# Patient Record
Sex: FEMALE | Race: WHITE | NOT HISPANIC OR LATINO
[De-identification: names, ages, dates, MRNs, and addresses within clinical notes are randomized per-mention and may not be internally consistent; named-entity substitution may affect disease eponyms.]

---

## 2017-01-04 ENCOUNTER — RX RENEWAL (OUTPATIENT)
Age: 82
End: 2017-01-04

## 2017-01-20 LAB
ALBUMIN SERPL ELPH-MCNC: 4.1 G/DL
ALP BLD-CCNC: 52 U/L
ALT SERPL-CCNC: 14 U/L
ANION GAP SERPL CALC-SCNC: 12 MMOL/L
APPEARANCE: CLEAR
AST SERPL-CCNC: 22 U/L
BASOPHILS # BLD AUTO: 0 K/UL
BASOPHILS NFR BLD AUTO: 0 %
BILIRUB SERPL-MCNC: 0.3 MG/DL
BILIRUBIN URINE: NEGATIVE
BLOOD URINE: NEGATIVE
BUN SERPL-MCNC: 17 MG/DL
CALCIUM SERPL-MCNC: 9.4 MG/DL
CHLORIDE SERPL-SCNC: 110 MMOL/L
CO2 SERPL-SCNC: 22 MMOL/L
COLOR: YELLOW
CREAT SERPL-MCNC: 1.29 MG/DL
EOSINOPHIL # BLD AUTO: 0.06 K/UL
EOSINOPHIL NFR BLD AUTO: 1.9 %
GLUCOSE QUALITATIVE U: NORMAL MG/DL
GLUCOSE SERPL-MCNC: 139 MG/DL
HCT VFR BLD CALC: 36.7 %
HGB BLD-MCNC: 11.8 G/DL
IMM GRANULOCYTES NFR BLD AUTO: 0 %
KETONES URINE: NEGATIVE
LEUKOCYTE ESTERASE URINE: NEGATIVE
LYMPHOCYTES # BLD AUTO: 1.09 K/UL
LYMPHOCYTES NFR BLD AUTO: 34.5 %
MAN DIFF?: NORMAL
MCHC RBC-ENTMCNC: 29.9 PG
MCHC RBC-ENTMCNC: 32.2 GM/DL
MCV RBC AUTO: 92.9 FL
MONOCYTES # BLD AUTO: 0.18 K/UL
MONOCYTES NFR BLD AUTO: 5.7 %
NEUTROPHILS # BLD AUTO: 1.83 K/UL
NEUTROPHILS NFR BLD AUTO: 57.9 %
NITRITE URINE: NEGATIVE
PH URINE: 5
PLATELET # BLD AUTO: 125 K/UL
POTASSIUM SERPL-SCNC: 3.9 MMOL/L
PROT SERPL-MCNC: 6.4 G/DL
PROTEIN URINE: NEGATIVE MG/DL
RBC # BLD: 3.95 M/UL
RBC # FLD: 15 %
SODIUM SERPL-SCNC: 144 MMOL/L
SPECIFIC GRAVITY URINE: 1.02
UROBILINOGEN URINE: NORMAL MG/DL
WBC # FLD AUTO: 3.16 K/UL

## 2017-01-23 ENCOUNTER — APPOINTMENT (OUTPATIENT)
Dept: HEMATOLOGY ONCOLOGY | Facility: CLINIC | Age: 82
End: 2017-01-23

## 2017-01-23 VITALS
WEIGHT: 171 LBS | SYSTOLIC BLOOD PRESSURE: 128 MMHG | TEMPERATURE: 97.8 F | DIASTOLIC BLOOD PRESSURE: 60 MMHG | BODY MASS INDEX: 30.29 KG/M2 | HEART RATE: 76 BPM

## 2017-01-26 ENCOUNTER — LABORATORY RESULT (OUTPATIENT)
Age: 82
End: 2017-01-26

## 2017-01-26 LAB
ALBUMIN SERPL ELPH-MCNC: 4.3 G/DL
ALP BLD-CCNC: 65 U/L
ALT SERPL-CCNC: 22 U/L
ANION GAP SERPL CALC-SCNC: 14 MMOL/L
APPEARANCE: ABNORMAL
AST SERPL-CCNC: 33 U/L
BASOPHILS # BLD AUTO: 0 K/UL
BASOPHILS NFR BLD AUTO: 0 %
BILIRUB SERPL-MCNC: 0.3 MG/DL
BILIRUBIN URINE: NEGATIVE
BLOOD URINE: NEGATIVE
BUN SERPL-MCNC: 20 MG/DL
CALCIUM SERPL-MCNC: 9.4 MG/DL
CHLORIDE SERPL-SCNC: 111 MMOL/L
CO2 SERPL-SCNC: 19 MMOL/L
COLOR: YELLOW
CREAT SERPL-MCNC: 1.24 MG/DL
EOSINOPHIL # BLD AUTO: 0.07 K/UL
EOSINOPHIL NFR BLD AUTO: 2.2 %
GLUCOSE QUALITATIVE U: NORMAL MG/DL
GLUCOSE SERPL-MCNC: 149 MG/DL
HCT VFR BLD CALC: 37.3 %
HGB BLD-MCNC: 12.1 G/DL
IMM GRANULOCYTES NFR BLD AUTO: 0 %
KETONES URINE: NEGATIVE
LEUKOCYTE ESTERASE URINE: NEGATIVE
LYMPHOCYTES # BLD AUTO: 1.11 K/UL
LYMPHOCYTES NFR BLD AUTO: 34.7 %
MAN DIFF?: NORMAL
MCHC RBC-ENTMCNC: 30.1 PG
MCHC RBC-ENTMCNC: 32.4 GM/DL
MCV RBC AUTO: 92.8 FL
MONOCYTES # BLD AUTO: 0.3 K/UL
MONOCYTES NFR BLD AUTO: 9.4 %
NEUTROPHILS # BLD AUTO: 1.72 K/UL
NEUTROPHILS NFR BLD AUTO: 53.7 %
NITRITE URINE: NEGATIVE
PH URINE: 5.5
PLATELET # BLD AUTO: 136 K/UL
POTASSIUM SERPL-SCNC: 3.6 MMOL/L
PROT SERPL-MCNC: 6.8 G/DL
PROTEIN URINE: ABNORMAL MG/DL
RBC # BLD: 4.02 M/UL
RBC # FLD: 16.4 %
SODIUM SERPL-SCNC: 144 MMOL/L
SPECIFIC GRAVITY URINE: 1.02
UROBILINOGEN URINE: NORMAL MG/DL
WBC # FLD AUTO: 3.2 K/UL

## 2017-01-30 ENCOUNTER — INPATIENT (INPATIENT)
Facility: HOSPITAL | Age: 82
LOS: 1 days | Discharge: HOME | DRG: 310 | End: 2017-02-01
Attending: INTERNAL MEDICINE | Admitting: STUDENT IN AN ORGANIZED HEALTH CARE EDUCATION/TRAINING PROGRAM
Payer: MEDICARE

## 2017-01-30 ENCOUNTER — APPOINTMENT (OUTPATIENT)
Age: 82
End: 2017-01-30

## 2017-01-30 ENCOUNTER — APPOINTMENT (OUTPATIENT)
Dept: INFUSION THERAPY | Facility: HOSPITAL | Age: 82
End: 2017-01-30

## 2017-01-30 ENCOUNTER — OUTPATIENT (OUTPATIENT)
Dept: OUTPATIENT SERVICES | Facility: HOSPITAL | Age: 82
LOS: 1 days | End: 2017-01-30
Payer: MEDICARE

## 2017-01-30 VITALS — DIASTOLIC BLOOD PRESSURE: 56 MMHG | HEART RATE: 44 BPM | SYSTOLIC BLOOD PRESSURE: 138 MMHG | RESPIRATION RATE: 14 BRPM

## 2017-01-30 DIAGNOSIS — Z79.82 LONG TERM (CURRENT) USE OF ASPIRIN: ICD-10-CM

## 2017-01-30 DIAGNOSIS — Z79.84 LONG TERM (CURRENT) USE OF ORAL HYPOGLYCEMIC DRUGS: ICD-10-CM

## 2017-01-30 DIAGNOSIS — R00.1 BRADYCARDIA, UNSPECIFIED: ICD-10-CM

## 2017-01-30 DIAGNOSIS — I44.1 ATRIOVENTRICULAR BLOCK, SECOND DEGREE: ICD-10-CM

## 2017-01-30 DIAGNOSIS — Z85.3 PERSONAL HISTORY OF MALIGNANT NEOPLASM OF BREAST: ICD-10-CM

## 2017-01-30 DIAGNOSIS — I10 ESSENTIAL (PRIMARY) HYPERTENSION: ICD-10-CM

## 2017-01-30 DIAGNOSIS — E78.5 HYPERLIPIDEMIA, UNSPECIFIED: ICD-10-CM

## 2017-01-30 DIAGNOSIS — Z85.038 PERSONAL HISTORY OF OTHER MALIGNANT NEOPLASM OF LARGE INTESTINE: ICD-10-CM

## 2017-01-30 DIAGNOSIS — Z92.21 PERSONAL HISTORY OF ANTINEOPLASTIC CHEMOTHERAPY: ICD-10-CM

## 2017-01-30 DIAGNOSIS — Z87.891 PERSONAL HISTORY OF NICOTINE DEPENDENCE: ICD-10-CM

## 2017-01-30 DIAGNOSIS — E11.9 TYPE 2 DIABETES MELLITUS WITHOUT COMPLICATIONS: ICD-10-CM

## 2017-01-30 PROCEDURE — 71010: CPT | Mod: 26

## 2017-01-30 PROCEDURE — 99223 1ST HOSP IP/OBS HIGH 75: CPT

## 2017-01-30 PROCEDURE — 96360 HYDRATION IV INFUSION INIT: CPT

## 2017-01-31 PROCEDURE — 93010 ELECTROCARDIOGRAM REPORT: CPT

## 2017-01-31 PROCEDURE — 93306 TTE W/DOPPLER COMPLETE: CPT | Mod: 26

## 2017-01-31 PROCEDURE — 99233 SBSQ HOSP IP/OBS HIGH 50: CPT

## 2017-02-01 PROCEDURE — 99223 1ST HOSP IP/OBS HIGH 75: CPT

## 2017-02-01 PROCEDURE — 84443 ASSAY THYROID STIM HORMONE: CPT

## 2017-02-01 PROCEDURE — 93017 CV STRESS TEST TRACING ONLY: CPT

## 2017-02-01 PROCEDURE — 82553 CREATINE MB FRACTION: CPT

## 2017-02-01 PROCEDURE — A9500: CPT

## 2017-02-01 PROCEDURE — 78452 HT MUSCLE IMAGE SPECT MULT: CPT

## 2017-02-01 PROCEDURE — 93306 TTE W/DOPPLER COMPLETE: CPT

## 2017-02-01 PROCEDURE — 80061 LIPID PANEL: CPT

## 2017-02-01 PROCEDURE — 99285 EMERGENCY DEPT VISIT HI MDM: CPT | Mod: 25

## 2017-02-01 PROCEDURE — 83036 HEMOGLOBIN GLYCOSYLATED A1C: CPT

## 2017-02-01 PROCEDURE — 96360 HYDRATION IV INFUSION INIT: CPT

## 2017-02-01 PROCEDURE — 81003 URINALYSIS AUTO W/O SCOPE: CPT

## 2017-02-01 PROCEDURE — 80048 BASIC METABOLIC PNL TOTAL CA: CPT

## 2017-02-01 PROCEDURE — 93018 CV STRESS TEST I&R ONLY: CPT

## 2017-02-01 PROCEDURE — 80076 HEPATIC FUNCTION PANEL: CPT

## 2017-02-01 PROCEDURE — 78452 HT MUSCLE IMAGE SPECT MULT: CPT | Mod: 26

## 2017-02-01 PROCEDURE — 85730 THROMBOPLASTIN TIME PARTIAL: CPT

## 2017-02-01 PROCEDURE — 71045 X-RAY EXAM CHEST 1 VIEW: CPT

## 2017-02-01 PROCEDURE — 93016 CV STRESS TEST SUPVJ ONLY: CPT

## 2017-02-01 PROCEDURE — 93005 ELECTROCARDIOGRAM TRACING: CPT

## 2017-02-01 PROCEDURE — 85027 COMPLETE CBC AUTOMATED: CPT

## 2017-02-01 PROCEDURE — 99232 SBSQ HOSP IP/OBS MODERATE 35: CPT | Mod: 25

## 2017-02-01 PROCEDURE — 85610 PROTHROMBIN TIME: CPT

## 2017-02-01 PROCEDURE — 84484 ASSAY OF TROPONIN QUANT: CPT

## 2017-02-01 PROCEDURE — 82550 ASSAY OF CK (CPK): CPT

## 2017-02-06 ENCOUNTER — APPOINTMENT (OUTPATIENT)
Dept: HEMATOLOGY ONCOLOGY | Facility: CLINIC | Age: 82
End: 2017-02-06

## 2017-02-06 VITALS
DIASTOLIC BLOOD PRESSURE: 70 MMHG | BODY MASS INDEX: 30.11 KG/M2 | TEMPERATURE: 97.8 F | SYSTOLIC BLOOD PRESSURE: 160 MMHG | WEIGHT: 170 LBS | HEART RATE: 80 BPM

## 2017-02-08 ENCOUNTER — APPOINTMENT (OUTPATIENT)
Dept: CARDIOLOGY | Facility: CLINIC | Age: 82
End: 2017-02-08

## 2017-02-08 ENCOUNTER — NON-APPOINTMENT (OUTPATIENT)
Age: 82
End: 2017-02-08

## 2017-02-08 VITALS
HEART RATE: 86 BPM | HEIGHT: 63 IN | SYSTOLIC BLOOD PRESSURE: 155 MMHG | DIASTOLIC BLOOD PRESSURE: 83 MMHG | OXYGEN SATURATION: 100 % | RESPIRATION RATE: 15 BRPM | BODY MASS INDEX: 30.3 KG/M2 | WEIGHT: 171 LBS

## 2017-02-08 DIAGNOSIS — Z92.21 PERSONAL HISTORY OF ANTINEOPLASTIC CHEMOTHERAPY: ICD-10-CM

## 2017-02-08 DIAGNOSIS — I44.1 ATRIOVENTRICULAR BLOCK, SECOND DEGREE: ICD-10-CM

## 2017-02-09 PROBLEM — I44.1 SECOND DEGREE AV BLOCK, MOBITZ TYPE I: Status: ACTIVE | Noted: 2017-02-02

## 2017-02-13 ENCOUNTER — OUTPATIENT (OUTPATIENT)
Dept: OUTPATIENT SERVICES | Facility: HOSPITAL | Age: 82
LOS: 1 days | End: 2017-02-13
Payer: MEDICARE

## 2017-02-13 ENCOUNTER — APPOINTMENT (OUTPATIENT)
Dept: INFUSION THERAPY | Facility: HOSPITAL | Age: 82
End: 2017-02-13

## 2017-02-13 DIAGNOSIS — Z51.11 ENCOUNTER FOR ANTINEOPLASTIC CHEMOTHERAPY: ICD-10-CM

## 2017-02-13 DIAGNOSIS — R11.2 NAUSEA WITH VOMITING, UNSPECIFIED: ICD-10-CM

## 2017-02-13 DIAGNOSIS — C18.9 MALIGNANT NEOPLASM OF COLON, UNSPECIFIED: ICD-10-CM

## 2017-03-01 ENCOUNTER — NON-APPOINTMENT (OUTPATIENT)
Age: 82
End: 2017-03-01

## 2017-03-01 ENCOUNTER — APPOINTMENT (OUTPATIENT)
Dept: ELECTROPHYSIOLOGY | Facility: CLINIC | Age: 82
End: 2017-03-01

## 2017-03-01 ENCOUNTER — MEDICATION RENEWAL (OUTPATIENT)
Age: 82
End: 2017-03-01

## 2017-03-01 VITALS
HEIGHT: 63 IN | WEIGHT: 170 LBS | HEART RATE: 81 BPM | DIASTOLIC BLOOD PRESSURE: 76 MMHG | OXYGEN SATURATION: 98 % | SYSTOLIC BLOOD PRESSURE: 149 MMHG | BODY MASS INDEX: 30.12 KG/M2

## 2017-03-04 LAB
ALBUMIN SERPL ELPH-MCNC: 4.2 G/DL
ALP BLD-CCNC: 60 U/L
ALT SERPL-CCNC: 14 U/L
ANION GAP SERPL CALC-SCNC: 14 MMOL/L
APPEARANCE: CLEAR
AST SERPL-CCNC: 21 U/L
BILIRUB SERPL-MCNC: 0.4 MG/DL
BILIRUBIN URINE: NEGATIVE
BLOOD URINE: NEGATIVE
BUN SERPL-MCNC: 25 MG/DL
CALCIUM SERPL-MCNC: 9.4 MG/DL
CHLORIDE SERPL-SCNC: 116 MMOL/L
CO2 SERPL-SCNC: 16 MMOL/L
COLOR: YELLOW
CREAT SERPL-MCNC: 1.41 MG/DL
GLUCOSE QUALITATIVE U: NORMAL MG/DL
GLUCOSE SERPL-MCNC: 125 MG/DL
KETONES URINE: NEGATIVE
LEUKOCYTE ESTERASE URINE: NEGATIVE
NITRITE URINE: NEGATIVE
PH URINE: 5.5
POTASSIUM SERPL-SCNC: 3.5 MMOL/L
PROT SERPL-MCNC: 6.3 G/DL
PROTEIN URINE: NEGATIVE MG/DL
SODIUM SERPL-SCNC: 146 MMOL/L
SPECIFIC GRAVITY URINE: 1.02
UROBILINOGEN URINE: NORMAL MG/DL

## 2017-03-06 ENCOUNTER — APPOINTMENT (OUTPATIENT)
Dept: INFUSION THERAPY | Facility: HOSPITAL | Age: 82
End: 2017-03-06

## 2017-03-06 ENCOUNTER — APPOINTMENT (OUTPATIENT)
Dept: HEMATOLOGY ONCOLOGY | Facility: CLINIC | Age: 82
End: 2017-03-06

## 2017-03-06 VITALS
TEMPERATURE: 97 F | BODY MASS INDEX: 30.47 KG/M2 | HEART RATE: 80 BPM | SYSTOLIC BLOOD PRESSURE: 126 MMHG | DIASTOLIC BLOOD PRESSURE: 78 MMHG | WEIGHT: 172 LBS

## 2017-03-06 LAB
BASOPHILS # BLD AUTO: 0.01 K/UL
BASOPHILS NFR BLD AUTO: 0.4 %
EOSINOPHIL # BLD AUTO: 0.12 K/UL
EOSINOPHIL NFR BLD AUTO: 4.4 %
HCT VFR BLD CALC: 35.4 %
HGB BLD-MCNC: 11.9 G/DL
IMM GRANULOCYTES NFR BLD AUTO: 0 %
LYMPHOCYTES # BLD AUTO: 0.8 K/UL
LYMPHOCYTES NFR BLD AUTO: 29.6 %
MAN DIFF?: NORMAL
MCHC RBC-ENTMCNC: 31.3 PG
MCHC RBC-ENTMCNC: 33.6 GM/DL
MCV RBC AUTO: 93.2 FL
MONOCYTES # BLD AUTO: 0.19 K/UL
MONOCYTES NFR BLD AUTO: 7 %
NEUTROPHILS # BLD AUTO: 1.58 K/UL
NEUTROPHILS NFR BLD AUTO: 58.6 %
PLATELET # BLD AUTO: 120 K/UL
RBC # BLD: 3.8 M/UL
RBC # FLD: 17.7 %
WBC # FLD AUTO: 2.7 K/UL

## 2017-03-06 PROCEDURE — 85025 COMPLETE CBC W/AUTO DIFF WBC: CPT

## 2017-03-06 PROCEDURE — 36415 COLL VENOUS BLD VENIPUNCTURE: CPT

## 2017-03-06 PROCEDURE — 96415 CHEMO IV INFUSION ADDL HR: CPT

## 2017-03-06 PROCEDURE — 96413 CHEMO IV INFUSION 1 HR: CPT

## 2017-03-08 ENCOUNTER — APPOINTMENT (OUTPATIENT)
Dept: CARDIOLOGY | Facility: CLINIC | Age: 82
End: 2017-03-08

## 2017-03-13 ENCOUNTER — LABORATORY RESULT (OUTPATIENT)
Age: 82
End: 2017-03-13

## 2017-03-13 LAB
BASOPHILS # BLD AUTO: 0 K/UL
BASOPHILS NFR BLD AUTO: 0 %
EOSINOPHIL # BLD AUTO: 0.1 K/UL
EOSINOPHIL NFR BLD AUTO: 3 %
HCT VFR BLD CALC: 36.1 %
HGB BLD-MCNC: 11.9 G/DL
IMM GRANULOCYTES NFR BLD AUTO: 0 %
LYMPHOCYTES # BLD AUTO: 1.05 K/UL
LYMPHOCYTES NFR BLD AUTO: 31.8 %
MAN DIFF?: NORMAL
MCHC RBC-ENTMCNC: 31.6 PG
MCHC RBC-ENTMCNC: 33 GM/DL
MCV RBC AUTO: 95.8 FL
MONOCYTES # BLD AUTO: 0.29 K/UL
MONOCYTES NFR BLD AUTO: 8.8 %
NEUTROPHILS # BLD AUTO: 1.86 K/UL
NEUTROPHILS NFR BLD AUTO: 56.4 %
PLATELET # BLD AUTO: 83 K/UL
RBC # BLD: 3.77 M/UL
RBC # FLD: 17.9 %
WBC # FLD AUTO: 3.3 K/UL

## 2017-03-20 ENCOUNTER — MEDICATION RENEWAL (OUTPATIENT)
Age: 82
End: 2017-03-20

## 2017-03-26 LAB
ALBUMIN SERPL ELPH-MCNC: 4 G/DL
ALP BLD-CCNC: 70 U/L
ALT SERPL-CCNC: 15 U/L
ANION GAP SERPL CALC-SCNC: 18 MMOL/L
APPEARANCE: CLEAR
AST SERPL-CCNC: 21 U/L
BASOPHILS # BLD AUTO: 0 K/UL
BASOPHILS NFR BLD AUTO: 0 %
BILIRUB SERPL-MCNC: 0.5 MG/DL
BILIRUBIN URINE: NEGATIVE
BLOOD URINE: NEGATIVE
BUN SERPL-MCNC: 19 MG/DL
CALCIUM SERPL-MCNC: 9.4 MG/DL
CHLORIDE SERPL-SCNC: 114 MMOL/L
CO2 SERPL-SCNC: 16 MMOL/L
COLOR: YELLOW
CREAT SERPL-MCNC: 1.54 MG/DL
EOSINOPHIL # BLD AUTO: 0.07 K/UL
EOSINOPHIL NFR BLD AUTO: 2.2 %
GLUCOSE QUALITATIVE U: NORMAL MG/DL
GLUCOSE SERPL-MCNC: 138 MG/DL
HCT VFR BLD CALC: 37.2 %
HGB BLD-MCNC: 12 G/DL
IMM GRANULOCYTES NFR BLD AUTO: 0 %
KETONES URINE: NEGATIVE
LEUKOCYTE ESTERASE URINE: NEGATIVE
LYMPHOCYTES # BLD AUTO: 0.86 K/UL
LYMPHOCYTES NFR BLD AUTO: 27.2 %
MAN DIFF?: NORMAL
MCHC RBC-ENTMCNC: 32.3 GM/DL
MCHC RBC-ENTMCNC: 32.3 PG
MCV RBC AUTO: 100 FL
MONOCYTES # BLD AUTO: 0.28 K/UL
MONOCYTES NFR BLD AUTO: 8.9 %
NEUTROPHILS # BLD AUTO: 1.95 K/UL
NEUTROPHILS NFR BLD AUTO: 61.7 %
NITRITE URINE: NEGATIVE
PH URINE: 6
PLATELET # BLD AUTO: 126 K/UL
POTASSIUM SERPL-SCNC: 4 MMOL/L
PROT SERPL-MCNC: 6.6 G/DL
PROTEIN URINE: NEGATIVE MG/DL
RBC # BLD: 3.72 M/UL
RBC # FLD: 20.3 %
SODIUM SERPL-SCNC: 147 MMOL/L
SPECIFIC GRAVITY URINE: 1.02
UROBILINOGEN URINE: NORMAL MG/DL
WBC # FLD AUTO: 3.16 K/UL

## 2017-03-27 ENCOUNTER — APPOINTMENT (OUTPATIENT)
Dept: HEMATOLOGY ONCOLOGY | Facility: CLINIC | Age: 82
End: 2017-03-27

## 2017-03-27 ENCOUNTER — APPOINTMENT (OUTPATIENT)
Dept: INFUSION THERAPY | Facility: HOSPITAL | Age: 82
End: 2017-03-27

## 2017-03-27 ENCOUNTER — OUTPATIENT (OUTPATIENT)
Dept: OUTPATIENT SERVICES | Facility: HOSPITAL | Age: 82
LOS: 1 days | End: 2017-03-27
Payer: MEDICARE

## 2017-03-27 VITALS
BODY MASS INDEX: 29.23 KG/M2 | HEART RATE: 80 BPM | DIASTOLIC BLOOD PRESSURE: 62 MMHG | WEIGHT: 165 LBS | TEMPERATURE: 97.7 F | SYSTOLIC BLOOD PRESSURE: 142 MMHG

## 2017-03-27 DIAGNOSIS — C18.9 MALIGNANT NEOPLASM OF COLON, UNSPECIFIED: ICD-10-CM

## 2017-03-27 DIAGNOSIS — R52 PAIN, UNSPECIFIED: ICD-10-CM

## 2017-04-13 ENCOUNTER — APPOINTMENT (OUTPATIENT)
Dept: HEMATOLOGY ONCOLOGY | Facility: CLINIC | Age: 82
End: 2017-04-13

## 2017-04-13 VITALS
HEART RATE: 60 BPM | WEIGHT: 160 LBS | DIASTOLIC BLOOD PRESSURE: 64 MMHG | SYSTOLIC BLOOD PRESSURE: 124 MMHG | BODY MASS INDEX: 28.34 KG/M2 | TEMPERATURE: 96.5 F

## 2017-04-15 ENCOUNTER — LABORATORY RESULT (OUTPATIENT)
Age: 82
End: 2017-04-15

## 2017-04-15 LAB
APPEARANCE: ABNORMAL
BILIRUBIN URINE: NEGATIVE
BLOOD URINE: NEGATIVE
COLOR: YELLOW
GLUCOSE QUALITATIVE U: NORMAL MG/DL
KETONES URINE: NEGATIVE
LEUKOCYTE ESTERASE URINE: NEGATIVE
NITRITE URINE: NEGATIVE
PH URINE: 6
PROTEIN URINE: 100 MG/DL
SPECIFIC GRAVITY URINE: 1.03
UROBILINOGEN URINE: 1 MG/DL

## 2017-04-17 ENCOUNTER — APPOINTMENT (OUTPATIENT)
Dept: INFUSION THERAPY | Facility: HOSPITAL | Age: 82
End: 2017-04-17

## 2017-04-17 ENCOUNTER — OTHER (OUTPATIENT)
Age: 82
End: 2017-04-17

## 2017-04-21 LAB
ALBUMIN SERPL ELPH-MCNC: 4.2 G/DL
ALP BLD-CCNC: 87 U/L
ALT SERPL-CCNC: 21 U/L
ANION GAP SERPL CALC-SCNC: 21 MMOL/L
AST SERPL-CCNC: 24 U/L
BASOPHILS # BLD AUTO: 0 K/UL
BASOPHILS NFR BLD AUTO: 0 %
BILIRUB SERPL-MCNC: 0.6 MG/DL
BUN SERPL-MCNC: 34 MG/DL
CALCIUM SERPL-MCNC: 9.6 MG/DL
CHLORIDE SERPL-SCNC: 115 MMOL/L
CO2 SERPL-SCNC: 12 MMOL/L
CREAT 24H UR-MCNC: 0.6 G/24 H
CREAT ?TM UR-MCNC: 137 MG/DL
CREAT SERPL-MCNC: 1.75 MG/DL
EOSINOPHIL # BLD AUTO: 0.06 K/UL
EOSINOPHIL NFR BLD AUTO: 1.6 %
GLUCOSE SERPL-MCNC: 174 MG/DL
HCT VFR BLD CALC: 36.7 %
HGB BLD-MCNC: 12.4 G/DL
IMM GRANULOCYTES NFR BLD AUTO: 0 %
LYMPHOCYTES # BLD AUTO: 0.84 K/UL
LYMPHOCYTES NFR BLD AUTO: 22.7 %
MAN DIFF?: NORMAL
MCHC RBC-ENTMCNC: 33.5 PG
MCHC RBC-ENTMCNC: 33.8 GM/DL
MCV RBC AUTO: 99.2 FL
MONOCYTES # BLD AUTO: 0.4 K/UL
MONOCYTES NFR BLD AUTO: 10.8 %
NEUTROPHILS # BLD AUTO: 2.4 K/UL
NEUTROPHILS NFR BLD AUTO: 64.9 %
PLATELET # BLD AUTO: 153 K/UL
POTASSIUM SERPL-SCNC: 3.7 MMOL/L
PROT 24H UR-MRATE: 26 MG/DL
PROT ?TM UR-MCNC: 24 HR
PROT SERPL-MCNC: 6.3 G/DL
PROT UR-MCNC: 110 MG/24 H
RBC # BLD: 3.7 M/UL
RBC # FLD: 20.5 %
SODIUM SERPL-SCNC: 148 MMOL/L
SPECIMEN VOL 24H UR: 425 ML
WBC # FLD AUTO: 3.7 K/UL

## 2017-04-27 ENCOUNTER — APPOINTMENT (OUTPATIENT)
Dept: INFUSION THERAPY | Facility: HOSPITAL | Age: 82
End: 2017-04-27

## 2017-04-28 ENCOUNTER — APPOINTMENT (OUTPATIENT)
Dept: INFUSION THERAPY | Facility: HOSPITAL | Age: 82
End: 2017-04-28

## 2017-04-28 ENCOUNTER — OUTPATIENT (OUTPATIENT)
Dept: OUTPATIENT SERVICES | Facility: HOSPITAL | Age: 82
LOS: 1 days | End: 2017-04-28
Payer: MEDICARE

## 2017-04-28 PROCEDURE — 96361 HYDRATE IV INFUSION ADD-ON: CPT

## 2017-04-28 PROCEDURE — 96413 CHEMO IV INFUSION 1 HR: CPT

## 2017-04-28 PROCEDURE — 96360 HYDRATION IV INFUSION INIT: CPT

## 2017-04-30 ENCOUNTER — FORM ENCOUNTER (OUTPATIENT)
Age: 82
End: 2017-04-30

## 2017-05-01 ENCOUNTER — OTHER (OUTPATIENT)
Age: 82
End: 2017-05-01

## 2017-05-01 ENCOUNTER — LABORATORY RESULT (OUTPATIENT)
Age: 82
End: 2017-05-01

## 2017-05-01 ENCOUNTER — OUTPATIENT (OUTPATIENT)
Dept: OUTPATIENT SERVICES | Facility: HOSPITAL | Age: 82
LOS: 1 days | End: 2017-05-01
Payer: MEDICARE

## 2017-05-01 ENCOUNTER — APPOINTMENT (OUTPATIENT)
Dept: CT IMAGING | Facility: HOSPITAL | Age: 82
End: 2017-05-01

## 2017-05-01 ENCOUNTER — APPOINTMENT (OUTPATIENT)
Dept: INFUSION THERAPY | Facility: HOSPITAL | Age: 82
End: 2017-05-01

## 2017-05-01 DIAGNOSIS — R91.8 OTHER NONSPECIFIC ABNORMAL FINDING OF LUNG FIELD: ICD-10-CM

## 2017-05-01 DIAGNOSIS — K76.89 OTHER SPECIFIED DISEASES OF LIVER: ICD-10-CM

## 2017-05-01 DIAGNOSIS — K57.30 DIVERTICULOSIS OF LARGE INTESTINE WITHOUT PERFORATION OR ABSCESS WITHOUT BLEEDING: ICD-10-CM

## 2017-05-01 PROCEDURE — 74176 CT ABD & PELVIS W/O CONTRAST: CPT

## 2017-05-01 PROCEDURE — 71250 CT THORAX DX C-: CPT

## 2017-05-02 ENCOUNTER — MESSAGE (OUTPATIENT)
Age: 82
End: 2017-05-02

## 2017-05-05 ENCOUNTER — APPOINTMENT (OUTPATIENT)
Dept: HEMATOLOGY ONCOLOGY | Facility: CLINIC | Age: 82
End: 2017-05-05

## 2017-05-05 VITALS
SYSTOLIC BLOOD PRESSURE: 122 MMHG | WEIGHT: 163 LBS | HEART RATE: 64 BPM | BODY MASS INDEX: 28.87 KG/M2 | DIASTOLIC BLOOD PRESSURE: 60 MMHG | TEMPERATURE: 97.7 F

## 2017-05-09 LAB
ALBUMIN SERPL ELPH-MCNC: 4.1 G/DL
ALP BLD-CCNC: 61 U/L
ALT SERPL-CCNC: 19 U/L
ANION GAP SERPL CALC-SCNC: 15 MMOL/L
AST SERPL-CCNC: 30 U/L
BASOPHILS # BLD AUTO: 0.01 K/UL
BASOPHILS NFR BLD AUTO: 0.4 %
BILIRUB SERPL-MCNC: 0.4 MG/DL
BUN SERPL-MCNC: 18 MG/DL
CALCIUM SERPL-MCNC: 9.7 MG/DL
CEA SERPL-MCNC: 208.2 NG/ML
CHLORIDE SERPL-SCNC: 111 MMOL/L
CO2 SERPL-SCNC: 19 MMOL/L
CREAT SERPL-MCNC: 1.26 MG/DL
EOSINOPHIL # BLD AUTO: 0.05 K/UL
EOSINOPHIL NFR BLD AUTO: 2.1 %
GLUCOSE SERPL-MCNC: 109 MG/DL
HCT VFR BLD CALC: 37.4 %
HGB BLD-MCNC: 12 G/DL
IMM GRANULOCYTES NFR BLD AUTO: 0 %
LYMPHOCYTES # BLD AUTO: 0.67 K/UL
LYMPHOCYTES NFR BLD AUTO: 28.3 %
MAN DIFF?: NORMAL
MCHC RBC-ENTMCNC: 32.1 GM/DL
MCHC RBC-ENTMCNC: 32.6 PG
MCV RBC AUTO: 101.6 FL
MONOCYTES # BLD AUTO: 0.2 K/UL
MONOCYTES NFR BLD AUTO: 8.4 %
NEUTROPHILS # BLD AUTO: 1.44 K/UL
NEUTROPHILS NFR BLD AUTO: 60.8 %
PLATELET # BLD AUTO: 125 K/UL
POTASSIUM SERPL-SCNC: 3.9 MMOL/L
PROT SERPL-MCNC: 6.5 G/DL
RBC # BLD: 3.68 M/UL
RBC # FLD: 15.6 %
SODIUM SERPL-SCNC: 145 MMOL/L
T4 FREE SERPL-MCNC: 1.2 NG/DL
TSH SERPL-ACNC: 1.45 UIU/ML
WBC # FLD AUTO: 2.37 K/UL

## 2017-05-10 ENCOUNTER — APPOINTMENT (OUTPATIENT)
Dept: INFUSION THERAPY | Facility: HOSPITAL | Age: 82
End: 2017-05-10

## 2017-05-15 ENCOUNTER — OTHER (OUTPATIENT)
Age: 82
End: 2017-05-15

## 2017-05-17 ENCOUNTER — LABORATORY RESULT (OUTPATIENT)
Age: 82
End: 2017-05-17

## 2017-05-18 ENCOUNTER — FORM ENCOUNTER (OUTPATIENT)
Age: 82
End: 2017-05-18

## 2017-05-19 ENCOUNTER — OUTPATIENT (OUTPATIENT)
Dept: OUTPATIENT SERVICES | Facility: HOSPITAL | Age: 82
LOS: 1 days | End: 2017-05-19
Payer: MEDICARE

## 2017-05-19 DIAGNOSIS — C18.9 MALIGNANT NEOPLASM OF COLON, UNSPECIFIED: ICD-10-CM

## 2017-05-19 PROCEDURE — 36561 INSERT TUNNELED CV CATH: CPT

## 2017-05-19 PROCEDURE — C1887: CPT

## 2017-05-19 PROCEDURE — C1788: CPT

## 2017-05-19 PROCEDURE — 77001 FLUOROGUIDE FOR VEIN DEVICE: CPT

## 2017-05-19 PROCEDURE — 76937 US GUIDE VASCULAR ACCESS: CPT | Mod: 26

## 2017-05-19 PROCEDURE — C1894: CPT

## 2017-05-19 PROCEDURE — C1769: CPT

## 2017-05-19 PROCEDURE — 77001 FLUOROGUIDE FOR VEIN DEVICE: CPT | Mod: 26

## 2017-05-19 PROCEDURE — 76937 US GUIDE VASCULAR ACCESS: CPT

## 2017-05-22 ENCOUNTER — LABORATORY RESULT (OUTPATIENT)
Age: 82
End: 2017-05-22

## 2017-05-22 LAB
ALBUMIN SERPL ELPH-MCNC: 4.2 G/DL
ALP BLD-CCNC: 73 U/L
ALT SERPL-CCNC: 19 U/L
ANION GAP SERPL CALC-SCNC: 15 MMOL/L
AST SERPL-CCNC: 29 U/L
BASOPHILS # BLD AUTO: 0.01 K/UL
BASOPHILS NFR BLD AUTO: 0.4 %
BILIRUB SERPL-MCNC: 0.4 MG/DL
BUN SERPL-MCNC: 20 MG/DL
CALCIUM SERPL-MCNC: 9.2 MG/DL
CHLORIDE SERPL-SCNC: 112 MMOL/L
CO2 SERPL-SCNC: 20 MMOL/L
CREAT SERPL-MCNC: 1.3 MG/DL
EOSINOPHIL # BLD AUTO: 0.04 K/UL
EOSINOPHIL NFR BLD AUTO: 1.4 %
GLUCOSE SERPL-MCNC: 128 MG/DL
HCT VFR BLD CALC: 35.1 %
HGB BLD-MCNC: 11.6 G/DL
IMM GRANULOCYTES NFR BLD AUTO: 0 %
LYMPHOCYTES # BLD AUTO: 0.71 K/UL
LYMPHOCYTES NFR BLD AUTO: 25 %
MAN DIFF?: NORMAL
MCHC RBC-ENTMCNC: 31.9 PG
MCHC RBC-ENTMCNC: 33 GM/DL
MCV RBC AUTO: 96.4 FL
MONOCYTES # BLD AUTO: 0.23 K/UL
MONOCYTES NFR BLD AUTO: 8.1 %
NEUTROPHILS # BLD AUTO: 1.85 K/UL
NEUTROPHILS NFR BLD AUTO: 65.1 %
PLATELET # BLD AUTO: 120 K/UL
POTASSIUM SERPL-SCNC: 3.4 MMOL/L
PROT SERPL-MCNC: 6.2 G/DL
RBC # BLD: 3.64 M/UL
RBC # FLD: 13.9 %
SODIUM SERPL-SCNC: 147 MMOL/L
T4 SERPL-MCNC: 7.6 UG/DL
TSH SERPL-ACNC: 1.46 UIU/ML
WBC # FLD AUTO: 2.84 K/UL

## 2017-05-23 ENCOUNTER — APPOINTMENT (OUTPATIENT)
Dept: HEMATOLOGY ONCOLOGY | Facility: CLINIC | Age: 82
End: 2017-05-23

## 2017-05-23 ENCOUNTER — MEDICATION RENEWAL (OUTPATIENT)
Age: 82
End: 2017-05-23

## 2017-05-23 ENCOUNTER — APPOINTMENT (OUTPATIENT)
Dept: INFUSION THERAPY | Facility: HOSPITAL | Age: 82
End: 2017-05-23

## 2017-05-23 VITALS — HEART RATE: 60 BPM | TEMPERATURE: 96.6 F | SYSTOLIC BLOOD PRESSURE: 140 MMHG | DIASTOLIC BLOOD PRESSURE: 65 MMHG

## 2017-05-23 PROCEDURE — 96361 HYDRATE IV INFUSION ADD-ON: CPT

## 2017-05-23 PROCEDURE — 96360 HYDRATION IV INFUSION INIT: CPT

## 2017-05-23 PROCEDURE — 96413 CHEMO IV INFUSION 1 HR: CPT

## 2017-06-05 ENCOUNTER — APPOINTMENT (OUTPATIENT)
Dept: INFUSION THERAPY | Facility: HOSPITAL | Age: 82
End: 2017-06-05

## 2017-06-05 ENCOUNTER — APPOINTMENT (OUTPATIENT)
Dept: HEMATOLOGY ONCOLOGY | Facility: CLINIC | Age: 82
End: 2017-06-05

## 2017-06-05 VITALS
TEMPERATURE: 97.9 F | SYSTOLIC BLOOD PRESSURE: 114 MMHG | WEIGHT: 163 LBS | BODY MASS INDEX: 28.87 KG/M2 | HEART RATE: 100 BPM | DIASTOLIC BLOOD PRESSURE: 60 MMHG

## 2017-06-08 ENCOUNTER — APPOINTMENT (OUTPATIENT)
Dept: MAMMOGRAPHY | Facility: HOSPITAL | Age: 82
End: 2017-06-08

## 2017-06-08 ENCOUNTER — OUTPATIENT (OUTPATIENT)
Dept: OUTPATIENT SERVICES | Facility: HOSPITAL | Age: 82
LOS: 1 days | End: 2017-06-08
Payer: MEDICARE

## 2017-06-08 ENCOUNTER — APPOINTMENT (OUTPATIENT)
Dept: ULTRASOUND IMAGING | Facility: HOSPITAL | Age: 82
End: 2017-06-08

## 2017-06-08 DIAGNOSIS — Z12.31 ENCOUNTER FOR SCREENING MAMMOGRAM FOR MALIGNANT NEOPLASM OF BREAST: ICD-10-CM

## 2017-06-08 DIAGNOSIS — N64.89 OTHER SPECIFIED DISORDERS OF BREAST: ICD-10-CM

## 2017-06-08 PROCEDURE — 77063 BREAST TOMOSYNTHESIS BI: CPT

## 2017-06-08 PROCEDURE — 76641 ULTRASOUND BREAST COMPLETE: CPT

## 2017-06-08 PROCEDURE — 77067 SCR MAMMO BI INCL CAD: CPT

## 2017-06-11 LAB
ALBUMIN SERPL ELPH-MCNC: 4.3 G/DL
ALP BLD-CCNC: 69 U/L
ALT SERPL-CCNC: 48 U/L
ANION GAP SERPL CALC-SCNC: 20 MMOL/L
AST SERPL-CCNC: 82 U/L
BASOPHILS # BLD AUTO: 0.01 K/UL
BASOPHILS NFR BLD AUTO: 0.3 %
BILIRUB SERPL-MCNC: 0.3 MG/DL
BUN SERPL-MCNC: 29 MG/DL
CALCIUM SERPL-MCNC: 9.8 MG/DL
CHLORIDE SERPL-SCNC: 110 MMOL/L
CHOLEST SERPL-MCNC: 139 MG/DL
CHOLEST/HDLC SERPL: 2.3 RATIO
CO2 SERPL-SCNC: 17 MMOL/L
CREAT SERPL-MCNC: 1.44 MG/DL
EOSINOPHIL # BLD AUTO: 0.06 K/UL
EOSINOPHIL NFR BLD AUTO: 1.8 %
GLUCOSE SERPL-MCNC: 158 MG/DL
HCT VFR BLD CALC: 38.7 %
HDLC SERPL-MCNC: 61 MG/DL
HGB BLD-MCNC: 12.2 G/DL
IMM GRANULOCYTES NFR BLD AUTO: 0.3 %
LDLC SERPL CALC-MCNC: 54 MG/DL
LYMPHOCYTES # BLD AUTO: 0.92 K/UL
LYMPHOCYTES NFR BLD AUTO: 28 %
MAN DIFF?: NORMAL
MCHC RBC-ENTMCNC: 30.8 PG
MCHC RBC-ENTMCNC: 31.5 GM/DL
MCV RBC AUTO: 97.7 FL
MONOCYTES # BLD AUTO: 0.18 K/UL
MONOCYTES NFR BLD AUTO: 5.5 %
NEUTROPHILS # BLD AUTO: 2.11 K/UL
NEUTROPHILS NFR BLD AUTO: 64.1 %
PLATELET # BLD AUTO: 199 K/UL
POTASSIUM SERPL-SCNC: 4.6 MMOL/L
PROT SERPL-MCNC: 6.8 G/DL
RBC # BLD: 3.96 M/UL
RBC # FLD: 13.3 %
SODIUM SERPL-SCNC: 147 MMOL/L
T4 SERPL-MCNC: 9 UG/DL
TRIGL SERPL-MCNC: 121 MG/DL
TSH SERPL-ACNC: 1.55 UIU/ML
WBC # FLD AUTO: 3.29 K/UL

## 2017-06-12 ENCOUNTER — APPOINTMENT (OUTPATIENT)
Dept: INFUSION THERAPY | Facility: HOSPITAL | Age: 82
End: 2017-06-12

## 2017-06-12 ENCOUNTER — OUTPATIENT (OUTPATIENT)
Dept: OUTPATIENT SERVICES | Facility: HOSPITAL | Age: 82
LOS: 1 days | End: 2017-06-12
Payer: MEDICARE

## 2017-06-12 DIAGNOSIS — C18.9 MALIGNANT NEOPLASM OF COLON, UNSPECIFIED: ICD-10-CM

## 2017-06-19 ENCOUNTER — APPOINTMENT (OUTPATIENT)
Dept: INFUSION THERAPY | Facility: HOSPITAL | Age: 82
End: 2017-06-19

## 2017-06-19 ENCOUNTER — APPOINTMENT (OUTPATIENT)
Dept: HEMATOLOGY ONCOLOGY | Facility: CLINIC | Age: 82
End: 2017-06-19

## 2017-06-19 LAB
ALBUMIN SERPL ELPH-MCNC: 4.2 G/DL
ALP BLD-CCNC: 58 U/L
ALT SERPL-CCNC: 77 U/L
AST SERPL-CCNC: 99 U/L
BILIRUB DIRECT SERPL-MCNC: 0.1 MG/DL
BILIRUB INDIRECT SERPL-MCNC: 0.3 MG/DL
BILIRUB SERPL-MCNC: 0.4 MG/DL
PROT SERPL-MCNC: 6.9 G/DL

## 2017-06-26 LAB
ALBUMIN SERPL ELPH-MCNC: 4.1 G/DL
ALP BLD-CCNC: 50 U/L
ALT SERPL-CCNC: 56 U/L
ANION GAP SERPL CALC-SCNC: 13 MMOL/L
AST SERPL-CCNC: 33 U/L
BASOPHILS # BLD AUTO: 0 K/UL
BASOPHILS NFR BLD AUTO: 0 %
BILIRUB SERPL-MCNC: 0.4 MG/DL
BUN SERPL-MCNC: 31 MG/DL
CALCIUM SERPL-MCNC: 9 MG/DL
CHLORIDE SERPL-SCNC: 110 MMOL/L
CO2 SERPL-SCNC: 23 MMOL/L
CREAT SERPL-MCNC: 1.11 MG/DL
EOSINOPHIL # BLD AUTO: 0.01 K/UL
EOSINOPHIL NFR BLD AUTO: 0.2 %
GLUCOSE SERPL-MCNC: 112 MG/DL
HAV IGM SER QL: NONREACTIVE
HBV CORE IGM SER QL: NONREACTIVE
HBV SURFACE AG SER QL: NONREACTIVE
HCT VFR BLD CALC: 40.6 %
HCV AB SER QL: NONREACTIVE
HCV S/CO RATIO: 0.09 S/CO
HGB BLD-MCNC: 13 G/DL
IMM GRANULOCYTES NFR BLD AUTO: 0.2 %
LYMPHOCYTES # BLD AUTO: 1.27 K/UL
LYMPHOCYTES NFR BLD AUTO: 28.7 %
MAN DIFF?: NORMAL
MCHC RBC-ENTMCNC: 30 PG
MCHC RBC-ENTMCNC: 32 GM/DL
MCV RBC AUTO: 93.5 FL
MONOCYTES # BLD AUTO: 0.33 K/UL
MONOCYTES NFR BLD AUTO: 7.5 %
NEUTROPHILS # BLD AUTO: 2.8 K/UL
NEUTROPHILS NFR BLD AUTO: 63.4 %
PLATELET # BLD AUTO: 149 K/UL
POTASSIUM SERPL-SCNC: 3.6 MMOL/L
PROT SERPL-MCNC: 6.1 G/DL
RBC # BLD: 4.34 M/UL
RBC # FLD: 13.6 %
SODIUM SERPL-SCNC: 146 MMOL/L
WBC # FLD AUTO: 4.42 K/UL

## 2017-06-30 LAB
ALBUMIN SERPL ELPH-MCNC: 4 G/DL
ALP BLD-CCNC: 45 U/L
ALT SERPL-CCNC: 55 U/L
ANION GAP SERPL CALC-SCNC: 16 MMOL/L
AST SERPL-CCNC: 38 U/L
BASOPHILS # BLD AUTO: 0 K/UL
BASOPHILS NFR BLD AUTO: 0 %
BILIRUB SERPL-MCNC: 0.4 MG/DL
BUN SERPL-MCNC: 27 MG/DL
CALCIUM SERPL-MCNC: 9.5 MG/DL
CHLORIDE SERPL-SCNC: 107 MMOL/L
CO2 SERPL-SCNC: 23 MMOL/L
CREAT SERPL-MCNC: 1.03 MG/DL
EOSINOPHIL # BLD AUTO: 0.02 K/UL
EOSINOPHIL NFR BLD AUTO: 0.4 %
GLUCOSE SERPL-MCNC: 98 MG/DL
HCT VFR BLD CALC: 40.2 %
HGB BLD-MCNC: 13.2 G/DL
IMM GRANULOCYTES NFR BLD AUTO: 0.4 %
LYMPHOCYTES # BLD AUTO: 1.32 K/UL
LYMPHOCYTES NFR BLD AUTO: 28 %
MAN DIFF?: NORMAL
MCHC RBC-ENTMCNC: 30.8 PG
MCHC RBC-ENTMCNC: 32.8 GM/DL
MCV RBC AUTO: 93.7 FL
MONOCYTES # BLD AUTO: 0.37 K/UL
MONOCYTES NFR BLD AUTO: 7.9 %
NEUTROPHILS # BLD AUTO: 2.98 K/UL
NEUTROPHILS NFR BLD AUTO: 63.3 %
PLATELET # BLD AUTO: 110 K/UL
POTASSIUM SERPL-SCNC: 3.2 MMOL/L
PROT SERPL-MCNC: 6 G/DL
RBC # BLD: 4.29 M/UL
RBC # FLD: 14 %
SODIUM SERPL-SCNC: 146 MMOL/L
WBC # FLD AUTO: 4.71 K/UL

## 2017-07-03 ENCOUNTER — APPOINTMENT (OUTPATIENT)
Dept: HEMATOLOGY ONCOLOGY | Facility: CLINIC | Age: 82
End: 2017-07-03

## 2017-07-03 ENCOUNTER — APPOINTMENT (OUTPATIENT)
Dept: INFUSION THERAPY | Facility: HOSPITAL | Age: 82
End: 2017-07-03

## 2017-07-03 VITALS
WEIGHT: 161 LBS | HEART RATE: 100 BPM | RESPIRATION RATE: 16 BRPM | SYSTOLIC BLOOD PRESSURE: 122 MMHG | HEIGHT: 63 IN | TEMPERATURE: 98.3 F | BODY MASS INDEX: 28.53 KG/M2 | DIASTOLIC BLOOD PRESSURE: 60 MMHG

## 2017-07-03 DIAGNOSIS — E87.6 HYPOKALEMIA: ICD-10-CM

## 2017-07-03 LAB
ALBUMIN SERPL ELPH-MCNC: 3.8 G/DL
ALP BLD-CCNC: 45 U/L
ALT SERPL-CCNC: 70 U/L
ANION GAP SERPL CALC-SCNC: 15 MMOL/L
AST SERPL-CCNC: 75 U/L
BASOPHILS # BLD AUTO: 0 K/UL
BASOPHILS NFR BLD AUTO: 0 %
BILIRUB SERPL-MCNC: 0.4 MG/DL
BUN SERPL-MCNC: 19 MG/DL
CALCIUM SERPL-MCNC: 9 MG/DL
CHLORIDE SERPL-SCNC: 107 MMOL/L
CO2 SERPL-SCNC: 22 MMOL/L
CREAT SERPL-MCNC: 0.96 MG/DL
EOSINOPHIL # BLD AUTO: 0.02 K/UL
EOSINOPHIL NFR BLD AUTO: 0.4 %
GLUCOSE SERPL-MCNC: 136 MG/DL
HCT VFR BLD CALC: 38.9 %
HGB BLD-MCNC: 12.6 G/DL
IMM GRANULOCYTES NFR BLD AUTO: 0.2 %
LYMPHOCYTES # BLD AUTO: 0.73 K/UL
LYMPHOCYTES NFR BLD AUTO: 13 %
MAGNESIUM SERPL-MCNC: 2.1 MG/DL
MAN DIFF?: NORMAL
MCHC RBC-ENTMCNC: 30 PG
MCHC RBC-ENTMCNC: 32.4 GM/DL
MCV RBC AUTO: 92.6 FL
MONOCYTES # BLD AUTO: 0.35 K/UL
MONOCYTES NFR BLD AUTO: 6.2 %
NEUTROPHILS # BLD AUTO: 4.5 K/UL
NEUTROPHILS NFR BLD AUTO: 80.2 %
PLATELET # BLD AUTO: 122 K/UL
POTASSIUM SERPL-SCNC: 4.4 MMOL/L
PROT SERPL-MCNC: 5.6 G/DL
RBC # BLD: 4.2 M/UL
RBC # FLD: 14.1 %
SODIUM SERPL-SCNC: 144 MMOL/L
TSH SERPL-ACNC: 1.51 UIU/ML
WBC # FLD AUTO: 5.61 K/UL

## 2017-07-03 RX ORDER — POTASSIUM CHLORIDE 1500 MG/1
20 TABLET, FILM COATED, EXTENDED RELEASE ORAL
Qty: 10 | Refills: 0 | Status: COMPLETED | COMMUNITY
Start: 2017-05-01 | End: 2017-07-03

## 2017-07-03 RX ORDER — POTASSIUM CHLORIDE 1500 MG/1
20 TABLET, EXTENDED RELEASE ORAL
Qty: 30 | Refills: 0 | Status: COMPLETED | COMMUNITY
Start: 2017-05-01 | End: 2017-07-03

## 2017-07-08 ENCOUNTER — LABORATORY RESULT (OUTPATIENT)
Age: 82
End: 2017-07-08

## 2017-07-10 ENCOUNTER — APPOINTMENT (OUTPATIENT)
Dept: HEMATOLOGY ONCOLOGY | Facility: CLINIC | Age: 82
End: 2017-07-10

## 2017-07-10 ENCOUNTER — FORM ENCOUNTER (OUTPATIENT)
Age: 82
End: 2017-07-10

## 2017-07-10 ENCOUNTER — APPOINTMENT (OUTPATIENT)
Dept: INFUSION THERAPY | Facility: HOSPITAL | Age: 82
End: 2017-07-10

## 2017-07-10 VITALS
DIASTOLIC BLOOD PRESSURE: 64 MMHG | RESPIRATION RATE: 16 BRPM | HEART RATE: 100 BPM | SYSTOLIC BLOOD PRESSURE: 128 MMHG | HEIGHT: 63 IN | TEMPERATURE: 98.3 F | BODY MASS INDEX: 28.17 KG/M2 | WEIGHT: 159 LBS

## 2017-07-10 LAB — PFCX: 11 %

## 2017-07-10 PROCEDURE — 96361 HYDRATE IV INFUSION ADD-ON: CPT

## 2017-07-10 PROCEDURE — 36415 COLL VENOUS BLD VENIPUNCTURE: CPT

## 2017-07-10 PROCEDURE — 96360 HYDRATION IV INFUSION INIT: CPT

## 2017-07-10 PROCEDURE — 80076 HEPATIC FUNCTION PANEL: CPT

## 2017-07-11 ENCOUNTER — APPOINTMENT (OUTPATIENT)
Dept: MRI IMAGING | Facility: HOSPITAL | Age: 82
End: 2017-07-11

## 2017-07-11 ENCOUNTER — OUTPATIENT (OUTPATIENT)
Dept: OUTPATIENT SERVICES | Facility: HOSPITAL | Age: 82
LOS: 1 days | End: 2017-07-11
Payer: MEDICARE

## 2017-07-11 DIAGNOSIS — Z85.3 PERSONAL HISTORY OF MALIGNANT NEOPLASM OF BREAST: ICD-10-CM

## 2017-07-11 DIAGNOSIS — R53.1 WEAKNESS: ICD-10-CM

## 2017-07-11 DIAGNOSIS — Z85.038 PERSONAL HISTORY OF OTHER MALIGNANT NEOPLASM OF LARGE INTESTINE: ICD-10-CM

## 2017-07-11 PROCEDURE — 70553 MRI BRAIN STEM W/O & W/DYE: CPT

## 2017-07-11 PROCEDURE — A9579: CPT

## 2017-07-12 ENCOUNTER — MESSAGE (OUTPATIENT)
Age: 82
End: 2017-07-12

## 2017-07-15 LAB
ALBUMIN SERPL ELPH-MCNC: 4.1 G/DL
ALP BLD-CCNC: 40 U/L
ALT SERPL-CCNC: 68 U/L
ANION GAP SERPL CALC-SCNC: 15 MMOL/L
AST SERPL-CCNC: 43 U/L
BILIRUB SERPL-MCNC: 0.4 MG/DL
BUN SERPL-MCNC: 27 MG/DL
CALCIUM SERPL-MCNC: 9.7 MG/DL
CHLORIDE SERPL-SCNC: 106 MMOL/L
CO2 SERPL-SCNC: 23 MMOL/L
CREAT SERPL-MCNC: 1.24 MG/DL
GLUCOSE SERPL-MCNC: 145 MG/DL
POTASSIUM SERPL-SCNC: 4 MMOL/L
PROT SERPL-MCNC: 6.3 G/DL
SODIUM SERPL-SCNC: 144 MMOL/L

## 2017-07-17 ENCOUNTER — APPOINTMENT (OUTPATIENT)
Dept: INFUSION THERAPY | Facility: HOSPITAL | Age: 82
End: 2017-07-17

## 2017-07-17 ENCOUNTER — OUTPATIENT (OUTPATIENT)
Dept: OUTPATIENT SERVICES | Facility: HOSPITAL | Age: 82
LOS: 1 days | End: 2017-07-17
Payer: MEDICARE

## 2017-07-17 ENCOUNTER — APPOINTMENT (OUTPATIENT)
Dept: PHYSICAL MEDICINE AND REHAB | Facility: CLINIC | Age: 82
End: 2017-07-17

## 2017-07-17 ENCOUNTER — APPOINTMENT (OUTPATIENT)
Dept: HEMATOLOGY ONCOLOGY | Facility: CLINIC | Age: 82
End: 2017-07-17

## 2017-07-17 VITALS
TEMPERATURE: 98.2 F | BODY MASS INDEX: 27.63 KG/M2 | HEART RATE: 92 BPM | WEIGHT: 156 LBS | SYSTOLIC BLOOD PRESSURE: 116 MMHG | DIASTOLIC BLOOD PRESSURE: 60 MMHG

## 2017-07-17 VITALS
RESPIRATION RATE: 16 BRPM | HEIGHT: 63 IN | SYSTOLIC BLOOD PRESSURE: 125 MMHG | DIASTOLIC BLOOD PRESSURE: 80 MMHG | HEART RATE: 107 BPM | WEIGHT: 156 LBS | BODY MASS INDEX: 27.64 KG/M2 | OXYGEN SATURATION: 95 %

## 2017-07-17 DIAGNOSIS — C18.9 MALIGNANT NEOPLASM OF COLON, UNSPECIFIED: ICD-10-CM

## 2017-07-17 RX ORDER — SODIUM BICARBONATE 650 MG/1
650 TABLET ORAL 3 TIMES DAILY
Qty: 270 | Refills: 0 | Status: COMPLETED | COMMUNITY
Start: 2017-04-26 | End: 2017-07-17

## 2017-07-18 ENCOUNTER — RX RENEWAL (OUTPATIENT)
Age: 82
End: 2017-07-18

## 2017-07-26 ENCOUNTER — OUTPATIENT (OUTPATIENT)
Dept: OUTPATIENT SERVICES | Facility: HOSPITAL | Age: 82
LOS: 1 days | End: 2017-07-26
Payer: MEDICARE

## 2017-07-26 DIAGNOSIS — M62.81 MUSCLE WEAKNESS (GENERALIZED): ICD-10-CM

## 2017-07-26 DIAGNOSIS — R53.1 WEAKNESS: ICD-10-CM

## 2017-07-26 DIAGNOSIS — G62.0 DRUG-INDUCED POLYNEUROPATHY: ICD-10-CM

## 2017-07-31 ENCOUNTER — MEDICATION RENEWAL (OUTPATIENT)
Age: 82
End: 2017-07-31

## 2017-08-06 LAB
ALBUMIN SERPL ELPH-MCNC: 4.1 G/DL
ALP BLD-CCNC: 39 U/L
ALT SERPL-CCNC: 40 U/L
ANION GAP SERPL CALC-SCNC: 16 MMOL/L
AST SERPL-CCNC: 26 U/L
BASOPHILS # BLD AUTO: 0.01 K/UL
BASOPHILS NFR BLD AUTO: 0.3 %
BILIRUB SERPL-MCNC: 0.3 MG/DL
BUN SERPL-MCNC: 26 MG/DL
CALCIUM SERPL-MCNC: 9.6 MG/DL
CHLORIDE SERPL-SCNC: 111 MMOL/L
CO2 SERPL-SCNC: 20 MMOL/L
CREAT SERPL-MCNC: 1.23 MG/DL
EOSINOPHIL # BLD AUTO: 0.04 K/UL
EOSINOPHIL NFR BLD AUTO: 1 %
GLUCOSE SERPL-MCNC: 123 MG/DL
HCT VFR BLD CALC: 39.3 %
HGB BLD-MCNC: 12.8 G/DL
IMM GRANULOCYTES NFR BLD AUTO: 0.3 %
LYMPHOCYTES # BLD AUTO: 0.93 K/UL
LYMPHOCYTES NFR BLD AUTO: 24.3 %
MAN DIFF?: NORMAL
MCHC RBC-ENTMCNC: 29.5 PG
MCHC RBC-ENTMCNC: 32.6 GM/DL
MCV RBC AUTO: 90.6 FL
MONOCYTES # BLD AUTO: 0.29 K/UL
MONOCYTES NFR BLD AUTO: 7.6 %
NEUTROPHILS # BLD AUTO: 2.54 K/UL
NEUTROPHILS NFR BLD AUTO: 66.5 %
PLATELET # BLD AUTO: 119 K/UL
POTASSIUM SERPL-SCNC: 4.4 MMOL/L
PROT SERPL-MCNC: 5.8 G/DL
RBC # BLD: 4.34 M/UL
RBC # FLD: 15.8 %
SODIUM SERPL-SCNC: 147 MMOL/L
TSH SERPL-ACNC: 2.38 UIU/ML
WBC # FLD AUTO: 3.82 K/UL

## 2017-08-07 ENCOUNTER — APPOINTMENT (OUTPATIENT)
Dept: INFUSION THERAPY | Facility: HOSPITAL | Age: 82
End: 2017-08-07

## 2017-08-07 ENCOUNTER — APPOINTMENT (OUTPATIENT)
Dept: HEMATOLOGY ONCOLOGY | Facility: CLINIC | Age: 82
End: 2017-08-07
Payer: MEDICARE

## 2017-08-07 VITALS
WEIGHT: 158 LBS | SYSTOLIC BLOOD PRESSURE: 138 MMHG | DIASTOLIC BLOOD PRESSURE: 64 MMHG | TEMPERATURE: 97.9 F | HEART RATE: 88 BPM | BODY MASS INDEX: 27.99 KG/M2

## 2017-08-07 PROCEDURE — 99214 OFFICE O/P EST MOD 30 MIN: CPT

## 2017-08-07 PROCEDURE — 96413 CHEMO IV INFUSION 1 HR: CPT

## 2017-08-07 RX ORDER — PEMBROLIZUMAB 25 MG/ML
100 INJECTION, SOLUTION INTRAVENOUS
Refills: 0 | Status: DISCONTINUED | COMMUNITY
Start: 2017-05-05 | End: 2017-08-07

## 2017-08-09 ENCOUNTER — FORM ENCOUNTER (OUTPATIENT)
Age: 82
End: 2017-08-09

## 2017-08-10 ENCOUNTER — APPOINTMENT (OUTPATIENT)
Dept: CT IMAGING | Facility: HOSPITAL | Age: 82
End: 2017-08-10
Payer: MEDICARE

## 2017-08-10 ENCOUNTER — OUTPATIENT (OUTPATIENT)
Dept: OUTPATIENT SERVICES | Facility: HOSPITAL | Age: 82
LOS: 1 days | End: 2017-08-10
Payer: MEDICARE

## 2017-08-10 DIAGNOSIS — I51.7 CARDIOMEGALY: ICD-10-CM

## 2017-08-10 DIAGNOSIS — C18.9 MALIGNANT NEOPLASM OF COLON, UNSPECIFIED: ICD-10-CM

## 2017-08-10 PROCEDURE — 74177 CT ABD & PELVIS W/CONTRAST: CPT

## 2017-08-10 PROCEDURE — 71260 CT THORAX DX C+: CPT

## 2017-08-10 PROCEDURE — 71260 CT THORAX DX C+: CPT | Mod: 26

## 2017-08-10 PROCEDURE — 74177 CT ABD & PELVIS W/CONTRAST: CPT | Mod: 26

## 2017-08-14 ENCOUNTER — OTHER (OUTPATIENT)
Age: 82
End: 2017-08-14

## 2017-08-14 ENCOUNTER — MEDICATION RENEWAL (OUTPATIENT)
Age: 82
End: 2017-08-14

## 2017-08-14 LAB
CORTIS SERPL-MCNC: 14 UG/DL
CORTISOL, FREE: 0.66 UG/DL
PFCX: 4.7 %

## 2017-08-22 ENCOUNTER — APPOINTMENT (OUTPATIENT)
Dept: HEMATOLOGY ONCOLOGY | Facility: CLINIC | Age: 82
End: 2017-08-22
Payer: MEDICARE

## 2017-08-22 VITALS
SYSTOLIC BLOOD PRESSURE: 114 MMHG | WEIGHT: 160 LBS | BODY MASS INDEX: 28.34 KG/M2 | HEART RATE: 92 BPM | TEMPERATURE: 97.8 F | DIASTOLIC BLOOD PRESSURE: 60 MMHG

## 2017-08-22 PROCEDURE — 99215 OFFICE O/P EST HI 40 MIN: CPT

## 2017-09-11 ENCOUNTER — MEDICATION RENEWAL (OUTPATIENT)
Age: 82
End: 2017-09-11

## 2017-09-16 ENCOUNTER — LABORATORY RESULT (OUTPATIENT)
Age: 82
End: 2017-09-16

## 2017-09-17 ENCOUNTER — FORM ENCOUNTER (OUTPATIENT)
Age: 82
End: 2017-09-17

## 2017-09-17 LAB
ALBUMIN SERPL ELPH-MCNC: 4.2 G/DL
ALP BLD-CCNC: 51 U/L
ALT SERPL-CCNC: 21 U/L
ANION GAP SERPL CALC-SCNC: 14 MMOL/L
AST SERPL-CCNC: 26 U/L
BASOPHILS # BLD AUTO: 0.02 K/UL
BASOPHILS NFR BLD AUTO: 0.9 %
BILIRUB SERPL-MCNC: 0.5 MG/DL
BUN SERPL-MCNC: 15 MG/DL
CALCIUM SERPL-MCNC: 9.6 MG/DL
CEA SERPL-MCNC: 130.6 NG/ML
CHLORIDE SERPL-SCNC: 109 MMOL/L
CO2 SERPL-SCNC: 21 MMOL/L
CREAT SERPL-MCNC: 1.23 MG/DL
EOSINOPHIL # BLD AUTO: 0.02 K/UL
EOSINOPHIL NFR BLD AUTO: 0.9 %
GLUCOSE SERPL-MCNC: 135 MG/DL
HCT VFR BLD CALC: 38.1 %
HGB BLD-MCNC: 12.7 G/DL
LYMPHOCYTES # BLD AUTO: 0.52 K/UL
LYMPHOCYTES NFR BLD AUTO: 20 %
MAN DIFF?: NORMAL
MCHC RBC-ENTMCNC: 30.2 PG
MCHC RBC-ENTMCNC: 33.3 GM/DL
MCV RBC AUTO: 90.5 FL
MONOCYTES # BLD AUTO: 0.16 K/UL
MONOCYTES NFR BLD AUTO: 6.1 %
NEUTROPHILS # BLD AUTO: 1.89 K/UL
NEUTROPHILS NFR BLD AUTO: 72.1 %
PLATELET # BLD AUTO: 117 K/UL
POTASSIUM SERPL-SCNC: 3.6 MMOL/L
PROT SERPL-MCNC: 6 G/DL
RBC # BLD: 4.21 M/UL
RBC # FLD: 16 %
SODIUM SERPL-SCNC: 144 MMOL/L
TSH SERPL-ACNC: 0.96 UIU/ML
WBC # FLD AUTO: 2.62 K/UL

## 2017-09-18 ENCOUNTER — OUTPATIENT (OUTPATIENT)
Dept: OUTPATIENT SERVICES | Facility: HOSPITAL | Age: 82
LOS: 1 days | End: 2017-09-18
Payer: MEDICARE

## 2017-09-18 ENCOUNTER — APPOINTMENT (OUTPATIENT)
Dept: CT IMAGING | Facility: HOSPITAL | Age: 82
End: 2017-09-18
Payer: MEDICARE

## 2017-09-18 DIAGNOSIS — C18.9 MALIGNANT NEOPLASM OF COLON, UNSPECIFIED: ICD-10-CM

## 2017-09-18 PROCEDURE — 74177 CT ABD & PELVIS W/CONTRAST: CPT | Mod: 26

## 2017-09-18 PROCEDURE — 74177 CT ABD & PELVIS W/CONTRAST: CPT

## 2017-09-19 ENCOUNTER — APPOINTMENT (OUTPATIENT)
Dept: HEMATOLOGY ONCOLOGY | Facility: CLINIC | Age: 82
End: 2017-09-19
Payer: MEDICARE

## 2017-09-19 VITALS
SYSTOLIC BLOOD PRESSURE: 118 MMHG | HEART RATE: 60 BPM | BODY MASS INDEX: 28.7 KG/M2 | WEIGHT: 162 LBS | TEMPERATURE: 98.4 F | DIASTOLIC BLOOD PRESSURE: 58 MMHG

## 2017-09-19 PROCEDURE — 99215 OFFICE O/P EST HI 40 MIN: CPT

## 2017-09-19 PROCEDURE — 97112 NEUROMUSCULAR REEDUCATION: CPT

## 2017-09-19 PROCEDURE — 97162 PT EVAL MOD COMPLEX 30 MIN: CPT

## 2017-09-19 PROCEDURE — G8979: CPT | Mod: CJ

## 2017-09-19 PROCEDURE — G8978: CPT | Mod: CL

## 2017-09-19 PROCEDURE — 97116 GAIT TRAINING THERAPY: CPT

## 2017-09-19 PROCEDURE — 97110 THERAPEUTIC EXERCISES: CPT

## 2017-09-19 RX ORDER — PREDNISONE 20 MG/1
20 TABLET ORAL
Qty: 30 | Refills: 0 | Status: COMPLETED | COMMUNITY
Start: 2017-07-31

## 2017-09-22 ENCOUNTER — APPOINTMENT (OUTPATIENT)
Dept: PHYSICAL MEDICINE AND REHAB | Facility: CLINIC | Age: 82
End: 2017-09-22
Payer: MEDICARE

## 2017-09-22 VITALS
RESPIRATION RATE: 16 BRPM | HEIGHT: 63 IN | SYSTOLIC BLOOD PRESSURE: 110 MMHG | OXYGEN SATURATION: 97 % | BODY MASS INDEX: 28.7 KG/M2 | WEIGHT: 162 LBS | DIASTOLIC BLOOD PRESSURE: 68 MMHG | HEART RATE: 94 BPM

## 2017-09-22 PROCEDURE — 99213 OFFICE O/P EST LOW 20 MIN: CPT

## 2017-09-22 RX ORDER — PREDNISONE 5 MG/1
5 TABLET ORAL
Qty: 30 | Refills: 0 | Status: DISCONTINUED | COMMUNITY
Start: 2017-06-19 | End: 2017-09-22

## 2017-09-25 ENCOUNTER — APPOINTMENT (OUTPATIENT)
Dept: INFUSION THERAPY | Facility: HOSPITAL | Age: 82
End: 2017-09-25

## 2017-09-25 ENCOUNTER — OUTPATIENT (OUTPATIENT)
Dept: OUTPATIENT SERVICES | Facility: HOSPITAL | Age: 82
LOS: 1 days | End: 2017-09-25
Payer: MEDICARE

## 2017-09-25 DIAGNOSIS — C18.9 MALIGNANT NEOPLASM OF COLON, UNSPECIFIED: ICD-10-CM

## 2017-09-25 LAB
CORTICOSTEROID BIND GLOBULIN: 3.3 MG/DL
CORTIS SERPL-MCNC: 21 UG/DL
CORTISOL, FREE: 1.9 UG/DL
PFCX: 9.1 %

## 2017-10-09 ENCOUNTER — RX RENEWAL (OUTPATIENT)
Age: 82
End: 2017-10-09

## 2017-10-14 ENCOUNTER — LABORATORY RESULT (OUTPATIENT)
Age: 82
End: 2017-10-14

## 2017-10-16 ENCOUNTER — APPOINTMENT (OUTPATIENT)
Dept: INFUSION THERAPY | Facility: HOSPITAL | Age: 82
End: 2017-10-16

## 2017-10-16 ENCOUNTER — APPOINTMENT (OUTPATIENT)
Dept: HEMATOLOGY ONCOLOGY | Facility: CLINIC | Age: 82
End: 2017-10-16
Payer: MEDICARE

## 2017-10-16 VITALS
DIASTOLIC BLOOD PRESSURE: 60 MMHG | SYSTOLIC BLOOD PRESSURE: 140 MMHG | TEMPERATURE: 97.9 F | BODY MASS INDEX: 29.05 KG/M2 | WEIGHT: 164 LBS | HEART RATE: 91 BPM | RESPIRATION RATE: 14 BRPM | OXYGEN SATURATION: 99 %

## 2017-10-16 LAB
ALBUMIN SERPL ELPH-MCNC: 4.2 G/DL
ALP BLD-CCNC: 55 U/L
ALT SERPL-CCNC: 19 U/L
ANION GAP SERPL CALC-SCNC: 15 MMOL/L
AST SERPL-CCNC: 25 U/L
BASOPHILS # BLD AUTO: 0.01 K/UL
BASOPHILS NFR BLD AUTO: 0.4 %
BILIRUB SERPL-MCNC: 0.3 MG/DL
BUN SERPL-MCNC: 24 MG/DL
CALCIUM SERPL-MCNC: 9.5 MG/DL
CHLORIDE SERPL-SCNC: 113 MMOL/L
CO2 SERPL-SCNC: 20 MMOL/L
CREAT SERPL-MCNC: 1.09 MG/DL
EOSINOPHIL # BLD AUTO: 0.05 K/UL
EOSINOPHIL NFR BLD AUTO: 2 %
GLUCOSE SERPL-MCNC: 129 MG/DL
HCT VFR BLD CALC: 38.9 %
HGB BLD-MCNC: 12.9 G/DL
IMM GRANULOCYTES NFR BLD AUTO: 0.4 %
LYMPHOCYTES # BLD AUTO: 0.79 K/UL
LYMPHOCYTES NFR BLD AUTO: 31.3 %
MAN DIFF?: NORMAL
MCHC RBC-ENTMCNC: 30.6 PG
MCHC RBC-ENTMCNC: 33.2 GM/DL
MCV RBC AUTO: 92.4 FL
MONOCYTES # BLD AUTO: 0.19 K/UL
MONOCYTES NFR BLD AUTO: 7.5 %
NEUTROPHILS # BLD AUTO: 1.47 K/UL
NEUTROPHILS NFR BLD AUTO: 58.4 %
PLATELET # BLD AUTO: 108 K/UL
POTASSIUM SERPL-SCNC: 4.4 MMOL/L
PROT SERPL-MCNC: 6 G/DL
RBC # BLD: 4.21 M/UL
RBC # FLD: 14.1 %
SODIUM SERPL-SCNC: 148 MMOL/L
T3FREE SERPL-MCNC: 3.13 PG/ML
T4 FREE SERPL-MCNC: 1.3 NG/DL
TSH SERPL-ACNC: 0.37 UIU/ML
WBC # FLD AUTO: 2.52 K/UL

## 2017-10-16 PROCEDURE — 96413 CHEMO IV INFUSION 1 HR: CPT

## 2017-10-16 PROCEDURE — 99215 OFFICE O/P EST HI 40 MIN: CPT

## 2017-10-23 LAB
BASOPHILS # BLD AUTO: 0.01 K/UL
BASOPHILS NFR BLD AUTO: 0.2 %
EOSINOPHIL # BLD AUTO: 0.08 K/UL
EOSINOPHIL NFR BLD AUTO: 1.9 %
HCT VFR BLD CALC: 38.7 %
HGB BLD-MCNC: 12.8 G/DL
IMM GRANULOCYTES NFR BLD AUTO: 0 %
LYMPHOCYTES # BLD AUTO: 0.96 K/UL
LYMPHOCYTES NFR BLD AUTO: 23.3 %
MAN DIFF?: NORMAL
MCHC RBC-ENTMCNC: 30.5 PG
MCHC RBC-ENTMCNC: 33.1 GM/DL
MCV RBC AUTO: 92.1 FL
MONOCYTES # BLD AUTO: 0.25 K/UL
MONOCYTES NFR BLD AUTO: 6.1 %
NEUTROPHILS # BLD AUTO: 2.82 K/UL
NEUTROPHILS NFR BLD AUTO: 68.5 %
PLATELET # BLD AUTO: 109 K/UL
RBC # BLD: 4.2 M/UL
RBC # FLD: 13.9 %
WBC # FLD AUTO: 4.12 K/UL

## 2017-11-04 ENCOUNTER — LABORATORY RESULT (OUTPATIENT)
Age: 82
End: 2017-11-04

## 2017-11-05 LAB
ALBUMIN SERPL ELPH-MCNC: 3.9 G/DL
ALP BLD-CCNC: 66 U/L
ALT SERPL-CCNC: 15 U/L
ANION GAP SERPL CALC-SCNC: 17 MMOL/L
AST SERPL-CCNC: 19 U/L
BASOPHILS # BLD AUTO: 0 K/UL
BASOPHILS NFR BLD AUTO: 0 %
BILIRUB SERPL-MCNC: 0.4 MG/DL
BUN SERPL-MCNC: 29 MG/DL
CALCIUM SERPL-MCNC: 9.9 MG/DL
CHLORIDE SERPL-SCNC: 116 MMOL/L
CO2 SERPL-SCNC: 17 MMOL/L
CREAT SERPL-MCNC: 1.16 MG/DL
EOSINOPHIL # BLD AUTO: 0.02 K/UL
EOSINOPHIL NFR BLD AUTO: 0.4 %
GLUCOSE SERPL-MCNC: 132 MG/DL
HCT VFR BLD CALC: 38.3 %
HGB BLD-MCNC: 12.9 G/DL
IMM GRANULOCYTES NFR BLD AUTO: 0 %
LYMPHOCYTES # BLD AUTO: 0.51 K/UL
LYMPHOCYTES NFR BLD AUTO: 9.3 %
MAN DIFF?: NORMAL
MCHC RBC-ENTMCNC: 30.7 PG
MCHC RBC-ENTMCNC: 33.7 GM/DL
MCV RBC AUTO: 91.2 FL
MONOCYTES # BLD AUTO: 0.18 K/UL
MONOCYTES NFR BLD AUTO: 3.3 %
NEUTROPHILS # BLD AUTO: 4.79 K/UL
NEUTROPHILS NFR BLD AUTO: 87 %
PLATELET # BLD AUTO: 121 K/UL
POTASSIUM SERPL-SCNC: 4.2 MMOL/L
PROT SERPL-MCNC: 6.5 G/DL
RBC # BLD: 4.2 M/UL
RBC # FLD: 13.6 %
SODIUM SERPL-SCNC: 150 MMOL/L
TSH SERPL-ACNC: 0.2 UIU/ML
WBC # FLD AUTO: 5.5 K/UL

## 2017-11-06 ENCOUNTER — APPOINTMENT (OUTPATIENT)
Dept: HEMATOLOGY ONCOLOGY | Facility: CLINIC | Age: 82
End: 2017-11-06
Payer: MEDICARE

## 2017-11-06 ENCOUNTER — APPOINTMENT (OUTPATIENT)
Dept: INFUSION THERAPY | Facility: HOSPITAL | Age: 82
End: 2017-11-06

## 2017-11-06 ENCOUNTER — OUTPATIENT (OUTPATIENT)
Dept: OUTPATIENT SERVICES | Facility: HOSPITAL | Age: 82
LOS: 1 days | End: 2017-11-06
Payer: MEDICARE

## 2017-11-06 ENCOUNTER — RX RENEWAL (OUTPATIENT)
Age: 82
End: 2017-11-06

## 2017-11-06 VITALS
TEMPERATURE: 97.7 F | HEART RATE: 75 BPM | SYSTOLIC BLOOD PRESSURE: 130 MMHG | DIASTOLIC BLOOD PRESSURE: 60 MMHG | OXYGEN SATURATION: 98 % | RESPIRATION RATE: 14 BRPM | BODY MASS INDEX: 28.87 KG/M2 | WEIGHT: 163 LBS

## 2017-11-06 PROCEDURE — 99214 OFFICE O/P EST MOD 30 MIN: CPT

## 2017-11-10 ENCOUNTER — RESULT REVIEW (OUTPATIENT)
Age: 82
End: 2017-11-10

## 2017-11-10 LAB
CORTICOSTEROID BIND GLOBULIN: 2.9 MG/DL
CORTIS SERPL-MCNC: 15 UG/DL
CORTISOL, FREE: 0.88 UG/DL
PFCX: 5.8 %

## 2017-11-11 LAB
ALBUMIN SERPL ELPH-MCNC: 4.3 G/DL
ALP BLD-CCNC: 63 U/L
ALT SERPL-CCNC: 18 U/L
ANION GAP SERPL CALC-SCNC: 16 MMOL/L
AST SERPL-CCNC: 21 U/L
BASOPHILS # BLD AUTO: 0.01 K/UL
BASOPHILS NFR BLD AUTO: 0.3 %
BILIRUB SERPL-MCNC: 0.3 MG/DL
BUN SERPL-MCNC: 18 MG/DL
CALCIUM SERPL-MCNC: 9.3 MG/DL
CEA SERPL-MCNC: 82 NG/ML
CHLORIDE SERPL-SCNC: 113 MMOL/L
CO2 SERPL-SCNC: 19 MMOL/L
CREAT SERPL-MCNC: 1.21 MG/DL
EOSINOPHIL # BLD AUTO: 0.09 K/UL
EOSINOPHIL NFR BLD AUTO: 2.9 %
FSH SERPL-MCNC: 95.6 IU/L
GLUCOSE SERPL-MCNC: 127 MG/DL
HCT VFR BLD CALC: 36.9 %
HGB BLD-MCNC: 12.5 G/DL
IMM GRANULOCYTES NFR BLD AUTO: 0 %
LH SERPL-ACNC: 41.9 IU/L
LYMPHOCYTES # BLD AUTO: 0.86 K/UL
LYMPHOCYTES NFR BLD AUTO: 27.5 %
MAN DIFF?: NORMAL
MCHC RBC-ENTMCNC: 30.6 PG
MCHC RBC-ENTMCNC: 33.9 GM/DL
MCV RBC AUTO: 90.4 FL
MONOCYTES # BLD AUTO: 0.21 K/UL
MONOCYTES NFR BLD AUTO: 6.7 %
NEUTROPHILS # BLD AUTO: 1.96 K/UL
NEUTROPHILS NFR BLD AUTO: 62.6 %
PLATELET # BLD AUTO: 118 K/UL
POTASSIUM SERPL-SCNC: 4.3 MMOL/L
PROLACTIN SERPL-MCNC: 17 NG/ML
PROT SERPL-MCNC: 6.1 G/DL
RBC # BLD: 4.08 M/UL
RBC # FLD: 14.2 %
SODIUM SERPL-SCNC: 148 MMOL/L
TSH SERPL-ACNC: 0.33 UIU/ML
WBC # FLD AUTO: 3.13 K/UL

## 2017-11-13 ENCOUNTER — APPOINTMENT (OUTPATIENT)
Dept: HEMATOLOGY ONCOLOGY | Facility: CLINIC | Age: 82
End: 2017-11-13
Payer: MEDICARE

## 2017-11-13 ENCOUNTER — APPOINTMENT (OUTPATIENT)
Dept: INFUSION THERAPY | Facility: HOSPITAL | Age: 82
End: 2017-11-13

## 2017-11-13 VITALS
DIASTOLIC BLOOD PRESSURE: 60 MMHG | TEMPERATURE: 97.9 F | HEART RATE: 77 BPM | OXYGEN SATURATION: 97 % | BODY MASS INDEX: 29.05 KG/M2 | RESPIRATION RATE: 14 BRPM | WEIGHT: 164 LBS | SYSTOLIC BLOOD PRESSURE: 130 MMHG

## 2017-11-13 DIAGNOSIS — R94.6 ABNORMAL RESULTS OF THYROID FUNCTION STUDIES: ICD-10-CM

## 2017-11-13 PROCEDURE — 99215 OFFICE O/P EST HI 40 MIN: CPT

## 2017-11-17 LAB
CORTICOSTEROID BIND GLOBULIN: 2.9 MG/DL
CORTIS SERPL-MCNC: 18 UG/DL
CORTISOL, FREE: 1.6 UG/DL
PFCX: 9.1 %

## 2017-11-27 ENCOUNTER — APPOINTMENT (OUTPATIENT)
Dept: INFUSION THERAPY | Facility: HOSPITAL | Age: 82
End: 2017-11-27

## 2017-11-27 ENCOUNTER — APPOINTMENT (OUTPATIENT)
Dept: HEMATOLOGY ONCOLOGY | Facility: CLINIC | Age: 82
End: 2017-11-27

## 2017-12-02 ENCOUNTER — RX RENEWAL (OUTPATIENT)
Age: 82
End: 2017-12-02

## 2017-12-02 LAB
BASOPHILS # BLD AUTO: 0.01 K/UL
BASOPHILS NFR BLD AUTO: 0.3 %
EOSINOPHIL # BLD AUTO: 0.07 K/UL
EOSINOPHIL NFR BLD AUTO: 2.1 %
HCT VFR BLD CALC: 36.4 %
HGB BLD-MCNC: 12 G/DL
IMM GRANULOCYTES NFR BLD AUTO: 0 %
LYMPHOCYTES # BLD AUTO: 0.69 K/UL
LYMPHOCYTES NFR BLD AUTO: 20.4 %
MAN DIFF?: NORMAL
MCHC RBC-ENTMCNC: 30.1 PG
MCHC RBC-ENTMCNC: 33 GM/DL
MCV RBC AUTO: 91.2 FL
MONOCYTES # BLD AUTO: 0.24 K/UL
MONOCYTES NFR BLD AUTO: 7.1 %
NEUTROPHILS # BLD AUTO: 2.38 K/UL
NEUTROPHILS NFR BLD AUTO: 70.1 %
PLATELET # BLD AUTO: 112 K/UL
RBC # BLD: 3.99 M/UL
RBC # FLD: 14.7 %
WBC # FLD AUTO: 3.39 K/UL

## 2017-12-03 LAB
ALBUMIN SERPL ELPH-MCNC: 4 G/DL
ALP BLD-CCNC: 62 U/L
ALT SERPL-CCNC: 16 U/L
ANION GAP SERPL CALC-SCNC: 12 MMOL/L
AST SERPL-CCNC: 17 U/L
BILIRUB SERPL-MCNC: 0.3 MG/DL
BUN SERPL-MCNC: 27 MG/DL
CALCIUM SERPL-MCNC: 9.3 MG/DL
CHLORIDE SERPL-SCNC: 117 MMOL/L
CO2 SERPL-SCNC: 17 MMOL/L
CREAT SERPL-MCNC: 1.19 MG/DL
GLUCOSE SERPL-MCNC: 118 MG/DL
POTASSIUM SERPL-SCNC: 4.7 MMOL/L
PROT SERPL-MCNC: 5.9 G/DL
SODIUM SERPL-SCNC: 146 MMOL/L
T4 FREE SERPL-MCNC: 1.1 NG/DL
TSH SERPL-ACNC: 0.47 UIU/ML

## 2017-12-04 ENCOUNTER — APPOINTMENT (OUTPATIENT)
Dept: INFUSION THERAPY | Facility: HOSPITAL | Age: 82
End: 2017-12-04

## 2017-12-04 ENCOUNTER — APPOINTMENT (OUTPATIENT)
Dept: HEMATOLOGY ONCOLOGY | Facility: CLINIC | Age: 82
End: 2017-12-04
Payer: MEDICARE

## 2017-12-04 VITALS
HEART RATE: 68 BPM | BODY MASS INDEX: 29.23 KG/M2 | WEIGHT: 165 LBS | DIASTOLIC BLOOD PRESSURE: 58 MMHG | SYSTOLIC BLOOD PRESSURE: 132 MMHG | TEMPERATURE: 97.6 F

## 2017-12-04 PROCEDURE — 96360 HYDRATION IV INFUSION INIT: CPT

## 2017-12-04 PROCEDURE — 96413 CHEMO IV INFUSION 1 HR: CPT

## 2017-12-04 PROCEDURE — 99215 OFFICE O/P EST HI 40 MIN: CPT

## 2017-12-04 PROCEDURE — 96361 HYDRATE IV INFUSION ADD-ON: CPT

## 2017-12-04 RX ORDER — POTASSIUM CHLORIDE 1500 MG/1
20 TABLET, FILM COATED, EXTENDED RELEASE ORAL
Qty: 10 | Refills: 0 | Status: COMPLETED | COMMUNITY
Start: 2017-06-30 | End: 2017-12-04

## 2017-12-04 RX ORDER — SODIUM BICARBONATE 325 MG/1
325 TABLET ORAL
Refills: 0 | Status: COMPLETED | COMMUNITY
Start: 2017-05-23 | End: 2017-12-04

## 2017-12-06 DIAGNOSIS — C18.9 MALIGNANT NEOPLASM OF COLON, UNSPECIFIED: ICD-10-CM

## 2017-12-23 ENCOUNTER — LABORATORY RESULT (OUTPATIENT)
Age: 82
End: 2017-12-23

## 2017-12-26 ENCOUNTER — APPOINTMENT (OUTPATIENT)
Dept: HEMATOLOGY ONCOLOGY | Facility: CLINIC | Age: 82
End: 2017-12-26
Payer: MEDICARE

## 2017-12-26 ENCOUNTER — APPOINTMENT (OUTPATIENT)
Dept: INFUSION THERAPY | Facility: HOSPITAL | Age: 82
End: 2017-12-26

## 2017-12-26 ENCOUNTER — OUTPATIENT (OUTPATIENT)
Dept: OUTPATIENT SERVICES | Facility: HOSPITAL | Age: 82
LOS: 1 days | End: 2017-12-26
Payer: MEDICARE

## 2017-12-26 VITALS
BODY MASS INDEX: 29.94 KG/M2 | SYSTOLIC BLOOD PRESSURE: 138 MMHG | DIASTOLIC BLOOD PRESSURE: 60 MMHG | TEMPERATURE: 97.6 F | WEIGHT: 169 LBS | HEART RATE: 68 BPM

## 2017-12-26 DIAGNOSIS — C18.9 MALIGNANT NEOPLASM OF COLON, UNSPECIFIED: ICD-10-CM

## 2017-12-26 LAB
ALBUMIN SERPL ELPH-MCNC: 4.3 G/DL
ALP BLD-CCNC: 63 U/L
ALT SERPL-CCNC: 16 U/L
ANION GAP SERPL CALC-SCNC: 13 MMOL/L
AST SERPL-CCNC: 22 U/L
BASOPHILS # BLD AUTO: 0.02 K/UL
BASOPHILS NFR BLD AUTO: 0.9 %
BILIRUB SERPL-MCNC: 0.5 MG/DL
BUN SERPL-MCNC: 27 MG/DL
CALCIUM SERPL-MCNC: 9.6 MG/DL
CHLORIDE SERPL-SCNC: 111 MMOL/L
CO2 SERPL-SCNC: 22 MMOL/L
CREAT SERPL-MCNC: 1.35 MG/DL
EOSINOPHIL # BLD AUTO: 0.09 K/UL
EOSINOPHIL NFR BLD AUTO: 3.5
GLUCOSE SERPL-MCNC: 125 MG/DL
HCT VFR BLD CALC: 38.2 %
HGB BLD-MCNC: 12.6 G/DL
LYMPHOCYTES # BLD AUTO: 0.69 K/UL
LYMPHOCYTES NFR BLD AUTO: 26.1 %
MAN DIFF?: NORMAL
MCHC RBC-ENTMCNC: 30.2 PG
MCHC RBC-ENTMCNC: 33 GM/DL
MCV RBC AUTO: 91.6 FL
MONOCYTES # BLD AUTO: 0.09 K/UL
MONOCYTES NFR BLD AUTO: 3.5 %
NEUTROPHILS # BLD AUTO: 1.75 K/UL
NEUTROPHILS NFR BLD AUTO: 66 %
PLATELET # BLD AUTO: 119 K/UL
POTASSIUM SERPL-SCNC: 3.9 MMOL/L
PROT SERPL-MCNC: 6.2 G/DL
RBC # BLD: 4.17 M/UL
RBC # FLD: 14.5 %
SODIUM SERPL-SCNC: 146 MMOL/L
TSH SERPL-ACNC: 1.76 UIU/ML
WBC # FLD AUTO: 2.65 K/UL

## 2017-12-26 PROCEDURE — 99214 OFFICE O/P EST MOD 30 MIN: CPT

## 2018-01-02 ENCOUNTER — FORM ENCOUNTER (OUTPATIENT)
Age: 83
End: 2018-01-02

## 2018-01-02 ENCOUNTER — LABORATORY RESULT (OUTPATIENT)
Age: 83
End: 2018-01-02

## 2018-01-02 LAB
ALBUMIN SERPL ELPH-MCNC: 4.4 G/DL
ALBUMIN SERPL ELPH-MCNC: 4.4 G/DL
ALP BLD-CCNC: 71 U/L
ALT SERPL-CCNC: 15 U/L
ANION GAP SERPL CALC-SCNC: 17 MMOL/L
ANION GAP SERPL CALC-SCNC: 17 MMOL/L
AST SERPL-CCNC: 23 U/L
BILIRUB SERPL-MCNC: 0.4 MG/DL
BUN SERPL-MCNC: 25 MG/DL
BUN SERPL-MCNC: 26 MG/DL
CALCIUM SERPL-MCNC: 9.7 MG/DL
CALCIUM SERPL-MCNC: 9.7 MG/DL
CEA SERPL-MCNC: 81 NG/ML
CHLORIDE SERPL-SCNC: 109 MMOL/L
CHLORIDE SERPL-SCNC: 109 MMOL/L
CO2 SERPL-SCNC: 20 MMOL/L
CO2 SERPL-SCNC: 20 MMOL/L
CREAT SERPL-MCNC: 1.19 MG/DL
CREAT SERPL-MCNC: 1.24 MG/DL
GLUCOSE SERPL-MCNC: 110 MG/DL
GLUCOSE SERPL-MCNC: 115 MG/DL
PHOSPHATE SERPL-MCNC: 4 MG/DL
POTASSIUM SERPL-SCNC: 4.4 MMOL/L
POTASSIUM SERPL-SCNC: 4.6 MMOL/L
PROT SERPL-MCNC: 6.3 G/DL
SODIUM SERPL-SCNC: 146 MMOL/L
SODIUM SERPL-SCNC: 146 MMOL/L
TSH SERPL-ACNC: 2.37 UIU/ML

## 2018-01-03 ENCOUNTER — APPOINTMENT (OUTPATIENT)
Dept: CT IMAGING | Facility: HOSPITAL | Age: 83
End: 2018-01-03
Payer: MEDICARE

## 2018-01-03 ENCOUNTER — OUTPATIENT (OUTPATIENT)
Dept: OUTPATIENT SERVICES | Facility: HOSPITAL | Age: 83
LOS: 1 days | End: 2018-01-03
Payer: MEDICARE

## 2018-01-03 DIAGNOSIS — C18.9 MALIGNANT NEOPLASM OF COLON, UNSPECIFIED: ICD-10-CM

## 2018-01-03 DIAGNOSIS — N32.89 OTHER SPECIFIED DISORDERS OF BLADDER: ICD-10-CM

## 2018-01-03 DIAGNOSIS — R59.0 LOCALIZED ENLARGED LYMPH NODES: ICD-10-CM

## 2018-01-03 LAB
BASOPHILS # BLD AUTO: 0 K/UL
BASOPHILS NFR BLD AUTO: 0 %
CORTICOSTEROID BIND GLOBULIN: 3 MG/DL
CORTIS SERPL-MCNC: 15 UG/DL
CORTISOL, FREE: 0.8 UG/DL
EOSINOPHIL # BLD AUTO: 0.08 K/UL
EOSINOPHIL NFR BLD AUTO: 2.8 %
HCT VFR BLD CALC: 38.4 %
HGB BLD-MCNC: 12.4 G/DL
IMM GRANULOCYTES NFR BLD AUTO: 0 %
LYMPHOCYTES # BLD AUTO: 0.93 K/UL
LYMPHOCYTES NFR BLD AUTO: 32.2 %
MAN DIFF?: YES
MCHC RBC-ENTMCNC: 29.8 PG
MCHC RBC-ENTMCNC: 32.3 GM/DL
MCV RBC AUTO: 92.3 FL
MONOCYTES # BLD AUTO: 0.22 K/UL
MONOCYTES NFR BLD AUTO: 7.6 %
NEUTROPHILS # BLD AUTO: 1.66 K/UL
NEUTROPHILS NFR BLD AUTO: 57.4 %
PFCX: 5.3 %
PLATELET # BLD AUTO: 117 K/UL
RBC # BLD: 4.16 M/UL
RBC # FLD: 15.1 %
WBC # FLD AUTO: 2.89 K/UL

## 2018-01-03 PROCEDURE — 74177 CT ABD & PELVIS W/CONTRAST: CPT | Mod: 26

## 2018-01-03 PROCEDURE — 71260 CT THORAX DX C+: CPT | Mod: 26

## 2018-01-07 ENCOUNTER — RESULT REVIEW (OUTPATIENT)
Age: 83
End: 2018-01-07

## 2018-01-07 LAB
CORTICOSTEROID BIND GLOBULIN: 2.5 MG/DL
CORTIS SERPL-MCNC: 20 UG/DL
CORTISOL, FREE: 4.2 UG/DL
PFCX: 21 %

## 2018-01-16 ENCOUNTER — LABORATORY RESULT (OUTPATIENT)
Age: 83
End: 2018-01-16

## 2018-01-16 LAB
ALBUMIN SERPL ELPH-MCNC: 4.4 G/DL
ALP BLD-CCNC: 73 U/L
ALT SERPL-CCNC: 15 U/L
ANION GAP SERPL CALC-SCNC: 12 MMOL/L
AST SERPL-CCNC: 19 U/L
BASOPHILS # BLD AUTO: 0.01 K/UL
BASOPHILS NFR BLD AUTO: 0.4 %
BILIRUB SERPL-MCNC: 0.4 MG/DL
BUN SERPL-MCNC: 20 MG/DL
CALCIUM SERPL-MCNC: 9.7 MG/DL
CHLORIDE SERPL-SCNC: 111 MMOL/L
CO2 SERPL-SCNC: 24 MMOL/L
CREAT SERPL-MCNC: 1.28 MG/DL
EOSINOPHIL # BLD AUTO: 0.06 K/UL
EOSINOPHIL NFR BLD AUTO: 2.3 %
GLUCOSE SERPL-MCNC: 119 MG/DL
HCT VFR BLD CALC: 39.6 %
HGB BLD-MCNC: 13.1 G/DL
IMM GRANULOCYTES NFR BLD AUTO: 0.4 %
LYMPHOCYTES # BLD AUTO: 0.64 K/UL
LYMPHOCYTES NFR BLD AUTO: 24.2 %
MAN DIFF?: NORMAL
MCHC RBC-ENTMCNC: 30.8 PG
MCHC RBC-ENTMCNC: 33.1 GM/DL
MCV RBC AUTO: 93.2 FL
MONOCYTES # BLD AUTO: 0.2 K/UL
MONOCYTES NFR BLD AUTO: 7.5 %
NEUTROPHILS # BLD AUTO: 1.73 K/UL
NEUTROPHILS NFR BLD AUTO: 65.2 %
PLATELET # BLD AUTO: 113 K/UL
POTASSIUM SERPL-SCNC: 4.5 MMOL/L
PROT SERPL-MCNC: 6.1 G/DL
RBC # BLD: 4.25 M/UL
RBC # FLD: 14.9 %
SODIUM SERPL-SCNC: 147 MMOL/L
TSH SERPL-ACNC: 1.36 UIU/ML
WBC # FLD AUTO: 2.65 K/UL

## 2018-01-16 PROCEDURE — 74177 CT ABD & PELVIS W/CONTRAST: CPT

## 2018-01-16 PROCEDURE — 71260 CT THORAX DX C+: CPT

## 2018-01-17 ENCOUNTER — APPOINTMENT (OUTPATIENT)
Dept: INFUSION THERAPY | Facility: HOSPITAL | Age: 83
End: 2018-01-17

## 2018-01-17 ENCOUNTER — APPOINTMENT (OUTPATIENT)
Dept: HEMATOLOGY ONCOLOGY | Facility: CLINIC | Age: 83
End: 2018-01-17
Payer: MEDICARE

## 2018-01-17 VITALS
DIASTOLIC BLOOD PRESSURE: 64 MMHG | TEMPERATURE: 97.8 F | WEIGHT: 167 LBS | SYSTOLIC BLOOD PRESSURE: 142 MMHG | BODY MASS INDEX: 29.58 KG/M2 | HEART RATE: 92 BPM

## 2018-01-17 PROCEDURE — 99214 OFFICE O/P EST MOD 30 MIN: CPT

## 2018-01-17 PROCEDURE — 96413 CHEMO IV INFUSION 1 HR: CPT

## 2018-01-17 PROCEDURE — 96361 HYDRATE IV INFUSION ADD-ON: CPT

## 2018-01-23 LAB
CORTICOSTEROID BIND GLOBULIN: 3.2 MG/DL
CORTIS SERPL-MCNC: 13 UG/DL
CORTISOL, FREE: 0.48 UG/DL
PFCX: 3.7 %

## 2018-02-03 ENCOUNTER — RX RENEWAL (OUTPATIENT)
Age: 83
End: 2018-02-03

## 2018-02-05 ENCOUNTER — MEDICATION RENEWAL (OUTPATIENT)
Age: 83
End: 2018-02-05

## 2018-02-10 LAB
BASOPHILS # BLD AUTO: 0.01 K/UL
BASOPHILS NFR BLD AUTO: 0.3 %
EOSINOPHIL # BLD AUTO: 0.07 K/UL
EOSINOPHIL NFR BLD AUTO: 2.3 %
HCT VFR BLD CALC: 37.1 %
HGB BLD-MCNC: 12.3 G/DL
IMM GRANULOCYTES NFR BLD AUTO: 0 %
LYMPHOCYTES # BLD AUTO: 0.79 K/UL
LYMPHOCYTES NFR BLD AUTO: 25.8 %
MAN DIFF?: NORMAL
MCHC RBC-ENTMCNC: 30.4 PG
MCHC RBC-ENTMCNC: 33.2 GM/DL
MCV RBC AUTO: 91.8 FL
MONOCYTES # BLD AUTO: 0.16 K/UL
MONOCYTES NFR BLD AUTO: 5.2 %
NEUTROPHILS # BLD AUTO: 2.03 K/UL
NEUTROPHILS NFR BLD AUTO: 66.4 %
PLATELET # BLD AUTO: 115 K/UL
RBC # BLD: 4.04 M/UL
RBC # FLD: 14.2 %
WBC # FLD AUTO: 3.06 K/UL

## 2018-02-11 LAB
ALBUMIN SERPL ELPH-MCNC: 4 G/DL
ALP BLD-CCNC: 73 U/L
ALT SERPL-CCNC: 17 U/L
ANION GAP SERPL CALC-SCNC: 17 MMOL/L
AST SERPL-CCNC: 24 U/L
BILIRUB SERPL-MCNC: 0.2 MG/DL
BUN SERPL-MCNC: 25 MG/DL
CALCIUM SERPL-MCNC: 9.8 MG/DL
CHLORIDE SERPL-SCNC: 111 MMOL/L
CO2 SERPL-SCNC: 19 MMOL/L
CREAT SERPL-MCNC: 1.21 MG/DL
GLUCOSE SERPL-MCNC: 122 MG/DL
POTASSIUM SERPL-SCNC: 4.3 MMOL/L
PROT SERPL-MCNC: 6.5 G/DL
SODIUM SERPL-SCNC: 147 MMOL/L
TSH SERPL-ACNC: 0.81 UIU/ML

## 2018-02-12 ENCOUNTER — OUTPATIENT (OUTPATIENT)
Dept: OUTPATIENT SERVICES | Facility: HOSPITAL | Age: 83
LOS: 1 days | End: 2018-02-12
Payer: MEDICARE

## 2018-02-12 ENCOUNTER — APPOINTMENT (OUTPATIENT)
Dept: HEMATOLOGY ONCOLOGY | Facility: CLINIC | Age: 83
End: 2018-02-12
Payer: MEDICARE

## 2018-02-12 ENCOUNTER — APPOINTMENT (OUTPATIENT)
Dept: INFUSION THERAPY | Facility: HOSPITAL | Age: 83
End: 2018-02-12

## 2018-02-12 VITALS
TEMPERATURE: 97.8 F | WEIGHT: 171 LBS | HEIGHT: 63 IN | SYSTOLIC BLOOD PRESSURE: 124 MMHG | RESPIRATION RATE: 14 BRPM | HEART RATE: 84 BPM | DIASTOLIC BLOOD PRESSURE: 60 MMHG | BODY MASS INDEX: 30.3 KG/M2

## 2018-02-12 DIAGNOSIS — C18.9 MALIGNANT NEOPLASM OF COLON, UNSPECIFIED: ICD-10-CM

## 2018-02-12 PROCEDURE — 99215 OFFICE O/P EST HI 40 MIN: CPT

## 2018-02-18 ENCOUNTER — RESULT REVIEW (OUTPATIENT)
Age: 83
End: 2018-02-18

## 2018-02-18 LAB
CORTICOSTEROID BIND GLOBULIN: 3.1 MG/DL
CORTIS SERPL-MCNC: 13 UG/DL
CORTISOL, FREE: 0.51 UG/DL
PFCX: 3.9 %

## 2018-03-04 LAB
ALBUMIN SERPL ELPH-MCNC: 4.4 G/DL
ALP BLD-CCNC: 97 U/L
ALT SERPL-CCNC: 23 U/L
ANION GAP SERPL CALC-SCNC: 22 MMOL/L
AST SERPL-CCNC: 21 U/L
BASOPHILS # BLD AUTO: 0 K/UL
BASOPHILS NFR BLD AUTO: 0 %
BILIRUB SERPL-MCNC: 0.4 MG/DL
BUN SERPL-MCNC: 44 MG/DL
CALCIUM SERPL-MCNC: 10 MG/DL
CHLORIDE SERPL-SCNC: 118 MMOL/L
CO2 SERPL-SCNC: 13 MMOL/L
CREAT SERPL-MCNC: 2.27 MG/DL
EOSINOPHIL # BLD AUTO: 0.05 K/UL
EOSINOPHIL NFR BLD AUTO: 1.6 %
GLUCOSE SERPL-MCNC: 150 MG/DL
HCT VFR BLD CALC: 36.8 %
HGB BLD-MCNC: 12 G/DL
IMM GRANULOCYTES NFR BLD AUTO: 0.3 %
LYMPHOCYTES # BLD AUTO: 0.7 K/UL
LYMPHOCYTES NFR BLD AUTO: 22.9 %
MAN DIFF?: NORMAL
MCHC RBC-ENTMCNC: 29.1 PG
MCHC RBC-ENTMCNC: 32.6 GM/DL
MCV RBC AUTO: 89.3 FL
MONOCYTES # BLD AUTO: 0.14 K/UL
MONOCYTES NFR BLD AUTO: 4.6 %
NEUTROPHILS # BLD AUTO: 2.16 K/UL
NEUTROPHILS NFR BLD AUTO: 70.6 %
PLATELET # BLD AUTO: 112 K/UL
POTASSIUM SERPL-SCNC: 4.4 MMOL/L
PROT SERPL-MCNC: 6.5 G/DL
RBC # BLD: 4.12 M/UL
RBC # FLD: 14.1 %
SODIUM SERPL-SCNC: 153 MMOL/L
TSH SERPL-ACNC: 0.77 UIU/ML
WBC # FLD AUTO: 3.06 K/UL

## 2018-03-05 ENCOUNTER — APPOINTMENT (OUTPATIENT)
Dept: HEMATOLOGY ONCOLOGY | Facility: CLINIC | Age: 83
End: 2018-03-05
Payer: MEDICARE

## 2018-03-05 ENCOUNTER — APPOINTMENT (OUTPATIENT)
Dept: INFUSION THERAPY | Facility: HOSPITAL | Age: 83
End: 2018-03-05

## 2018-03-05 VITALS
DIASTOLIC BLOOD PRESSURE: 60 MMHG | TEMPERATURE: 97.5 F | HEART RATE: 74 BPM | BODY MASS INDEX: 29.58 KG/M2 | RESPIRATION RATE: 14 BRPM | OXYGEN SATURATION: 99 % | SYSTOLIC BLOOD PRESSURE: 120 MMHG | WEIGHT: 167 LBS

## 2018-03-05 PROCEDURE — 99215 OFFICE O/P EST HI 40 MIN: CPT

## 2018-03-09 LAB
ALBUMIN SERPL ELPH-MCNC: 4.2 G/DL
ALP BLD-CCNC: 80 U/L
ALT SERPL-CCNC: 9 U/L
ANION GAP SERPL CALC-SCNC: 16 MMOL/L
AST SERPL-CCNC: 21 U/L
BASOPHILS # BLD AUTO: 0 K/UL
BASOPHILS NFR BLD AUTO: 0 %
BILIRUB SERPL-MCNC: 0.3 MG/DL
BUN SERPL-MCNC: 35 MG/DL
CALCIUM SERPL-MCNC: 9.8 MG/DL
CHLORIDE SERPL-SCNC: 117 MMOL/L
CO2 SERPL-SCNC: 15 MMOL/L
CREAT SERPL-MCNC: 1.84 MG/DL
EOSINOPHIL # BLD AUTO: 0.04 K/UL
EOSINOPHIL NFR BLD AUTO: 1.3 %
GLUCOSE SERPL-MCNC: 121 MG/DL
HCT VFR BLD CALC: 35.1 %
HGB BLD-MCNC: 12 G/DL
IMM GRANULOCYTES NFR BLD AUTO: 0 %
LYMPHOCYTES # BLD AUTO: 0.79 K/UL
LYMPHOCYTES NFR BLD AUTO: 24.7 %
MAN DIFF?: NORMAL
MCHC RBC-ENTMCNC: 30.2 PG
MCHC RBC-ENTMCNC: 34.2 GM/DL
MCV RBC AUTO: 88.4 FL
MONOCYTES # BLD AUTO: 0.17 K/UL
MONOCYTES NFR BLD AUTO: 5.3 %
NEUTROPHILS # BLD AUTO: 2.2 K/UL
NEUTROPHILS NFR BLD AUTO: 68.7 %
PLATELET # BLD AUTO: 125 K/UL
POTASSIUM SERPL-SCNC: 3.9 MMOL/L
PROT SERPL-MCNC: 6.6 G/DL
RBC # BLD: 3.97 M/UL
RBC # FLD: 14.1 %
SODIUM SERPL-SCNC: 148 MMOL/L
TSH SERPL-ACNC: 0.64 UIU/ML
WBC # FLD AUTO: 3.2 K/UL

## 2018-03-10 LAB
CORTICOSTEROID BIND GLOBULIN: 3.3 MG/DL
CORTIS SERPL-MCNC: 15 UG/DL
CORTISOL, FREE: 0.62 UG/DL
PFCX: 4.2 %

## 2018-03-12 ENCOUNTER — OTHER (OUTPATIENT)
Age: 83
End: 2018-03-12

## 2018-03-12 ENCOUNTER — APPOINTMENT (OUTPATIENT)
Dept: INFUSION THERAPY | Facility: HOSPITAL | Age: 83
End: 2018-03-12

## 2018-03-12 LAB — ALDOSTERONE SERUM: 12.7 NG/DL

## 2018-03-12 PROCEDURE — 80069 RENAL FUNCTION PANEL: CPT

## 2018-03-12 PROCEDURE — 96360 HYDRATION IV INFUSION INIT: CPT

## 2018-03-12 PROCEDURE — 36591 DRAW BLOOD OFF VENOUS DEVICE: CPT

## 2018-03-12 PROCEDURE — 96413 CHEMO IV INFUSION 1 HR: CPT

## 2018-03-12 PROCEDURE — 36415 COLL VENOUS BLD VENIPUNCTURE: CPT

## 2018-03-12 PROCEDURE — 96361 HYDRATE IV INFUSION ADD-ON: CPT

## 2018-03-13 ENCOUNTER — APPOINTMENT (OUTPATIENT)
Dept: HEMATOLOGY ONCOLOGY | Facility: CLINIC | Age: 83
End: 2018-03-13

## 2018-03-13 ENCOUNTER — APPOINTMENT (OUTPATIENT)
Dept: INFUSION THERAPY | Facility: HOSPITAL | Age: 83
End: 2018-03-13

## 2018-03-14 LAB
CORTICOSTEROID BIND GLOBULIN: 3.5 MG/DL
CORTIS SERPL-MCNC: 12 UG/DL
CORTISOL, FREE: 0.34 UG/DL
PFCX: 2.8 %

## 2018-03-23 ENCOUNTER — OTHER (OUTPATIENT)
Age: 83
End: 2018-03-23

## 2018-03-25 LAB
ALBUMIN SERPL ELPH-MCNC: 4.2 G/DL
ALP BLD-CCNC: 74 U/L
ALT SERPL-CCNC: 12 U/L
ANION GAP SERPL CALC-SCNC: 15 MMOL/L
AST SERPL-CCNC: 17 U/L
BASOPHILS # BLD AUTO: 0.01 K/UL
BASOPHILS NFR BLD AUTO: 0.3 %
BILIRUB SERPL-MCNC: 0.3 MG/DL
BUN SERPL-MCNC: 35 MG/DL
CALCIUM SERPL-MCNC: 9.5 MG/DL
CHLORIDE SERPL-SCNC: 116 MMOL/L
CO2 SERPL-SCNC: 14 MMOL/L
CREAT SERPL-MCNC: 1.73 MG/DL
EOSINOPHIL # BLD AUTO: 0.06 K/UL
EOSINOPHIL NFR BLD AUTO: 2 %
GLUCOSE SERPL-MCNC: 132 MG/DL
HCT VFR BLD CALC: 33.9 %
HGB BLD-MCNC: 11.6 G/DL
IMM GRANULOCYTES NFR BLD AUTO: 0 %
LYMPHOCYTES # BLD AUTO: 0.81 K/UL
LYMPHOCYTES NFR BLD AUTO: 26.5 %
MAN DIFF?: NORMAL
MCHC RBC-ENTMCNC: 30.1 PG
MCHC RBC-ENTMCNC: 34.2 GM/DL
MCV RBC AUTO: 88.1 FL
MONOCYTES # BLD AUTO: 0.18 K/UL
MONOCYTES NFR BLD AUTO: 5.9 %
NEUTROPHILS # BLD AUTO: 2 K/UL
NEUTROPHILS NFR BLD AUTO: 65.3 %
PLATELET # BLD AUTO: 101 K/UL
POTASSIUM SERPL-SCNC: 3.9 MMOL/L
PROT SERPL-MCNC: 6.6 G/DL
RBC # BLD: 3.85 M/UL
RBC # FLD: 15.5 %
SODIUM SERPL-SCNC: 145 MMOL/L
TSH SERPL-ACNC: 1.04 UIU/ML
WBC # FLD AUTO: 3.06 K/UL

## 2018-03-26 ENCOUNTER — OUTPATIENT (OUTPATIENT)
Dept: OUTPATIENT SERVICES | Facility: HOSPITAL | Age: 83
LOS: 1 days | End: 2018-03-26
Payer: MEDICARE

## 2018-03-26 ENCOUNTER — APPOINTMENT (OUTPATIENT)
Dept: HEMATOLOGY ONCOLOGY | Facility: CLINIC | Age: 83
End: 2018-03-26
Payer: MEDICARE

## 2018-03-26 ENCOUNTER — APPOINTMENT (OUTPATIENT)
Dept: INFUSION THERAPY | Facility: HOSPITAL | Age: 83
End: 2018-03-26

## 2018-03-26 VITALS
HEART RATE: 84 BPM | DIASTOLIC BLOOD PRESSURE: 50 MMHG | TEMPERATURE: 97.6 F | WEIGHT: 168 LBS | SYSTOLIC BLOOD PRESSURE: 104 MMHG | BODY MASS INDEX: 29.76 KG/M2

## 2018-03-26 DIAGNOSIS — C18.9 MALIGNANT NEOPLASM OF COLON, UNSPECIFIED: ICD-10-CM

## 2018-03-26 PROCEDURE — 99215 OFFICE O/P EST HI 40 MIN: CPT

## 2018-03-26 RX ORDER — POTASSIUM CHLORIDE 1500 MG/1
20 TABLET, EXTENDED RELEASE ORAL DAILY
Qty: 10 | Refills: 0 | Status: COMPLETED | COMMUNITY
Start: 2018-03-12 | End: 2018-03-26

## 2018-03-26 RX ORDER — PANTOPRAZOLE 40 MG/1
40 TABLET, DELAYED RELEASE ORAL
Qty: 90 | Refills: 0 | Status: COMPLETED | COMMUNITY
Start: 2017-06-19 | End: 2018-03-26

## 2018-04-14 ENCOUNTER — RX RENEWAL (OUTPATIENT)
Age: 83
End: 2018-04-14

## 2018-04-16 ENCOUNTER — APPOINTMENT (OUTPATIENT)
Dept: HEMATOLOGY ONCOLOGY | Facility: CLINIC | Age: 83
End: 2018-04-16
Payer: MEDICARE

## 2018-04-16 ENCOUNTER — APPOINTMENT (OUTPATIENT)
Dept: INFUSION THERAPY | Facility: HOSPITAL | Age: 83
End: 2018-04-16

## 2018-04-16 VITALS
TEMPERATURE: 97.4 F | HEART RATE: 60 BPM | SYSTOLIC BLOOD PRESSURE: 128 MMHG | WEIGHT: 170 LBS | BODY MASS INDEX: 30.11 KG/M2 | DIASTOLIC BLOOD PRESSURE: 78 MMHG

## 2018-04-16 PROCEDURE — 96413 CHEMO IV INFUSION 1 HR: CPT

## 2018-04-16 PROCEDURE — 99214 OFFICE O/P EST MOD 30 MIN: CPT

## 2018-05-06 LAB
ALBUMIN SERPL ELPH-MCNC: 3.9 G/DL
ALP BLD-CCNC: 70 U/L
ALT SERPL-CCNC: 22 U/L
ANION GAP SERPL CALC-SCNC: 13 MMOL/L
AST SERPL-CCNC: 24 U/L
BASOPHILS # BLD AUTO: 0.01 K/UL
BASOPHILS NFR BLD AUTO: 0.3 %
BILIRUB SERPL-MCNC: 0.3 MG/DL
BUN SERPL-MCNC: 17 MG/DL
CALCIUM SERPL-MCNC: 9.1 MG/DL
CEA SERPL-MCNC: 80 NG/ML
CHLORIDE SERPL-SCNC: 111 MMOL/L
CO2 SERPL-SCNC: 20 MMOL/L
CREAT SERPL-MCNC: 1.47 MG/DL
EOSINOPHIL # BLD AUTO: 0.09 K/UL
EOSINOPHIL NFR BLD AUTO: 2.9 %
GLUCOSE SERPL-MCNC: 112 MG/DL
HCT VFR BLD CALC: 32.7 %
HGB BLD-MCNC: 11 G/DL
IMM GRANULOCYTES NFR BLD AUTO: 0 %
LYMPHOCYTES # BLD AUTO: 0.66 K/UL
LYMPHOCYTES NFR BLD AUTO: 21 %
MAN DIFF?: NORMAL
MCHC RBC-ENTMCNC: 31.2 PG
MCHC RBC-ENTMCNC: 33.6 GM/DL
MCV RBC AUTO: 92.6 FL
MONOCYTES # BLD AUTO: 0.16 K/UL
MONOCYTES NFR BLD AUTO: 5.1 %
NEUTROPHILS # BLD AUTO: 2.23 K/UL
NEUTROPHILS NFR BLD AUTO: 70.7 %
PLATELET # BLD AUTO: 103 K/UL
POTASSIUM SERPL-SCNC: 4.2 MMOL/L
PROT SERPL-MCNC: 6.4 G/DL
RBC # BLD: 3.53 M/UL
RBC # FLD: 14.8 %
SODIUM SERPL-SCNC: 144 MMOL/L
WBC # FLD AUTO: 3.15 K/UL

## 2018-05-07 ENCOUNTER — OUTPATIENT (OUTPATIENT)
Dept: PREADMISSION | Facility: HOSPITAL | Age: 83
LOS: 1 days | End: 2018-05-07
Payer: MEDICARE

## 2018-05-07 ENCOUNTER — APPOINTMENT (OUTPATIENT)
Dept: INFUSION THERAPY | Facility: HOSPITAL | Age: 83
End: 2018-05-07

## 2018-05-07 ENCOUNTER — APPOINTMENT (OUTPATIENT)
Dept: HEMATOLOGY ONCOLOGY | Facility: CLINIC | Age: 83
End: 2018-05-07
Payer: MEDICARE

## 2018-05-07 VITALS
BODY MASS INDEX: 30.29 KG/M2 | SYSTOLIC BLOOD PRESSURE: 138 MMHG | HEART RATE: 76 BPM | TEMPERATURE: 99 F | WEIGHT: 171 LBS | DIASTOLIC BLOOD PRESSURE: 68 MMHG

## 2018-05-07 DIAGNOSIS — C18.9 MALIGNANT NEOPLASM OF COLON, UNSPECIFIED: ICD-10-CM

## 2018-05-07 PROCEDURE — 99215 OFFICE O/P EST HI 40 MIN: CPT

## 2018-05-27 LAB
ALBUMIN SERPL ELPH-MCNC: 4.2 G/DL
ALP BLD-CCNC: 70 U/L
ALT SERPL-CCNC: 16 U/L
ANION GAP SERPL CALC-SCNC: 12 MMOL/L
AST SERPL-CCNC: 22 U/L
BASOPHILS # BLD AUTO: 0.01 K/UL
BASOPHILS NFR BLD AUTO: 0.3 %
BILIRUB SERPL-MCNC: 0.5 MG/DL
BUN SERPL-MCNC: 25 MG/DL
CALCIUM SERPL-MCNC: 9.5 MG/DL
CHLORIDE SERPL-SCNC: 115 MMOL/L
CO2 SERPL-SCNC: 21 MMOL/L
CREAT SERPL-MCNC: 1.42 MG/DL
EOSINOPHIL # BLD AUTO: 0.1 K/UL
EOSINOPHIL NFR BLD AUTO: 2.5 %
GLUCOSE SERPL-MCNC: 122 MG/DL
HCT VFR BLD CALC: 34.6 %
HGB BLD-MCNC: 11.3 G/DL
IMM GRANULOCYTES NFR BLD AUTO: 0 %
LYMPHOCYTES # BLD AUTO: 0.76 K/UL
LYMPHOCYTES NFR BLD AUTO: 19.2 %
MAN DIFF?: NORMAL
MCHC RBC-ENTMCNC: 30.3 PG
MCHC RBC-ENTMCNC: 32.7 GM/DL
MCV RBC AUTO: 92.8 FL
MONOCYTES # BLD AUTO: 0.26 K/UL
MONOCYTES NFR BLD AUTO: 6.6 %
NEUTROPHILS # BLD AUTO: 2.82 K/UL
NEUTROPHILS NFR BLD AUTO: 71.4 %
PLATELET # BLD AUTO: 115 K/UL
POTASSIUM SERPL-SCNC: 4 MMOL/L
PROT SERPL-MCNC: 6.4 G/DL
RBC # BLD: 3.73 M/UL
RBC # FLD: 14.6 %
SODIUM SERPL-SCNC: 148 MMOL/L
TSH SERPL-ACNC: 1.47 UIU/ML
WBC # FLD AUTO: 3.95 K/UL

## 2018-05-30 ENCOUNTER — APPOINTMENT (OUTPATIENT)
Dept: INFUSION THERAPY | Facility: HOSPITAL | Age: 83
End: 2018-05-30

## 2018-05-30 ENCOUNTER — APPOINTMENT (OUTPATIENT)
Dept: HEMATOLOGY ONCOLOGY | Facility: CLINIC | Age: 83
End: 2018-05-30
Payer: MEDICARE

## 2018-05-30 VITALS
WEIGHT: 175 LBS | TEMPERATURE: 99.1 F | BODY MASS INDEX: 31 KG/M2 | OXYGEN SATURATION: 96 % | HEART RATE: 76 BPM | DIASTOLIC BLOOD PRESSURE: 60 MMHG | RESPIRATION RATE: 14 BRPM | SYSTOLIC BLOOD PRESSURE: 112 MMHG

## 2018-05-30 PROCEDURE — 96413 CHEMO IV INFUSION 1 HR: CPT

## 2018-05-30 PROCEDURE — 99214 OFFICE O/P EST MOD 30 MIN: CPT

## 2018-06-10 ENCOUNTER — FORM ENCOUNTER (OUTPATIENT)
Age: 83
End: 2018-06-10

## 2018-06-11 ENCOUNTER — APPOINTMENT (OUTPATIENT)
Dept: CT IMAGING | Facility: HOSPITAL | Age: 83
End: 2018-06-11
Payer: MEDICARE

## 2018-06-11 ENCOUNTER — OUTPATIENT (OUTPATIENT)
Dept: OUTPATIENT SERVICES | Facility: HOSPITAL | Age: 83
LOS: 1 days | End: 2018-06-11
Payer: MEDICARE

## 2018-06-11 DIAGNOSIS — Z00.8 ENCOUNTER FOR OTHER GENERAL EXAMINATION: ICD-10-CM

## 2018-06-11 PROCEDURE — 71250 CT THORAX DX C-: CPT | Mod: 26

## 2018-06-11 PROCEDURE — 71250 CT THORAX DX C-: CPT

## 2018-06-11 PROCEDURE — 74176 CT ABD & PELVIS W/O CONTRAST: CPT | Mod: 26

## 2018-06-11 PROCEDURE — 74176 CT ABD & PELVIS W/O CONTRAST: CPT

## 2018-06-12 ENCOUNTER — APPOINTMENT (OUTPATIENT)
Dept: MAMMOGRAPHY | Facility: HOSPITAL | Age: 83
End: 2018-06-12
Payer: MEDICARE

## 2018-06-12 ENCOUNTER — APPOINTMENT (OUTPATIENT)
Dept: ULTRASOUND IMAGING | Facility: HOSPITAL | Age: 83
End: 2018-06-12
Payer: MEDICARE

## 2018-06-12 ENCOUNTER — OUTPATIENT (OUTPATIENT)
Dept: OUTPATIENT SERVICES | Facility: HOSPITAL | Age: 83
LOS: 1 days | End: 2018-06-12
Payer: MEDICARE

## 2018-06-12 DIAGNOSIS — Z00.8 ENCOUNTER FOR OTHER GENERAL EXAMINATION: ICD-10-CM

## 2018-06-12 PROCEDURE — 77063 BREAST TOMOSYNTHESIS BI: CPT

## 2018-06-12 PROCEDURE — 76641 ULTRASOUND BREAST COMPLETE: CPT | Mod: 26

## 2018-06-12 PROCEDURE — 77067 SCR MAMMO BI INCL CAD: CPT

## 2018-06-12 PROCEDURE — 77067 SCR MAMMO BI INCL CAD: CPT | Mod: 26

## 2018-06-12 PROCEDURE — 76641 ULTRASOUND BREAST COMPLETE: CPT

## 2018-06-12 PROCEDURE — 77063 BREAST TOMOSYNTHESIS BI: CPT | Mod: 26

## 2018-06-16 ENCOUNTER — LABORATORY RESULT (OUTPATIENT)
Age: 83
End: 2018-06-16

## 2018-06-17 VITALS — HEIGHT: 63 IN | WEIGHT: 175.05 LBS

## 2018-06-17 LAB
ALBUMIN SERPL ELPH-MCNC: 4.2 G/DL
ALP BLD-CCNC: 67 U/L
ALT SERPL-CCNC: 16 U/L
ANION GAP SERPL CALC-SCNC: 15 MMOL/L
AST SERPL-CCNC: 23 U/L
BASOPHILS # BLD AUTO: 0.01 K/UL
BASOPHILS NFR BLD AUTO: 0.3 %
BILIRUB SERPL-MCNC: 0.3 MG/DL
BUN SERPL-MCNC: 23 MG/DL
CALCIUM SERPL-MCNC: 9.6 MG/DL
CEA SERPL-MCNC: 90.8 NG/ML
CHLORIDE SERPL-SCNC: 108 MMOL/L
CO2 SERPL-SCNC: 21 MMOL/L
CREAT SERPL-MCNC: 1.55 MG/DL
EOSINOPHIL # BLD AUTO: 0.08 K/UL
EOSINOPHIL NFR BLD AUTO: 2.4 %
GLUCOSE SERPL-MCNC: 127 MG/DL
HCT VFR BLD CALC: 36.4 %
HGB BLD-MCNC: 11.7 G/DL
IMM GRANULOCYTES NFR BLD AUTO: 0 %
LYMPHOCYTES # BLD AUTO: 1.11 K/UL
LYMPHOCYTES NFR BLD AUTO: 33.9 %
MAN DIFF?: NORMAL
MCHC RBC-ENTMCNC: 29.6 PG
MCHC RBC-ENTMCNC: 32.1 GM/DL
MCV RBC AUTO: 92.2 FL
MONOCYTES # BLD AUTO: 0.22 K/UL
MONOCYTES NFR BLD AUTO: 6.7 %
NEUTROPHILS # BLD AUTO: 1.85 K/UL
NEUTROPHILS NFR BLD AUTO: 56.7 %
PLATELET # BLD AUTO: 116 K/UL
POTASSIUM SERPL-SCNC: 4 MMOL/L
PROT SERPL-MCNC: 6.7 G/DL
RBC # BLD: 3.95 M/UL
RBC # FLD: 14 %
SODIUM SERPL-SCNC: 144 MMOL/L
T3RU NFR SERPL: 1.06 INDEX
T4 SERPL-MCNC: 7.5 UG/DL
TSH SERPL-ACNC: 2.32 UIU/ML
WBC # FLD AUTO: 3.27 K/UL

## 2018-06-18 ENCOUNTER — APPOINTMENT (OUTPATIENT)
Dept: INFUSION THERAPY | Facility: HOSPITAL | Age: 83
End: 2018-06-18

## 2018-06-18 ENCOUNTER — OUTPATIENT (OUTPATIENT)
Dept: OUTPATIENT SERVICES | Facility: HOSPITAL | Age: 83
LOS: 1 days | End: 2018-06-18
Payer: MEDICARE

## 2018-06-18 ENCOUNTER — APPOINTMENT (OUTPATIENT)
Dept: HEMATOLOGY ONCOLOGY | Facility: CLINIC | Age: 83
End: 2018-06-18
Payer: MEDICARE

## 2018-06-18 VITALS
SYSTOLIC BLOOD PRESSURE: 147 MMHG | HEART RATE: 75 BPM | RESPIRATION RATE: 14 BRPM | DIASTOLIC BLOOD PRESSURE: 70 MMHG | TEMPERATURE: 98 F | WEIGHT: 175.05 LBS

## 2018-06-18 VITALS
HEART RATE: 75 BPM | TEMPERATURE: 97.8 F | DIASTOLIC BLOOD PRESSURE: 70 MMHG | SYSTOLIC BLOOD PRESSURE: 147 MMHG | BODY MASS INDEX: 31 KG/M2 | RESPIRATION RATE: 14 BRPM | WEIGHT: 175 LBS

## 2018-06-18 DIAGNOSIS — K43.2 INCISIONAL HERNIA WITHOUT OBSTRUCTION OR GANGRENE: ICD-10-CM

## 2018-06-18 DIAGNOSIS — C18.9 MALIGNANT NEOPLASM OF COLON, UNSPECIFIED: ICD-10-CM

## 2018-06-18 DIAGNOSIS — E27.8 OTHER SPECIFIED DISORDERS OF ADRENAL GLAND: ICD-10-CM

## 2018-06-18 DIAGNOSIS — Z12.31 ENCOUNTER FOR SCREENING MAMMOGRAM FOR MALIGNANT NEOPLASM OF BREAST: ICD-10-CM

## 2018-06-18 DIAGNOSIS — Z85.3 PERSONAL HISTORY OF MALIGNANT NEOPLASM OF BREAST: ICD-10-CM

## 2018-06-18 DIAGNOSIS — R92.8 OTHER ABNORMAL AND INCONCLUSIVE FINDINGS ON DIAGNOSTIC IMAGING OF BREAST: ICD-10-CM

## 2018-06-18 DIAGNOSIS — K57.30 DIVERTICULOSIS OF LARGE INTESTINE WITHOUT PERFORATION OR ABSCESS WITHOUT BLEEDING: ICD-10-CM

## 2018-06-18 DIAGNOSIS — K76.9 LIVER DISEASE, UNSPECIFIED: ICD-10-CM

## 2018-06-18 PROCEDURE — 99215 OFFICE O/P EST HI 40 MIN: CPT

## 2018-06-18 RX ORDER — PEMBROLIZUMAB 25 MG/ML
200 INJECTION, SOLUTION INTRAVENOUS ONCE
Qty: 0 | Refills: 0 | Status: COMPLETED | OUTPATIENT
Start: 2018-06-18 | End: 2018-06-18

## 2018-06-18 RX ORDER — ACETAMINOPHEN 500 MG
650 TABLET ORAL ONCE
Qty: 0 | Refills: 0 | Status: DISCONTINUED | OUTPATIENT
Start: 2018-06-18 | End: 2018-12-21

## 2018-06-18 RX ORDER — DIPHENHYDRAMINE HCL 50 MG
50 CAPSULE ORAL ONCE
Qty: 0 | Refills: 0 | Status: COMPLETED | OUTPATIENT
Start: 2018-06-18 | End: 2018-06-18

## 2018-06-18 RX ADMIN — PEMBROLIZUMAB 200 MILLIGRAM(S): 25 INJECTION, SOLUTION INTRAVENOUS at 15:46

## 2018-07-07 LAB
ALBUMIN SERPL ELPH-MCNC: 4.4 G/DL
ALP BLD-CCNC: 62 U/L
ALT SERPL-CCNC: 18 U/L
ANION GAP SERPL CALC-SCNC: 14 MMOL/L
AST SERPL-CCNC: 24 U/L
BASOPHILS # BLD AUTO: 0.01 K/UL
BASOPHILS NFR BLD AUTO: 0.3 %
BILIRUB SERPL-MCNC: 0.3 MG/DL
BUN SERPL-MCNC: 33 MG/DL
CALCIUM SERPL-MCNC: 9.6 MG/DL
CANCER AG27-29 SERPL-ACNC: 18.9 U/ML
CHLORIDE SERPL-SCNC: 112 MMOL/L
CO2 SERPL-SCNC: 19 MMOL/L
CREAT SERPL-MCNC: 1.51 MG/DL
EOSINOPHIL # BLD AUTO: 0.07 K/UL
EOSINOPHIL NFR BLD AUTO: 2.3 %
GLUCOSE SERPL-MCNC: 130 MG/DL
HCT VFR BLD CALC: 35.8 %
HGB BLD-MCNC: 12.5 G/DL
IMM GRANULOCYTES NFR BLD AUTO: 0.3 %
LYMPHOCYTES # BLD AUTO: 0.9 K/UL
LYMPHOCYTES NFR BLD AUTO: 29.8 %
MAN DIFF?: NORMAL
MCHC RBC-ENTMCNC: 31.5 PG
MCHC RBC-ENTMCNC: 34.9 GM/DL
MCV RBC AUTO: 90.2 FL
MONOCYTES # BLD AUTO: 0.21 K/UL
MONOCYTES NFR BLD AUTO: 7 %
NEUTROPHILS # BLD AUTO: 1.82 K/UL
NEUTROPHILS NFR BLD AUTO: 60.3 %
PLATELET # BLD AUTO: 111 K/UL
POTASSIUM SERPL-SCNC: 4 MMOL/L
PROT SERPL-MCNC: 6.7 G/DL
RBC # BLD: 3.97 M/UL
RBC # FLD: 13.9 %
SODIUM SERPL-SCNC: 145 MMOL/L
WBC # FLD AUTO: 3.02 K/UL

## 2018-07-09 ENCOUNTER — APPOINTMENT (OUTPATIENT)
Dept: HEMATOLOGY ONCOLOGY | Facility: CLINIC | Age: 83
End: 2018-07-09
Payer: MEDICARE

## 2018-07-09 ENCOUNTER — APPOINTMENT (OUTPATIENT)
Dept: INFUSION THERAPY | Facility: HOSPITAL | Age: 83
End: 2018-07-09

## 2018-07-09 VITALS
SYSTOLIC BLOOD PRESSURE: 118 MMHG | TEMPERATURE: 98 F | BODY MASS INDEX: 30.47 KG/M2 | WEIGHT: 172 LBS | DIASTOLIC BLOOD PRESSURE: 60 MMHG | HEART RATE: 76 BPM

## 2018-07-09 PROCEDURE — 96365 THER/PROPH/DIAG IV INF INIT: CPT

## 2018-07-09 PROCEDURE — 99215 OFFICE O/P EST HI 40 MIN: CPT

## 2018-07-09 PROCEDURE — 96413 CHEMO IV INFUSION 1 HR: CPT

## 2018-07-09 RX ORDER — PEMBROLIZUMAB 25 MG/ML
200 INJECTION, SOLUTION INTRAVENOUS ONCE
Qty: 0 | Refills: 0 | Status: COMPLETED | OUTPATIENT
Start: 2018-07-09 | End: 2018-07-09

## 2018-07-09 RX ADMIN — PEMBROLIZUMAB 200 MILLIGRAM(S): 25 INJECTION, SOLUTION INTRAVENOUS at 15:33

## 2018-07-29 LAB
ALBUMIN SERPL ELPH-MCNC: 4.4 G/DL
ALP BLD-CCNC: 62 U/L
ALT SERPL-CCNC: 18 U/L
ANION GAP SERPL CALC-SCNC: 16 MMOL/L
AST SERPL-CCNC: 25 U/L
BASOPHILS # BLD AUTO: 0.01 K/UL
BASOPHILS NFR BLD AUTO: 0.3 %
BILIRUB SERPL-MCNC: 0.4 MG/DL
BUN SERPL-MCNC: 24 MG/DL
CALCIUM SERPL-MCNC: 9.3 MG/DL
CEA SERPL-MCNC: 88.9 NG/ML
CHLORIDE SERPL-SCNC: 111 MMOL/L
CO2 SERPL-SCNC: 20 MMOL/L
CREAT SERPL-MCNC: 1.41 MG/DL
EOSINOPHIL # BLD AUTO: 0.07 K/UL
EOSINOPHIL NFR BLD AUTO: 2.3 %
GLUCOSE SERPL-MCNC: 121 MG/DL
HCT VFR BLD CALC: 36.1 %
HGB BLD-MCNC: 12 G/DL
IMM GRANULOCYTES NFR BLD AUTO: 0 %
LYMPHOCYTES # BLD AUTO: 0.91 K/UL
LYMPHOCYTES NFR BLD AUTO: 30.1 %
MAN DIFF?: NORMAL
MCHC RBC-ENTMCNC: 30.2 PG
MCHC RBC-ENTMCNC: 33.2 GM/DL
MCV RBC AUTO: 90.9 FL
MONOCYTES # BLD AUTO: 0.22 K/UL
MONOCYTES NFR BLD AUTO: 7.3 %
NEUTROPHILS # BLD AUTO: 1.81 K/UL
NEUTROPHILS NFR BLD AUTO: 60 %
PLATELET # BLD AUTO: 112 K/UL
POTASSIUM SERPL-SCNC: 4 MMOL/L
PROT SERPL-MCNC: 6.2 G/DL
RBC # BLD: 3.97 M/UL
RBC # FLD: 13.8 %
SODIUM SERPL-SCNC: 147 MMOL/L
TSH SERPL-ACNC: 1.68 UIU/ML
WBC # FLD AUTO: 3.02 K/UL

## 2018-07-30 VITALS — HEIGHT: 63 IN | WEIGHT: 171.96 LBS

## 2018-07-31 ENCOUNTER — APPOINTMENT (OUTPATIENT)
Dept: HEMATOLOGY ONCOLOGY | Facility: CLINIC | Age: 83
End: 2018-07-31
Payer: MEDICARE

## 2018-07-31 ENCOUNTER — OUTPATIENT (OUTPATIENT)
Dept: OUTPATIENT SERVICES | Facility: HOSPITAL | Age: 83
LOS: 1 days | End: 2018-07-31
Payer: MEDICARE

## 2018-07-31 ENCOUNTER — APPOINTMENT (OUTPATIENT)
Dept: INFUSION THERAPY | Facility: HOSPITAL | Age: 83
End: 2018-07-31

## 2018-07-31 VITALS — RESPIRATION RATE: 16 BRPM | DIASTOLIC BLOOD PRESSURE: 71 MMHG | SYSTOLIC BLOOD PRESSURE: 131 MMHG | HEART RATE: 60 BPM

## 2018-07-31 VITALS
BODY MASS INDEX: 31 KG/M2 | SYSTOLIC BLOOD PRESSURE: 108 MMHG | DIASTOLIC BLOOD PRESSURE: 56 MMHG | HEART RATE: 76 BPM | TEMPERATURE: 98.1 F | WEIGHT: 175 LBS

## 2018-07-31 DIAGNOSIS — C18.9 MALIGNANT NEOPLASM OF COLON, UNSPECIFIED: ICD-10-CM

## 2018-07-31 PROCEDURE — 99215 OFFICE O/P EST HI 40 MIN: CPT

## 2018-07-31 RX ORDER — PEMBROLIZUMAB 25 MG/ML
200 INJECTION, SOLUTION INTRAVENOUS ONCE
Qty: 0 | Refills: 0 | Status: COMPLETED | OUTPATIENT
Start: 2018-07-31 | End: 2018-07-31

## 2018-07-31 RX ADMIN — PEMBROLIZUMAB 200 MILLIGRAM(S): 25 INJECTION, SOLUTION INTRAVENOUS at 14:05

## 2018-08-18 LAB
BASOPHILS # BLD AUTO: 0.01 K/UL
BASOPHILS NFR BLD AUTO: 0.3 %
EOSINOPHIL # BLD AUTO: 0.08 K/UL
EOSINOPHIL NFR BLD AUTO: 2.4 %
HCT VFR BLD CALC: 37.7 %
HGB BLD-MCNC: 12.1 G/DL
IMM GRANULOCYTES NFR BLD AUTO: 0 %
LYMPHOCYTES # BLD AUTO: 0.85 K/UL
LYMPHOCYTES NFR BLD AUTO: 25.8 %
MAN DIFF?: NORMAL
MCHC RBC-ENTMCNC: 29.7 PG
MCHC RBC-ENTMCNC: 32.1 GM/DL
MCV RBC AUTO: 92.6 FL
MONOCYTES # BLD AUTO: 0.25 K/UL
MONOCYTES NFR BLD AUTO: 7.6 %
NEUTROPHILS # BLD AUTO: 2.11 K/UL
NEUTROPHILS NFR BLD AUTO: 63.9 %
PLATELET # BLD AUTO: 118 K/UL
RBC # BLD: 4.07 M/UL
RBC # FLD: 14.2 %
WBC # FLD AUTO: 3.3 K/UL

## 2018-08-19 LAB
ALBUMIN SERPL ELPH-MCNC: 4.6 G/DL
ALP BLD-CCNC: 65 U/L
ALT SERPL-CCNC: 16 U/L
ANION GAP SERPL CALC-SCNC: 15 MMOL/L
AST SERPL-CCNC: 21 U/L
BILIRUB SERPL-MCNC: 0.3 MG/DL
BUN SERPL-MCNC: 35 MG/DL
CALCIUM SERPL-MCNC: 9.6 MG/DL
CHLORIDE SERPL-SCNC: 109 MMOL/L
CO2 SERPL-SCNC: 19 MMOL/L
CREAT SERPL-MCNC: 1.5 MG/DL
GLUCOSE SERPL-MCNC: 136 MG/DL
POTASSIUM SERPL-SCNC: 4.2 MMOL/L
PROT SERPL-MCNC: 6.5 G/DL
SODIUM SERPL-SCNC: 143 MMOL/L
TSH SERPL-ACNC: 2.84 UIU/ML

## 2018-08-20 ENCOUNTER — OTHER (OUTPATIENT)
Age: 83
End: 2018-08-20

## 2018-08-20 ENCOUNTER — APPOINTMENT (OUTPATIENT)
Dept: INFUSION THERAPY | Facility: HOSPITAL | Age: 83
End: 2018-08-20

## 2018-08-20 ENCOUNTER — APPOINTMENT (OUTPATIENT)
Dept: HEMATOLOGY ONCOLOGY | Facility: CLINIC | Age: 83
End: 2018-08-20
Payer: MEDICARE

## 2018-08-20 VITALS
SYSTOLIC BLOOD PRESSURE: 124 MMHG | DIASTOLIC BLOOD PRESSURE: 64 MMHG | BODY MASS INDEX: 30.82 KG/M2 | WEIGHT: 174 LBS | TEMPERATURE: 98.4 F | HEART RATE: 80 BPM

## 2018-08-20 PROCEDURE — 96413 CHEMO IV INFUSION 1 HR: CPT

## 2018-08-20 PROCEDURE — 96375 TX/PRO/DX INJ NEW DRUG ADDON: CPT

## 2018-08-20 PROCEDURE — 99214 OFFICE O/P EST MOD 30 MIN: CPT

## 2018-08-20 RX ORDER — PEMBROLIZUMAB 25 MG/ML
200 INJECTION, SOLUTION INTRAVENOUS ONCE
Qty: 0 | Refills: 0 | Status: COMPLETED | OUTPATIENT
Start: 2018-08-20 | End: 2018-08-20

## 2018-08-20 RX ORDER — ALTEPLASE 100 MG
2 KIT INTRAVENOUS ONCE
Qty: 0 | Refills: 0 | Status: COMPLETED | OUTPATIENT
Start: 2018-08-20 | End: 2018-08-20

## 2018-08-20 RX ADMIN — PEMBROLIZUMAB 200 MILLIGRAM(S): 25 INJECTION, SOLUTION INTRAVENOUS at 15:05

## 2018-08-20 RX ADMIN — ALTEPLASE 2 MILLIGRAM(S): KIT at 15:15

## 2018-08-21 ENCOUNTER — APPOINTMENT (OUTPATIENT)
Dept: HEMATOLOGY ONCOLOGY | Facility: CLINIC | Age: 83
End: 2018-08-21

## 2018-08-21 ENCOUNTER — APPOINTMENT (OUTPATIENT)
Dept: INFUSION THERAPY | Facility: HOSPITAL | Age: 83
End: 2018-08-21

## 2018-08-24 LAB
CORTICOSTEROID BIND GLOBULIN: 3.6 MG/DL
CORTIS SERPL-MCNC: 20 UG/DL
CORTISOL, FREE: 1.2 UG/DL
PFCX: 5.8 %

## 2018-08-30 ENCOUNTER — OUTPATIENT (OUTPATIENT)
Dept: OUTPATIENT SERVICES | Facility: HOSPITAL | Age: 83
LOS: 1 days | End: 2018-08-30
Payer: MEDICARE

## 2018-08-30 DIAGNOSIS — C19 MALIGNANT NEOPLASM OF RECTOSIGMOID JUNCTION: ICD-10-CM

## 2018-08-30 PROCEDURE — 36593 DECLOT VASCULAR DEVICE: CPT

## 2018-08-30 PROCEDURE — 36598 INJ W/FLUOR EVAL CV DEVICE: CPT

## 2018-09-05 DIAGNOSIS — T82.898A OTHER SPECIFIED COMPLICATION OF VASCULAR PROSTHETIC DEVICES, IMPLANTS AND GRAFTS, INITIAL ENCOUNTER: ICD-10-CM

## 2018-09-05 DIAGNOSIS — C18.9 MALIGNANT NEOPLASM OF COLON, UNSPECIFIED: ICD-10-CM

## 2018-09-10 ENCOUNTER — APPOINTMENT (OUTPATIENT)
Dept: INFUSION THERAPY | Facility: HOSPITAL | Age: 83
End: 2018-09-10

## 2018-09-10 ENCOUNTER — OUTPATIENT (OUTPATIENT)
Dept: OUTPATIENT SERVICES | Facility: HOSPITAL | Age: 83
LOS: 1 days | End: 2018-09-10
Payer: MEDICARE

## 2018-09-10 DIAGNOSIS — C18.9 MALIGNANT NEOPLASM OF COLON, UNSPECIFIED: ICD-10-CM

## 2018-09-10 LAB
ALBUMIN SERPL ELPH-MCNC: 4.4 G/DL
ALP BLD-CCNC: 61 U/L
ALT SERPL-CCNC: 14 U/L
ANION GAP SERPL CALC-SCNC: 15 MMOL/L
AST SERPL-CCNC: 21 U/L
BASOPHILS # BLD AUTO: 0.01 K/UL
BASOPHILS NFR BLD AUTO: 0.3 %
BILIRUB SERPL-MCNC: 0.3 MG/DL
BUN SERPL-MCNC: 21 MG/DL
CALCIUM SERPL-MCNC: 9.6 MG/DL
CHLORIDE SERPL-SCNC: 108 MMOL/L
CO2 SERPL-SCNC: 22 MMOL/L
CREAT SERPL-MCNC: 1.49 MG/DL
EOSINOPHIL # BLD AUTO: 0.08 K/UL
EOSINOPHIL NFR BLD AUTO: 2.8 %
GLUCOSE SERPL-MCNC: 117 MG/DL
HCT VFR BLD CALC: 34.5 %
HGB BLD-MCNC: 11.6 G/DL
IMM GRANULOCYTES NFR BLD AUTO: 0 %
LYMPHOCYTES # BLD AUTO: 0.77 K/UL
LYMPHOCYTES NFR BLD AUTO: 26.9 %
MAN DIFF?: NORMAL
MCHC RBC-ENTMCNC: 31 PG
MCHC RBC-ENTMCNC: 33.6 GM/DL
MCV RBC AUTO: 92.2 FL
MONOCYTES # BLD AUTO: 0.19 K/UL
MONOCYTES NFR BLD AUTO: 6.6 %
NEUTROPHILS # BLD AUTO: 1.81 K/UL
NEUTROPHILS NFR BLD AUTO: 63.4 %
PLATELET # BLD AUTO: 122 K/UL
POTASSIUM SERPL-SCNC: 3.9 MMOL/L
PROT SERPL-MCNC: 6.5 G/DL
RBC # BLD: 3.74 M/UL
RBC # FLD: 14.1 %
SODIUM SERPL-SCNC: 145 MMOL/L
TSH SERPL-ACNC: 1.53 UIU/ML
WBC # FLD AUTO: 2.86 K/UL

## 2018-09-10 RX ORDER — PEMBROLIZUMAB 25 MG/ML
200 INJECTION, SOLUTION INTRAVENOUS ONCE
Qty: 0 | Refills: 0 | Status: COMPLETED | OUTPATIENT
Start: 2018-09-10 | End: 2018-09-10

## 2018-09-10 RX ADMIN — PEMBROLIZUMAB 200 MILLIGRAM(S): 25 INJECTION, SOLUTION INTRAVENOUS at 13:33

## 2018-09-29 LAB
ALBUMIN SERPL ELPH-MCNC: 4.2 G/DL
ALP BLD-CCNC: 66 U/L
ALT SERPL-CCNC: 18 U/L
ANION GAP SERPL CALC-SCNC: 14 MMOL/L
AST SERPL-CCNC: 22 U/L
BASOPHILS # BLD AUTO: 0.01 K/UL
BASOPHILS NFR BLD AUTO: 0.3 %
BILIRUB SERPL-MCNC: 0.5 MG/DL
BUN SERPL-MCNC: 18 MG/DL
CALCIUM SERPL-MCNC: 9.6 MG/DL
CHLORIDE SERPL-SCNC: 109 MMOL/L
CO2 SERPL-SCNC: 21 MMOL/L
CREAT SERPL-MCNC: 1.24 MG/DL
EOSINOPHIL # BLD AUTO: 0.07 K/UL
EOSINOPHIL NFR BLD AUTO: 2.1 %
GLUCOSE SERPL-MCNC: 136 MG/DL
HCT VFR BLD CALC: 35.7 %
HGB BLD-MCNC: 11.7 G/DL
IMM GRANULOCYTES NFR BLD AUTO: 0 %
LYMPHOCYTES # BLD AUTO: 0.72 K/UL
LYMPHOCYTES NFR BLD AUTO: 21.4 %
MAN DIFF?: NORMAL
MCHC RBC-ENTMCNC: 30.1 PG
MCHC RBC-ENTMCNC: 32.8 GM/DL
MCV RBC AUTO: 91.8 FL
MONOCYTES # BLD AUTO: 0.25 K/UL
MONOCYTES NFR BLD AUTO: 7.4 %
NEUTROPHILS # BLD AUTO: 2.32 K/UL
NEUTROPHILS NFR BLD AUTO: 68.8 %
PLATELET # BLD AUTO: 108 K/UL
POTASSIUM SERPL-SCNC: 3.9 MMOL/L
PROT SERPL-MCNC: 6.5 G/DL
RBC # BLD: 3.89 M/UL
RBC # FLD: 14.4 %
SODIUM SERPL-SCNC: 144 MMOL/L
TSH SERPL-ACNC: 2.38 UIU/ML
WBC # FLD AUTO: 3.37 K/UL

## 2018-10-01 ENCOUNTER — APPOINTMENT (OUTPATIENT)
Dept: INFUSION THERAPY | Facility: HOSPITAL | Age: 83
End: 2018-10-01

## 2018-10-01 ENCOUNTER — APPOINTMENT (OUTPATIENT)
Dept: HEMATOLOGY ONCOLOGY | Facility: CLINIC | Age: 83
End: 2018-10-01
Payer: MEDICARE

## 2018-10-01 VITALS
TEMPERATURE: 98.6 F | BODY MASS INDEX: 30.82 KG/M2 | SYSTOLIC BLOOD PRESSURE: 142 MMHG | HEART RATE: 84 BPM | WEIGHT: 174 LBS | DIASTOLIC BLOOD PRESSURE: 60 MMHG

## 2018-10-01 PROCEDURE — 96413 CHEMO IV INFUSION 1 HR: CPT

## 2018-10-01 PROCEDURE — 99214 OFFICE O/P EST MOD 30 MIN: CPT

## 2018-10-01 RX ORDER — DOXAZOSIN 1 MG/1
1 TABLET ORAL
Qty: 60 | Refills: 2 | Status: ACTIVE | COMMUNITY
Start: 2017-02-08

## 2018-10-01 RX ORDER — PEMBROLIZUMAB 25 MG/ML
200 INJECTION, SOLUTION INTRAVENOUS ONCE
Qty: 0 | Refills: 0 | Status: COMPLETED | OUTPATIENT
Start: 2018-10-01 | End: 2018-10-01

## 2018-10-01 RX ADMIN — PEMBROLIZUMAB 200 MILLIGRAM(S): 25 INJECTION, SOLUTION INTRAVENOUS at 12:30

## 2018-10-20 ENCOUNTER — LABORATORY RESULT (OUTPATIENT)
Age: 83
End: 2018-10-20

## 2018-10-21 LAB
ALBUMIN SERPL ELPH-MCNC: 4.3 G/DL
ALP BLD-CCNC: 68 U/L
ALT SERPL-CCNC: 19 U/L
ANION GAP SERPL CALC-SCNC: 13 MMOL/L
AST SERPL-CCNC: 25 U/L
BASOPHILS # BLD AUTO: 0.01 K/UL
BASOPHILS NFR BLD AUTO: 0.4 %
BILIRUB SERPL-MCNC: 0.4 MG/DL
BUN SERPL-MCNC: 22 MG/DL
CALCIUM SERPL-MCNC: 9.3 MG/DL
CHLORIDE SERPL-SCNC: 112 MMOL/L
CO2 SERPL-SCNC: 21 MMOL/L
CREAT SERPL-MCNC: 1.42 MG/DL
EOSINOPHIL # BLD AUTO: 0.06 K/UL
EOSINOPHIL NFR BLD AUTO: 2.1 %
GLUCOSE SERPL-MCNC: 124 MG/DL
HCT VFR BLD CALC: 35 %
HGB BLD-MCNC: 11.8 G/DL
IMM GRANULOCYTES NFR BLD AUTO: 0 %
LYMPHOCYTES # BLD AUTO: 0.73 K/UL
LYMPHOCYTES NFR BLD AUTO: 26 %
MAN DIFF?: NORMAL
MCHC RBC-ENTMCNC: 30.5 PG
MCHC RBC-ENTMCNC: 33.7 GM/DL
MCV RBC AUTO: 90.4 FL
MONOCYTES # BLD AUTO: 0.19 K/UL
MONOCYTES NFR BLD AUTO: 6.8 %
NEUTROPHILS # BLD AUTO: 1.82 K/UL
NEUTROPHILS NFR BLD AUTO: 64.7 %
PLATELET # BLD AUTO: 106 K/UL
POTASSIUM SERPL-SCNC: 4.6 MMOL/L
PROT SERPL-MCNC: 6 G/DL
RBC # BLD: 3.87 M/UL
RBC # FLD: 14.5 %
SODIUM SERPL-SCNC: 146 MMOL/L
TSH SERPL-ACNC: 1.53 UIU/ML
WBC # FLD AUTO: 2.81 K/UL

## 2018-10-22 ENCOUNTER — APPOINTMENT (OUTPATIENT)
Dept: HEMATOLOGY ONCOLOGY | Facility: CLINIC | Age: 83
End: 2018-10-22
Payer: MEDICARE

## 2018-10-22 ENCOUNTER — OUTPATIENT (OUTPATIENT)
Dept: OUTPATIENT SERVICES | Facility: HOSPITAL | Age: 83
LOS: 1 days | End: 2018-10-22
Payer: MEDICARE

## 2018-10-22 ENCOUNTER — APPOINTMENT (OUTPATIENT)
Dept: INFUSION THERAPY | Facility: HOSPITAL | Age: 83
End: 2018-10-22

## 2018-10-22 VITALS
BODY MASS INDEX: 31.18 KG/M2 | DIASTOLIC BLOOD PRESSURE: 64 MMHG | WEIGHT: 176 LBS | TEMPERATURE: 98.4 F | HEART RATE: 76 BPM | SYSTOLIC BLOOD PRESSURE: 118 MMHG

## 2018-10-22 DIAGNOSIS — C18.9 MALIGNANT NEOPLASM OF COLON, UNSPECIFIED: ICD-10-CM

## 2018-10-22 DIAGNOSIS — Z51.11 ENCOUNTER FOR ANTINEOPLASTIC CHEMOTHERAPY: ICD-10-CM

## 2018-10-22 PROCEDURE — 99215 OFFICE O/P EST HI 40 MIN: CPT

## 2018-10-22 RX ORDER — PEMBROLIZUMAB 25 MG/ML
200 INJECTION, SOLUTION INTRAVENOUS ONCE
Qty: 0 | Refills: 0 | Status: COMPLETED | OUTPATIENT
Start: 2018-10-22 | End: 2018-10-22

## 2018-10-22 RX ADMIN — PEMBROLIZUMAB 200 MILLIGRAM(S): 25 INJECTION, SOLUTION INTRAVENOUS at 14:23

## 2018-11-10 ENCOUNTER — LABORATORY RESULT (OUTPATIENT)
Age: 83
End: 2018-11-10

## 2018-11-10 LAB
ALBUMIN SERPL ELPH-MCNC: 4.2 G/DL
ALP BLD-CCNC: 63 U/L
ALT SERPL-CCNC: 16 U/L
ANION GAP SERPL CALC-SCNC: 13 MMOL/L
AST SERPL-CCNC: 20 U/L
BASOPHILS # BLD AUTO: 0.02 K/UL
BASOPHILS NFR BLD AUTO: 0.6 %
BILIRUB SERPL-MCNC: 0.3 MG/DL
BUN SERPL-MCNC: 27 MG/DL
CALCIUM SERPL-MCNC: 9.4 MG/DL
CANCER AG125 SERPL-ACNC: 13 U/ML
CHLORIDE SERPL-SCNC: 113 MMOL/L
CO2 SERPL-SCNC: 20 MMOL/L
CREAT SERPL-MCNC: 1.41 MG/DL
EOSINOPHIL # BLD AUTO: 0.08 K/UL
EOSINOPHIL NFR BLD AUTO: 2.5 %
GLUCOSE SERPL-MCNC: 139 MG/DL
HCT VFR BLD CALC: 35.5 %
HGB BLD-MCNC: 12.1 G/DL
IMM GRANULOCYTES NFR BLD AUTO: 0 %
LYMPHOCYTES # BLD AUTO: 0.89 K/UL
LYMPHOCYTES NFR BLD AUTO: 28 %
MAN DIFF?: NORMAL
MCHC RBC-ENTMCNC: 31.3 PG
MCHC RBC-ENTMCNC: 34.1 GM/DL
MCV RBC AUTO: 91.7 FL
MONOCYTES # BLD AUTO: 0.25 K/UL
MONOCYTES NFR BLD AUTO: 7.9 %
NEUTROPHILS # BLD AUTO: 1.94 K/UL
NEUTROPHILS NFR BLD AUTO: 61 %
PLATELET # BLD AUTO: 104 K/UL
POTASSIUM SERPL-SCNC: 4.3 MMOL/L
PROT SERPL-MCNC: 6.3 G/DL
RBC # BLD: 3.87 M/UL
RBC # FLD: 14.4 %
SODIUM SERPL-SCNC: 146 MMOL/L
TSH SERPL-ACNC: 2.31 UIU/ML
WBC # FLD AUTO: 3.18 K/UL

## 2018-11-12 ENCOUNTER — APPOINTMENT (OUTPATIENT)
Dept: HEMATOLOGY ONCOLOGY | Facility: CLINIC | Age: 83
End: 2018-11-12
Payer: MEDICARE

## 2018-11-12 ENCOUNTER — APPOINTMENT (OUTPATIENT)
Dept: INFUSION THERAPY | Facility: HOSPITAL | Age: 83
End: 2018-11-12

## 2018-11-12 VITALS
DIASTOLIC BLOOD PRESSURE: 72 MMHG | HEART RATE: 71 BPM | RESPIRATION RATE: 16 BRPM | SYSTOLIC BLOOD PRESSURE: 136 MMHG | WEIGHT: 179.02 LBS | TEMPERATURE: 98 F

## 2018-11-12 VITALS
BODY MASS INDEX: 31.71 KG/M2 | WEIGHT: 179 LBS | RESPIRATION RATE: 14 BRPM | HEART RATE: 71 BPM | TEMPERATURE: 97.7 F | DIASTOLIC BLOOD PRESSURE: 72 MMHG | SYSTOLIC BLOOD PRESSURE: 136 MMHG

## 2018-11-12 LAB — CEA SERPL-MCNC: 90.7 NG/ML

## 2018-11-12 PROCEDURE — 96413 CHEMO IV INFUSION 1 HR: CPT

## 2018-11-12 PROCEDURE — 99214 OFFICE O/P EST MOD 30 MIN: CPT

## 2018-11-12 RX ORDER — PEMBROLIZUMAB 25 MG/ML
200 INJECTION, SOLUTION INTRAVENOUS ONCE
Qty: 0 | Refills: 0 | Status: COMPLETED | OUTPATIENT
Start: 2018-11-12 | End: 2018-11-12

## 2018-11-12 RX ADMIN — PEMBROLIZUMAB 200 MILLIGRAM(S): 25 INJECTION, SOLUTION INTRAVENOUS at 11:53

## 2018-11-12 NOTE — HISTORY OF PRESENT ILLNESS
[Disease: _____________________] : Disease: [unfilled] [T: ___] : T[unfilled] [N: ___] : N[unfilled] [M: ___] : M[unfilled] [AJCC Stage: ____] : AJCC Stage: [unfilled] [de-identified] : Patient with history of colon cancer (adenocarcinoma)-5-2011. Stage IIIC-T4 N2a. Status post right colectomy followed by FOLFOX chemotherapy.  With persistently elevated CEA on f/u testing,  3/2013 mesentery mass excision pathology showed a lymph node negative for metastatic carcinoma. Benign fibroadipose tissue.\par Persistently increasing elevated CEA.  CT scan abdomen/pelvis 6/2016, showed enlarging mesenteric adenopathy. Endoscopic ultrasound guided FNA of the bryan-pancreatic, mesenteric mass was positive for malignant cells-adenocarcinoma associated with abundant mucin. 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  Patient obtained 2 surgical opinions and disease was felt to be inoperable by both surgeons.  Patient begun on Xeloda/Avastin.\par 5/2017-CT scan chest/abdomen/pelvis showed stable tiny nodules in the lungs. Sizable mass merging with the head of the pancreas had Increased in size from 11/2016 with some increasing infiltration of the surrounding fat.  Possibility of increasing colonic wall thickness on the colon site of the ileocolic anastomosis. Patient begun on Pembrolizumab (tumor MSI-high).\par 8/2017- CT scan chest/abdomen/pelvis showed marginally worsening mesenteric adenopathy, with short interval followup CT scan abdomen/pelvis 9/2017, stable compared to 8/2017.\par 1/2018-CT scan chest/abdomen/pelvis-stable adenopathy.\par 6/2018-CT scan chest/abdomen/pelvis-stable adenopathy.\par \par History of right breast cancer-2007. Status post right breast conservation surgery--> RT--> [facility]. [de-identified] : adenocarcinoma [de-identified] : CEA [de-identified] : CEA 12/2013-33.6.         \par CEA 2/2015-94.1\par CEA 8/2015-130.8.     \par CEA 8/2016-294.3.\par CEA 11/2016-146.3.\par CEA 12/2016-187.0\par CEA 5/2017-208.2.\par CEA 9/2017-130,6.\par CEA 9/2017-130.6.\par CEA 11/2017-82.\par  CEA 5/2018-80.\par CEA 6/2018-90.8.\par CEA 7/2018-88.9.\par CEA 10/2018-90.7. [de-identified] : Feeling well without new complaints.\par No pulmonary/ or bony complaints. No fevers. No abnormal bleeding. Bowel movement irregularities without acute change. No associated nausea/vomiting, or abdominal pain.\par Maintaining activities of daily living.

## 2018-11-12 NOTE — PHYSICAL EXAM
[Normal] : affect appropriate [de-identified] : Well-developed, well-nourished, cooperative female, nontoxic appearing, in no acute distress. [de-identified] : no dominant mass [de-identified] : Soft, nontender to palpation. Unable to appreciate organomegaly. [de-identified] : unable to appreciate discrete LAD [de-identified] : no vesicles [de-identified] : Able to ambulate without assistance.

## 2018-11-12 NOTE — OB HISTORY
[Post-Menopause, No Sxs] : post-menopausal, currently without symptoms [Menopause Age: ____] : age at menopause was [unfilled] [___] : Living: [unfilled]

## 2018-11-12 NOTE — ASSESSMENT
[FreeTextEntry1] : #1) colon cancer-clinically stable for Pembrolizumab today (potential side effects reviewed). CEA level not done with most recent lab work-added to next lab work. Patient also to have followup CAT scan imaging prior to next visit.\par #2) anemia/thrombocytopenia/leukopenia-lab work reviewed-ANC, hemoglobin, and platelet count currently acceptable. Continue to monitor CBC with differential.\par Patient was given the opportunity to ask questions. Her questions have been answered to her satisfaction. She concurs with plans of care.

## 2018-11-28 ENCOUNTER — OUTPATIENT (OUTPATIENT)
Dept: OUTPATIENT SERVICES | Facility: HOSPITAL | Age: 83
LOS: 1 days | Discharge: HOME | End: 2018-11-28

## 2018-11-28 DIAGNOSIS — Z90.49 ACQUIRED ABSENCE OF OTHER SPECIFIED PARTS OF DIGESTIVE TRACT: Chronic | ICD-10-CM

## 2018-11-28 DIAGNOSIS — D64.9 ANEMIA, UNSPECIFIED: ICD-10-CM

## 2018-11-29 ENCOUNTER — APPOINTMENT (OUTPATIENT)
Dept: CT IMAGING | Facility: HOSPITAL | Age: 83
End: 2018-11-29
Payer: MEDICARE

## 2018-11-29 ENCOUNTER — OUTPATIENT (OUTPATIENT)
Dept: OUTPATIENT SERVICES | Facility: HOSPITAL | Age: 83
LOS: 1 days | End: 2018-11-29
Payer: MEDICARE

## 2018-11-29 DIAGNOSIS — Z90.49 ACQUIRED ABSENCE OF OTHER SPECIFIED PARTS OF DIGESTIVE TRACT: Chronic | ICD-10-CM

## 2018-11-29 DIAGNOSIS — C18.9 MALIGNANT NEOPLASM OF COLON, UNSPECIFIED: ICD-10-CM

## 2018-11-29 PROBLEM — I10 ESSENTIAL (PRIMARY) HYPERTENSION: Chronic | Status: ACTIVE | Noted: 2018-11-26

## 2018-11-29 PROBLEM — Z85.858: Chronic | Status: ACTIVE | Noted: 2018-11-26

## 2018-11-29 PROBLEM — Z86.2 PERSONAL HISTORY OF DISEASES OF THE BLOOD AND BLOOD-FORMING ORGANS AND CERTAIN DISORDERS INVOLVING THE IMMUNE MECHANISM: Chronic | Status: ACTIVE | Noted: 2018-11-26

## 2018-11-29 PROBLEM — Z86.19 PERSONAL HISTORY OF OTHER INFECTIOUS AND PARASITIC DISEASES: Chronic | Status: ACTIVE | Noted: 2018-11-26

## 2018-11-29 PROBLEM — D64.9 ANEMIA, UNSPECIFIED: Chronic | Status: ACTIVE | Noted: 2018-11-26

## 2018-11-29 PROCEDURE — 71250 CT THORAX DX C-: CPT

## 2018-11-29 PROCEDURE — 74176 CT ABD & PELVIS W/O CONTRAST: CPT | Mod: 26

## 2018-11-29 PROCEDURE — 71250 CT THORAX DX C-: CPT | Mod: 26

## 2018-11-29 PROCEDURE — 74176 CT ABD & PELVIS W/O CONTRAST: CPT

## 2018-12-03 ENCOUNTER — LABORATORY RESULT (OUTPATIENT)
Age: 83
End: 2018-12-03

## 2018-12-03 LAB
ALBUMIN SERPL ELPH-MCNC: 4.1 G/DL
ALP BLD-CCNC: 67 U/L
ALT SERPL-CCNC: 17 U/L
ANION GAP SERPL CALC-SCNC: 13 MMOL/L
AST SERPL-CCNC: 30 U/L
BILIRUB SERPL-MCNC: 0.4 MG/DL
BUN SERPL-MCNC: 17 MG/DL
CALCIUM SERPL-MCNC: 9.6 MG/DL
CEA SERPL-MCNC: 103.8 NG/ML
CHLORIDE SERPL-SCNC: 118 MMOL/L
CO2 SERPL-SCNC: 16 MMOL/L
CREAT SERPL-MCNC: 1.51 MG/DL
GLUCOSE SERPL-MCNC: 143 MG/DL
POTASSIUM SERPL-SCNC: 4 MMOL/L
PROT SERPL-MCNC: 6.6 G/DL
SODIUM SERPL-SCNC: 147 MMOL/L
TSH SERPL-ACNC: 1.33 UIU/ML

## 2018-12-04 LAB
BASOPHILS # BLD AUTO: 0.01 K/UL
BASOPHILS NFR BLD AUTO: 0.3 %
EOSINOPHIL # BLD AUTO: 0.09 K/UL
EOSINOPHIL NFR BLD AUTO: 2.6 %
HCT VFR BLD CALC: 40 %
HGB BLD-MCNC: 13.3 G/DL
IMM GRANULOCYTES NFR BLD AUTO: 0.3 %
LYMPHOCYTES # BLD AUTO: 0.96 K/UL
LYMPHOCYTES NFR BLD AUTO: 27.5 %
MAN DIFF?: NORMAL
MCHC RBC-ENTMCNC: 31.2 PG
MCHC RBC-ENTMCNC: 33.3 GM/DL
MCV RBC AUTO: 93.9 FL
MONOCYTES # BLD AUTO: 0.21 K/UL
MONOCYTES NFR BLD AUTO: 6 %
NEUTROPHILS # BLD AUTO: 2.21 K/UL
NEUTROPHILS NFR BLD AUTO: 63.3 %
PLATELET # BLD AUTO: 116 K/UL
RBC # BLD: 4.26 M/UL
RBC # FLD: 14.8 %
WBC # FLD AUTO: 3.49 K/UL

## 2018-12-05 ENCOUNTER — APPOINTMENT (OUTPATIENT)
Dept: HEMATOLOGY ONCOLOGY | Facility: CLINIC | Age: 83
End: 2018-12-05
Payer: MEDICARE

## 2018-12-05 ENCOUNTER — APPOINTMENT (OUTPATIENT)
Dept: INFUSION THERAPY | Facility: HOSPITAL | Age: 83
End: 2018-12-05

## 2018-12-05 VITALS
WEIGHT: 176.37 LBS | BODY MASS INDEX: 31.24 KG/M2 | SYSTOLIC BLOOD PRESSURE: 160 MMHG | HEART RATE: 76 BPM | DIASTOLIC BLOOD PRESSURE: 74 MMHG | TEMPERATURE: 98 F | OXYGEN SATURATION: 99 % | RESPIRATION RATE: 16 BRPM

## 2018-12-05 DIAGNOSIS — Z87.898 PERSONAL HISTORY OF OTHER SPECIFIED CONDITIONS: ICD-10-CM

## 2018-12-05 DIAGNOSIS — M62.81 MUSCLE WEAKNESS (GENERALIZED): ICD-10-CM

## 2018-12-05 PROCEDURE — 99215 OFFICE O/P EST HI 40 MIN: CPT

## 2018-12-05 NOTE — HISTORY OF PRESENT ILLNESS
[Disease: _____________________] : Disease: [unfilled] [T: ___] : T[unfilled] [N: ___] : N[unfilled] [M: ___] : M[unfilled] [AJCC Stage: ____] : AJCC Stage: [unfilled] [de-identified] : Patient with history of colon cancer (adenocarcinoma)-5-2011. Stage IIIC-T4 N2a. Status post right colectomy followed by FOLFOX chemotherapy.  With persistently elevated CEA on f/u testing,  3/2013 mesentery mass excision pathology showed a lymph node negative for metastatic carcinoma. Benign fibroadipose tissue.\par Persistently increasing elevated CEA.  CT scan abdomen/pelvis 6/2016, showed enlarging mesenteric adenopathy. Endoscopic ultrasound guided FNA of the bryan-pancreatic, mesenteric mass was positive for malignant cells-adenocarcinoma associated with abundant mucin. 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  Patient obtained 2 surgical opinions and disease was felt to be inoperable by both surgeons.  Patient begun on Xeloda/Avastin.\par 5/2017-CT scan chest/abdomen/pelvis showed stable tiny nodules in the lungs. Sizable mass merging with the head of the pancreas had Increased in size from 11/2016 with some increasing infiltration of the surrounding fat.  Possibility of increasing colonic wall thickness on the colon site of the ileocolic anastomosis. Patient begun on Pembrolizumab (tumor MSI-high).\par 8/2017- CT scan chest/abdomen/pelvis showed marginally worsening mesenteric adenopathy, with short interval followup CT scan abdomen/pelvis 9/2017, stable compared to 8/2017.\par 1/2018-CT scan chest/abdomen/pelvis-stable adenopathy.\par 6/2018-CT scan chest/abdomen/pelvis-stable adenopathy.\par 11/2018-CT chest/abdomen/pelvis-unchanged mass at the root of the mesentery.\par \par History of right breast cancer-2007. Status post right breast conservation surgery--> RT--> [facility]. [de-identified] : adenocarcinoma [de-identified] : CEA [de-identified] : CEA 12/2013-33.6.         \par \par  [de-identified] : Overall, feeling well.  Is having a wart "frozen" off of her left heel by dermatologist.\par No pulmonary/ or bony complaints. No fevers. No abnormal bleeding. Bowel movement irregularities without acute change. No associated nausea/vomiting, or abdominal pain.\par Maintaining activities of daily living.

## 2018-12-05 NOTE — PHYSICAL EXAM
[Normal] : affect appropriate [de-identified] : Well-developed, well-nourished, cooperative female, nontoxic appearing, in no acute distress. [de-identified] : no dominant mass [de-identified] : Soft, nontender to palpation. Unable to appreciate organomegaly. [de-identified] : unable to appreciate discrete LAD [de-identified] : no vesicles; left healed scabbed area at site of verrucus-no erythema or purulence [de-identified] : no gross focal motor deficits

## 2018-12-05 NOTE — ASSESSMENT
[FreeTextEntry1] : #1) colon cancer-reviewed lab work and CT scan results with patient in detail. While I am concerned that her CEA level is rising, her most recent imaging reveals essentially stable disease. Discussed management options, along with palliative treatment intent. Weighing potential benefits versus side effects of treatment options, plan to continue current Pembrolizumab (potential toxicity reviewed) with interval followup imaging again to be done. Patient is enjoying her quality of life activities currently.\par #2) history of anemia/thrombocytopenia/leukopenia-CBC acceptable for treatment. Continue to monitor CBC with differential.\par Patient's questions have been answered to her apparent satisfaction at this time.

## 2018-12-05 NOTE — REVIEW OF SYSTEMS
[Negative] : Allergic/Immunologic [Abdominal Pain] : no abdominal pain [de-identified] : left heel wart

## 2018-12-06 DIAGNOSIS — Z51.11 ENCOUNTER FOR ANTINEOPLASTIC CHEMOTHERAPY: ICD-10-CM

## 2018-12-06 DIAGNOSIS — C18.9 MALIGNANT NEOPLASM OF COLON, UNSPECIFIED: ICD-10-CM

## 2018-12-24 ENCOUNTER — APPOINTMENT (OUTPATIENT)
Dept: HEMATOLOGY ONCOLOGY | Facility: CLINIC | Age: 83
End: 2018-12-24

## 2018-12-24 ENCOUNTER — APPOINTMENT (OUTPATIENT)
Dept: INFUSION THERAPY | Facility: HOSPITAL | Age: 83
End: 2018-12-24

## 2018-12-24 ENCOUNTER — OUTPATIENT (OUTPATIENT)
Dept: OUTPATIENT SERVICES | Facility: HOSPITAL | Age: 83
LOS: 1 days | Discharge: HOME | End: 2018-12-24

## 2018-12-24 DIAGNOSIS — Z90.49 ACQUIRED ABSENCE OF OTHER SPECIFIED PARTS OF DIGESTIVE TRACT: Chronic | ICD-10-CM

## 2018-12-24 DIAGNOSIS — C18.9 MALIGNANT NEOPLASM OF COLON, UNSPECIFIED: ICD-10-CM

## 2019-01-02 ENCOUNTER — APPOINTMENT (OUTPATIENT)
Dept: HEMATOLOGY ONCOLOGY | Facility: CLINIC | Age: 84
End: 2019-01-02
Payer: MEDICARE

## 2019-01-02 ENCOUNTER — RESULT REVIEW (OUTPATIENT)
Age: 84
End: 2019-01-02

## 2019-01-02 ENCOUNTER — LABORATORY RESULT (OUTPATIENT)
Age: 84
End: 2019-01-02

## 2019-01-02 ENCOUNTER — APPOINTMENT (OUTPATIENT)
Dept: INFUSION THERAPY | Facility: HOSPITAL | Age: 84
End: 2019-01-02

## 2019-01-02 VITALS
HEART RATE: 87 BPM | BODY MASS INDEX: 32.02 KG/M2 | TEMPERATURE: 98.1 F | WEIGHT: 180.78 LBS | OXYGEN SATURATION: 99 % | SYSTOLIC BLOOD PRESSURE: 148 MMHG | DIASTOLIC BLOOD PRESSURE: 75 MMHG | RESPIRATION RATE: 16 BRPM

## 2019-01-02 PROCEDURE — 99214 OFFICE O/P EST MOD 30 MIN: CPT

## 2019-01-02 NOTE — HISTORY OF PRESENT ILLNESS
[Disease: _____________________] : Disease: [unfilled] [T: ___] : T[unfilled] [N: ___] : N[unfilled] [M: ___] : M[unfilled] [AJCC Stage: ____] : AJCC Stage: [unfilled] [de-identified] : Patient with history of colon cancer (adenocarcinoma)-5-2011. Stage IIIC-T4 N2a. Status post right colectomy followed by FOLFOX chemotherapy.  With persistently elevated CEA on f/u testing,  3/2013 mesentery mass excision pathology showed a lymph node negative for metastatic carcinoma. Benign fibroadipose tissue.\par Persistently increasing elevated CEA.  CT scan abdomen/pelvis 6/2016, showed enlarging mesenteric adenopathy. Endoscopic ultrasound guided FNA of the bryan-pancreatic, mesenteric mass was positive for malignant cells-adenocarcinoma associated with abundant mucin. 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  Patient obtained 2 surgical opinions and disease was felt to be inoperable by both surgeons.  Patient begun on Xeloda/Avastin.\par 5/2017-CT scan chest/abdomen/pelvis showed stable tiny nodules in the lungs. Sizable mass merging with the head of the pancreas had Increased in size from 11/2016 with some increasing infiltration of the surrounding fat.  Possibility of increasing colonic wall thickness on the colon site of the ileocolic anastomosis. Patient begun on Pembrolizumab (tumor MSI-high).\par 8/2017- CT scan chest/abdomen/pelvis showed marginally worsening mesenteric adenopathy, with short interval followup CT scan abdomen/pelvis 9/2017, stable compared to 8/2017.\par 1/2018-CT scan chest/abdomen/pelvis-stable adenopathy.\par 6/2018-CT scan chest/abdomen/pelvis-stable adenopathy.\par 11/2018-CT chest/abdomen/pelvis-unchanged mass at the root of the mesentery.\par \par History of right breast cancer-2007. Status post right breast conservation surgery--> RT--> [facility]. [de-identified] : adenocarcinoma [de-identified] : CEA [de-identified] : CEA 12/2013-33.6.         \par \par  [de-identified] : Feeling well without new complaints.\par No pulmonary/ or bony complaints. No fevers. No abnormal bleeding. Bowel movement irregularities without acute change. No associated nausea/vomiting, or abdominal pain.\par Maintaining activities of daily living.

## 2019-01-02 NOTE — PHYSICAL EXAM
[Normal] : normal appearance, no rash, nodules, vesicles, ulcers, erythema [de-identified] : Well-developed, well-nourished, cooperative female, nontoxic appearing, in no acute distress. [de-identified] : Soft, nontender to palpation. Unable to appreciate organomegaly. [de-identified] : unable to appreciate discrete LAD [de-identified] : no gross focal motor deficits

## 2019-01-02 NOTE — ASSESSMENT
[FreeTextEntry1] : #1) colon cancer-clinically stable for Pembrolizumab today, to which patient consents-potential side effect profile reviewed.\par #2) history of anemia/thrombocytopenia/leukopenia-cytopenias may get exacerbated by Pembrolizumab. CBC currently acceptable for treatment. Continue to monitor CBC with differential closely.\par Patient was given the opportunity to ask questions. Her questions have been answered to her satisfaction at this time.\par

## 2019-01-03 DIAGNOSIS — R11.2 NAUSEA WITH VOMITING, UNSPECIFIED: ICD-10-CM

## 2019-01-03 DIAGNOSIS — Z51.11 ENCOUNTER FOR ANTINEOPLASTIC CHEMOTHERAPY: ICD-10-CM

## 2019-01-09 ENCOUNTER — APPOINTMENT (OUTPATIENT)
Dept: ULTRASOUND IMAGING | Facility: HOSPITAL | Age: 84
End: 2019-01-09
Payer: MEDICARE

## 2019-01-09 ENCOUNTER — OUTPATIENT (OUTPATIENT)
Dept: OUTPATIENT SERVICES | Facility: HOSPITAL | Age: 84
LOS: 1 days | End: 2019-01-09
Payer: MEDICARE

## 2019-01-09 DIAGNOSIS — Z00.8 ENCOUNTER FOR OTHER GENERAL EXAMINATION: ICD-10-CM

## 2019-01-09 DIAGNOSIS — Z90.49 ACQUIRED ABSENCE OF OTHER SPECIFIED PARTS OF DIGESTIVE TRACT: Chronic | ICD-10-CM

## 2019-01-09 PROCEDURE — 76536 US EXAM OF HEAD AND NECK: CPT | Mod: 26

## 2019-01-09 PROCEDURE — 76536 US EXAM OF HEAD AND NECK: CPT

## 2019-01-16 ENCOUNTER — OUTPATIENT (OUTPATIENT)
Dept: OUTPATIENT SERVICES | Facility: HOSPITAL | Age: 84
LOS: 1 days | Discharge: HOME | End: 2019-01-16

## 2019-01-16 DIAGNOSIS — Z90.49 ACQUIRED ABSENCE OF OTHER SPECIFIED PARTS OF DIGESTIVE TRACT: Chronic | ICD-10-CM

## 2019-01-16 DIAGNOSIS — C18.9 MALIGNANT NEOPLASM OF COLON, UNSPECIFIED: ICD-10-CM

## 2019-01-23 ENCOUNTER — APPOINTMENT (OUTPATIENT)
Dept: HEMATOLOGY ONCOLOGY | Facility: CLINIC | Age: 84
End: 2019-01-23
Payer: MEDICARE

## 2019-01-23 ENCOUNTER — APPOINTMENT (OUTPATIENT)
Dept: INFUSION THERAPY | Facility: HOSPITAL | Age: 84
End: 2019-01-23

## 2019-01-23 VITALS
SYSTOLIC BLOOD PRESSURE: 121 MMHG | RESPIRATION RATE: 17 BRPM | HEART RATE: 58 BPM | TEMPERATURE: 97.5 F | DIASTOLIC BLOOD PRESSURE: 76 MMHG

## 2019-01-23 PROCEDURE — 99214 OFFICE O/P EST MOD 30 MIN: CPT

## 2019-01-23 NOTE — HISTORY OF PRESENT ILLNESS
[Disease: _____________________] : Disease: [unfilled] [T: ___] : T[unfilled] [N: ___] : N[unfilled] [M: ___] : M[unfilled] [AJCC Stage: ____] : AJCC Stage: [unfilled] [de-identified] : Patient with history of colon cancer (adenocarcinoma)-5-2011. Stage IIIC-T4 N2a. Status post right colectomy followed by FOLFOX chemotherapy.  With persistently elevated CEA on f/u testing,  3/2013 mesentery mass excision pathology showed a lymph node negative for metastatic carcinoma. Benign fibroadipose tissue.\par Persistently increasing elevated CEA.  CT scan abdomen/pelvis 6/2016, showed enlarging mesenteric adenopathy. Endoscopic ultrasound guided FNA of the bryan-pancreatic, mesenteric mass was positive for malignant cells-adenocarcinoma associated with abundant mucin. 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  Patient obtained 2 surgical opinions and disease was felt to be inoperable by both surgeons.  Patient begun on Xeloda/Avastin.\par 5/2017-CT scan chest/abdomen/pelvis showed stable tiny nodules in the lungs. Sizable mass merging with the head of the pancreas had Increased in size from 11/2016 with some increasing infiltration of the surrounding fat.  Possibility of increasing colonic wall thickness on the colon site of the ileocolic anastomosis. Patient begun on Pembrolizumab (tumor MSI-high).\par 8/2017- CT scan chest/abdomen/pelvis showed marginally worsening mesenteric adenopathy, with short interval followup CT scan abdomen/pelvis 9/2017, stable compared to 8/2017.\par 1/2018-CT scan chest/abdomen/pelvis-stable adenopathy.\par 6/2018-CT scan chest/abdomen/pelvis-stable adenopathy.\par 11/2018-CT chest/abdomen/pelvis-unchanged mass at the root of the mesentery.\par \par History of right breast cancer-2007. Status post right breast conservation surgery--> RT--> [facility]. [de-identified] : adenocarcinoma [de-identified] : CEA [de-identified] : CEA 12/2013-33.6.         \par \par  [de-identified] : States she is feeling very well. \par No pulmonary/ or bony complaints. No fevers. No abnormal bleeding. Bowel movement irregularities without acute change. No associated nausea/vomiting, or abdominal pain.\par Maintaining activities of daily living, and [facility]

## 2019-01-23 NOTE — PHYSICAL EXAM
[Normal] : affect appropriate [de-identified] : Well-developed, well-nourished, cooperative female, nontoxic appearing, in no acute distress. [de-identified] : Soft, nontender to palpation. Unable to appreciate organomegaly. [de-identified] : unable to appreciate discrete LAD [de-identified] : no gross focal motor deficits

## 2019-01-23 NOTE — ASSESSMENT
[FreeTextEntry1] : #1) colon cancer-clinically stable for Pembrolizumab today (potential side effect profile reviewed).\par #2) leukopenia/anemia/thrombocytopenia-most recent lab work available reviewed. Holding on GF at this time. F/U CBC with next treatment.\par Patient's questions have been answered to her satisfaction at this time, and she concurs with plans of care.

## 2019-01-24 DIAGNOSIS — Z51.11 ENCOUNTER FOR ANTINEOPLASTIC CHEMOTHERAPY: ICD-10-CM

## 2019-02-11 ENCOUNTER — APPOINTMENT (OUTPATIENT)
Dept: HEMATOLOGY ONCOLOGY | Facility: CLINIC | Age: 84
End: 2019-02-11
Payer: MEDICARE

## 2019-02-11 ENCOUNTER — LABORATORY RESULT (OUTPATIENT)
Age: 84
End: 2019-02-11

## 2019-02-11 ENCOUNTER — RESULT REVIEW (OUTPATIENT)
Age: 84
End: 2019-02-11

## 2019-02-11 ENCOUNTER — APPOINTMENT (OUTPATIENT)
Dept: INFUSION THERAPY | Facility: HOSPITAL | Age: 84
End: 2019-02-11

## 2019-02-11 VITALS
TEMPERATURE: 97.59 F | HEART RATE: 73 BPM | RESPIRATION RATE: 16 BRPM | BODY MASS INDEX: 31.83 KG/M2 | DIASTOLIC BLOOD PRESSURE: 80 MMHG | SYSTOLIC BLOOD PRESSURE: 155 MMHG | OXYGEN SATURATION: 99 % | WEIGHT: 179.67 LBS

## 2019-02-11 LAB
BASOPHILS # BLD AUTO: 0 K/UL — SIGNIFICANT CHANGE UP (ref 0–0.2)
BASOPHILS NFR BLD AUTO: 0.3 % — SIGNIFICANT CHANGE UP (ref 0–2)
EOSINOPHIL # BLD AUTO: 0 K/UL — SIGNIFICANT CHANGE UP (ref 0–0.5)
EOSINOPHIL NFR BLD AUTO: 0.8 % — SIGNIFICANT CHANGE UP (ref 0–6)
HCT VFR BLD CALC: 33.6 % — LOW (ref 34.5–45)
HGB BLD-MCNC: 11.8 G/DL — SIGNIFICANT CHANGE UP (ref 11.5–15.5)
LYMPHOCYTES # BLD AUTO: 0.9 K/UL — LOW (ref 1–3.3)
LYMPHOCYTES # BLD AUTO: 24.1 % — SIGNIFICANT CHANGE UP (ref 13–44)
MCHC RBC-ENTMCNC: 31.6 PG — SIGNIFICANT CHANGE UP (ref 27–34)
MCHC RBC-ENTMCNC: 35.2 G/DL — SIGNIFICANT CHANGE UP (ref 32–36)
MCV RBC AUTO: 89.7 FL — SIGNIFICANT CHANGE UP (ref 80–100)
MONOCYTES # BLD AUTO: 0.2 K/UL — SIGNIFICANT CHANGE UP (ref 0–0.9)
MONOCYTES NFR BLD AUTO: 6.4 % — SIGNIFICANT CHANGE UP (ref 2–14)
NEUTROPHILS # BLD AUTO: 2.4 K/UL — SIGNIFICANT CHANGE UP (ref 1.8–7.4)
NEUTROPHILS NFR BLD AUTO: 68.4 % — SIGNIFICANT CHANGE UP (ref 43–77)
PLATELET # BLD AUTO: 104 K/UL — LOW (ref 150–400)
RBC # BLD: 3.75 M/UL — LOW (ref 3.8–5.2)
RBC # FLD: 12.7 % — SIGNIFICANT CHANGE UP (ref 10.3–14.5)
WBC # BLD: 3.6 K/UL — LOW (ref 3.8–10.5)
WBC # FLD AUTO: 3.6 K/UL — LOW (ref 3.8–10.5)

## 2019-02-11 PROCEDURE — 99214 OFFICE O/P EST MOD 30 MIN: CPT

## 2019-02-11 NOTE — PHYSICAL EXAM
[Normal] : affect appropriate [de-identified] : Well-developed, well-nourished, cooperative female, nontoxic appearing, in no acute distress. [de-identified] : Soft, nontender to palpation. Unable to appreciate organomegaly. [de-identified] : unable to appreciate discrete LAD [de-identified] : no gross focal motor deficits

## 2019-02-11 NOTE — HISTORY OF PRESENT ILLNESS
[Disease: _____________________] : Disease: [unfilled] [T: ___] : T[unfilled] [N: ___] : N[unfilled] [M: ___] : M[unfilled] [AJCC Stage: ____] : AJCC Stage: [unfilled] [de-identified] : Patient with history of colon cancer (adenocarcinoma)-5-2011. Stage IIIC-T4 N2a. Status post right colectomy followed by FOLFOX chemotherapy.  With persistently elevated CEA on f/u testing,  3/2013 mesentery mass excision pathology showed a lymph node negative for metastatic carcinoma. Benign fibroadipose tissue.\par Persistently increasing elevated CEA.  CT scan abdomen/pelvis 6/2016, showed enlarging mesenteric adenopathy. Endoscopic ultrasound guided FNA of the bryan-pancreatic, mesenteric mass was positive for malignant cells-adenocarcinoma associated with abundant mucin. 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  Patient obtained 2 surgical opinions and disease was felt to be inoperable by both surgeons.  Patient begun on Xeloda/Avastin.\par 5/2017-CT scan chest/abdomen/pelvis showed stable tiny nodules in the lungs. Sizable mass merging with the head of the pancreas had Increased in size from 11/2016 with some increasing infiltration of the surrounding fat.  Possibility of increasing colonic wall thickness on the colon site of the ileocolic anastomosis. Patient begun on Pembrolizumab (tumor MSI-high).\par 8/2017- CT scan chest/abdomen/pelvis showed marginally worsening mesenteric adenopathy, with short interval followup CT scan abdomen/pelvis 9/2017, stable compared to 8/2017.\par 1/2018-CT scan chest/abdomen/pelvis-stable adenopathy.\par 6/2018-CT scan chest/abdomen/pelvis-stable adenopathy.\par 11/2018-CT chest/abdomen/pelvis-unchanged mass at the root of the mesentery.\par \par History of right breast cancer-2007. Status post right breast conservation surgery--> RT--> [facility]. [de-identified] : adenocarcinoma [de-identified] : CEA [de-identified] : CEA 12/2013-33.6.         \par \par  [de-identified] : Feels well, without new complaints.\par No pulmonary/ or bony complaints. No fevers. No abnormal bleeding. Bowel movement irregularities without acute change. No associated nausea/vomiting, or abdominal pain.\par Maintaining activities of daily living.

## 2019-02-11 NOTE — ASSESSMENT
[FreeTextEntry1] : #1) colon cancer-clinically stable for Pembrolizumab today, to which patient consents.\par #2) leukopenia/anemia/thrombocytopenia-CBC currently acceptable for treatment. Continue to monitor CBC with differential.\par Patient was given the opportunity to ask questions. Her questions have been answered to her satisfaction at this time.

## 2019-02-22 ENCOUNTER — OUTPATIENT (OUTPATIENT)
Dept: OUTPATIENT SERVICES | Facility: HOSPITAL | Age: 84
LOS: 1 days | Discharge: HOME | End: 2019-02-22

## 2019-02-22 DIAGNOSIS — C18.9 MALIGNANT NEOPLASM OF COLON, UNSPECIFIED: ICD-10-CM

## 2019-02-22 DIAGNOSIS — Z90.49 ACQUIRED ABSENCE OF OTHER SPECIFIED PARTS OF DIGESTIVE TRACT: Chronic | ICD-10-CM

## 2019-03-05 ENCOUNTER — LABORATORY RESULT (OUTPATIENT)
Age: 84
End: 2019-03-05

## 2019-03-05 ENCOUNTER — APPOINTMENT (OUTPATIENT)
Dept: HEMATOLOGY ONCOLOGY | Facility: CLINIC | Age: 84
End: 2019-03-05
Payer: MEDICARE

## 2019-03-05 ENCOUNTER — APPOINTMENT (OUTPATIENT)
Dept: INFUSION THERAPY | Facility: HOSPITAL | Age: 84
End: 2019-03-05

## 2019-03-05 VITALS
RESPIRATION RATE: 16 BRPM | HEART RATE: 92 BPM | SYSTOLIC BLOOD PRESSURE: 140 MMHG | BODY MASS INDEX: 31.83 KG/M2 | OXYGEN SATURATION: 99 % | WEIGHT: 179.67 LBS | TEMPERATURE: 97.8 F | DIASTOLIC BLOOD PRESSURE: 80 MMHG

## 2019-03-05 VITALS
DIASTOLIC BLOOD PRESSURE: 73 MMHG | RESPIRATION RATE: 16 BRPM | TEMPERATURE: 97.8 F | SYSTOLIC BLOOD PRESSURE: 180 MMHG | BODY MASS INDEX: 31.83 KG/M2 | WEIGHT: 179.68 LBS | HEART RATE: 92 BPM | OXYGEN SATURATION: 99 %

## 2019-03-05 PROCEDURE — 99214 OFFICE O/P EST MOD 30 MIN: CPT

## 2019-03-05 NOTE — ASSESSMENT
[FreeTextEntry1] : #1) colon cancer-clinically stable for Pembrolizumab today, to which patient consents.\par #2) leukopenia/anemia/thrombocytopenia-most recent CBC available, acceptable for treatment. Repeat CBC with differential ordered today.\par Medications reviewed-patient continues to take Ecotrin-potential side effect profile reviewed. No overt bleeding noted clinically at present.\par Patient's questions have been answered to her apparent satisfaction at this time.

## 2019-03-05 NOTE — PHYSICAL EXAM
[Normal] : affect appropriate [de-identified] : Well-developed, well-nourished, cooperative female, nontoxic appearing, in no acute distress. [de-identified] : Soft, nontender to palpation. Unable to appreciate organomegaly. [de-identified] : unable to appreciate discrete LAD [de-identified] : no gross focal motor deficits

## 2019-03-05 NOTE — HISTORY OF PRESENT ILLNESS
[Disease: _____________________] : Disease: [unfilled] [T: ___] : T[unfilled] [N: ___] : N[unfilled] [M: ___] : M[unfilled] [AJCC Stage: ____] : AJCC Stage: [unfilled] [de-identified] : Patient with history of colon cancer (adenocarcinoma)-5-2011. Stage IIIC-T4 N2a. Status post right colectomy followed by FOLFOX chemotherapy.  With persistently elevated CEA on f/u testing,  3/2013 mesentery mass excision pathology showed a lymph node negative for metastatic carcinoma. Benign fibroadipose tissue.\par Persistently increasing elevated CEA.  CT scan abdomen/pelvis 6/2016, showed enlarging mesenteric adenopathy. Endoscopic ultrasound guided FNA of the bryan-pancreatic, mesenteric mass was positive for malignant cells-adenocarcinoma associated with abundant mucin. 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  Patient obtained 2 surgical opinions and disease was felt to be inoperable by both surgeons.  Patient begun on Xeloda/Avastin.\par 5/2017-CT scan chest/abdomen/pelvis showed stable tiny nodules in the lungs. Sizable mass merging with the head of the pancreas had Increased in size from 11/2016 with some increasing infiltration of the surrounding fat.  Possibility of increasing colonic wall thickness on the colon site of the ileocolic anastomosis. Patient begun on Pembrolizumab (tumor MSI-high).\par 8/2017- CT scan chest/abdomen/pelvis showed marginally worsening mesenteric adenopathy, with short interval followup CT scan abdomen/pelvis 9/2017, stable compared to 8/2017.\par 1/2018-CT scan chest/abdomen/pelvis-stable adenopathy.\par 6/2018-CT scan chest/abdomen/pelvis-stable adenopathy.\par 11/2018-CT chest/abdomen/pelvis-unchanged mass at the root of the mesentery.\par \par History of right breast cancer-2007. Status post right breast conservation surgery--> RT--> [facility]. [de-identified] : adenocarcinoma [de-identified] : CEA [de-identified] : CEA 12/2013-33.6.         \par \par  [de-identified] : Overall, feeling well without new complaints.\par No pulmonary/ or bony complaints. No fevers. No abnormal bleeding. Bowel movement irregularities without acute change. No associated nausea/vomiting, or abdominal pain.\par Maintaining activities of daily living. [facility]ains [facility] with [facility] activities.

## 2019-03-06 DIAGNOSIS — R11.2 NAUSEA WITH VOMITING, UNSPECIFIED: ICD-10-CM

## 2019-03-06 DIAGNOSIS — Z51.11 ENCOUNTER FOR ANTINEOPLASTIC CHEMOTHERAPY: ICD-10-CM

## 2019-03-19 ENCOUNTER — OUTPATIENT (OUTPATIENT)
Dept: OUTPATIENT SERVICES | Facility: HOSPITAL | Age: 84
LOS: 1 days | Discharge: HOME | End: 2019-03-19

## 2019-03-19 DIAGNOSIS — Z90.49 ACQUIRED ABSENCE OF OTHER SPECIFIED PARTS OF DIGESTIVE TRACT: Chronic | ICD-10-CM

## 2019-03-19 DIAGNOSIS — C18.9 MALIGNANT NEOPLASM OF COLON, UNSPECIFIED: ICD-10-CM

## 2019-03-27 ENCOUNTER — APPOINTMENT (OUTPATIENT)
Dept: INFUSION THERAPY | Facility: HOSPITAL | Age: 84
End: 2019-03-27

## 2019-03-27 ENCOUNTER — LABORATORY RESULT (OUTPATIENT)
Age: 84
End: 2019-03-27

## 2019-03-27 ENCOUNTER — APPOINTMENT (OUTPATIENT)
Dept: HEMATOLOGY ONCOLOGY | Facility: CLINIC | Age: 84
End: 2019-03-27
Payer: MEDICARE

## 2019-03-27 ENCOUNTER — RESULT REVIEW (OUTPATIENT)
Age: 84
End: 2019-03-27

## 2019-03-27 VITALS
TEMPERATURE: 97.9 F | DIASTOLIC BLOOD PRESSURE: 76 MMHG | OXYGEN SATURATION: 99 % | BODY MASS INDEX: 32.18 KG/M2 | HEART RATE: 73 BPM | SYSTOLIC BLOOD PRESSURE: 169 MMHG | RESPIRATION RATE: 16 BRPM | WEIGHT: 181.66 LBS

## 2019-03-27 PROCEDURE — 99214 OFFICE O/P EST MOD 30 MIN: CPT

## 2019-03-27 NOTE — PHYSICAL EXAM
[Normal] : affect appropriate [de-identified] : Well-developed, well-nourished, cooperative female, nontoxic appearing, in no acute distress. [de-identified] : Soft, nontender to palpation. Unable to appreciate organomegaly. [de-identified] : unable to appreciate discrete LAD [de-identified] : no gross focal motor deficits

## 2019-03-27 NOTE — ASSESSMENT
[FreeTextEntry1] : #1) colon cancer-clinically stable for Pembrolizumab today, to which patient consents.\par #2) leukopenia/anemia/thrombocytopenia-ANC/hemoglobin/platelet count currently acceptable. Continue to monitor CBC with differential.\par Patient was given the opportunity to ask questions. Her questions have been answered to her apparent satisfaction at this time.

## 2019-03-27 NOTE — ADDENDUM
[FreeTextEntry1] : After visit today, received CEA level-->increasing. Telephone call to patient and discussed with her-plan f/u imaging next visit, for further treatment decisions.

## 2019-03-28 DIAGNOSIS — Z51.11 ENCOUNTER FOR ANTINEOPLASTIC CHEMOTHERAPY: ICD-10-CM

## 2019-03-28 DIAGNOSIS — R11.2 NAUSEA WITH VOMITING, UNSPECIFIED: ICD-10-CM

## 2019-03-28 LAB
ALBUMIN SERPL ELPH-MCNC: 4.3 G/DL
ALP BLD-CCNC: 71 U/L
ALT SERPL-CCNC: 16 U/L
ANION GAP SERPL CALC-SCNC: 12 MMOL/L
AST SERPL-CCNC: 18 U/L
BILIRUB SERPL-MCNC: 0.3 MG/DL
BUN SERPL-MCNC: 25 MG/DL
CALCIUM SERPL-MCNC: 9.3 MG/DL
CHLORIDE SERPL-SCNC: 113 MMOL/L
CO2 SERPL-SCNC: 20 MMOL/L
CREAT SERPL-MCNC: 1.42 MG/DL
GLUCOSE SERPL-MCNC: 128 MG/DL
POTASSIUM SERPL-SCNC: 3.8 MMOL/L
PROT SERPL-MCNC: 6.2 G/DL
SODIUM SERPL-SCNC: 145 MMOL/L
TSH SERPL-ACNC: 1.07 UIU/ML

## 2019-04-15 ENCOUNTER — LABORATORY RESULT (OUTPATIENT)
Age: 84
End: 2019-04-15

## 2019-04-15 ENCOUNTER — APPOINTMENT (OUTPATIENT)
Dept: HEMATOLOGY ONCOLOGY | Facility: CLINIC | Age: 84
End: 2019-04-15
Payer: MEDICARE

## 2019-04-15 ENCOUNTER — APPOINTMENT (OUTPATIENT)
Dept: INFUSION THERAPY | Facility: HOSPITAL | Age: 84
End: 2019-04-15

## 2019-04-15 ENCOUNTER — RESULT REVIEW (OUTPATIENT)
Age: 84
End: 2019-04-15

## 2019-04-15 LAB
BASOPHILS # BLD AUTO: 0.1 K/UL — SIGNIFICANT CHANGE UP (ref 0–0.2)
BASOPHILS NFR BLD AUTO: 1.9 % — SIGNIFICANT CHANGE UP (ref 0–2)
EOSINOPHIL # BLD AUTO: 0.1 K/UL — SIGNIFICANT CHANGE UP (ref 0–0.5)
EOSINOPHIL NFR BLD AUTO: 1.6 % — SIGNIFICANT CHANGE UP (ref 0–6)
HCT VFR BLD CALC: 34.2 % — LOW (ref 34.5–45)
HGB BLD-MCNC: 11.8 G/DL — SIGNIFICANT CHANGE UP (ref 11.5–15.5)
LYMPHOCYTES # BLD AUTO: 0.8 K/UL — LOW (ref 1–3.3)
LYMPHOCYTES # BLD AUTO: 23.3 % — SIGNIFICANT CHANGE UP (ref 13–44)
MCHC RBC-ENTMCNC: 31.2 PG — SIGNIFICANT CHANGE UP (ref 27–34)
MCHC RBC-ENTMCNC: 34.5 G/DL — SIGNIFICANT CHANGE UP (ref 32–36)
MCV RBC AUTO: 90.6 FL — SIGNIFICANT CHANGE UP (ref 80–100)
MONOCYTES # BLD AUTO: 0.2 K/UL — SIGNIFICANT CHANGE UP (ref 0–0.9)
MONOCYTES NFR BLD AUTO: 6.4 % — SIGNIFICANT CHANGE UP (ref 2–14)
NEUTROPHILS # BLD AUTO: 2.3 K/UL — SIGNIFICANT CHANGE UP (ref 1.8–7.4)
NEUTROPHILS NFR BLD AUTO: 66.9 % — SIGNIFICANT CHANGE UP (ref 43–77)
PLATELET # BLD AUTO: 117 K/UL — LOW (ref 150–400)
RBC # BLD: 3.77 M/UL — LOW (ref 3.8–5.2)
RBC # FLD: 12.9 % — SIGNIFICANT CHANGE UP (ref 10.3–14.5)
WBC # BLD: 3.5 K/UL — LOW (ref 3.8–10.5)
WBC # FLD AUTO: 3.5 K/UL — LOW (ref 3.8–10.5)

## 2019-04-15 PROCEDURE — 99214 OFFICE O/P EST MOD 30 MIN: CPT

## 2019-04-15 NOTE — CONSULT LETTER
[Dear  ___] : Dear  [unfilled], [Courtesy Letter:] : I had the pleasure of seeing your patient, [unfilled], in my office today. [Please see my note below.] : Please see my note below. [Consult Closing:] : Thank you very much for allowing me to participate in the care of this patient.  If you have any questions, please do not hesitate to contact me. [Sincerely,] : Sincerely, [FreeTextEntry3] : Cortney Mack M.D.

## 2019-04-15 NOTE — PHYSICAL EXAM
[Normal] : affect appropriate [de-identified] : unable to appreciate discrete LAD [de-identified] : Well-developed, well-nourished, cooperative female, nontoxic appearing, in no acute distress. [de-identified] : Soft, nontender to palpation. Unable to appreciate organomegaly. [de-identified] : no gross focal motor deficits

## 2019-04-15 NOTE — HISTORY OF PRESENT ILLNESS
[Disease: _____________________] : Disease: [unfilled] [T: ___] : T[unfilled] [N: ___] : N[unfilled] [M: ___] : M[unfilled] [AJCC Stage: ____] : AJCC Stage: [unfilled] [de-identified] : adenocarcinoma [de-identified] : CEA [de-identified] : Patient with history of colon cancer (adenocarcinoma)-5-2011. Stage IIIC-T4 N2a. Status post right colectomy followed by FOLFOX chemotherapy.  With persistently elevated CEA on f/u testing,  3/2013 mesentery mass excision pathology showed a lymph node negative for metastatic carcinoma. Benign fibroadipose tissue.\par Persistently increasing elevated CEA.  CT scan abdomen/pelvis 6/2016, showed enlarging mesenteric adenopathy. Endoscopic ultrasound guided FNA of the bryan-pancreatic, mesenteric mass was positive for malignant cells-adenocarcinoma associated with abundant mucin. 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  Patient obtained 2 surgical opinions and disease was felt to be inoperable by both surgeons.  Patient begun on Xeloda/Avastin.\par 5/2017-CT scan chest/abdomen/pelvis showed stable tiny nodules in the lungs. Sizable mass merging with the head of the pancreas had Increased in size from 11/2016 with some increasing infiltration of the surrounding fat.  Possibility of increasing colonic wall thickness on the colon site of the ileocolic anastomosis. Patient begun on Pembrolizumab (tumor MSI-high).\par 8/2017- CT scan chest/abdomen/pelvis showed marginally worsening mesenteric adenopathy, with short interval followup CT scan abdomen/pelvis 9/2017, stable compared to 8/2017.\par 1/2018-CT scan chest/abdomen/pelvis-stable adenopathy.\par 6/2018-CT scan chest/abdomen/pelvis-stable adenopathy.\par 11/2018-CT chest/abdomen/pelvis-unchanged mass at the root of the mesentery.\par \par History of right breast cancer-2007. Status post right breast conservation surgery--> RT--> [facility]. [de-identified] : CEA 12/2013-33.6.         \par \par  [de-identified] : Feels well. Good appetite.\par No pulmonary/ or bony complaints. No fevers. No abnormal bleeding. Bowel movement irregularities without acute change. No associated nausea/vomiting, or abdominal pain.\par Maintaining activities of daily living.

## 2019-04-15 NOTE — ASSESSMENT
[FreeTextEntry1] : #1) colon cancer-clinically stable for pembrolizumab today, to which patient consents (potential side effects reviewed). Increasing CEA of concern. F/U CT scans to be done for further treatment decisions.\par #2) leukopenia/anemia/thrombocytopenia-CBC reviewed-acceptable at present.\par Patient's questions have been answered to her satisfaction at this time. She concurs with plans of care.

## 2019-04-29 ENCOUNTER — FORM ENCOUNTER (OUTPATIENT)
Age: 84
End: 2019-04-29

## 2019-04-30 ENCOUNTER — OUTPATIENT (OUTPATIENT)
Dept: OUTPATIENT SERVICES | Facility: HOSPITAL | Age: 84
LOS: 1 days | End: 2019-04-30
Payer: MEDICARE

## 2019-04-30 ENCOUNTER — APPOINTMENT (OUTPATIENT)
Dept: CT IMAGING | Facility: HOSPITAL | Age: 84
End: 2019-04-30
Payer: MEDICARE

## 2019-04-30 DIAGNOSIS — C18.9 MALIGNANT NEOPLASM OF COLON, UNSPECIFIED: ICD-10-CM

## 2019-04-30 DIAGNOSIS — Z90.49 ACQUIRED ABSENCE OF OTHER SPECIFIED PARTS OF DIGESTIVE TRACT: Chronic | ICD-10-CM

## 2019-04-30 PROCEDURE — 74176 CT ABD & PELVIS W/O CONTRAST: CPT

## 2019-04-30 PROCEDURE — 71250 CT THORAX DX C-: CPT | Mod: 26

## 2019-04-30 PROCEDURE — 71250 CT THORAX DX C-: CPT

## 2019-04-30 PROCEDURE — 74176 CT ABD & PELVIS W/O CONTRAST: CPT | Mod: 26

## 2019-05-01 ENCOUNTER — OUTPATIENT (OUTPATIENT)
Dept: OUTPATIENT SERVICES | Facility: HOSPITAL | Age: 84
LOS: 1 days | Discharge: HOME | End: 2019-05-01

## 2019-05-01 DIAGNOSIS — Z90.49 ACQUIRED ABSENCE OF OTHER SPECIFIED PARTS OF DIGESTIVE TRACT: Chronic | ICD-10-CM

## 2019-05-01 DIAGNOSIS — C18.9 MALIGNANT NEOPLASM OF COLON, UNSPECIFIED: ICD-10-CM

## 2019-05-06 ENCOUNTER — LABORATORY RESULT (OUTPATIENT)
Age: 84
End: 2019-05-06

## 2019-05-06 ENCOUNTER — APPOINTMENT (OUTPATIENT)
Dept: INFUSION THERAPY | Facility: HOSPITAL | Age: 84
End: 2019-05-06

## 2019-05-07 DIAGNOSIS — Z51.11 ENCOUNTER FOR ANTINEOPLASTIC CHEMOTHERAPY: ICD-10-CM

## 2019-05-22 ENCOUNTER — OTHER (OUTPATIENT)
Age: 84
End: 2019-05-22

## 2019-05-28 ENCOUNTER — APPOINTMENT (OUTPATIENT)
Dept: HEMATOLOGY ONCOLOGY | Facility: CLINIC | Age: 84
End: 2019-05-28
Payer: MEDICARE

## 2019-05-28 ENCOUNTER — RESULT REVIEW (OUTPATIENT)
Age: 84
End: 2019-05-28

## 2019-05-28 ENCOUNTER — LABORATORY RESULT (OUTPATIENT)
Age: 84
End: 2019-05-28

## 2019-05-28 ENCOUNTER — APPOINTMENT (OUTPATIENT)
Dept: INFUSION THERAPY | Facility: HOSPITAL | Age: 84
End: 2019-05-28

## 2019-05-28 VITALS
WEIGHT: 180.56 LBS | SYSTOLIC BLOOD PRESSURE: 146 MMHG | RESPIRATION RATE: 17 BRPM | DIASTOLIC BLOOD PRESSURE: 73 MMHG | BODY MASS INDEX: 31.98 KG/M2 | OXYGEN SATURATION: 97 % | TEMPERATURE: 98.1 F | HEART RATE: 73 BPM

## 2019-05-28 PROCEDURE — 99214 OFFICE O/P EST MOD 30 MIN: CPT

## 2019-05-28 NOTE — HISTORY OF PRESENT ILLNESS
[T: ___] : T[unfilled] [Disease: _____________________] : Disease: [unfilled] [M: ___] : M[unfilled] [N: ___] : N[unfilled] [AJCC Stage: ____] : AJCC Stage: [unfilled] [de-identified] : Patient with history of colon cancer (adenocarcinoma)-5-2011. Stage IIIC-T4 N2a. Status post right colectomy followed by FOLFOX chemotherapy.  With persistently elevated CEA on f/u testing,  3/2013 mesentery mass excision pathology showed a lymph node negative for metastatic carcinoma. Benign fibroadipose tissue.\par Persistently increasing elevated CEA.  CT scan abdomen/pelvis 6/2016, showed enlarging mesenteric adenopathy. Endoscopic ultrasound guided FNA of the bryan-pancreatic, mesenteric mass was positive for malignant cells-adenocarcinoma associated with abundant mucin. 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  Patient obtained 2 surgical opinions and disease was felt to be inoperable by both surgeons.  Patient begun on Xeloda/Avastin.\par 5/2017-CT scan chest/abdomen/pelvis showed stable tiny nodules in the lungs. Sizable mass merging with the head of the pancreas had Increased in size from 11/2016 with some increasing infiltration of the surrounding fat.  Possibility of increasing colonic wall thickness on the colon site of the ileocolic anastomosis. Patient begun on Pembrolizumab (tumor MSI-high).\par 8/2017- CT scan chest/abdomen/pelvis showed marginally worsening mesenteric adenopathy, with short interval followup CT scan abdomen/pelvis 9/2017, stable compared to 8/2017.\par 1/2018-CT scan chest/abdomen/pelvis-stable adenopathy.\par 6/2018-CT scan chest/abdomen/pelvis-stable adenopathy.\par 11/2018-CT chest/abdomen/pelvis-unchanged mass at the root of the mesentery.\par 4/2019-CT scan chest/abdomen/pelvis-stable mesenteric soft tissue mass.\par \par History of right breast cancer-2007. Status post right breast conservation surgery--> RT--> [facility]. [de-identified] : adenocarcinoma [de-identified] : CEA [de-identified] : CEA 12/2013-33.6.         \par \par  [de-identified] : Feeling well without new complaints.\par No pulmonary/ or bony complaints. No fevers. No abnormal bleeding. Bowel movement irregularities without acute change. No associated nausea/vomiting, or abdominal pain.\par Maintaining activities of daily living.

## 2019-05-28 NOTE — PHYSICAL EXAM
[Normal] : affect appropriate [de-identified] : Soft, nontender to palpation. Unable to appreciate organomegaly. [de-identified] : Well-developed, well-nourished, cooperative female, nontoxic appearing, in no acute distress. [de-identified] : unable to appreciate discrete LAD [de-identified] : no gross focal motor deficits

## 2019-05-28 NOTE — RESULTS/DATA
[FreeTextEntry1] : 4/2019-CT scan chest/abdomen/pelvis-stable mesenteric soft tissue mass. Tiny lung nodules unchanged. Multiple hepatic cysts unchanged. Stable multinodular goiter.

## 2019-05-28 NOTE — ASSESSMENT
[FreeTextEntry1] : #1) colon cancer-clinically stable for Pembrolizumab today (potential side effect profile reviewed). Discussed CAT scan results-essentially stable neoplastic disease. While one could take a break from immunotherapy at this point, favor continued treatment, as patient is tolerating the medication and is enjoying quality of life activities currently. Palliative treatment intent.\par #2) leukopenia/thrombocytopenia-most recent CBC available reviewed. ANC and platelet count acceptable. Continue to monitor CBC with differential.\par #3)  Patient to followup with nephrologist regarding sodium/renal insufficiency issues.\par Patient was given the opportunity to ask questions. Her questions have been answered to her satisfaction at this time.

## 2019-06-12 ENCOUNTER — OUTPATIENT (OUTPATIENT)
Dept: OUTPATIENT SERVICES | Facility: HOSPITAL | Age: 84
LOS: 1 days | Discharge: HOME | End: 2019-06-12

## 2019-06-12 DIAGNOSIS — Z90.49 ACQUIRED ABSENCE OF OTHER SPECIFIED PARTS OF DIGESTIVE TRACT: Chronic | ICD-10-CM

## 2019-06-12 DIAGNOSIS — C18.9 MALIGNANT NEOPLASM OF COLON, UNSPECIFIED: ICD-10-CM

## 2019-06-13 ENCOUNTER — OUTPATIENT (OUTPATIENT)
Dept: OUTPATIENT SERVICES | Facility: HOSPITAL | Age: 84
LOS: 1 days | End: 2019-06-13
Payer: MEDICARE

## 2019-06-13 ENCOUNTER — APPOINTMENT (OUTPATIENT)
Dept: ULTRASOUND IMAGING | Facility: HOSPITAL | Age: 84
End: 2019-06-13
Payer: MEDICARE

## 2019-06-13 ENCOUNTER — APPOINTMENT (OUTPATIENT)
Dept: MAMMOGRAPHY | Facility: HOSPITAL | Age: 84
End: 2019-06-13
Payer: MEDICARE

## 2019-06-13 DIAGNOSIS — Z90.49 ACQUIRED ABSENCE OF OTHER SPECIFIED PARTS OF DIGESTIVE TRACT: Chronic | ICD-10-CM

## 2019-06-13 DIAGNOSIS — Z00.8 ENCOUNTER FOR OTHER GENERAL EXAMINATION: ICD-10-CM

## 2019-06-13 PROCEDURE — 77063 BREAST TOMOSYNTHESIS BI: CPT | Mod: 26

## 2019-06-13 PROCEDURE — 76641 ULTRASOUND BREAST COMPLETE: CPT | Mod: 26,50

## 2019-06-13 PROCEDURE — 76641 ULTRASOUND BREAST COMPLETE: CPT

## 2019-06-13 PROCEDURE — 77067 SCR MAMMO BI INCL CAD: CPT | Mod: 26

## 2019-06-13 PROCEDURE — 77067 SCR MAMMO BI INCL CAD: CPT

## 2019-06-13 PROCEDURE — 77063 BREAST TOMOSYNTHESIS BI: CPT

## 2019-06-18 ENCOUNTER — APPOINTMENT (OUTPATIENT)
Dept: HEMATOLOGY ONCOLOGY | Facility: CLINIC | Age: 84
End: 2019-06-18
Payer: MEDICARE

## 2019-06-18 ENCOUNTER — LABORATORY RESULT (OUTPATIENT)
Age: 84
End: 2019-06-18

## 2019-06-18 ENCOUNTER — APPOINTMENT (OUTPATIENT)
Dept: INFUSION THERAPY | Facility: HOSPITAL | Age: 84
End: 2019-06-18

## 2019-06-18 VITALS
HEART RATE: 93 BPM | OXYGEN SATURATION: 98 % | BODY MASS INDEX: 31.79 KG/M2 | WEIGHT: 179.46 LBS | DIASTOLIC BLOOD PRESSURE: 77 MMHG | TEMPERATURE: 98.5 F | RESPIRATION RATE: 16 BRPM | SYSTOLIC BLOOD PRESSURE: 135 MMHG

## 2019-06-18 PROCEDURE — 99215 OFFICE O/P EST HI 40 MIN: CPT

## 2019-06-18 NOTE — HISTORY OF PRESENT ILLNESS
[Disease: _____________________] : Disease: [unfilled] [T: ___] : T[unfilled] [N: ___] : N[unfilled] [AJCC Stage: ____] : AJCC Stage: [unfilled] [M: ___] : M[unfilled] [de-identified] : Patient with history of colon cancer (adenocarcinoma)-5-2011. Stage IIIC-T4 N2a. Status post right colectomy followed by FOLFOX chemotherapy.  With persistently elevated CEA on f/u testing,  3/2013 mesentery mass excision pathology showed a lymph node negative for metastatic carcinoma. Benign fibroadipose tissue.\par Persistently increasing elevated CEA.  CT scan abdomen/pelvis 6/2016, showed enlarging mesenteric adenopathy. Endoscopic ultrasound guided FNA of the bryan-pancreatic, mesenteric mass was positive for malignant cells-adenocarcinoma associated with abundant mucin. 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  Patient obtained 2 surgical opinions and disease was felt to be inoperable by both surgeons.  Patient begun on Xeloda/Avastin.\par 5/2017-CT scan chest/abdomen/pelvis showed stable tiny nodules in the lungs. Sizable mass merging with the head of the pancreas had Increased in size from 11/2016 with some increasing infiltration of the surrounding fat.  Possibility of increasing colonic wall thickness on the colon site of the ileocolic anastomosis. Patient begun on Pembrolizumab (tumor MSI-high).\par 8/2017- CT scan chest/abdomen/pelvis showed marginally worsening mesenteric adenopathy, with short interval followup CT scan abdomen/pelvis 9/2017, stable compared to 8/2017.\par 1/2018-CT scan chest/abdomen/pelvis-stable adenopathy.\par 6/2018-CT scan chest/abdomen/pelvis-stable adenopathy.\par 11/2018-CT chest/abdomen/pelvis-unchanged mass at the root of the mesentery.\par 4/2019-CT scan chest/abdomen/pelvis-stable mesenteric soft tissue mass.\par \par History of right breast cancer-2007. Status post right breast conservation surgery--> RT--> [facility]. [de-identified] : adenocarcinoma [de-identified] : CEA [de-identified] : CEA 12/2013-33.6.         \par \par  [de-identified] : Overall, feeling well. Planning on traveling to [place] on vacation in 8/2019.\par No pulmonary/ or bony complaints. No fevers. No abnormal bleeding. Bowel movement irregularities without acute change. No associated nausea/vomiting, or abdominal pain.\par Maintaining activities of daily living.

## 2019-06-18 NOTE — ASSESSMENT
[FreeTextEntry1] : #1) colon cancer-clinically stable for Pembrolizumab today, to which patient consents (potential side effect profile reviewed). Discussed consideration of stopping immunotherapy after 2 years of treatment with short interval followup imaging. Patient does not wish to stop treatment at this time, as she is tolerating it well, and disease is stable currently. Potential risks/benefits reviewed.\par #2) history of leukopenia/anemia/thrombocytopenia-lab work reviewed. CBC currently acceptable.\par #3) history of breast cancer-clinically stable. Surveillance.\par Patient's questions have been answered to her satisfaction at this time.

## 2019-06-18 NOTE — PHYSICAL EXAM
[Normal] : affect appropriate [de-identified] : Well-developed, well-nourished, cooperative female, nontoxic appearing, in no acute distress. [de-identified] : no dominant mass or nipple discharge [de-identified] : Soft, nontender to palpation. Unable to appreciate organomegaly. [de-identified] : unable to appreciate discrete LAD [de-identified] : no gross focal motor deficits

## 2019-06-19 DIAGNOSIS — Z51.11 ENCOUNTER FOR ANTINEOPLASTIC CHEMOTHERAPY: ICD-10-CM

## 2019-07-08 ENCOUNTER — APPOINTMENT (OUTPATIENT)
Dept: INFUSION THERAPY | Facility: HOSPITAL | Age: 84
End: 2019-07-08

## 2019-07-08 ENCOUNTER — LABORATORY RESULT (OUTPATIENT)
Age: 84
End: 2019-07-08

## 2019-07-08 ENCOUNTER — RESULT REVIEW (OUTPATIENT)
Age: 84
End: 2019-07-08

## 2019-07-24 ENCOUNTER — OUTPATIENT (OUTPATIENT)
Dept: OUTPATIENT SERVICES | Facility: HOSPITAL | Age: 84
LOS: 1 days | Discharge: HOME | End: 2019-07-24

## 2019-07-24 DIAGNOSIS — Z90.49 ACQUIRED ABSENCE OF OTHER SPECIFIED PARTS OF DIGESTIVE TRACT: Chronic | ICD-10-CM

## 2019-07-24 DIAGNOSIS — C18.9 MALIGNANT NEOPLASM OF COLON, UNSPECIFIED: ICD-10-CM

## 2019-07-30 ENCOUNTER — APPOINTMENT (OUTPATIENT)
Dept: INFUSION THERAPY | Facility: HOSPITAL | Age: 84
End: 2019-07-30

## 2019-07-30 ENCOUNTER — LABORATORY RESULT (OUTPATIENT)
Age: 84
End: 2019-07-30

## 2019-07-30 ENCOUNTER — RESULT REVIEW (OUTPATIENT)
Age: 84
End: 2019-07-30

## 2019-07-30 ENCOUNTER — APPOINTMENT (OUTPATIENT)
Dept: HEMATOLOGY ONCOLOGY | Facility: CLINIC | Age: 84
End: 2019-07-30
Payer: MEDICARE

## 2019-07-30 VITALS
SYSTOLIC BLOOD PRESSURE: 131 MMHG | OXYGEN SATURATION: 98 % | TEMPERATURE: 98.4 F | BODY MASS INDEX: 32.34 KG/M2 | HEART RATE: 61 BPM | DIASTOLIC BLOOD PRESSURE: 78 MMHG | RESPIRATION RATE: 16 BRPM | WEIGHT: 182.54 LBS

## 2019-07-30 LAB
BASOPHILS # BLD AUTO: 0 K/UL — SIGNIFICANT CHANGE UP (ref 0–0.2)
BASOPHILS NFR BLD AUTO: 1.3 % — SIGNIFICANT CHANGE UP (ref 0–2)
EOSINOPHIL # BLD AUTO: 0.1 K/UL — SIGNIFICANT CHANGE UP (ref 0–0.5)
EOSINOPHIL NFR BLD AUTO: 1.6 % — SIGNIFICANT CHANGE UP (ref 0–6)
HCT VFR BLD CALC: 35.6 % — SIGNIFICANT CHANGE UP (ref 34.5–45)
HGB BLD-MCNC: 12.1 G/DL — SIGNIFICANT CHANGE UP (ref 11.5–15.5)
LYMPHOCYTES # BLD AUTO: 0.7 K/UL — LOW (ref 1–3.3)
LYMPHOCYTES # BLD AUTO: 21.6 % — SIGNIFICANT CHANGE UP (ref 13–44)
MCHC RBC-ENTMCNC: 31.4 PG — SIGNIFICANT CHANGE UP (ref 27–34)
MCHC RBC-ENTMCNC: 33.9 G/DL — SIGNIFICANT CHANGE UP (ref 32–36)
MCV RBC AUTO: 92.5 FL — SIGNIFICANT CHANGE UP (ref 80–100)
MONOCYTES # BLD AUTO: 0.2 K/UL — SIGNIFICANT CHANGE UP (ref 0–0.9)
MONOCYTES NFR BLD AUTO: 7.1 % — SIGNIFICANT CHANGE UP (ref 2–14)
NEUTROPHILS # BLD AUTO: 2.3 K/UL — SIGNIFICANT CHANGE UP (ref 1.8–7.4)
NEUTROPHILS NFR BLD AUTO: 68.4 % — SIGNIFICANT CHANGE UP (ref 43–77)
PLATELET # BLD AUTO: 101 K/UL — LOW (ref 150–400)
RBC # BLD: 3.85 M/UL — SIGNIFICANT CHANGE UP (ref 3.8–5.2)
RBC # FLD: 12.9 % — SIGNIFICANT CHANGE UP (ref 10.3–14.5)
WBC # BLD: 3.4 K/UL — LOW (ref 3.8–10.5)
WBC # FLD AUTO: 3.4 K/UL — LOW (ref 3.8–10.5)

## 2019-07-30 PROCEDURE — 99214 OFFICE O/P EST MOD 30 MIN: CPT

## 2019-07-30 NOTE — PHYSICAL EXAM
[Normal] : affect appropriate [de-identified] : Well-developed, well-nourished, cooperative female, nontoxic appearing, in no acute distress. [de-identified] : Soft, nontender to palpation. Unable to appreciate organomegaly. [de-identified] : unable to appreciate discrete LAD [de-identified] : no gross focal motor deficits

## 2019-07-30 NOTE — ASSESSMENT
[FreeTextEntry1] : #1) colon cancer-patient continues with immunotherapy, which she feels she is tolerating well. Reviewed lab work with her. I am concerned regarding rising CEA. Patient to have followup CAT scans at this point.\par #2) history of leukopenia/anemia/thrombocytopenia-ANC/hemoglobin/platelet count currently acceptable. Continue to monitor CBC with differential.\par Patient was given the opportunity to ask questions. Her questions have been answered to her satisfaction at this time. She concurs with plans of care.

## 2019-07-30 NOTE — HISTORY OF PRESENT ILLNESS
[T: ___] : T[unfilled] [Disease: _____________________] : Disease: [unfilled] [N: ___] : N[unfilled] [M: ___] : M[unfilled] [AJCC Stage: ____] : AJCC Stage: [unfilled] [de-identified] : Patient with history of colon cancer (adenocarcinoma)-5-2011. Stage IIIC-T4 N2a. Status post right colectomy followed by FOLFOX chemotherapy.  With persistently elevated CEA on f/u testing,  3/2013 mesentery mass excision pathology showed a lymph node negative for metastatic carcinoma. Benign fibroadipose tissue.\par Persistently increasing elevated CEA.  CT scan abdomen/pelvis 6/2016, showed enlarging mesenteric adenopathy. Endoscopic ultrasound guided FNA of the bryan-pancreatic, mesenteric mass was positive for malignant cells-adenocarcinoma associated with abundant mucin. 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  Patient obtained 2 surgical opinions and disease was felt to be inoperable by both surgeons.  Patient begun on Xeloda/Avastin.\par 5/2017-CT scan chest/abdomen/pelvis showed stable tiny nodules in the lungs. Sizable mass merging with the head of the pancreas had Increased in size from 11/2016 with some increasing infiltration of the surrounding fat.  Possibility of increasing colonic wall thickness on the colon site of the ileocolic anastomosis. Patient begun on Pembrolizumab (tumor MSI-high).\par 8/2017- CT scan chest/abdomen/pelvis showed marginally worsening mesenteric adenopathy, with short interval followup CT scan abdomen/pelvis 9/2017, stable compared to 8/2017.\par 1/2018-CT scan chest/abdomen/pelvis-stable adenopathy.\par 6/2018-CT scan chest/abdomen/pelvis-stable adenopathy.\par 11/2018-CT chest/abdomen/pelvis-unchanged mass at the root of the mesentery.\par 4/2019-CT scan chest/abdomen/pelvis-stable mesenteric soft tissue mass.\par \par History of right breast cancer-2007. Status post right breast conservation surgery--> RT--> [facility]. [de-identified] : adenocarcinoma [de-identified] : CEA [de-identified] : Feels well without new complaints.\par Planning on traveling to [place] on vacation in mid-8/2019.\par No pulmonary/ or bony complaints. No fevers. No abnormal bleeding. Bowel movement irregularities without acute change. No associated nausea/vomiting, or abdominal pain.\par Maintaining activities of daily living. [de-identified] : CEA 12/2013-33.6.         \par \par

## 2019-07-31 DIAGNOSIS — Z51.11 ENCOUNTER FOR ANTINEOPLASTIC CHEMOTHERAPY: ICD-10-CM

## 2019-08-01 ENCOUNTER — FORM ENCOUNTER (OUTPATIENT)
Age: 84
End: 2019-08-01

## 2019-08-02 ENCOUNTER — APPOINTMENT (OUTPATIENT)
Dept: CT IMAGING | Facility: HOSPITAL | Age: 84
End: 2019-08-02
Payer: MEDICARE

## 2019-08-02 ENCOUNTER — OUTPATIENT (OUTPATIENT)
Dept: OUTPATIENT SERVICES | Facility: HOSPITAL | Age: 84
LOS: 1 days | End: 2019-08-02
Payer: MEDICARE

## 2019-08-02 DIAGNOSIS — Z90.49 ACQUIRED ABSENCE OF OTHER SPECIFIED PARTS OF DIGESTIVE TRACT: Chronic | ICD-10-CM

## 2019-08-02 DIAGNOSIS — Z00.8 ENCOUNTER FOR OTHER GENERAL EXAMINATION: ICD-10-CM

## 2019-08-02 PROCEDURE — 74176 CT ABD & PELVIS W/O CONTRAST: CPT

## 2019-08-02 PROCEDURE — 71250 CT THORAX DX C-: CPT | Mod: 26

## 2019-08-02 PROCEDURE — 74176 CT ABD & PELVIS W/O CONTRAST: CPT | Mod: 26

## 2019-08-02 PROCEDURE — 71250 CT THORAX DX C-: CPT

## 2019-08-19 ENCOUNTER — RESULT REVIEW (OUTPATIENT)
Age: 84
End: 2019-08-19

## 2019-08-19 ENCOUNTER — LABORATORY RESULT (OUTPATIENT)
Age: 84
End: 2019-08-19

## 2019-08-19 ENCOUNTER — APPOINTMENT (OUTPATIENT)
Dept: INFUSION THERAPY | Facility: HOSPITAL | Age: 84
End: 2019-08-19

## 2019-08-19 ENCOUNTER — APPOINTMENT (OUTPATIENT)
Dept: HEMATOLOGY ONCOLOGY | Facility: CLINIC | Age: 84
End: 2019-08-19
Payer: MEDICARE

## 2019-08-19 VITALS
WEIGHT: 186.29 LBS | HEART RATE: 55 BPM | RESPIRATION RATE: 16 BRPM | DIASTOLIC BLOOD PRESSURE: 76 MMHG | SYSTOLIC BLOOD PRESSURE: 158 MMHG | TEMPERATURE: 97.5 F | OXYGEN SATURATION: 98 % | BODY MASS INDEX: 33 KG/M2

## 2019-08-19 VITALS
SYSTOLIC BLOOD PRESSURE: 119 MMHG | WEIGHT: 214.95 LBS | BODY MASS INDEX: 38.08 KG/M2 | DIASTOLIC BLOOD PRESSURE: 71 MMHG | OXYGEN SATURATION: 96 % | HEART RATE: 82 BPM | RESPIRATION RATE: 16 BRPM | TEMPERATURE: 98.6 F

## 2019-08-19 LAB
EOSINOPHIL # BLD AUTO: 0.1 K/UL — SIGNIFICANT CHANGE UP (ref 0–0.5)
HCT VFR BLD CALC: 35.3 % — SIGNIFICANT CHANGE UP (ref 34.5–45)
HGB BLD-MCNC: 12.1 G/DL — SIGNIFICANT CHANGE UP (ref 11.5–15.5)
LYMPHOCYTES # BLD AUTO: 0.8 K/UL — LOW (ref 1–3.3)
LYMPHOCYTES # BLD AUTO: 18 % — SIGNIFICANT CHANGE UP (ref 13–44)
MCHC RBC-ENTMCNC: 32.1 PG — SIGNIFICANT CHANGE UP (ref 27–34)
MCHC RBC-ENTMCNC: 34.4 G/DL — SIGNIFICANT CHANGE UP (ref 32–36)
MCV RBC AUTO: 93.2 FL — SIGNIFICANT CHANGE UP (ref 80–100)
MONOCYTES # BLD AUTO: 0.2 K/UL — SIGNIFICANT CHANGE UP (ref 0–0.9)
MONOCYTES NFR BLD AUTO: 7 % — SIGNIFICANT CHANGE UP (ref 2–14)
NEUTROPHILS # BLD AUTO: 2.2 K/UL — SIGNIFICANT CHANGE UP (ref 1.8–7.4)
NEUTROPHILS NFR BLD AUTO: 75 % — SIGNIFICANT CHANGE UP (ref 43–77)
OVALOCYTES BLD QL SMEAR: SLIGHT — SIGNIFICANT CHANGE UP
PLAT MORPH BLD: NORMAL — SIGNIFICANT CHANGE UP
PLATELET # BLD AUTO: 109 K/UL — LOW (ref 150–400)
RBC # BLD: 3.78 M/UL — LOW (ref 3.8–5.2)
RBC # FLD: 13.1 % — SIGNIFICANT CHANGE UP (ref 10.3–14.5)
RBC BLD AUTO: SIGNIFICANT CHANGE UP
WBC # BLD: 3.3 K/UL — LOW (ref 3.8–10.5)
WBC # FLD AUTO: 3.3 K/UL — LOW (ref 3.8–10.5)

## 2019-08-19 PROCEDURE — 99214 OFFICE O/P EST MOD 30 MIN: CPT

## 2019-08-19 NOTE — ASSESSMENT
[FreeTextEntry1] : #1) colon cancer-clinically stable. Reviewed CAT scan results with patient in detail-consistent with stable disease, however, increasing CEA discussed, and remains of concern. Patient is tolerating immunotherapy well without symptoms from her neoplastic disease currently. Patient wishes to continue with current therapy at this time. Interval followup imaging will again be planned. Should there be an increase in measurable disease, would consider alternative therapy.\par #2) history of anemia/leukopenia/thrombocytopenia-CBC currently acceptable-continue to monitor.\par #3) history of breast cancer-clinically stable. Continue surveillance.\par #4) h/o goiter-f/u PCP as needed.\par Patient's questions have been answered to her apparent satisfaction.

## 2019-08-19 NOTE — RESULTS/DATA
[FreeTextEntry1] : 6/2019-mammogram showed no mammographic evidence of malignancy. Breast ultrasound-no discrete solid or cystic lesion. \par 8/2019-CT scan chest/abdomen/pelvis-mesenteric mass encasing the superior mesenteric vessels unchanged. No metastatic lesion identified otherwise.

## 2019-08-19 NOTE — HISTORY OF PRESENT ILLNESS
[Disease: _____________________] : Disease: [unfilled] [T: ___] : T[unfilled] [N: ___] : N[unfilled] [M: ___] : M[unfilled] [AJCC Stage: ____] : AJCC Stage: [unfilled] [de-identified] : Patient with history of colon cancer (adenocarcinoma)-5-2011. Stage IIIC-T4 N2a. Status post right colectomy followed by FOLFOX chemotherapy.  With persistently elevated CEA on f/u testing,  3/2013 mesentery mass excision pathology showed a lymph node negative for metastatic carcinoma. Benign fibroadipose tissue.\par Persistently increasing elevated CEA.  CT scan abdomen/pelvis 6/2016, showed enlarging mesenteric adenopathy. Endoscopic ultrasound guided FNA of the bryan-pancreatic, mesenteric mass was positive for malignant cells-adenocarcinoma associated with abundant mucin. 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  Patient obtained 2 surgical opinions and disease was felt to be inoperable by both surgeons.  Patient begun on Xeloda/Avastin.\par 5/2017-CT scan chest/abdomen/pelvis showed stable tiny nodules in the lungs. Sizable mass merging with the head of the pancreas had Increased in size from 11/2016 with some increasing infiltration of the surrounding fat.  Possibility of increasing colonic wall thickness on the colon site of the ileocolic anastomosis. Patient begun on Pembrolizumab (tumor MSI-high).\par 8/2017- CT scan chest/abdomen/pelvis showed marginally worsening mesenteric adenopathy, with short interval followup CT scan abdomen/pelvis 9/2017, stable compared to 8/2017.\par 1/2018-CT scan chest/abdomen/pelvis-stable adenopathy.\par 6/2018-CT scan chest/abdomen/pelvis-stable adenopathy.\par 11/2018-CT chest/abdomen/pelvis-unchanged mass at the root of the mesentery.\par 4/2019-CT scan chest/abdomen/pelvis-stable mesenteric soft tissue mass.\par 8/2019-CT scan chest/abdomen/pelvis-unchanged mesenteric mass.\par \par History of right breast cancer-2007. Status post right breast conservation surgery--> RT--> [facility]. [de-identified] : adenocarcinoma [de-identified] : CEA [de-identified] : CEA 12/2013-33.6.         \par \par  [de-identified] : Feeling well.\par Had a great time in [place] on vacation.\par No pulmonary/ or bony complaints. No fevers. No abnormal bleeding. No change in bowel habits. No nausea/vomiting, or abdominal pain.\par Maintaining activities of daily living.

## 2019-08-19 NOTE — PHYSICAL EXAM
[Normal] : affect appropriate [de-identified] : Well-developed, well-nourished, cooperative female, nontoxic appearing, in no acute distress. [de-identified] : no dominant mass or nipple discharge [de-identified] : Soft, nontender to palpation. Unable to appreciate organomegaly. [de-identified] : unable to appreciate discrete LAD [de-identified] : no vesicles [de-identified] : no gross focal motor deficits

## 2019-09-05 ENCOUNTER — OUTPATIENT (OUTPATIENT)
Dept: OUTPATIENT SERVICES | Facility: HOSPITAL | Age: 84
LOS: 1 days | Discharge: HOME | End: 2019-09-05

## 2019-09-05 DIAGNOSIS — Z90.49 ACQUIRED ABSENCE OF OTHER SPECIFIED PARTS OF DIGESTIVE TRACT: Chronic | ICD-10-CM

## 2019-09-05 DIAGNOSIS — C18.9 MALIGNANT NEOPLASM OF COLON, UNSPECIFIED: ICD-10-CM

## 2019-09-09 ENCOUNTER — APPOINTMENT (OUTPATIENT)
Dept: HEMATOLOGY ONCOLOGY | Facility: CLINIC | Age: 84
End: 2019-09-09
Payer: MEDICARE

## 2019-09-09 ENCOUNTER — LABORATORY RESULT (OUTPATIENT)
Age: 84
End: 2019-09-09

## 2019-09-09 ENCOUNTER — APPOINTMENT (OUTPATIENT)
Dept: INFUSION THERAPY | Facility: HOSPITAL | Age: 84
End: 2019-09-09

## 2019-09-09 VITALS
HEART RATE: 77 BPM | WEIGHT: 182.32 LBS | SYSTOLIC BLOOD PRESSURE: 171 MMHG | TEMPERATURE: 97.7 F | OXYGEN SATURATION: 99 % | DIASTOLIC BLOOD PRESSURE: 82 MMHG | BODY MASS INDEX: 32.3 KG/M2 | RESPIRATION RATE: 16 BRPM

## 2019-09-09 PROCEDURE — 99214 OFFICE O/P EST MOD 30 MIN: CPT

## 2019-09-09 NOTE — HISTORY OF PRESENT ILLNESS
[Disease: _____________________] : Disease: [unfilled] [T: ___] : T[unfilled] [N: ___] : N[unfilled] [M: ___] : M[unfilled] [AJCC Stage: ____] : AJCC Stage: [unfilled] [de-identified] : Patient with history of colon cancer (adenocarcinoma)-5-2011. Stage IIIC-T4 N2a. Status post right colectomy followed by FOLFOX chemotherapy.  With persistently elevated CEA on f/u testing,  3/2013 mesentery mass excision pathology showed a lymph node negative for metastatic carcinoma. Benign fibroadipose tissue.\par Persistently increasing elevated CEA.  CT scan abdomen/pelvis 6/2016, showed enlarging mesenteric adenopathy. Endoscopic ultrasound guided FNA of the bryan-pancreatic, mesenteric mass was positive for malignant cells-adenocarcinoma associated with abundant mucin. 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  Patient obtained 2 surgical opinions and disease was felt to be inoperable by both surgeons.  Patient begun on Xeloda/Avastin.\par 5/2017-CT scan chest/abdomen/pelvis showed stable tiny nodules in the lungs. Sizable mass merging with the head of the pancreas had Increased in size from 11/2016 with some increasing infiltration of the surrounding fat.  Possibility of increasing colonic wall thickness on the colon site of the ileocolic anastomosis. Patient begun on Pembrolizumab (tumor MSI-high).\par 8/2017- CT scan chest/abdomen/pelvis showed marginally worsening mesenteric adenopathy, with short interval followup CT scan abdomen/pelvis 9/2017, stable compared to 8/2017.\par 1/2018-CT scan chest/abdomen/pelvis-stable adenopathy.\par 6/2018-CT scan chest/abdomen/pelvis-stable adenopathy.\par 11/2018-CT chest/abdomen/pelvis-unchanged mass at the root of the mesentery.\par 4/2019-CT scan chest/abdomen/pelvis-stable mesenteric soft tissue mass.\par 8/2019-CT scan chest/abdomen/pelvis-unchanged mesenteric mass.\par \par History of right breast cancer-2007. Status post right breast conservation surgery--> RT--> [facility]. [de-identified] : adenocarcinoma [de-identified] : CEA [de-identified] : CEA 12/2013-33.6.         \par \par  [de-identified] : Feels well, remaining active with the senior center. Independent.\par OD cataract surgery scheduled (then plans to do OS).\par No pulmonary/ or bony complaints. No fevers. No abnormal bleeding. No change in bowel habits. No nausea/vomiting, or abdominal pain.\par

## 2019-09-09 NOTE — PHYSICAL EXAM
[Normal] : full range of motion and no deformities appreciated [Restricted in physically strenuous activity but ambulatory and able to carry out work of a light or sedentary nature] : Status 1- Restricted in physically strenuous activity but ambulatory and able to carry out work of a light or sedentary nature, e.g., light house work, office work [de-identified] : Well-developed, well-nourished, cooperative female, nontoxic appearing, in no acute distress. [de-identified] : no dominant mass or nipple discharge [de-identified] : unable to appreciate discrete LAD [de-identified] : Soft, nontender to palpation. Unable to appreciate organomegaly. [de-identified] : no vesicles [de-identified] : no gross focal motor deficits

## 2019-09-09 NOTE — ASSESSMENT
[FreeTextEntry1] : #1) colon cancer-clinically stable for immunotherapy today, to which patient consents (potential side effects reviewed).\par #2) history of anemia/thrombocytopenia/leukopenia-CBC reviewed, and is currently acceptable. Continue to monitor CBC with differential.\par #3) history of breast cancer-clinically stable. Continue surveillance.\par Patient was given the opportunity to ask questions. Her questions have been answered to her apparent satisfaction at this time. She concurs with plans of care.

## 2019-09-10 DIAGNOSIS — Z51.11 ENCOUNTER FOR ANTINEOPLASTIC CHEMOTHERAPY: ICD-10-CM

## 2019-09-24 ENCOUNTER — TRANSCRIPTION ENCOUNTER (OUTPATIENT)
Age: 84
End: 2019-09-24

## 2019-09-24 NOTE — ASU PATIENT PROFILE, ADULT - VISION (WITH CORRECTIVE LENSES IF THE PATIENT USUALLY WEARS THEM):
Normal vision: sees adequately in most situations; can see medication labels, newsprint/distance and reading glasses

## 2019-09-24 NOTE — ASU PATIENT PROFILE, ADULT - PMH
Anemia    Chronic renal insufficiency    Colon cancer    Diabetes mellitus  NIDDM  History of breast cancer  right breast  History of herpes zoster    History of malignant neoplasm of parathyroid gland    History of thrombocytopenia    Hypertension    Thyroid nodule

## 2019-09-24 NOTE — ASU PATIENT PROFILE, ADULT - PSH
History of cholecystectomy    History of lumpectomy of right breast    History of partial colectomy    Parathyroid adenoma  excision

## 2019-09-25 ENCOUNTER — OUTPATIENT (OUTPATIENT)
Dept: OUTPATIENT SERVICES | Facility: HOSPITAL | Age: 84
LOS: 1 days | End: 2019-09-25
Payer: MEDICARE

## 2019-09-25 VITALS
OXYGEN SATURATION: 98 % | HEIGHT: 63 IN | DIASTOLIC BLOOD PRESSURE: 69 MMHG | WEIGHT: 182.98 LBS | HEART RATE: 63 BPM | SYSTOLIC BLOOD PRESSURE: 147 MMHG | TEMPERATURE: 98 F | RESPIRATION RATE: 14 BRPM

## 2019-09-25 VITALS
DIASTOLIC BLOOD PRESSURE: 73 MMHG | OXYGEN SATURATION: 98 % | HEART RATE: 52 BPM | RESPIRATION RATE: 16 BRPM | TEMPERATURE: 98 F | SYSTOLIC BLOOD PRESSURE: 126 MMHG

## 2019-09-25 DIAGNOSIS — Z90.49 ACQUIRED ABSENCE OF OTHER SPECIFIED PARTS OF DIGESTIVE TRACT: Chronic | ICD-10-CM

## 2019-09-25 DIAGNOSIS — D35.1 BENIGN NEOPLASM OF PARATHYROID GLAND: Chronic | ICD-10-CM

## 2019-09-25 DIAGNOSIS — H25.11 AGE-RELATED NUCLEAR CATARACT, RIGHT EYE: ICD-10-CM

## 2019-09-25 DIAGNOSIS — Z98.890 OTHER SPECIFIED POSTPROCEDURAL STATES: Chronic | ICD-10-CM

## 2019-09-25 PROCEDURE — V2632: CPT

## 2019-09-25 PROCEDURE — 66984 XCAPSL CTRC RMVL W/O ECP: CPT | Mod: RT

## 2019-09-25 RX ORDER — KETOROLAC TROMETHAMINE 0.5 %
1 DROPS OPHTHALMIC (EYE)
Refills: 0 | Status: COMPLETED | OUTPATIENT
Start: 2019-09-25 | End: 2019-09-25

## 2019-09-25 RX ORDER — PHENYLEPHRINE HCL 2.5 %
1 DROPS OPHTHALMIC (EYE)
Refills: 0 | Status: COMPLETED | OUTPATIENT
Start: 2019-09-25 | End: 2019-09-25

## 2019-09-25 RX ORDER — SODIUM CHLORIDE 9 MG/ML
1000 INJECTION, SOLUTION INTRAVENOUS
Refills: 0 | Status: DISCONTINUED | OUTPATIENT
Start: 2019-09-25 | End: 2019-09-25

## 2019-09-25 RX ORDER — TROPICAMIDE 1 %
1 DROPS OPHTHALMIC (EYE)
Refills: 0 | Status: COMPLETED | OUTPATIENT
Start: 2019-09-25 | End: 2019-09-25

## 2019-09-25 RX ORDER — CYCLOPENTOLATE HYDROCHLORIDE 10 MG/ML
1 SOLUTION/ DROPS OPHTHALMIC
Refills: 0 | Status: COMPLETED | OUTPATIENT
Start: 2019-09-25 | End: 2019-09-25

## 2019-09-25 RX ADMIN — CYCLOPENTOLATE HYDROCHLORIDE 1 DROP(S): 10 SOLUTION/ DROPS OPHTHALMIC at 08:34

## 2019-09-25 RX ADMIN — Medication 1 DROP(S): at 08:29

## 2019-09-25 RX ADMIN — Medication 1 DROP(S): at 08:24

## 2019-09-25 RX ADMIN — Medication 1 DROP(S): at 08:23

## 2019-09-25 RX ADMIN — Medication 1 DROP(S): at 08:33

## 2019-09-25 RX ADMIN — CYCLOPENTOLATE HYDROCHLORIDE 1 DROP(S): 10 SOLUTION/ DROPS OPHTHALMIC at 08:24

## 2019-09-25 RX ADMIN — Medication 1 DROP(S): at 08:34

## 2019-09-25 RX ADMIN — CYCLOPENTOLATE HYDROCHLORIDE 1 DROP(S): 10 SOLUTION/ DROPS OPHTHALMIC at 08:29

## 2019-09-25 RX ADMIN — Medication 1 DROP(S): at 08:28

## 2019-09-25 NOTE — ASU DISCHARGE PLAN (ADULT/PEDIATRIC) - ASU DC SPECIAL INSTRUCTIONSFT
Keep shield on eye until office visit today at 2:15 pm  Any problems call office at [phone]    OFFICE VISIT TODAY AT 2:15 pm  Bring Drops

## 2019-09-25 NOTE — ASU DISCHARGE PLAN (ADULT/PEDIATRIC) - NURSING INSTRUCTIONS
All discharge information reviewed with patient including pain, safety, medication ,hygiene and follow up care

## 2019-10-01 ENCOUNTER — APPOINTMENT (OUTPATIENT)
Dept: HEMATOLOGY ONCOLOGY | Facility: CLINIC | Age: 84
End: 2019-10-01
Payer: MEDICARE

## 2019-10-01 ENCOUNTER — RESULT REVIEW (OUTPATIENT)
Age: 84
End: 2019-10-01

## 2019-10-01 ENCOUNTER — APPOINTMENT (OUTPATIENT)
Dept: INFUSION THERAPY | Facility: HOSPITAL | Age: 84
End: 2019-10-01

## 2019-10-01 ENCOUNTER — LABORATORY RESULT (OUTPATIENT)
Age: 84
End: 2019-10-01

## 2019-10-01 VITALS
DIASTOLIC BLOOD PRESSURE: 77 MMHG | WEIGHT: 181.22 LBS | HEART RATE: 56 BPM | OXYGEN SATURATION: 100 % | TEMPERATURE: 97.8 F | BODY MASS INDEX: 32.1 KG/M2 | SYSTOLIC BLOOD PRESSURE: 138 MMHG | RESPIRATION RATE: 16 BRPM

## 2019-10-01 LAB
BASOPHILS # BLD AUTO: 0 K/UL — SIGNIFICANT CHANGE UP (ref 0–0.2)
BASOPHILS NFR BLD AUTO: 0.2 % — SIGNIFICANT CHANGE UP (ref 0–2)
EOSINOPHIL # BLD AUTO: 0.1 K/UL — SIGNIFICANT CHANGE UP (ref 0–0.5)
EOSINOPHIL NFR BLD AUTO: 1.7 % — SIGNIFICANT CHANGE UP (ref 0–6)
HCT VFR BLD CALC: 34.6 % — SIGNIFICANT CHANGE UP (ref 34.5–45)
HGB BLD-MCNC: 11.9 G/DL — SIGNIFICANT CHANGE UP (ref 11.5–15.5)
LYMPHOCYTES # BLD AUTO: 0.9 K/UL — LOW (ref 1–3.3)
LYMPHOCYTES # BLD AUTO: 26 % — SIGNIFICANT CHANGE UP (ref 13–44)
MCHC RBC-ENTMCNC: 32 PG — SIGNIFICANT CHANGE UP (ref 27–34)
MCHC RBC-ENTMCNC: 34.3 G/DL — SIGNIFICANT CHANGE UP (ref 32–36)
MCV RBC AUTO: 93.2 FL — SIGNIFICANT CHANGE UP (ref 80–100)
MONOCYTES # BLD AUTO: 0.3 K/UL — SIGNIFICANT CHANGE UP (ref 0–0.9)
MONOCYTES NFR BLD AUTO: 7.3 % — SIGNIFICANT CHANGE UP (ref 2–14)
NEUTROPHILS # BLD AUTO: 2.3 K/UL — SIGNIFICANT CHANGE UP (ref 1.8–7.4)
NEUTROPHILS NFR BLD AUTO: 64.8 % — SIGNIFICANT CHANGE UP (ref 43–77)
PLATELET # BLD AUTO: 113 K/UL — LOW (ref 150–400)
RBC # BLD: 3.71 M/UL — LOW (ref 3.8–5.2)
RBC # FLD: 13.7 % — SIGNIFICANT CHANGE UP (ref 10.3–14.5)
WBC # BLD: 3.6 K/UL — LOW (ref 3.8–10.5)
WBC # FLD AUTO: 3.6 K/UL — LOW (ref 3.8–10.5)

## 2019-10-01 PROCEDURE — 99214 OFFICE O/P EST MOD 30 MIN: CPT

## 2019-10-01 NOTE — OB HISTORY
[Menopause Age: ____] : age at menopause was [unfilled] [Post-Menopause, No Sxs] : post-menopausal, currently without symptoms [___] : Living: [unfilled]

## 2019-10-01 NOTE — PHYSICAL EXAM
[Restricted in physically strenuous activity but ambulatory and able to carry out work of a light or sedentary nature] : Status 1- Restricted in physically strenuous activity but ambulatory and able to carry out work of a light or sedentary nature, e.g., light house work, office work [Normal] : affect appropriate [de-identified] : Well-developed, well-nourished, cooperative female, nontoxic appearing, in no acute distress. [de-identified] : no vesicles [de-identified] : unable to appreciate discrete LAD [de-identified] : Soft, nontender to palpation. Unable to appreciate organomegaly. [de-identified] : no gross focal motor deficits

## 2019-10-01 NOTE — HISTORY OF PRESENT ILLNESS
[Disease: _____________________] : Disease: [unfilled] [N: ___] : N[unfilled] [T: ___] : T[unfilled] [M: ___] : M[unfilled] [AJCC Stage: ____] : AJCC Stage: [unfilled] [de-identified] : Patient with history of colon cancer (adenocarcinoma)-5-2011. Stage IIIC-T4 N2a. Status post right colectomy followed by FOLFOX chemotherapy.  With persistently elevated CEA on f/u testing,  3/2013 mesentery mass excision pathology showed a lymph node negative for metastatic carcinoma. Benign fibroadipose tissue.\par Persistently increasing elevated CEA.  CT scan abdomen/pelvis 6/2016, showed enlarging mesenteric adenopathy. Endoscopic ultrasound guided FNA of the bryan-pancreatic, mesenteric mass was positive for malignant cells-adenocarcinoma associated with abundant mucin. 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  Patient obtained 2 surgical opinions and disease was felt to be inoperable by both surgeons.  Patient begun on Xeloda/Avastin.\par 5/2017-CT scan chest/abdomen/pelvis showed stable tiny nodules in the lungs. Sizable mass merging with the head of the pancreas had Increased in size from 11/2016 with some increasing infiltration of the surrounding fat.  Possibility of increasing colonic wall thickness on the colon site of the ileocolic anastomosis. Patient begun on Pembrolizumab (tumor MSI-high).\par 8/2017- CT scan chest/abdomen/pelvis showed marginally worsening mesenteric adenopathy, with short interval followup CT scan abdomen/pelvis 9/2017, stable compared to 8/2017.\par 1/2018-CT scan chest/abdomen/pelvis-stable adenopathy.\par 6/2018-CT scan chest/abdomen/pelvis-stable adenopathy.\par 11/2018-CT chest/abdomen/pelvis-unchanged mass at the root of the mesentery.\par 4/2019-CT scan chest/abdomen/pelvis-stable mesenteric soft tissue mass.\par 8/2019-CT scan chest/abdomen/pelvis-unchanged mesenteric mass.\par \par History of right breast cancer-2007. Status post right breast conservation surgery--> RT--> Femara. [de-identified] : CEA [de-identified] : adenocarcinoma [de-identified] : S/P OD cataract surgery-went well-has plans to have OS done.\par [facility]. Independent.\par No pulmonary/ or bony complaints. No fevers. No abnormal bleeding. No change in bowel habits. No c/o nausea/vomiting, or abdominal pain.\par  [de-identified] : CEA 12/2013-33.6.         \par \par

## 2019-10-01 NOTE — ASSESSMENT
[FreeTextEntry1] : #1) colon cancer-clinically stable for Keytruda today (potential side effect profile reviewed). Discussed future management options, pending course. May decide to take a break from immunotherapy pending followup imaging. Patient continues to enjoy quality of life activities.\par #2) history of anemia/thrombocytopenia/leukopenia-hemoglobin/platelet count/ANC currently acceptable. Continue to monitor CBC with differential.\par #3) Increase p.o. fluid hydration as tolerated-elevated creatinine noted. If continued increase in creatinine, consider break from immunotherapy/nephrology consultation.\par Patient was given the opportunity to ask questions. Her questions have been answered to her satisfaction at this time.

## 2019-10-16 ENCOUNTER — OUTPATIENT (OUTPATIENT)
Dept: OUTPATIENT SERVICES | Facility: HOSPITAL | Age: 84
LOS: 1 days | Discharge: HOME | End: 2019-10-16

## 2019-10-16 DIAGNOSIS — D35.1 BENIGN NEOPLASM OF PARATHYROID GLAND: Chronic | ICD-10-CM

## 2019-10-16 DIAGNOSIS — Z90.49 ACQUIRED ABSENCE OF OTHER SPECIFIED PARTS OF DIGESTIVE TRACT: Chronic | ICD-10-CM

## 2019-10-16 DIAGNOSIS — C18.9 MALIGNANT NEOPLASM OF COLON, UNSPECIFIED: ICD-10-CM

## 2019-10-16 DIAGNOSIS — Z98.890 OTHER SPECIFIED POSTPROCEDURAL STATES: Chronic | ICD-10-CM

## 2019-10-16 PROBLEM — E04.1 NONTOXIC SINGLE THYROID NODULE: Chronic | Status: ACTIVE | Noted: 2019-09-24

## 2019-10-16 PROBLEM — N18.9 CHRONIC KIDNEY DISEASE, UNSPECIFIED: Chronic | Status: ACTIVE | Noted: 2019-09-24

## 2019-10-16 PROBLEM — E11.9 TYPE 2 DIABETES MELLITUS WITHOUT COMPLICATIONS: Chronic | Status: ACTIVE | Noted: 2019-09-24

## 2019-10-16 PROBLEM — Z85.3 PERSONAL HISTORY OF MALIGNANT NEOPLASM OF BREAST: Chronic | Status: ACTIVE | Noted: 2018-11-26

## 2019-10-21 ENCOUNTER — OTHER (OUTPATIENT)
Age: 84
End: 2019-10-21

## 2019-10-21 ENCOUNTER — LABORATORY RESULT (OUTPATIENT)
Age: 84
End: 2019-10-21

## 2019-10-22 ENCOUNTER — APPOINTMENT (OUTPATIENT)
Dept: HEMATOLOGY ONCOLOGY | Facility: CLINIC | Age: 84
End: 2019-10-22
Payer: MEDICARE

## 2019-10-22 ENCOUNTER — LABORATORY RESULT (OUTPATIENT)
Age: 84
End: 2019-10-22

## 2019-10-22 ENCOUNTER — APPOINTMENT (OUTPATIENT)
Dept: INFUSION THERAPY | Facility: HOSPITAL | Age: 84
End: 2019-10-22

## 2019-10-22 ENCOUNTER — RESULT REVIEW (OUTPATIENT)
Age: 84
End: 2019-10-22

## 2019-10-22 VITALS
TEMPERATURE: 97.6 F | SYSTOLIC BLOOD PRESSURE: 146 MMHG | OXYGEN SATURATION: 98 % | RESPIRATION RATE: 17 BRPM | WEIGHT: 182.54 LBS | DIASTOLIC BLOOD PRESSURE: 72 MMHG | HEART RATE: 62 BPM | BODY MASS INDEX: 32.34 KG/M2

## 2019-10-22 LAB
BASOPHILS # BLD AUTO: 0 K/UL — SIGNIFICANT CHANGE UP (ref 0–0.2)
BASOPHILS NFR BLD AUTO: 0.6 % — SIGNIFICANT CHANGE UP (ref 0–2)
EOSINOPHIL # BLD AUTO: 0 K/UL — SIGNIFICANT CHANGE UP (ref 0–0.5)
EOSINOPHIL NFR BLD AUTO: 1.4 % — SIGNIFICANT CHANGE UP (ref 0–6)
HCT VFR BLD CALC: 33.8 % — LOW (ref 34.5–45)
HGB BLD-MCNC: 12.3 G/DL — SIGNIFICANT CHANGE UP (ref 11.5–15.5)
LYMPHOCYTES # BLD AUTO: 0.7 K/UL — LOW (ref 1–3.3)
LYMPHOCYTES # BLD AUTO: 22 % — SIGNIFICANT CHANGE UP (ref 13–44)
MCHC RBC-ENTMCNC: 33.8 PG — SIGNIFICANT CHANGE UP (ref 27–34)
MCHC RBC-ENTMCNC: 36.3 G/DL — HIGH (ref 32–36)
MCV RBC AUTO: 93 FL — SIGNIFICANT CHANGE UP (ref 80–100)
MONOCYTES # BLD AUTO: 0.3 K/UL — SIGNIFICANT CHANGE UP (ref 0–0.9)
MONOCYTES NFR BLD AUTO: 7.8 % — SIGNIFICANT CHANGE UP (ref 2–14)
NEUTROPHILS # BLD AUTO: 2.3 K/UL — SIGNIFICANT CHANGE UP (ref 1.8–7.4)
NEUTROPHILS NFR BLD AUTO: 68.2 % — SIGNIFICANT CHANGE UP (ref 43–77)
PLATELET # BLD AUTO: 94 K/UL — SIGNIFICANT CHANGE UP (ref 150–400)
RBC # BLD: 3.63 M/UL — LOW (ref 3.8–5.2)
RBC # FLD: 12.8 % — SIGNIFICANT CHANGE UP (ref 10.3–14.5)
WBC # BLD: 3.4 K/UL — LOW (ref 3.8–10.5)
WBC # FLD AUTO: 3.4 K/UL — LOW (ref 3.8–10.5)

## 2019-10-22 PROCEDURE — 99214 OFFICE O/P EST MOD 30 MIN: CPT

## 2019-10-22 NOTE — ASSESSMENT
[FreeTextEntry1] : #1) colon cancer-clinically stable for Pembrolizumab today, to which patient consents. Patient to have followup CAT scan prior to next visit for further management decisions (history of elevated CEA noted).\par #2) history of clinic/leukopenia/thrombocytopenia-CBC reviewed, and currently acceptable.\par #3) renal insufficiency-repeat creatinine decreasing. PO fluid hydration as tolerated.\par Patient was given the opportunity to ask questions. Her questions have been answered to her satisfaction at this time.

## 2019-10-22 NOTE — HISTORY OF PRESENT ILLNESS
[T: ___] : T[unfilled] [Disease: _____________________] : Disease: [unfilled] [N: ___] : N[unfilled] [M: ___] : M[unfilled] [AJCC Stage: ____] : AJCC Stage: [unfilled] [de-identified] : Patient with history of colon cancer (adenocarcinoma)-5-2011. Stage IIIC-T4 N2a. Status post right colectomy followed by FOLFOX chemotherapy.  With persistently elevated CEA on f/u testing,  3/2013 mesentery mass excision pathology showed a lymph node negative for metastatic carcinoma. Benign fibroadipose tissue.\par Persistently increasing elevated CEA.  CT scan abdomen/pelvis 6/2016, showed enlarging mesenteric adenopathy. Endoscopic ultrasound guided FNA of the bryan-pancreatic, mesenteric mass was positive for malignant cells-adenocarcinoma associated with abundant mucin. 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  Patient obtained 2 surgical opinions and disease was felt to be inoperable by both surgeons.  Patient begun on Xeloda/Avastin.\par 5/2017-CT scan chest/abdomen/pelvis showed stable tiny nodules in the lungs. Sizable mass merging with the head of the pancreas had Increased in size from 11/2016 with some increasing infiltration of the surrounding fat.  Possibility of increasing colonic wall thickness on the colon site of the ileocolic anastomosis. Patient begun on Pembrolizumab (tumor MSI-high).\par 8/2017- CT scan chest/abdomen/pelvis showed marginally worsening mesenteric adenopathy, with short interval followup CT scan abdomen/pelvis 9/2017, stable compared to 8/2017.\par 1/2018-CT scan chest/abdomen/pelvis-stable adenopathy.\par 6/2018-CT scan chest/abdomen/pelvis-stable adenopathy.\par 11/2018-CT chest/abdomen/pelvis-unchanged mass at the root of the mesentery.\par 4/2019-CT scan chest/abdomen/pelvis-stable mesenteric soft tissue mass.\par 8/2019-CT scan chest/abdomen/pelvis-unchanged mesenteric mass.\par \par History of right breast cancer-2007. Status post right breast conservation surgery--> RT--> Femara. [de-identified] : adenocarcinoma [de-identified] : CEA [de-identified] : Plans to have OS cataract surgery 1/8/2019.\par Remaining active with the senior center. Independent.\par No pulmonary/ or bony complaints. No fevers. No abnormal bleeding. No change in bowel habits. No c/o nausea/vomiting, or abdominal pain.\par  [de-identified] : CEA 12/2013-33.6.         \par \par

## 2019-10-22 NOTE — PHYSICAL EXAM
[Restricted in physically strenuous activity but ambulatory and able to carry out work of a light or sedentary nature] : Status 1- Restricted in physically strenuous activity but ambulatory and able to carry out work of a light or sedentary nature, e.g., light house work, office work [Normal] : affect appropriate [de-identified] : Well-developed, well-nourished, cooperative female, nontoxic appearing, in no acute distress. [de-identified] : Soft, nontender to palpation. Unable to appreciate organomegaly. [de-identified] : unable to appreciate discrete LAD [de-identified] : no vesicles [de-identified] : no gross focal motor deficits

## 2019-10-23 DIAGNOSIS — Z51.11 ENCOUNTER FOR ANTINEOPLASTIC CHEMOTHERAPY: ICD-10-CM

## 2019-10-24 ENCOUNTER — FORM ENCOUNTER (OUTPATIENT)
Age: 84
End: 2019-10-24

## 2019-10-25 ENCOUNTER — OUTPATIENT (OUTPATIENT)
Dept: OUTPATIENT SERVICES | Facility: HOSPITAL | Age: 84
LOS: 1 days | End: 2019-10-25
Payer: MEDICARE

## 2019-10-25 ENCOUNTER — APPOINTMENT (OUTPATIENT)
Dept: CT IMAGING | Facility: HOSPITAL | Age: 84
End: 2019-10-25
Payer: MEDICARE

## 2019-10-25 DIAGNOSIS — Z90.49 ACQUIRED ABSENCE OF OTHER SPECIFIED PARTS OF DIGESTIVE TRACT: Chronic | ICD-10-CM

## 2019-10-25 DIAGNOSIS — C18.9 MALIGNANT NEOPLASM OF COLON, UNSPECIFIED: ICD-10-CM

## 2019-10-25 DIAGNOSIS — D35.1 BENIGN NEOPLASM OF PARATHYROID GLAND: Chronic | ICD-10-CM

## 2019-10-25 DIAGNOSIS — D64.9 ANEMIA, UNSPECIFIED: ICD-10-CM

## 2019-10-25 DIAGNOSIS — R97.0 ELEVATED CARCINOEMBRYONIC ANTIGEN [CEA]: ICD-10-CM

## 2019-10-25 DIAGNOSIS — Z98.890 OTHER SPECIFIED POSTPROCEDURAL STATES: Chronic | ICD-10-CM

## 2019-10-25 DIAGNOSIS — Z51.12 ENCOUNTER FOR ANTINEOPLASTIC IMMUNOTHERAPY: ICD-10-CM

## 2019-10-25 PROCEDURE — 74176 CT ABD & PELVIS W/O CONTRAST: CPT

## 2019-10-25 PROCEDURE — 74176 CT ABD & PELVIS W/O CONTRAST: CPT | Mod: 26

## 2019-11-12 ENCOUNTER — LABORATORY RESULT (OUTPATIENT)
Age: 84
End: 2019-11-12

## 2019-11-12 ENCOUNTER — RESULT REVIEW (OUTPATIENT)
Age: 84
End: 2019-11-12

## 2019-11-12 ENCOUNTER — APPOINTMENT (OUTPATIENT)
Dept: INFUSION THERAPY | Facility: HOSPITAL | Age: 84
End: 2019-11-12

## 2019-11-12 ENCOUNTER — APPOINTMENT (OUTPATIENT)
Dept: HEMATOLOGY ONCOLOGY | Facility: CLINIC | Age: 84
End: 2019-11-12
Payer: MEDICARE

## 2019-11-12 VITALS — TEMPERATURE: 97.6 F | DIASTOLIC BLOOD PRESSURE: 80 MMHG | SYSTOLIC BLOOD PRESSURE: 124 MMHG | HEART RATE: 71 BPM

## 2019-11-12 LAB
BASOPHILS # BLD AUTO: 0.1 K/UL — SIGNIFICANT CHANGE UP (ref 0–0.2)
BASOPHILS NFR BLD AUTO: 1.4 % — SIGNIFICANT CHANGE UP (ref 0–2)
EOSINOPHIL # BLD AUTO: 0 K/UL — SIGNIFICANT CHANGE UP (ref 0–0.5)
EOSINOPHIL NFR BLD AUTO: 1.3 % — SIGNIFICANT CHANGE UP (ref 0–6)
HCT VFR BLD CALC: 33.7 % — LOW (ref 34.5–45)
HGB BLD-MCNC: 11.9 G/DL — SIGNIFICANT CHANGE UP (ref 11.5–15.5)
LYMPHOCYTES # BLD AUTO: 0.7 K/UL — LOW (ref 1–3.3)
LYMPHOCYTES # BLD AUTO: 19.8 % — SIGNIFICANT CHANGE UP (ref 13–44)
MCHC RBC-ENTMCNC: 32.8 PG — SIGNIFICANT CHANGE UP (ref 27–34)
MCHC RBC-ENTMCNC: 35.4 G/DL — SIGNIFICANT CHANGE UP (ref 32–36)
MCV RBC AUTO: 92.6 FL — SIGNIFICANT CHANGE UP (ref 80–100)
MONOCYTES # BLD AUTO: 0.2 K/UL — SIGNIFICANT CHANGE UP (ref 0–0.9)
MONOCYTES NFR BLD AUTO: 6.4 % — SIGNIFICANT CHANGE UP (ref 2–14)
NEUTROPHILS # BLD AUTO: 2.6 K/UL — SIGNIFICANT CHANGE UP (ref 1.8–7.4)
NEUTROPHILS NFR BLD AUTO: 71.1 % — SIGNIFICANT CHANGE UP (ref 43–77)
PLATELET # BLD AUTO: 99 K/UL — LOW (ref 150–400)
RBC # BLD: 3.63 M/UL — LOW (ref 3.8–5.2)
RBC # FLD: 12.9 % — SIGNIFICANT CHANGE UP (ref 10.3–14.5)
WBC # BLD: 3.7 K/UL — LOW (ref 3.8–10.5)
WBC # FLD AUTO: 3.7 K/UL — LOW (ref 3.8–10.5)

## 2019-11-12 PROCEDURE — 99214 OFFICE O/P EST MOD 30 MIN: CPT

## 2019-11-12 NOTE — RESULTS/DATA
[FreeTextEntry1] : 10/2019-CT A/P:IMPRESSION: \par Unchanged mesenteric mass. \par No new metastatic lesion identified. \par Questionable colitis involving proximal colon at the anastomosis.

## 2019-11-12 NOTE — HISTORY OF PRESENT ILLNESS
[Disease: _____________________] : Disease: [unfilled] [N: ___] : N[unfilled] [T: ___] : T[unfilled] [M: ___] : M[unfilled] [AJCC Stage: ____] : AJCC Stage: [unfilled] [de-identified] : Patient with history of colon cancer (adenocarcinoma)-5-2011. Stage IIIC-T4 N2a. Status post right colectomy followed by FOLFOX chemotherapy.  With persistently elevated CEA on f/u testing,  3/2013 mesentery mass excision pathology showed a lymph node negative for metastatic carcinoma. Benign fibroadipose tissue.\par Persistently increasing elevated CEA.  CT scan abdomen/pelvis 6/2016, showed enlarging mesenteric adenopathy. Endoscopic ultrasound guided FNA of the bryan-pancreatic, mesenteric mass was positive for malignant cells-adenocarcinoma associated with abundant mucin. 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  Patient obtained 2 surgical opinions and disease was felt to be inoperable by both surgeons.  Patient begun on Xeloda/Avastin.\par 5/2017-CT scan chest/abdomen/pelvis showed stable tiny nodules in the lungs. Sizable mass merging with the head of the pancreas had Increased in size from 11/2016 with some increasing infiltration of the surrounding fat.  Possibility of increasing colonic wall thickness on the colon site of the ileocolic anastomosis. Patient begun on Pembrolizumab (tumor MSI-high).\par 8/2017- CT scan chest/abdomen/pelvis showed marginally worsening mesenteric adenopathy, with short interval followup CT scan abdomen/pelvis 9/2017, stable compared to 8/2017.\par 1/2018-CT scan chest/abdomen/pelvis-stable adenopathy.\par 6/2018-CT scan chest/abdomen/pelvis-stable adenopathy.\par 11/2018-CT chest/abdomen/pelvis-unchanged mass at the root of the mesentery.\par 4/2019-CT scan chest/abdomen/pelvis-stable mesenteric soft tissue mass.\par 8/2019-CT scan chest/abdomen/pelvis-unchanged mesenteric mass.\par 10/2019-CT scan A/P-stable mesenteric mass. \par \par History of right breast cancer-2007. Status post right breast conservation surgery--> RT--> Femara. [de-identified] : adenocarcinoma [de-identified] : CEA [de-identified] : CEA 12/2013-33.6.         \par \par  [de-identified] : Overall, feels well.\par Independent.\par No pulmonary/ or bony complaints. No fevers. No abnormal bleeding. No change in bowel habits. No c/o nausea/vomiting, or abdominal pain.\par

## 2019-11-12 NOTE — ASSESSMENT
[FreeTextEntry1] : #1) colon cancer-clinically stable for Pembrolizumab today, to which patient consents-potential side effect profile reviewed-patient feels she is tolerating with a good QOL. CT scans show essentially stable disease.\par #2) history of leukopenia/thrombocytopenia-CBC reviewed, and currently acceptable.\par Patient was given the opportunity to ask questions. Her questions have been answered to her satisfaction at this time.

## 2019-11-13 ENCOUNTER — MEDICATION RENEWAL (OUTPATIENT)
Age: 84
End: 2019-11-13

## 2019-11-25 ENCOUNTER — OUTPATIENT (OUTPATIENT)
Dept: OUTPATIENT SERVICES | Facility: HOSPITAL | Age: 84
LOS: 1 days | Discharge: HOME | End: 2019-11-25

## 2019-11-25 DIAGNOSIS — C18.9 MALIGNANT NEOPLASM OF COLON, UNSPECIFIED: ICD-10-CM

## 2019-11-25 DIAGNOSIS — D35.1 BENIGN NEOPLASM OF PARATHYROID GLAND: Chronic | ICD-10-CM

## 2019-11-25 DIAGNOSIS — Z90.49 ACQUIRED ABSENCE OF OTHER SPECIFIED PARTS OF DIGESTIVE TRACT: Chronic | ICD-10-CM

## 2019-11-25 DIAGNOSIS — Z98.890 OTHER SPECIFIED POSTPROCEDURAL STATES: Chronic | ICD-10-CM

## 2019-12-02 ENCOUNTER — LABORATORY RESULT (OUTPATIENT)
Age: 84
End: 2019-12-02

## 2019-12-02 ENCOUNTER — RESULT REVIEW (OUTPATIENT)
Age: 84
End: 2019-12-02

## 2019-12-02 ENCOUNTER — APPOINTMENT (OUTPATIENT)
Dept: HEMATOLOGY ONCOLOGY | Facility: CLINIC | Age: 84
End: 2019-12-02
Payer: MEDICARE

## 2019-12-02 ENCOUNTER — APPOINTMENT (OUTPATIENT)
Dept: INFUSION THERAPY | Facility: HOSPITAL | Age: 84
End: 2019-12-02

## 2019-12-02 VITALS — HEART RATE: 72 BPM | RESPIRATION RATE: 14 BRPM

## 2019-12-02 LAB
BASOPHILS # BLD AUTO: 0 K/UL — SIGNIFICANT CHANGE UP (ref 0–0.2)
BASOPHILS NFR BLD AUTO: 0.5 % — SIGNIFICANT CHANGE UP (ref 0–2)
EOSINOPHIL # BLD AUTO: 0.1 K/UL — SIGNIFICANT CHANGE UP (ref 0–0.5)
EOSINOPHIL NFR BLD AUTO: 1.7 % — SIGNIFICANT CHANGE UP (ref 0–6)
HCT VFR BLD CALC: 35.6 % — SIGNIFICANT CHANGE UP (ref 34.5–45)
HGB BLD-MCNC: 12.5 G/DL — SIGNIFICANT CHANGE UP (ref 11.5–15.5)
LYMPHOCYTES # BLD AUTO: 0.6 K/UL — LOW (ref 1–3.3)
LYMPHOCYTES # BLD AUTO: 17.4 % — SIGNIFICANT CHANGE UP (ref 13–44)
MCHC RBC-ENTMCNC: 32.2 PG — SIGNIFICANT CHANGE UP (ref 27–34)
MCHC RBC-ENTMCNC: 35.2 G/DL — SIGNIFICANT CHANGE UP (ref 32–36)
MCV RBC AUTO: 91.6 FL — SIGNIFICANT CHANGE UP (ref 80–100)
MONOCYTES # BLD AUTO: 0.2 K/UL — SIGNIFICANT CHANGE UP (ref 0–0.9)
MONOCYTES NFR BLD AUTO: 6.3 % — SIGNIFICANT CHANGE UP (ref 2–14)
NEUTROPHILS # BLD AUTO: 2.6 K/UL — SIGNIFICANT CHANGE UP (ref 1.8–7.4)
NEUTROPHILS NFR BLD AUTO: 74.1 % — SIGNIFICANT CHANGE UP (ref 43–77)
PLATELET # BLD AUTO: 93 K/UL — LOW (ref 150–400)
RBC # BLD: 3.88 M/UL — SIGNIFICANT CHANGE UP (ref 3.8–5.2)
RBC # FLD: 12.8 % — SIGNIFICANT CHANGE UP (ref 10.3–14.5)
WBC # BLD: 3.6 K/UL — LOW (ref 3.8–10.5)
WBC # FLD AUTO: 3.6 K/UL — LOW (ref 3.8–10.5)

## 2019-12-02 PROCEDURE — 99214 OFFICE O/P EST MOD 30 MIN: CPT

## 2019-12-02 NOTE — ASSESSMENT
[FreeTextEntry1] : #1) colon cancer-clinically stable for Pembrolizumab today, to which patient consents. \par #2) history of anemia/leukopenia/thrombocytopenia-most recent CBC available reviewed- acceptable. Continue to monitor CBC with differential.\par #3) F/U CMP pending, s/p potassium supplement.\par Patient was given the opportunity to ask questions. Her questions have been answered to her satisfaction at this time. She concurs with plans of care.

## 2019-12-02 NOTE — HISTORY OF PRESENT ILLNESS
[Disease: _____________________] : Disease: [unfilled] [N: ___] : N[unfilled] [T: ___] : T[unfilled] [M: ___] : M[unfilled] [AJCC Stage: ____] : AJCC Stage: [unfilled] [de-identified] : Patient with history of colon cancer (adenocarcinoma)-5-2011. Stage IIIC-T4 N2a. Status post right colectomy followed by FOLFOX chemotherapy.  With persistently elevated CEA on f/u testing,  3/2013 mesentery mass excision pathology showed a lymph node negative for metastatic carcinoma. Benign fibroadipose tissue.\par Persistently increasing elevated CEA.  CT scan abdomen/pelvis 6/2016, showed enlarging mesenteric adenopathy. Endoscopic ultrasound guided FNA of the bryan-pancreatic, mesenteric mass was positive for malignant cells-adenocarcinoma associated with abundant mucin. 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  Patient obtained 2 surgical opinions and disease was felt to be inoperable by both surgeons.  Patient begun on Xeloda/Avastin.\par 5/2017-CT scan chest/abdomen/pelvis showed stable tiny nodules in the lungs. Sizable mass merging with the head of the pancreas had Increased in size from 11/2016 with some increasing infiltration of the surrounding fat.  Possibility of increasing colonic wall thickness on the colon site of the ileocolic anastomosis. Patient begun on Pembrolizumab (tumor MSI-high).\par 8/2017- CT scan chest/abdomen/pelvis showed marginally worsening mesenteric adenopathy, with short interval followup CT scan abdomen/pelvis 9/2017, stable compared to 8/2017.\par 1/2018-CT scan chest/abdomen/pelvis-stable adenopathy.\par 6/2018-CT scan chest/abdomen/pelvis-stable adenopathy.\par 11/2018-CT chest/abdomen/pelvis-unchanged mass at the root of the mesentery.\par 4/2019-CT scan chest/abdomen/pelvis-stable mesenteric soft tissue mass.\par 8/2019-CT scan chest/abdomen/pelvis-unchanged mesenteric mass.\par 10/2019-CT scan A/P-stable mesenteric mass. \par \par History of right breast cancer-2007. Status post right breast conservation surgery--> RT--> Femara. [de-identified] : adenocarcinoma [de-identified] : CEA [de-identified] : CEA 12/2013-33.6.         \par \par  [de-identified] : Feels well without new complaints.\par Independent.\par No pulmonary/ or bony complaints. No fevers. No abnormal bleeding. No change in bowel habits. No c/o nausea/vomiting, or abdominal pain.\par

## 2019-12-02 NOTE — PHYSICAL EXAM
[Restricted in physically strenuous activity but ambulatory and able to carry out work of a light or sedentary nature] : Status 1- Restricted in physically strenuous activity but ambulatory and able to carry out work of a light or sedentary nature, e.g., light house work, office work [Normal] : normal spine exam without palpable tenderness, no kyphosis or scoliosis [de-identified] : Well-developed, well-nourished, cooperative female, nontoxic appearing, in no acute distress. [de-identified] : Soft, nontender to palpation. Unable to appreciate organomegaly. [de-identified] : unable to appreciate discrete LAD [de-identified] : no vesicles [de-identified] : no gross focal motor deficits

## 2019-12-03 ENCOUNTER — OTHER (OUTPATIENT)
Age: 84
End: 2019-12-03

## 2019-12-03 DIAGNOSIS — R11.2 NAUSEA WITH VOMITING, UNSPECIFIED: ICD-10-CM

## 2019-12-03 DIAGNOSIS — T82.598A OTHER MECHANICAL COMPLICATION OF OTHER CARDIAC AND VASCULAR DEVICES AND IMPLANTS, INITIAL ENCOUNTER: ICD-10-CM

## 2019-12-03 DIAGNOSIS — Z51.11 ENCOUNTER FOR ANTINEOPLASTIC CHEMOTHERAPY: ICD-10-CM

## 2019-12-09 ENCOUNTER — FORM ENCOUNTER (OUTPATIENT)
Age: 84
End: 2019-12-09

## 2019-12-09 ENCOUNTER — OTHER (OUTPATIENT)
Age: 84
End: 2019-12-09

## 2019-12-09 LAB
ALBUMIN SERPL ELPH-MCNC: 4.4 G/DL
ANION GAP SERPL CALC-SCNC: 12 MMOL/L
BUN SERPL-MCNC: 29 MG/DL
CALCIUM SERPL-MCNC: 9.4 MG/DL
CHLORIDE SERPL-SCNC: 118 MMOL/L
CO2 SERPL-SCNC: 16 MMOL/L
CREAT SERPL-MCNC: 1.76 MG/DL
GLUCOSE SERPL-MCNC: 147 MG/DL
PHOSPHATE SERPL-MCNC: 3.4 MG/DL
POTASSIUM SERPL-SCNC: 3.8 MMOL/L
SODIUM SERPL-SCNC: 146 MMOL/L

## 2019-12-10 ENCOUNTER — OUTPATIENT (OUTPATIENT)
Dept: OUTPATIENT SERVICES | Facility: HOSPITAL | Age: 84
LOS: 1 days | End: 2019-12-10
Payer: MEDICARE

## 2019-12-10 DIAGNOSIS — D35.1 BENIGN NEOPLASM OF PARATHYROID GLAND: Chronic | ICD-10-CM

## 2019-12-10 DIAGNOSIS — Z90.49 ACQUIRED ABSENCE OF OTHER SPECIFIED PARTS OF DIGESTIVE TRACT: Chronic | ICD-10-CM

## 2019-12-10 DIAGNOSIS — Z98.890 OTHER SPECIFIED POSTPROCEDURAL STATES: Chronic | ICD-10-CM

## 2019-12-10 DIAGNOSIS — C18.9 MALIGNANT NEOPLASM OF COLON, UNSPECIFIED: ICD-10-CM

## 2019-12-10 PROCEDURE — 36598 INJ W/FLUOR EVAL CV DEVICE: CPT

## 2019-12-13 DIAGNOSIS — T82.898A OTHER SPECIFIED COMPLICATION OF VASCULAR PROSTHETIC DEVICES, IMPLANTS AND GRAFTS, INITIAL ENCOUNTER: ICD-10-CM

## 2019-12-13 DIAGNOSIS — C18.9 MALIGNANT NEOPLASM OF COLON, UNSPECIFIED: ICD-10-CM

## 2019-12-17 ENCOUNTER — TRANSCRIPTION ENCOUNTER (OUTPATIENT)
Age: 84
End: 2019-12-17

## 2019-12-17 NOTE — ASU PATIENT PROFILE, ADULT - NS PRO LACT YNNA
DISCHARGE INSTRUCTIONS    Name: Kandace [address]  YOB: 2018  Primary Diagnosis: Active Problems:    [place] (2018)        General:     Cord Care:   Keep dry. Keep diaper folded below umbilical cord. Physical Activity / Restrictions / Safety:        Positioning: Position baby on his or her back while sleeping. Use a firm mattress. No Co Bedding. Car Seat: Car seat should be reclining, rear facing, and in the back seat of the car until 3years of age or has reached the rear facing weight limit of the seat. Notify Doctor For:     Call your baby's doctor for the following:   Fever over 100.3 degrees, taken Axillary or Rectally  Yellow Skin color  Increased irritability and / or sleepiness  Wetting less than 5 diapers per day for formula fed babies  Wetting less than 6 diapers per day once your breast milk is in, (at 117 days of age)  Diarrhea or Vomiting    Pain Management:     Pain Management: Bundling, Patting, Dress Appropriately    Follow-Up Care:     Appointment with MD:   Call your baby's doctors office on the next business day to make an appointment for baby's first office visit. Telephone number: ***       Reviewed By: Virginie Peralta RN                                                                                                   Date: 2018 Time: 7:33 AM         Your  at Home: Care Instructions  Your Care Instructions  During your baby's first few weeks, you will spend most of your time feeding, diapering, and comforting your baby. You may feel overwhelmed at times. It is normal to wonder if you know what you are doing, especially if you are first-time parents.  care gets easier with every day. Soon you will know what each cry means and be able to figure out what your baby needs and wants. Follow-up care is a key part of your child's treatment and safety.  Be sure to make and go to all appointments, and call your doctor if your child is having problems. It's also a good idea to know your child's test results and keep a list of the medicines your child takes. How can you care for your child at home? Feeding  · Feed your baby on demand. This means that you should breastfeed or bottle-feed your baby whenever he or she seems hungry. Do not set a schedule. · During the first 2 weeks,  babies need to be fed every 1 to 3 hours (10 to 12 times in 24 hours) or whenever the baby is hungry. Formula-fed babies may need fewer feedings, about 6 to 10 every 24 hours. · These early feedings often are short. Sometimes, a  nurses or drinks from a bottle only for a few minutes. Feedings gradually will last longer. · You may have to wake your sleepy baby to feed in the first few days after birth. Sleeping  · Always put your baby to sleep on his or her back, not the stomach. This lowers the risk of sudden infant death syndrome (SIDS). · Most babies sleep for a total of 18 hours each day. They wake for a short time at least every 2 to 3 hours. · Newborns have some moments of active sleep. The baby may make sounds or seem restless. This happens about every 50 to 60 minutes and usually lasts a few minutes. · At first, your baby may sleep through loud noises. Later, noises may wake your baby. · When your  wakes up, he or she usually will be hungry and will need to be fed. Diaper changing and bowel habits  · Try to check your baby's diaper at least every 2 hours. If it needs to be changed, do it as soon as you can. That will help prevent diaper rash. · Your 's wet and soiled diapers can give you clues about your baby's health. Babies can become dehydrated if they're not getting enough breast milk or formula or if they lose fluid because of diarrhea, vomiting, or a fever. · For the first few days, your baby may have about 3 wet diapers a day. After that, expect 6 or more wet diapers a day throughout the first month of life.  It can be hard to tell when a diaper is wet if you use disposable diapers. If you cannot tell, put a piece of tissue in the diaper. It will be wet when your baby urinates. · Keep track of what bowel habits are normal or usual for your child. Umbilical cord care  · Gently clean your baby's umbilical cord stump and the skin around it at least one time a day. You also can clean it during diaper changes. · Gently pat dry the area with a soft cloth. You can help your baby's umbilical cord stump fall off and heal faster by keeping it dry between cleanings. · The stump should fall off within a week or two. After the stump falls off, keep cleaning around the belly button at least one time a day until it has healed. When should you call for help? Call your baby's doctor now or seek immediate medical care if:    · Your baby has a rectal temperature that is less than 97.8°F or is 100.4°F or higher. Call if you cannot take your baby's temperature but he or she seems hot.     · Your baby has no wet diapers for 6 hours.     · Your baby's skin or whites of the eyes gets a brighter or deeper yellow.     · You see pus or red skin on or around the umbilical cord stump. These are signs of infection.    Watch closely for changes in your child's health, and be sure to contact your doctor if:    · Your baby is not having regular bowel movements based on his or her age.     · Your baby cries in an unusual way or for an unusual length of time.     · Your baby is rarely awake and does not wake up for feedings, is very fussy, seems too tired to eat, or is not interested in eating. Where can you learn more? Go to http://royce-sebas.info/. Enter L427 in the search box to learn more about \"Your  at Home: Care Instructions. \"  Current as of: May 12, 2017  Content Version: 11.7  © 4159-9664 [employer].  Care instructions adapted under license by [employer] (which disclaims liability or warranty for this information). If you have questions about a medical condition or this instruction, always ask your healthcare professional. [place] [address] any warranty or liability for your use of this information. no

## 2019-12-17 NOTE — ASU PATIENT PROFILE, ADULT - PSH
History of cholecystectomy    History of lumpectomy of right breast    History of partial colectomy    Parathyroid adenoma  excision History of cholecystectomy    History of lumpectomy of right breast  2007 and radiation and chemotherapy  History of partial colectomy  and appendectomy at the same time  Parathyroid adenoma  excision

## 2019-12-18 ENCOUNTER — OUTPATIENT (OUTPATIENT)
Dept: OUTPATIENT SERVICES | Facility: HOSPITAL | Age: 84
LOS: 1 days | End: 2019-12-18
Payer: MEDICARE

## 2019-12-18 VITALS
TEMPERATURE: 98 F | HEART RATE: 69 BPM | RESPIRATION RATE: 16 BRPM | HEIGHT: 63 IN | OXYGEN SATURATION: 100 % | SYSTOLIC BLOOD PRESSURE: 145 MMHG | DIASTOLIC BLOOD PRESSURE: 66 MMHG | WEIGHT: 179.02 LBS

## 2019-12-18 VITALS
RESPIRATION RATE: 16 BRPM | SYSTOLIC BLOOD PRESSURE: 150 MMHG | DIASTOLIC BLOOD PRESSURE: 67 MMHG | OXYGEN SATURATION: 100 % | HEART RATE: 57 BPM | TEMPERATURE: 97 F

## 2019-12-18 DIAGNOSIS — Z98.890 OTHER SPECIFIED POSTPROCEDURAL STATES: Chronic | ICD-10-CM

## 2019-12-18 DIAGNOSIS — H25.12 AGE-RELATED NUCLEAR CATARACT, LEFT EYE: ICD-10-CM

## 2019-12-18 DIAGNOSIS — Z90.49 ACQUIRED ABSENCE OF OTHER SPECIFIED PARTS OF DIGESTIVE TRACT: Chronic | ICD-10-CM

## 2019-12-18 DIAGNOSIS — D35.1 BENIGN NEOPLASM OF PARATHYROID GLAND: Chronic | ICD-10-CM

## 2019-12-18 PROCEDURE — V2632: CPT

## 2019-12-18 PROCEDURE — 66984 XCAPSL CTRC RMVL W/O ECP: CPT | Mod: 79,LT

## 2019-12-18 RX ORDER — KETOROLAC TROMETHAMINE 0.5 %
1 DROPS OPHTHALMIC (EYE)
Refills: 0 | Status: COMPLETED | OUTPATIENT
Start: 2019-12-18 | End: 2019-12-18

## 2019-12-18 RX ORDER — TROPICAMIDE 1 %
1 DROPS OPHTHALMIC (EYE)
Refills: 0 | Status: COMPLETED | OUTPATIENT
Start: 2019-12-18 | End: 2019-12-18

## 2019-12-18 RX ORDER — SODIUM CHLORIDE 9 MG/ML
1000 INJECTION, SOLUTION INTRAVENOUS
Refills: 0 | Status: DISCONTINUED | OUTPATIENT
Start: 2019-12-18 | End: 2019-12-18

## 2019-12-18 RX ORDER — CYCLOPENTOLATE HYDROCHLORIDE 10 MG/ML
1 SOLUTION/ DROPS OPHTHALMIC
Refills: 0 | Status: COMPLETED | OUTPATIENT
Start: 2019-12-18 | End: 2019-12-18

## 2019-12-18 RX ORDER — PHENYLEPHRINE HCL 2.5 %
1 DROPS OPHTHALMIC (EYE)
Refills: 0 | Status: COMPLETED | OUTPATIENT
Start: 2019-12-18 | End: 2019-12-18

## 2019-12-18 RX ADMIN — Medication 1 DROP(S): at 07:27

## 2019-12-18 RX ADMIN — Medication 1 DROP(S): at 07:16

## 2019-12-18 RX ADMIN — Medication 1 DROP(S): at 07:17

## 2019-12-18 RX ADMIN — Medication 1 DROP(S): at 07:22

## 2019-12-18 RX ADMIN — CYCLOPENTOLATE HYDROCHLORIDE 1 DROP(S): 10 SOLUTION/ DROPS OPHTHALMIC at 07:22

## 2019-12-18 RX ADMIN — CYCLOPENTOLATE HYDROCHLORIDE 1 DROP(S): 10 SOLUTION/ DROPS OPHTHALMIC at 07:27

## 2019-12-18 RX ADMIN — CYCLOPENTOLATE HYDROCHLORIDE 1 DROP(S): 10 SOLUTION/ DROPS OPHTHALMIC at 07:17

## 2019-12-18 RX ADMIN — Medication 1 DROP(S): at 07:21

## 2019-12-18 RX ADMIN — Medication 1 DROP(S): at 07:26

## 2019-12-18 NOTE — ASU DISCHARGE PLAN (ADULT/PEDIATRIC) - ASU DC SPECIAL INSTRUCTIONSFT
Keep shield on eye until office visit today at 2 pm  Any problems call office at [phone]    OFFICE VISIT TODAY AT 2 pm  Bring Drops

## 2019-12-18 NOTE — ASU DISCHARGE PLAN (ADULT/PEDIATRIC) - CALL YOUR DOCTOR IF YOU HAVE ANY OF THE FOLLOWING:
Bleeding that does not stop/Nausea and vomiting that does not stop/Inability to tolerate liquids or foods/Pain not relieved by Medications/Swelling that gets worse/Numbness, tingling, color or temperature change to extremity/Fever greater than (need to indicate Fahrenheit or Celsius)

## 2019-12-18 NOTE — ASU DISCHARGE PLAN (ADULT/PEDIATRIC) - NURSING INSTRUCTIONS
all discharge safety follow up care to MD. Eat a good lunch today before going to surgeons office. Rest at home drink plenty of fluids. Take eye drops to follow up appointment today.

## 2019-12-23 ENCOUNTER — OUTPATIENT (OUTPATIENT)
Dept: OUTPATIENT SERVICES | Facility: HOSPITAL | Age: 84
LOS: 1 days | Discharge: HOME | End: 2019-12-23

## 2019-12-23 DIAGNOSIS — D35.1 BENIGN NEOPLASM OF PARATHYROID GLAND: Chronic | ICD-10-CM

## 2019-12-23 DIAGNOSIS — Z90.49 ACQUIRED ABSENCE OF OTHER SPECIFIED PARTS OF DIGESTIVE TRACT: Chronic | ICD-10-CM

## 2019-12-23 DIAGNOSIS — Z98.890 OTHER SPECIFIED POSTPROCEDURAL STATES: Chronic | ICD-10-CM

## 2019-12-23 DIAGNOSIS — C18.9 MALIGNANT NEOPLASM OF COLON, UNSPECIFIED: ICD-10-CM

## 2019-12-27 LAB
ALBUMIN SERPL ELPH-MCNC: 4.2 G/DL
ANION GAP SERPL CALC-SCNC: 13 MMOL/L
BUN SERPL-MCNC: 29 MG/DL
CALCIUM SERPL-MCNC: 8.9 MG/DL
CHLORIDE SERPL-SCNC: 114 MMOL/L
CO2 SERPL-SCNC: 18 MMOL/L
CREAT SERPL-MCNC: 1.61 MG/DL
GLUCOSE SERPL-MCNC: 131 MG/DL
PHOSPHATE SERPL-MCNC: 3.2 MG/DL
POTASSIUM SERPL-SCNC: 3.6 MMOL/L
SODIUM SERPL-SCNC: 145 MMOL/L

## 2019-12-30 ENCOUNTER — APPOINTMENT (OUTPATIENT)
Dept: INFUSION THERAPY | Facility: HOSPITAL | Age: 84
End: 2019-12-30

## 2019-12-30 ENCOUNTER — APPOINTMENT (OUTPATIENT)
Dept: HEMATOLOGY ONCOLOGY | Facility: CLINIC | Age: 84
End: 2019-12-30

## 2020-01-21 ENCOUNTER — APPOINTMENT (OUTPATIENT)
Dept: INFUSION THERAPY | Facility: HOSPITAL | Age: 85
End: 2020-01-21

## 2020-01-21 ENCOUNTER — APPOINTMENT (OUTPATIENT)
Dept: HEMATOLOGY ONCOLOGY | Facility: CLINIC | Age: 85
End: 2020-01-21
Payer: MEDICARE

## 2020-01-21 VITALS
WEIGHT: 178.57 LBS | OXYGEN SATURATION: 99 % | DIASTOLIC BLOOD PRESSURE: 65 MMHG | SYSTOLIC BLOOD PRESSURE: 155 MMHG | HEART RATE: 65 BPM | TEMPERATURE: 97.5 F | BODY MASS INDEX: 31.63 KG/M2 | RESPIRATION RATE: 14 BRPM

## 2020-01-21 PROCEDURE — 99214 OFFICE O/P EST MOD 30 MIN: CPT

## 2020-01-21 NOTE — ASSESSMENT
[FreeTextEntry1] : #1) colon cancer- as per my discussion with Dr. Garcia 1/16/20, giving patient a break from immunotherapy, with interval f/u imaging planned. Pending f/u scans, then may consider restarting treatment.\par #2) history of leukopenia/thrombocytopenia-most recent CBC available reviewed- acceptable ANC. No overt bleeding noted clinically at present. Continue to monitor CBC with differential.\par H/O breast cancer-clinically stable. Surveillance.\par Patient was given the opportunity to ask questions. Her questions have been answered to her apparent satisfaction at this time. She concurs with plans of care.

## 2020-01-21 NOTE — PHYSICAL EXAM
[Restricted in physically strenuous activity but ambulatory and able to carry out work of a light or sedentary nature] : Status 1- Restricted in physically strenuous activity but ambulatory and able to carry out work of a light or sedentary nature, e.g., light house work, office work [Normal] : full range of motion and no deformities appreciated [de-identified] : Well-developed, well-nourished, cooperative female, nontoxic appearing, in no acute distress. [de-identified] : Soft, nontender to palpation. Unable to appreciate organomegaly. [de-identified] : no dominant mass [de-identified] : unable to appreciate discrete LAD [de-identified] : no gross focal motor deficits [de-identified] : no vesicles

## 2020-01-21 NOTE — HISTORY OF PRESENT ILLNESS
[Disease: _____________________] : Disease: [unfilled] [T: ___] : T[unfilled] [N: ___] : N[unfilled] [AJCC Stage: ____] : AJCC Stage: [unfilled] [M: ___] : M[unfilled] [de-identified] : Patient with history of colon cancer (adenocarcinoma)-5-2011. Stage IIIC-T4 N2a. Status post right colectomy followed by FOLFOX chemotherapy.  With persistently elevated CEA on f/u testing,  3/2013 mesentery mass excision pathology showed a lymph node negative for metastatic carcinoma. Benign fibroadipose tissue.\par Persistently increasing elevated CEA.  CT scan abdomen/pelvis 6/2016, showed enlarging mesenteric adenopathy. Endoscopic ultrasound guided FNA of the bryan-pancreatic, mesenteric mass was positive for malignant cells-adenocarcinoma associated with abundant mucin. 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  Patient obtained 2 surgical opinions and disease was felt to be inoperable by both surgeons.  Patient begun on Xeloda/Avastin.\par 5/2017-CT scan chest/abdomen/pelvis showed stable tiny nodules in the lungs. Sizable mass merging with the head of the pancreas had Increased in size from 11/2016 with some increasing infiltration of the surrounding fat.  Possibility of increasing colonic wall thickness on the colon site of the ileocolic anastomosis. Patient begun on Pembrolizumab (tumor MSI-high).\par 8/2017- CT scan chest/abdomen/pelvis showed marginally worsening mesenteric adenopathy, with short interval followup CT scan abdomen/pelvis 9/2017, stable compared to 8/2017.\par 1/2018-CT scan chest/abdomen/pelvis-stable adenopathy.\par 6/2018-CT scan chest/abdomen/pelvis-stable adenopathy.\par 11/2018-CT chest/abdomen/pelvis-unchanged mass at the root of the mesentery.\par 4/2019-CT scan chest/abdomen/pelvis-stable mesenteric soft tissue mass.\par 8/2019-CT scan chest/abdomen/pelvis-unchanged mesenteric mass.\par 10/2019-CT scan A/P-stable mesenteric mass. \par \par History of right breast cancer-2007. Status post right breast conservation surgery--> RT--> Femara. [de-identified] : CEA [de-identified] : adenocarcinoma [de-identified] : CEA 12/2013-33.6.         \par \par  [de-identified] : S/P antibiotic course for URI. Now overall, feeling better. No c/o fevers or SOB.\par No  or bony complaints. No abnormal bleeding. No change in bowel habits. No c/o nausea/vomiting, or abdominal pain.\par Remains independent, active with [facility] activities.\par

## 2020-02-10 ENCOUNTER — APPOINTMENT (OUTPATIENT)
Dept: HEMATOLOGY ONCOLOGY | Facility: CLINIC | Age: 85
End: 2020-02-10

## 2020-02-10 ENCOUNTER — APPOINTMENT (OUTPATIENT)
Dept: INFUSION THERAPY | Facility: HOSPITAL | Age: 85
End: 2020-02-10

## 2020-02-27 ENCOUNTER — FORM ENCOUNTER (OUTPATIENT)
Age: 85
End: 2020-02-27

## 2020-02-28 ENCOUNTER — OUTPATIENT (OUTPATIENT)
Dept: OUTPATIENT SERVICES | Facility: HOSPITAL | Age: 85
LOS: 1 days | End: 2020-02-28
Payer: MEDICARE

## 2020-02-28 ENCOUNTER — APPOINTMENT (OUTPATIENT)
Dept: CT IMAGING | Facility: HOSPITAL | Age: 85
End: 2020-02-28
Payer: MEDICARE

## 2020-02-28 DIAGNOSIS — D35.1 BENIGN NEOPLASM OF PARATHYROID GLAND: Chronic | ICD-10-CM

## 2020-02-28 DIAGNOSIS — Z98.890 OTHER SPECIFIED POSTPROCEDURAL STATES: Chronic | ICD-10-CM

## 2020-02-28 DIAGNOSIS — Z90.49 ACQUIRED ABSENCE OF OTHER SPECIFIED PARTS OF DIGESTIVE TRACT: Chronic | ICD-10-CM

## 2020-02-28 DIAGNOSIS — C18.9 MALIGNANT NEOPLASM OF COLON, UNSPECIFIED: ICD-10-CM

## 2020-02-28 PROCEDURE — 74176 CT ABD & PELVIS W/O CONTRAST: CPT | Mod: 26

## 2020-02-28 PROCEDURE — 71250 CT THORAX DX C-: CPT | Mod: 26

## 2020-02-28 PROCEDURE — 74176 CT ABD & PELVIS W/O CONTRAST: CPT

## 2020-02-28 PROCEDURE — 71250 CT THORAX DX C-: CPT

## 2020-03-05 ENCOUNTER — OUTPATIENT (OUTPATIENT)
Dept: OUTPATIENT SERVICES | Facility: HOSPITAL | Age: 85
LOS: 1 days | Discharge: HOME | End: 2020-03-05

## 2020-03-05 DIAGNOSIS — Z98.890 OTHER SPECIFIED POSTPROCEDURAL STATES: Chronic | ICD-10-CM

## 2020-03-05 DIAGNOSIS — C18.9 MALIGNANT NEOPLASM OF COLON, UNSPECIFIED: ICD-10-CM

## 2020-03-05 DIAGNOSIS — Z90.49 ACQUIRED ABSENCE OF OTHER SPECIFIED PARTS OF DIGESTIVE TRACT: Chronic | ICD-10-CM

## 2020-03-05 DIAGNOSIS — D35.1 BENIGN NEOPLASM OF PARATHYROID GLAND: Chronic | ICD-10-CM

## 2020-03-12 ENCOUNTER — LABORATORY RESULT (OUTPATIENT)
Age: 85
End: 2020-03-12

## 2020-03-12 ENCOUNTER — APPOINTMENT (OUTPATIENT)
Dept: HEMATOLOGY ONCOLOGY | Facility: CLINIC | Age: 85
End: 2020-03-12
Payer: MEDICARE

## 2020-03-12 ENCOUNTER — RESULT REVIEW (OUTPATIENT)
Age: 85
End: 2020-03-12

## 2020-03-12 ENCOUNTER — APPOINTMENT (OUTPATIENT)
Dept: INFUSION THERAPY | Facility: HOSPITAL | Age: 85
End: 2020-03-12

## 2020-03-12 VITALS
TEMPERATURE: 97.6 F | HEART RATE: 69 BPM | DIASTOLIC BLOOD PRESSURE: 78 MMHG | BODY MASS INDEX: 32.22 KG/M2 | SYSTOLIC BLOOD PRESSURE: 172 MMHG | OXYGEN SATURATION: 99 % | WEIGHT: 181.88 LBS | RESPIRATION RATE: 16 BRPM

## 2020-03-12 LAB
BASOPHILS # BLD AUTO: 0.01 K/UL — SIGNIFICANT CHANGE UP (ref 0–0.2)
BASOPHILS NFR BLD AUTO: 0.3 % — SIGNIFICANT CHANGE UP (ref 0–2)
EOSINOPHIL # BLD AUTO: 0.04 K/UL — SIGNIFICANT CHANGE UP (ref 0–0.5)
EOSINOPHIL NFR BLD AUTO: 1.4 % — SIGNIFICANT CHANGE UP (ref 0–6)
HCT VFR BLD CALC: 32.5 % — LOW (ref 34.5–45)
HGB BLD-MCNC: 11 G/DL — LOW (ref 11.5–15.5)
IMM GRANULOCYTES NFR BLD AUTO: 0 % — SIGNIFICANT CHANGE UP (ref 0–1.5)
LYMPHOCYTES # BLD AUTO: 0.73 K/UL — LOW (ref 1–3.3)
LYMPHOCYTES # BLD AUTO: 24.7 % — SIGNIFICANT CHANGE UP (ref 13–44)
MCHC RBC-ENTMCNC: 30.8 PG — SIGNIFICANT CHANGE UP (ref 27–34)
MCHC RBC-ENTMCNC: 33.8 GM/DL — SIGNIFICANT CHANGE UP (ref 32–36)
MCV RBC AUTO: 91 FL — SIGNIFICANT CHANGE UP (ref 80–100)
MONOCYTES # BLD AUTO: 0.19 K/UL — SIGNIFICANT CHANGE UP (ref 0–0.9)
MONOCYTES NFR BLD AUTO: 6.4 % — SIGNIFICANT CHANGE UP (ref 2–14)
NEUTROPHILS # BLD AUTO: 1.99 K/UL — SIGNIFICANT CHANGE UP (ref 1.8–7.4)
NEUTROPHILS NFR BLD AUTO: 67.2 % — SIGNIFICANT CHANGE UP (ref 43–77)
NRBC # BLD: 0 /100 WBCS — SIGNIFICANT CHANGE UP (ref 0–0)
PLATELET # BLD AUTO: 103 K/UL — LOW (ref 150–400)
RBC # BLD: 3.57 M/UL — LOW (ref 3.8–5.2)
RBC # FLD: 14.6 % — HIGH (ref 10.3–14.5)
WBC # BLD: 2.96 K/UL — LOW (ref 3.8–10.5)
WBC # FLD AUTO: 2.96 K/UL — LOW (ref 3.8–10.5)

## 2020-03-12 PROCEDURE — 99214 OFFICE O/P EST MOD 30 MIN: CPT

## 2020-03-12 NOTE — PHYSICAL EXAM
[Restricted in physically strenuous activity but ambulatory and able to carry out work of a light or sedentary nature] : Status 1- Restricted in physically strenuous activity but ambulatory and able to carry out work of a light or sedentary nature, e.g., light house work, office work [Normal] : affect appropriate [de-identified] : Well-developed, well-nourished, cooperative female, nontoxic appearing, in no acute distress. [de-identified] : no dominant mass [de-identified] : Soft, nontender to palpation. Unable to appreciate organomegaly. [de-identified] : unable to appreciate discrete LAD [de-identified] : no vesicles [de-identified] : no gross focal motor deficits

## 2020-03-12 NOTE — HISTORY OF PRESENT ILLNESS
[Disease: _____________________] : Disease: [unfilled] [T: ___] : T[unfilled] [N: ___] : N[unfilled] [M: ___] : M[unfilled] [AJCC Stage: ____] : AJCC Stage: [unfilled] [de-identified] : Patient with history of colon cancer (adenocarcinoma)-5-2011. Stage IIIC-T4 N2a. Status post right colectomy followed by FOLFOX chemotherapy.  With persistently elevated CEA on f/u testing,  3/2013 mesentery mass excision pathology showed a lymph node negative for metastatic carcinoma. Benign fibroadipose tissue.\par Persistently increasing elevated CEA.  CT scan abdomen/pelvis 6/2016, showed enlarging mesenteric adenopathy. Endoscopic ultrasound guided FNA of the bryan-pancreatic, mesenteric mass was positive for malignant cells-adenocarcinoma associated with abundant mucin. 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  Patient obtained 2 surgical opinions and disease was felt to be inoperable by both surgeons.  Patient begun on Xeloda/Avastin.\par 5/2017-CT scan chest/abdomen/pelvis showed stable tiny nodules in the lungs. Sizable mass merging with the head of the pancreas had Increased in size from 11/2016 with some increasing infiltration of the surrounding fat.  Possibility of increasing colonic wall thickness on the colon site of the ileocolic anastomosis. Patient begun on Pembrolizumab (tumor MSI-high).\par 8/2017- CT scan chest/abdomen/pelvis showed marginally worsening mesenteric adenopathy, with short interval followup CT scan abdomen/pelvis 9/2017, stable compared to 8/2017.\par 1/2018-CT scan chest/abdomen/pelvis-stable adenopathy.\par 6/2018-CT scan chest/abdomen/pelvis-stable adenopathy.\par 11/2018-CT chest/abdomen/pelvis-unchanged mass at the root of the mesentery.\par 4/2019-CT scan chest/abdomen/pelvis-stable mesenteric soft tissue mass.\par 8/2019-CT scan chest/abdomen/pelvis-unchanged mesenteric mass.\par 10/2019-CT scan A/P-stable mesenteric mass. Held Pembrolizumab.\par 2/2020-CT scan chest/abdomen/pelvis-stable exam with mesenteric mass without significant change since 10/2019. Resumed Pembrolizumab.\par \par History of right breast cancer-2007. Status post right breast conservation surgery--> RT--> Femara. [de-identified] : adenocarcinoma [de-identified] : CEA [de-identified] : CEA 12/2013-33.6.         \par \par  [de-identified] : Mammogram and breast US are in June (gets scripts from PCP). No new breast pain or lumps.\par Dr. Seals is monitoring thyroid.\par No c/o fevers or SOB.\par No  or bony complaints. No abnormal bleeding. No change in bowel habits. No c/o nausea/vomiting, or abdominal pain.\par Remains independent, active with [facility] activities.\par

## 2020-03-12 NOTE — ASSESSMENT
[FreeTextEntry1] : #1) colon cancer-reviewed CT scan results with patient in detail. Essentially stable disease following a break from immunotherapy. Discussed treatment options, including continued expectant surveillance, with short interval followup imaging versus resumption of systemic therapy. Patient wishes to restart treatment-she is agreeable to Pembrolizumab.\par #2) neutropenia-ANC currently within normal limits. Continue to monitor CBC with differential.\par #3) thrombocytopenia-platelet count currently stable.\par #4) anemia-decreasing hemoglobin noted-no overt bleeding noted clinically at present. Followup CBC to be done.\par #5) history of breast cancer-clinically stable. Expectant surveillance. Breast imaging as planned.\par Patient was given the opportunity to ask questions. Her questions have been answered to her apparent satisfaction at this time. She concurs with plans of care.

## 2020-03-12 NOTE — RESULTS/DATA
[FreeTextEntry1] : 2/2020-CT C/A/P-IMPRESSION: \par Stable exam. \par Mesenteric mass without significant change since prior exam 10/25/2019.

## 2020-03-13 DIAGNOSIS — Z51.89 ENCOUNTER FOR OTHER SPECIFIED AFTERCARE: ICD-10-CM

## 2020-03-26 ENCOUNTER — TRANSCRIPTION ENCOUNTER (OUTPATIENT)
Age: 85
End: 2020-03-26

## 2020-03-31 ENCOUNTER — LABORATORY RESULT (OUTPATIENT)
Age: 85
End: 2020-03-31

## 2020-03-31 ENCOUNTER — APPOINTMENT (OUTPATIENT)
Dept: ORTHOPEDIC SURGERY | Facility: CLINIC | Age: 85
End: 2020-03-31
Payer: MEDICARE

## 2020-03-31 ENCOUNTER — RESULT REVIEW (OUTPATIENT)
Age: 85
End: 2020-03-31

## 2020-03-31 ENCOUNTER — APPOINTMENT (OUTPATIENT)
Dept: INFUSION THERAPY | Facility: HOSPITAL | Age: 85
End: 2020-03-31

## 2020-03-31 ENCOUNTER — APPOINTMENT (OUTPATIENT)
Dept: HEMATOLOGY ONCOLOGY | Facility: CLINIC | Age: 85
End: 2020-03-31

## 2020-03-31 DIAGNOSIS — M17.11 UNILATERAL PRIMARY OSTEOARTHRITIS, RIGHT KNEE: ICD-10-CM

## 2020-03-31 DIAGNOSIS — S90.01XA CONTUSION OF RIGHT ANKLE, INITIAL ENCOUNTER: ICD-10-CM

## 2020-03-31 DIAGNOSIS — S70.01XA CONTUSION OF RIGHT HIP, INITIAL ENCOUNTER: ICD-10-CM

## 2020-03-31 LAB
BASOPHILS # BLD AUTO: 0.01 K/UL — SIGNIFICANT CHANGE UP (ref 0–0.2)
BASOPHILS NFR BLD AUTO: 0.3 % — SIGNIFICANT CHANGE UP (ref 0–2)
EOSINOPHIL # BLD AUTO: 0.05 K/UL — SIGNIFICANT CHANGE UP (ref 0–0.5)
EOSINOPHIL NFR BLD AUTO: 1.5 % — SIGNIFICANT CHANGE UP (ref 0–6)
HCT VFR BLD CALC: 33 % — LOW (ref 34.5–45)
HGB BLD-MCNC: 11.2 G/DL — LOW (ref 11.5–15.5)
IMM GRANULOCYTES NFR BLD AUTO: 0.3 % — SIGNIFICANT CHANGE UP (ref 0–1.5)
LYMPHOCYTES # BLD AUTO: 0.54 K/UL — LOW (ref 1–3.3)
LYMPHOCYTES # BLD AUTO: 16.7 % — SIGNIFICANT CHANGE UP (ref 13–44)
MCHC RBC-ENTMCNC: 30.7 PG — SIGNIFICANT CHANGE UP (ref 27–34)
MCHC RBC-ENTMCNC: 33.9 GM/DL — SIGNIFICANT CHANGE UP (ref 32–36)
MCV RBC AUTO: 90.4 FL — SIGNIFICANT CHANGE UP (ref 80–100)
MONOCYTES # BLD AUTO: 0.2 K/UL — SIGNIFICANT CHANGE UP (ref 0–0.9)
MONOCYTES NFR BLD AUTO: 6.2 % — SIGNIFICANT CHANGE UP (ref 2–14)
NEUTROPHILS # BLD AUTO: 2.43 K/UL — SIGNIFICANT CHANGE UP (ref 1.8–7.4)
NEUTROPHILS NFR BLD AUTO: 75 % — SIGNIFICANT CHANGE UP (ref 43–77)
NRBC # BLD: 0 /100 WBCS — SIGNIFICANT CHANGE UP (ref 0–0)
PLATELET # BLD AUTO: 109 K/UL — LOW (ref 150–400)
RBC # BLD: 3.65 M/UL — LOW (ref 3.8–5.2)
RBC # FLD: 14.6 % — HIGH (ref 10.3–14.5)
WBC # BLD: 3.24 K/UL — LOW (ref 3.8–10.5)
WBC # FLD AUTO: 3.24 K/UL — LOW (ref 3.8–10.5)

## 2020-03-31 PROCEDURE — 72170 X-RAY EXAM OF PELVIS: CPT

## 2020-03-31 PROCEDURE — 99203 OFFICE O/P NEW LOW 30 MIN: CPT

## 2020-03-31 PROCEDURE — 73560 X-RAY EXAM OF KNEE 1 OR 2: CPT | Mod: RT

## 2020-03-31 PROCEDURE — 73620 X-RAY EXAM OF FOOT: CPT | Mod: RT

## 2020-03-31 PROCEDURE — 73610 X-RAY EXAM OF ANKLE: CPT | Mod: RT

## 2020-03-31 NOTE — HISTORY OF PRESENT ILLNESS
[de-identified] : Patient is an 85 year old female who presents with a right leg injury. 03/25/2020. She was walking out of a supermarket last week, 03/25/2020 when she fell. She is unsure of the exact mechanism of the fall, but believes that the tip of her right foot caught the edge of the sidewalk step-off which then subsequently caused her to trip and fall. She landed directly onto the right knee and additionally sustained a small abrasion. There was pain in the knee as well as the dorsum of the foot. She was evaluated at [facility] where x-rays of the right tib/fib were taken. X-rays revealed a minimally displaced oblique fracture of the proximal fibula. X-rays of the ankle and foot were not taken. Today she states that she continues to experience pain in the knee directly where the site of the fracture is. She also has pain in the foot over the metatarsals. She has been ambulating with a four prong cane since the injury for additional support. She is not taking anything for pain control. \par \par Of note, the patient is currently on Keytruda for recurrent colon CA. She was previously treated with several rounds of chemotherapy but the tumor was unresponsive.

## 2020-03-31 NOTE — ADDENDUM
[FreeTextEntry1] : I, Shanita Leal wrote this note acting as a scribe for Dr. Carlton Pepper on Mar 31, 2020.

## 2020-03-31 NOTE — END OF VISIT
[FreeTextEntry3] : I, Carlton Pepper MD, ordering physician, have read and attest that all the information, medical decision making and discharge instructions within are true and accurate.

## 2020-03-31 NOTE — CONSULT LETTER
[Dear  ___] : Dear  [unfilled], [Consult Letter:] : I had the pleasure of evaluating your patient, [unfilled]. [Please see my note below.] : Please see my note below. [Consult Closing:] : Thank you very much for allowing me to participate in the care of this patient.  If you have any questions, please do not hesitate to contact me. [Sincerely,] : Sincerely, [FreeTextEntry2] : ALBINO CRUZ  [FreeTextEntry3] : Carlton Pepper MD

## 2020-03-31 NOTE — PHYSICAL EXAM
[de-identified] : Patient is WDWN, alert, and in no acute distress. Breathing is unlabored. She is grossly oriented to person, place, and time. \par \par Right knee: Small abrasion over the anterior knee. No signs of infection. Tenderness laterally over the proximal fibula. No swelling, no valgus or valgus instability present on provocative testing. Flexion and extension 5/5.\par Range of motion: Active flexion and extension full and painless. No crepitus.\par Tests and Signs: All tests for stability are normal. \par Strength: flexion and extension 5/5 \par Gait: Ambulating with four prong cane in the left hand. \par \par Right Ankle:\par Inspection/Palpation: No tenderness, edema, or deformities.\par Range of Motion: Full and painless in all planes\par Stability: Joint stability is intact. Full range of motion in toes. \par Strength: Plantar flexion, dorsiflexion, inversion, eversion 5/5  [de-identified] : AP view of the pelvis was obtained and revealed adequate cartilage spaces in the bilateral hip joints. Surgical staples visualized in the right lower quadrant. \par \par 2v of the right knee weightbearing showed  the minimally displaced oblique fracture of the proximal fibula. There is minimal narrowing of the medial compartment joint space. \par \par 3v of the right foot and ankle were obtained today and showed no abnormalities. No acute fracture. No dislocation. Cartilage spaces are maintained. Mortise is well aligned.

## 2020-03-31 NOTE — DISCUSSION/SUMMARY
[de-identified] : The underlying pathophysiology was reviewed with the patient. Treatment options were discussed including; observation, NSAIDS, analgesics, physical therapy. \par \par \par Patient was advised to continue with the cane for at least the next 6 weeks for additional support. \par Topical analgesics as needed. \par NSAIDs as tolerated. \par Follow up in 6 weeks for repeat x-rays of the right knee to assess for proximal fibula fracture healing.

## 2020-04-01 DIAGNOSIS — Z51.11 ENCOUNTER FOR ANTINEOPLASTIC CHEMOTHERAPY: ICD-10-CM

## 2020-04-02 ENCOUNTER — APPOINTMENT (OUTPATIENT)
Dept: HEMATOLOGY ONCOLOGY | Facility: CLINIC | Age: 85
End: 2020-04-02
Payer: MEDICARE

## 2020-04-02 PROCEDURE — 99441: CPT

## 2020-04-02 NOTE — HISTORY OF PRESENT ILLNESS
[Home] : at home, [unfilled] , at the time of the visit. [Other Location: e.g. Home (Enter Location, City,State)___] : at [unfilled] [Patient] : the patient [Self] : self [Disease: _____________________] : Disease: [unfilled] [T: ___] : T[unfilled] [N: ___] : N[unfilled] [M: ___] : M[unfilled] [AJCC Stage: ____] : AJCC Stage: [unfilled] [de-identified] : Patient with history of colon cancer (adenocarcinoma)-5-2011. Stage IIIC-T4 N2a. Status post right colectomy followed by FOLFOX chemotherapy.  With persistently elevated CEA on f/u testing,  3/2013 mesentery mass excision pathology showed a lymph node negative for metastatic carcinoma. Benign fibroadipose tissue.\par Persistently increasing elevated CEA.  CT scan abdomen/pelvis 6/2016, showed enlarging mesenteric adenopathy. Endoscopic ultrasound guided FNA of the bryan-pancreatic, mesenteric mass was positive for malignant cells-adenocarcinoma associated with abundant mucin. 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  Patient obtained 2 surgical opinions and disease was felt to be inoperable by both surgeons.  Patient begun on Xeloda/Avastin.\par 5/2017-CT scan chest/abdomen/pelvis showed stable tiny nodules in the lungs. Sizable mass merging with the head of the pancreas had Increased in size from 11/2016 with some increasing infiltration of the surrounding fat.  Possibility of increasing colonic wall thickness on the colon site of the ileocolic anastomosis. Patient begun on Pembrolizumab (tumor MSI-high).\par 8/2017- CT scan chest/abdomen/pelvis showed marginally worsening mesenteric adenopathy, with short interval followup CT scan abdomen/pelvis 9/2017, stable compared to 8/2017.\par 1/2018-CT scan chest/abdomen/pelvis-stable adenopathy.\par 6/2018-CT scan chest/abdomen/pelvis-stable adenopathy.\par 11/2018-CT chest/abdomen/pelvis-unchanged mass at the root of the mesentery.\par 4/2019-CT scan chest/abdomen/pelvis-stable mesenteric soft tissue mass.\par 8/2019-CT scan chest/abdomen/pelvis-unchanged mesenteric mass.\par 10/2019-CT scan A/P-stable mesenteric mass. Held Pembrolizumab.\par 2/2020-CT scan chest/abdomen/pelvis-stable exam with mesenteric mass without significant change since 10/2019. Resumed Pembrolizumab.\par \par History of right breast cancer-2007. Status post right breast conservation surgery--> RT--> Femara. [de-identified] : adenocarcinoma [de-identified] : CEA [de-identified] : CEA 12/2013-33.6.         \par \par  [de-identified] : Telehealth visit secondary to COVID19 pandemic.\par Fell at the grocery store last week and fractured right fibula-saw ortho MD, with conservative management being done. No LOC. Patient ambulating with a cane.\par No c/o fevers, cough or SOB.\par No  complaints. No abnormal bleeding. No change in bowel habits. No c/o nausea/vomiting, or abdominal pain.\par Remains independent.\par

## 2020-04-02 NOTE — ASSESSMENT
[FreeTextEntry1] : #1) colon cancer-increased CEA discussed. Patient has recently restarted immunotherapy which we will continue for now (recent CT scan essentially stable disease). Interval f/u imaging planned, pending improvement in COVID19 situation.\par #2) neutropenia-ANC currently acceptable. Continue to monitor CBC with differential.\par #3) thrombocytopenia-platelet count currently stable.\par #4) anemia-hemoglobin stable at present-no overt bleeding reported. Follow clinically.\par #5) renal insufficiency-patient to increase PO fluid hydration as tolerated. She will f/u with renal MD Dr. Garcia as soon as she is able (recent visit cancelled by nephrologist, secondary to COVID19).\par Patient was given the opportunity to ask questions. Her questions have been answered to her apparent satisfaction at this time. She concurs with plans of care.

## 2020-04-14 ENCOUNTER — OUTPATIENT (OUTPATIENT)
Dept: OUTPATIENT SERVICES | Facility: HOSPITAL | Age: 85
LOS: 1 days | Discharge: HOME | End: 2020-04-14

## 2020-04-14 DIAGNOSIS — Z90.49 ACQUIRED ABSENCE OF OTHER SPECIFIED PARTS OF DIGESTIVE TRACT: Chronic | ICD-10-CM

## 2020-04-14 DIAGNOSIS — C18.9 MALIGNANT NEOPLASM OF COLON, UNSPECIFIED: ICD-10-CM

## 2020-04-14 DIAGNOSIS — D35.1 BENIGN NEOPLASM OF PARATHYROID GLAND: Chronic | ICD-10-CM

## 2020-04-14 DIAGNOSIS — Z98.890 OTHER SPECIFIED POSTPROCEDURAL STATES: Chronic | ICD-10-CM

## 2020-04-16 LAB
ALBUMIN SERPL ELPH-MCNC: 4.6 G/DL
ANION GAP SERPL CALC-SCNC: 14 MMOL/L
BUN SERPL-MCNC: 25 MG/DL
CALCIUM SERPL-MCNC: 9.3 MG/DL
CHLORIDE SERPL-SCNC: 114 MMOL/L
CO2 SERPL-SCNC: 16 MMOL/L
CREAT SERPL-MCNC: 1.69 MG/DL
GLUCOSE SERPL-MCNC: 152 MG/DL
PHOSPHATE SERPL-MCNC: 3.9 MG/DL
POTASSIUM SERPL-SCNC: 4 MMOL/L
SODIUM SERPL-SCNC: 145 MMOL/L

## 2020-04-21 ENCOUNTER — RESULT REVIEW (OUTPATIENT)
Age: 85
End: 2020-04-21

## 2020-04-21 ENCOUNTER — LABORATORY RESULT (OUTPATIENT)
Age: 85
End: 2020-04-21

## 2020-04-21 ENCOUNTER — APPOINTMENT (OUTPATIENT)
Dept: INFUSION THERAPY | Facility: HOSPITAL | Age: 85
End: 2020-04-21

## 2020-04-21 ENCOUNTER — APPOINTMENT (OUTPATIENT)
Dept: HEMATOLOGY ONCOLOGY | Facility: CLINIC | Age: 85
End: 2020-04-21
Payer: MEDICARE

## 2020-04-21 VITALS — RESPIRATION RATE: 14 BRPM | HEART RATE: 72 BPM

## 2020-04-21 LAB
BASOPHILS # BLD AUTO: 0.02 K/UL — SIGNIFICANT CHANGE UP (ref 0–0.2)
BASOPHILS NFR BLD AUTO: 0.7 % — SIGNIFICANT CHANGE UP (ref 0–2)
EOSINOPHIL # BLD AUTO: 0.04 K/UL — SIGNIFICANT CHANGE UP (ref 0–0.5)
EOSINOPHIL NFR BLD AUTO: 1.4 % — SIGNIFICANT CHANGE UP (ref 0–6)
HCT VFR BLD CALC: 31.9 % — LOW (ref 34.5–45)
HGB BLD-MCNC: 10.4 G/DL — LOW (ref 11.5–15.5)
IMM GRANULOCYTES NFR BLD AUTO: 0.3 % — SIGNIFICANT CHANGE UP (ref 0–1.5)
LYMPHOCYTES # BLD AUTO: 0.52 K/UL — LOW (ref 1–3.3)
LYMPHOCYTES # BLD AUTO: 18.1 % — SIGNIFICANT CHANGE UP (ref 13–44)
MCHC RBC-ENTMCNC: 30.4 PG — SIGNIFICANT CHANGE UP (ref 27–34)
MCHC RBC-ENTMCNC: 32.6 GM/DL — SIGNIFICANT CHANGE UP (ref 32–36)
MCV RBC AUTO: 93.3 FL — SIGNIFICANT CHANGE UP (ref 80–100)
MONOCYTES # BLD AUTO: 0.27 K/UL — SIGNIFICANT CHANGE UP (ref 0–0.9)
MONOCYTES NFR BLD AUTO: 9.4 % — SIGNIFICANT CHANGE UP (ref 2–14)
NEUTROPHILS # BLD AUTO: 2.01 K/UL — SIGNIFICANT CHANGE UP (ref 1.8–7.4)
NEUTROPHILS NFR BLD AUTO: 70.1 % — SIGNIFICANT CHANGE UP (ref 43–77)
NRBC # BLD: 0 /100 WBCS — SIGNIFICANT CHANGE UP (ref 0–0)
PLATELET # BLD AUTO: 101 K/UL — LOW (ref 150–400)
RBC # BLD: 3.42 M/UL — LOW (ref 3.8–5.2)
RBC # FLD: 14.5 % — SIGNIFICANT CHANGE UP (ref 10.3–14.5)
WBC # BLD: 2.87 K/UL — LOW (ref 3.8–10.5)
WBC # FLD AUTO: 2.87 K/UL — LOW (ref 3.8–10.5)

## 2020-04-21 PROCEDURE — 99214 OFFICE O/P EST MOD 30 MIN: CPT

## 2020-04-21 NOTE — HISTORY OF PRESENT ILLNESS
[Disease: _____________________] : Disease: [unfilled] [T: ___] : T[unfilled] [N: ___] : N[unfilled] [M: ___] : M[unfilled] [AJCC Stage: ____] : AJCC Stage: [unfilled] [de-identified] : Patient with history of colon cancer (adenocarcinoma)-5-2011. Stage IIIC-T4 N2a. Status post right colectomy followed by FOLFOX chemotherapy.  With persistently elevated CEA on f/u testing,  3/2013 mesentery mass excision pathology showed a lymph node negative for metastatic carcinoma. Benign fibroadipose tissue.\par Persistently increasing elevated CEA.  CT scan abdomen/pelvis 6/2016, showed enlarging mesenteric adenopathy. Endoscopic ultrasound guided FNA of the bryan-pancreatic, mesenteric mass was positive for malignant cells-adenocarcinoma associated with abundant mucin. 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  Patient obtained 2 surgical opinions and disease was felt to be inoperable by both surgeons.  Patient begun on Xeloda/Avastin.\par 5/2017-CT scan chest/abdomen/pelvis showed stable tiny nodules in the lungs. Sizable mass merging with the head of the pancreas had Increased in size from 11/2016 with some increasing infiltration of the surrounding fat.  Possibility of increasing colonic wall thickness on the colon site of the ileocolic anastomosis. Patient begun on Pembrolizumab (tumor MSI-high).\par 8/2017- CT scan chest/abdomen/pelvis showed marginally worsening mesenteric adenopathy, with short interval followup CT scan abdomen/pelvis 9/2017, stable compared to 8/2017.\par 1/2018-CT scan chest/abdomen/pelvis-stable adenopathy.\par 6/2018-CT scan chest/abdomen/pelvis-stable adenopathy.\par 11/2018-CT chest/abdomen/pelvis-unchanged mass at the root of the mesentery.\par 4/2019-CT scan chest/abdomen/pelvis-stable mesenteric soft tissue mass.\par 8/2019-CT scan chest/abdomen/pelvis-unchanged mesenteric mass.\par 10/2019-CT scan A/P-stable mesenteric mass. Held Pembrolizumab.\par 2/2020-CT scan chest/abdomen/pelvis-stable exam with mesenteric mass without significant change since 10/2019. Resumed Pembrolizumab.\par \par History of right breast cancer-2007. Status post right breast conservation surgery--> RT--> Femara. [de-identified] : adenocarcinoma [de-identified] : CEA [de-identified] : CEA 12/2013-33.6.         \par \par  [de-identified] : Here for treatment-tripped on floor-no LOC. Scraped right hand and over right eyebrow. Denies any pain. No H/A, change in vision or lateralizing weakness. No dizziness or palpitations. Able to ambulate without difficulty. Has not been using cane recently, though plans to restart using it-encouraged.\par Had cracked tooth extracted yesterday. No c/o fevers, cough or SOB.\par No  complaints. No abnormal bleeding. No change in bowel habits. No c/o nausea/vomiting, or abdominal pain.\par Remains independent.\par

## 2020-04-21 NOTE — CONSULT LETTER
[Courtesy Letter:] : I had the pleasure of seeing your patient, [unfilled], in my office today. [Dear  ___] : Dear  [unfilled], [Consult Closing:] : Thank you very much for allowing me to participate in the care of this patient.  If you have any questions, please do not hesitate to contact me. [Please see my note below.] : Please see my note below. [Sincerely,] : Sincerely, [FreeTextEntry3] : Cortney Mack M.D.

## 2020-04-21 NOTE — PHYSICAL EXAM
[Restricted in physically strenuous activity but ambulatory and able to carry out work of a light or sedentary nature] : Status 1- Restricted in physically strenuous activity but ambulatory and able to carry out work of a light or sedentary nature, e.g., light house work, office work [Normal] : affect appropriate [de-identified] : Well-developed, well-nourished, cooperative female, nontoxic appearing, in no acute distress. [de-identified] : Soft, nontender to palpation. Unable to appreciate organomegaly. [de-identified] : unable to appreciate discrete LAD [de-identified] : papery thin with skin tear dorsum of right hand; small clean, dry, intact laceration over right eyebrow over ecchymosis; no bleeding [de-identified] : no gross focal motor deficits; patient ambulating steadily without difficulty unassisted

## 2020-04-21 NOTE — ADDENDUM
[FreeTextEntry1] : Telephone call back to son Don and discussed patient's fall and updated oncology status/plans of care with him. If patient has recurrent fall, recommend f/u with neurologist for re-evaluation and consider brain imaging. Son agrees with plans of care and is appreciative of care. He will call if further questions/concerns.

## 2020-04-21 NOTE — ASSESSMENT
[FreeTextEntry1] : #1) colon cancer-clinically stable. patient wishes to continue with immunotherapy.\par #2) neutropenia-ANC currently WNL. Continue to monitor CBC with differential.\par #3) thrombocytopenia-platelet count currently acceptable.\par #4) anemia-no overt bleeding noted clinically at present. Keytruda may contribute. Repeat CBC with next lab work.\par #5) s/p slip and fall-no bony complaints at this time; no LOC; superficial skin abrasions. Advised patient to call/go directly to the ED if H/A, N/V develop/any symptoms of concern.\par Patient was given the opportunity to ask questions. Her questions have been answered to her apparent satisfaction at this time. She concurs with plans of care.

## 2020-04-21 NOTE — REVIEW OF SYSTEMS
[Negative] : Allergic/Immunologic [Dizziness] : no dizziness [Fainting] : no fainting [de-identified] : scraped right hand

## 2020-04-22 DIAGNOSIS — Z51.11 ENCOUNTER FOR ANTINEOPLASTIC CHEMOTHERAPY: ICD-10-CM

## 2020-05-12 ENCOUNTER — APPOINTMENT (OUTPATIENT)
Dept: ORTHOPEDIC SURGERY | Facility: CLINIC | Age: 85
End: 2020-05-12
Payer: MEDICARE

## 2020-05-12 ENCOUNTER — APPOINTMENT (OUTPATIENT)
Dept: INFUSION THERAPY | Facility: HOSPITAL | Age: 85
End: 2020-05-12

## 2020-05-12 DIAGNOSIS — S82.831A OTHER FRACTURE OF UPPER AND LOWER END OF RIGHT FIBULA, INITIAL ENCOUNTER FOR CLOSED FRACTURE: ICD-10-CM

## 2020-05-12 PROCEDURE — 73590 X-RAY EXAM OF LOWER LEG: CPT | Mod: RT

## 2020-05-12 PROCEDURE — 99213 OFFICE O/P EST LOW 20 MIN: CPT

## 2020-05-12 NOTE — HISTORY OF PRESENT ILLNESS
[de-identified] : Patient is an 85 year old female who returns with a right leg injury. She was walking out of a supermarket on 03/25/2020 when she fell. She is unsure of the exact mechanism of the fall, but believes that the tip of her right foot caught the edge of the sidewalk step-off which then subsequently caused her to trip and fall. She landed directly onto the right knee and additionally sustained a small abrasion. There was pain in the knee as well as the dorsum of the foot. She was evaluated at [facility] where x-rays of the right tib/fib were taken. X-rays revealed a minimally displaced oblique fracture of the proximal fibula. X-rays of the ankle and foot were not taken. She presents at the 6 week jonathan. The pain in the knee has almost completely subsided with a dull soreness being all that she experiences in terms of symptoms. She has returned to all prior activities and does not feel any limitations. She is no longer using the cane. She is not taking anything for pain control. \par Of note, the patient is currently on Keytruda for recurrent colon CA. She was previously treated with several rounds of chemotherapy but the tumor was unresponsive.

## 2020-05-12 NOTE — ADDENDUM
[FreeTextEntry1] : I, Shanita Leal wrote this note acting as a scribe for Dr. Carlton Pepper on May 12, 2020.

## 2020-05-12 NOTE — DISCUSSION/SUMMARY
[de-identified] : The patient was informed that she may return to all prior activities without any imposing restrictions. \par Topical analgesics as needed. \par NSAIDs as tolerated. \par Follow up as needed.

## 2020-05-12 NOTE — PHYSICAL EXAM
[de-identified] : Patient is WDWN, alert, and in no acute distress. Breathing is unlabored. She is grossly oriented to person, place, and time. \par \par Right knee: Minimal tenderness laterally over the proximal fibula. No swelling, no valgus or valgus instability present on provocative testing. Flexion and extension 5/5.\par Range of motion: Active flexion and extension full and painless. No crepitus.\par Tests and Signs: All tests for stability are normal. \par Strength: flexion and extension 5/5 \par Gait: Normal.  [de-identified] : 2v of the right tib/fib weightbearing showed  the minimally displaced oblique fracture of the proximal fibula. Fracture is healing well.

## 2020-05-14 ENCOUNTER — APPOINTMENT (OUTPATIENT)
Dept: HEMATOLOGY ONCOLOGY | Facility: CLINIC | Age: 85
End: 2020-05-14
Payer: MEDICARE

## 2020-05-14 ENCOUNTER — LABORATORY RESULT (OUTPATIENT)
Age: 85
End: 2020-05-14

## 2020-05-14 ENCOUNTER — RESULT REVIEW (OUTPATIENT)
Age: 85
End: 2020-05-14

## 2020-05-14 ENCOUNTER — APPOINTMENT (OUTPATIENT)
Dept: INFUSION THERAPY | Facility: HOSPITAL | Age: 85
End: 2020-05-14

## 2020-05-14 VITALS — DIASTOLIC BLOOD PRESSURE: 80 MMHG | HEART RATE: 73 BPM | TEMPERATURE: 98 F | SYSTOLIC BLOOD PRESSURE: 131 MMHG

## 2020-05-14 LAB
BASOPHILS # BLD AUTO: 0.01 K/UL — SIGNIFICANT CHANGE UP (ref 0–0.2)
BASOPHILS NFR BLD AUTO: 0.3 % — SIGNIFICANT CHANGE UP (ref 0–2)
EOSINOPHIL # BLD AUTO: 0.04 K/UL — SIGNIFICANT CHANGE UP (ref 0–0.5)
EOSINOPHIL NFR BLD AUTO: 1.3 % — SIGNIFICANT CHANGE UP (ref 0–6)
HCT VFR BLD CALC: 34.1 % — LOW (ref 34.5–45)
HGB BLD-MCNC: 10.9 G/DL — LOW (ref 11.5–15.5)
IMM GRANULOCYTES NFR BLD AUTO: 0.3 % — SIGNIFICANT CHANGE UP (ref 0–1.5)
LYMPHOCYTES # BLD AUTO: 0.54 K/UL — LOW (ref 1–3.3)
LYMPHOCYTES # BLD AUTO: 17.4 % — SIGNIFICANT CHANGE UP (ref 13–44)
MCHC RBC-ENTMCNC: 30.2 PG — SIGNIFICANT CHANGE UP (ref 27–34)
MCHC RBC-ENTMCNC: 32 GM/DL — SIGNIFICANT CHANGE UP (ref 32–36)
MCV RBC AUTO: 94.5 FL — SIGNIFICANT CHANGE UP (ref 80–100)
MONOCYTES # BLD AUTO: 0.21 K/UL — SIGNIFICANT CHANGE UP (ref 0–0.9)
MONOCYTES NFR BLD AUTO: 6.8 % — SIGNIFICANT CHANGE UP (ref 2–14)
NEUTROPHILS # BLD AUTO: 2.29 K/UL — SIGNIFICANT CHANGE UP (ref 1.8–7.4)
NEUTROPHILS NFR BLD AUTO: 73.9 % — SIGNIFICANT CHANGE UP (ref 43–77)
NRBC # BLD: 0 /100 WBCS — SIGNIFICANT CHANGE UP (ref 0–0)
PLATELET # BLD AUTO: 108 K/UL — LOW (ref 150–400)
RBC # BLD: 3.61 M/UL — LOW (ref 3.8–5.2)
RBC # FLD: 14.1 % — SIGNIFICANT CHANGE UP (ref 10.3–14.5)
WBC # BLD: 3.1 K/UL — LOW (ref 3.8–10.5)
WBC # FLD AUTO: 3.1 K/UL — LOW (ref 3.8–10.5)

## 2020-05-14 PROCEDURE — 99214 OFFICE O/P EST MOD 30 MIN: CPT

## 2020-05-14 NOTE — REVIEW OF SYSTEMS
[Negative] : Allergic/Immunologic [Fainting] : no fainting [Abdominal Pain] : no abdominal pain [Muscle Pain] : no muscle pain

## 2020-05-14 NOTE — ASSESSMENT
[FreeTextEntry1] : #1) colon cancer-clinically stable. Patient wishes to continue with immunotherapy.\par #2) neutropenia-ANC currently WNL. Continue to monitor CBC with differential.\par #3) thrombocytopenia-platelet count currently stable-follow.\par #4) anemia-no overt bleeding noted clinically at present. Keytruda may contribute. Hemoglobin currently stable-follow.\par #5) CKD-I am concerned regarding increased creatinine-recommend hydration, and f/u with nephrologist. May  need to hold immunotherapy going forward, pending improvement.\par Patient was given the opportunity to ask questions. Her questions have been answered to her apparent satisfaction at this time.

## 2020-05-14 NOTE — PHYSICAL EXAM
[Restricted in physically strenuous activity but ambulatory and able to carry out work of a light or sedentary nature] : Status 1- Restricted in physically strenuous activity but ambulatory and able to carry out work of a light or sedentary nature, e.g., light house work, office work [Normal] : affect appropriate [de-identified] : Well-developed, well-nourished, cooperative female, nontoxic appearing, in no acute distress. [de-identified] : Soft, nontender to palpation. Unable to appreciate organomegaly. [de-identified] : papery thin with skin tear dorsum of right hand; small clean, dry, intact laceration over right eyebrow over ecchymosis; no bleeding [de-identified] : no gross focal motor deficits [de-identified] : unable to appreciate discrete LAD

## 2020-05-15 ENCOUNTER — OUTPATIENT (OUTPATIENT)
Dept: OUTPATIENT SERVICES | Facility: HOSPITAL | Age: 85
LOS: 1 days | Discharge: HOME | End: 2020-05-15

## 2020-05-15 DIAGNOSIS — D35.1 BENIGN NEOPLASM OF PARATHYROID GLAND: Chronic | ICD-10-CM

## 2020-05-15 DIAGNOSIS — Z90.49 ACQUIRED ABSENCE OF OTHER SPECIFIED PARTS OF DIGESTIVE TRACT: Chronic | ICD-10-CM

## 2020-05-15 DIAGNOSIS — Z98.890 OTHER SPECIFIED POSTPROCEDURAL STATES: Chronic | ICD-10-CM

## 2020-05-15 DIAGNOSIS — C18.9 MALIGNANT NEOPLASM OF COLON, UNSPECIFIED: ICD-10-CM

## 2020-05-16 ENCOUNTER — APPOINTMENT (OUTPATIENT)
Dept: INFUSION THERAPY | Facility: HOSPITAL | Age: 85
End: 2020-05-16

## 2020-05-18 ENCOUNTER — OUTPATIENT (OUTPATIENT)
Dept: OUTPATIENT SERVICES | Facility: HOSPITAL | Age: 85
LOS: 1 days | End: 2020-05-18
Payer: MEDICARE

## 2020-05-18 ENCOUNTER — APPOINTMENT (OUTPATIENT)
Dept: ULTRASOUND IMAGING | Facility: HOSPITAL | Age: 85
End: 2020-05-18
Payer: MEDICARE

## 2020-05-18 DIAGNOSIS — Z00.8 ENCOUNTER FOR OTHER GENERAL EXAMINATION: ICD-10-CM

## 2020-05-18 DIAGNOSIS — D35.1 BENIGN NEOPLASM OF PARATHYROID GLAND: Chronic | ICD-10-CM

## 2020-05-18 DIAGNOSIS — E86.0 DEHYDRATION: ICD-10-CM

## 2020-05-18 DIAGNOSIS — Z98.890 OTHER SPECIFIED POSTPROCEDURAL STATES: Chronic | ICD-10-CM

## 2020-05-18 DIAGNOSIS — Z90.49 ACQUIRED ABSENCE OF OTHER SPECIFIED PARTS OF DIGESTIVE TRACT: Chronic | ICD-10-CM

## 2020-05-18 PROCEDURE — 76536 US EXAM OF HEAD AND NECK: CPT | Mod: 26

## 2020-05-18 PROCEDURE — 76536 US EXAM OF HEAD AND NECK: CPT

## 2020-05-19 ENCOUNTER — APPOINTMENT (OUTPATIENT)
Dept: INFUSION THERAPY | Facility: HOSPITAL | Age: 85
End: 2020-05-19

## 2020-06-02 ENCOUNTER — APPOINTMENT (OUTPATIENT)
Dept: INFUSION THERAPY | Facility: HOSPITAL | Age: 85
End: 2020-06-02

## 2020-06-02 ENCOUNTER — APPOINTMENT (OUTPATIENT)
Dept: HEMATOLOGY ONCOLOGY | Facility: CLINIC | Age: 85
End: 2020-06-02
Payer: MEDICARE

## 2020-06-02 DIAGNOSIS — N18.9 CHRONIC KIDNEY DISEASE, UNSPECIFIED: ICD-10-CM

## 2020-06-02 PROCEDURE — 99442: CPT | Mod: 95

## 2020-06-02 NOTE — HISTORY OF PRESENT ILLNESS
[T: ___] : T[unfilled] [Disease: _____________________] : Disease: [unfilled] [N: ___] : N[unfilled] [M: ___] : M[unfilled] [AJCC Stage: ____] : AJCC Stage: [unfilled] [de-identified] : adenocarcinoma [de-identified] : Patient with history of colon cancer (adenocarcinoma)-5-2011. Stage IIIC-T4 N2a. Status post right colectomy followed by FOLFOX chemotherapy.  With persistently elevated CEA on f/u testing,  3/2013 mesentery mass excision pathology showed a lymph node negative for metastatic carcinoma. Benign fibroadipose tissue.\par Persistently increasing elevated CEA.  CT scan abdomen/pelvis 6/2016, showed enlarging mesenteric adenopathy. Endoscopic ultrasound guided FNA of the bryan-pancreatic, mesenteric mass was positive for malignant cells-adenocarcinoma associated with abundant mucin. 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  Patient obtained 2 surgical opinions and disease was felt to be inoperable by both surgeons.  Patient begun on Xeloda/Avastin.\par 5/2017-CT scan chest/abdomen/pelvis showed stable tiny nodules in the lungs. Sizable mass merging with the head of the pancreas had Increased in size from 11/2016 with some increasing infiltration of the surrounding fat.  Possibility of increasing colonic wall thickness on the colon site of the ileocolic anastomosis. Patient begun on Pembrolizumab (tumor MSI-high).\par 8/2017- CT scan chest/abdomen/pelvis showed marginally worsening mesenteric adenopathy, with short interval followup CT scan abdomen/pelvis 9/2017, stable compared to 8/2017.\par 1/2018-CT scan chest/abdomen/pelvis-stable adenopathy.\par 6/2018-CT scan chest/abdomen/pelvis-stable adenopathy.\par 11/2018-CT chest/abdomen/pelvis-unchanged mass at the root of the mesentery.\par 4/2019-CT scan chest/abdomen/pelvis-stable mesenteric soft tissue mass.\par 8/2019-CT scan chest/abdomen/pelvis-unchanged mesenteric mass.\par 10/2019-CT scan A/P-stable mesenteric mass. Held Pembrolizumab.\par 2/2020-CT scan chest/abdomen/pelvis-stable exam with mesenteric mass without significant change since 10/2019. Resumed Pembrolizumab, which was then held 6/2020 due to progressive renal insufficiency.\par \par History of right breast cancer-2007. Status post right breast conservation surgery--> RT--> [facility]. [de-identified] : CEA [de-identified] : CEA 12/2013-33.6.         \par \par  [de-identified] : Telephone visit due to COVID-19 pandemic.\par S/P f/u with renal MD-told creatinine decreased slightly post hydration. Has f/u with Dr. Garcia again in a few weeks.\par Plans to have next lab work done prior to PCP visit mid-June-to add CMP.\par No c/o H/A, lateralizing weakness.\par No CP, cough, SOB, fevers. No c/o N/V/change in bowel habits. No c/o abdominal pain.\par Remains independent.\par

## 2020-06-02 NOTE — ASSESSMENT
[FreeTextEntry1] : #1) colon cancer-clinically stable. Systemic therapy on hold for now, due to renal insufficiency.\par #2) neutropenia-ANC currently WNL. Continue to monitor CBC with differential.\par #3) thrombocytopenia-platelet count currently stable-follow.\par #4) anemia-no overt bleeding noted clinically at present. Keytruda may contribute. Hemoglobin currently stable-follow.\par #5) CKD-holding further immunotherapy, pending improvement.\par Patient was given the opportunity to ask questions. Her questions have been answered to her apparent satisfaction at this time.

## 2020-06-02 NOTE — REASON FOR VISIT
[Home] : at home, [unfilled] , at the time of the visit. [Medical Office: ([facility])___] : at the medical office located in  [Verbal consent obtained from patient] : the patient, [unfilled] [Follow-Up Visit] : a follow-up [FreeTextEntry2] : colon cancer

## 2020-06-15 ENCOUNTER — OUTPATIENT (OUTPATIENT)
Dept: OUTPATIENT SERVICES | Facility: HOSPITAL | Age: 85
LOS: 1 days | End: 2020-06-15
Payer: MEDICARE

## 2020-06-15 ENCOUNTER — APPOINTMENT (OUTPATIENT)
Dept: ULTRASOUND IMAGING | Facility: HOSPITAL | Age: 85
End: 2020-06-15
Payer: MEDICARE

## 2020-06-15 ENCOUNTER — APPOINTMENT (OUTPATIENT)
Dept: MAMMOGRAPHY | Facility: HOSPITAL | Age: 85
End: 2020-06-15
Payer: MEDICARE

## 2020-06-15 DIAGNOSIS — Z00.8 ENCOUNTER FOR OTHER GENERAL EXAMINATION: ICD-10-CM

## 2020-06-15 DIAGNOSIS — D35.1 BENIGN NEOPLASM OF PARATHYROID GLAND: Chronic | ICD-10-CM

## 2020-06-15 DIAGNOSIS — Z98.890 OTHER SPECIFIED POSTPROCEDURAL STATES: Chronic | ICD-10-CM

## 2020-06-15 DIAGNOSIS — Z90.49 ACQUIRED ABSENCE OF OTHER SPECIFIED PARTS OF DIGESTIVE TRACT: Chronic | ICD-10-CM

## 2020-06-15 PROCEDURE — 77063 BREAST TOMOSYNTHESIS BI: CPT | Mod: 26

## 2020-06-15 PROCEDURE — 76641 ULTRASOUND BREAST COMPLETE: CPT | Mod: 26,50

## 2020-06-15 PROCEDURE — 77067 SCR MAMMO BI INCL CAD: CPT | Mod: 26

## 2020-06-15 PROCEDURE — 76641 ULTRASOUND BREAST COMPLETE: CPT

## 2020-06-15 PROCEDURE — 77063 BREAST TOMOSYNTHESIS BI: CPT

## 2020-06-15 PROCEDURE — 77067 SCR MAMMO BI INCL CAD: CPT

## 2020-06-19 ENCOUNTER — OUTPATIENT (OUTPATIENT)
Dept: OUTPATIENT SERVICES | Facility: HOSPITAL | Age: 85
LOS: 1 days | Discharge: HOME | End: 2020-06-19

## 2020-06-19 DIAGNOSIS — Z90.49 ACQUIRED ABSENCE OF OTHER SPECIFIED PARTS OF DIGESTIVE TRACT: Chronic | ICD-10-CM

## 2020-06-19 DIAGNOSIS — Z98.890 OTHER SPECIFIED POSTPROCEDURAL STATES: Chronic | ICD-10-CM

## 2020-06-19 DIAGNOSIS — D35.1 BENIGN NEOPLASM OF PARATHYROID GLAND: Chronic | ICD-10-CM

## 2020-06-19 DIAGNOSIS — C18.9 MALIGNANT NEOPLASM OF COLON, UNSPECIFIED: ICD-10-CM

## 2020-06-23 ENCOUNTER — APPOINTMENT (OUTPATIENT)
Dept: INFUSION THERAPY | Facility: HOSPITAL | Age: 85
End: 2020-06-23

## 2020-06-23 ENCOUNTER — APPOINTMENT (OUTPATIENT)
Dept: HEMATOLOGY ONCOLOGY | Facility: CLINIC | Age: 85
End: 2020-06-23
Payer: MEDICARE

## 2020-06-23 PROCEDURE — 99442: CPT | Mod: 95

## 2020-06-23 NOTE — REASON FOR VISIT
[Home] : at home, [unfilled] , at the time of the visit. [Medical Office: ([facility])___] : at the medical office located in  [Verbal consent obtained from patient] : the patient, [unfilled] [Follow-Up Visit] : a follow-up [FreeTextEntry2] : colon cancer

## 2020-06-23 NOTE — HISTORY OF PRESENT ILLNESS
[Disease: _____________________] : Disease: [unfilled] [T: ___] : T[unfilled] [N: ___] : N[unfilled] [M: ___] : M[unfilled] [AJCC Stage: ____] : AJCC Stage: [unfilled] [de-identified] : Patient with history of colon cancer (adenocarcinoma)-5-2011. Stage IIIC-T4 N2a. Status post right colectomy followed by FOLFOX chemotherapy.  With persistently elevated CEA on f/u testing,  3/2013 mesentery mass excision pathology showed a lymph node negative for metastatic carcinoma. Benign fibroadipose tissue.\par Persistently increasing elevated CEA.  CT scan abdomen/pelvis 6/2016, showed enlarging mesenteric adenopathy. Endoscopic ultrasound guided FNA of the bryan-pancreatic, mesenteric mass was positive for malignant cells-adenocarcinoma associated with abundant mucin. 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  Patient obtained 2 surgical opinions and disease was felt to be inoperable by both surgeons.  Patient begun on Xeloda/Avastin.\par 5/2017-CT scan chest/abdomen/pelvis showed stable tiny nodules in the lungs. Sizable mass merging with the head of the pancreas had Increased in size from 11/2016 with some increasing infiltration of the surrounding fat.  Possibility of increasing colonic wall thickness on the colon site of the ileocolic anastomosis. Patient begun on Pembrolizumab (tumor MSI-high).\par 8/2017- CT scan chest/abdomen/pelvis showed marginally worsening mesenteric adenopathy, with short interval followup CT scan abdomen/pelvis 9/2017, stable compared to 8/2017.\par 1/2018-CT scan chest/abdomen/pelvis-stable adenopathy.\par 6/2018-CT scan chest/abdomen/pelvis-stable adenopathy.\par 11/2018-CT chest/abdomen/pelvis-unchanged mass at the root of the mesentery.\par 4/2019-CT scan chest/abdomen/pelvis-stable mesenteric soft tissue mass.\par 8/2019-CT scan chest/abdomen/pelvis-unchanged mesenteric mass.\par 10/2019-CT scan A/P-stable mesenteric mass. Held Pembrolizumab.\par 2/2020-CT scan chest/abdomen/pelvis-stable exam with mesenteric mass without significant change since 10/2019. Resumed Pembrolizumab, which was then held 6/2020 due to progressive renal insufficiency.\par \par History of right breast cancer-2007. Status post right breast conservation surgery--> RT--> [facility]. [de-identified] : adenocarcinoma [de-identified] : CEA [de-identified] : CEA 12/2013-33.6.         \par \par  [de-identified] : Telephone visit due to COVID-19 pandemic.\par No c/o H/A, lateralizing weakness.\par No CP, cough, SOB, fevers. No c/o N/V/change in bowel habits. No c/o abdominal pain.\par Remains independent.\par

## 2020-06-23 NOTE — OB HISTORY
[Post-Menopause, No Sxs] : post-menopausal, currently without symptoms [Menopause Age: ____] : age at menopause was [unfilled] [___] : Living: [unfilled] Statement Selected

## 2020-06-23 NOTE — REASON FOR VISIT
[Home] : at home, [unfilled] , at the time of the visit. [Medical Office: ([facility])___] : at the medical office located in  [Verbal consent obtained from patient] : the patient, [unfilled] [Follow-Up Visit] : a follow-up [FreeTextEntry2] : colon cancer

## 2020-06-23 NOTE — HISTORY OF PRESENT ILLNESS
[Disease: _____________________] : Disease: [unfilled] [N: ___] : N[unfilled] [T: ___] : T[unfilled] [M: ___] : M[unfilled] [AJCC Stage: ____] : AJCC Stage: [unfilled] [de-identified] : Patient with history of colon cancer (adenocarcinoma)-5-2011. Stage IIIC-T4 N2a. Status post right colectomy followed by FOLFOX chemotherapy.  With persistently elevated CEA on f/u testing,  3/2013 mesentery mass excision pathology showed a lymph node negative for metastatic carcinoma. Benign fibroadipose tissue.\par Persistently increasing elevated CEA.  CT scan abdomen/pelvis 6/2016, showed enlarging mesenteric adenopathy. Endoscopic ultrasound guided FNA of the bryan-pancreatic, mesenteric mass was positive for malignant cells-adenocarcinoma associated with abundant mucin. 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  Patient obtained 2 surgical opinions and disease was felt to be inoperable by both surgeons.  Patient begun on Xeloda/Avastin.\par 5/2017-CT scan chest/abdomen/pelvis showed stable tiny nodules in the lungs. Sizable mass merging with the head of the pancreas had Increased in size from 11/2016 with some increasing infiltration of the surrounding fat.  Possibility of increasing colonic wall thickness on the colon site of the ileocolic anastomosis. Patient begun on Pembrolizumab (tumor MSI-high).\par 8/2017- CT scan chest/abdomen/pelvis showed marginally worsening mesenteric adenopathy, with short interval followup CT scan abdomen/pelvis 9/2017, stable compared to 8/2017.\par 1/2018-CT scan chest/abdomen/pelvis-stable adenopathy.\par 6/2018-CT scan chest/abdomen/pelvis-stable adenopathy.\par 11/2018-CT chest/abdomen/pelvis-unchanged mass at the root of the mesentery.\par 4/2019-CT scan chest/abdomen/pelvis-stable mesenteric soft tissue mass.\par 8/2019-CT scan chest/abdomen/pelvis-unchanged mesenteric mass.\par 10/2019-CT scan A/P-stable mesenteric mass. Held Pembrolizumab.\par 2/2020-CT scan chest/abdomen/pelvis-stable exam with mesenteric mass without significant change since 10/2019. Resumed Pembrolizumab, which was then held 6/2020 due to progressive renal insufficiency.\par \par History of right breast cancer-2007. Status post right breast conservation surgery--> RT--> [facility]. [de-identified] : adenocarcinoma [de-identified] : CEA [de-identified] : CEA 12/2013-33.6.         \par \par  [de-identified] : Telephone visit due to COVID-19 pandemic.\par No c/o H/A, lateralizing weakness.\par No CP, cough, SOB, fevers. No c/o N/V/change in bowel habits. No c/o abdominal pain.\par Remains independent.\par

## 2020-06-23 NOTE — ASSESSMENT
[FreeTextEntry1] : #1) colon cancer-clinically stable. Systemic therapy on hold for now, due to renal insufficiency.  Follow-up imaging planned for further management decisions.\par #2) neutropenia-ANC currently WNL. Continue to monitor CBC with differential.\par #3) thrombocytopenia-platelet count currently stable-follow.\par #4) anemia-no overt bleeding reported at present. Hemoglobin currently stable-follow.\par Patient was given the opportunity to ask questions. Her questions have been answered to her apparent satisfaction at this time.  She is agreeable to recommended follow-up.

## 2020-07-15 ENCOUNTER — OUTPATIENT (OUTPATIENT)
Dept: OUTPATIENT SERVICES | Facility: HOSPITAL | Age: 85
LOS: 1 days | End: 2020-07-15
Payer: MEDICARE

## 2020-07-15 ENCOUNTER — APPOINTMENT (OUTPATIENT)
Dept: CT IMAGING | Facility: HOSPITAL | Age: 85
End: 2020-07-15
Payer: MEDICARE

## 2020-07-15 DIAGNOSIS — D35.1 BENIGN NEOPLASM OF PARATHYROID GLAND: Chronic | ICD-10-CM

## 2020-07-15 DIAGNOSIS — Z90.49 ACQUIRED ABSENCE OF OTHER SPECIFIED PARTS OF DIGESTIVE TRACT: Chronic | ICD-10-CM

## 2020-07-15 DIAGNOSIS — Z98.890 OTHER SPECIFIED POSTPROCEDURAL STATES: Chronic | ICD-10-CM

## 2020-07-15 DIAGNOSIS — C18.9 MALIGNANT NEOPLASM OF COLON, UNSPECIFIED: ICD-10-CM

## 2020-07-15 PROCEDURE — 74176 CT ABD & PELVIS W/O CONTRAST: CPT

## 2020-07-15 PROCEDURE — 74176 CT ABD & PELVIS W/O CONTRAST: CPT | Mod: 26

## 2020-07-15 PROCEDURE — 71250 CT THORAX DX C-: CPT | Mod: 26

## 2020-07-15 PROCEDURE — 71250 CT THORAX DX C-: CPT

## 2020-07-19 ENCOUNTER — OUTPATIENT (OUTPATIENT)
Dept: OUTPATIENT SERVICES | Facility: HOSPITAL | Age: 85
LOS: 1 days | Discharge: HOME | End: 2020-07-19

## 2020-07-19 DIAGNOSIS — Z98.890 OTHER SPECIFIED POSTPROCEDURAL STATES: Chronic | ICD-10-CM

## 2020-07-19 DIAGNOSIS — D35.1 BENIGN NEOPLASM OF PARATHYROID GLAND: Chronic | ICD-10-CM

## 2020-07-19 DIAGNOSIS — C18.9 MALIGNANT NEOPLASM OF COLON, UNSPECIFIED: ICD-10-CM

## 2020-07-19 DIAGNOSIS — Z90.49 ACQUIRED ABSENCE OF OTHER SPECIFIED PARTS OF DIGESTIVE TRACT: Chronic | ICD-10-CM

## 2020-07-21 LAB
25(OH)D3 SERPL-MCNC: 27.2 NG/ML
ALBUMIN SERPL ELPH-MCNC: 4.7 G/DL
ALP BLD-CCNC: 87 U/L
ALT SERPL-CCNC: 18 U/L
ANION GAP SERPL CALC-SCNC: 14 MMOL/L
AST SERPL-CCNC: 20 U/L
BASOPHILS # BLD AUTO: 0.02 K/UL
BASOPHILS NFR BLD AUTO: 0.5 %
BILIRUB SERPL-MCNC: 0.4 MG/DL
BUN SERPL-MCNC: 27 MG/DL
CALCIUM SERPL-MCNC: 9.4 MG/DL
CEA SERPL-MCNC: 323 NG/ML
CHLORIDE SERPL-SCNC: 115 MMOL/L
CO2 SERPL-SCNC: 15 MMOL/L
CREAT SERPL-MCNC: 2 MG/DL
EOSINOPHIL # BLD AUTO: 0.07 K/UL
EOSINOPHIL NFR BLD AUTO: 1.8 %
GLUCOSE SERPL-MCNC: 139 MG/DL
HCT VFR BLD CALC: 38.2 %
HGB BLD-MCNC: 12.1 G/DL
IMM GRANULOCYTES NFR BLD AUTO: 0.3 %
LYMPHOCYTES # BLD AUTO: 0.79 K/UL
LYMPHOCYTES NFR BLD AUTO: 20.6 %
MAN DIFF?: NORMAL
MCHC RBC-ENTMCNC: 30.3 PG
MCHC RBC-ENTMCNC: 31.7 GM/DL
MCV RBC AUTO: 95.7 FL
MONOCYTES # BLD AUTO: 0.23 K/UL
MONOCYTES NFR BLD AUTO: 6 %
NEUTROPHILS # BLD AUTO: 2.72 K/UL
NEUTROPHILS NFR BLD AUTO: 70.8 %
PLATELET # BLD AUTO: 106 K/UL
POTASSIUM SERPL-SCNC: 4.4 MMOL/L
PROT SERPL-MCNC: 6.3 G/DL
RBC # BLD: 3.99 M/UL
RBC # FLD: 14.9 %
SODIUM SERPL-SCNC: 145 MMOL/L
WBC # FLD AUTO: 3.84 K/UL

## 2020-07-23 ENCOUNTER — APPOINTMENT (OUTPATIENT)
Dept: HEMATOLOGY ONCOLOGY | Facility: CLINIC | Age: 85
End: 2020-07-23
Payer: MEDICARE

## 2020-07-23 DIAGNOSIS — R79.89 OTHER SPECIFIED ABNORMAL FINDINGS OF BLOOD CHEMISTRY: ICD-10-CM

## 2020-07-23 PROCEDURE — 99442: CPT | Mod: 95

## 2020-07-23 NOTE — RESULTS/DATA
[FreeTextEntry1] : CT chest/abdomen/pelvis–stable mesenteric mass unchanged since 10/2019.  Segment of colonic wall thickening at the level of the anastomosis, unchanged.  Enlarged multinodular thyroid with bilateral substernal extension unchanged.  Tiny pulmonary nodules unchanged with no new nodules.

## 2020-07-23 NOTE — REASON FOR VISIT
[Home] : at home, [unfilled] , at the time of the visit. [Medical Office: ([facility])___] : at the medical office located in  [Verbal consent obtained from patient] : the patient, [unfilled] [Follow-Up Visit] : a follow-up [FreeTextEntry2] : colon cancer

## 2020-07-23 NOTE — HISTORY OF PRESENT ILLNESS
[T: ___] : T[unfilled] [Disease: _____________________] : Disease: [unfilled] [N: ___] : N[unfilled] [M: ___] : M[unfilled] [AJCC Stage: ____] : AJCC Stage: [unfilled] [de-identified] : Patient with history of colon cancer (adenocarcinoma)-5-2011. Stage IIIC-T4 N2a. Status post right colectomy followed by FOLFOX chemotherapy.  With persistently elevated CEA on f/u testing,  3/2013 mesentery mass excision pathology showed a lymph node negative for metastatic carcinoma. Benign fibroadipose tissue.\par Persistently increasing elevated CEA.  CT scan abdomen/pelvis 6/2016, showed enlarging mesenteric adenopathy. Endoscopic ultrasound guided FNA of the bryan-pancreatic, mesenteric mass was positive for malignant cells-adenocarcinoma associated with abundant mucin. 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  Patient obtained 2 surgical opinions and disease was felt to be inoperable by both surgeons.  Patient begun on Xeloda/Avastin.\par 5/2017-CT scan chest/abdomen/pelvis showed stable tiny nodules in the lungs. Sizable mass merging with the head of the pancreas had Increased in size from 11/2016 with some increasing infiltration of the surrounding fat.  Possibility of increasing colonic wall thickness on the colon site of the ileocolic anastomosis. Patient begun on Pembrolizumab (tumor MSI-high).\par 8/2017- CT scan chest/abdomen/pelvis showed marginally worsening mesenteric adenopathy, with short interval followup CT scan abdomen/pelvis 9/2017, stable compared to 8/2017.\par 1/2018-CT scan chest/abdomen/pelvis-stable adenopathy.\par 6/2018-CT scan chest/abdomen/pelvis-stable adenopathy.\par 11/2018-CT chest/abdomen/pelvis-unchanged mass at the root of the mesentery.\par 4/2019-CT scan chest/abdomen/pelvis-stable mesenteric soft tissue mass.\par 8/2019-CT scan chest/abdomen/pelvis-unchanged mesenteric mass.\par 10/2019-CT scan A/P-stable mesenteric mass. Held Pembrolizumab.\par 2/2020-CT scan chest/abdomen/pelvis-stable exam with mesenteric mass without significant change since 10/2019. Resumed Pembrolizumab, which was then held 6/2020 due to progressive renal insufficiency.\par \par History of right breast cancer-2007. Status post right breast conservation surgery--> RT--> [facility]. [de-identified] : adenocarcinoma [de-identified] : CEA [de-identified] : CEA 12/2013-33.6.         \par \par  [de-identified] : Telephone visit due to COVID-19 pandemic.\par Eating well. No dysphagia. No c/o H/A, lateralizing weakness.\par No CP, cough, SOB, fevers. No c/o N/V/change in bowel habits. No c/o abdominal pain. \par Remains independent.\par

## 2020-07-23 NOTE — ASSESSMENT
[FreeTextEntry1] : Colon cancer–reviewed CT scan results with patient in detail.  Clinically appears to have stable disease.  However, increasing CEA of concern.  Recommend follow-up colonoscopy at this point–patient stated she will contact Dr. Payton for this.\par Creatinine remains elevated.  Immunotherapy on hold for now, in light of stable disease on imaging.\par May consider further thyroid evaluation pending course.\par Patient to take p.o. vitamin D supplementation for low level.\par Patient was given the opportunity to ask questions. Her questions have been answered to her apparent satisfaction at this time.  She is agreeable to recommended follow-up.

## 2020-08-14 ENCOUNTER — APPOINTMENT (OUTPATIENT)
Dept: SURGERY | Facility: CLINIC | Age: 85
End: 2020-08-14
Payer: MEDICARE

## 2020-08-14 VITALS — HEIGHT: 63 IN | WEIGHT: 180 LBS | BODY MASS INDEX: 31.89 KG/M2

## 2020-08-14 DIAGNOSIS — Z12.11 ENCOUNTER FOR SCREENING FOR MALIGNANT NEOPLASM OF COLON: ICD-10-CM

## 2020-08-14 DIAGNOSIS — N60.01 SOLITARY CYST OF RIGHT BREAST: ICD-10-CM

## 2020-08-14 PROCEDURE — 99202 OFFICE O/P NEW SF 15 MIN: CPT

## 2020-08-14 NOTE — PHYSICAL EXAM
[Normal Breath Sounds] : Normal breath sounds [Normal Heart Sounds] : normal heart sounds [Abdomen Tenderness] : ~T ~M No abdominal tenderness [Abdominal Masses] : No abdominal masses [Normal Rate and Rhythm] : normal rate and rhythm [No Rash or Lesion] : No rash or lesion [de-identified] : nl [de-identified] : nl [de-identified] : nl

## 2020-08-14 NOTE — REVIEW OF SYSTEMS
[Heart Rate Is Slow] : the heart rate was not slow [Shortness Of Breath] : no shortness of breath [Chest Pain] : no chest pain [Abdominal Pain] : no abdominal pain [Constipation] : no constipation [Negative] : Eyes

## 2020-08-14 NOTE — HISTORY OF PRESENT ILLNESS
[de-identified] : This 85 year old woman underwent a colectomy for CA nine years ago. She has a strong FH of colon CA (sister).  The patient is asymptomatic regarding her GI tract

## 2020-08-15 ENCOUNTER — LABORATORY RESULT (OUTPATIENT)
Age: 85
End: 2020-08-15

## 2020-08-17 ENCOUNTER — TRANSCRIPTION ENCOUNTER (OUTPATIENT)
Age: 85
End: 2020-08-17

## 2020-08-18 ENCOUNTER — OUTPATIENT (OUTPATIENT)
Dept: OUTPATIENT SERVICES | Facility: HOSPITAL | Age: 85
LOS: 1 days | End: 2020-08-18
Payer: MEDICARE

## 2020-08-18 DIAGNOSIS — Z80.0 FAMILY HISTORY OF MALIGNANT NEOPLASM OF DIGESTIVE ORGANS: ICD-10-CM

## 2020-08-18 DIAGNOSIS — Z90.49 ACQUIRED ABSENCE OF OTHER SPECIFIED PARTS OF DIGESTIVE TRACT: Chronic | ICD-10-CM

## 2020-08-18 DIAGNOSIS — Z85.038 PERSONAL HISTORY OF OTHER MALIGNANT NEOPLASM OF LARGE INTESTINE: ICD-10-CM

## 2020-08-18 DIAGNOSIS — Z98.890 OTHER SPECIFIED POSTPROCEDURAL STATES: Chronic | ICD-10-CM

## 2020-08-18 DIAGNOSIS — Z12.11 ENCOUNTER FOR SCREENING FOR MALIGNANT NEOPLASM OF COLON: ICD-10-CM

## 2020-08-18 DIAGNOSIS — D35.1 BENIGN NEOPLASM OF PARATHYROID GLAND: Chronic | ICD-10-CM

## 2020-08-18 PROCEDURE — 45378 DIAGNOSTIC COLONOSCOPY: CPT | Mod: 33

## 2020-08-18 PROCEDURE — G0105: CPT

## 2020-08-18 RX ORDER — SODIUM CHLORIDE 9 MG/ML
1000 INJECTION INTRAMUSCULAR; INTRAVENOUS; SUBCUTANEOUS
Refills: 0 | Status: DISCONTINUED | OUTPATIENT
Start: 2020-08-18 | End: 2020-09-02

## 2020-08-18 RX ADMIN — SODIUM CHLORIDE 75 MILLILITER(S): 9 INJECTION INTRAMUSCULAR; INTRAVENOUS; SUBCUTANEOUS at 13:22

## 2020-08-27 ENCOUNTER — OUTPATIENT (OUTPATIENT)
Dept: OUTPATIENT SERVICES | Facility: HOSPITAL | Age: 85
LOS: 1 days | Discharge: HOME | End: 2020-08-27

## 2020-08-27 DIAGNOSIS — C18.9 MALIGNANT NEOPLASM OF COLON, UNSPECIFIED: ICD-10-CM

## 2020-08-27 DIAGNOSIS — Z90.49 ACQUIRED ABSENCE OF OTHER SPECIFIED PARTS OF DIGESTIVE TRACT: Chronic | ICD-10-CM

## 2020-08-27 DIAGNOSIS — Z98.890 OTHER SPECIFIED POSTPROCEDURAL STATES: Chronic | ICD-10-CM

## 2020-08-27 DIAGNOSIS — D35.1 BENIGN NEOPLASM OF PARATHYROID GLAND: Chronic | ICD-10-CM

## 2020-09-02 ENCOUNTER — LABORATORY RESULT (OUTPATIENT)
Age: 85
End: 2020-09-02

## 2020-09-03 ENCOUNTER — APPOINTMENT (OUTPATIENT)
Dept: HEMATOLOGY ONCOLOGY | Facility: CLINIC | Age: 85
End: 2020-09-03
Payer: MEDICARE

## 2020-09-03 VITALS
OXYGEN SATURATION: 98 % | SYSTOLIC BLOOD PRESSURE: 143 MMHG | WEIGHT: 179.68 LBS | DIASTOLIC BLOOD PRESSURE: 76 MMHG | RESPIRATION RATE: 17 BRPM | HEART RATE: 78 BPM | BODY MASS INDEX: 31.05 KG/M2 | HEIGHT: 63.82 IN | TEMPERATURE: 98.3 F

## 2020-09-03 PROCEDURE — 99214 OFFICE O/P EST MOD 30 MIN: CPT

## 2020-09-03 NOTE — PHYSICAL EXAM
[Restricted in physically strenuous activity but ambulatory and able to carry out work of a light or sedentary nature] : Status 1- Restricted in physically strenuous activity but ambulatory and able to carry out work of a light or sedentary nature, e.g., light house work, office work [Normal] : normal appearance, no rash, nodules, vesicles, ulcers, erythema [de-identified] : Soft, nontender to palpation. Unable to appreciate organomegaly. [de-identified] : unable to appreciate discrete LAD [de-identified] : no gross focal motor deficits [de-identified] : Well-developed, well-nourished, cooperative female, nontoxic appearing, in no acute distress.

## 2020-09-03 NOTE — ASSESSMENT
[FreeTextEntry1] : Colon cancer–clinically stable at present.  History of elevated CEA appears now to be fluctuating.  Patient to have follow-up imaging prior to next visit for further management decisions.\par Patient continues in follow-up with otolaryngologist for her thyroid disease.\par Breast cancer-clinically stable. Surveillance.  \par Thrombocytopenia/leukopenia-platelet count stable and ANC currently within normal limits.  Continue to monitor CBC with differential.\par Patient was given the opportunity to ask questions. Her questions have been answered to her apparent satisfaction at this time.  She is agreeable to recommended follow-up.

## 2020-09-03 NOTE — HISTORY OF PRESENT ILLNESS
[T: ___] : T[unfilled] [Disease: _____________________] : Disease: [unfilled] [N: ___] : N[unfilled] [AJCC Stage: ____] : AJCC Stage: [unfilled] [M: ___] : M[unfilled] [de-identified] : Patient with history of colon cancer (adenocarcinoma)-5-2011. Stage IIIC-T4 N2a. Status post right colectomy followed by FOLFOX chemotherapy.  With persistently elevated CEA on f/u testing,  3/2013 mesentery mass excision pathology showed a lymph node negative for metastatic carcinoma. Benign fibroadipose tissue.\par Persistently increasing elevated CEA.  CT scan abdomen/pelvis 6/2016, showed enlarging mesenteric adenopathy. Endoscopic ultrasound guided FNA of the bryan-pancreatic, mesenteric mass was positive for malignant cells-adenocarcinoma associated with abundant mucin. 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  Patient obtained 2 surgical opinions and disease was felt to be inoperable by both surgeons.  Patient begun on Xeloda/Avastin.\par 5/2017-CT scan chest/abdomen/pelvis showed stable tiny nodules in the lungs. Sizable mass merging with the head of the pancreas had Increased in size from 11/2016 with some increasing infiltration of the surrounding fat.  Possibility of increasing colonic wall thickness on the colon site of the ileocolic anastomosis. Patient begun on Pembrolizumab (tumor MSI-high).\par 8/2017- CT scan chest/abdomen/pelvis showed marginally worsening mesenteric adenopathy, with short interval followup CT scan abdomen/pelvis 9/2017, stable compared to 8/2017.\par 1/2018-CT scan chest/abdomen/pelvis-stable adenopathy.\par 6/2018-CT scan chest/abdomen/pelvis-stable adenopathy.\par 11/2018-CT chest/abdomen/pelvis-unchanged mass at the root of the mesentery.\par 4/2019-CT scan chest/abdomen/pelvis-stable mesenteric soft tissue mass.\par 8/2019-CT scan chest/abdomen/pelvis-unchanged mesenteric mass.\par 10/2019-CT scan A/P-stable mesenteric mass. Held Pembrolizumab.\par 2/2020-CT scan chest/abdomen/pelvis-stable exam with mesenteric mass without significant change since 10/2019. Resumed Pembrolizumab, which was then held 6/2020 due to progressive renal insufficiency.\par 7/2020-CT scan chest/abdomen/pelvis–no significant change.  Stable mesenteric mass unchanged since 10/2019.  Segment of colonic wall thickening at the level of the anastomosis unchanged.  Enlarged multinodular thyroid with bilateral substernal extension unchanged.\par  [de-identified] : adenocarcinoma [de-identified] : History of right breast cancer-2007. Status post right breast conservation surgery--> RT--> [facility].         \par \par  [de-identified] : CEA 12/2013-33.6 [de-identified] : S/P unremarkable colonoscopy.\par Eating well. No dysphagia. No c/o H/A, lateralizing weakness.\par No CP, cough, SOB, fevers. No c/o N/V/change in bowel habits. No c/o abdominal pain. \par Has thyroid evaluation every 6 months under the care of her otolaryngologist Dr. Seals.\par Remains independent.\par

## 2020-10-08 ENCOUNTER — LABORATORY RESULT (OUTPATIENT)
Age: 85
End: 2020-10-08

## 2020-10-13 ENCOUNTER — OUTPATIENT (OUTPATIENT)
Dept: OUTPATIENT SERVICES | Facility: HOSPITAL | Age: 85
LOS: 1 days | Discharge: HOME | End: 2020-10-13

## 2020-10-13 ENCOUNTER — APPOINTMENT (OUTPATIENT)
Dept: CT IMAGING | Facility: HOSPITAL | Age: 85
End: 2020-10-13
Payer: MEDICARE

## 2020-10-13 ENCOUNTER — OUTPATIENT (OUTPATIENT)
Dept: OUTPATIENT SERVICES | Facility: HOSPITAL | Age: 85
LOS: 1 days | End: 2020-10-13
Payer: MEDICARE

## 2020-10-13 DIAGNOSIS — Z90.49 ACQUIRED ABSENCE OF OTHER SPECIFIED PARTS OF DIGESTIVE TRACT: Chronic | ICD-10-CM

## 2020-10-13 DIAGNOSIS — D35.1 BENIGN NEOPLASM OF PARATHYROID GLAND: Chronic | ICD-10-CM

## 2020-10-13 DIAGNOSIS — C18.9 MALIGNANT NEOPLASM OF COLON, UNSPECIFIED: ICD-10-CM

## 2020-10-13 DIAGNOSIS — Z98.890 OTHER SPECIFIED POSTPROCEDURAL STATES: Chronic | ICD-10-CM

## 2020-10-13 PROCEDURE — 71250 CT THORAX DX C-: CPT

## 2020-10-13 PROCEDURE — 74176 CT ABD & PELVIS W/O CONTRAST: CPT | Mod: 26

## 2020-10-13 PROCEDURE — 74176 CT ABD & PELVIS W/O CONTRAST: CPT

## 2020-10-13 PROCEDURE — 71250 CT THORAX DX C-: CPT | Mod: 26

## 2020-10-15 ENCOUNTER — APPOINTMENT (OUTPATIENT)
Dept: HEMATOLOGY ONCOLOGY | Facility: CLINIC | Age: 85
End: 2020-10-15
Payer: MEDICARE

## 2020-10-15 ENCOUNTER — APPOINTMENT (OUTPATIENT)
Dept: INFUSION THERAPY | Facility: HOSPITAL | Age: 85
End: 2020-10-15

## 2020-10-15 VITALS
SYSTOLIC BLOOD PRESSURE: 161 MMHG | WEIGHT: 177.69 LBS | HEIGHT: 63.7 IN | BODY MASS INDEX: 30.71 KG/M2 | HEART RATE: 69 BPM | RESPIRATION RATE: 16 BRPM | TEMPERATURE: 97.4 F | OXYGEN SATURATION: 98 % | DIASTOLIC BLOOD PRESSURE: 70 MMHG

## 2020-10-15 PROCEDURE — 99214 OFFICE O/P EST MOD 30 MIN: CPT

## 2020-10-15 NOTE — CONSULT LETTER
[Courtesy Letter:] : I had the pleasure of seeing your patient, [unfilled], in my office today. [Dear  ___] : Dear  [unfilled], [Sincerely,] : Sincerely, [Please see my note below.] : Please see my note below. [Consult Closing:] : Thank you very much for allowing me to participate in the care of this patient.  If you have any questions, please do not hesitate to contact me. [FreeTextEntry3] : Cortney Mack MD

## 2020-10-15 NOTE — ASSESSMENT
[FreeTextEntry1] : Colon cancer–clinically stable at present.  History of elevated CEA appears now to be fluctuating.  Reviewed CT scan results with patient-no new lesions reported. Favor expectant surveillance for now.\par Patient continues in follow-up with otolaryngologist for her thyroid disease.\par Breast cancer-clinically stable. Surveillance.  \par Thrombocytopenia/leukopenia/anemia-platelet count acceptable and ANC and hemoglobin currently within normal limits.  Continue to monitor CBC with differential.\par Patient consents to mediport placement while it remains in.\par Patient was given the opportunity to ask questions. Her questions have been answered to her apparent satisfaction at this time.  She is agreeable to recommended follow-up.

## 2020-10-15 NOTE — HISTORY OF PRESENT ILLNESS
[Disease: _____________________] : Disease: [unfilled] [T: ___] : T[unfilled] [AJCC Stage: ____] : AJCC Stage: [unfilled] [N: ___] : N[unfilled] [M: ___] : M[unfilled] [de-identified] : Patient with history of colon cancer (adenocarcinoma)-5-2011. Stage IIIC-T4 N2a. Status post right colectomy followed by FOLFOX chemotherapy.  With persistently elevated CEA on f/u testing,  3/2013 mesentery mass excision pathology showed a lymph node negative for metastatic carcinoma. Benign fibroadipose tissue.\par Persistently increasing elevated CEA.  CT scan abdomen/pelvis 6/2016, showed enlarging mesenteric adenopathy. Endoscopic ultrasound guided FNA of the bryan-pancreatic, mesenteric mass was positive for malignant cells-adenocarcinoma associated with abundant mucin. 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  Patient obtained 2 surgical opinions and disease was felt to be inoperable by both surgeons.  Patient begun on Xeloda/Avastin.\par 5/2017-CT scan chest/abdomen/pelvis showed stable tiny nodules in the lungs. Sizable mass merging with the head of the pancreas had Increased in size from 11/2016 with some increasing infiltration of the surrounding fat.  Possibility of increasing colonic wall thickness on the colon site of the ileocolic anastomosis. Patient begun on Pembrolizumab (tumor MSI-high).\par 8/2017- CT scan chest/abdomen/pelvis showed marginally worsening mesenteric adenopathy, with short interval followup CT scan abdomen/pelvis 9/2017, stable compared to 8/2017.\par 1/2018-CT scan chest/abdomen/pelvis-stable adenopathy.\par 6/2018-CT scan chest/abdomen/pelvis-stable adenopathy.\par 11/2018-CT chest/abdomen/pelvis-unchanged mass at the root of the mesentery.\par 4/2019-CT scan chest/abdomen/pelvis-stable mesenteric soft tissue mass.\par 8/2019-CT scan chest/abdomen/pelvis-unchanged mesenteric mass.\par 10/2019-CT scan A/P-stable mesenteric mass. Held Pembrolizumab.\par 2/2020-CT scan chest/abdomen/pelvis-stable exam with mesenteric mass without significant change since 10/2019. Resumed Pembrolizumab, which was then held 6/2020 due to progressive renal insufficiency.\par 7/2020-CT scan chest/abdomen/pelvis–no significant change.  Stable mesenteric mass unchanged since 10/2019.  Segment of colonic wall thickening at the level of the anastomosis unchanged.  Enlarged multinodular thyroid with bilateral substernal extension unchanged.\par 10/2020-CT scan C/A/P-stable upper abdominal mesenteric mass. No new suspicious pathology.\par  [de-identified] : adenocarcinoma [de-identified] : CEA 12/2013-33.6 [de-identified] : Remaining active with senior activiites-plays bingo and eats out. Remains independent.\par Eating well. No dysphagia. No c/o H/A, lateralizing weakness. Ambulates with a walker and cane as needed due to chronic imbalance.\par No CP, cough, SOB, fevers. No c/o N/V/change in bowel habits. No c/o abdominal pain. \par Has thyroid evaluation every 6 months under the care of her otolaryngologist Dr. Seals-last seen in May.\par \par  [de-identified] : History of right breast cancer-2007. Status post right breast conservation surgery--> RT--> [facility].         \par \par

## 2020-10-15 NOTE — RESULTS/DATA
[FreeTextEntry1] : \par COMPARISON: 7/15/2020\par \par PROCEDURE:\par CT of the Chest, Abdomen and Pelvis was performed without intravenous contrast.\par Intravenous contrast: None.\par Oral contrast: Positive contrast was administered.\par Sagittal and coronal reformats were performed.\par \par FINDINGS:\par Lack of IV contrast limits evaluation solid organ parenchyma and vascular structures\par CHEST:\par LUNGS AND LARGE AIRWAYS: Patent central airways. A few scattered tiny 2 mm parenchymal nodules similar to prior probably postinflammatory.\par PLEURA: No pleural effusion.\par VESSELS: Nonaneurysmal\par HEART: Heart size is normal. Coronary artery calcification. Trace pericardial effusion versus pericardial thickening similar to prior.\par MEDIASTINUM AND DIONICIO: No lymphadenopathy. Multinodular intrathoracic thyroid similar to prior. Tiny hiatal hernia.\par CHEST WALL AND LOWER NECK: As above\par \par ABDOMEN AND PELVIS:\par LIVER: Multiple hypodensities similar to prior cannot be accurately characterized on this noncontrast study\par BILE DUCTS: Normal caliber.\par GALLBLADDER: Cholecystectomy.\par SPLEEN: Within normal limits.\par PANCREAS: Within normal limits.\par ADRENALS: Within normal limits.\par KIDNEYS/URETERS: No hydronephrosis. Bilateral renal cortical scarring right parapelvic cyst similar to prior. Left renal cortical hypodensities similar to prior cannot be definitively characterized probable cyst.\par \par BLADDER: Within normal limits.\par REPRODUCTIVE ORGANS: No gynecologic mass\par \par BOWEL: No bowel obstruction. Right hemicolectomy. Mild wall thickening at the level of the anastomosis is less prominent than on prior likely represents postoperative change. Appendix absent\par PERITONEUM: No significant change in size or appearance of the solid mesenteric mass in the right upper abdomen inseparable from the pancreas, measuring on the order of 5.1 x 8.1 cm. Associated infiltrative changes similar to prior. Associated clips similar to prior.\par VESSELS: Nonaneurysmal.\par RETROPERITONEUM/LYMPH NODES: No lymphadenopathy.\par ABDOMINAL WALL: Rectus diastases with protruding bowel similar to prior.\par BONES: Stable. No aggressive lesion.\par \par IMPRESSION:\par Limited by lack of IV contrast.\par Stable upper abdominal mesenteric mass\par Additional stable findings as discussed.\par Decrease in colonic wall thickening at the anastomosis\par No new suspicious pathology on this noncontrast study.\par \par \par \par \par \par \par \par

## 2020-10-15 NOTE — PHYSICAL EXAM
[Restricted in physically strenuous activity but ambulatory and able to carry out work of a light or sedentary nature] : Status 1- Restricted in physically strenuous activity but ambulatory and able to carry out work of a light or sedentary nature, e.g., light house work, office work [Normal] : affect appropriate [de-identified] : no dominant mass [de-identified] : Well-developed, well-nourished, cooperative female, nontoxic appearing, in no acute distress. [de-identified] : no gross focal motor deficits [de-identified] : unable to appreciate discrete LAD [de-identified] : Soft, nontender to palpation. Unable to appreciate organomegaly.

## 2020-10-16 DIAGNOSIS — Z51.11 ENCOUNTER FOR ANTINEOPLASTIC CHEMOTHERAPY: ICD-10-CM

## 2020-10-16 DIAGNOSIS — R11.2 NAUSEA WITH VOMITING, UNSPECIFIED: ICD-10-CM

## 2020-11-19 ENCOUNTER — OUTPATIENT (OUTPATIENT)
Dept: OUTPATIENT SERVICES | Facility: HOSPITAL | Age: 85
LOS: 1 days | End: 2020-11-19
Payer: MEDICARE

## 2020-11-19 ENCOUNTER — APPOINTMENT (OUTPATIENT)
Dept: ULTRASOUND IMAGING | Facility: HOSPITAL | Age: 85
End: 2020-11-19
Payer: MEDICARE

## 2020-11-19 DIAGNOSIS — Z98.890 OTHER SPECIFIED POSTPROCEDURAL STATES: Chronic | ICD-10-CM

## 2020-11-19 DIAGNOSIS — D35.1 BENIGN NEOPLASM OF PARATHYROID GLAND: Chronic | ICD-10-CM

## 2020-11-19 DIAGNOSIS — Z90.49 ACQUIRED ABSENCE OF OTHER SPECIFIED PARTS OF DIGESTIVE TRACT: Chronic | ICD-10-CM

## 2020-11-19 DIAGNOSIS — E04.2 NONTOXIC MULTINODULAR GOITER: ICD-10-CM

## 2020-11-19 PROCEDURE — 76536 US EXAM OF HEAD AND NECK: CPT

## 2020-11-19 PROCEDURE — 76536 US EXAM OF HEAD AND NECK: CPT | Mod: 26

## 2020-12-07 ENCOUNTER — OUTPATIENT (OUTPATIENT)
Dept: OUTPATIENT SERVICES | Facility: HOSPITAL | Age: 85
LOS: 1 days | Discharge: HOME | End: 2020-12-07

## 2020-12-07 DIAGNOSIS — D35.1 BENIGN NEOPLASM OF PARATHYROID GLAND: Chronic | ICD-10-CM

## 2020-12-07 DIAGNOSIS — Z90.49 ACQUIRED ABSENCE OF OTHER SPECIFIED PARTS OF DIGESTIVE TRACT: Chronic | ICD-10-CM

## 2020-12-07 DIAGNOSIS — Z98.890 OTHER SPECIFIED POSTPROCEDURAL STATES: Chronic | ICD-10-CM

## 2020-12-07 DIAGNOSIS — C18.9 MALIGNANT NEOPLASM OF COLON, UNSPECIFIED: ICD-10-CM

## 2020-12-07 LAB
ALBUMIN SERPL ELPH-MCNC: 4.5 G/DL
ALP BLD-CCNC: 104 U/L
ALT SERPL-CCNC: 15 U/L
ANION GAP SERPL CALC-SCNC: 12 MMOL/L
AST SERPL-CCNC: 18 U/L
BASOPHILS # BLD AUTO: 0.02 K/UL
BASOPHILS NFR BLD AUTO: 0.4 %
BILIRUB SERPL-MCNC: 0.4 MG/DL
BUN SERPL-MCNC: 26 MG/DL
CALCIUM SERPL-MCNC: 9.4 MG/DL
CEA SERPL-MCNC: 300 NG/ML
CHLORIDE SERPL-SCNC: 114 MMOL/L
CO2 SERPL-SCNC: 18 MMOL/L
CREAT SERPL-MCNC: 1.66 MG/DL
EOSINOPHIL # BLD AUTO: 0.07 K/UL
EOSINOPHIL NFR BLD AUTO: 1.4 %
GLUCOSE SERPL-MCNC: 153 MG/DL
HCT VFR BLD CALC: 39.5 %
HGB BLD-MCNC: 12.9 G/DL
IMM GRANULOCYTES NFR BLD AUTO: 0.2 %
LYMPHOCYTES # BLD AUTO: 0.89 K/UL
LYMPHOCYTES NFR BLD AUTO: 17.7 %
MAN DIFF?: NORMAL
MCHC RBC-ENTMCNC: 31 PG
MCHC RBC-ENTMCNC: 32.7 GM/DL
MCV RBC AUTO: 95 FL
MONOCYTES # BLD AUTO: 0.31 K/UL
MONOCYTES NFR BLD AUTO: 6.2 %
NEUTROPHILS # BLD AUTO: 3.73 K/UL
NEUTROPHILS NFR BLD AUTO: 74.1 %
PLATELET # BLD AUTO: 122 K/UL
POTASSIUM SERPL-SCNC: 4.2 MMOL/L
PROT SERPL-MCNC: 6.4 G/DL
RBC # BLD: 4.16 M/UL
RBC # FLD: 14 %
SODIUM SERPL-SCNC: 145 MMOL/L
WBC # FLD AUTO: 5.03 K/UL

## 2020-12-10 ENCOUNTER — APPOINTMENT (OUTPATIENT)
Dept: HEMATOLOGY ONCOLOGY | Facility: CLINIC | Age: 85
End: 2020-12-10
Payer: MEDICARE

## 2020-12-10 ENCOUNTER — APPOINTMENT (OUTPATIENT)
Dept: INFUSION THERAPY | Facility: HOSPITAL | Age: 85
End: 2020-12-10

## 2020-12-10 VITALS
BODY MASS INDEX: 30.94 KG/M2 | OXYGEN SATURATION: 98 % | WEIGHT: 179.02 LBS | RESPIRATION RATE: 17 BRPM | HEIGHT: 63.7 IN | SYSTOLIC BLOOD PRESSURE: 136 MMHG | TEMPERATURE: 97.1 F | DIASTOLIC BLOOD PRESSURE: 70 MMHG | HEART RATE: 66 BPM

## 2020-12-10 PROCEDURE — 99214 OFFICE O/P EST MOD 30 MIN: CPT

## 2020-12-10 NOTE — PHYSICAL EXAM
[Restricted in physically strenuous activity but ambulatory and able to carry out work of a light or sedentary nature] : Status 1- Restricted in physically strenuous activity but ambulatory and able to carry out work of a light or sedentary nature, e.g., light house work, office work [Normal] : affect appropriate [de-identified] : Well-developed, well-nourished, cooperative female, nontoxic appearing, in no acute distress. [de-identified] : no dominant mass [de-identified] : Soft, nontender to palpation. Unable to appreciate organomegaly. [de-identified] : unable to appreciate discrete LAD [de-identified] : no gross focal motor deficits

## 2020-12-10 NOTE — HISTORY OF PRESENT ILLNESS
[Disease: _____________________] : Disease: [unfilled] [T: ___] : T[unfilled] [N: ___] : N[unfilled] [M: ___] : M[unfilled] [AJCC Stage: ____] : AJCC Stage: [unfilled] [de-identified] : Patient with history of colon cancer (adenocarcinoma)-5-2011. Stage IIIC-T4 N2a. Status post right colectomy followed by FOLFOX chemotherapy.  With persistently elevated CEA on f/u testing,  3/2013 mesentery mass excision pathology showed a lymph node negative for metastatic carcinoma. Benign fibroadipose tissue.\par Persistently increasing elevated CEA.  CT scan abdomen/pelvis 6/2016, showed enlarging mesenteric adenopathy. Endoscopic ultrasound guided FNA of the bryan-pancreatic, mesenteric mass was positive for malignant cells-adenocarcinoma associated with abundant mucin. 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  Patient obtained 2 surgical opinions and disease was felt to be inoperable by both surgeons.  Patient begun on Xeloda/Avastin.\par 5/2017-CT scan chest/abdomen/pelvis showed stable tiny nodules in the lungs. Sizable mass merging with the head of the pancreas had Increased in size from 11/2016 with some increasing infiltration of the surrounding fat.  Possibility of increasing colonic wall thickness on the colon site of the ileocolic anastomosis. Patient begun on Pembrolizumab (tumor MSI-high).\par 8/2017- CT scan chest/abdomen/pelvis showed marginally worsening mesenteric adenopathy, with short interval followup CT scan abdomen/pelvis 9/2017, stable compared to 8/2017.\par 1/2018-CT scan chest/abdomen/pelvis-stable adenopathy.\par 6/2018-CT scan chest/abdomen/pelvis-stable adenopathy.\par 11/2018-CT chest/abdomen/pelvis-unchanged mass at the root of the mesentery.\par 4/2019-CT scan chest/abdomen/pelvis-stable mesenteric soft tissue mass.\par 8/2019-CT scan chest/abdomen/pelvis-unchanged mesenteric mass.\par 10/2019-CT scan A/P-stable mesenteric mass. Held Pembrolizumab.\par 2/2020-CT scan chest/abdomen/pelvis-stable exam with mesenteric mass without significant change since 10/2019. Resumed Pembrolizumab, which was then held 6/2020 due to progressive renal insufficiency.\par 7/2020-CT scan chest/abdomen/pelvis–no significant change.  Stable mesenteric mass unchanged since 10/2019.  Segment of colonic wall thickening at the level of the anastomosis unchanged.  Enlarged multinodular thyroid with bilateral substernal extension unchanged.\par 10/2020-CT scan C/A/P-stable upper abdominal mesenteric mass. No new suspicious pathology.\par  [de-identified] : adenocarcinoma [de-identified] : CEA 12/2013-33.6 [de-identified] : History of right breast cancer-2007. Status post right breast conservation surgery--> RT--> [facility].         \par \par  [de-identified] : Son just out of hospital post COVID-complicated course-had blood clots.\par Having a thyroid biopsy in 1/2020, along with US (per Dr. Seals).\par Remains independent, drives.\par Eating well. No dysphagia. No c/o H/A, lateralizing weakness. Ambulates with a walker and cane as needed due to chronic imbalance.\par No CP, cough, SOB, fevers. No c/o N/V/change in bowel habits. No c/o abdominal pain. \par \par \par

## 2020-12-10 NOTE — ASSESSMENT
[FreeTextEntry1] : Colon cancer–clinically stable at present.  History of elevated CEA appears now to be fluctuating.  Follow-up imaging ordered.\par Breast cancer-clinically stable. Surveillance.  \par Thrombocytopenia/leukopenia/anemia-lab work reviewed with patient.  Platelet count acceptable and ANC and hemoglobin currently within normal limits.  Continue to monitor CBC with differential.\par Patient consents to mediport maintenance while it remains in.\par Patient was given the opportunity to ask questions. Her questions have been answered to her apparent satisfaction at this time.  She is agreeable to recommended follow-up.

## 2020-12-23 PROBLEM — Z12.11 ENCOUNTER FOR SCREENING COLONOSCOPY: Status: RESOLVED | Noted: 2020-08-14 | Resolved: 2020-12-23

## 2021-01-13 ENCOUNTER — OUTPATIENT (OUTPATIENT)
Dept: OUTPATIENT SERVICES | Facility: HOSPITAL | Age: 86
LOS: 1 days | End: 2021-01-13
Payer: MEDICARE

## 2021-01-13 ENCOUNTER — APPOINTMENT (OUTPATIENT)
Dept: CT IMAGING | Facility: HOSPITAL | Age: 86
End: 2021-01-13
Payer: MEDICARE

## 2021-01-13 DIAGNOSIS — C18.9 MALIGNANT NEOPLASM OF COLON, UNSPECIFIED: ICD-10-CM

## 2021-01-13 DIAGNOSIS — D35.1 BENIGN NEOPLASM OF PARATHYROID GLAND: Chronic | ICD-10-CM

## 2021-01-13 DIAGNOSIS — Z90.49 ACQUIRED ABSENCE OF OTHER SPECIFIED PARTS OF DIGESTIVE TRACT: Chronic | ICD-10-CM

## 2021-01-13 DIAGNOSIS — C50.911 MALIGNANT NEOPLASM OF UNSPECIFIED SITE OF RIGHT FEMALE BREAST: ICD-10-CM

## 2021-01-13 DIAGNOSIS — Z98.890 OTHER SPECIFIED POSTPROCEDURAL STATES: Chronic | ICD-10-CM

## 2021-01-13 PROCEDURE — 74176 CT ABD & PELVIS W/O CONTRAST: CPT

## 2021-01-13 PROCEDURE — 71250 CT THORAX DX C-: CPT | Mod: 26,MH

## 2021-01-13 PROCEDURE — 74176 CT ABD & PELVIS W/O CONTRAST: CPT | Mod: 26,MF

## 2021-01-13 PROCEDURE — G1004: CPT

## 2021-01-13 PROCEDURE — 71250 CT THORAX DX C-: CPT

## 2021-01-14 ENCOUNTER — OUTPATIENT (OUTPATIENT)
Dept: OUTPATIENT SERVICES | Facility: HOSPITAL | Age: 86
LOS: 1 days | Discharge: HOME | End: 2021-01-14

## 2021-01-14 DIAGNOSIS — Z90.49 ACQUIRED ABSENCE OF OTHER SPECIFIED PARTS OF DIGESTIVE TRACT: Chronic | ICD-10-CM

## 2021-01-14 DIAGNOSIS — D35.1 BENIGN NEOPLASM OF PARATHYROID GLAND: Chronic | ICD-10-CM

## 2021-01-14 DIAGNOSIS — Z98.890 OTHER SPECIFIED POSTPROCEDURAL STATES: Chronic | ICD-10-CM

## 2021-01-14 DIAGNOSIS — C18.9 MALIGNANT NEOPLASM OF COLON, UNSPECIFIED: ICD-10-CM

## 2021-01-18 LAB
BASOPHILS # BLD AUTO: 0.01 K/UL
BASOPHILS NFR BLD AUTO: 0.3 %
EOSINOPHIL # BLD AUTO: 0.08 K/UL
EOSINOPHIL NFR BLD AUTO: 2.4 %
HCT VFR BLD CALC: 37.7 %
HGB BLD-MCNC: 12.5 G/DL
IMM GRANULOCYTES NFR BLD AUTO: 0 %
LYMPHOCYTES # BLD AUTO: 0.75 K/UL
LYMPHOCYTES NFR BLD AUTO: 22.4 %
MAN DIFF?: NORMAL
MCHC RBC-ENTMCNC: 31.5 PG
MCHC RBC-ENTMCNC: 33.2 GM/DL
MCV RBC AUTO: 95 FL
MONOCYTES # BLD AUTO: 0.26 K/UL
MONOCYTES NFR BLD AUTO: 7.8 %
NEUTROPHILS # BLD AUTO: 2.25 K/UL
NEUTROPHILS NFR BLD AUTO: 67.1 %
PLATELET # BLD AUTO: 116 K/UL
RBC # BLD: 3.97 M/UL
RBC # FLD: 13.6 %
WBC # FLD AUTO: 3.35 K/UL

## 2021-01-19 LAB
ALBUMIN SERPL ELPH-MCNC: 4.4 G/DL
ALP BLD-CCNC: 93 U/L
ALT SERPL-CCNC: 20 U/L
ANION GAP SERPL CALC-SCNC: 14 MMOL/L
AST SERPL-CCNC: 19 U/L
BILIRUB SERPL-MCNC: 0.4 MG/DL
BUN SERPL-MCNC: 27 MG/DL
CALCIUM SERPL-MCNC: 9.2 MG/DL
CEA SERPL-MCNC: 331 NG/ML
CHLORIDE SERPL-SCNC: 115 MMOL/L
CO2 SERPL-SCNC: 17 MMOL/L
CREAT SERPL-MCNC: 1.79 MG/DL
GLUCOSE SERPL-MCNC: 143 MG/DL
POTASSIUM SERPL-SCNC: 4.2 MMOL/L
PROT SERPL-MCNC: 6.2 G/DL
SODIUM SERPL-SCNC: 146 MMOL/L

## 2021-01-20 ENCOUNTER — LABORATORY RESULT (OUTPATIENT)
Age: 86
End: 2021-01-20

## 2021-01-20 ENCOUNTER — APPOINTMENT (OUTPATIENT)
Dept: HEMATOLOGY ONCOLOGY | Facility: CLINIC | Age: 86
End: 2021-01-20
Payer: MEDICARE

## 2021-01-20 ENCOUNTER — RESULT REVIEW (OUTPATIENT)
Age: 86
End: 2021-01-20

## 2021-01-20 ENCOUNTER — APPOINTMENT (OUTPATIENT)
Dept: INFUSION THERAPY | Facility: HOSPITAL | Age: 86
End: 2021-01-20

## 2021-01-20 VITALS
WEIGHT: 177.25 LBS | RESPIRATION RATE: 16 BRPM | BODY MASS INDEX: 30.71 KG/M2 | DIASTOLIC BLOOD PRESSURE: 76 MMHG | TEMPERATURE: 96.7 F | OXYGEN SATURATION: 96 % | SYSTOLIC BLOOD PRESSURE: 169 MMHG | HEART RATE: 66 BPM

## 2021-01-20 LAB
BASOPHILS # BLD AUTO: 0.01 K/UL — SIGNIFICANT CHANGE UP (ref 0–0.2)
BASOPHILS NFR BLD AUTO: 0.3 % — SIGNIFICANT CHANGE UP (ref 0–2)
EOSINOPHIL # BLD AUTO: 0.03 K/UL — SIGNIFICANT CHANGE UP (ref 0–0.5)
EOSINOPHIL NFR BLD AUTO: 0.8 % — SIGNIFICANT CHANGE UP (ref 0–6)
HCT VFR BLD CALC: 36.8 % — SIGNIFICANT CHANGE UP (ref 34.5–45)
HGB BLD-MCNC: 12.3 G/DL — SIGNIFICANT CHANGE UP (ref 11.5–15.5)
IMM GRANULOCYTES NFR BLD AUTO: 0.5 % — SIGNIFICANT CHANGE UP (ref 0–1.5)
LYMPHOCYTES # BLD AUTO: 0.59 K/UL — LOW (ref 1–3.3)
LYMPHOCYTES # BLD AUTO: 16.1 % — SIGNIFICANT CHANGE UP (ref 13–44)
MCHC RBC-ENTMCNC: 31.5 PG — SIGNIFICANT CHANGE UP (ref 27–34)
MCHC RBC-ENTMCNC: 33.4 G/DL — SIGNIFICANT CHANGE UP (ref 32–36)
MCV RBC AUTO: 94.1 FL — SIGNIFICANT CHANGE UP (ref 80–100)
MONOCYTES # BLD AUTO: 0.2 K/UL — SIGNIFICANT CHANGE UP (ref 0–0.9)
MONOCYTES NFR BLD AUTO: 5.5 % — SIGNIFICANT CHANGE UP (ref 2–14)
NEUTROPHILS # BLD AUTO: 2.81 K/UL — SIGNIFICANT CHANGE UP (ref 1.8–7.4)
NEUTROPHILS NFR BLD AUTO: 76.8 % — SIGNIFICANT CHANGE UP (ref 43–77)
NRBC # BLD: 0 /100 WBCS — SIGNIFICANT CHANGE UP (ref 0–0)
PLATELET # BLD AUTO: 112 K/UL — LOW (ref 150–400)
RBC # BLD: 3.91 M/UL — SIGNIFICANT CHANGE UP (ref 3.8–5.2)
RBC # FLD: 13.6 % — SIGNIFICANT CHANGE UP (ref 10.3–14.5)
WBC # BLD: 3.66 K/UL — LOW (ref 3.8–10.5)
WBC # FLD AUTO: 3.66 K/UL — LOW (ref 3.8–10.5)

## 2021-01-20 PROCEDURE — 99215 OFFICE O/P EST HI 40 MIN: CPT

## 2021-01-20 NOTE — ASSESSMENT
[FreeTextEntry1] : #1) Colon cancer–reviewed CT scan results and lab work with patient.  Increasing CEA noted.  Appears to have minimally progressive disease clinically.  Discussed management options.  Considering quality of life in decision making, chronic cytopenias, and prior stability of disease on immunotherapy, patient wishes to resume pembrolizumab at this time.  Interval follow-up imaging will again be planned.\par Telephone call with nephrologist Dr. Garcia–she will follow-up with patient regarding her renal insufficiency, and stated it was okay to proceed with immunotherapy from her/renal viewpoint at this time.\par #2) history of anemia/leukopenia/thrombocytopenia-CBC currently acceptable.  Continue to follow CBC with differential.\par #3) vascular access–Mediport remains in place with maintenance as needed.\par #4) history of breast cancer–clinically CHRISTIE.  Surveillance.\par Patient was given the opportunity to ask questions. Her questions have been answered to her apparent satisfaction.  She concurs with plans of care.\par \par Time spent in review of lab work, CT scan results, discussion of colon cancer treatment options, and discussion with Dr. Garcia.

## 2021-01-20 NOTE — HISTORY OF PRESENT ILLNESS
[Disease: _____________________] : Disease: [unfilled] [T: ___] : T[unfilled] [N: ___] : N[unfilled] [M: ___] : M[unfilled] [AJCC Stage: ____] : AJCC Stage: [unfilled] [de-identified] : Patient with history of colon cancer (adenocarcinoma)-5-2011. Stage IIIC-T4 N2a. Status post right colectomy followed by FOLFOX chemotherapy.  With persistently elevated CEA on f/u testing,  3/2013 mesentery mass excision pathology showed a lymph node negative for metastatic carcinoma. Benign fibroadipose tissue.\par Persistently increasing elevated CEA.  CT scan abdomen/pelvis 6/2016, showed enlarging mesenteric adenopathy. Endoscopic ultrasound guided FNA of the bryan-pancreatic, mesenteric mass was positive for malignant cells-adenocarcinoma associated with abundant mucin. 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  Patient obtained 2 surgical opinions and disease was felt to be inoperable by both surgeons.  Patient begun on Xeloda/Avastin.\par 5/2017-CT scan chest/abdomen/pelvis showed stable tiny nodules in the lungs. Sizable mass merging with the head of the pancreas had Increased in size from 11/2016 with some increasing infiltration of the surrounding fat.  Possibility of increasing colonic wall thickness on the colon site of the ileocolic anastomosis. Patient begun on Pembrolizumab (tumor MSI-high).\par 8/2017- CT scan chest/abdomen/pelvis showed marginally worsening mesenteric adenopathy, with short interval followup CT scan abdomen/pelvis 9/2017, stable compared to 8/2017.\par 1/2018-CT scan chest/abdomen/pelvis-stable adenopathy.\par 6/2018-CT scan chest/abdomen/pelvis-stable adenopathy.\par 11/2018-CT chest/abdomen/pelvis-unchanged mass at the root of the mesentery.\par 4/2019-CT scan chest/abdomen/pelvis-stable mesenteric soft tissue mass.\par 8/2019-CT scan chest/abdomen/pelvis-unchanged mesenteric mass.\par 10/2019-CT scan A/P-stable mesenteric mass. Held Pembrolizumab.\par 2/2020-CT scan chest/abdomen/pelvis-stable exam with mesenteric mass without significant change since 10/2019. Resumed Pembrolizumab, which was then held 6/2020 due to progressive renal insufficiency.\par 7/2020-CT scan chest/abdomen/pelvis–no significant change.  Stable mesenteric mass unchanged since 10/2019.  Segment of colonic wall thickening at the level of the anastomosis unchanged.  Enlarged multinodular thyroid with bilateral substernal extension unchanged.\par 10/2020-CT scan C/A/P-stable upper abdominal mesenteric mass. No new suspicious pathology.\par 1/2021–CT scan chest/abdomen/pelvis–mesenteric mass inseparable from the pancreatic head encasing the proximal mesenteric vessels minimally enlarged since 10/2020.  Mild mural thickening and ileal colic anastomosis.\par  [de-identified] : adenocarcinoma [de-identified] : CEA 12/2013-33.6 [de-identified] : History of right breast cancer-2007. Status post right breast conservation surgery--> RT--> [facility].         \par \par  [de-identified] : Remains independent, drives.\par Eating well. No dysphagia. No c/o H/A, lateralizing weakness. Ambulates with a walker and cane as needed due to chronic imbalance.\par No CP, cough, SOB, fevers. No c/o N/V/change in bowel habits. No c/o abdominal pain. No bleeding.\par \par \par

## 2021-01-20 NOTE — PHYSICAL EXAM
[Restricted in physically strenuous activity but ambulatory and able to carry out work of a light or sedentary nature] : Status 1- Restricted in physically strenuous activity but ambulatory and able to carry out work of a light or sedentary nature, e.g., light house work, office work [Normal] : affect appropriate [de-identified] : Well-developed, well-nourished, cooperative female, nontoxic appearing, in no acute distress. [de-identified] : no dominant mass [de-identified] : Soft, nontender to palpation. Unable to appreciate organomegaly. [de-identified] : unable to appreciate discrete LAD [de-identified] : no gross focal motor deficits

## 2021-01-20 NOTE — CONSULT LETTER
[Dear  ___] : Dear  [unfilled], [Courtesy Letter:] : I had the pleasure of seeing your patient, [unfilled], in my office today. [Please see my note below.] : Please see my note below. [Consult Closing:] : Thank you very much for allowing me to participate in the care of this patient.  If you have any questions, please do not hesitate to contact me. [Sincerely,] : Sincerely, [FreeTextEntry3] : Cortney Mack MD

## 2021-01-21 DIAGNOSIS — R11.2 NAUSEA WITH VOMITING, UNSPECIFIED: ICD-10-CM

## 2021-01-21 DIAGNOSIS — Z51.11 ENCOUNTER FOR ANTINEOPLASTIC CHEMOTHERAPY: ICD-10-CM

## 2021-01-25 ENCOUNTER — LABORATORY RESULT (OUTPATIENT)
Age: 86
End: 2021-01-25

## 2021-01-25 ENCOUNTER — RESULT REVIEW (OUTPATIENT)
Age: 86
End: 2021-01-25

## 2021-01-25 ENCOUNTER — APPOINTMENT (OUTPATIENT)
Dept: INFUSION THERAPY | Facility: HOSPITAL | Age: 86
End: 2021-01-25

## 2021-01-25 LAB
BASOPHILS # BLD AUTO: 0.02 K/UL — SIGNIFICANT CHANGE UP (ref 0–0.2)
BASOPHILS NFR BLD AUTO: 0.5 % — SIGNIFICANT CHANGE UP (ref 0–2)
EOSINOPHIL # BLD AUTO: 0.06 K/UL — SIGNIFICANT CHANGE UP (ref 0–0.5)
EOSINOPHIL NFR BLD AUTO: 1.6 % — SIGNIFICANT CHANGE UP (ref 0–6)
HCT VFR BLD CALC: 35 % — SIGNIFICANT CHANGE UP (ref 34.5–45)
HGB BLD-MCNC: 11.9 G/DL — SIGNIFICANT CHANGE UP (ref 11.5–15.5)
IMM GRANULOCYTES NFR BLD AUTO: 0.3 % — SIGNIFICANT CHANGE UP (ref 0–1.5)
LYMPHOCYTES # BLD AUTO: 0.87 K/UL — LOW (ref 1–3.3)
LYMPHOCYTES # BLD AUTO: 23 % — SIGNIFICANT CHANGE UP (ref 13–44)
MCHC RBC-ENTMCNC: 31.4 PG — SIGNIFICANT CHANGE UP (ref 27–34)
MCHC RBC-ENTMCNC: 34 G/DL — SIGNIFICANT CHANGE UP (ref 32–36)
MCV RBC AUTO: 92.3 FL — SIGNIFICANT CHANGE UP (ref 80–100)
MONOCYTES # BLD AUTO: 0.28 K/UL — SIGNIFICANT CHANGE UP (ref 0–0.9)
MONOCYTES NFR BLD AUTO: 7.4 % — SIGNIFICANT CHANGE UP (ref 2–14)
NEUTROPHILS # BLD AUTO: 2.55 K/UL — SIGNIFICANT CHANGE UP (ref 1.8–7.4)
NEUTROPHILS NFR BLD AUTO: 67.2 % — SIGNIFICANT CHANGE UP (ref 43–77)
NRBC # BLD: 0 /100 WBCS — SIGNIFICANT CHANGE UP (ref 0–0)
PLATELET # BLD AUTO: 99 K/UL — LOW (ref 150–400)
RBC # BLD: 3.79 M/UL — LOW (ref 3.8–5.2)
RBC # FLD: 13.2 % — SIGNIFICANT CHANGE UP (ref 10.3–14.5)
WBC # BLD: 3.79 K/UL — LOW (ref 3.8–10.5)
WBC # FLD AUTO: 3.79 K/UL — LOW (ref 3.8–10.5)

## 2021-01-28 DIAGNOSIS — Z00.00 ENCOUNTER FOR GENERAL ADULT MEDICAL EXAMINATION W/OUT ABNORMAL FINDINGS: ICD-10-CM

## 2021-02-13 ENCOUNTER — OUTPATIENT (OUTPATIENT)
Dept: OUTPATIENT SERVICES | Facility: HOSPITAL | Age: 86
LOS: 1 days | Discharge: HOME | End: 2021-02-13

## 2021-02-13 DIAGNOSIS — D35.1 BENIGN NEOPLASM OF PARATHYROID GLAND: Chronic | ICD-10-CM

## 2021-02-13 DIAGNOSIS — Z90.49 ACQUIRED ABSENCE OF OTHER SPECIFIED PARTS OF DIGESTIVE TRACT: Chronic | ICD-10-CM

## 2021-02-13 DIAGNOSIS — C18.9 MALIGNANT NEOPLASM OF COLON, UNSPECIFIED: ICD-10-CM

## 2021-02-13 DIAGNOSIS — Z98.890 OTHER SPECIFIED POSTPROCEDURAL STATES: Chronic | ICD-10-CM

## 2021-02-13 LAB
ALBUMIN SERPL ELPH-MCNC: 4.3 G/DL
ALP BLD-CCNC: 96 U/L
ALT SERPL-CCNC: 19 U/L
ANION GAP SERPL CALC-SCNC: 14 MMOL/L
AST SERPL-CCNC: 20 U/L
BASOPHILS # BLD AUTO: 0.02 K/UL
BASOPHILS NFR BLD AUTO: 0.5 %
BILIRUB SERPL-MCNC: 0.4 MG/DL
BUN SERPL-MCNC: 28 MG/DL
CALCIUM SERPL-MCNC: 9.1 MG/DL
CHLORIDE SERPL-SCNC: 113 MMOL/L
CO2 SERPL-SCNC: 17 MMOL/L
CREAT SERPL-MCNC: 2.05 MG/DL
EOSINOPHIL # BLD AUTO: 0.07 K/UL
EOSINOPHIL NFR BLD AUTO: 1.8 %
GLUCOSE SERPL-MCNC: 161 MG/DL
HCT VFR BLD CALC: 39.1 %
HGB BLD-MCNC: 12.7 G/DL
IMM GRANULOCYTES NFR BLD AUTO: 0.3 %
LYMPHOCYTES # BLD AUTO: 0.82 K/UL
LYMPHOCYTES NFR BLD AUTO: 21.1 %
MAN DIFF?: NORMAL
MCHC RBC-ENTMCNC: 30.3 PG
MCHC RBC-ENTMCNC: 32.5 GM/DL
MCV RBC AUTO: 93.3 FL
MONOCYTES # BLD AUTO: 0.26 K/UL
MONOCYTES NFR BLD AUTO: 6.7 %
NEUTROPHILS # BLD AUTO: 2.71 K/UL
NEUTROPHILS NFR BLD AUTO: 69.6 %
PLATELET # BLD AUTO: 119 K/UL
POTASSIUM SERPL-SCNC: 3.6 MMOL/L
PROT SERPL-MCNC: 6.5 G/DL
RBC # BLD: 4.19 M/UL
RBC # FLD: 13.8 %
SODIUM SERPL-SCNC: 145 MMOL/L
TSH SERPL-ACNC: 0.97 UIU/ML
WBC # FLD AUTO: 3.89 K/UL

## 2021-02-14 LAB — SARS-COV-2 N GENE NPH QL NAA+PROBE: NOT DETECTED

## 2021-02-17 ENCOUNTER — LABORATORY RESULT (OUTPATIENT)
Age: 86
End: 2021-02-17

## 2021-02-17 ENCOUNTER — RESULT REVIEW (OUTPATIENT)
Age: 86
End: 2021-02-17

## 2021-02-17 ENCOUNTER — APPOINTMENT (OUTPATIENT)
Dept: INFUSION THERAPY | Facility: HOSPITAL | Age: 86
End: 2021-02-17

## 2021-02-17 ENCOUNTER — APPOINTMENT (OUTPATIENT)
Dept: HEMATOLOGY ONCOLOGY | Facility: CLINIC | Age: 86
End: 2021-02-17
Payer: MEDICARE

## 2021-02-17 VITALS
HEART RATE: 60 BPM | SYSTOLIC BLOOD PRESSURE: 148 MMHG | TEMPERATURE: 98 F | DIASTOLIC BLOOD PRESSURE: 75 MMHG | RESPIRATION RATE: 18 BRPM

## 2021-02-17 LAB
BASOPHILS # BLD AUTO: 0.01 K/UL — SIGNIFICANT CHANGE UP (ref 0–0.2)
BASOPHILS NFR BLD AUTO: 0.3 % — SIGNIFICANT CHANGE UP (ref 0–2)
EOSINOPHIL # BLD AUTO: 0.03 K/UL — SIGNIFICANT CHANGE UP (ref 0–0.5)
EOSINOPHIL NFR BLD AUTO: 0.8 % — SIGNIFICANT CHANGE UP (ref 0–6)
HCT VFR BLD CALC: 35 % — SIGNIFICANT CHANGE UP (ref 34.5–45)
HGB BLD-MCNC: 12.1 G/DL — SIGNIFICANT CHANGE UP (ref 11.5–15.5)
IMM GRANULOCYTES NFR BLD AUTO: 0.3 % — SIGNIFICANT CHANGE UP (ref 0–1.5)
LYMPHOCYTES # BLD AUTO: 0.51 K/UL — LOW (ref 1–3.3)
LYMPHOCYTES # BLD AUTO: 13.3 % — SIGNIFICANT CHANGE UP (ref 13–44)
MCHC RBC-ENTMCNC: 31.8 PG — SIGNIFICANT CHANGE UP (ref 27–34)
MCHC RBC-ENTMCNC: 34.6 G/DL — SIGNIFICANT CHANGE UP (ref 32–36)
MCV RBC AUTO: 91.9 FL — SIGNIFICANT CHANGE UP (ref 80–100)
MONOCYTES # BLD AUTO: 0.23 K/UL — SIGNIFICANT CHANGE UP (ref 0–0.9)
MONOCYTES NFR BLD AUTO: 6 % — SIGNIFICANT CHANGE UP (ref 2–14)
NEUTROPHILS # BLD AUTO: 3.05 K/UL — SIGNIFICANT CHANGE UP (ref 1.8–7.4)
NEUTROPHILS NFR BLD AUTO: 79.3 % — HIGH (ref 43–77)
NRBC # BLD: 0 /100 WBCS — SIGNIFICANT CHANGE UP (ref 0–0)
PLATELET # BLD AUTO: 103 K/UL — LOW (ref 150–400)
RBC # BLD: 3.81 M/UL — SIGNIFICANT CHANGE UP (ref 3.8–5.2)
RBC # FLD: 13.5 % — SIGNIFICANT CHANGE UP (ref 10.3–14.5)
WBC # BLD: 3.84 K/UL — SIGNIFICANT CHANGE UP (ref 3.8–10.5)
WBC # FLD AUTO: 3.84 K/UL — SIGNIFICANT CHANGE UP (ref 3.8–10.5)

## 2021-02-17 PROCEDURE — 99214 OFFICE O/P EST MOD 30 MIN: CPT

## 2021-02-17 NOTE — ASSESSMENT
[FreeTextEntry1] : #1) Colon cancer–appeared to have minimally progressive disease on most recent imaging, and resumed immunotherapy which patient feels she tolerates well, considering QOL in her decision making.\par \par #2) history of anemia/leukopenia/thrombocytopenia-hemoglobin and ANC WNL, with acceptable platelet count. Continue to follow CBC with differential.\par \par #3) elevated creatinine discussed-advised continued f/u with nephrologist, and copy of labs to be faxed to Dr. Garcia.\par \par #4) history of breast cancer–clinically CHRISTIE.  Surveillance.\par \par Patient was given the opportunity to ask questions. Her questions have been answered to her apparent satisfaction at this time.  She concurs with plans of care.\par

## 2021-02-17 NOTE — PHYSICAL EXAM
[Restricted in physically strenuous activity but ambulatory and able to carry out work of a light or sedentary nature] : Status 1- Restricted in physically strenuous activity but ambulatory and able to carry out work of a light or sedentary nature, e.g., light house work, office work [Normal] : affect appropriate [de-identified] : Well-developed, well-nourished, cooperative female, nontoxic appearing, in no acute distress. [de-identified] : no dominant mass [de-identified] : Soft, nontender to palpation. Unable to appreciate organomegaly. [de-identified] : unable to appreciate discrete LAD [de-identified] : no gross focal motor deficits

## 2021-02-17 NOTE — HISTORY OF PRESENT ILLNESS
[Disease: _____________________] : Disease: [unfilled] [T: ___] : T[unfilled] [N: ___] : N[unfilled] [M: ___] : M[unfilled] [AJCC Stage: ____] : AJCC Stage: [unfilled] [de-identified] : Patient with history of colon cancer (adenocarcinoma)-5-2011. Stage IIIC-T4 N2a. Status post right colectomy followed by FOLFOX chemotherapy.  With persistently elevated CEA on f/u testing,  3/2013 mesentery mass excision pathology showed a lymph node negative for metastatic carcinoma. Benign fibroadipose tissue.\par Persistently increasing elevated CEA.  CT scan abdomen/pelvis 6/2016, showed enlarging mesenteric adenopathy. Endoscopic ultrasound guided FNA of the bryan-pancreatic, mesenteric mass was positive for malignant cells-adenocarcinoma associated with abundant mucin. 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  Patient obtained 2 surgical opinions and disease was felt to be inoperable by both surgeons.  Patient begun on Xeloda/Avastin.\par 5/2017-CT scan chest/abdomen/pelvis showed stable tiny nodules in the lungs. Sizable mass merging with the head of the pancreas had Increased in size from 11/2016 with some increasing infiltration of the surrounding fat.  Possibility of increasing colonic wall thickness on the colon site of the ileocolic anastomosis. Patient begun on Pembrolizumab (tumor MSI-high).\par 8/2017- CT scan chest/abdomen/pelvis showed marginally worsening mesenteric adenopathy, with short interval followup CT scan abdomen/pelvis 9/2017, stable compared to 8/2017.\par 1/2018-CT scan chest/abdomen/pelvis-stable adenopathy.\par 6/2018-CT scan chest/abdomen/pelvis-stable adenopathy.\par 11/2018-CT chest/abdomen/pelvis-unchanged mass at the root of the mesentery.\par 4/2019-CT scan chest/abdomen/pelvis-stable mesenteric soft tissue mass.\par 8/2019-CT scan chest/abdomen/pelvis-unchanged mesenteric mass.\par 10/2019-CT scan A/P-stable mesenteric mass. Held Pembrolizumab.\par 2/2020-CT scan chest/abdomen/pelvis-stable exam with mesenteric mass without significant change since 10/2019. Resumed Pembrolizumab, which was then held 6/2020 due to progressive renal insufficiency.\par 7/2020-CT scan chest/abdomen/pelvis–no significant change.  Stable mesenteric mass unchanged since 10/2019.  Segment of colonic wall thickening at the level of the anastomosis unchanged.  Enlarged multinodular thyroid with bilateral substernal extension unchanged.\par 10/2020-CT scan C/A/P-stable upper abdominal mesenteric mass. No new suspicious pathology.\par 1/2021–CT scan chest/abdomen/pelvis–mesenteric mass inseparable from the pancreatic head encasing the proximal mesenteric vessels minimally enlarged since 10/2020.  Mild mural thickening and ileal colic anastomosis. New course Pembrolizumab begun.\par  [de-identified] : adenocarcinoma [de-identified] : CEA 12/2013-33.6 [de-identified] : History of right breast cancer-2007. Status post right breast conservation surgery--> RT--> [facility].         \par \par  [de-identified] : Offers no new complaints. Has COVID vaccine scheduled next month.\par Remains independent, drives.\par No c/o H/A, lateralizing weakness. Ambulates with a walker and cane as needed due to chronic imbalance.\par No CP, cough, SOB, fevers. No c/o N/V/change in bowel habits. No c/o abdominal/pelvic pain. No bleeding.\par States had f/u visit with nephrologist Dr. Garcia-told status stable.\par \par

## 2021-02-18 DIAGNOSIS — Z51.11 ENCOUNTER FOR ANTINEOPLASTIC CHEMOTHERAPY: ICD-10-CM

## 2021-02-18 DIAGNOSIS — R11.2 NAUSEA WITH VOMITING, UNSPECIFIED: ICD-10-CM

## 2021-03-06 ENCOUNTER — LABORATORY RESULT (OUTPATIENT)
Age: 86
End: 2021-03-06

## 2021-03-07 LAB — SARS-COV-2 N GENE NPH QL NAA+PROBE: NOT DETECTED

## 2021-03-10 ENCOUNTER — LABORATORY RESULT (OUTPATIENT)
Age: 86
End: 2021-03-10

## 2021-03-10 ENCOUNTER — RESULT REVIEW (OUTPATIENT)
Age: 86
End: 2021-03-10

## 2021-03-10 ENCOUNTER — APPOINTMENT (OUTPATIENT)
Dept: HEMATOLOGY ONCOLOGY | Facility: CLINIC | Age: 86
End: 2021-03-10
Payer: MEDICARE

## 2021-03-10 ENCOUNTER — APPOINTMENT (OUTPATIENT)
Dept: INFUSION THERAPY | Facility: HOSPITAL | Age: 86
End: 2021-03-10

## 2021-03-10 VITALS
SYSTOLIC BLOOD PRESSURE: 144 MMHG | HEART RATE: 64 BPM | TEMPERATURE: 97.9 F | RESPIRATION RATE: 20 BRPM | DIASTOLIC BLOOD PRESSURE: 77 MMHG

## 2021-03-10 LAB
BASOPHILS # BLD AUTO: 0.01 K/UL — SIGNIFICANT CHANGE UP (ref 0–0.2)
BASOPHILS NFR BLD AUTO: 0.3 % — SIGNIFICANT CHANGE UP (ref 0–2)
EOSINOPHIL # BLD AUTO: 0.06 K/UL — SIGNIFICANT CHANGE UP (ref 0–0.5)
EOSINOPHIL NFR BLD AUTO: 1.9 % — SIGNIFICANT CHANGE UP (ref 0–6)
HCT VFR BLD CALC: 34.8 % — SIGNIFICANT CHANGE UP (ref 34.5–45)
HGB BLD-MCNC: 11.7 G/DL — SIGNIFICANT CHANGE UP (ref 11.5–15.5)
IMM GRANULOCYTES NFR BLD AUTO: 0 % — SIGNIFICANT CHANGE UP (ref 0–1.5)
LYMPHOCYTES # BLD AUTO: 0.67 K/UL — LOW (ref 1–3.3)
LYMPHOCYTES # BLD AUTO: 20.9 % — SIGNIFICANT CHANGE UP (ref 13–44)
MCHC RBC-ENTMCNC: 31.5 PG — SIGNIFICANT CHANGE UP (ref 27–34)
MCHC RBC-ENTMCNC: 33.6 G/DL — SIGNIFICANT CHANGE UP (ref 32–36)
MCV RBC AUTO: 93.5 FL — SIGNIFICANT CHANGE UP (ref 80–100)
MONOCYTES # BLD AUTO: 0.22 K/UL — SIGNIFICANT CHANGE UP (ref 0–0.9)
MONOCYTES NFR BLD AUTO: 6.9 % — SIGNIFICANT CHANGE UP (ref 2–14)
NEUTROPHILS # BLD AUTO: 2.25 K/UL — SIGNIFICANT CHANGE UP (ref 1.8–7.4)
NEUTROPHILS NFR BLD AUTO: 70 % — SIGNIFICANT CHANGE UP (ref 43–77)
NRBC # BLD: 0 /100 WBCS — SIGNIFICANT CHANGE UP (ref 0–0)
PLATELET # BLD AUTO: 103 K/UL — LOW (ref 150–400)
RBC # BLD: 3.72 M/UL — LOW (ref 3.8–5.2)
RBC # FLD: 13.3 % — SIGNIFICANT CHANGE UP (ref 10.3–14.5)
WBC # BLD: 3.21 K/UL — LOW (ref 3.8–10.5)
WBC # FLD AUTO: 3.21 K/UL — LOW (ref 3.8–10.5)

## 2021-03-10 PROCEDURE — 99214 OFFICE O/P EST MOD 30 MIN: CPT

## 2021-03-10 NOTE — HISTORY OF PRESENT ILLNESS
[Disease: _____________________] : Disease: [unfilled] [T: ___] : T[unfilled] [N: ___] : N[unfilled] [M: ___] : M[unfilled] [AJCC Stage: ____] : AJCC Stage: [unfilled] [de-identified] : Patient with history of colon cancer (adenocarcinoma)-5-2011. Stage IIIC-T4 N2a. Status post right colectomy followed by FOLFOX chemotherapy.  With persistently elevated CEA on f/u testing,  3/2013 mesentery mass excision pathology showed a lymph node negative for metastatic carcinoma. Benign fibroadipose tissue.\par Persistently increasing elevated CEA.  CT scan abdomen/pelvis 6/2016, showed enlarging mesenteric adenopathy. Endoscopic ultrasound guided FNA of the bryan-pancreatic, mesenteric mass was positive for malignant cells-adenocarcinoma associated with abundant mucin. 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  Patient obtained 2 surgical opinions and disease was felt to be inoperable by both surgeons.  Patient begun on Xeloda/Avastin.\par 5/2017-CT scan chest/abdomen/pelvis showed stable tiny nodules in the lungs. Sizable mass merging with the head of the pancreas had Increased in size from 11/2016 with some increasing infiltration of the surrounding fat.  Possibility of increasing colonic wall thickness on the colon site of the ileocolic anastomosis. Patient begun on Pembrolizumab (tumor MSI-high).\par 8/2017- CT scan chest/abdomen/pelvis showed marginally worsening mesenteric adenopathy, with short interval followup CT scan abdomen/pelvis 9/2017, stable compared to 8/2017.\par 1/2018-CT scan chest/abdomen/pelvis-stable adenopathy.\par 6/2018-CT scan chest/abdomen/pelvis-stable adenopathy.\par 11/2018-CT chest/abdomen/pelvis-unchanged mass at the root of the mesentery.\par 4/2019-CT scan chest/abdomen/pelvis-stable mesenteric soft tissue mass.\par 8/2019-CT scan chest/abdomen/pelvis-unchanged mesenteric mass.\par 10/2019-CT scan A/P-stable mesenteric mass. Held Pembrolizumab.\par 2/2020-CT scan chest/abdomen/pelvis-stable exam with mesenteric mass without significant change since 10/2019. Resumed Pembrolizumab, which was then held 6/2020 due to progressive renal insufficiency.\par 7/2020-CT scan chest/abdomen/pelvis–no significant change.  Stable mesenteric mass unchanged since 10/2019.  Segment of colonic wall thickening at the level of the anastomosis unchanged.  Enlarged multinodular thyroid with bilateral substernal extension unchanged.\par 10/2020-CT scan C/A/P-stable upper abdominal mesenteric mass. No new suspicious pathology.\par 1/2021–CT scan chest/abdomen/pelvis–mesenteric mass inseparable from the pancreatic head encasing the proximal mesenteric vessels minimally enlarged since 10/2020.  Mild mural thickening and ileal colic anastomosis. New course Pembrolizumab begun.\par  [de-identified] : adenocarcinoma [de-identified] : CEA 12/2013-33.6 [de-identified] : History of right breast cancer-2007. Status post right breast conservation surgery--> RT--> [facility].         \par \par  [de-identified] : Received first COVID vaccine-no adverse effects.\par Remains independent, drives.\par No c/o H/A, lateralizing weakness. Ambulates with a walker and cane as needed due to chronic imbalance.\par No CP, cough, SOB, fevers. No c/o N/V/change in bowel habits. No c/o abdominal/pelvic pain. No bleeding.\par \par \par

## 2021-03-10 NOTE — PHYSICAL EXAM
[Restricted in physically strenuous activity but ambulatory and able to carry out work of a light or sedentary nature] : Status 1- Restricted in physically strenuous activity but ambulatory and able to carry out work of a light or sedentary nature, e.g., light house work, office work [Normal] : affect appropriate [de-identified] : Well-developed, well-nourished, cooperative female, nontoxic appearing, in no acute distress. [de-identified] : no dominant mass [de-identified] : Soft, nontender to palpation. Unable to appreciate organomegaly. [de-identified] : unable to appreciate discrete LAD [de-identified] : no gross focal motor deficits

## 2021-03-10 NOTE — ASSESSMENT
[FreeTextEntry1] : #1) Colon cancer–appeared to have minimally progressive disease on most recent imaging, and resumed immunotherapy which patient feels she tolerates well, considering QOL in her decision making. CEA trending downward.\par \par #2) history of anemia/leukopenia/thrombocytopenia-hemoglobin and ANC WNL, with acceptable platelet count. Continue to follow CBC with differential.\par \par #3) history of breast cancer–clinically CHRISTIE.  Surveillance.\par \par Patient was given the opportunity to ask questions. Her questions have been answered to her apparent satisfaction at this time.  She concurs with plans of care.\par

## 2021-03-11 ENCOUNTER — NON-APPOINTMENT (OUTPATIENT)
Age: 86
End: 2021-03-11

## 2021-03-25 ENCOUNTER — OUTPATIENT (OUTPATIENT)
Dept: OUTPATIENT SERVICES | Facility: HOSPITAL | Age: 86
LOS: 1 days | Discharge: HOME | End: 2021-03-25

## 2021-03-25 DIAGNOSIS — Z98.890 OTHER SPECIFIED POSTPROCEDURAL STATES: Chronic | ICD-10-CM

## 2021-03-25 DIAGNOSIS — C18.9 MALIGNANT NEOPLASM OF COLON, UNSPECIFIED: ICD-10-CM

## 2021-03-25 DIAGNOSIS — Z90.49 ACQUIRED ABSENCE OF OTHER SPECIFIED PARTS OF DIGESTIVE TRACT: Chronic | ICD-10-CM

## 2021-03-25 DIAGNOSIS — D35.1 BENIGN NEOPLASM OF PARATHYROID GLAND: Chronic | ICD-10-CM

## 2021-03-28 LAB
ALBUMIN SERPL ELPH-MCNC: 4.2 G/DL
ALP BLD-CCNC: 87 U/L
ALT SERPL-CCNC: 16 U/L
ANION GAP SERPL CALC-SCNC: 12 MMOL/L
AST SERPL-CCNC: 20 U/L
BASOPHILS # BLD AUTO: 0.02 K/UL
BASOPHILS NFR BLD AUTO: 0.6 %
BILIRUB SERPL-MCNC: 0.4 MG/DL
BUN SERPL-MCNC: 27 MG/DL
CALCIUM SERPL-MCNC: 9 MG/DL
CHLORIDE SERPL-SCNC: 118 MMOL/L
CO2 SERPL-SCNC: 17 MMOL/L
CREAT SERPL-MCNC: 1.74 MG/DL
EOSINOPHIL # BLD AUTO: 0.09 K/UL
EOSINOPHIL NFR BLD AUTO: 2.8 %
GLUCOSE SERPL-MCNC: 124 MG/DL
HCT VFR BLD CALC: 34.6 %
HGB BLD-MCNC: 11.4 G/DL
IMM GRANULOCYTES NFR BLD AUTO: 0 %
LYMPHOCYTES # BLD AUTO: 0.87 K/UL
LYMPHOCYTES NFR BLD AUTO: 27.4 %
MAN DIFF?: NORMAL
MCHC RBC-ENTMCNC: 31.1 PG
MCHC RBC-ENTMCNC: 32.9 GM/DL
MCV RBC AUTO: 94.5 FL
MONOCYTES # BLD AUTO: 0.24 K/UL
MONOCYTES NFR BLD AUTO: 7.6 %
NEUTROPHILS # BLD AUTO: 1.95 K/UL
NEUTROPHILS NFR BLD AUTO: 61.6 %
PLATELET # BLD AUTO: 102 K/UL
POTASSIUM SERPL-SCNC: 4.2 MMOL/L
PROT SERPL-MCNC: 5.9 G/DL
RBC # BLD: 3.66 M/UL
RBC # FLD: 13.7 %
SARS-COV-2 N GENE NPH QL NAA+PROBE: NOT DETECTED
SODIUM SERPL-SCNC: 147 MMOL/L
TSH SERPL-ACNC: 1.53 UIU/ML
WBC # FLD AUTO: 3.17 K/UL

## 2021-03-31 ENCOUNTER — APPOINTMENT (OUTPATIENT)
Dept: INFUSION THERAPY | Facility: HOSPITAL | Age: 86
End: 2021-03-31

## 2021-03-31 ENCOUNTER — RESULT REVIEW (OUTPATIENT)
Age: 86
End: 2021-03-31

## 2021-03-31 ENCOUNTER — APPOINTMENT (OUTPATIENT)
Dept: HEMATOLOGY ONCOLOGY | Facility: CLINIC | Age: 86
End: 2021-03-31
Payer: MEDICARE

## 2021-03-31 ENCOUNTER — LABORATORY RESULT (OUTPATIENT)
Age: 86
End: 2021-03-31

## 2021-03-31 VITALS
TEMPERATURE: 97 F | OXYGEN SATURATION: 98 % | WEIGHT: 174.8 LBS | DIASTOLIC BLOOD PRESSURE: 67 MMHG | HEART RATE: 67 BPM | HEIGHT: 63.82 IN | RESPIRATION RATE: 17 BRPM | BODY MASS INDEX: 30.21 KG/M2 | SYSTOLIC BLOOD PRESSURE: 163 MMHG

## 2021-03-31 LAB
BASOPHILS # BLD AUTO: 0.01 K/UL — SIGNIFICANT CHANGE UP (ref 0–0.2)
BASOPHILS NFR BLD AUTO: 0.3 % — SIGNIFICANT CHANGE UP (ref 0–2)
EOSINOPHIL # BLD AUTO: 0.04 K/UL — SIGNIFICANT CHANGE UP (ref 0–0.5)
EOSINOPHIL NFR BLD AUTO: 1.2 % — SIGNIFICANT CHANGE UP (ref 0–6)
HCT VFR BLD CALC: 33.6 % — LOW (ref 34.5–45)
HGB BLD-MCNC: 11.3 G/DL — LOW (ref 11.5–15.5)
IMM GRANULOCYTES NFR BLD AUTO: 0.3 % — SIGNIFICANT CHANGE UP (ref 0–1.5)
LYMPHOCYTES # BLD AUTO: 0.7 K/UL — LOW (ref 1–3.3)
LYMPHOCYTES # BLD AUTO: 20.4 % — SIGNIFICANT CHANGE UP (ref 13–44)
MCHC RBC-ENTMCNC: 31 PG — SIGNIFICANT CHANGE UP (ref 27–34)
MCHC RBC-ENTMCNC: 33.6 G/DL — SIGNIFICANT CHANGE UP (ref 32–36)
MCV RBC AUTO: 92.3 FL — SIGNIFICANT CHANGE UP (ref 80–100)
MONOCYTES # BLD AUTO: 0.24 K/UL — SIGNIFICANT CHANGE UP (ref 0–0.9)
MONOCYTES NFR BLD AUTO: 7 % — SIGNIFICANT CHANGE UP (ref 2–14)
NEUTROPHILS # BLD AUTO: 2.43 K/UL — SIGNIFICANT CHANGE UP (ref 1.8–7.4)
NEUTROPHILS NFR BLD AUTO: 70.8 % — SIGNIFICANT CHANGE UP (ref 43–77)
NRBC # BLD: 0 /100 WBCS — SIGNIFICANT CHANGE UP (ref 0–0)
PLATELET # BLD AUTO: 103 K/UL — LOW (ref 150–400)
RBC # BLD: 3.64 M/UL — LOW (ref 3.8–5.2)
RBC # FLD: 14 % — SIGNIFICANT CHANGE UP (ref 10.3–14.5)
WBC # BLD: 3.43 K/UL — LOW (ref 3.8–10.5)
WBC # FLD AUTO: 3.43 K/UL — LOW (ref 3.8–10.5)

## 2021-03-31 PROCEDURE — 99214 OFFICE O/P EST MOD 30 MIN: CPT

## 2021-03-31 NOTE — ASSESSMENT
[FreeTextEntry1] : #1) Colon cancer–appeared to have minimally progressive disease on most recent imaging, and resumed immunotherapy which patient feels she tolerates well, considering QOL in her decision making. Increasing CEA noted again-repeat with next labs. If continued increase-->t/c re-imaging.\par \par #2) history of anemia/leukopenia/thrombocytopenia-hemoglobin and ANC acceptable, as is platelet count. Continue to follow CBC with differential.\par \par #3) history of breast cancer–clinically CHRISTIE.  Surveillance.\par \par Patient was given the opportunity to ask questions. Her questions have been answered to her apparent satisfaction at this time.  She concurs with plans of care.\par

## 2021-03-31 NOTE — HISTORY OF PRESENT ILLNESS
[Disease: _____________________] : Disease: [unfilled] [T: ___] : T[unfilled] [N: ___] : N[unfilled] [M: ___] : M[unfilled] [AJCC Stage: ____] : AJCC Stage: [unfilled] [de-identified] : Patient with history of colon cancer (adenocarcinoma)-5-2011. Stage IIIC-T4 N2a. Status post right colectomy followed by FOLFOX chemotherapy.  With persistently elevated CEA on f/u testing,  3/2013 mesentery mass excision pathology showed a lymph node negative for metastatic carcinoma. Benign fibroadipose tissue.\par Persistently increasing elevated CEA.  CT scan abdomen/pelvis 6/2016, showed enlarging mesenteric adenopathy. Endoscopic ultrasound guided FNA of the bryan-pancreatic, mesenteric mass was positive for malignant cells-adenocarcinoma associated with abundant mucin. 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  Patient obtained 2 surgical opinions and disease was felt to be inoperable by both surgeons.  Patient begun on Xeloda/Avastin.\par 5/2017-CT scan chest/abdomen/pelvis showed stable tiny nodules in the lungs. Sizable mass merging with the head of the pancreas had Increased in size from 11/2016 with some increasing infiltration of the surrounding fat.  Possibility of increasing colonic wall thickness on the colon site of the ileocolic anastomosis. Patient begun on Pembrolizumab (tumor MSI-high).\par 8/2017- CT scan chest/abdomen/pelvis showed marginally worsening mesenteric adenopathy, with short interval followup CT scan abdomen/pelvis 9/2017, stable compared to 8/2017.\par 1/2018-CT scan chest/abdomen/pelvis-stable adenopathy.\par 6/2018-CT scan chest/abdomen/pelvis-stable adenopathy.\par 11/2018-CT chest/abdomen/pelvis-unchanged mass at the root of the mesentery.\par 4/2019-CT scan chest/abdomen/pelvis-stable mesenteric soft tissue mass.\par 8/2019-CT scan chest/abdomen/pelvis-unchanged mesenteric mass.\par 10/2019-CT scan A/P-stable mesenteric mass. Held Pembrolizumab.\par 2/2020-CT scan chest/abdomen/pelvis-stable exam with mesenteric mass without significant change since 10/2019. Resumed Pembrolizumab, which was then held 6/2020 due to progressive renal insufficiency.\par 7/2020-CT scan chest/abdomen/pelvis–no significant change.  Stable mesenteric mass unchanged since 10/2019.  Segment of colonic wall thickening at the level of the anastomosis unchanged.  Enlarged multinodular thyroid with bilateral substernal extension unchanged.\par 10/2020-CT scan C/A/P-stable upper abdominal mesenteric mass. No new suspicious pathology.\par 1/2021–CT scan chest/abdomen/pelvis–mesenteric mass inseparable from the pancreatic head encasing the proximal mesenteric vessels minimally enlarged since 10/2020.  Mild mural thickening and ileal colic anastomosis. New course Pembrolizumab begun.\par  [de-identified] : adenocarcinoma [de-identified] : CEA 12/2013-33.6 [de-identified] : History of right breast cancer-2007. Status post right breast conservation surgery--> RT--> [facility].         \par \par  [de-identified] : Second COVID vaccine scheduled (Moderna).\par Remains independent, drives.\par No c/o H/A, lateralizing weakness. Ambulates with a walker and cane as needed due to chronic imbalance.\par No CP, cough, SOB, fevers. No c/o N/V/change in bowel habits. No c/o abdominal/pelvic pain. No bleeding.\par \par \par

## 2021-03-31 NOTE — PHYSICAL EXAM
[Restricted in physically strenuous activity but ambulatory and able to carry out work of a light or sedentary nature] : Status 1- Restricted in physically strenuous activity but ambulatory and able to carry out work of a light or sedentary nature, e.g., light house work, office work [Normal] : affect appropriate [de-identified] : Well-developed, well-nourished, cooperative female, nontoxic appearing, in no acute distress. [de-identified] : no dominant mass [de-identified] : Soft, nontender to palpation. Unable to appreciate organomegaly. [de-identified] : unable to appreciate discrete LAD [de-identified] : no gross focal motor deficits

## 2021-04-01 DIAGNOSIS — R11.2 NAUSEA WITH VOMITING, UNSPECIFIED: ICD-10-CM

## 2021-04-01 DIAGNOSIS — Z51.11 ENCOUNTER FOR ANTINEOPLASTIC CHEMOTHERAPY: ICD-10-CM

## 2021-04-17 LAB
BASOPHILS # BLD AUTO: 0.02 K/UL
BASOPHILS NFR BLD AUTO: 0.6 %
CEA SERPL-MCNC: 345 NG/ML
EOSINOPHIL # BLD AUTO: 0.08 K/UL
EOSINOPHIL NFR BLD AUTO: 2.3 %
HCT VFR BLD CALC: 37.1 %
HGB BLD-MCNC: 11.9 G/DL
IMM GRANULOCYTES NFR BLD AUTO: 0.3 %
LYMPHOCYTES # BLD AUTO: 0.72 K/UL
LYMPHOCYTES NFR BLD AUTO: 20.5 %
MAN DIFF?: NORMAL
MCHC RBC-ENTMCNC: 30.7 PG
MCHC RBC-ENTMCNC: 32.1 GM/DL
MCV RBC AUTO: 95.6 FL
MONOCYTES # BLD AUTO: 0.2 K/UL
MONOCYTES NFR BLD AUTO: 5.7 %
NEUTROPHILS # BLD AUTO: 2.48 K/UL
NEUTROPHILS NFR BLD AUTO: 70.6 %
PLATELET # BLD AUTO: 124 K/UL
RBC # BLD: 3.88 M/UL
RBC # FLD: 14.1 %
WBC # FLD AUTO: 3.51 K/UL

## 2021-04-18 ENCOUNTER — NON-APPOINTMENT (OUTPATIENT)
Age: 86
End: 2021-04-18

## 2021-04-18 LAB
25(OH)D3 SERPL-MCNC: 42.4 NG/ML
ALBUMIN SERPL ELPH-MCNC: 4.5 G/DL
ALP BLD-CCNC: 92 U/L
ALT SERPL-CCNC: 12 U/L
ANION GAP SERPL CALC-SCNC: 19 MMOL/L
AST SERPL-CCNC: 16 U/L
BILIRUB SERPL-MCNC: 0.4 MG/DL
BUN SERPL-MCNC: 42 MG/DL
CALCIUM SERPL-MCNC: 9.7 MG/DL
CHLORIDE SERPL-SCNC: 118 MMOL/L
CO2 SERPL-SCNC: 10 MMOL/L
CREAT SERPL-MCNC: 2.21 MG/DL
GLUCOSE SERPL-MCNC: 148 MG/DL
POTASSIUM SERPL-SCNC: 4.6 MMOL/L
PROT SERPL-MCNC: 6.5 G/DL
SARS-COV-2 N GENE NPH QL NAA+PROBE: NOT DETECTED
SODIUM SERPL-SCNC: 148 MMOL/L

## 2021-04-19 ENCOUNTER — NON-APPOINTMENT (OUTPATIENT)
Age: 86
End: 2021-04-19

## 2021-04-20 ENCOUNTER — NON-APPOINTMENT (OUTPATIENT)
Age: 86
End: 2021-04-20

## 2021-04-21 ENCOUNTER — APPOINTMENT (OUTPATIENT)
Dept: HEMATOLOGY ONCOLOGY | Facility: CLINIC | Age: 86
End: 2021-04-21
Payer: MEDICARE

## 2021-04-21 ENCOUNTER — APPOINTMENT (OUTPATIENT)
Dept: INFUSION THERAPY | Facility: HOSPITAL | Age: 86
End: 2021-04-21

## 2021-04-21 VITALS
SYSTOLIC BLOOD PRESSURE: 134 MMHG | DIASTOLIC BLOOD PRESSURE: 64 MMHG | RESPIRATION RATE: 18 BRPM | TEMPERATURE: 97.9 F | HEART RATE: 64 BPM

## 2021-04-21 PROCEDURE — 99215 OFFICE O/P EST HI 40 MIN: CPT

## 2021-04-21 NOTE — ASSESSMENT
[FreeTextEntry1] : Lab work reviewed.\par #1) Colon cancer–appeared to have minimally progressive disease on most recent imaging, and resumed immunotherapy which patient feels she tolerates well, considering QOL in her decision making. Increasing CEA however noted again-f/u imaging to be done. Needs continued close f/u with nephrology-if further increase in creatinine, immunotherapy to be held as well.\par \par #2) history of anemia/leukopenia/thrombocytopenia-hemoglobin and ANC acceptable, as is platelet count. Continue to follow CBC with differential.\par \par #3) history of breast cancer–clinically stable.  Surveillance.\par \par Patient was given the opportunity to ask questions. Her questions have been answered to her apparent satisfaction at this time.  She concurs with plans of care.\par

## 2021-04-21 NOTE — PHYSICAL EXAM
[Restricted in physically strenuous activity but ambulatory and able to carry out work of a light or sedentary nature] : Status 1- Restricted in physically strenuous activity but ambulatory and able to carry out work of a light or sedentary nature, e.g., light house work, office work [Normal] : affect appropriate [de-identified] : Well-developed, well-nourished, cooperative female, nontoxic appearing, in no acute distress. [de-identified] : no dominant mass [de-identified] : Soft, nontender to palpation. Unable to appreciate organomegaly. [de-identified] : unable to appreciate discrete LAD [de-identified] : no gross focal motor deficits

## 2021-04-21 NOTE — HISTORY OF PRESENT ILLNESS
[Disease: _____________________] : Disease: [unfilled] [T: ___] : T[unfilled] [N: ___] : N[unfilled] [M: ___] : M[unfilled] [AJCC Stage: ____] : AJCC Stage: [unfilled] [de-identified] : Patient with history of colon cancer (adenocarcinoma)-5-2011. Stage IIIC-T4 N2a. Status post right colectomy followed by FOLFOX chemotherapy.  With persistently elevated CEA on f/u testing,  3/2013 mesentery mass excision pathology showed a lymph node negative for metastatic carcinoma. Benign fibroadipose tissue.\par Persistently increasing elevated CEA.  CT scan abdomen/pelvis 6/2016, showed enlarging mesenteric adenopathy. Endoscopic ultrasound guided FNA of the bryan-pancreatic, mesenteric mass was positive for malignant cells-adenocarcinoma associated with abundant mucin. 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  Patient obtained 2 surgical opinions and disease was felt to be inoperable by both surgeons.  Patient begun on Xeloda/Avastin.\par 5/2017-CT scan chest/abdomen/pelvis showed stable tiny nodules in the lungs. Sizable mass merging with the head of the pancreas had Increased in size from 11/2016 with some increasing infiltration of the surrounding fat.  Possibility of increasing colonic wall thickness on the colon site of the ileocolic anastomosis. Patient begun on Pembrolizumab (tumor MSI-high).\par 8/2017- CT scan chest/abdomen/pelvis showed marginally worsening mesenteric adenopathy, with short interval followup CT scan abdomen/pelvis 9/2017, stable compared to 8/2017.\par 1/2018-CT scan chest/abdomen/pelvis-stable adenopathy.\par 6/2018-CT scan chest/abdomen/pelvis-stable adenopathy.\par 11/2018-CT chest/abdomen/pelvis-unchanged mass at the root of the mesentery.\par 4/2019-CT scan chest/abdomen/pelvis-stable mesenteric soft tissue mass.\par 8/2019-CT scan chest/abdomen/pelvis-unchanged mesenteric mass.\par 10/2019-CT scan A/P-stable mesenteric mass. Held Pembrolizumab.\par 2/2020-CT scan chest/abdomen/pelvis-stable exam with mesenteric mass without significant change since 10/2019. Resumed Pembrolizumab, which was then held 6/2020 due to progressive renal insufficiency.\par 7/2020-CT scan chest/abdomen/pelvis–no significant change.  Stable mesenteric mass unchanged since 10/2019.  Segment of colonic wall thickening at the level of the anastomosis unchanged.  Enlarged multinodular thyroid with bilateral substernal extension unchanged.\par 10/2020-CT scan C/A/P-stable upper abdominal mesenteric mass. No new suspicious pathology.\par 1/2021–CT scan chest/abdomen/pelvis–mesenteric mass inseparable from the pancreatic head encasing the proximal mesenteric vessels minimally enlarged since 10/2020.  Mild mural thickening and ileal colic anastomosis. New course Pembrolizumab begun.\par  [de-identified] : adenocarcinoma [de-identified] : CEA 12/2013-33.6 [de-identified] : History of right breast cancer-2007. Status post right breast conservation surgery--> RT--> [facility].         \par \par  [de-identified] : Had f/u with nephrologist Dr. Garcia-told patient she may continue immunotherapy from her viewpoint-has increased bicarb supplement to 2 tabs BID.\par Remains independent, drives.\par No c/o H/A, lateralizing weakness. Ambulates with a walker and cane as needed due to chronic imbalance.\par No CP, cough, SOB, fevers. No c/o N/V/change in bowel habits. No c/o abdominal/pelvic pain. No bleeding.\par \par \par

## 2021-04-28 ENCOUNTER — RESULT REVIEW (OUTPATIENT)
Age: 86
End: 2021-04-28

## 2021-04-29 ENCOUNTER — NON-APPOINTMENT (OUTPATIENT)
Age: 86
End: 2021-04-29

## 2021-05-03 ENCOUNTER — RESULT REVIEW (OUTPATIENT)
Age: 86
End: 2021-05-03

## 2021-05-03 ENCOUNTER — OUTPATIENT (OUTPATIENT)
Dept: OUTPATIENT SERVICES | Facility: HOSPITAL | Age: 86
LOS: 1 days | End: 2021-05-03
Payer: MEDICARE

## 2021-05-03 ENCOUNTER — APPOINTMENT (OUTPATIENT)
Dept: CT IMAGING | Facility: HOSPITAL | Age: 86
End: 2021-05-03

## 2021-05-03 DIAGNOSIS — Z90.49 ACQUIRED ABSENCE OF OTHER SPECIFIED PARTS OF DIGESTIVE TRACT: Chronic | ICD-10-CM

## 2021-05-03 DIAGNOSIS — Z98.890 OTHER SPECIFIED POSTPROCEDURAL STATES: Chronic | ICD-10-CM

## 2021-05-03 DIAGNOSIS — Z00.8 ENCOUNTER FOR OTHER GENERAL EXAMINATION: ICD-10-CM

## 2021-05-03 DIAGNOSIS — D35.1 BENIGN NEOPLASM OF PARATHYROID GLAND: Chronic | ICD-10-CM

## 2021-05-03 PROCEDURE — 71250 CT THORAX DX C-: CPT | Mod: 26,MH

## 2021-05-03 PROCEDURE — 74176 CT ABD & PELVIS W/O CONTRAST: CPT

## 2021-05-03 PROCEDURE — 74176 CT ABD & PELVIS W/O CONTRAST: CPT | Mod: 26,MH

## 2021-05-03 PROCEDURE — 71250 CT THORAX DX C-: CPT

## 2021-05-07 ENCOUNTER — OUTPATIENT (OUTPATIENT)
Dept: OUTPATIENT SERVICES | Facility: HOSPITAL | Age: 86
LOS: 1 days | Discharge: HOME | End: 2021-05-07

## 2021-05-07 DIAGNOSIS — Z90.49 ACQUIRED ABSENCE OF OTHER SPECIFIED PARTS OF DIGESTIVE TRACT: Chronic | ICD-10-CM

## 2021-05-07 DIAGNOSIS — D35.1 BENIGN NEOPLASM OF PARATHYROID GLAND: Chronic | ICD-10-CM

## 2021-05-07 DIAGNOSIS — Z98.890 OTHER SPECIFIED POSTPROCEDURAL STATES: Chronic | ICD-10-CM

## 2021-05-07 DIAGNOSIS — C18.9 MALIGNANT NEOPLASM OF COLON, UNSPECIFIED: ICD-10-CM

## 2021-05-07 LAB
ALBUMIN SERPL ELPH-MCNC: 4.3 G/DL
ALP BLD-CCNC: 87 U/L
ALT SERPL-CCNC: 17 U/L
ANION GAP SERPL CALC-SCNC: 13 MMOL/L
AST SERPL-CCNC: 17 U/L
BASOPHILS # BLD AUTO: 0.01 K/UL
BASOPHILS NFR BLD AUTO: 0.3 %
BILIRUB SERPL-MCNC: 0.3 MG/DL
BUN SERPL-MCNC: 45 MG/DL
CALCIUM SERPL-MCNC: 9.2 MG/DL
CEA SERPL-MCNC: 332 NG/ML
CHLORIDE SERPL-SCNC: 115 MMOL/L
CO2 SERPL-SCNC: 12 MMOL/L
CREAT SERPL-MCNC: 2.17 MG/DL
EOSINOPHIL # BLD AUTO: 0.08 K/UL
EOSINOPHIL NFR BLD AUTO: 2.5 %
GLUCOSE SERPL-MCNC: 126 MG/DL
HCT VFR BLD CALC: 35.6 %
HGB BLD-MCNC: 11.4 G/DL
IMM GRANULOCYTES NFR BLD AUTO: 0 %
LYMPHOCYTES # BLD AUTO: 0.81 K/UL
LYMPHOCYTES NFR BLD AUTO: 25.6 %
MAN DIFF?: NORMAL
MCHC RBC-ENTMCNC: 31.2 PG
MCHC RBC-ENTMCNC: 32 GM/DL
MCV RBC AUTO: 97.5 FL
MONOCYTES # BLD AUTO: 0.21 K/UL
MONOCYTES NFR BLD AUTO: 6.6 %
NEUTROPHILS # BLD AUTO: 2.06 K/UL
NEUTROPHILS NFR BLD AUTO: 65 %
PLATELET # BLD AUTO: 102 K/UL
POTASSIUM SERPL-SCNC: 4.5 MMOL/L
PROT SERPL-MCNC: 6.1 G/DL
RBC # BLD: 3.65 M/UL
RBC # FLD: 14.3 %
SODIUM SERPL-SCNC: 140 MMOL/L
TSH SERPL-ACNC: 1.51 UIU/ML
WBC # FLD AUTO: 3.17 K/UL

## 2021-05-09 LAB — SARS-COV-2 N GENE NPH QL NAA+PROBE: NOT DETECTED

## 2021-05-11 ENCOUNTER — APPOINTMENT (OUTPATIENT)
Dept: HEMATOLOGY ONCOLOGY | Facility: CLINIC | Age: 86
End: 2021-05-11
Payer: MEDICARE

## 2021-05-11 ENCOUNTER — NON-APPOINTMENT (OUTPATIENT)
Age: 86
End: 2021-05-11

## 2021-05-11 ENCOUNTER — APPOINTMENT (OUTPATIENT)
Dept: INFUSION THERAPY | Facility: HOSPITAL | Age: 86
End: 2021-05-11

## 2021-05-11 VITALS
OXYGEN SATURATION: 99 % | BODY MASS INDEX: 29.73 KG/M2 | RESPIRATION RATE: 16 BRPM | DIASTOLIC BLOOD PRESSURE: 67 MMHG | SYSTOLIC BLOOD PRESSURE: 156 MMHG | TEMPERATURE: 96.8 F | WEIGHT: 174.17 LBS | HEIGHT: 64.17 IN | HEART RATE: 59 BPM

## 2021-05-11 PROCEDURE — 99214 OFFICE O/P EST MOD 30 MIN: CPT

## 2021-05-11 NOTE — PHYSICAL EXAM
[Restricted in physically strenuous activity but ambulatory and able to carry out work of a light or sedentary nature] : Status 1- Restricted in physically strenuous activity but ambulatory and able to carry out work of a light or sedentary nature, e.g., light house work, office work [Normal] : affect appropriate [de-identified] : Well-developed, well-nourished, cooperative female, nontoxic appearing, in no acute distress. [de-identified] : no dominant mass [de-identified] : Soft, nontender to palpation. Unable to appreciate organomegaly. [de-identified] : unable to appreciate discrete LAD [de-identified] : no gross focal motor deficits

## 2021-05-11 NOTE — ASSESSMENT
[FreeTextEntry1] : Lab work and CT C/A/P results reviewed.\par #1) Colon cancer-clinically stable at present. Favor continuing current therapy. Interval f/u imaging will again be planned.\par Needs continued close f/u with nephrology, for renal insufficiency.\par \par #2) history of anemia/leukopenia/thrombocytopenia-hemoglobin and ANC currently acceptable, as is platelet count. Continue to follow CBC with differential.\par \par #3) history of breast cancer–clinically stable.  Surveillance.\par \par Patient was given the opportunity to ask questions. Her questions have been answered to her apparent satisfaction at this time.  She concurs with plans of care.\par

## 2021-05-12 DIAGNOSIS — R11.2 NAUSEA WITH VOMITING, UNSPECIFIED: ICD-10-CM

## 2021-05-12 DIAGNOSIS — Z51.11 ENCOUNTER FOR ANTINEOPLASTIC CHEMOTHERAPY: ICD-10-CM

## 2021-05-28 ENCOUNTER — LABORATORY RESULT (OUTPATIENT)
Age: 86
End: 2021-05-28

## 2021-05-28 LAB
ALBUMIN SERPL ELPH-MCNC: 4.2 G/DL
ALP BLD-CCNC: 89 U/L
ALT SERPL-CCNC: 18 U/L
ANION GAP SERPL CALC-SCNC: 13 MMOL/L
AST SERPL-CCNC: 17 U/L
BASOPHILS # BLD AUTO: 0.01 K/UL
BASOPHILS NFR BLD AUTO: 0.3 %
BILIRUB SERPL-MCNC: 0.4 MG/DL
BUN SERPL-MCNC: 29 MG/DL
CALCIUM SERPL-MCNC: 9.1 MG/DL
CHLORIDE SERPL-SCNC: 116 MMOL/L
CO2 SERPL-SCNC: 16 MMOL/L
CREAT SERPL-MCNC: 1.92 MG/DL
EOSINOPHIL # BLD AUTO: 0.08 K/UL
EOSINOPHIL NFR BLD AUTO: 2.7 %
GLUCOSE SERPL-MCNC: 138 MG/DL
HCT VFR BLD CALC: 33.7 %
HGB BLD-MCNC: 11 G/DL
IMM GRANULOCYTES NFR BLD AUTO: 0.3 %
LYMPHOCYTES # BLD AUTO: 0.85 K/UL
LYMPHOCYTES NFR BLD AUTO: 28.6 %
MAN DIFF?: NORMAL
MCHC RBC-ENTMCNC: 31.2 PG
MCHC RBC-ENTMCNC: 32.6 GM/DL
MCV RBC AUTO: 95.5 FL
MONOCYTES # BLD AUTO: 0.18 K/UL
MONOCYTES NFR BLD AUTO: 6.1 %
NEUTROPHILS # BLD AUTO: 1.84 K/UL
NEUTROPHILS NFR BLD AUTO: 62 %
PLATELET # BLD AUTO: 110 K/UL
POTASSIUM SERPL-SCNC: 4 MMOL/L
PROT SERPL-MCNC: 6 G/DL
RBC # BLD: 3.53 M/UL
RBC # FLD: 14.3 %
SODIUM SERPL-SCNC: 145 MMOL/L
TSH SERPL-ACNC: 1.16 UIU/ML
WBC # FLD AUTO: 2.97 K/UL

## 2021-06-01 ENCOUNTER — APPOINTMENT (OUTPATIENT)
Dept: INFUSION THERAPY | Facility: HOSPITAL | Age: 86
End: 2021-06-01

## 2021-06-01 ENCOUNTER — APPOINTMENT (OUTPATIENT)
Dept: HEMATOLOGY ONCOLOGY | Facility: CLINIC | Age: 86
End: 2021-06-01
Payer: MEDICARE

## 2021-06-01 VITALS
OXYGEN SATURATION: 100 % | DIASTOLIC BLOOD PRESSURE: 70 MMHG | SYSTOLIC BLOOD PRESSURE: 152 MMHG | RESPIRATION RATE: 18 BRPM | HEART RATE: 62 BPM | TEMPERATURE: 97.7 F

## 2021-06-01 PROCEDURE — 99214 OFFICE O/P EST MOD 30 MIN: CPT

## 2021-06-01 NOTE — HISTORY OF PRESENT ILLNESS
[Disease: _____________________] : Disease: [unfilled] [T: ___] : T[unfilled] [N: ___] : N[unfilled] [M: ___] : M[unfilled] [AJCC Stage: ____] : AJCC Stage: [unfilled] [de-identified] : Patient with history of colon cancer (adenocarcinoma)-5-2011. Stage IIIC-T4 N2a. Status post right colectomy followed by FOLFOX chemotherapy.  With persistently elevated CEA on f/u testing,  3/2013 mesentery mass excision pathology showed a lymph node negative for metastatic carcinoma. Benign fibroadipose tissue.\par Persistently increasing elevated CEA.  CT scan abdomen/pelvis 6/2016, showed enlarging mesenteric adenopathy. Endoscopic ultrasound guided FNA of the bryan-pancreatic, mesenteric mass was positive for malignant cells-adenocarcinoma associated with abundant mucin. 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  Patient obtained 2 surgical opinions and disease was felt to be inoperable by both surgeons.  Patient begun on Xeloda/Avastin.\par 5/2017-CT scan chest/abdomen/pelvis showed stable tiny nodules in the lungs. Sizable mass merging with the head of the pancreas had Increased in size from 11/2016 with some increasing infiltration of the surrounding fat.  Possibility of increasing colonic wall thickness on the colon site of the ileocolic anastomosis. Patient begun on Pembrolizumab (tumor MSI-high).\par 8/2017- CT scan chest/abdomen/pelvis showed marginally worsening mesenteric adenopathy, with short interval followup CT scan abdomen/pelvis 9/2017, stable compared to 8/2017.\par 1/2018-CT scan chest/abdomen/pelvis-stable adenopathy.\par 6/2018-CT scan chest/abdomen/pelvis-stable adenopathy.\par 11/2018-CT chest/abdomen/pelvis-unchanged mass at the root of the mesentery.\par 4/2019-CT scan chest/abdomen/pelvis-stable mesenteric soft tissue mass.\par 8/2019-CT scan chest/abdomen/pelvis-unchanged mesenteric mass.\par 10/2019-CT scan A/P-stable mesenteric mass. Held Pembrolizumab.\par 2/2020-CT scan chest/abdomen/pelvis-stable exam with mesenteric mass without significant change since 10/2019. Resumed Pembrolizumab, which was then held 6/2020 due to progressive renal insufficiency.\par 7/2020-CT scan chest/abdomen/pelvis–no significant change.  Stable mesenteric mass unchanged since 10/2019.  Segment of colonic wall thickening at the level of the anastomosis unchanged.  Enlarged multinodular thyroid with bilateral substernal extension unchanged.\par 10/2020-CT scan C/A/P-stable upper abdominal mesenteric mass. No new suspicious pathology.\par \par 1/2021–CT scan chest/abdomen/pelvis–mesenteric mass inseparable from the pancreatic head encasing the proximal mesenteric vessels minimally enlarged since 10/2020.  Mild mural thickening and ileal colic anastomosis. New course Pembrolizumab begun.\par 5/2021–CT scan chest/abdomen/pelvis–stable mesenteric mass contiguous with pancreatic head.  Decrease in thickening at the ileocolic anastomosis.  No new abnormalities. [de-identified] : adenocarcinoma [de-identified] : CEA 12/2013-33.6 [de-identified] : History of right breast cancer-2007. Status post right breast conservation surgery--> RT--> [facility].         \par \par  [de-identified] : Remains independent, drives. Active with senior center activities.\par No c/o H/A, lateralizing weakness. Ambulates with a walker and cane as needed due to chronic imbalance-no acute change.\par No CP, cough, SOB, fevers. No c/o N/V/change in bowel habits. No c/o abdominal/pelvic pain. No bleeding.\par \par \par

## 2021-06-01 NOTE — ASSESSMENT
[FreeTextEntry1] : Lab work reviewed.\par #1) Colon cancer-clinically stable at present. Patient consents to Keytruda as planned.\par \par Needs continued close f/u with nephrology, for renal disease. Continues to take bicarb supplement per Dr. Garcia.\par \par #2) history of anemia/leukopenia/thrombocytopenia-hemoglobin and ANC currently acceptable, as is platelet count. Continue to monitor CBC with differential.\par \par Patient was given the opportunity to ask questions. Her questions have been answered to her apparent satisfaction at this time.  She concurs with plans of care.\par

## 2021-06-01 NOTE — PHYSICAL EXAM
[Restricted in physically strenuous activity but ambulatory and able to carry out work of a light or sedentary nature] : Status 1- Restricted in physically strenuous activity but ambulatory and able to carry out work of a light or sedentary nature, e.g., light house work, office work [Normal] : affect appropriate [de-identified] : Well-developed, well-nourished, cooperative female, nontoxic appearing, in no acute distress. [de-identified] : Soft, nontender to palpation. Unable to appreciate organomegaly. [de-identified] : unable to appreciate discrete LAD [de-identified] : no gross focal motor deficits

## 2021-06-17 ENCOUNTER — APPOINTMENT (OUTPATIENT)
Dept: ULTRASOUND IMAGING | Facility: HOSPITAL | Age: 86
End: 2021-06-17
Payer: MEDICARE

## 2021-06-17 ENCOUNTER — LABORATORY RESULT (OUTPATIENT)
Age: 86
End: 2021-06-17

## 2021-06-17 ENCOUNTER — APPOINTMENT (OUTPATIENT)
Dept: MAMMOGRAPHY | Facility: HOSPITAL | Age: 86
End: 2021-06-17
Payer: MEDICARE

## 2021-06-17 ENCOUNTER — OUTPATIENT (OUTPATIENT)
Dept: OUTPATIENT SERVICES | Facility: HOSPITAL | Age: 86
LOS: 1 days | End: 2021-06-17
Payer: MEDICARE

## 2021-06-17 DIAGNOSIS — Z90.49 ACQUIRED ABSENCE OF OTHER SPECIFIED PARTS OF DIGESTIVE TRACT: Chronic | ICD-10-CM

## 2021-06-17 DIAGNOSIS — Z98.890 OTHER SPECIFIED POSTPROCEDURAL STATES: Chronic | ICD-10-CM

## 2021-06-17 DIAGNOSIS — D35.1 BENIGN NEOPLASM OF PARATHYROID GLAND: Chronic | ICD-10-CM

## 2021-06-17 DIAGNOSIS — Z00.8 ENCOUNTER FOR OTHER GENERAL EXAMINATION: ICD-10-CM

## 2021-06-17 PROCEDURE — 77067 SCR MAMMO BI INCL CAD: CPT | Mod: 26

## 2021-06-17 PROCEDURE — 76641 ULTRASOUND BREAST COMPLETE: CPT | Mod: 26,50

## 2021-06-17 PROCEDURE — 76641 ULTRASOUND BREAST COMPLETE: CPT

## 2021-06-17 PROCEDURE — 77063 BREAST TOMOSYNTHESIS BI: CPT | Mod: 26

## 2021-06-17 PROCEDURE — 77063 BREAST TOMOSYNTHESIS BI: CPT

## 2021-06-17 PROCEDURE — 77067 SCR MAMMO BI INCL CAD: CPT

## 2021-06-18 ENCOUNTER — OUTPATIENT (OUTPATIENT)
Dept: OUTPATIENT SERVICES | Facility: HOSPITAL | Age: 86
LOS: 1 days | Discharge: HOME | End: 2021-06-18

## 2021-06-18 DIAGNOSIS — Z98.890 OTHER SPECIFIED POSTPROCEDURAL STATES: Chronic | ICD-10-CM

## 2021-06-18 DIAGNOSIS — Z90.49 ACQUIRED ABSENCE OF OTHER SPECIFIED PARTS OF DIGESTIVE TRACT: Chronic | ICD-10-CM

## 2021-06-18 DIAGNOSIS — D35.1 BENIGN NEOPLASM OF PARATHYROID GLAND: Chronic | ICD-10-CM

## 2021-06-18 DIAGNOSIS — C18.9 MALIGNANT NEOPLASM OF COLON, UNSPECIFIED: ICD-10-CM

## 2021-06-18 LAB
ALBUMIN SERPL ELPH-MCNC: 4.3 G/DL
ALP BLD-CCNC: 89 U/L
ALT SERPL-CCNC: 19 U/L
ANION GAP SERPL CALC-SCNC: 13 MMOL/L
AST SERPL-CCNC: 23 U/L
BASOPHILS # BLD AUTO: 0.01 K/UL
BASOPHILS NFR BLD AUTO: 0.3 %
BILIRUB SERPL-MCNC: 0.4 MG/DL
BUN SERPL-MCNC: 28 MG/DL
CALCIUM SERPL-MCNC: 9.1 MG/DL
CEA SERPL-MCNC: 326 NG/ML
CHLORIDE SERPL-SCNC: 112 MMOL/L
CO2 SERPL-SCNC: 16 MMOL/L
CREAT SERPL-MCNC: 1.7 MG/DL
EOSINOPHIL # BLD AUTO: 0.05 K/UL
EOSINOPHIL NFR BLD AUTO: 1.7 %
GLUCOSE SERPL-MCNC: 111 MG/DL
HCT VFR BLD CALC: 34.4 %
HGB BLD-MCNC: 11.2 G/DL
IMM GRANULOCYTES NFR BLD AUTO: 0 %
LYMPHOCYTES # BLD AUTO: 0.73 K/UL
LYMPHOCYTES NFR BLD AUTO: 24.4 %
MAN DIFF?: NORMAL
MCHC RBC-ENTMCNC: 30.6 PG
MCHC RBC-ENTMCNC: 32.6 GM/DL
MCV RBC AUTO: 94 FL
MONOCYTES # BLD AUTO: 0.22 K/UL
MONOCYTES NFR BLD AUTO: 7.4 %
NEUTROPHILS # BLD AUTO: 1.98 K/UL
NEUTROPHILS NFR BLD AUTO: 66.2 %
PLATELET # BLD AUTO: 111 K/UL
POTASSIUM SERPL-SCNC: 4.2 MMOL/L
PROT SERPL-MCNC: 6.1 G/DL
RBC # BLD: 3.66 M/UL
RBC # FLD: 14 %
SODIUM SERPL-SCNC: 141 MMOL/L
TSH SERPL-ACNC: 0.8 UIU/ML
WBC # FLD AUTO: 2.99 K/UL

## 2021-06-23 ENCOUNTER — APPOINTMENT (OUTPATIENT)
Dept: HEMATOLOGY ONCOLOGY | Facility: CLINIC | Age: 86
End: 2021-06-23
Payer: MEDICARE

## 2021-06-23 ENCOUNTER — APPOINTMENT (OUTPATIENT)
Dept: INFUSION THERAPY | Facility: HOSPITAL | Age: 86
End: 2021-06-23

## 2021-06-23 VITALS
DIASTOLIC BLOOD PRESSURE: 70 MMHG | HEART RATE: 65 BPM | SYSTOLIC BLOOD PRESSURE: 153 MMHG | RESPIRATION RATE: 18 BRPM | TEMPERATURE: 97.9 F

## 2021-06-23 PROCEDURE — 99214 OFFICE O/P EST MOD 30 MIN: CPT

## 2021-06-23 NOTE — CONSULT LETTER
[Dear  ___] : Dear  [unfilled], [Please see my note below.] : Please see my note below. [Consult Closing:] : Thank you very much for allowing me to participate in the care of this patient.  If you have any questions, please do not hesitate to contact me. [Sincerely,] : Sincerely, [FreeTextEntry3] : Cortney Mack MD

## 2021-06-23 NOTE — RESULTS/DATA
[FreeTextEntry1] : 6/2021-Mammogram/breast US-\par IMPRESSION:\par No mammographic evidence of malignancy. No discrete solid or cystic lesion is noted upon sonographic evaluation of the right and left breast.\par

## 2021-06-23 NOTE — PHYSICAL EXAM
[Restricted in physically strenuous activity but ambulatory and able to carry out work of a light or sedentary nature] : Status 1- Restricted in physically strenuous activity but ambulatory and able to carry out work of a light or sedentary nature, e.g., light house work, office work [Normal] : affect appropriate [de-identified] : Well-developed, well-nourished, cooperative female, nontoxic appearing, in no acute distress. [de-identified] : Soft, nontender to palpation. Unable to appreciate organomegaly. [de-identified] : unable to appreciate discrete LAD [de-identified] : no gross focal motor deficits

## 2021-06-23 NOTE — ASSESSMENT
[FreeTextEntry1] : Lab work, mammogram/breast US results reviewed.\par #1) Colon cancer-clinically stable at present. CEA stable/slightly decreased from last check. Patient consents to Keytruda as planned.\par \par Needs continued close f/u with nephrology, for renal disease. Continues to take bicarb supplement per Dr. Garcia.\par \par #2) history of anemia/leukopenia/thrombocytopenia-hemoglobin and ANC currently acceptable, as is platelet count. Continue to monitor CBC with differential.\par \par Patient was given the opportunity to ask questions. Her questions have been answered to her apparent satisfaction at this time.  She concurs with plans of care.\par

## 2021-06-23 NOTE — HISTORY OF PRESENT ILLNESS
[Disease: _____________________] : Disease: [unfilled] [T: ___] : T[unfilled] [N: ___] : N[unfilled] [M: ___] : M[unfilled] [AJCC Stage: ____] : AJCC Stage: [unfilled] [de-identified] : Patient with history of colon cancer (adenocarcinoma)-5-2011. Stage IIIC-T4 N2a. Status post right colectomy followed by FOLFOX chemotherapy.  With persistently elevated CEA on f/u testing,  3/2013 mesentery mass excision pathology showed a lymph node negative for metastatic carcinoma. Benign fibroadipose tissue.\par Persistently increasing elevated CEA.  CT scan abdomen/pelvis 6/2016, showed enlarging mesenteric adenopathy. Endoscopic ultrasound guided FNA of the bryan-pancreatic, mesenteric mass was positive for malignant cells-adenocarcinoma associated with abundant mucin. 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  Patient obtained 2 surgical opinions and disease was felt to be inoperable by both surgeons.  Patient begun on Xeloda/Avastin.\par 5/2017-CT scan chest/abdomen/pelvis showed stable tiny nodules in the lungs. Sizable mass merging with the head of the pancreas had Increased in size from 11/2016 with some increasing infiltration of the surrounding fat.  Possibility of increasing colonic wall thickness on the colon site of the ileocolic anastomosis. Patient begun on Pembrolizumab (tumor MSI-high).\par 8/2017- CT scan chest/abdomen/pelvis showed marginally worsening mesenteric adenopathy, with short interval followup CT scan abdomen/pelvis 9/2017, stable compared to 8/2017.\par 1/2018-CT scan chest/abdomen/pelvis-stable adenopathy.\par 6/2018-CT scan chest/abdomen/pelvis-stable adenopathy.\par 11/2018-CT chest/abdomen/pelvis-unchanged mass at the root of the mesentery.\par 4/2019-CT scan chest/abdomen/pelvis-stable mesenteric soft tissue mass.\par 8/2019-CT scan chest/abdomen/pelvis-unchanged mesenteric mass.\par 10/2019-CT scan A/P-stable mesenteric mass. Held Pembrolizumab.\par 2/2020-CT scan chest/abdomen/pelvis-stable exam with mesenteric mass without significant change since 10/2019. Resumed Pembrolizumab, which was then held 6/2020 due to progressive renal insufficiency.\par 7/2020-CT scan chest/abdomen/pelvis–no significant change.  Stable mesenteric mass unchanged since 10/2019.  Segment of colonic wall thickening at the level of the anastomosis unchanged.  Enlarged multinodular thyroid with bilateral substernal extension unchanged.\par 10/2020-CT scan C/A/P-stable upper abdominal mesenteric mass. No new suspicious pathology.\par \par 1/2021–CT scan chest/abdomen/pelvis–mesenteric mass inseparable from the pancreatic head encasing the proximal mesenteric vessels minimally enlarged since 10/2020.  Mild mural thickening and ileal colic anastomosis. New course Pembrolizumab begun.\par 5/2021–CT scan chest/abdomen/pelvis–stable mesenteric mass contiguous with pancreatic head.  Decrease in thickening at the ileocolic anastomosis.  No new abnormalities. [de-identified] : adenocarcinoma [de-identified] : CEA 12/2013-33.6 [de-identified] : History of right breast cancer-2007. Status post right breast conservation surgery--> RT--> [facility].         \par \par  [de-identified] : Remains independent, drives. Active with senior center activities. Enjoying QOL.\par Ambulates with a walker and cane as needed due to chronic imbalance-no acute change.\par No CP, cough, SOB, fevers. No c/o N/V/change in bowel habits. No c/o abdominal/pelvic pain. No bleeding.\par \par \par

## 2021-06-24 ENCOUNTER — APPOINTMENT (OUTPATIENT)
Dept: INFUSION THERAPY | Facility: HOSPITAL | Age: 86
End: 2021-06-24

## 2021-06-24 ENCOUNTER — NON-APPOINTMENT (OUTPATIENT)
Age: 86
End: 2021-06-24

## 2021-06-24 DIAGNOSIS — Z51.11 ENCOUNTER FOR ANTINEOPLASTIC CHEMOTHERAPY: ICD-10-CM

## 2021-06-24 DIAGNOSIS — R11.2 NAUSEA WITH VOMITING, UNSPECIFIED: ICD-10-CM

## 2021-07-12 ENCOUNTER — OUTPATIENT (OUTPATIENT)
Dept: OUTPATIENT SERVICES | Facility: HOSPITAL | Age: 86
LOS: 1 days | End: 2021-07-12
Payer: MEDICARE

## 2021-07-12 ENCOUNTER — APPOINTMENT (OUTPATIENT)
Dept: ULTRASOUND IMAGING | Facility: HOSPITAL | Age: 86
End: 2021-07-12
Payer: MEDICARE

## 2021-07-12 DIAGNOSIS — D35.1 BENIGN NEOPLASM OF PARATHYROID GLAND: Chronic | ICD-10-CM

## 2021-07-12 DIAGNOSIS — Z90.49 ACQUIRED ABSENCE OF OTHER SPECIFIED PARTS OF DIGESTIVE TRACT: Chronic | ICD-10-CM

## 2021-07-12 DIAGNOSIS — Z00.8 ENCOUNTER FOR OTHER GENERAL EXAMINATION: ICD-10-CM

## 2021-07-12 DIAGNOSIS — Z98.890 OTHER SPECIFIED POSTPROCEDURAL STATES: Chronic | ICD-10-CM

## 2021-07-12 LAB
ALBUMIN SERPL ELPH-MCNC: 4.1 G/DL
ALP BLD-CCNC: 84 U/L
ALT SERPL-CCNC: 20 U/L
ANION GAP SERPL CALC-SCNC: 14 MMOL/L
AST SERPL-CCNC: 23 U/L
BASOPHILS # BLD AUTO: 0.01 K/UL
BASOPHILS NFR BLD AUTO: 0.3 %
BILIRUB SERPL-MCNC: 0.4 MG/DL
BUN SERPL-MCNC: 27 MG/DL
CALCIUM SERPL-MCNC: 9.2 MG/DL
CHLORIDE SERPL-SCNC: 112 MMOL/L
CO2 SERPL-SCNC: 18 MMOL/L
CREAT SERPL-MCNC: 1.8 MG/DL
EOSINOPHIL # BLD AUTO: 0.05 K/UL
EOSINOPHIL NFR BLD AUTO: 1.7 %
GLUCOSE SERPL-MCNC: 121 MG/DL
HCT VFR BLD CALC: 35.1 %
HGB BLD-MCNC: 11.3 G/DL
IMM GRANULOCYTES NFR BLD AUTO: 0 %
LYMPHOCYTES # BLD AUTO: 0.82 K/UL
LYMPHOCYTES NFR BLD AUTO: 27.1 %
MAN DIFF?: NORMAL
MCHC RBC-ENTMCNC: 30.8 PG
MCHC RBC-ENTMCNC: 32.2 GM/DL
MCV RBC AUTO: 95.6 FL
MONOCYTES # BLD AUTO: 0.28 K/UL
MONOCYTES NFR BLD AUTO: 9.2 %
NEUTROPHILS # BLD AUTO: 1.87 K/UL
NEUTROPHILS NFR BLD AUTO: 61.7 %
PLATELET # BLD AUTO: 107 K/UL
POTASSIUM SERPL-SCNC: 4.2 MMOL/L
PROT SERPL-MCNC: 6 G/DL
RBC # BLD: 3.67 M/UL
RBC # FLD: 13.7 %
SODIUM SERPL-SCNC: 144 MMOL/L
TSH SERPL-ACNC: 1.12 UIU/ML
WBC # FLD AUTO: 3.03 K/UL

## 2021-07-12 PROCEDURE — 93880 EXTRACRANIAL BILAT STUDY: CPT | Mod: 26

## 2021-07-12 PROCEDURE — 93880 EXTRACRANIAL BILAT STUDY: CPT

## 2021-07-14 ENCOUNTER — APPOINTMENT (OUTPATIENT)
Dept: INFUSION THERAPY | Facility: HOSPITAL | Age: 86
End: 2021-07-14

## 2021-07-21 ENCOUNTER — OUTPATIENT (OUTPATIENT)
Dept: OUTPATIENT SERVICES | Facility: HOSPITAL | Age: 86
LOS: 1 days | Discharge: HOME | End: 2021-07-21

## 2021-07-21 DIAGNOSIS — Z90.49 ACQUIRED ABSENCE OF OTHER SPECIFIED PARTS OF DIGESTIVE TRACT: Chronic | ICD-10-CM

## 2021-07-21 DIAGNOSIS — D35.1 BENIGN NEOPLASM OF PARATHYROID GLAND: Chronic | ICD-10-CM

## 2021-07-21 DIAGNOSIS — Z98.890 OTHER SPECIFIED POSTPROCEDURAL STATES: Chronic | ICD-10-CM

## 2021-07-21 DIAGNOSIS — C18.9 MALIGNANT NEOPLASM OF COLON, UNSPECIFIED: ICD-10-CM

## 2021-07-22 ENCOUNTER — APPOINTMENT (OUTPATIENT)
Dept: HEMATOLOGY ONCOLOGY | Facility: CLINIC | Age: 86
End: 2021-07-22
Payer: MEDICARE

## 2021-07-22 PROCEDURE — 99441: CPT | Mod: 95

## 2021-07-22 NOTE — HISTORY OF PRESENT ILLNESS
[Disease: _____________________] : Disease: [unfilled] [T: ___] : T[unfilled] [N: ___] : N[unfilled] [M: ___] : M[unfilled] [AJCC Stage: ____] : AJCC Stage: [unfilled] [de-identified] : Patient with history of colon cancer (adenocarcinoma)-5-2011. Stage IIIC-T4 N2a. Status post right colectomy followed by FOLFOX chemotherapy.  With persistently elevated CEA on f/u testing,  3/2013 mesentery mass excision pathology showed a lymph node negative for metastatic carcinoma. Benign fibroadipose tissue.\par Persistently increasing elevated CEA.  CT scan abdomen/pelvis 6/2016, showed enlarging mesenteric adenopathy. Endoscopic ultrasound guided FNA of the bryan-pancreatic, mesenteric mass was positive for malignant cells-adenocarcinoma associated with abundant mucin. 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  Patient obtained 2 surgical opinions and disease was felt to be inoperable by both surgeons.  Patient begun on Xeloda/Avastin.\par 5/2017-CT scan chest/abdomen/pelvis showed stable tiny nodules in the lungs. Sizable mass merging with the head of the pancreas had Increased in size from 11/2016 with some increasing infiltration of the surrounding fat.  Possibility of increasing colonic wall thickness on the colon site of the ileocolic anastomosis. Patient begun on Pembrolizumab (tumor MSI-high).\par 8/2017- CT scan chest/abdomen/pelvis showed marginally worsening mesenteric adenopathy, with short interval followup CT scan abdomen/pelvis 9/2017, stable compared to 8/2017.\par 1/2018-CT scan chest/abdomen/pelvis-stable adenopathy.\par 6/2018-CT scan chest/abdomen/pelvis-stable adenopathy.\par 11/2018-CT chest/abdomen/pelvis-unchanged mass at the root of the mesentery.\par 4/2019-CT scan chest/abdomen/pelvis-stable mesenteric soft tissue mass.\par 8/2019-CT scan chest/abdomen/pelvis-unchanged mesenteric mass.\par 10/2019-CT scan A/P-stable mesenteric mass. Held Pembrolizumab.\par 2/2020-CT scan chest/abdomen/pelvis-stable exam with mesenteric mass without significant change since 10/2019. Resumed Pembrolizumab, which was then held 6/2020 due to progressive renal insufficiency.\par 7/2020-CT scan chest/abdomen/pelvis–no significant change.  Stable mesenteric mass unchanged since 10/2019.  Segment of colonic wall thickening at the level of the anastomosis unchanged.  Enlarged multinodular thyroid with bilateral substernal extension unchanged.\par 10/2020-CT scan C/A/P-stable upper abdominal mesenteric mass. No new suspicious pathology.\par \par 1/2021–CT scan chest/abdomen/pelvis–mesenteric mass inseparable from the pancreatic head encasing the proximal mesenteric vessels minimally enlarged since 10/2020.  Mild mural thickening and ileal colic anastomosis. New course Pembrolizumab begun.\par 5/2021–CT scan chest/abdomen/pelvis–stable mesenteric mass contiguous with pancreatic head.  Decrease in thickening at the ileocolic anastomosis.  No new abnormalities. [de-identified] : adenocarcinoma [de-identified] : CEA 12/2013-33.6 [de-identified] : History of right breast cancer-2007. Status post right breast conservation surgery--> RT--> [facility].         \par \par  [de-identified] : Remains independent, drives. \par Ambulates with a walker and cane as needed due to chronic imbalance. No c/o H/A.\par No CP, cough, SOB, fevers. No c/o N/V/change in bowel habits. No c/o abdominal/pelvic pain. No bleeding.\par \par \par

## 2021-07-22 NOTE — ASSESSMENT
[FreeTextEntry1] : Lab work reviewed.\par #1) Colon cancer-clinically stable at present. Patient consents to continue Keytruda as planned.\par \par Continue f/u with nephrology, for renal disease. Continues to take bicarb supplement per Dr. Garcia.\par \par #2) history of anemia/leukopenia/thrombocytopenia-most recent hemoglobin and ANC available acceptable, as is platelet count. Continue to monitor CBC with differential.\par \par Patient was given the opportunity to ask questions. Her questions have been answered to her apparent satisfaction at this time.  She concurs with plans of care.\par

## 2021-07-22 NOTE — REASON FOR VISIT
[Home] : at home, [unfilled] , at the time of the visit. [Medical Office: ([facility])___] : at the medical office located in  [Verbal consent obtained from patient] : the patient, [unfilled] [Follow-Up Visit] : a follow-up [FreeTextEntry2] : colon cancer

## 2021-07-30 ENCOUNTER — LABORATORY RESULT (OUTPATIENT)
Age: 86
End: 2021-07-30

## 2021-07-30 LAB
ALBUMIN SERPL ELPH-MCNC: 4.3 G/DL
ALP BLD-CCNC: 80 U/L
ALT SERPL-CCNC: 20 U/L
ANION GAP SERPL CALC-SCNC: 17 MMOL/L
AST SERPL-CCNC: 22 U/L
BASOPHILS # BLD AUTO: 0.02 K/UL
BASOPHILS NFR BLD AUTO: 0.6 %
BILIRUB SERPL-MCNC: 0.4 MG/DL
BUN SERPL-MCNC: 35 MG/DL
CALCIUM SERPL-MCNC: 9 MG/DL
CHLORIDE SERPL-SCNC: 113 MMOL/L
CO2 SERPL-SCNC: 13 MMOL/L
CREAT SERPL-MCNC: 2.02 MG/DL
EOSINOPHIL # BLD AUTO: 0.07 K/UL
EOSINOPHIL NFR BLD AUTO: 2.1 %
GLUCOSE SERPL-MCNC: 119 MG/DL
HCT VFR BLD CALC: 34.9 %
HGB BLD-MCNC: 11.3 G/DL
IMM GRANULOCYTES NFR BLD AUTO: 0 %
LYMPHOCYTES # BLD AUTO: 0.85 K/UL
LYMPHOCYTES NFR BLD AUTO: 25.5 %
MAN DIFF?: NORMAL
MCHC RBC-ENTMCNC: 31.1 PG
MCHC RBC-ENTMCNC: 32.4 GM/DL
MCV RBC AUTO: 96.1 FL
MONOCYTES # BLD AUTO: 0.24 K/UL
MONOCYTES NFR BLD AUTO: 7.2 %
NEUTROPHILS # BLD AUTO: 2.15 K/UL
NEUTROPHILS NFR BLD AUTO: 64.6 %
PLATELET # BLD AUTO: 95 K/UL
POTASSIUM SERPL-SCNC: 3.7 MMOL/L
PROT SERPL-MCNC: 6.1 G/DL
RBC # BLD: 3.63 M/UL
RBC # FLD: 14.2 %
SODIUM SERPL-SCNC: 142 MMOL/L
TSH SERPL-ACNC: 1.01 UIU/ML
WBC # FLD AUTO: 3.33 K/UL

## 2021-08-04 ENCOUNTER — APPOINTMENT (OUTPATIENT)
Dept: HEMATOLOGY ONCOLOGY | Facility: CLINIC | Age: 86
End: 2021-08-04
Payer: MEDICARE

## 2021-08-04 ENCOUNTER — APPOINTMENT (OUTPATIENT)
Dept: INFUSION THERAPY | Facility: HOSPITAL | Age: 86
End: 2021-08-04

## 2021-08-04 VITALS
DIASTOLIC BLOOD PRESSURE: 73 MMHG | RESPIRATION RATE: 16 BRPM | HEART RATE: 71 BPM | TEMPERATURE: 98 F | SYSTOLIC BLOOD PRESSURE: 151 MMHG

## 2021-08-04 PROCEDURE — 99214 OFFICE O/P EST MOD 30 MIN: CPT

## 2021-08-04 NOTE — ASSESSMENT
[FreeTextEntry1] : Lab work reviewed.\par #1) Colon cancer-clinically stable at present. Patient consents to continue Keytruda as planned. H/O elevated CEA-re-check with next lab work.\par \par Continue f/u with nephrology, for renal disease. A copy of recent labs faxed to nephrologist.  Continues to take bicarb supplement per Dr. Garcia.\par \par #2) history of anemia/leukopenia/thrombocytopenia-most recent hemoglobin stable and ANC WNL. Platelet count decreased-no overt bleeding noted clinically at present. Continue to monitor CBC with differential closely-if continued decline in count(s), need t/c further evaluation (ex. BMB) to r/o evolving disorder such as MDS-procedure d/w patient today.\par \par Patient was given the opportunity to ask questions. Her questions have been answered to her apparent satisfaction at this time.  She concurs with plans of care.\par

## 2021-08-04 NOTE — PHYSICAL EXAM
[Restricted in physically strenuous activity but ambulatory and able to carry out work of a light or sedentary nature] : Status 1- Restricted in physically strenuous activity but ambulatory and able to carry out work of a light or sedentary nature, e.g., light house work, office work [Normal] : affect appropriate [de-identified] : Well-developed, well-nourished, cooperative female, nontoxic appearing, in no acute distress. [de-identified] : Soft, nontender to palpation. Unable to appreciate organomegaly. [de-identified] : unable to appreciate discrete LAD [de-identified] : no gross focal motor deficits

## 2021-08-04 NOTE — HISTORY OF PRESENT ILLNESS
[Disease: _____________________] : Disease: [unfilled] [T: ___] : T[unfilled] [N: ___] : N[unfilled] [M: ___] : M[unfilled] [AJCC Stage: ____] : AJCC Stage: [unfilled] [de-identified] : Patient with history of colon cancer (adenocarcinoma)-5-2011. Stage IIIC-T4 N2a. Status post right colectomy followed by FOLFOX chemotherapy.  With persistently elevated CEA on f/u testing,  3/2013 mesentery mass excision pathology showed a lymph node negative for metastatic carcinoma. Benign fibroadipose tissue.\par Persistently increasing elevated CEA.  CT scan abdomen/pelvis 6/2016, showed enlarging mesenteric adenopathy. Endoscopic ultrasound guided FNA of the bryan-pancreatic, mesenteric mass was positive for malignant cells-adenocarcinoma associated with abundant mucin. 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  Patient obtained 2 surgical opinions and disease was felt to be inoperable by both surgeons.  Patient begun on Xeloda/Avastin.\par 5/2017-CT scan chest/abdomen/pelvis showed stable tiny nodules in the lungs. Sizable mass merging with the head of the pancreas had Increased in size from 11/2016 with some increasing infiltration of the surrounding fat.  Possibility of increasing colonic wall thickness on the colon site of the ileocolic anastomosis. Patient begun on Pembrolizumab (tumor MSI-high).\par 8/2017- CT scan chest/abdomen/pelvis showed marginally worsening mesenteric adenopathy, with short interval followup CT scan abdomen/pelvis 9/2017, stable compared to 8/2017.\par 1/2018-CT scan chest/abdomen/pelvis-stable adenopathy.\par 6/2018-CT scan chest/abdomen/pelvis-stable adenopathy.\par 11/2018-CT chest/abdomen/pelvis-unchanged mass at the root of the mesentery.\par 4/2019-CT scan chest/abdomen/pelvis-stable mesenteric soft tissue mass.\par 8/2019-CT scan chest/abdomen/pelvis-unchanged mesenteric mass.\par 10/2019-CT scan A/P-stable mesenteric mass. Held Pembrolizumab.\par 2/2020-CT scan chest/abdomen/pelvis-stable exam with mesenteric mass without significant change since 10/2019. Resumed Pembrolizumab, which was then held 6/2020 due to progressive renal insufficiency.\par 7/2020-CT scan chest/abdomen/pelvis–no significant change.  Stable mesenteric mass unchanged since 10/2019.  Segment of colonic wall thickening at the level of the anastomosis unchanged.  Enlarged multinodular thyroid with bilateral substernal extension unchanged.\par 10/2020-CT scan C/A/P-stable upper abdominal mesenteric mass. No new suspicious pathology.\par \par 1/2021–CT scan chest/abdomen/pelvis–mesenteric mass inseparable from the pancreatic head encasing the proximal mesenteric vessels minimally enlarged since 10/2020.  Mild mural thickening and ileal colic anastomosis. New course Pembrolizumab begun.\par 5/2021–CT scan chest/abdomen/pelvis–stable mesenteric mass contiguous with pancreatic head.  Decrease in thickening at the ileocolic anastomosis.  No new abnormalities. [de-identified] : adenocarcinoma [de-identified] : CEA 12/2013-33.6 [de-identified] : History of right breast cancer-2007. Status post right breast conservation surgery--> RT--> [facility].         \par \par  [de-identified] : [facility]es [facility] activities [facility]. Remains independent, drives. \par Ambulates with a walker and cane as needed due to chronic imbalance. No c/o H/A.\par No CP, cough, SOB, fevers. No c/o N/V/change in bowel habits. No c/o abdominal/pelvic pain. No bleeding.\par \par \par

## 2021-08-05 DIAGNOSIS — R11.2 NAUSEA WITH VOMITING, UNSPECIFIED: ICD-10-CM

## 2021-08-05 DIAGNOSIS — Z51.11 ENCOUNTER FOR ANTINEOPLASTIC CHEMOTHERAPY: ICD-10-CM

## 2021-08-21 ENCOUNTER — OUTPATIENT (OUTPATIENT)
Dept: OUTPATIENT SERVICES | Facility: HOSPITAL | Age: 86
LOS: 1 days | Discharge: HOME | End: 2021-08-21

## 2021-08-21 DIAGNOSIS — Z98.890 OTHER SPECIFIED POSTPROCEDURAL STATES: Chronic | ICD-10-CM

## 2021-08-21 DIAGNOSIS — C18.9 MALIGNANT NEOPLASM OF COLON, UNSPECIFIED: ICD-10-CM

## 2021-08-21 DIAGNOSIS — Z90.49 ACQUIRED ABSENCE OF OTHER SPECIFIED PARTS OF DIGESTIVE TRACT: Chronic | ICD-10-CM

## 2021-08-21 DIAGNOSIS — D35.1 BENIGN NEOPLASM OF PARATHYROID GLAND: Chronic | ICD-10-CM

## 2021-08-21 LAB
25(OH)D3 SERPL-MCNC: 40 NG/ML
ALBUMIN SERPL ELPH-MCNC: 4.3 G/DL
ALP BLD-CCNC: 72 U/L
ALT SERPL-CCNC: 14 U/L
ANION GAP SERPL CALC-SCNC: 14 MMOL/L
AST SERPL-CCNC: 21 U/L
BASOPHILS # BLD AUTO: 0.02 K/UL
BASOPHILS NFR BLD AUTO: 0.6 %
BILIRUB SERPL-MCNC: 0.4 MG/DL
BUN SERPL-MCNC: 38 MG/DL
CALCIUM SERPL-MCNC: 9.3 MG/DL
CEA SERPL-MCNC: 309 NG/ML
CHLORIDE SERPL-SCNC: 113 MMOL/L
CO2 SERPL-SCNC: 13 MMOL/L
CREAT SERPL-MCNC: 2 MG/DL
EOSINOPHIL # BLD AUTO: 0.09 K/UL
EOSINOPHIL NFR BLD AUTO: 2.9 %
GLUCOSE SERPL-MCNC: 122 MG/DL
HCT VFR BLD CALC: 34.4 %
HGB BLD-MCNC: 11.4 G/DL
IMM GRANULOCYTES NFR BLD AUTO: 0.3 %
LYMPHOCYTES # BLD AUTO: 0.77 K/UL
LYMPHOCYTES NFR BLD AUTO: 24.5 %
MAN DIFF?: NORMAL
MCHC RBC-ENTMCNC: 31.1 PG
MCHC RBC-ENTMCNC: 33.1 GM/DL
MCV RBC AUTO: 94 FL
MONOCYTES # BLD AUTO: 0.22 K/UL
MONOCYTES NFR BLD AUTO: 7 %
NEUTROPHILS # BLD AUTO: 2.03 K/UL
NEUTROPHILS NFR BLD AUTO: 64.7 %
PLATELET # BLD AUTO: 109 K/UL
POTASSIUM SERPL-SCNC: 4.4 MMOL/L
PROT SERPL-MCNC: 6.2 G/DL
RBC # BLD: 3.66 M/UL
RBC # FLD: 13.6 %
SODIUM SERPL-SCNC: 141 MMOL/L
TSH SERPL-ACNC: 1.36 UIU/ML
WBC # FLD AUTO: 3.14 K/UL

## 2021-08-24 ENCOUNTER — APPOINTMENT (OUTPATIENT)
Dept: INFUSION THERAPY | Facility: HOSPITAL | Age: 86
End: 2021-08-24

## 2021-08-24 ENCOUNTER — APPOINTMENT (OUTPATIENT)
Dept: HEMATOLOGY ONCOLOGY | Facility: CLINIC | Age: 86
End: 2021-08-24
Payer: MEDICARE

## 2021-08-24 VITALS
DIASTOLIC BLOOD PRESSURE: 65 MMHG | HEART RATE: 67 BPM | RESPIRATION RATE: 18 BRPM | TEMPERATURE: 97.8 F | SYSTOLIC BLOOD PRESSURE: 167 MMHG

## 2021-08-24 PROCEDURE — 99214 OFFICE O/P EST MOD 30 MIN: CPT

## 2021-08-24 NOTE — HISTORY OF PRESENT ILLNESS
[Disease: _____________________] : Disease: [unfilled] [T: ___] : T[unfilled] [N: ___] : N[unfilled] [M: ___] : M[unfilled] [AJCC Stage: ____] : AJCC Stage: [unfilled] [de-identified] : Patient with history of colon cancer (adenocarcinoma)-5-2011. Stage IIIC-T4 N2a. Status post right colectomy followed by FOLFOX chemotherapy.  With persistently elevated CEA on f/u testing,  3/2013 mesentery mass excision pathology showed a lymph node negative for metastatic carcinoma. Benign fibroadipose tissue.\par Persistently increasing elevated CEA.  CT scan abdomen/pelvis 6/2016, showed enlarging mesenteric adenopathy. Endoscopic ultrasound guided FNA of the bryan-pancreatic, mesenteric mass was positive for malignant cells-adenocarcinoma associated with abundant mucin. 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  Patient obtained 2 surgical opinions and disease was felt to be inoperable by both surgeons.  Patient begun on Xeloda/Avastin.\par 5/2017-CT scan chest/abdomen/pelvis showed stable tiny nodules in the lungs. Sizable mass merging with the head of the pancreas had Increased in size from 11/2016 with some increasing infiltration of the surrounding fat.  Possibility of increasing colonic wall thickness on the colon site of the ileocolic anastomosis. Patient begun on Pembrolizumab (tumor MSI-high).\par 8/2017- CT scan chest/abdomen/pelvis showed marginally worsening mesenteric adenopathy, with short interval followup CT scan abdomen/pelvis 9/2017, stable compared to 8/2017.\par 1/2018-CT scan chest/abdomen/pelvis-stable adenopathy.\par 6/2018-CT scan chest/abdomen/pelvis-stable adenopathy.\par 11/2018-CT chest/abdomen/pelvis-unchanged mass at the root of the mesentery.\par 4/2019-CT scan chest/abdomen/pelvis-stable mesenteric soft tissue mass.\par 8/2019-CT scan chest/abdomen/pelvis-unchanged mesenteric mass.\par 10/2019-CT scan A/P-stable mesenteric mass. Held Pembrolizumab.\par 2/2020-CT scan chest/abdomen/pelvis-stable exam with mesenteric mass without significant change since 10/2019. Resumed Pembrolizumab, which was then held 6/2020 due to progressive renal insufficiency.\par 7/2020-CT scan chest/abdomen/pelvis–no significant change.  Stable mesenteric mass unchanged since 10/2019.  Segment of colonic wall thickening at the level of the anastomosis unchanged.  Enlarged multinodular thyroid with bilateral substernal extension unchanged.\par 10/2020-CT scan C/A/P-stable upper abdominal mesenteric mass. No new suspicious pathology.\par \par 1/2021–CT scan chest/abdomen/pelvis–mesenteric mass inseparable from the pancreatic head encasing the proximal mesenteric vessels minimally enlarged since 10/2020.  Mild mural thickening and ileal colic anastomosis. New course Pembrolizumab begun.\par 5/2021–CT scan chest/abdomen/pelvis–stable mesenteric mass contiguous with pancreatic head.  Decrease in thickening at the ileocolic anastomosis.  No new abnormalities. [de-identified] : adenocarcinoma [de-identified] : CEA 12/2013-33.6 [de-identified] : History of right breast cancer-2007. Status post right breast conservation surgery--> RT--> [facility].         \par \par  [de-identified] : Generally feels well. Remains independent, drives. \par Ambulates with a walker and cane as needed due to chronic imbalance. No c/o H/A.\par No CP, cough, SOB, fevers. No c/o N/V/change in bowel habits. No c/o abdominal/pelvic pain. 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

## 2021-08-24 NOTE — REVIEW OF SYSTEMS
[Negative] : Allergic/Immunologic [Loss of Hearing] : loss of hearing [Abdominal Pain] : no abdominal pain [Muscle Pain] : no muscle pain [Fainting] : no fainting

## 2021-08-24 NOTE — ASSESSMENT
[FreeTextEntry1] : Lab work reviewed.\par #1) Colon cancer-clinically stable at present. Patient consents to continue Keytruda as planned. H/O elevated CEA-decreasing trend in last few months-continue to follow.\par \par Continue f/u with nephrology, for renal disease. Continues to take bicarb supplement per Dr. Garcia.\par \par #2) history of anemia/leukopenia/thrombocytopenia-most recent hemoglobin available stable and ANC WNL. Platelet count stable-no overt bleeding noted clinically at present. Continue to monitor CBC with differential closely-if continued decline in count(s), need t/c further evaluation (ex. BMB) to r/o evolving disorder such as MDS.\par \par Patient was given the opportunity to ask questions. Her questions have been answered to her apparent satisfaction at this time.  She concurs with plans of care.\par

## 2021-08-24 NOTE — PHYSICAL EXAM
[Restricted in physically strenuous activity but ambulatory and able to carry out work of a light or sedentary nature] : Status 1- Restricted in physically strenuous activity but ambulatory and able to carry out work of a light or sedentary nature, e.g., light house work, office work [Normal] : affect appropriate [de-identified] : Well-developed, well-nourished, cooperative female, nontoxic appearing, in no acute distress. [de-identified] : Soft, nontender to palpation. Unable to appreciate organomegaly. [de-identified] : unable to appreciate discrete LAD [de-identified] : no gross focal motor deficits

## 2021-08-25 DIAGNOSIS — Z51.11 ENCOUNTER FOR ANTINEOPLASTIC CHEMOTHERAPY: ICD-10-CM

## 2021-08-25 DIAGNOSIS — R11.2 NAUSEA WITH VOMITING, UNSPECIFIED: ICD-10-CM

## 2021-09-14 ENCOUNTER — APPOINTMENT (OUTPATIENT)
Dept: INFUSION THERAPY | Facility: HOSPITAL | Age: 86
End: 2021-09-14

## 2021-09-14 ENCOUNTER — APPOINTMENT (OUTPATIENT)
Dept: HEMATOLOGY ONCOLOGY | Facility: CLINIC | Age: 86
End: 2021-09-14
Payer: MEDICARE

## 2021-09-14 VITALS
DIASTOLIC BLOOD PRESSURE: 68 MMHG | RESPIRATION RATE: 18 BRPM | TEMPERATURE: 98 F | SYSTOLIC BLOOD PRESSURE: 133 MMHG | HEART RATE: 65 BPM

## 2021-09-14 PROCEDURE — 99214 OFFICE O/P EST MOD 30 MIN: CPT

## 2021-09-14 NOTE — ASSESSMENT
[FreeTextEntry1] : Lab work reviewed.\par #1) Colon cancer-clinically stable at present. Patient consents to continue Keytruda as planned. H/O elevated CEA-decreasing trend in last few months-continue to follow, and tentatively plan f/u scans next visit.\par \par Continue f/u with nephrology, for renal disease. Continues to take bicarb supplement per Dr. Garcia.\par \par #2) history of anemia/leukopenia/thrombocytopenia-most recent hemoglobin available stable and ANC WNL. Platelet count stable-no overt bleeding noted clinically at present. Continue to monitor CBC with differential closely-if continued decline in count(s), need t/c further evaluation (ex. BMB) to r/o evolving disorder such as MDS.\par \par Patient was given the opportunity to ask questions. Her questions have been answered to her apparent satisfaction at this time.  She concurs with plans of care.\par

## 2021-09-14 NOTE — HISTORY OF PRESENT ILLNESS
[Disease: _____________________] : Disease: [unfilled] [T: ___] : T[unfilled] [N: ___] : N[unfilled] [M: ___] : M[unfilled] [AJCC Stage: ____] : AJCC Stage: [unfilled] [de-identified] : Patient with history of colon cancer (adenocarcinoma)-5-2011. Stage IIIC-T4 N2a. Status post right colectomy followed by FOLFOX chemotherapy.  With persistently elevated CEA on f/u testing,  3/2013 mesentery mass excision pathology showed a lymph node negative for metastatic carcinoma. Benign fibroadipose tissue.\par Persistently increasing elevated CEA.  CT scan abdomen/pelvis 6/2016, showed enlarging mesenteric adenopathy. Endoscopic ultrasound guided FNA of the bryan-pancreatic, mesenteric mass was positive for malignant cells-adenocarcinoma associated with abundant mucin. 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  Patient obtained 2 surgical opinions and disease was felt to be inoperable by both surgeons.  Patient begun on Xeloda/Avastin.\par 5/2017-CT scan chest/abdomen/pelvis showed stable tiny nodules in the lungs. Sizable mass merging with the head of the pancreas had Increased in size from 11/2016 with some increasing infiltration of the surrounding fat.  Possibility of increasing colonic wall thickness on the colon site of the ileocolic anastomosis. Patient begun on Pembrolizumab (tumor MSI-high).\par 8/2017- CT scan chest/abdomen/pelvis showed marginally worsening mesenteric adenopathy, with short interval followup CT scan abdomen/pelvis 9/2017, stable compared to 8/2017.\par 1/2018-CT scan chest/abdomen/pelvis-stable adenopathy.\par 6/2018-CT scan chest/abdomen/pelvis-stable adenopathy.\par 11/2018-CT chest/abdomen/pelvis-unchanged mass at the root of the mesentery.\par 4/2019-CT scan chest/abdomen/pelvis-stable mesenteric soft tissue mass.\par 8/2019-CT scan chest/abdomen/pelvis-unchanged mesenteric mass.\par 10/2019-CT scan A/P-stable mesenteric mass. Held Pembrolizumab.\par 2/2020-CT scan chest/abdomen/pelvis-stable exam with mesenteric mass without significant change since 10/2019. Resumed Pembrolizumab, which was then held 6/2020 due to progressive renal insufficiency.\par 7/2020-CT scan chest/abdomen/pelvis–no significant change.  Stable mesenteric mass unchanged since 10/2019.  Segment of colonic wall thickening at the level of the anastomosis unchanged.  Enlarged multinodular thyroid with bilateral substernal extension unchanged.\par 10/2020-CT scan C/A/P-stable upper abdominal mesenteric mass. No new suspicious pathology.\par \par 1/2021–CT scan chest/abdomen/pelvis–mesenteric mass inseparable from the pancreatic head encasing the proximal mesenteric vessels minimally enlarged since 10/2020.  Mild mural thickening and ileal colic anastomosis. New course Pembrolizumab begun.\par 5/2021–CT scan chest/abdomen/pelvis–stable mesenteric mass contiguous with pancreatic head.  Decrease in thickening at the ileocolic anastomosis.  No new abnormalities. [de-identified] : adenocarcinoma [de-identified] : CEA 12/2013-33.6 [de-identified] : History of right breast cancer-2007. Status post right breast conservation surgery--> RT--> [facility].         \par \par  [de-identified] : Decreased hearing right ear. S/W PCP and decided not to pursue second ENT MD opinion at this time.\par Remains independent, drives. \par Ambulates with a walker and cane as needed due to chronic imbalance. No c/o H/A.\par No CP, cough, SOB, fevers. No c/o N/V/change in bowel habits. No c/o abdominal/pelvic pain. No bleeding.\par \par \par \par

## 2021-09-14 NOTE — PHYSICAL EXAM
[Restricted in physically strenuous activity but ambulatory and able to carry out work of a light or sedentary nature] : Status 1- Restricted in physically strenuous activity but ambulatory and able to carry out work of a light or sedentary nature, e.g., light house work, office work [Normal] : affect appropriate [de-identified] : Well-developed, well-nourished, cooperative female, nontoxic appearing, in no acute distress. [de-identified] : Soft, nontender to palpation. Unable to appreciate organomegaly. [de-identified] : unable to appreciate discrete LAD [de-identified] : no gross focal motor deficits

## 2021-09-30 ENCOUNTER — OUTPATIENT (OUTPATIENT)
Dept: OUTPATIENT SERVICES | Facility: HOSPITAL | Age: 86
LOS: 1 days | Discharge: HOME | End: 2021-09-30

## 2021-09-30 DIAGNOSIS — Z90.49 ACQUIRED ABSENCE OF OTHER SPECIFIED PARTS OF DIGESTIVE TRACT: Chronic | ICD-10-CM

## 2021-09-30 DIAGNOSIS — Z98.890 OTHER SPECIFIED POSTPROCEDURAL STATES: Chronic | ICD-10-CM

## 2021-09-30 DIAGNOSIS — C18.9 MALIGNANT NEOPLASM OF COLON, UNSPECIFIED: ICD-10-CM

## 2021-09-30 DIAGNOSIS — D35.1 BENIGN NEOPLASM OF PARATHYROID GLAND: Chronic | ICD-10-CM

## 2021-10-01 LAB
ALBUMIN SERPL ELPH-MCNC: 4.1 G/DL
ALP BLD-CCNC: 75 U/L
ALT SERPL-CCNC: 24 U/L
ANION GAP SERPL CALC-SCNC: 14 MMOL/L
AST SERPL-CCNC: 22 U/L
BASOPHILS # BLD AUTO: 0.01 K/UL
BASOPHILS NFR BLD AUTO: 0.3 %
BILIRUB SERPL-MCNC: 0.4 MG/DL
BUN SERPL-MCNC: 31 MG/DL
CALCIUM SERPL-MCNC: 8.9 MG/DL
CHLORIDE SERPL-SCNC: 113 MMOL/L
CO2 SERPL-SCNC: 16 MMOL/L
CREAT SERPL-MCNC: 2.04 MG/DL
EOSINOPHIL # BLD AUTO: 0.07 K/UL
EOSINOPHIL NFR BLD AUTO: 2.4 %
GLUCOSE SERPL-MCNC: 129 MG/DL
HCT VFR BLD CALC: 32.7 %
HGB BLD-MCNC: 11 G/DL
IMM GRANULOCYTES NFR BLD AUTO: 0 %
LYMPHOCYTES # BLD AUTO: 0.79 K/UL
LYMPHOCYTES NFR BLD AUTO: 26.7 %
MAN DIFF?: NORMAL
MCHC RBC-ENTMCNC: 31.6 PG
MCHC RBC-ENTMCNC: 33.6 GM/DL
MCV RBC AUTO: 94 FL
MONOCYTES # BLD AUTO: 0.18 K/UL
MONOCYTES NFR BLD AUTO: 6.1 %
NEUTROPHILS # BLD AUTO: 1.91 K/UL
NEUTROPHILS NFR BLD AUTO: 64.5 %
PLATELET # BLD AUTO: 105 K/UL
POTASSIUM SERPL-SCNC: 4 MMOL/L
PROT SERPL-MCNC: 6 G/DL
RBC # BLD: 3.48 M/UL
RBC # FLD: 13.8 %
SODIUM SERPL-SCNC: 143 MMOL/L
TSH SERPL-ACNC: 1.71 UIU/ML
WBC # FLD AUTO: 2.96 K/UL

## 2021-10-05 ENCOUNTER — APPOINTMENT (OUTPATIENT)
Dept: HEMATOLOGY ONCOLOGY | Facility: CLINIC | Age: 86
End: 2021-10-05
Payer: MEDICARE

## 2021-10-05 ENCOUNTER — APPOINTMENT (OUTPATIENT)
Dept: INFUSION THERAPY | Facility: HOSPITAL | Age: 86
End: 2021-10-05

## 2021-10-05 VITALS — HEART RATE: 72 BPM | RESPIRATION RATE: 16 BRPM

## 2021-10-05 PROCEDURE — 99214 OFFICE O/P EST MOD 30 MIN: CPT

## 2021-10-05 NOTE — ASSESSMENT
[FreeTextEntry1] : Lab work reviewed.\par #1) Colon cancer-clinically stable at present. Patient consents to continue Keytruda as planned. H/O elevated CEA-decreasing trend in last few months-continue to follow, with f/u scans ordered.\par \par Recommend f/u with nephrology Dr. Garcia, for renal disease/elevated creatinine. Continues to take bicarb supplement per Dr. Garcia.\par \par #2) history of anemia/leukopenia/thrombocytopenia-most recent hemoglobin/platelet count available acceptable,  and ANC WNL. No overt bleeding noted clinically at present. Continue to monitor CBC with differential closely-if continued decline in count(s), need t/c further evaluation (ex. BMB) to r/o evolving disorder such as MDS-discussed with patient today.\par \par #3) h/o breast cancer-clinically stable. Surveillance.\par \par Patient was given the opportunity to ask questions. Her questions have been answered to her apparent satisfaction at this time.  She concurs with plans of care.\par

## 2021-10-05 NOTE — PHYSICAL EXAM
[Normal] : affect appropriate [Restricted in physically strenuous activity but ambulatory and able to carry out work of a light or sedentary nature] : Status 1- Restricted in physically strenuous activity but ambulatory and able to carry out work of a light or sedentary nature, e.g., light house work, office work [de-identified] : Well-developed, well-nourished, cooperative female, nontoxic appearing, in no acute distress. [de-identified] : no dominant mass [de-identified] : Soft, nontender to palpation. Unable to appreciate organomegaly. [de-identified] : unable to appreciate discrete LAD [de-identified] : no gross focal motor deficits

## 2021-10-05 NOTE — HISTORY OF PRESENT ILLNESS
[Disease: _____________________] : Disease: [unfilled] [T: ___] : T[unfilled] [N: ___] : N[unfilled] [M: ___] : M[unfilled] [AJCC Stage: ____] : AJCC Stage: [unfilled] [de-identified] : Patient with history of colon cancer (adenocarcinoma)-5-2011. Stage IIIC-T4 N2a. Status post right colectomy followed by FOLFOX chemotherapy.  With persistently elevated CEA on f/u testing,  3/2013 mesentery mass excision pathology showed a lymph node negative for metastatic carcinoma. Benign fibroadipose tissue.\par Persistently increasing elevated CEA.  CT scan abdomen/pelvis 6/2016, showed enlarging mesenteric adenopathy. Endoscopic ultrasound guided FNA of the bryan-pancreatic, mesenteric mass was positive for malignant cells-adenocarcinoma associated with abundant mucin. 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  Patient obtained 2 surgical opinions and disease was felt to be inoperable by both surgeons.  Patient begun on Xeloda/Avastin.\par 5/2017-CT scan chest/abdomen/pelvis showed stable tiny nodules in the lungs. Sizable mass merging with the head of the pancreas had Increased in size from 11/2016 with some increasing infiltration of the surrounding fat.  Possibility of increasing colonic wall thickness on the colon site of the ileocolic anastomosis. Patient begun on Pembrolizumab (tumor MSI-high).\par 8/2017- CT scan chest/abdomen/pelvis showed marginally worsening mesenteric adenopathy, with short interval followup CT scan abdomen/pelvis 9/2017, stable compared to 8/2017.\par 1/2018-CT scan chest/abdomen/pelvis-stable adenopathy.\par 6/2018-CT scan chest/abdomen/pelvis-stable adenopathy.\par 11/2018-CT chest/abdomen/pelvis-unchanged mass at the root of the mesentery.\par 4/2019-CT scan chest/abdomen/pelvis-stable mesenteric soft tissue mass.\par 8/2019-CT scan chest/abdomen/pelvis-unchanged mesenteric mass.\par 10/2019-CT scan A/P-stable mesenteric mass. Held Pembrolizumab.\par 2/2020-CT scan chest/abdomen/pelvis-stable exam with mesenteric mass without significant change since 10/2019. Resumed Pembrolizumab, which was then held 6/2020 due to progressive renal insufficiency.\par 7/2020-CT scan chest/abdomen/pelvis–no significant change.  Stable mesenteric mass unchanged since 10/2019.  Segment of colonic wall thickening at the level of the anastomosis unchanged.  Enlarged multinodular thyroid with bilateral substernal extension unchanged.\par 10/2020-CT scan C/A/P-stable upper abdominal mesenteric mass. No new suspicious pathology.\par \par 1/2021–CT scan chest/abdomen/pelvis–mesenteric mass inseparable from the pancreatic head encasing the proximal mesenteric vessels minimally enlarged since 10/2020.  Mild mural thickening and ileal colic anastomosis. New course Pembrolizumab begun.\par 5/2021–CT scan chest/abdomen/pelvis–stable mesenteric mass contiguous with pancreatic head.  Decrease in thickening at the ileocolic anastomosis.  No new abnormalities. [de-identified] : adenocarcinoma [de-identified] : CEA 12/2013-33.6 [de-identified] : History of right breast cancer-2007. Status post right breast conservation surgery--> RT--> [facility].         \par \par  [de-identified] : Generally feeling well without new complaints.\par Remains independent, drives. \par Ambulates with a walker and cane as needed due to chronic imbalance. No c/o H/A.\par No CP, cough, SOB, fevers. No c/o N/V/change in bowel habits. No c/o abdominal/pelvic pain. No bleeding.\par \par \par \par

## 2021-10-06 DIAGNOSIS — R11.2 NAUSEA WITH VOMITING, UNSPECIFIED: ICD-10-CM

## 2021-10-06 DIAGNOSIS — Z51.11 ENCOUNTER FOR ANTINEOPLASTIC CHEMOTHERAPY: ICD-10-CM

## 2021-10-14 ENCOUNTER — RESULT REVIEW (OUTPATIENT)
Age: 86
End: 2021-10-14

## 2021-10-19 ENCOUNTER — APPOINTMENT (OUTPATIENT)
Dept: CT IMAGING | Facility: HOSPITAL | Age: 86
End: 2021-10-19
Payer: MEDICARE

## 2021-10-19 ENCOUNTER — OUTPATIENT (OUTPATIENT)
Dept: OUTPATIENT SERVICES | Facility: HOSPITAL | Age: 86
LOS: 1 days | End: 2021-10-19
Payer: MEDICARE

## 2021-10-19 DIAGNOSIS — Z98.890 OTHER SPECIFIED POSTPROCEDURAL STATES: Chronic | ICD-10-CM

## 2021-10-19 DIAGNOSIS — C18.9 MALIGNANT NEOPLASM OF COLON, UNSPECIFIED: ICD-10-CM

## 2021-10-19 DIAGNOSIS — D35.1 BENIGN NEOPLASM OF PARATHYROID GLAND: Chronic | ICD-10-CM

## 2021-10-19 DIAGNOSIS — Z90.49 ACQUIRED ABSENCE OF OTHER SPECIFIED PARTS OF DIGESTIVE TRACT: Chronic | ICD-10-CM

## 2021-10-19 PROCEDURE — 74176 CT ABD & PELVIS W/O CONTRAST: CPT | Mod: 26,MG

## 2021-10-19 PROCEDURE — G1004: CPT

## 2021-10-19 PROCEDURE — 71250 CT THORAX DX C-: CPT | Mod: MG

## 2021-10-19 PROCEDURE — 74176 CT ABD & PELVIS W/O CONTRAST: CPT | Mod: MG

## 2021-10-19 PROCEDURE — 71250 CT THORAX DX C-: CPT | Mod: 26,MG

## 2021-10-22 ENCOUNTER — LABORATORY RESULT (OUTPATIENT)
Age: 86
End: 2021-10-22

## 2021-10-22 LAB
ALBUMIN SERPL ELPH-MCNC: 4.2 G/DL
ALP BLD-CCNC: 72 U/L
ALT SERPL-CCNC: 19 U/L
ANION GAP SERPL CALC-SCNC: 13 MMOL/L
AST SERPL-CCNC: 19 U/L
BASOPHILS # BLD AUTO: 0.01 K/UL
BASOPHILS NFR BLD AUTO: 0.4 %
BILIRUB SERPL-MCNC: 0.4 MG/DL
BUN SERPL-MCNC: 23 MG/DL
CALCIUM SERPL-MCNC: 9.2 MG/DL
CEA SERPL-MCNC: 270 NG/ML
CHLORIDE SERPL-SCNC: 112 MMOL/L
CO2 SERPL-SCNC: 18 MMOL/L
CREAT SERPL-MCNC: 1.91 MG/DL
EOSINOPHIL # BLD AUTO: 0.07 K/UL
EOSINOPHIL NFR BLD AUTO: 2.7 %
GLUCOSE SERPL-MCNC: 112 MG/DL
HCT VFR BLD CALC: 32.8 %
HGB BLD-MCNC: 11 G/DL
IMM GRANULOCYTES NFR BLD AUTO: 0.4 %
LYMPHOCYTES # BLD AUTO: 0.73 K/UL
LYMPHOCYTES NFR BLD AUTO: 27.9 %
MAN DIFF?: NORMAL
MCHC RBC-ENTMCNC: 32.2 PG
MCHC RBC-ENTMCNC: 33.5 GM/DL
MCV RBC AUTO: 95.9 FL
MONOCYTES # BLD AUTO: 0.21 K/UL
MONOCYTES NFR BLD AUTO: 8 %
NEUTROPHILS # BLD AUTO: 1.59 K/UL
NEUTROPHILS NFR BLD AUTO: 60.6 %
PLATELET # BLD AUTO: 88 K/UL
POTASSIUM SERPL-SCNC: 5.1 MMOL/L
PROT SERPL-MCNC: 6 G/DL
RBC # BLD: 3.42 M/UL
RBC # FLD: 14.4 %
SODIUM SERPL-SCNC: 144 MMOL/L
TSH SERPL-ACNC: 2.14 UIU/ML
WBC # FLD AUTO: 2.62 K/UL

## 2021-10-27 ENCOUNTER — APPOINTMENT (OUTPATIENT)
Dept: INFUSION THERAPY | Facility: HOSPITAL | Age: 86
End: 2021-10-27

## 2021-10-27 ENCOUNTER — APPOINTMENT (OUTPATIENT)
Dept: HEMATOLOGY ONCOLOGY | Facility: CLINIC | Age: 86
End: 2021-10-27
Payer: MEDICARE

## 2021-10-27 VITALS
TEMPERATURE: 97.6 F | RESPIRATION RATE: 16 BRPM | HEART RATE: 64 BPM | OXYGEN SATURATION: 96 % | DIASTOLIC BLOOD PRESSURE: 58 MMHG | SYSTOLIC BLOOD PRESSURE: 163 MMHG

## 2021-10-27 PROCEDURE — 99214 OFFICE O/P EST MOD 30 MIN: CPT

## 2021-10-27 NOTE — HISTORY OF PRESENT ILLNESS
[Disease: _____________________] : Disease: [unfilled] [T: ___] : T[unfilled] [N: ___] : N[unfilled] [M: ___] : M[unfilled] [AJCC Stage: ____] : AJCC Stage: [unfilled] [de-identified] : Patient with history of colon cancer (adenocarcinoma)-5-2011. Stage IIIC-T4 N2a. Status post right colectomy followed by FOLFOX chemotherapy.  With persistently elevated CEA on f/u testing,  3/2013 mesentery mass excision pathology showed a lymph node negative for metastatic carcinoma. Benign fibroadipose tissue.\par Persistently increasing elevated CEA.  CT scan abdomen/pelvis 6/2016, showed enlarging mesenteric adenopathy. Endoscopic ultrasound guided FNA of the bryan-pancreatic, mesenteric mass was positive for malignant cells-adenocarcinoma associated with abundant mucin. 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  Patient obtained 2 surgical opinions and disease was felt to be inoperable by both surgeons.  Patient begun on Xeloda/Avastin.\par 5/2017-CT scan chest/abdomen/pelvis showed stable tiny nodules in the lungs. Sizable mass merging with the head of the pancreas had Increased in size from 11/2016 with some increasing infiltration of the surrounding fat.  Possibility of increasing colonic wall thickness on the colon site of the ileocolic anastomosis. Patient begun on Pembrolizumab (tumor MSI-high).\par 8/2017- CT scan chest/abdomen/pelvis showed marginally worsening mesenteric adenopathy, with short interval followup CT scan abdomen/pelvis 9/2017, stable compared to 8/2017.\par 1/2018-CT scan chest/abdomen/pelvis-stable adenopathy.\par 6/2018-CT scan chest/abdomen/pelvis-stable adenopathy.\par 11/2018-CT chest/abdomen/pelvis-unchanged mass at the root of the mesentery.\par 4/2019-CT scan chest/abdomen/pelvis-stable mesenteric soft tissue mass.\par 8/2019-CT scan chest/abdomen/pelvis-unchanged mesenteric mass.\par 10/2019-CT scan A/P-stable mesenteric mass. Held Pembrolizumab.\par 2/2020-CT scan chest/abdomen/pelvis-stable exam with mesenteric mass without significant change since 10/2019. Resumed Pembrolizumab, which was then held 6/2020 due to progressive renal insufficiency.\par 7/2020-CT scan chest/abdomen/pelvis–no significant change.  Stable mesenteric mass unchanged since 10/2019.  Segment of colonic wall thickening at the level of the anastomosis unchanged.  Enlarged multinodular thyroid with bilateral substernal extension unchanged.\par 10/2020-CT scan C/A/P-stable upper abdominal mesenteric mass. No new suspicious pathology.\par \par 1/2021–CT scan chest/abdomen/pelvis–mesenteric mass inseparable from the pancreatic head encasing the proximal mesenteric vessels minimally enlarged since 10/2020.  Mild mural thickening and ileal colic anastomosis. New course Pembrolizumab begun.\par 5/2021–CT scan chest/abdomen/pelvis–stable mesenteric mass contiguous with pancreatic head.  Decrease in thickening at the ileocolic anastomosis.  No new abnormalities.\par 10/2021- CT C/A/P-stable exam. [de-identified] : adenocarcinoma [de-identified] : CEA 12/2013-33.6 [de-identified] : History of right breast cancer-2007. Status post right breast conservation surgery--> RT--> [facility].         \par \par  [de-identified] : Had appt. with nephrologist Dr. Garcia-plans to keep monitoring renal status-felt was overall, stable.\par Generally feeling well without new complaints.\par Remains independent, drives. \par Ambulates with a walker and cane as needed due to chronic imbalance. No c/o H/A.\par No CP, cough, SOB, fevers. No c/o N/V/change in bowel habits. No c/o abdominal/pelvic pain. No bleeding.\par \par \par \par

## 2021-10-27 NOTE — PHYSICAL EXAM
[Restricted in physically strenuous activity but ambulatory and able to carry out work of a light or sedentary nature] : Status 1- Restricted in physically strenuous activity but ambulatory and able to carry out work of a light or sedentary nature, e.g., light house work, office work [Normal] : affect appropriate [de-identified] : Well-developed, well-nourished, cooperative female, nontoxic appearing, in no acute distress. [de-identified] : no dominant mass [de-identified] : Soft, nontender to palpation. Unable to appreciate organomegaly. [de-identified] : unable to appreciate discrete LAD [de-identified] : no gross focal motor deficits

## 2021-10-27 NOTE — ASSESSMENT
[FreeTextEntry1] : Lab work, CT C/A/P results reviewed.\par #1) Colon cancer-clinically stable at present. Patient wishes to continue Keytruda which she feels she is tolerating well. \par \par #2) history of anemia/leukopenia/thrombocytopenia-most recent hemoglobin stable.  With progressive neutropenia, thrombocytopenia, discussed consideration of bone marrow aspiration/biopsy to rule out underlying marrow process such as MDS evolving.  Procedure with potential benefits/side effects explained.  Patient consents to have BM evaluation, which will be scheduled.\par \par Patient was given the opportunity to ask questions. Her questions have been answered to her apparent satisfaction at this time.  She concurs with plans of care.\par

## 2021-10-27 NOTE — ADDENDUM
[FreeTextEntry1] : Treatment room RN unable to get blood return from port, so keytruda not given today (patient did not want peripheral access used). Port study to be done before next treatment.

## 2021-10-31 ENCOUNTER — OUTPATIENT (OUTPATIENT)
Dept: OUTPATIENT SERVICES | Facility: HOSPITAL | Age: 86
LOS: 1 days | End: 2021-10-31
Payer: MEDICARE

## 2021-10-31 DIAGNOSIS — Z90.49 ACQUIRED ABSENCE OF OTHER SPECIFIED PARTS OF DIGESTIVE TRACT: Chronic | ICD-10-CM

## 2021-10-31 DIAGNOSIS — Z11.52 ENCOUNTER FOR SCREENING FOR COVID-19: ICD-10-CM

## 2021-10-31 DIAGNOSIS — Z98.890 OTHER SPECIFIED POSTPROCEDURAL STATES: Chronic | ICD-10-CM

## 2021-10-31 DIAGNOSIS — D35.1 BENIGN NEOPLASM OF PARATHYROID GLAND: Chronic | ICD-10-CM

## 2021-10-31 LAB — SARS-COV-2 RNA SPEC QL NAA+PROBE: SIGNIFICANT CHANGE UP

## 2021-10-31 PROCEDURE — U0005: CPT

## 2021-10-31 PROCEDURE — U0003: CPT

## 2021-10-31 PROCEDURE — C9803: CPT

## 2021-11-03 ENCOUNTER — RESULT REVIEW (OUTPATIENT)
Age: 86
End: 2021-11-03

## 2021-11-03 ENCOUNTER — OUTPATIENT (OUTPATIENT)
Dept: OUTPATIENT SERVICES | Facility: HOSPITAL | Age: 86
LOS: 1 days | End: 2021-11-03
Payer: MEDICARE

## 2021-11-03 VITALS
OXYGEN SATURATION: 100 % | HEIGHT: 64 IN | DIASTOLIC BLOOD PRESSURE: 57 MMHG | SYSTOLIC BLOOD PRESSURE: 151 MMHG | RESPIRATION RATE: 18 BRPM | WEIGHT: 166.01 LBS | TEMPERATURE: 97 F | HEART RATE: 80 BPM

## 2021-11-03 DIAGNOSIS — Z90.49 ACQUIRED ABSENCE OF OTHER SPECIFIED PARTS OF DIGESTIVE TRACT: Chronic | ICD-10-CM

## 2021-11-03 DIAGNOSIS — Z98.890 OTHER SPECIFIED POSTPROCEDURAL STATES: Chronic | ICD-10-CM

## 2021-11-03 DIAGNOSIS — D35.1 BENIGN NEOPLASM OF PARATHYROID GLAND: Chronic | ICD-10-CM

## 2021-11-03 DIAGNOSIS — Z01.818 ENCOUNTER FOR OTHER PREPROCEDURAL EXAMINATION: ICD-10-CM

## 2021-11-03 DIAGNOSIS — C18.9 MALIGNANT NEOPLASM OF COLON, UNSPECIFIED: ICD-10-CM

## 2021-11-03 PROCEDURE — 36598 INJ W/FLUOR EVAL CV DEVICE: CPT | Mod: 52

## 2021-11-03 PROCEDURE — 36598 INJ W/FLUOR EVAL CV DEVICE: CPT

## 2021-11-03 RX ORDER — ASPIRIN/CALCIUM CARB/MAGNESIUM 324 MG
81 TABLET ORAL DAILY
Refills: 0 | Status: DISCONTINUED | OUTPATIENT
Start: 2021-11-03 | End: 2021-11-17

## 2021-11-03 NOTE — PRE PROCEDURE NOTE - PRE PROCEDURE EVALUATION
Interventional Radiology    HPI: 87y Female here in IR today for a chest port with no blood return.    Allergies:   Medications (Abx/Cardiac/Anticoagulation/Blood Products)      Data:  162.6  75.3  T(C): 36.3  HR: 80  BP: 151/57  RR: 18  SpO2: 100%    Exam  General: No acute distress  Chest: Non labored breathing  Abdomen: Non-distended  Extremities: No swelling, warm      Plan: 87y Female presents for chest port evaluation  -Risks/Benefits/alternatives explained with the patient and/or healthcare proxy and witnessed informed consent obtained.

## 2021-11-03 NOTE — ASU PATIENT PROFILE, ADULT - NSICDXPASTMEDICALHX_GEN_ALL_CORE_FT
PAST MEDICAL HISTORY:  Anemia     Chronic renal insufficiency     Colon cancer     Diabetes mellitus NIDDM    History of breast cancer right breast    History of herpes zoster     History of malignant neoplasm of parathyroid gland     History of thrombocytopenia     Hypertension     Thyroid nodule

## 2021-11-03 NOTE — PROCEDURE NOTE - PROCEDURE FINDINGS AND DETAILS
preliminary imaging demonstrating chest port catheter withdrawn; no evidence of fibrin sheath or clot on distal catheter; patient to be scheduled for port revision. do not use port in the interim

## 2021-11-03 NOTE — ASU DISCHARGE PLAN (ADULT/PEDIATRIC) - ASU DC SPECIAL INSTRUCTIONSFT
Chest Port Check    Discharge Instructions  - You have had a chest port evaluated.   - You may shower in 24 hours.  - Keep the area covered and dry for the next 24 hours.  - You may resume your normal diet.  - You may resume your normal medications.  - It is normal to experience some pain over the site for the next 24 hours. You may take apply ice to the area (20 minutes on, 20 minutes off) and take Tylenol for that pain. Do not take more frequently than every 6 hours and do not exceed more than 3000mg of Tylenol in a 24 hour period.    Notify your primary physician and/or Interventional Radiology IMMEDIATELY if you experience any of the following       - Fever of 100.4For 38C       - Chills or Rigors/ Shakes       - Swelling and/or Redness in the area around the port       - Worsening Pain       - Blood soaked bandages or worsening bleeding       - Lightheadedness and/or dizziness upon standing       - Chest Pain/ Tightness       - Shortness of Breath       - Difficulty walking    If you have a problem that you believe requires IMMEDIATE attention, please go to your NEAREST Emergency Room. If you believe your problem can safely wait until you speak to a physician, please call Interventional Radiology for any concerns.    During Normal Weekday Business Hours- You can contact the Interventional Radiology department during normal business hours via telephone.  During Evenings and Weekends- If you need to contact Interventional Radiology during off hours, do so by calling the hospital and requesting to be connected to the Interventional Radiologist on call.

## 2021-11-03 NOTE — ASU PATIENT PROFILE, ADULT - NSICDXPASTSURGICALHX_GEN_ALL_CORE_FT
PAST SURGICAL HISTORY:  History of cholecystectomy     History of lumpectomy of right breast 2007 and radiation and chemotherapy    History of partial colectomy and appendectomy at the same time    Parathyroid adenoma excision

## 2021-11-06 ENCOUNTER — OUTPATIENT (OUTPATIENT)
Dept: OUTPATIENT SERVICES | Facility: HOSPITAL | Age: 86
LOS: 1 days | End: 2021-11-06
Payer: MEDICARE

## 2021-11-06 DIAGNOSIS — Z98.890 OTHER SPECIFIED POSTPROCEDURAL STATES: Chronic | ICD-10-CM

## 2021-11-06 DIAGNOSIS — Z90.49 ACQUIRED ABSENCE OF OTHER SPECIFIED PARTS OF DIGESTIVE TRACT: Chronic | ICD-10-CM

## 2021-11-06 DIAGNOSIS — D35.1 BENIGN NEOPLASM OF PARATHYROID GLAND: Chronic | ICD-10-CM

## 2021-11-06 DIAGNOSIS — Z11.52 ENCOUNTER FOR SCREENING FOR COVID-19: ICD-10-CM

## 2021-11-06 LAB — SARS-COV-2 RNA SPEC QL NAA+PROBE: SIGNIFICANT CHANGE UP

## 2021-11-06 PROCEDURE — U0005: CPT

## 2021-11-06 PROCEDURE — C9803: CPT

## 2021-11-06 PROCEDURE — U0003: CPT

## 2021-11-08 ENCOUNTER — RESULT REVIEW (OUTPATIENT)
Age: 86
End: 2021-11-08

## 2021-11-08 ENCOUNTER — OUTPATIENT (OUTPATIENT)
Dept: OUTPATIENT SERVICES | Facility: HOSPITAL | Age: 86
LOS: 1 days | End: 2021-11-08
Payer: MEDICARE

## 2021-11-08 ENCOUNTER — NON-APPOINTMENT (OUTPATIENT)
Age: 86
End: 2021-11-08

## 2021-11-08 VITALS
OXYGEN SATURATION: 99 % | HEART RATE: 57 BPM | DIASTOLIC BLOOD PRESSURE: 47 MMHG | SYSTOLIC BLOOD PRESSURE: 176 MMHG | RESPIRATION RATE: 13 BRPM

## 2021-11-08 VITALS
TEMPERATURE: 98 F | DIASTOLIC BLOOD PRESSURE: 92 MMHG | HEART RATE: 72 BPM | HEIGHT: 64 IN | OXYGEN SATURATION: 99 % | WEIGHT: 166.01 LBS | RESPIRATION RATE: 18 BRPM | SYSTOLIC BLOOD PRESSURE: 136 MMHG

## 2021-11-08 DIAGNOSIS — Z11.52 ENCOUNTER FOR SCREENING FOR COVID-19: ICD-10-CM

## 2021-11-08 DIAGNOSIS — Z90.49 ACQUIRED ABSENCE OF OTHER SPECIFIED PARTS OF DIGESTIVE TRACT: Chronic | ICD-10-CM

## 2021-11-08 DIAGNOSIS — D35.1 BENIGN NEOPLASM OF PARATHYROID GLAND: Chronic | ICD-10-CM

## 2021-11-08 DIAGNOSIS — Z98.890 OTHER SPECIFIED POSTPROCEDURAL STATES: Chronic | ICD-10-CM

## 2021-11-08 DIAGNOSIS — C18.9 MALIGNANT NEOPLASM OF COLON, UNSPECIFIED: ICD-10-CM

## 2021-11-08 PROCEDURE — 76937 US GUIDE VASCULAR ACCESS: CPT

## 2021-11-08 PROCEDURE — 36582 REPLACE TUNNELED CV CATH: CPT

## 2021-11-08 PROCEDURE — 77001 FLUOROGUIDE FOR VEIN DEVICE: CPT | Mod: 26

## 2021-11-08 PROCEDURE — C1788: CPT

## 2021-11-08 PROCEDURE — C1769: CPT

## 2021-11-08 PROCEDURE — 36415 COLL VENOUS BLD VENIPUNCTURE: CPT

## 2021-11-08 PROCEDURE — 85610 PROTHROMBIN TIME: CPT

## 2021-11-08 PROCEDURE — 76937 US GUIDE VASCULAR ACCESS: CPT | Mod: 26

## 2021-11-08 PROCEDURE — C1887: CPT

## 2021-11-08 PROCEDURE — 77001 FLUOROGUIDE FOR VEIN DEVICE: CPT

## 2021-11-08 NOTE — CHART NOTE - NSCHARTNOTESELECT_GEN_ALL_CORE
EXAM note    History  Jose Hodge is a 40year old female who presents with complaints of a productive cough, nasal drainage congestion, mild sore throat, denies any earache, denies any sinus tenderness or pressure, questionable fever, no nausea or vomiting. Questionable sick contacts. Has been trying over-the-counter medications with only some relief. Symptoms do seem to be worsening and have been ongoing for 1 wk. Also complaining of a cold sore, right nare, ongoing for 2 days. I have reviewed the past medical, family and social history sections including the medications and allergies listed in the above medical record as well as the nursing notes. 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 â¢ Budesonide ER (UCERIS) 9 MG TABLET SR 24 HR Take 1 tablet by mouth daily. â¢ buPROPion (WELLBUTRIN XL) 150 MG 24 hr tablet Take 1 tablet by mouth daily. â¢ buPROPion (WELLBUTRIN XL) 300 MG 24 hr tablet Take 1 tablet by mouth daily. â¢ LORazepam (ATIVAN) 1 MG tablet Take 1 tablet by mouth every 8 hours as needed for Anxiety. â¢ methylpheniDATE (RITALIN) 10 MG tablet Take 1 tablet by mouth 2 times daily. â¢ valACYclovir (VALTREX) 500 MG tablet Take 1 tablet by mouth daily. (Patient taking differently: Take 500 mg by mouth daily as needed. )   â¢ meclizine HCl (ANTIVERT) 25 MG tablet Take 1 tablet by mouth 3 times daily as needed for Dizziness.    â¢ "ondansetron (ZOFRAN) 4 MG tablet Take 1 tablet by mouth every 8 hours as needed for Nausea. â¢ montelukast (SINGULAIR) 10 MG tablet Take 1 tablet by mouth nightly. â¢ acetaminophen (TYLENOL) 500 MG tablet 1-2 every 6 hours as needed for pain   â¢ albuterol 108 (90 Base) MCG/ACT inhaler Inhale 2 puffs into the lungs every 4 hours as needed for Shortness of Breath or Wheezing. â¢ QUEtiapine (SEROQUEL) 400 MG tablet Take 1 tablet by mouth nightly. â¢ cetirizine (ZYRTEC ALLERGY) 10 MG tablet Take 1 tablet by mouth daily. â¢ ranitidine (ZANTAC) 150 MG tablet Take 1 tablet by mouth 2 times daily. No current facility-administered medications for this visit. Review of systems  Constitutional:  Patient denies fever, chills, fatigue or malaise. HENT:  +sinus problems, ear infections, nasal congestion no sore throat. Respiratory:  +cough, no shortness of breath, wheezing, chest tightness. Cardiovascular:  Denies chest pain, edema, palpitations. Gastrointestinal:  Denies abdominal pain, nausea, vomiting, bloody stools or diarrhea. Musculoskeletal:  Denies back pain, neck pain, joint pain or myalgias. Integument:  Denies rash, itching. +cold sore  Neurologic:  Denies headache, focal weakness, dizziness, syncope. Endocrine:  Denies polyuria, polydipsia or temperature intolerance. Physical Exam  Vital Signs:    Vitals:    01/02/20 1028   BP: 118/82   Pulse: 72   SpO2: 96%   Weight: 106.9 kg   Height: 5' 11"" (1.803 m)     Constitutional:  Well-developed, well-nourished. no acute distress. Integument:  Warm. Dry. No erythema. No rash. HENT:  Normocephalic. Atraumatic. Bilateral external ears normal. Oropharynx moist positive erythema, cobblestoning and irritation. No oral exudates. Nose normal.  Neck:  Supple. No cervical lymphadenopathy. no thyromegaly. No carotid bruits. Cardiovascular:  Normal heart rate. Normal rhythm. No murmurs. No rubs. No gallops. 2+ radial pulses.  2+ dorsalis pedis and " Interventional Radiology/Event Note posterior tibial pulses. Respiratory:  Normal breath sounds. No respiratory distress. No wheezing, rales or rhonchi. Gastrointestinal:  Bowel sounds normal. Soft. No tenderness. No distention. No masses. No hepatosplenomegaly. Neurologic:  Alert and oriented x 3. Normal motor function. Psychologic: Normal mood and normal behavior. Assessment AND Plan  1) acute maxillary sinusitis-will send a prescription for Augmentin at this time, will also send a prescription for Diflucan. 2. Herpes simplex-will send a prescription for valacyclovir 1000 mg every 8 hours x7 days. This is to be followed by 500 mg daily. 3. Myalgias-will refill Tylenol. Patient verbalized understanding and agreed to plan, return to clinic after June 26, 2020 for CPE.

## 2021-11-08 NOTE — CONSULT NOTE ADULT - SUBJECTIVE AND OBJECTIVE BOX
DATE OF SERVICE: 11-08-21 @ 18:36    CHIEF COMPLAINT:Patient is a 87y old  Female who presents with a chief complaint of     HISTORY OF PRESENT ILLNESS:HPI:      PAST MEDICAL & SURGICAL HISTORY:  History of breast cancer  right breast    Anemia    Hypertension    History of thrombocytopenia    History of herpes zoster    History of malignant neoplasm of parathyroid gland    Colon cancer    Chronic renal insufficiency    Thyroid nodule    Diabetes mellitus  NIDDM    History of cholecystectomy    History of partial colectomy  and appendectomy at the same time    History of lumpectomy of right breast  2007 and radiation and chemotherapy    Parathyroid adenoma  excision            MEDICATIONS:                  FAMILY HISTORY:      Non-contributory    SOCIAL HISTORY:    [ ] Tobacco  [ ] Drugs  [ ] Alcohol    Allergies    No Known Allergies    Intolerances    	    REVIEW OF SYSTEMS:  CONSTITUTIONAL: No fever  EYES: No eye pain, visual disturbances, or discharge  ENMT:  No difficulty hearing, tinnitus  NECK: No pain or stiffness  RESPIRATORY: No cough, wheezing,  CARDIOVASCULAR: No chest pain, palpitations, passing out, dizziness, or leg swelling  GASTROINTESTINAL:  No nausea, vomiting, diarrhea or constipation. No melena.  GENITOURINARY: No dysuria, hematuria  NEUROLOGICAL: No stroke like symptoms  SKIN: No burning or lesions   ENDOCRINE: No heat or cold intolerance  MUSCULOSKELETAL: No joint pain or swelling  PSYCHIATRIC: No  anxiety, mood swings  HEME/LYMPH: No bleeding gums  ALLERGY AND IMMUNOLOGIC: No hives or eczema	    All other ROS negative    PHYSICAL EXAM:  T(C): 36.7 (11-08-21 @ 15:30), Max: 36.7 (11-08-21 @ 15:30)  HR: 50 (11-08-21 @ 18:15) (36 - 72)  BP: 160/60 (11-08-21 @ 18:15) (136/92 - 182/43)  RR: 15 (11-08-21 @ 18:15) (9 - 18)  SpO2: 100% (11-08-21 @ 18:15) (87% - 100%)  Wt(kg): --  I&O's Summary      Appearance: Normal	  HEENT:   Normal oral mucosa, EOMI	  Cardiovascular:  S1 S2, No JVD,    Respiratory: Lungs clear to auscultation	  Psychiatry: Alert  Gastrointestinal:  Soft, Non-tender, + BS	  Skin: No rashes   Neurologic: Non-focal  Extremities:  No edema  Vascular: Peripheral pulses palpable    	    	  	  CARDIAC MARKERS:  Labs personally reviewed by me                          EKG: Personally reviewed by me -   Radiology: Personally reviewed by me -       Assessment /Plan:   Los 1 -- per [facility] records here from 2017 pt with known thom and Mobitz 1 in past, worked up with cardio Drs Ania/Angélica and EP Dr Tracey Yanes.   - now SR in the 70s  - asymptomatic the entire time  - outpt follow up with her cards advised in next 1-2 weeks     Differential diagnosis and plan of care discussed with patient after the evaluation. Counseling on diet, nutritional counseling, weight management, exercise and medication compliance was done.   Advanced care planning/advanced directives discussed with patient/family. DNR status including forceful chest compressions to attempt to restart the heart, ventilator support/artificial breathing, electric shock, artificial nutrition, health care proxy, Molst form all discussed with pt. Pt wishes to consider. More than fifteen minutes spent on discussing advanced directives.         Reymundo Manzano DO [facility]  Cardiovascular Medicine  [address], [address]  Office [phone]  Cell [phone] DATE OF SERVICE: 11-08-21 @ 18:36    CHIEF COMPLAINT:Patient is a 87y old  Female who presents with a chief complaint of Mediport change    HISTORY OF PRESENT ILLNESS:HPI: Here for mediport change      PAST MEDICAL & SURGICAL HISTORY:  History of breast cancer  right breast    Anemia    Hypertension    History of thrombocytopenia    History of herpes zoster    History of malignant neoplasm of parathyroid gland    Colon cancer    Chronic renal insufficiency    Thyroid nodule    Diabetes mellitus  NIDDM    History of cholecystectomy    History of partial colectomy  and appendectomy at the same time    History of lumpectomy of right breast  2007 and radiation and chemotherapy    Parathyroid adenoma  excision            MEDICATIONS:        FAMILY HISTORY:      Non-contributory    SOCIAL HISTORY:    not a smoker    Allergies    No Known Allergies    Intolerances    	    REVIEW OF SYSTEMS:  CONSTITUTIONAL: No fever  EYES: No eye pain, visual disturbances, or discharge  ENMT:  No difficulty hearing, tinnitus  NECK: No pain or stiffness  RESPIRATORY: No cough, wheezing,  CARDIOVASCULAR: No chest pain, palpitations, passing out, dizziness, or leg swelling  GASTROINTESTINAL:  No nausea, vomiting, diarrhea or constipation. No melena.  GENITOURINARY: No dysuria, hematuria  NEUROLOGICAL: No stroke like symptoms  SKIN: No burning or lesions   ENDOCRINE: No heat or cold intolerance  MUSCULOSKELETAL: No joint pain or swelling  PSYCHIATRIC: No  anxiety, mood swings  HEME/LYMPH: No bleeding gums  ALLERGY AND IMMUNOLOGIC: No hives or eczema	    All other ROS negative    PHYSICAL EXAM:  T(C): 36.7 (11-08-21 @ 15:30), Max: 36.7 (11-08-21 @ 15:30)  HR: 50 (11-08-21 @ 18:15) (36 - 72)  BP: 160/60 (11-08-21 @ 18:15) (136/92 - 182/43)  RR: 15 (11-08-21 @ 18:15) (9 - 18)  SpO2: 100% (11-08-21 @ 18:15) (87% - 100%)  Wt(kg): --  I&O's Summary      Appearance: Normal	  HEENT:   Normal oral mucosa, EOMI	  Cardiovascular:  S1 S2, No JVD,    Respiratory: Lungs clear to auscultation	  Psychiatry: Alert  Gastrointestinal:  Soft, Non-tender, + BS	  Skin: No rashes   Neurologic: Non-focal  Extremities:  No edema  Vascular: Peripheral pulses palpable    	    	  	  AsCARDIAC MARKERS:  Labs personally reviewed by me                          EKG: Personally reviewed by me -   Radiology: Personally reviewed by me -       Assessment /Plan:   Asymptomatic Mobitz 1 -- per [facility] records here from 2017 pt with known thom and Mobitz 1 in past, worked up with cardio Drs Ania/Angélica and EP Dr Tracey Yanes.   - now SR in the 70s  - asymptomatic the entire time  - outpt follow up with her cards advised in next 1-2 weeks       Differential diagnosis and plan of care discussed with patient after the evaluation. Counseling on diet, nutritional counseling, weight management, exercise and medication compliance was done.   Advanced care planning/advanced directives discussed with patient/family. DNR status including forceful chest compressions to attempt to restart the heart, ventilator support/artificial breathing, electric shock, artificial nutrition, health care proxy, Molst form all discussed with pt. Pt wishes to consider. More than fifteen minutes spent on discussing advanced directives.         Reymundo Manzano DO Located within [facility]  Cardiovascular Medicine  [address] [place] [address], [address]  Office [phone]  Cell [phone]

## 2021-11-08 NOTE — CHART NOTE - NSCHARTNOTEFT_GEN_A_CORE
At approximately 4pm, patient experienced bradycardia 40s-50s bpm with minimum 38. An EKG was obtained and demonstrated Mobitz I arrythmia. Patient was asymptomatic at the time. She reports history of 1st degree heart block, and also describes a similar episode in 2017, which was worked up by cardiology. [facility] was consulted after today's event. Per their recommendation, patient is safe for discharge and should follow up with cardiology outpatient. Patient stable at discharge.    Event discussed with daughter, [place].

## 2021-11-08 NOTE — PRE PROCEDURE NOTE - PRE PROCEDURE EVALUATION
Interventional Radiology Pre-Procedure Note    87y old female with colon cancer on chemotherapy presents for left chest wall mediport revision. Patient reports that the port stopped withdrawing blood. She presented to IR on 11/3/2021 for a port check that demonstrated the catheter withdrawn to the proximal SVC. The port was originally placed on 05/19/2017.    PAST MEDICAL & SURGICAL HISTORY:  History of breast cancer  right breast    Anemia    Hypertension    History of thrombocytopenia    History of herpes zoster    History of malignant neoplasm of parathyroid gland    Colon cancer    Chronic renal insufficiency    Thyroid nodule    Diabetes mellitus  NIDDM    History of cholecystectomy    History of partial colectomy  and appendectomy at the same time    History of lumpectomy of right breast  2007 and radiation and chemotherapy    Parathyroid adenoma  excision         Female    Allergies: No Known Allergies      LABS:          PT/INR - ( 08 Nov 2021 10:52 )   PT: 11.9 sec;   INR: 0.99 ratio             Procedure/ risks/ benefits were explained to the patient and informed consent obtained from patient, who verbalized understanding.

## 2021-11-08 NOTE — ASU DISCHARGE PLAN (ADULT/PEDIATRIC) - ASU DC SPECIAL INSTRUCTIONSFT
Chest Port Placement    Discharge Instructions  - You had a chest port implanted in your chest.   - The port is ready for use.  - You may shower in 48 hours. No soaking or swimming for 2 weeks or until the site is completely healed.  - Keep the area covered and dry for the next 7 days. It may be removed by a chemotherapy nurse as needed for treatment.  - Do not perform any heavy lifting or put tension on the area for the next week or until the site is healed.  - You may resume your normal diet.  - You may resume your normal medications however you should wait 48 hours before restarting aspirin, plavix, or blood thinners.  - It is normal to experience some pain over the site for the next few days. You may take Tylenol for that pain. Do not take more frequently than every 6 hours and do not exceed more than 3000mg of Tylenol in a 24 hour period.    - You were given conscious sedation which may make you drowsy, therefore you need someone to stay with you until the morning following the procedure.  - Do not drive, engage in heavy lifting or strenuous activity, or drink any alcoholic beverages for the next 24 hours.   - You may resume normal activity in 24 hours.    Notify your primary physician and/or Interventional Radiology IMMEDIATELY if you experience any of the following       - Fever of 101F or 38C       - Chills or Rigors/ Shakes       - Swelling and/or Redness in the area around the port       - Worsening Pain       - Blood soaked bandages or worsening bleeding       - Lightheadedness and/or dizziness upon standing       - Chest Pain/ Tightness       - Shortness of Breath       - Difficulty walking    If you have a problem that you believe requires IMMEDIATE attention, please go to your NEAREST Emergency Room. If you believe your problem can safely wait until you speak to a physician, please call Interventional Radiology for any concerns.

## 2021-11-10 DIAGNOSIS — T82.528A DISPLACEMENT OF OTHER CARDIAC AND VASCULAR DEVICES AND IMPLANTS, INITIAL ENCOUNTER: ICD-10-CM

## 2021-11-10 DIAGNOSIS — C50.911 MALIGNANT NEOPLASM OF UNSPECIFIED SITE OF RIGHT FEMALE BREAST: ICD-10-CM

## 2021-11-11 ENCOUNTER — OUTPATIENT (OUTPATIENT)
Dept: OUTPATIENT SERVICES | Facility: HOSPITAL | Age: 86
LOS: 1 days | Discharge: HOME | End: 2021-11-11

## 2021-11-11 DIAGNOSIS — C18.9 MALIGNANT NEOPLASM OF COLON, UNSPECIFIED: ICD-10-CM

## 2021-11-11 DIAGNOSIS — D35.1 BENIGN NEOPLASM OF PARATHYROID GLAND: Chronic | ICD-10-CM

## 2021-11-11 DIAGNOSIS — Z90.49 ACQUIRED ABSENCE OF OTHER SPECIFIED PARTS OF DIGESTIVE TRACT: Chronic | ICD-10-CM

## 2021-11-11 DIAGNOSIS — Z98.890 OTHER SPECIFIED POSTPROCEDURAL STATES: Chronic | ICD-10-CM

## 2021-11-11 DIAGNOSIS — T82.598A OTHER MECHANICAL COMPLICATION OF OTHER CARDIAC AND VASCULAR DEVICES AND IMPLANTS, INITIAL ENCOUNTER: ICD-10-CM

## 2021-11-14 LAB
ALBUMIN SERPL ELPH-MCNC: 4.1 G/DL
ALP BLD-CCNC: 72 U/L
ALT SERPL-CCNC: 12 U/L
ANION GAP SERPL CALC-SCNC: 15 MMOL/L
AST SERPL-CCNC: 20 U/L
BASOPHILS # BLD AUTO: 0.01 K/UL
BASOPHILS NFR BLD AUTO: 0.3 %
BILIRUB SERPL-MCNC: 0.4 MG/DL
BUN SERPL-MCNC: 34 MG/DL
CALCIUM SERPL-MCNC: 9.4 MG/DL
CHLORIDE SERPL-SCNC: 116 MMOL/L
CO2 SERPL-SCNC: 15 MMOL/L
CREAT SERPL-MCNC: 1.91 MG/DL
EOSINOPHIL # BLD AUTO: 0.08 K/UL
EOSINOPHIL NFR BLD AUTO: 2.7 %
GLUCOSE SERPL-MCNC: 135 MG/DL
HCT VFR BLD CALC: 33.4 %
HGB BLD-MCNC: 11.2 G/DL
IMM GRANULOCYTES NFR BLD AUTO: 0.3 %
LYMPHOCYTES # BLD AUTO: 0.59 K/UL
LYMPHOCYTES NFR BLD AUTO: 20.2 %
MAN DIFF?: NORMAL
MCHC RBC-ENTMCNC: 31.6 PG
MCHC RBC-ENTMCNC: 33.5 GM/DL
MCV RBC AUTO: 94.4 FL
MONOCYTES # BLD AUTO: 0.2 K/UL
MONOCYTES NFR BLD AUTO: 6.8 %
NEUTROPHILS # BLD AUTO: 2.03 K/UL
NEUTROPHILS NFR BLD AUTO: 69.7 %
PLATELET # BLD AUTO: 88 K/UL
POTASSIUM SERPL-SCNC: 3.9 MMOL/L
PROT SERPL-MCNC: 6.1 G/DL
RBC # BLD: 3.54 M/UL
RBC # FLD: 14.1 %
SODIUM SERPL-SCNC: 146 MMOL/L
TSH SERPL-ACNC: 1.92 UIU/ML
WBC # FLD AUTO: 2.92 K/UL

## 2021-11-16 ENCOUNTER — APPOINTMENT (OUTPATIENT)
Dept: HEMATOLOGY ONCOLOGY | Facility: CLINIC | Age: 86
End: 2021-11-16
Payer: MEDICARE

## 2021-11-16 ENCOUNTER — APPOINTMENT (OUTPATIENT)
Dept: INFUSION THERAPY | Facility: HOSPITAL | Age: 86
End: 2021-11-16

## 2021-11-16 VITALS
HEART RATE: 48 BPM | SYSTOLIC BLOOD PRESSURE: 174 MMHG | TEMPERATURE: 97.5 F | RESPIRATION RATE: 18 BRPM | DIASTOLIC BLOOD PRESSURE: 60 MMHG | OXYGEN SATURATION: 96 %

## 2021-11-16 PROCEDURE — 99214 OFFICE O/P EST MOD 30 MIN: CPT

## 2021-11-16 NOTE — ASSESSMENT
[FreeTextEntry1] : Lab work reviewed.\par #1) Colon cancer-clinically stable at present. Patient wishes to continue Keytruda which she feels she is tolerating well.  To consider follow-up imaging after the holidays.  Reviewed that the usual duration of immunotherapy would be for up to 2 years or until disease progression or intolerance to the medication.  Patient had had a break from treatment and resumed it with stability of her disease.  Discussed potential alternative treatment options pending course.  Patient is considering quality of life issues in her decision making and wishes to continue as we are doing for now.\par \par #2) history of anemia/leukopenia/thrombocytopenia-most recent hemoglobin stable.  With progressive thrombocytopenia, had discussed obtaining bone marrow aspiration/biopsy to rule out underlying marrow process such as MDS evolving.  Patient consented to have BM evaluation, which is scheduled.\par \par Patient was given the opportunity to ask questions. Her questions have been answered to her apparent satisfaction at this time.  She concurs with plans of care.\par

## 2021-11-16 NOTE — PHYSICAL EXAM
[Restricted in physically strenuous activity but ambulatory and able to carry out work of a light or sedentary nature] : Status 1- Restricted in physically strenuous activity but ambulatory and able to carry out work of a light or sedentary nature, e.g., light house work, office work [Normal] : affect appropriate [de-identified] : Well-developed, well-nourished, cooperative female, nontoxic appearing, in no acute distress. [de-identified] : no dominant mass [de-identified] : Soft, nontender to palpation. Unable to appreciate organomegaly. [de-identified] : unable to appreciate discrete LAD [de-identified] : no gross focal motor deficits

## 2021-11-16 NOTE — HISTORY OF PRESENT ILLNESS
[Disease: _____________________] : Disease: [unfilled] [T: ___] : T[unfilled] [N: ___] : N[unfilled] [M: ___] : M[unfilled] [AJCC Stage: ____] : AJCC Stage: [unfilled] [de-identified] : Patient with history of colon cancer (adenocarcinoma)-5-2011. Stage IIIC-T4 N2a. Status post right colectomy followed by FOLFOX chemotherapy.  With persistently elevated CEA on f/u testing,  3/2013 mesentery mass excision pathology showed a lymph node negative for metastatic carcinoma. Benign fibroadipose tissue.\par Persistently increasing elevated CEA.  CT scan abdomen/pelvis 6/2016, showed enlarging mesenteric adenopathy. Endoscopic ultrasound guided FNA of the bryan-pancreatic, mesenteric mass was positive for malignant cells-adenocarcinoma associated with abundant mucin. 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  Patient obtained 2 surgical opinions and disease was felt to be inoperable by both surgeons.  Patient begun on Xeloda/Avastin.\par 5/2017-CT scan chest/abdomen/pelvis showed stable tiny nodules in the lungs. Sizable mass merging with the head of the pancreas had Increased in size from 11/2016 with some increasing infiltration of the surrounding fat.  Possibility of increasing colonic wall thickness on the colon site of the ileocolic anastomosis. Patient begun on Pembrolizumab (tumor MSI-high).\par 8/2017- CT scan chest/abdomen/pelvis showed marginally worsening mesenteric adenopathy, with short interval followup CT scan abdomen/pelvis 9/2017, stable compared to 8/2017.\par 1/2018-CT scan chest/abdomen/pelvis-stable adenopathy.\par 6/2018-CT scan chest/abdomen/pelvis-stable adenopathy.\par 11/2018-CT chest/abdomen/pelvis-unchanged mass at the root of the mesentery.\par 4/2019-CT scan chest/abdomen/pelvis-stable mesenteric soft tissue mass.\par 8/2019-CT scan chest/abdomen/pelvis-unchanged mesenteric mass.\par 10/2019-CT scan A/P-stable mesenteric mass. Held Pembrolizumab.\par 2/2020-CT scan chest/abdomen/pelvis-stable exam with mesenteric mass without significant change since 10/2019. Resumed Pembrolizumab, which was then held 6/2020 due to progressive renal insufficiency.\par 7/2020-CT scan chest/abdomen/pelvis–no significant change.  Stable mesenteric mass unchanged since 10/2019.  Segment of colonic wall thickening at the level of the anastomosis unchanged.  Enlarged multinodular thyroid with bilateral substernal extension unchanged.\par 10/2020-CT scan C/A/P-stable upper abdominal mesenteric mass. No new suspicious pathology.\par \par 1/2021–CT scan chest/abdomen/pelvis–mesenteric mass inseparable from the pancreatic head encasing the proximal mesenteric vessels minimally enlarged since 10/2020.  Mild mural thickening and ileal colic anastomosis. New course Pembrolizumab begun.\par 5/2021–CT scan chest/abdomen/pelvis–stable mesenteric mass contiguous with pancreatic head.  Decrease in thickening at the ileocolic anastomosis.  No new abnormalities.\par 10/2021- CT C/A/P-stable exam. [de-identified] : adenocarcinoma [de-identified] : CEA 12/2013-33.6 [de-identified] : History of right breast cancer-2007. Status post right breast conservation surgery--> RT--> [facility].         \par \par  [de-identified] : Had mediport replaced. Became bradycardic post procedure-subsequently stabilized. Patient reports h/o chronic intermittent slow heart rate. Saw cardiologist with no treatment recommended-is asymptomatic from this.\par Generally feeling well.\par Remains independent, drives. \par Ambulates with a walker and cane as needed due to chronic imbalance. No c/o H/A.\par No CP, cough, SOB, fevers. No c/o N/V/change in bowel habits. No c/o abdominal/pelvic pain. No bleeding.\par \par \par \par

## 2021-11-17 DIAGNOSIS — R11.2 NAUSEA WITH VOMITING, UNSPECIFIED: ICD-10-CM

## 2021-11-17 DIAGNOSIS — Z51.11 ENCOUNTER FOR ANTINEOPLASTIC CHEMOTHERAPY: ICD-10-CM

## 2021-11-19 ENCOUNTER — LABORATORY RESULT (OUTPATIENT)
Age: 86
End: 2021-11-19

## 2021-11-19 ENCOUNTER — APPOINTMENT (OUTPATIENT)
Dept: HEMATOLOGY ONCOLOGY | Facility: CLINIC | Age: 86
End: 2021-11-19
Payer: MEDICARE

## 2021-11-19 ENCOUNTER — RESULT REVIEW (OUTPATIENT)
Age: 86
End: 2021-11-19

## 2021-11-19 VITALS
TEMPERATURE: 98 F | WEIGHT: 174.17 LBS | HEART RATE: 66 BPM | BODY MASS INDEX: 29.73 KG/M2 | RESPIRATION RATE: 16 BRPM | DIASTOLIC BLOOD PRESSURE: 72 MMHG | SYSTOLIC BLOOD PRESSURE: 170 MMHG | OXYGEN SATURATION: 100 %

## 2021-11-19 LAB
BASOPHILS # BLD AUTO: 0.01 K/UL — SIGNIFICANT CHANGE UP (ref 0–0.2)
BASOPHILS NFR BLD AUTO: 0.3 % — SIGNIFICANT CHANGE UP (ref 0–2)
EOSINOPHIL # BLD AUTO: 0.1 K/UL — SIGNIFICANT CHANGE UP (ref 0–0.5)
EOSINOPHIL NFR BLD AUTO: 3.3 % — SIGNIFICANT CHANGE UP (ref 0–6)
HCT VFR BLD CALC: 31.7 % — LOW (ref 34.5–45)
HGB BLD-MCNC: 10.9 G/DL — LOW (ref 11.5–15.5)
IMM GRANULOCYTES NFR BLD AUTO: 0.7 % — SIGNIFICANT CHANGE UP (ref 0–1.5)
LYMPHOCYTES # BLD AUTO: 0.79 K/UL — LOW (ref 1–3.3)
LYMPHOCYTES # BLD AUTO: 26.3 % — SIGNIFICANT CHANGE UP (ref 13–44)
MCHC RBC-ENTMCNC: 32.1 PG — SIGNIFICANT CHANGE UP (ref 27–34)
MCHC RBC-ENTMCNC: 34.4 G/DL — SIGNIFICANT CHANGE UP (ref 32–36)
MCV RBC AUTO: 93.2 FL — SIGNIFICANT CHANGE UP (ref 80–100)
MONOCYTES # BLD AUTO: 0.25 K/UL — SIGNIFICANT CHANGE UP (ref 0–0.9)
MONOCYTES NFR BLD AUTO: 8.3 % — SIGNIFICANT CHANGE UP (ref 2–14)
NEUTROPHILS # BLD AUTO: 1.83 K/UL — SIGNIFICANT CHANGE UP (ref 1.8–7.4)
NEUTROPHILS NFR BLD AUTO: 61.1 % — SIGNIFICANT CHANGE UP (ref 43–77)
NRBC # BLD: 0 /100 WBCS — SIGNIFICANT CHANGE UP (ref 0–0)
PLATELET # BLD AUTO: 91 K/UL — LOW (ref 150–400)
RBC # BLD: 3.4 M/UL — LOW (ref 3.8–5.2)
RBC # FLD: 14.6 % — HIGH (ref 10.3–14.5)
WBC # BLD: 3 K/UL — LOW (ref 3.8–10.5)
WBC # FLD AUTO: 3 K/UL — LOW (ref 3.8–10.5)

## 2021-11-19 PROCEDURE — 38221 DX BONE MARROW BIOPSIES: CPT

## 2021-11-19 RX ORDER — POTASSIUM CHLORIDE 1500 MG/1
20 TABLET, FILM COATED, EXTENDED RELEASE ORAL
Qty: 5 | Refills: 0 | Status: COMPLETED | COMMUNITY
Start: 2019-11-13 | End: 2021-11-19

## 2021-11-19 NOTE — REASON FOR VISIT
[Bone Marrow Biopsy] : bone marrow biopsy [Bone Marrow Aspiration] : bone marrow aspiration [FreeTextEntry2] : hx of anemia and thrombocytopenia

## 2021-11-19 NOTE — PROCEDURE
[Bone Marrow Biopsy] : bone marrow biopsy [Bone Marrow Aspiration] : bone marrow aspiration  [Patient] : the patient [Verbal Consent Obtained] : verbal consent was obtained prior to the procedure [Patient identification verified] : patient identification verified [Procedure verified and consent obtained] : procedure verified and consent obtained [Laterality verified and correct site marked] : laterality verified and correct site marked [Right] : site: right [Correct positioning] : correct positioning [Prone] : prone [Right lateral decubitus position] : right lateral decubitus position [Superior iliac spine was identified] : the superior iliac spine was identified. [The right posterior iliac crest was prepped with betadine and draped, using sterile technique.] : The right posterior iliac crest was prepped with betadine and draped, using sterile technique. [Lidocaine was injected and into the periosteum overlying the site.] : Lidocaine was injected and into the periosteum overlying the site. [Aspirate] : aspirate [Cytogenetics] : cytogenetics [FISH] : FISH [Biopsy] : biopsy [Flow Cytometry] : flow cytometry [] : The patient was instructed to remove the bandage the following AM. The patient may bathe. Acetaminophen may be taken for discomfort, as per package directions.If there are any other problems, the patient was instructed to call the office. The patient verbalized understanding, and is aware of the office contact numbers. [FreeTextEntry1] : hx of anemia and thrombocytopenia  [FreeTextEntry2] : 7cc of lidocaine was used for the procedure. \par \par WBC: 3.0\par Hgb: 10.9\par Hct: 31.7\par Plts: 91\par \par Bone marrow aspiration and biopsy were done. MDS Panel also sent. \par \par

## 2021-11-20 ENCOUNTER — LABORATORY RESULT (OUTPATIENT)
Age: 86
End: 2021-11-20

## 2021-12-08 ENCOUNTER — APPOINTMENT (OUTPATIENT)
Dept: INFUSION THERAPY | Facility: HOSPITAL | Age: 86
End: 2021-12-08

## 2021-12-08 ENCOUNTER — APPOINTMENT (OUTPATIENT)
Dept: HEMATOLOGY ONCOLOGY | Facility: CLINIC | Age: 86
End: 2021-12-08
Payer: MEDICARE

## 2021-12-08 VITALS
RESPIRATION RATE: 16 BRPM | DIASTOLIC BLOOD PRESSURE: 77 MMHG | SYSTOLIC BLOOD PRESSURE: 176 MMHG | TEMPERATURE: 97.5 F | HEART RATE: 48 BPM

## 2021-12-08 PROCEDURE — 99214 OFFICE O/P EST MOD 30 MIN: CPT

## 2021-12-08 NOTE — ASSESSMENT
[FreeTextEntry1] : Lab work, BM pathology, flow cytometry, cytogenetics, [employer] results reviewed.\par #1) Colon cancer-clinically stable at present. Patient wishes to continue Keytruda which she feels she is tolerating well.  To consider follow-up imaging after the holidays.  Reviewed that the usual duration of immunotherapy would be for up to 2 years or until disease progression or intolerance to the medication.  Patient had had a break from treatment and resumed it with stability of her disease.  Patient is considering quality of life issues in her decision making and wishes to continue as we are doing for now.\par \par #2) history of anemia/leukopenia/thrombocytopenia. With progressive thrombocytopenia, patient had bone marrow biopsy 11/2021–cellular marrow with erythroid predominant maturing trilineage hematopoiesis.  Normal cytogenetics.  Genomic analysis with no mutations identified.\par ?  May have immune component to cytopenia(s).  Explained that some BM disorders may evolve over time, such as MDS. Reviewed with patient potential treatment options for cytopenia(s) if needed, pending course. Hematologic surveillance for now.\par \par Patient was given the opportunity to ask questions. Her questions have been answered to her apparent satisfaction at this time.  She concurs with plans of care.\par

## 2021-12-08 NOTE — HISTORY OF PRESENT ILLNESS
[Disease: _____________________] : Disease: [unfilled] [T: ___] : T[unfilled] [N: ___] : N[unfilled] [M: ___] : M[unfilled] [AJCC Stage: ____] : AJCC Stage: [unfilled] [de-identified] : Patient with history of colon cancer (adenocarcinoma)-5-2011. Stage IIIC-T4 N2a. Status post right colectomy followed by FOLFOX chemotherapy.  With persistently elevated CEA on f/u testing,  3/2013 mesentery mass excision pathology showed a lymph node negative for metastatic carcinoma. Benign fibroadipose tissue.\par Persistently increasing elevated CEA.  CT scan abdomen/pelvis 6/2016, showed enlarging mesenteric adenopathy. Endoscopic ultrasound guided FNA of the bryan-pancreatic, mesenteric mass was positive for malignant cells-adenocarcinoma associated with abundant mucin. 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  Patient obtained 2 surgical opinions and disease was felt to be inoperable by both surgeons.  Patient begun on Xeloda/Avastin.\par 5/2017-CT scan chest/abdomen/pelvis showed stable tiny nodules in the lungs. Sizable mass merging with the head of the pancreas had Increased in size from 11/2016 with some increasing infiltration of the surrounding fat.  Possibility of increasing colonic wall thickness on the colon site of the ileocolic anastomosis. Patient begun on Pembrolizumab (tumor MSI-high).\par 8/2017- CT scan chest/abdomen/pelvis showed marginally worsening mesenteric adenopathy, with short interval followup CT scan abdomen/pelvis 9/2017, stable compared to 8/2017.\par 1/2018-CT scan chest/abdomen/pelvis-stable adenopathy.\par 6/2018-CT scan chest/abdomen/pelvis-stable adenopathy.\par 11/2018-CT chest/abdomen/pelvis-unchanged mass at the root of the mesentery.\par 4/2019-CT scan chest/abdomen/pelvis-stable mesenteric soft tissue mass.\par 8/2019-CT scan chest/abdomen/pelvis-unchanged mesenteric mass.\par 10/2019-CT scan A/P-stable mesenteric mass. Held Pembrolizumab.\par 2/2020-CT scan chest/abdomen/pelvis-stable exam with mesenteric mass without significant change since 10/2019. Resumed Pembrolizumab, which was then held 6/2020 due to progressive renal insufficiency.\par 7/2020-CT scan chest/abdomen/pelvis–no significant change.  Stable mesenteric mass unchanged since 10/2019.  Segment of colonic wall thickening at the level of the anastomosis unchanged.  Enlarged multinodular thyroid with bilateral substernal extension unchanged.\par 10/2020-CT scan C/A/P-stable upper abdominal mesenteric mass. No new suspicious pathology.\par \par 1/2021–CT scan chest/abdomen/pelvis–mesenteric mass inseparable from the pancreatic head encasing the proximal mesenteric vessels minimally enlarged since 10/2020.  Mild mural thickening and ileal colic anastomosis. New course Pembrolizumab begun.\par 5/2021–CT scan chest/abdomen/pelvis–stable mesenteric mass contiguous with pancreatic head.  Decrease in thickening at the ileocolic anastomosis.  No new abnormalities.\par 10/2021- CT C/A/P-stable exam. [de-identified] : adenocarcinoma [de-identified] : CEA 12/2013-33.6 [de-identified] : History of right breast cancer-2007. Status post right breast conservation surgery--> RT--> [facility].         \par \par  [de-identified] : S/P BMB.\par Feeling well.\par Remains independent, drives. \par Ambulates with a walker and cane as needed due to chronic imbalance. No c/o H/A.\par No CP, cough, SOB, fevers. No c/o N/V/change in bowel habits. No c/o abdominal/pelvic pain. No bleeding.\par \par \par \par

## 2021-12-08 NOTE — PHYSICAL EXAM
[Restricted in physically strenuous activity but ambulatory and able to carry out work of a light or sedentary nature] : Status 1- Restricted in physically strenuous activity but ambulatory and able to carry out work of a light or sedentary nature, e.g., light house work, office work [Normal] : affect appropriate [de-identified] : Well-developed, well-nourished, cooperative female, nontoxic appearing, in no acute distress. [de-identified] : no dominant mass [de-identified] : Soft, nontender to palpation. Unable to appreciate organomegaly. [de-identified] : unable to appreciate discrete LAD [de-identified] : BMB site healed. [de-identified] : no gross focal motor deficits

## 2021-12-23 ENCOUNTER — OUTPATIENT (OUTPATIENT)
Dept: OUTPATIENT SERVICES | Facility: HOSPITAL | Age: 86
LOS: 1 days | Discharge: HOME | End: 2021-12-23

## 2021-12-23 DIAGNOSIS — Z90.49 ACQUIRED ABSENCE OF OTHER SPECIFIED PARTS OF DIGESTIVE TRACT: Chronic | ICD-10-CM

## 2021-12-23 DIAGNOSIS — D35.1 BENIGN NEOPLASM OF PARATHYROID GLAND: Chronic | ICD-10-CM

## 2021-12-23 DIAGNOSIS — Z98.890 OTHER SPECIFIED POSTPROCEDURAL STATES: Chronic | ICD-10-CM

## 2021-12-23 DIAGNOSIS — C18.9 MALIGNANT NEOPLASM OF COLON, UNSPECIFIED: ICD-10-CM

## 2021-12-23 LAB
ALBUMIN SERPL ELPH-MCNC: 4.2 G/DL
ALP BLD-CCNC: 83 U/L
ALT SERPL-CCNC: 16 U/L
ANION GAP SERPL CALC-SCNC: 13 MMOL/L
AST SERPL-CCNC: 19 U/L
BASOPHILS # BLD AUTO: 0.02 K/UL
BASOPHILS NFR BLD AUTO: 0.6 %
BILIRUB SERPL-MCNC: 0.4 MG/DL
BUN SERPL-MCNC: 26 MG/DL
CALCIUM SERPL-MCNC: 9.1 MG/DL
CHLORIDE SERPL-SCNC: 115 MMOL/L
CO2 SERPL-SCNC: 18 MMOL/L
CREAT SERPL-MCNC: 2 MG/DL
EOSINOPHIL # BLD AUTO: 0.2 K/UL
EOSINOPHIL NFR BLD AUTO: 5.9 %
GLUCOSE SERPL-MCNC: 133 MG/DL
HCT VFR BLD CALC: 33.3 %
HGB BLD-MCNC: 11 G/DL
IMM GRANULOCYTES NFR BLD AUTO: 0 %
LYMPHOCYTES # BLD AUTO: 0.81 K/UL
LYMPHOCYTES NFR BLD AUTO: 24 %
MAN DIFF?: NORMAL
MCHC RBC-ENTMCNC: 31.2 PG
MCHC RBC-ENTMCNC: 33 GM/DL
MCV RBC AUTO: 94.3 FL
MONOCYTES # BLD AUTO: 0.25 K/UL
MONOCYTES NFR BLD AUTO: 7.4 %
NEUTROPHILS # BLD AUTO: 2.09 K/UL
NEUTROPHILS NFR BLD AUTO: 62.1 %
PLATELET # BLD AUTO: 125 K/UL
POTASSIUM SERPL-SCNC: 3.9 MMOL/L
PROT SERPL-MCNC: 6.1 G/DL
RBC # BLD: 3.53 M/UL
RBC # FLD: 14.5 %
SODIUM SERPL-SCNC: 145 MMOL/L
WBC # FLD AUTO: 3.37 K/UL

## 2021-12-26 LAB — TSH SERPL-ACNC: 2.36 UIU/ML

## 2021-12-28 ENCOUNTER — APPOINTMENT (OUTPATIENT)
Dept: INFUSION THERAPY | Facility: HOSPITAL | Age: 86
End: 2021-12-28

## 2021-12-29 ENCOUNTER — NON-APPOINTMENT (OUTPATIENT)
Age: 86
End: 2021-12-29

## 2021-12-29 DIAGNOSIS — R11.2 NAUSEA WITH VOMITING, UNSPECIFIED: ICD-10-CM

## 2021-12-29 DIAGNOSIS — Z51.11 ENCOUNTER FOR ANTINEOPLASTIC CHEMOTHERAPY: ICD-10-CM

## 2022-01-14 LAB
ALBUMIN SERPL ELPH-MCNC: 4.3 G/DL
ALP BLD-CCNC: 87 U/L
ALT SERPL-CCNC: 21 U/L
ANION GAP SERPL CALC-SCNC: 15 MMOL/L
AST SERPL-CCNC: 24 U/L
BASOPHILS # BLD AUTO: 0.03 K/UL
BASOPHILS NFR BLD AUTO: 0.8 %
BILIRUB SERPL-MCNC: 0.4 MG/DL
BUN SERPL-MCNC: 40 MG/DL
CALCIUM SERPL-MCNC: 9 MG/DL
CHLORIDE SERPL-SCNC: 117 MMOL/L
CO2 SERPL-SCNC: 15 MMOL/L
CREAT SERPL-MCNC: 2.1 MG/DL
EOSINOPHIL # BLD AUTO: 0.12 K/UL
EOSINOPHIL NFR BLD AUTO: 3.1 %
GLUCOSE SERPL-MCNC: 142 MG/DL
HCT VFR BLD CALC: 35.3 %
HGB BLD-MCNC: 11.7 G/DL
IMM GRANULOCYTES NFR BLD AUTO: 0.3 %
LYMPHOCYTES # BLD AUTO: 1.02 K/UL
LYMPHOCYTES NFR BLD AUTO: 26.5 %
MAN DIFF?: NORMAL
MCHC RBC-ENTMCNC: 30.8 PG
MCHC RBC-ENTMCNC: 33.1 GM/DL
MCV RBC AUTO: 92.9 FL
MONOCYTES # BLD AUTO: 0.3 K/UL
MONOCYTES NFR BLD AUTO: 7.8 %
NEUTROPHILS # BLD AUTO: 2.37 K/UL
NEUTROPHILS NFR BLD AUTO: 61.5 %
PLATELET # BLD AUTO: 128 K/UL
POTASSIUM SERPL-SCNC: 4.4 MMOL/L
PROT SERPL-MCNC: 6.1 G/DL
RBC # BLD: 3.8 M/UL
RBC # FLD: 13.8 %
SODIUM SERPL-SCNC: 147 MMOL/L
TSH SERPL-ACNC: 0.97 UIU/ML
WBC # FLD AUTO: 3.85 K/UL

## 2022-01-19 ENCOUNTER — APPOINTMENT (OUTPATIENT)
Dept: INFUSION THERAPY | Facility: HOSPITAL | Age: 87
End: 2022-01-19

## 2022-01-19 ENCOUNTER — APPOINTMENT (OUTPATIENT)
Dept: HEMATOLOGY ONCOLOGY | Facility: CLINIC | Age: 87
End: 2022-01-19
Payer: MEDICARE

## 2022-01-19 VITALS
SYSTOLIC BLOOD PRESSURE: 150 MMHG | TEMPERATURE: 97.6 F | DIASTOLIC BLOOD PRESSURE: 78 MMHG | OXYGEN SATURATION: 100 % | HEART RATE: 63 BPM

## 2022-01-19 PROCEDURE — 99214 OFFICE O/P EST MOD 30 MIN: CPT

## 2022-01-19 NOTE — ASSESSMENT
[FreeTextEntry1] : Lab work results reviewed.\par #1) Colon cancer-clinically stable at present. Patient wishes to continue Keytruda which she feels she is tolerating well.  Reviewed that the usual duration of immunotherapy would be for up to 2 years or until disease progression or intolerance to the medication.  Patient had had a break from treatment and resumed it with stability of her disease.  Patient is considering quality of life issues in her decision making and wishes to continue as we are doing for now.\par \par #2) history of anemia/leukopenia/thrombocytopenia. With progressive thrombocytopenia, patient had bone marrow biopsy 11/2021–cellular marrow with erythroid predominant maturing trilineage hematopoiesis.  Normal cytogenetics.  Genomic analysis with no mutations identified.\par ?  May have immune component to cytopenia(s).  Have explained that some BM disorders may evolve over time, such as MDS. Hematologic surveillance for now.\par \par #3) advised f/u with nephrology for renal disease/electrolyte abnormalities. A copy of lab work to be faxed to Dr. Garcia.\par \par #4) h/o breast cancer. Clinically stable. Surveillance.\par \par Patient was given the opportunity to ask questions. Her questions have been answered to her apparent satisfaction at this time.  She concurs with plans of care.\par

## 2022-01-19 NOTE — HISTORY OF PRESENT ILLNESS
[Disease: _____________________] : Disease: [unfilled] [T: ___] : T[unfilled] [N: ___] : N[unfilled] [M: ___] : M[unfilled] [AJCC Stage: ____] : AJCC Stage: [unfilled] [de-identified] : Patient with history of colon cancer (adenocarcinoma)-5-2011. Stage IIIC-T4 N2a. Status post right colectomy followed by FOLFOX chemotherapy.  With persistently elevated CEA on f/u testing,  3/2013 mesentery mass excision pathology showed a lymph node negative for metastatic carcinoma. Benign fibroadipose tissue.\par Persistently increasing elevated CEA.  CT scan abdomen/pelvis 6/2016, showed enlarging mesenteric adenopathy. Endoscopic ultrasound guided FNA of the bryan-pancreatic, mesenteric mass was positive for malignant cells-adenocarcinoma associated with abundant mucin. 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  Patient obtained 2 surgical opinions and disease was felt to be inoperable by both surgeons.  Patient begun on Xeloda/Avastin.\par 5/2017-CT scan chest/abdomen/pelvis showed stable tiny nodules in the lungs. Sizable mass merging with the head of the pancreas had Increased in size from 11/2016 with some increasing infiltration of the surrounding fat.  Possibility of increasing colonic wall thickness on the colon site of the ileocolic anastomosis. Patient begun on Pembrolizumab (tumor MSI-high).\par 8/2017- CT scan chest/abdomen/pelvis showed marginally worsening mesenteric adenopathy, with short interval followup CT scan abdomen/pelvis 9/2017, stable compared to 8/2017.\par 1/2018-CT scan chest/abdomen/pelvis-stable adenopathy.\par 6/2018-CT scan chest/abdomen/pelvis-stable adenopathy.\par 11/2018-CT chest/abdomen/pelvis-unchanged mass at the root of the mesentery.\par 4/2019-CT scan chest/abdomen/pelvis-stable mesenteric soft tissue mass.\par 8/2019-CT scan chest/abdomen/pelvis-unchanged mesenteric mass.\par 10/2019-CT scan A/P-stable mesenteric mass. Held Pembrolizumab.\par 2/2020-CT scan chest/abdomen/pelvis-stable exam with mesenteric mass without significant change since 10/2019. Resumed Pembrolizumab, which was then held 6/2020 due to progressive renal insufficiency.\par 7/2020-CT scan chest/abdomen/pelvis–no significant change.  Stable mesenteric mass unchanged since 10/2019.  Segment of colonic wall thickening at the level of the anastomosis unchanged.  Enlarged multinodular thyroid with bilateral substernal extension unchanged.\par 10/2020-CT scan C/A/P-stable upper abdominal mesenteric mass. No new suspicious pathology.\par \par 1/2021–CT scan chest/abdomen/pelvis–mesenteric mass inseparable from the pancreatic head encasing the proximal mesenteric vessels minimally enlarged since 10/2020.  Mild mural thickening and ileal colic anastomosis. New course Pembrolizumab begun.\par 5/2021–CT scan chest/abdomen/pelvis–stable mesenteric mass contiguous with pancreatic head.  Decrease in thickening at the ileocolic anastomosis.  No new abnormalities.\par 10/2021- CT C/A/P-stable exam. [de-identified] : adenocarcinoma [de-identified] : CEA 12/2013-33.6 [de-identified] : History of right breast cancer-2007. Status post right breast conservation surgery--> RT--> [facility].         \par \par 11/2021 (h/o pancytopenia)-Bone marrow biopsy and bone marrow aspirate-cellular marrow (40-50%) with erythroid predominant maturing\par  trilineage hematopoiesis with no increase in blast population. Normal female cytogenetics. Normal MDS FISH panel.\par [employer] Myeloid panel-no Mutations Identified\par  [de-identified] : Generally feeling well.\par Takes sodium bicarb tabs-4 daily per Dr. Garcia.\par Remains independent, drives. \par Ambulates with a walker and cane as needed due to chronic imbalance. No c/o H/A.\par No CP, cough, SOB, fevers. No c/o N/V/change in bowel habits. No c/o abdominal/pelvic pain. No bleeding.\par \par \par \par

## 2022-01-19 NOTE — PHYSICAL EXAM
[Restricted in physically strenuous activity but ambulatory and able to carry out work of a light or sedentary nature] : Status 1- Restricted in physically strenuous activity but ambulatory and able to carry out work of a light or sedentary nature, e.g., light house work, office work [Normal] : affect appropriate [de-identified] : Well-developed, well-nourished, cooperative female, nontoxic appearing, in no acute distress. [de-identified] : no dominant mass [de-identified] : Soft, nontender to palpation. Unable to appreciate organomegaly. [de-identified] : unable to appreciate discrete LAD [de-identified] : BMB site healed. [de-identified] : no gross focal motor deficits

## 2022-02-03 LAB
ALBUMIN SERPL ELPH-MCNC: 4.4 G/DL
ALP BLD-CCNC: 80 U/L
ALT SERPL-CCNC: 20 U/L
ANION GAP SERPL CALC-SCNC: 16 MMOL/L
AST SERPL-CCNC: 20 U/L
BASOPHILS # BLD AUTO: 0.02 K/UL
BASOPHILS NFR BLD AUTO: 0.6 %
BILIRUB SERPL-MCNC: 0.5 MG/DL
BUN SERPL-MCNC: 37 MG/DL
CALCIUM SERPL-MCNC: 9.2 MG/DL
CEA SERPL-MCNC: 243 NG/ML
CHLORIDE SERPL-SCNC: 116 MMOL/L
CO2 SERPL-SCNC: 14 MMOL/L
CREAT SERPL-MCNC: 2.1 MG/DL
EOSINOPHIL # BLD AUTO: 0.12 K/UL
EOSINOPHIL NFR BLD AUTO: 3.5 %
GLUCOSE SERPL-MCNC: 158 MG/DL
HCT VFR BLD CALC: 36 %
HGB BLD-MCNC: 11.7 G/DL
IMM GRANULOCYTES NFR BLD AUTO: 0.3 %
LYMPHOCYTES # BLD AUTO: 0.76 K/UL
LYMPHOCYTES NFR BLD AUTO: 22.4 %
MAN DIFF?: NORMAL
MCHC RBC-ENTMCNC: 31.3 PG
MCHC RBC-ENTMCNC: 32.5 GM/DL
MCV RBC AUTO: 96.3 FL
MONOCYTES # BLD AUTO: 0.21 K/UL
MONOCYTES NFR BLD AUTO: 6.2 %
NEUTROPHILS # BLD AUTO: 2.28 K/UL
NEUTROPHILS NFR BLD AUTO: 67 %
PLATELET # BLD AUTO: 105 K/UL
POTASSIUM SERPL-SCNC: 3.8 MMOL/L
PROT SERPL-MCNC: 6.1 G/DL
RBC # BLD: 3.74 M/UL
RBC # FLD: 14.7 %
SODIUM SERPL-SCNC: 146 MMOL/L
TSH SERPL-ACNC: 1.51 UIU/ML
WBC # FLD AUTO: 3.4 K/UL

## 2022-02-05 ENCOUNTER — OUTPATIENT (OUTPATIENT)
Dept: OUTPATIENT SERVICES | Facility: HOSPITAL | Age: 87
LOS: 1 days | Discharge: HOME | End: 2022-02-05

## 2022-02-05 DIAGNOSIS — Z90.49 ACQUIRED ABSENCE OF OTHER SPECIFIED PARTS OF DIGESTIVE TRACT: Chronic | ICD-10-CM

## 2022-02-05 DIAGNOSIS — Z98.890 OTHER SPECIFIED POSTPROCEDURAL STATES: Chronic | ICD-10-CM

## 2022-02-05 DIAGNOSIS — C18.9 MALIGNANT NEOPLASM OF COLON, UNSPECIFIED: ICD-10-CM

## 2022-02-05 DIAGNOSIS — D35.1 BENIGN NEOPLASM OF PARATHYROID GLAND: Chronic | ICD-10-CM

## 2022-02-08 ENCOUNTER — APPOINTMENT (OUTPATIENT)
Dept: INFUSION THERAPY | Facility: HOSPITAL | Age: 87
End: 2022-02-08

## 2022-02-08 ENCOUNTER — APPOINTMENT (OUTPATIENT)
Dept: HEMATOLOGY ONCOLOGY | Facility: CLINIC | Age: 87
End: 2022-02-08
Payer: MEDICARE

## 2022-02-08 VITALS
HEART RATE: 62 BPM | SYSTOLIC BLOOD PRESSURE: 170 MMHG | DIASTOLIC BLOOD PRESSURE: 51 MMHG | RESPIRATION RATE: 16 BRPM | OXYGEN SATURATION: 100 % | TEMPERATURE: 97.7 F

## 2022-02-08 PROCEDURE — 99214 OFFICE O/P EST MOD 30 MIN: CPT

## 2022-02-08 NOTE — REVIEW OF SYSTEMS
[Negative] : Allergic/Immunologic [Abdominal Pain] : no abdominal pain [Muscle Pain] : no muscle pain [Fainting] : no fainting

## 2022-02-08 NOTE — HISTORY OF PRESENT ILLNESS
[Disease: _____________________] : Disease: [unfilled] [T: ___] : T[unfilled] [N: ___] : N[unfilled] [M: ___] : M[unfilled] [AJCC Stage: ____] : AJCC Stage: [unfilled] [de-identified] : Patient with history of colon cancer (adenocarcinoma)-5-2011. Stage IIIC-T4 N2a. Status post right colectomy followed by FOLFOX chemotherapy.  With persistently elevated CEA on f/u testing,  3/2013 mesentery mass excision pathology showed a lymph node negative for metastatic carcinoma. Benign fibroadipose tissue.\par Persistently increasing elevated CEA.  CT scan abdomen/pelvis 6/2016, showed enlarging mesenteric adenopathy. Endoscopic ultrasound guided FNA of the bryan-pancreatic, mesenteric mass was positive for malignant cells-adenocarcinoma associated with abundant mucin. 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  Patient obtained 2 surgical opinions and disease was felt to be inoperable by both surgeons.  Patient begun on Xeloda/Avastin.\par 5/2017-CT scan chest/abdomen/pelvis showed stable tiny nodules in the lungs. Sizable mass merging with the head of the pancreas had Increased in size from 11/2016 with some increasing infiltration of the surrounding fat.  Possibility of increasing colonic wall thickness on the colon site of the ileocolic anastomosis. Patient begun on Pembrolizumab (tumor MSI-high).\par 8/2017- CT scan chest/abdomen/pelvis showed marginally worsening mesenteric adenopathy, with short interval followup CT scan abdomen/pelvis 9/2017, stable compared to 8/2017.\par 1/2018-CT scan chest/abdomen/pelvis-stable adenopathy.\par 6/2018-CT scan chest/abdomen/pelvis-stable adenopathy.\par 11/2018-CT chest/abdomen/pelvis-unchanged mass at the root of the mesentery.\par 4/2019-CT scan chest/abdomen/pelvis-stable mesenteric soft tissue mass.\par 8/2019-CT scan chest/abdomen/pelvis-unchanged mesenteric mass.\par 10/2019-CT scan A/P-stable mesenteric mass. Held Pembrolizumab.\par 2/2020-CT scan chest/abdomen/pelvis-stable exam with mesenteric mass without significant change since 10/2019. Resumed Pembrolizumab, which was then held 6/2020 due to progressive renal insufficiency.\par 7/2020-CT scan chest/abdomen/pelvis–no significant change.  Stable mesenteric mass unchanged since 10/2019.  Segment of colonic wall thickening at the level of the anastomosis unchanged.  Enlarged multinodular thyroid with bilateral substernal extension unchanged.\par 10/2020-CT scan C/A/P-stable upper abdominal mesenteric mass. No new suspicious pathology.\par \par 1/2021–CT scan chest/abdomen/pelvis–mesenteric mass inseparable from the pancreatic head encasing the proximal mesenteric vessels minimally enlarged since 10/2020.  Mild mural thickening and ileal colic anastomosis. New course Pembrolizumab begun.\par 5/2021–CT scan chest/abdomen/pelvis–stable mesenteric mass contiguous with pancreatic head.  Decrease in thickening at the ileocolic anastomosis.  No new abnormalities.\par 10/2021- CT C/A/P-stable exam. [de-identified] : adenocarcinoma [de-identified] : CEA 12/2013-33.6 [de-identified] : History of right breast cancer-2007. Status post right breast conservation surgery--> RT--> [facility].         \par \par 11/2021 (h/o pancytopenia)-Bone marrow biopsy and bone marrow aspirate-cellular marrow (40-50%) with erythroid predominant maturing\par  trilineage hematopoiesis with no increase in blast population. Normal female cytogenetics. Normal MDS FISH panel.\par Onko Sight Myeloid panel-no Mutations Identified\par  [de-identified] : No new complaints.\par Takes sodium bicarb tabs-4 daily per Dr. Garcia-has nephrology f/u planned.\par Remains independent, drives. \par Ambulates with a walker and cane as needed due to chronic imbalance. No c/o H/A.\par No CP, cough, SOB, fevers. No c/o N/V/change in bowel habits. No c/o abdominal/pelvic pain. No bleeding.\par \par Had COVID vaccines, along with booster.\par \par \par \par

## 2022-02-08 NOTE — PHYSICAL EXAM
[Restricted in physically strenuous activity but ambulatory and able to carry out work of a light or sedentary nature] : Status 1- Restricted in physically strenuous activity but ambulatory and able to carry out work of a light or sedentary nature, e.g., light house work, office work [Normal] : affect appropriate [de-identified] : Well-developed, well-nourished, cooperative female, nontoxic appearing, in no acute distress. [de-identified] : no dominant mass [de-identified] : Soft, nontender to palpation. Unable to appreciate organomegaly. [de-identified] : unable to appreciate discrete LAD [de-identified] : no gross focal motor deficits

## 2022-02-08 NOTE — ASSESSMENT
[FreeTextEntry1] : Lab work results reviewed.\par #1) Colon cancer-clinically stable at present. Patient wishes to continue Keytruda which she feels she is tolerating well.  Have reviewed that the usual duration of immunotherapy would be for up to 2 years or until disease progression or intolerance to the medication.  Patient had had a break from treatment and resumed it with stability of her disease.  Patient is considering quality of life issues in her decision making and wishes to continue immunotherapy. CEA trending downward.\par \par #2) history of anemia/leukopenia/thrombocytopenia. With progressive thrombocytopenia, patient had bone marrow biopsy 11/2021–cellular marrow with erythroid predominant maturing trilineage hematopoiesis.  Normal cytogenetics.  Genomic analysis with no mutations identified.\par ?  May have immune component to cytopenia(s).  Have explained that some BM disorders may evolve over time, such as MDS. Hematologic surveillance for now.\par \par #3) advise continued f/u with nephrology for renal disease/electrolyte abnormalities. Copy of most recent labs to be faxed to Dr. Garcia at patient request.\par \par Patient was given the opportunity to ask questions. Her questions have been answered to her apparent satisfaction at this time.  She concurs with plans of care.\par

## 2022-02-09 DIAGNOSIS — R11.2 NAUSEA WITH VOMITING, UNSPECIFIED: ICD-10-CM

## 2022-02-09 DIAGNOSIS — Z51.11 ENCOUNTER FOR ANTINEOPLASTIC CHEMOTHERAPY: ICD-10-CM

## 2022-02-25 LAB
ALBUMIN SERPL ELPH-MCNC: 4.2 G/DL
ALP BLD-CCNC: 78 U/L
ALT SERPL-CCNC: 13 U/L
ANION GAP SERPL CALC-SCNC: 14 MMOL/L
AST SERPL-CCNC: 19 U/L
BASOPHILS # BLD AUTO: 0.02 K/UL
BASOPHILS NFR BLD AUTO: 0.6 %
BILIRUB SERPL-MCNC: 0.4 MG/DL
BUN SERPL-MCNC: 31 MG/DL
CALCIUM SERPL-MCNC: 8.9 MG/DL
CEA SERPL-MCNC: 235 NG/ML
CHLORIDE SERPL-SCNC: 113 MMOL/L
CO2 SERPL-SCNC: 16 MMOL/L
CREAT SERPL-MCNC: 2.19 MG/DL
EOSINOPHIL # BLD AUTO: 0.11 K/UL
EOSINOPHIL NFR BLD AUTO: 3.2 %
GLUCOSE SERPL-MCNC: 126 MG/DL
HCT VFR BLD CALC: 33.5 %
HGB BLD-MCNC: 10.7 G/DL
IMM GRANULOCYTES NFR BLD AUTO: 0 %
LYMPHOCYTES # BLD AUTO: 0.69 K/UL
LYMPHOCYTES NFR BLD AUTO: 20.4 %
MAN DIFF?: NORMAL
MCHC RBC-ENTMCNC: 30.2 PG
MCHC RBC-ENTMCNC: 31.9 GM/DL
MCV RBC AUTO: 94.6 FL
MONOCYTES # BLD AUTO: 0.21 K/UL
MONOCYTES NFR BLD AUTO: 6.2 %
NEUTROPHILS # BLD AUTO: 2.36 K/UL
NEUTROPHILS NFR BLD AUTO: 69.6 %
PLATELET # BLD AUTO: 105 K/UL
POTASSIUM SERPL-SCNC: 4.2 MMOL/L
PROT SERPL-MCNC: 5.9 G/DL
RBC # BLD: 3.54 M/UL
RBC # FLD: 13.8 %
SODIUM SERPL-SCNC: 144 MMOL/L
TSH SERPL-ACNC: 1.49 UIU/ML
WBC # FLD AUTO: 3.39 K/UL

## 2022-03-01 ENCOUNTER — APPOINTMENT (OUTPATIENT)
Dept: INFUSION THERAPY | Facility: HOSPITAL | Age: 87
End: 2022-03-01

## 2022-03-01 ENCOUNTER — APPOINTMENT (OUTPATIENT)
Dept: HEMATOLOGY ONCOLOGY | Facility: CLINIC | Age: 87
End: 2022-03-01
Payer: MEDICARE

## 2022-03-01 VITALS
DIASTOLIC BLOOD PRESSURE: 54 MMHG | HEART RATE: 64 BPM | SYSTOLIC BLOOD PRESSURE: 149 MMHG | TEMPERATURE: 97.3 F | OXYGEN SATURATION: 99 %

## 2022-03-01 PROCEDURE — 99214 OFFICE O/P EST MOD 30 MIN: CPT

## 2022-03-01 NOTE — HISTORY OF PRESENT ILLNESS
[Disease: _____________________] : Disease: [unfilled] [T: ___] : T[unfilled] [N: ___] : N[unfilled] [M: ___] : M[unfilled] [AJCC Stage: ____] : AJCC Stage: [unfilled] [de-identified] : Patient with history of colon cancer (adenocarcinoma)-5-2011. Stage IIIC-T4 N2a. Status post right colectomy followed by FOLFOX chemotherapy.  With persistently elevated CEA on f/u testing,  3/2013 mesentery mass excision pathology showed a lymph node negative for metastatic carcinoma. Benign fibroadipose tissue.\par Persistently increasing elevated CEA.  CT scan abdomen/pelvis 6/2016, showed enlarging mesenteric adenopathy. Endoscopic ultrasound guided FNA of the bryan-pancreatic, mesenteric mass was positive for malignant cells-adenocarcinoma associated with abundant mucin. 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  Patient obtained 2 surgical opinions and disease was felt to be inoperable by both surgeons.  Patient begun on Xeloda/Avastin.\par 5/2017-CT scan chest/abdomen/pelvis showed stable tiny nodules in the lungs. Sizable mass merging with the head of the pancreas had Increased in size from 11/2016 with some increasing infiltration of the surrounding fat.  Possibility of increasing colonic wall thickness on the colon site of the ileocolic anastomosis. Patient begun on Pembrolizumab (tumor MSI-high).\par 8/2017- CT scan chest/abdomen/pelvis showed marginally worsening mesenteric adenopathy, with short interval followup CT scan abdomen/pelvis 9/2017, stable compared to 8/2017.\par 1/2018-CT scan chest/abdomen/pelvis-stable adenopathy.\par 6/2018-CT scan chest/abdomen/pelvis-stable adenopathy.\par 11/2018-CT chest/abdomen/pelvis-unchanged mass at the root of the mesentery.\par 4/2019-CT scan chest/abdomen/pelvis-stable mesenteric soft tissue mass.\par 8/2019-CT scan chest/abdomen/pelvis-unchanged mesenteric mass.\par 10/2019-CT scan A/P-stable mesenteric mass. Held Pembrolizumab.\par 2/2020-CT scan chest/abdomen/pelvis-stable exam with mesenteric mass without significant change since 10/2019. Resumed Pembrolizumab, which was then held 6/2020 due to progressive renal insufficiency.\par 7/2020-CT scan chest/abdomen/pelvis–no significant change.  Stable mesenteric mass unchanged since 10/2019.  Segment of colonic wall thickening at the level of the anastomosis unchanged.  Enlarged multinodular thyroid with bilateral substernal extension unchanged.\par 10/2020-CT scan C/A/P-stable upper abdominal mesenteric mass. No new suspicious pathology.\par \par 1/2021–CT scan chest/abdomen/pelvis–mesenteric mass inseparable from the pancreatic head encasing the proximal mesenteric vessels minimally enlarged since 10/2020.  Mild mural thickening and ileal colic anastomosis. New course Pembrolizumab begun.\par 5/2021–CT scan chest/abdomen/pelvis–stable mesenteric mass contiguous with pancreatic head.  Decrease in thickening at the ileocolic anastomosis.  No new abnormalities.\par 10/2021- CT C/A/P-stable exam. [de-identified] : adenocarcinoma [de-identified] : CEA 12/2013-33.6 [de-identified] : History of right breast cancer-2007. Status post right breast conservation surgery--> RT--> [facility].         \par \par 11/2021 (h/o pancytopenia)-Bone marrow biopsy and bone marrow aspirate-cellular marrow (40-50%) with erythroid predominant maturing\par  trilineage hematopoiesis with no increase in blast population. Normal female cytogenetics. Normal MDS FISH panel.\par Onko Sight Myeloid panel-no Mutations Identified\par  [de-identified] : Feels well.\par Takes sodium bicarb tabs-4 daily per Dr. Garcia-has nephrology f/u planned.\par Remains independent, drives. \par Ambulates with a walker and cane as needed due to chronic imbalance. No c/o H/A.\par No CP, cough, SOB, fevers. No c/o N/V/change in bowel habits. No c/o abdominal/pelvic pain. No bleeding.\par \par Had COVID vaccines, along with booster.\par \par \par \par

## 2022-03-01 NOTE — ASSESSMENT
[FreeTextEntry1] : Lab work results reviewed.\par #1) Colon cancer-clinically stable at present. Patient wishes to continue Keytruda which she feels she is tolerating well.  Have reviewed that the usual duration of immunotherapy would be for up to 2 years or until disease progression or intolerance to the medication.  Patient had had a break from treatment and resumed it with stability of her disease.  Patient is considering quality of life issues in her decision making and wishes to continue immunotherapy. CEA continuing to trend downward. Tentatively plan f/u imaging next visit.\par \par #2) history of anemia/leukopenia/thrombocytopenia. With progressive thrombocytopenia, patient had bone marrow biopsy 11/2021–cellular marrow with erythroid predominant maturing trilineage hematopoiesis.  Normal cytogenetics.  Genomic analysis with no mutations identified.\par ?  May have immune component to cytopenia(s).  Have explained that some BM disorders may evolve over time, such as MDS. No overt bleeding noted clinically at present.  Hematologic surveillance for now.\par \par #3) advise continued f/u with nephrology for renal disease/electrolyte abnormalities-increasing creatinine discussed.  Copy of most recent labs to be faxed to Dr. Garcia at patient request.\par \par Patient was given the opportunity to ask questions. Her questions have been answered to her apparent satisfaction at this time.  She concurs with plans of care.\par

## 2022-03-01 NOTE — PHYSICAL EXAM
[Restricted in physically strenuous activity but ambulatory and able to carry out work of a light or sedentary nature] : Status 1- Restricted in physically strenuous activity but ambulatory and able to carry out work of a light or sedentary nature, e.g., light house work, office work [Normal] : affect appropriate [de-identified] : Well-developed, well-nourished, cooperative female, nontoxic appearing, in no acute distress. [de-identified] : no dominant mass [de-identified] : Soft, nontender to palpation. Unable to appreciate organomegaly. [de-identified] : unable to appreciate discrete LAD [de-identified] : no gross focal motor deficits

## 2022-03-18 ENCOUNTER — NON-APPOINTMENT (OUTPATIENT)
Age: 87
End: 2022-03-18

## 2022-03-18 ENCOUNTER — OUTPATIENT (OUTPATIENT)
Dept: OUTPATIENT SERVICES | Facility: HOSPITAL | Age: 87
LOS: 1 days | Discharge: HOME | End: 2022-03-18

## 2022-03-18 DIAGNOSIS — Z90.49 ACQUIRED ABSENCE OF OTHER SPECIFIED PARTS OF DIGESTIVE TRACT: Chronic | ICD-10-CM

## 2022-03-18 DIAGNOSIS — Z98.890 OTHER SPECIFIED POSTPROCEDURAL STATES: Chronic | ICD-10-CM

## 2022-03-18 DIAGNOSIS — C18.9 MALIGNANT NEOPLASM OF COLON, UNSPECIFIED: ICD-10-CM

## 2022-03-18 DIAGNOSIS — D35.1 BENIGN NEOPLASM OF PARATHYROID GLAND: Chronic | ICD-10-CM

## 2022-03-18 LAB
ALBUMIN SERPL ELPH-MCNC: 4.1 G/DL
ALP BLD-CCNC: 74 U/L
ALT SERPL-CCNC: 16 U/L
ANION GAP SERPL CALC-SCNC: 15 MMOL/L
AST SERPL-CCNC: 15 U/L
BASOPHILS # BLD AUTO: 0.01 K/UL
BASOPHILS NFR BLD AUTO: 0.3 %
BILIRUB SERPL-MCNC: 0.3 MG/DL
BUN SERPL-MCNC: 50 MG/DL
CALCIUM SERPL-MCNC: 8.7 MG/DL
CEA SERPL-MCNC: 271 NG/ML
CHLORIDE SERPL-SCNC: 115 MMOL/L
CO2 SERPL-SCNC: 13 MMOL/L
CREAT SERPL-MCNC: 2.57 MG/DL
EGFR: 18 ML/MIN/1.73M2
EOSINOPHIL # BLD AUTO: 0.1 K/UL
EOSINOPHIL NFR BLD AUTO: 3.4 %
GLUCOSE SERPL-MCNC: 117 MG/DL
HCT VFR BLD CALC: 31.5 %
HGB BLD-MCNC: 10.3 G/DL
IMM GRANULOCYTES NFR BLD AUTO: 0.3 %
LYMPHOCYTES # BLD AUTO: 0.75 K/UL
LYMPHOCYTES NFR BLD AUTO: 25.3 %
MAN DIFF?: NORMAL
MCHC RBC-ENTMCNC: 30.7 PG
MCHC RBC-ENTMCNC: 32.7 GM/DL
MCV RBC AUTO: 93.8 FL
MONOCYTES # BLD AUTO: 0.22 K/UL
MONOCYTES NFR BLD AUTO: 7.4 %
NEUTROPHILS # BLD AUTO: 1.87 K/UL
NEUTROPHILS NFR BLD AUTO: 63.3 %
PLATELET # BLD AUTO: 86 K/UL
POTASSIUM SERPL-SCNC: 4.3 MMOL/L
PROT SERPL-MCNC: 5.9 G/DL
RBC # BLD: 3.36 M/UL
RBC # FLD: 14.6 %
SODIUM SERPL-SCNC: 144 MMOL/L
TSH SERPL-ACNC: 2.27 UIU/ML
WBC # FLD AUTO: 2.96 K/UL

## 2022-03-22 ENCOUNTER — APPOINTMENT (OUTPATIENT)
Dept: INFUSION THERAPY | Facility: HOSPITAL | Age: 87
End: 2022-03-22

## 2022-03-22 ENCOUNTER — APPOINTMENT (OUTPATIENT)
Dept: HEMATOLOGY ONCOLOGY | Facility: CLINIC | Age: 87
End: 2022-03-22

## 2022-03-24 ENCOUNTER — APPOINTMENT (OUTPATIENT)
Dept: CT IMAGING | Facility: HOSPITAL | Age: 87
End: 2022-03-24
Payer: MEDICARE

## 2022-03-24 ENCOUNTER — OUTPATIENT (OUTPATIENT)
Dept: OUTPATIENT SERVICES | Facility: HOSPITAL | Age: 87
LOS: 1 days | End: 2022-03-24
Payer: MEDICARE

## 2022-03-24 DIAGNOSIS — D35.1 BENIGN NEOPLASM OF PARATHYROID GLAND: Chronic | ICD-10-CM

## 2022-03-24 DIAGNOSIS — C18.9 MALIGNANT NEOPLASM OF COLON, UNSPECIFIED: ICD-10-CM

## 2022-03-24 DIAGNOSIS — Z90.49 ACQUIRED ABSENCE OF OTHER SPECIFIED PARTS OF DIGESTIVE TRACT: Chronic | ICD-10-CM

## 2022-03-24 DIAGNOSIS — Z98.890 OTHER SPECIFIED POSTPROCEDURAL STATES: Chronic | ICD-10-CM

## 2022-03-24 PROCEDURE — 74176 CT ABD & PELVIS W/O CONTRAST: CPT | Mod: 26,MG

## 2022-03-24 PROCEDURE — G1004: CPT

## 2022-03-24 PROCEDURE — 71250 CT THORAX DX C-: CPT | Mod: 26,MG

## 2022-03-24 PROCEDURE — 74176 CT ABD & PELVIS W/O CONTRAST: CPT | Mod: MG

## 2022-03-24 PROCEDURE — 71250 CT THORAX DX C-: CPT | Mod: MG

## 2022-03-28 ENCOUNTER — RESULT REVIEW (OUTPATIENT)
Age: 87
End: 2022-03-28

## 2022-03-28 ENCOUNTER — NON-APPOINTMENT (OUTPATIENT)
Age: 87
End: 2022-03-28

## 2022-04-08 LAB
ALBUMIN SERPL ELPH-MCNC: 4.1 G/DL
ALP BLD-CCNC: 82 U/L
ALT SERPL-CCNC: 14 U/L
ANION GAP SERPL CALC-SCNC: 14 MMOL/L
AST SERPL-CCNC: 15 U/L
BASOPHILS # BLD AUTO: 0.01 K/UL
BASOPHILS NFR BLD AUTO: 0.3 %
BILIRUB SERPL-MCNC: 0.6 MG/DL
BUN SERPL-MCNC: 31 MG/DL
CALCIUM SERPL-MCNC: 9 MG/DL
CHLORIDE SERPL-SCNC: 112 MMOL/L
CO2 SERPL-SCNC: 19 MMOL/L
CREAT SERPL-MCNC: 2.14 MG/DL
EGFR: 22 ML/MIN/1.73M2
EOSINOPHIL # BLD AUTO: 0.08 K/UL
EOSINOPHIL NFR BLD AUTO: 2.3 %
GLUCOSE SERPL-MCNC: 124 MG/DL
HCT VFR BLD CALC: 31 %
HGB BLD-MCNC: 10.2 G/DL
IMM GRANULOCYTES NFR BLD AUTO: 0.3 %
LYMPHOCYTES # BLD AUTO: 0.87 K/UL
LYMPHOCYTES NFR BLD AUTO: 24.6 %
MAN DIFF?: NORMAL
MCHC RBC-ENTMCNC: 30.8 PG
MCHC RBC-ENTMCNC: 32.9 GM/DL
MCV RBC AUTO: 93.7 FL
MONOCYTES # BLD AUTO: 0.24 K/UL
MONOCYTES NFR BLD AUTO: 6.8 %
NEUTROPHILS # BLD AUTO: 2.32 K/UL
NEUTROPHILS NFR BLD AUTO: 65.7 %
PLATELET # BLD AUTO: 86 K/UL
POTASSIUM SERPL-SCNC: 4.2 MMOL/L
PROT SERPL-MCNC: 5.9 G/DL
RBC # BLD: 3.31 M/UL
RBC # FLD: 14.2 %
SODIUM SERPL-SCNC: 145 MMOL/L
TSH SERPL-ACNC: 1.37 UIU/ML
WBC # FLD AUTO: 3.53 K/UL

## 2022-04-12 ENCOUNTER — APPOINTMENT (OUTPATIENT)
Dept: HEMATOLOGY ONCOLOGY | Facility: CLINIC | Age: 87
End: 2022-04-12
Payer: MEDICARE

## 2022-04-12 ENCOUNTER — APPOINTMENT (OUTPATIENT)
Dept: INFUSION THERAPY | Facility: HOSPITAL | Age: 87
End: 2022-04-12

## 2022-04-12 VITALS
TEMPERATURE: 97.7 F | RESPIRATION RATE: 16 BRPM | DIASTOLIC BLOOD PRESSURE: 68 MMHG | OXYGEN SATURATION: 100 % | SYSTOLIC BLOOD PRESSURE: 168 MMHG | HEART RATE: 65 BPM

## 2022-04-12 PROCEDURE — 99214 OFFICE O/P EST MOD 30 MIN: CPT

## 2022-04-12 NOTE — PHYSICAL EXAM
[Restricted in physically strenuous activity but ambulatory and able to carry out work of a light or sedentary nature] : Status 1- Restricted in physically strenuous activity but ambulatory and able to carry out work of a light or sedentary nature, e.g., light house work, office work [Normal] : pharynx is unremarkable, moist mucus membrane, no oral lesions [de-identified] : no dominant mass [de-identified] : Well-developed, well-nourished, cooperative female, nontoxic appearing, in no acute distress. [de-identified] : Soft, nontender to palpation. Unable to appreciate organomegaly. [de-identified] : unable to appreciate discrete LAD [de-identified] : no gross focal motor deficits

## 2022-04-12 NOTE — HISTORY OF PRESENT ILLNESS
[Disease: _____________________] : Disease: [unfilled] [T: ___] : T[unfilled] [N: ___] : N[unfilled] [M: ___] : M[unfilled] [AJCC Stage: ____] : AJCC Stage: [unfilled] [de-identified] : Patient with history of colon cancer (adenocarcinoma)-5-2011. Stage IIIC-T4 N2a. Status post right colectomy followed by FOLFOX chemotherapy.  With persistently elevated CEA on f/u testing,  3/2013 mesentery mass excision pathology showed a lymph node negative for metastatic carcinoma. Benign fibroadipose tissue.\par Persistently increasing elevated CEA.  CT scan abdomen/pelvis 6/2016, showed enlarging mesenteric adenopathy. Endoscopic ultrasound guided FNA of the bryan-pancreatic, mesenteric mass was positive for malignant cells-adenocarcinoma associated with abundant mucin. 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  Patient obtained 2 surgical opinions and disease was felt to be inoperable by both surgeons.  Patient begun on Xeloda/Avastin.\par 5/2017-CT scan chest/abdomen/pelvis showed stable tiny nodules in the lungs. Sizable mass merging with the head of the pancreas had Increased in size from 11/2016 with some increasing infiltration of the surrounding fat.  Possibility of increasing colonic wall thickness on the colon site of the ileocolic anastomosis. Patient begun on Pembrolizumab (tumor MSI-high).\par 8/2017- CT scan chest/abdomen/pelvis showed marginally worsening mesenteric adenopathy, with short interval followup CT scan abdomen/pelvis 9/2017, stable compared to 8/2017.\par 1/2018-CT scan chest/abdomen/pelvis-stable adenopathy.\par 6/2018-CT scan chest/abdomen/pelvis-stable adenopathy.\par 11/2018-CT chest/abdomen/pelvis-unchanged mass at the root of the mesentery.\par 4/2019-CT scan chest/abdomen/pelvis-stable mesenteric soft tissue mass.\par 8/2019-CT scan chest/abdomen/pelvis-unchanged mesenteric mass.\par 10/2019-CT scan A/P-stable mesenteric mass. Held Pembrolizumab.\par 2/2020-CT scan chest/abdomen/pelvis-stable exam with mesenteric mass without significant change since 10/2019. Resumed Pembrolizumab, which was then held 6/2020 due to progressive renal insufficiency.\par 7/2020-CT scan chest/abdomen/pelvis–no significant change.  Stable mesenteric mass unchanged since 10/2019.  Segment of colonic wall thickening at the level of the anastomosis unchanged.  Enlarged multinodular thyroid with bilateral substernal extension unchanged.\par 10/2020-CT scan C/A/P-stable upper abdominal mesenteric mass. No new suspicious pathology.\par \par 1/2021–CT scan chest/abdomen/pelvis–mesenteric mass inseparable from the pancreatic head encasing the proximal mesenteric vessels minimally enlarged since 10/2020.  Mild mural thickening and ileal colic anastomosis. New course Pembrolizumab begun.\par 5/2021–CT scan chest/abdomen/pelvis–stable mesenteric mass contiguous with pancreatic head.  Decrease in thickening at the ileocolic anastomosis.  No new abnormalities.\par 10/2021- CT C/A/P-stable exam.\par 3/28/2022-CT C/A/P-nonspecific small volume pelvic ascites, stable  since 10/19/2021. An 8.3 x 5.6 cm lobulated mesenteric mass is indeterminate, but unchanged.\par Stable appearance of the chest since 10/19/2021.\par \par  [de-identified] : adenocarcinoma [de-identified] : CEA 12/2013-33.6 [de-identified] : History of right breast cancer-2007. Status post right breast conservation surgery--> RT--> [facility].         \par \par 11/2021 (h/o pancytopenia)-Bone marrow biopsy and bone marrow aspirate-cellular marrow (40-50%) with erythroid predominant maturing\par  trilineage hematopoiesis with no increase in blast population. Normal female cytogenetics. Normal MDS FISH panel.\par Onko Sight Myeloid panel-no Mutations Identified\par  [de-identified] : No new complaints.\par Saw nephrologist in f/u-takes sodium bicarb tabs-6-8 daily now, per Dr. Garcia.\par Remains independent, drives. \par Ambulates with a walker and cane as needed due to chronic imbalance. No c/o H/A.\par No CP, cough, SOB, fevers. No c/o N/V/change in bowel habits. No c/o abdominal/pelvic pain. No bleeding.\par \par Had COVID vaccines, along with booster.\par \par \par \par

## 2022-04-12 NOTE — ASSESSMENT
[FreeTextEntry1] : Lab work, CT C/A/P results reviewed.\par #1) Colon cancer-clinically stable at present. Patient wishes to continue Keytruda which she feels she is tolerating well.  Have reviewed that the usual duration of immunotherapy would be for up to 2 years or until disease progression or intolerance to the medication.  Patient had had a break from treatment and resumed it with stability of her disease.  Patient is considering quality of life issues in her decision making and wishes to continue immunotherapy. CT C/A/P 3/2022 c/w stable disease.\par \par #2) history of anemia/leukopenia/thrombocytopenia. With progressive thrombocytopenia, patient had bone marrow biopsy 11/2021–cellular marrow with erythroid predominant maturing trilineage hematopoiesis.  Normal cytogenetics.  Genomic analysis with no mutations identified.\par ?  May have immune component to cytopenia(s).  Have explained that some BM disorders may evolve over time, such as MDS. No overt bleeding noted clinically at present.  Hematologic surveillance for now.\par \par #3) advise continued f/u with nephrology for renal disease/electrolyte abnormalities.\par \par Patient was given the opportunity to ask questions. Her questions have been answered to her apparent satisfaction at this time.  She concurs with plans of care.\par

## 2022-04-13 DIAGNOSIS — R11.2 NAUSEA WITH VOMITING, UNSPECIFIED: ICD-10-CM

## 2022-04-13 DIAGNOSIS — Z51.11 ENCOUNTER FOR ANTINEOPLASTIC CHEMOTHERAPY: ICD-10-CM

## 2022-04-23 ENCOUNTER — OUTPATIENT (OUTPATIENT)
Dept: OUTPATIENT SERVICES | Facility: HOSPITAL | Age: 87
LOS: 1 days | End: 2022-04-23
Payer: MEDICARE

## 2022-04-23 DIAGNOSIS — Z90.49 ACQUIRED ABSENCE OF OTHER SPECIFIED PARTS OF DIGESTIVE TRACT: Chronic | ICD-10-CM

## 2022-04-23 DIAGNOSIS — Z98.890 OTHER SPECIFIED POSTPROCEDURAL STATES: Chronic | ICD-10-CM

## 2022-04-23 DIAGNOSIS — Z11.52 ENCOUNTER FOR SCREENING FOR COVID-19: ICD-10-CM

## 2022-04-23 DIAGNOSIS — D35.1 BENIGN NEOPLASM OF PARATHYROID GLAND: Chronic | ICD-10-CM

## 2022-04-23 LAB — SARS-COV-2 RNA SPEC QL NAA+PROBE: SIGNIFICANT CHANGE UP

## 2022-04-23 PROCEDURE — C9803: CPT

## 2022-04-23 PROCEDURE — U0005: CPT

## 2022-04-23 PROCEDURE — U0003: CPT

## 2022-04-26 ENCOUNTER — RESULT REVIEW (OUTPATIENT)
Age: 87
End: 2022-04-26

## 2022-04-26 ENCOUNTER — OUTPATIENT (OUTPATIENT)
Dept: OUTPATIENT SERVICES | Facility: HOSPITAL | Age: 87
LOS: 1 days | End: 2022-04-26
Payer: MEDICARE

## 2022-04-26 ENCOUNTER — TRANSCRIPTION ENCOUNTER (OUTPATIENT)
Age: 87
End: 2022-04-26

## 2022-04-26 VITALS
RESPIRATION RATE: 16 BRPM | HEART RATE: 72 BPM | SYSTOLIC BLOOD PRESSURE: 150 MMHG | TEMPERATURE: 98 F | OXYGEN SATURATION: 100 % | DIASTOLIC BLOOD PRESSURE: 63 MMHG

## 2022-04-26 DIAGNOSIS — Z90.49 ACQUIRED ABSENCE OF OTHER SPECIFIED PARTS OF DIGESTIVE TRACT: Chronic | ICD-10-CM

## 2022-04-26 DIAGNOSIS — D35.1 BENIGN NEOPLASM OF PARATHYROID GLAND: Chronic | ICD-10-CM

## 2022-04-26 DIAGNOSIS — Z95.828 PRESENCE OF OTHER VASCULAR IMPLANTS AND GRAFTS: ICD-10-CM

## 2022-04-26 DIAGNOSIS — Z98.890 OTHER SPECIFIED POSTPROCEDURAL STATES: Chronic | ICD-10-CM

## 2022-04-26 PROCEDURE — 36598 INJ W/FLUOR EVAL CV DEVICE: CPT

## 2022-04-26 NOTE — PROCEDURE NOTE - PLAN
patient scheduled for port revision with anesthesia 4/28 at 230pm with Dr. Rivera  - do not use port in the interim,   -covid pcr current, most recent  4/23/22 (within 5 days).   - please hold Aspirin 48 hours  prior to procedure.   - nothing to eat or drink after 11pm on 4/27; Wednesday.

## 2022-04-26 NOTE — PROCEDURE NOTE - PROCEDURE FINDINGS AND DETAILS
Discharge Instructions  - You have had a chest port evaluated.    -chest port catheter withdrawn from appropriate position;  pulled back into left brachiocephalic vein.  - full report to follow Discharge Instructions  - You have had a chest port evaluated.    -chest port catheter withdrawn from appropriate position;  seen in left brachiocephalic vein.  - full report to follow

## 2022-04-26 NOTE — PRE PROCEDURE NOTE - PRE PROCEDURE EVALUATION
Interventional Radiology    HPI: 87y old female with colon cancer on chemotherapy , The port was originally placed on 05/19/2017.  She presented to IR on 11/3/2021 for a port check that demonstrated the catheter withdrawn to the proximal SVC. s/p L mediport revision on 11/8/2021.  now presents today to IR 4/26/22 for left mediport study for concern of no blood draw.    NPO: n/a    Allergies:  nkda  Medications (Abx/Cardiac/Anticoagulation/Blood Products)      Data:    T(C): 36.4  HR: 72  BP: 150/63  RR: 16  SpO2: 100%       Exam  General: No acute distress  Chest: Non labored breathing, left mediport, site c/d/i, no sign of infxn          Imaging: reviewed     Plan:  87y old female with colon cancer on chemotherapy , The port was originally placed on 05/19/2017.  She presented to IR on 11/3/2021 for a port check that demonstrated the catheter withdrawn to the proximal SVC. s/p L mediport revision on 11/8/2021.  now presents today to IR 4/26/22 for left mediport study for concern of no blood draw.      -Risks/Benefits/alternatives explained with the patient and/or healthcare proxy and witnessed informed consent obtained.

## 2022-04-26 NOTE — ASU PREOP CHECKLIST - ISOLATION PRECAUTIONS
Hpi Title: Evaluation of Skin Lesions
Additional History: Patient wants the spots frozen with liquid nitrogen
none

## 2022-04-26 NOTE — ASU DISCHARGE PLAN (ADULT/PEDIATRIC) - ASU DC SPECIAL INSTRUCTIONSFT
Chest Port dye study    Discharge Instructions  - You have had a chest port evaluated.    -chest port catheter withdrawn from appropriate position.    patient scheduled for port revision with anesthesia 4/28 at 230pm with Dr. Rivera  - do not use port in the interim,   -covid pcr current, most recent  4/23/22 (within 5 days).   - please hold Aspirin 48 hours  prior to procedure.   - nothing to eat or drink after 11pm on 4/27; Wednesday.       - You may resume your normal diet.  - You may resume your normal medications      Notify your primary physician and/or Interventional Radiology IMMEDIATELY if you experience any of the following       - Fever of 100.4F or 38C       - Chills or Rigors/ Shakes       - Swelling and/or Redness in the area around the port       - Worsening Pain       - Blood soaked bandages or worsening bleeding       - Lightheadedness and/or dizziness upon standing       - Chest Pain/ Tightness       - Shortness of Breath       - Difficulty walking    If you have a problem that you believe requires IMMEDIATE attention, please go to your NEAREST Emergency Room. If you believe your problem can safely wait until you speak to a physician, please call Interventional Radiology for any concerns.    During Normal Weekday Business Hours- You can contact the Interventional Radiology department during normal business hours via telephone.  During Evenings and Weekends- If you need to contact Interventional Radiology during off hours, do so by calling the hospital and requesting to be connected to the Interventional Radiologist on call.

## 2022-04-26 NOTE — ASU DISCHARGE PLAN (ADULT/PEDIATRIC) - NS MD DC FALL RISK RISK
For information on Fall & Injury Prevention, visit: [email or web address] OR  [email or web address] OR  https://www.cdc.gov/steadi/patient.html

## 2022-04-26 NOTE — ASU PATIENT PROFILE, ADULT - FALL HARM RISK - UNIVERSAL INTERVENTIONS
Bed in lowest position, wheels locked, appropriate side rails in place/Call bell, personal items and telephone in reach/Instruct patient to call for assistance before getting out of bed or chair/Non-slip footwear when patient is out of bed/[place] to call system/Physically safe environment - no spills, clutter or unnecessary equipment/Purposeful Proactive Rounding/Room/bathroom lighting operational, light cord in reach

## 2022-04-26 NOTE — ASU DISCHARGE PLAN (ADULT/PEDIATRIC) - NURSING INSTRUCTIONS
Please feel free to contact us at [phone] if any problem arises. After 6PM. Monday through Friday,  on weekends and on holidays, please call us at [phone] and ask for the radiology resident on call to be paged.

## 2022-04-27 ENCOUNTER — OUTPATIENT (OUTPATIENT)
Dept: OUTPATIENT SERVICES | Facility: HOSPITAL | Age: 87
LOS: 1 days | Discharge: HOME | End: 2022-04-27

## 2022-04-27 DIAGNOSIS — Z90.49 ACQUIRED ABSENCE OF OTHER SPECIFIED PARTS OF DIGESTIVE TRACT: Chronic | ICD-10-CM

## 2022-04-27 DIAGNOSIS — D35.1 BENIGN NEOPLASM OF PARATHYROID GLAND: Chronic | ICD-10-CM

## 2022-04-27 DIAGNOSIS — Z98.890 OTHER SPECIFIED POSTPROCEDURAL STATES: Chronic | ICD-10-CM

## 2022-04-27 DIAGNOSIS — C18.9 MALIGNANT NEOPLASM OF COLON, UNSPECIFIED: ICD-10-CM

## 2022-04-28 ENCOUNTER — RESULT REVIEW (OUTPATIENT)
Age: 87
End: 2022-04-28

## 2022-04-28 ENCOUNTER — OUTPATIENT (OUTPATIENT)
Dept: OUTPATIENT SERVICES | Facility: HOSPITAL | Age: 87
LOS: 1 days | End: 2022-04-28
Payer: MEDICARE

## 2022-04-28 ENCOUNTER — TRANSCRIPTION ENCOUNTER (OUTPATIENT)
Age: 87
End: 2022-04-28

## 2022-04-28 VITALS
DIASTOLIC BLOOD PRESSURE: 73 MMHG | TEMPERATURE: 98 F | RESPIRATION RATE: 18 BRPM | OXYGEN SATURATION: 99 % | WEIGHT: 167.99 LBS | SYSTOLIC BLOOD PRESSURE: 173 MMHG | HEART RATE: 74 BPM | HEIGHT: 64 IN

## 2022-04-28 VITALS
SYSTOLIC BLOOD PRESSURE: 142 MMHG | HEART RATE: 55 BPM | DIASTOLIC BLOOD PRESSURE: 65 MMHG | RESPIRATION RATE: 16 BRPM | OXYGEN SATURATION: 97 %

## 2022-04-28 DIAGNOSIS — Z90.49 ACQUIRED ABSENCE OF OTHER SPECIFIED PARTS OF DIGESTIVE TRACT: Chronic | ICD-10-CM

## 2022-04-28 DIAGNOSIS — D35.1 BENIGN NEOPLASM OF PARATHYROID GLAND: Chronic | ICD-10-CM

## 2022-04-28 DIAGNOSIS — Z95.828 PRESENCE OF OTHER VASCULAR IMPLANTS AND GRAFTS: ICD-10-CM

## 2022-04-28 DIAGNOSIS — Z98.890 OTHER SPECIFIED POSTPROCEDURAL STATES: Chronic | ICD-10-CM

## 2022-04-28 PROCEDURE — C1725: CPT

## 2022-04-28 PROCEDURE — C1887: CPT

## 2022-04-28 PROCEDURE — C1788: CPT

## 2022-04-28 PROCEDURE — C1894: CPT

## 2022-04-28 PROCEDURE — 85610 PROTHROMBIN TIME: CPT

## 2022-04-28 PROCEDURE — 36415 COLL VENOUS BLD VENIPUNCTURE: CPT

## 2022-04-28 PROCEDURE — 36598 INJ W/FLUOR EVAL CV DEVICE: CPT

## 2022-04-28 PROCEDURE — C1892: CPT

## 2022-04-28 PROCEDURE — 85027 COMPLETE CBC AUTOMATED: CPT

## 2022-04-28 PROCEDURE — C1769: CPT

## 2022-04-28 NOTE — ASU PREOP CHECKLIST - TEMPERATURE IN FAHRENHEIT (DEGREES F)
[Normal Development] : Normal Development [Smiles responsively] : smiles responsively [Vocalizes with simple cooing] : vocalizes with simple cooing [Lifts head and chest in prone] : lifts head and chest in prone [Opens and shuts hands] : opens and shuts hands 97.8

## 2022-04-28 NOTE — ASU PATIENT PROFILE, ADULT - FALL HARM RISK - RISK INTERVENTIONS

## 2022-04-28 NOTE — PROCEDURE NOTE - PROCEDURE FINDINGS AND DETAILS
Successful Left Chest Port Revision with replacement of the existing port with a 6 Cuban chest port with the tip at the superior cavoatrial junction

## 2022-04-28 NOTE — ASU PATIENT PROFILE, ADULT - NS PRO MODE OF ARRIVAL
Complex Case Management      Date/Time:  2/3/2020 2:40 PM    Method of communication with patient:phone    Ambulator Care Manager (ACM) contacted the patient by telephone to perform Ambulatory Care Coordination. Verified name and  (PHI) with patient as identifiers. Provided introduction to self, and explanation of the Ambulatory Care Manager's role. Reviewed most recent clinic visit w/ patient who verbalized understanding. Patient given an opportunity to ask questions. Top Challenges reviewed with the patient   1. Spoke with patient  2. Stated she does not need to set up any follow up appts as of yet. 3. Waiting to hear from [place] [id number] regarding anorectal manometry. 4. No issues at present  5. Med therapy going well without issues. Med rec done  6. Will continue to follow patient per ACM protocal     The patient agrees to contact the PCP office or the [address] [place] [address] for questions related to their healthcare. Provided contact information for future reference. Disease Specific:   N/A    Home Health Active: No    DME Active: No    Barriers to care? None    Advance Care Planning:   Does patient have an Advance Directive:  not on file; education provided     Medication(s):   Medication reconciliation was performed with patient, who verbalizes understanding of administration of home medications. There were no barriers to obtaining medications identified at this time. Referral to Pharm D needed: no     Current Outpatient Medications   Medication Sig    vitamin a & d (A&D) ointment Apply  to affected area as needed for Skin Irritation or Itching.  zinc oxide (DESITIN) 13 % topical cream Apply  to affected area as needed for Skin Irritation or Itching.  Lactobacillus acidophilus (ACIDOPHILUS) cap Take 2 Caps by mouth two (2) times a day.  psyllium (METAMUCIL) packet Take 1 Packet by mouth daily as needed.  ketoconazole (NIZORAL) 2 % topical cream Apply  to affected area daily.     hydrocortisone (ANUSOL-HC) 2.5 % rectal cream Insert  into rectum four (4) times daily.  ergocalciferol (ERGOCALCIFEROL) 50,000 unit capsule Take 1 Cap by mouth every seven (7) days.  ondansetron (ZOFRAN ODT) 4 mg disintegrating tablet Take 1 Tab by mouth every eight (8) hours as needed for Nausea.  ALPRAZolam (XANAX) 0.25 mg tablet Take 1 Tab by mouth daily as needed for Anxiety. No current facility-administered medications for this visit. [facility] follow up appointment(s): No future appointments. Goals      Attend follow up appointments on schedule      1-29-20  Patient will attend all scheduled appointments through     3-29-20       Knowledge and adherence of prescribed medication (ie. action, side effects, missed dose, etc.).       1-29-20  Pt will take all medications prescribed to be evaluated on each outreach through      3-29-20       Prepare patients and caregivers for end of life decisions (ie. need for hospice, pain management, symptom relief, advance directives etc.)      1-29-20  Pt will complete an ACP and have it scanned into their EMR by     3-29-20 cane

## 2022-04-28 NOTE — ASU DISCHARGE PLAN (ADULT/PEDIATRIC) - ASU DC SPECIAL INSTRUCTIONSFT
Chest Port Placement    Discharge Instructions  - You have had a chest port implanted in your chest.   - The port is ready for use.  - You may shower in 48 hours. No soaking or swimming for 2 weeks or until the site is completely healed.  - Keep the area covered and dry for the next 2days. It may be removed by a chemotherapy nurse as needed for treatment.  - Do not perform any heavy lifting or put tension on the area for the next week or until the site is healed.  - You may resume your normal diet.  - You may resume your normal medications however you should wait 48 hours before restarting aspirin, plavix, or blood thinners.  - It is normal to experience some pain over the site for the next few days. You may take Tylenol for that pain. Do not take more frequently than every 6 hours and do not exceed more than 3000mg of Tylenol in a 24 hour period.  - You were given conscious sedation which may make you drowsy, therefore you need someone to stay with you until the morning following the procedure.  - Do not drive, engage in heavy lifting or strenuous activity, or drink any alcoholic beverages for the next 24 hours.   - You may resume normal activity in 24 hours.    Notify your primary physician and/or Interventional Radiology IMMEDIATELY if you experience any of the following       - Fever of 101F or 38C       - Chills or Rigors/ Shakes       - Swelling and/or Redness in the area around the port       - Worsening Pain       - Blood soaked bandages or worsening bleeding       - Lightheadedness and/or dizziness upon standing       - Chest Pain/ Tightness       - Shortness of Breath       - Difficulty walking    If you have a problem that you believe requires IMMEDIATE attention, please go to your NEAREST Emergency Room. If you believe your problem can safely wait until you speak to a physician, please call Interventional Radiology for any concerns.    Please feel free to contact us at [phone] if any problems arise. After 6PM, Monday through Friday, on weekends and on holidays, please call [phone] and ask for the radiology resident on call to be paged.

## 2022-04-28 NOTE — PRE PROCEDURE NOTE - PRE PROCEDURE EVALUATION
87F with colon cancer s/p port placement with interval retraction of the tip with need for revision.

## 2022-04-29 DIAGNOSIS — Z45.2 ENCOUNTER FOR ADJUSTMENT AND MANAGEMENT OF VASCULAR ACCESS DEVICE: ICD-10-CM

## 2022-05-03 ENCOUNTER — APPOINTMENT (OUTPATIENT)
Dept: HEMATOLOGY ONCOLOGY | Facility: CLINIC | Age: 87
End: 2022-05-03
Payer: MEDICARE

## 2022-05-03 ENCOUNTER — APPOINTMENT (OUTPATIENT)
Dept: INFUSION THERAPY | Facility: HOSPITAL | Age: 87
End: 2022-05-03

## 2022-05-03 VITALS — RESPIRATION RATE: 16 BRPM | TEMPERATURE: 97.8 F | OXYGEN SATURATION: 99 %

## 2022-05-03 PROCEDURE — 99214 OFFICE O/P EST MOD 30 MIN: CPT

## 2022-05-03 NOTE — HISTORY OF PRESENT ILLNESS
[Disease: _____________________] : Disease: [unfilled] [T: ___] : T[unfilled] [N: ___] : N[unfilled] [M: ___] : M[unfilled] [AJCC Stage: ____] : AJCC Stage: [unfilled] [de-identified] : Patient with history of colon cancer (adenocarcinoma)-5-2011. Stage IIIC-T4 N2a. Status post right colectomy followed by FOLFOX chemotherapy.  With persistently elevated CEA on f/u testing,  3/2013 mesentery mass excision pathology showed a lymph node negative for metastatic carcinoma. Benign fibroadipose tissue.\par Persistently increasing elevated CEA.  CT scan abdomen/pelvis 6/2016, showed enlarging mesenteric adenopathy. Endoscopic ultrasound guided FNA of the bryan-pancreatic, mesenteric mass was positive for malignant cells-adenocarcinoma associated with abundant mucin-compatible with known history of adenocarcinoma, colorectal type, mucinous variant.. 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  Patient obtained 2 surgical opinions and disease was felt to be inoperable by both surgeons.  Patient begun on Xeloda/Avastin.\par 5/2017-CT scan chest/abdomen/pelvis showed stable tiny nodules in the lungs. Sizable mass merging with the head of the pancreas had Increased in size from 11/2016 with some increasing infiltration of the surrounding fat.  Possibility of increasing colonic wall thickness on the colon site of the ileocolic anastomosis. Patient begun on Pembrolizumab (tumor MSI-high).\par 8/2017- CT scan chest/abdomen/pelvis showed marginally worsening mesenteric adenopathy, with short interval followup CT scan abdomen/pelvis 9/2017, stable compared to 8/2017.\par 1/2018-CT scan chest/abdomen/pelvis-stable adenopathy.\par 6/2018-CT scan chest/abdomen/pelvis-stable adenopathy.\par 11/2018-CT chest/abdomen/pelvis-unchanged mass at the root of the mesentery.\par 4/2019-CT scan chest/abdomen/pelvis-stable mesenteric soft tissue mass.\par 8/2019-CT scan chest/abdomen/pelvis-unchanged mesenteric mass.\par 10/2019-CT scan A/P-stable mesenteric mass. Held Pembrolizumab.\par 2/2020-CT scan chest/abdomen/pelvis-stable exam with mesenteric mass without significant change since 10/2019. Resumed Pembrolizumab, which was then held 6/2020 due to progressive renal insufficiency.\par 7/2020-CT scan chest/abdomen/pelvis–no significant change.  Stable mesenteric mass unchanged since 10/2019.  Segment of colonic wall thickening at the level of the anastomosis unchanged.  Enlarged multinodular thyroid with bilateral substernal extension unchanged.\par 10/2020-CT scan C/A/P-stable upper abdominal mesenteric mass. No new suspicious pathology.\par \par 1/2021–CT scan chest/abdomen/pelvis–mesenteric mass inseparable from the pancreatic head encasing the proximal mesenteric vessels minimally enlarged since 10/2020.  Mild mural thickening and ileal colic anastomosis. New course Pembrolizumab begun.\par 5/2021–CT scan chest/abdomen/pelvis–stable mesenteric mass contiguous with pancreatic head.  Decrease in thickening at the ileocolic anastomosis.  No new abnormalities.\par 10/2021- CT C/A/P-stable exam.\par 3/28/2022-CT C/A/P-nonspecific small volume pelvic ascites, stable  since 10/19/2021. An 8.3 x 5.6 cm lobulated mesenteric mass is indeterminate, but unchanged.\par Stable appearance of the chest since 10/19/2021.\par \par  [de-identified] : adenocarcinoma [de-identified] : CEA 12/2013-33.6 [de-identified] : History of right breast cancer-2007. Status post right breast conservation surgery--> RT--> [facility].         \par \par 11/2021 (h/o pancytopenia)-Bone marrow biopsy and bone marrow aspirate-cellular marrow (40-50%) with erythroid predominant maturing\par  trilineage hematopoiesis with no increase in blast population. Normal female cytogenetics. Normal MDS FISH panel.\par Onko Sight Myeloid panel-no Mutations Identified\par  [de-identified] : S/P port revision.\par Sees nephrologist-takes sodium bicarb tabs-6 daily, per Dr. Garcia.\par Remains independent, drives. \par Ambulates with a walker and cane as needed due to chronic imbalance. No c/o H/A.\par No CP, cough, SOB, fevers. No c/o N/V/change in bowel habits. No c/o abdominal/pelvic pain. No bleeding.\par \par Had COVID vaccines, along with booster.\par \par \par \par

## 2022-05-03 NOTE — ASSESSMENT
[FreeTextEntry1] : Lab work reviewed.\par #1) Colon cancer-clinically stable at present. Patient wishes to continue Keytruda which she feels she is tolerating well.  Had reviewed that the usual duration of immunotherapy would be for up to 2 years or until disease progression or intolerance to the medication.  Patient had had a break from treatment and resumed it with stability of her disease.  Patient is considering quality of life issues in her decision making and wishes to continue immunotherapy. CT C/A/P 3/2022 c/w stable disease.\par \par #2) history of anemia/leukopenia/thrombocytopenia. With progressive thrombocytopenia, patient had bone marrow biopsy 11/2021–cellular marrow with erythroid predominant maturing trilineage hematopoiesis.  Normal cytogenetics.  Genomic analysis with no mutations identified.\par ?  May have immune component to cytopenia(s).  Have explained that some BM disorders may evolve over time, such as MDS. No overt bleeding noted clinically at present.  Hematologic surveillance for now. GF support as needed.\par \par #3) advise continued f/u with nephrology for renal disease/electrolyte abnormalities.\par \par Patient was given the opportunity to ask questions. Her questions have been answered to her apparent satisfaction at this time.  She concurs with plans of care.\par

## 2022-05-03 NOTE — PHYSICAL EXAM
[Restricted in physically strenuous activity but ambulatory and able to carry out work of a light or sedentary nature] : Status 1- Restricted in physically strenuous activity but ambulatory and able to carry out work of a light or sedentary nature, e.g., light house work, office work [Normal] : affect appropriate [de-identified] : Well-developed, well-nourished, cooperative female, nontoxic appearing, in no acute distress. [de-identified] : no dominant mass [de-identified] : Soft, nontender to palpation. Unable to appreciate organomegaly. [de-identified] : unable to appreciate discrete LAD [de-identified] : no gross focal motor deficits

## 2022-05-04 DIAGNOSIS — R11.2 NAUSEA WITH VOMITING, UNSPECIFIED: ICD-10-CM

## 2022-05-04 DIAGNOSIS — Z51.11 ENCOUNTER FOR ANTINEOPLASTIC CHEMOTHERAPY: ICD-10-CM

## 2022-05-05 DIAGNOSIS — Z45.2 ENCOUNTER FOR ADJUSTMENT AND MANAGEMENT OF VASCULAR ACCESS DEVICE: ICD-10-CM

## 2022-05-20 LAB
ALBUMIN SERPL ELPH-MCNC: 4.4 G/DL
ALP BLD-CCNC: 74 U/L
ALT SERPL-CCNC: 16 U/L
ANION GAP SERPL CALC-SCNC: 12 MMOL/L
AST SERPL-CCNC: 21 U/L
BASOPHILS # BLD AUTO: 0.01 K/UL
BASOPHILS NFR BLD AUTO: 0.3 %
BILIRUB SERPL-MCNC: 0.4 MG/DL
BUN SERPL-MCNC: 34 MG/DL
CALCIUM SERPL-MCNC: 8.6 MG/DL
CEA SERPL-MCNC: 318 NG/ML
CHLORIDE SERPL-SCNC: 116 MMOL/L
CO2 SERPL-SCNC: 16 MMOL/L
CREAT SERPL-MCNC: 2.21 MG/DL
EGFR: 21 ML/MIN/1.73M2
EOSINOPHIL # BLD AUTO: 0.11 K/UL
EOSINOPHIL NFR BLD AUTO: 3.7 %
GLUCOSE SERPL-MCNC: 128 MG/DL
HCT VFR BLD CALC: 30.9 %
HGB BLD-MCNC: 10 G/DL
IMM GRANULOCYTES NFR BLD AUTO: 0.3 %
LYMPHOCYTES # BLD AUTO: 0.85 K/UL
LYMPHOCYTES NFR BLD AUTO: 28.6 %
MAN DIFF?: NORMAL
MCHC RBC-ENTMCNC: 30.4 PG
MCHC RBC-ENTMCNC: 32.4 GM/DL
MCV RBC AUTO: 93.9 FL
MONOCYTES # BLD AUTO: 0.2 K/UL
MONOCYTES NFR BLD AUTO: 6.7 %
NEUTROPHILS # BLD AUTO: 1.79 K/UL
NEUTROPHILS NFR BLD AUTO: 60.4 %
PLATELET # BLD AUTO: 92 K/UL
POTASSIUM SERPL-SCNC: 4.8 MMOL/L
PROT SERPL-MCNC: 5.9 G/DL
RBC # BLD: 3.29 M/UL
RBC # FLD: 14.3 %
SODIUM SERPL-SCNC: 145 MMOL/L
TSH SERPL-ACNC: 1.19 UIU/ML
WBC # FLD AUTO: 2.97 K/UL

## 2022-05-25 ENCOUNTER — APPOINTMENT (OUTPATIENT)
Dept: INFUSION THERAPY | Facility: HOSPITAL | Age: 87
End: 2022-05-25

## 2022-05-25 ENCOUNTER — APPOINTMENT (OUTPATIENT)
Dept: HEMATOLOGY ONCOLOGY | Facility: CLINIC | Age: 87
End: 2022-05-25
Payer: MEDICARE

## 2022-05-25 VITALS
DIASTOLIC BLOOD PRESSURE: 80 MMHG | TEMPERATURE: 97.9 F | HEART RATE: 51 BPM | SYSTOLIC BLOOD PRESSURE: 179 MMHG | OXYGEN SATURATION: 97 % | RESPIRATION RATE: 16 BRPM

## 2022-05-25 PROCEDURE — 99214 OFFICE O/P EST MOD 30 MIN: CPT

## 2022-05-25 NOTE — PHYSICAL EXAM
[Restricted in physically strenuous activity but ambulatory and able to carry out work of a light or sedentary nature] : Status 1- Restricted in physically strenuous activity but ambulatory and able to carry out work of a light or sedentary nature, e.g., light house work, office work [Normal] : affect appropriate [de-identified] : Well-developed, well-nourished, cooperative female, nontoxic appearing, in no acute distress. [de-identified] : no dominant mass [de-identified] : Soft, nontender to palpation. Unable to appreciate organomegaly. [de-identified] : unable to appreciate discrete LAD [de-identified] : no gross focal motor deficits

## 2022-05-25 NOTE — ASSESSMENT
[FreeTextEntry1] : Lab work reviewed.\par #1) Colon cancer-clinically stable at present. Patient wishes to continue Keytruda which she feels she is tolerating well.  Had reviewed that the usual duration of immunotherapy would be for up to 2 years or until disease progression or intolerance to the medication.  Patient had had a break from treatment and resumed it with stability of her disease.  Patient is considering quality of life issues in her decision making and wishes to continue immunotherapy. CT C/A/P 3/2022 c/w stable disease, though increased CEA noted-short interval repeat level to be done-if continued increase, t/c f/u imaging.\par \par #2) history of anemia/leukopenia/thrombocytopenia. With progressive thrombocytopenia, patient had bone marrow biopsy 11/2021–cellular marrow with erythroid predominant maturing trilineage hematopoiesis.  Normal cytogenetics.  Genomic analysis with no mutations identified.\par ?  May have immune component to cytopenia(s).  Had explained that some BM disorders may evolve over time, such as MDS. No overt bleeding noted clinically at present.  Hematologic surveillance for now. GF support as needed.\par \par #3) advise continued f/u with nephrology for renal disease/electrolyte abnormalities.\par \par #4) HM-patient to update breast imaging as scheduled  next month.\par \par Patient was given the opportunity to ask questions. Her questions have been answered to her apparent satisfaction at this time.  She concurs with plans of care.\par

## 2022-05-25 NOTE — HISTORY OF PRESENT ILLNESS
[Disease: _____________________] : Disease: [unfilled] [T: ___] : T[unfilled] [N: ___] : N[unfilled] [M: ___] : M[unfilled] [AJCC Stage: ____] : AJCC Stage: [unfilled] [de-identified] : Patient with history of colon cancer (adenocarcinoma)-5-2011. Stage IIIC-T4 N2a. Status post right colectomy followed by FOLFOX chemotherapy.  With persistently elevated CEA on f/u testing,  3/2013 mesentery mass excision pathology showed a lymph node negative for metastatic carcinoma. Benign fibroadipose tissue.\par Persistently increasing elevated CEA.  CT scan abdomen/pelvis 6/2016, showed enlarging mesenteric adenopathy. Endoscopic ultrasound guided FNA of the bryan-pancreatic, mesenteric mass was positive for malignant cells-adenocarcinoma associated with abundant mucin-compatible with known history of adenocarcinoma, colorectal type, mucinous variant.. 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  Patient obtained 2 surgical opinions and disease was felt to be inoperable by both surgeons.  Patient begun on Xeloda/Avastin.\par 5/2017-CT scan chest/abdomen/pelvis showed stable tiny nodules in the lungs. Sizable mass merging with the head of the pancreas had Increased in size from 11/2016 with some increasing infiltration of the surrounding fat.  Possibility of increasing colonic wall thickness on the colon site of the ileocolic anastomosis. Patient begun on Pembrolizumab (tumor MSI-high).\par 8/2017- CT scan chest/abdomen/pelvis showed marginally worsening mesenteric adenopathy, with short interval followup CT scan abdomen/pelvis 9/2017, stable compared to 8/2017.\par 1/2018-CT scan chest/abdomen/pelvis-stable adenopathy.\par 6/2018-CT scan chest/abdomen/pelvis-stable adenopathy.\par 11/2018-CT chest/abdomen/pelvis-unchanged mass at the root of the mesentery.\par 4/2019-CT scan chest/abdomen/pelvis-stable mesenteric soft tissue mass.\par 8/2019-CT scan chest/abdomen/pelvis-unchanged mesenteric mass.\par 10/2019-CT scan A/P-stable mesenteric mass. Held Pembrolizumab.\par 2/2020-CT scan chest/abdomen/pelvis-stable exam with mesenteric mass without significant change since 10/2019. Resumed Pembrolizumab, which was then held 6/2020 due to progressive renal insufficiency.\par 7/2020-CT scan chest/abdomen/pelvis–no significant change.  Stable mesenteric mass unchanged since 10/2019.  Segment of colonic wall thickening at the level of the anastomosis unchanged.  Enlarged multinodular thyroid with bilateral substernal extension unchanged.\par 10/2020-CT scan C/A/P-stable upper abdominal mesenteric mass. No new suspicious pathology.\par \par 1/2021–CT scan chest/abdomen/pelvis–mesenteric mass inseparable from the pancreatic head encasing the proximal mesenteric vessels minimally enlarged since 10/2020.  Mild mural thickening and ileal colic anastomosis. New course Pembrolizumab begun.\par 5/2021–CT scan chest/abdomen/pelvis–stable mesenteric mass contiguous with pancreatic head.  Decrease in thickening at the ileocolic anastomosis.  No new abnormalities.\par 10/2021- CT C/A/P-stable exam.\par 3/28/2022-CT C/A/P-nonspecific small volume pelvic ascites, stable  since 10/19/2021. An 8.3 x 5.6 cm lobulated mesenteric mass is indeterminate, but unchanged.\par Stable appearance of the chest since 10/19/2021.\par \par  [de-identified] : adenocarcinoma [de-identified] : CEA 12/2013-33.6 [de-identified] : History of right breast cancer-2007. Status post right breast conservation surgery--> RT--> [facility].         \par \par 11/2021 (h/o pancytopenia)-Bone marrow biopsy and bone marrow aspirate-cellular marrow (40-50%) with erythroid predominant maturing\par  trilineage hematopoiesis with no increase in blast population. Normal female cytogenetics. Normal MDS FISH panel.\par Onko Sight Myeloid panel-no Mutations Identified\par  [de-identified] : Sees nephrologist-takes sodium bicarb tabs-4 daily, though aware to increase to 6/day, per Dr. Garcia.\par Remains independent, continues to drive. \par Ambulates with a walker and cane as needed due to chronic imbalance-no acute change. No c/o H/A.\par No CP, cough, SOB, fevers. No c/o N/V/change in bowel habits. No c/o abdominal/pelvic pain. No bleeding.\par Son is [employer] . He is a source of support for patient.\par \par Had COVID vaccines, along with booster.\par \par \par \par

## 2022-06-07 ENCOUNTER — OUTPATIENT (OUTPATIENT)
Dept: OUTPATIENT SERVICES | Facility: HOSPITAL | Age: 87
LOS: 1 days | Discharge: HOME | End: 2022-06-07

## 2022-06-07 DIAGNOSIS — Z90.49 ACQUIRED ABSENCE OF OTHER SPECIFIED PARTS OF DIGESTIVE TRACT: Chronic | ICD-10-CM

## 2022-06-07 DIAGNOSIS — C18.9 MALIGNANT NEOPLASM OF COLON, UNSPECIFIED: ICD-10-CM

## 2022-06-07 DIAGNOSIS — D35.1 BENIGN NEOPLASM OF PARATHYROID GLAND: Chronic | ICD-10-CM

## 2022-06-07 DIAGNOSIS — Z98.890 OTHER SPECIFIED POSTPROCEDURAL STATES: Chronic | ICD-10-CM

## 2022-06-09 LAB
ALBUMIN SERPL ELPH-MCNC: 4.2 G/DL
ALP BLD-CCNC: 81 U/L
ALT SERPL-CCNC: 17 U/L
ANION GAP SERPL CALC-SCNC: 14 MMOL/L
AST SERPL-CCNC: 21 U/L
BILIRUB SERPL-MCNC: 0.5 MG/DL
BUN SERPL-MCNC: 34 MG/DL
CALCIUM SERPL-MCNC: 9 MG/DL
CEA SERPL-MCNC: 311 NG/ML
CHLORIDE SERPL-SCNC: 113 MMOL/L
CO2 SERPL-SCNC: 18 MMOL/L
CREAT SERPL-MCNC: 2.1 MG/DL
EGFR: 22 ML/MIN/1.73M2
GLUCOSE SERPL-MCNC: 130 MG/DL
POTASSIUM SERPL-SCNC: 4.3 MMOL/L
PROT SERPL-MCNC: 6.1 G/DL
SODIUM SERPL-SCNC: 145 MMOL/L
TSH SERPL-ACNC: 0.57 UIU/ML

## 2022-06-14 ENCOUNTER — APPOINTMENT (OUTPATIENT)
Dept: HEMATOLOGY ONCOLOGY | Facility: CLINIC | Age: 87
End: 2022-06-14
Payer: MEDICARE

## 2022-06-14 ENCOUNTER — APPOINTMENT (OUTPATIENT)
Dept: INFUSION THERAPY | Facility: HOSPITAL | Age: 87
End: 2022-06-14

## 2022-06-14 VITALS
SYSTOLIC BLOOD PRESSURE: 162 MMHG | HEART RATE: 50 BPM | OXYGEN SATURATION: 97 % | DIASTOLIC BLOOD PRESSURE: 54 MMHG | TEMPERATURE: 97.9 F | RESPIRATION RATE: 16 BRPM

## 2022-06-14 PROCEDURE — 99214 OFFICE O/P EST MOD 30 MIN: CPT

## 2022-06-14 NOTE — PHYSICAL EXAM
[Restricted in physically strenuous activity but ambulatory and able to carry out work of a light or sedentary nature] : Status 1- Restricted in physically strenuous activity but ambulatory and able to carry out work of a light or sedentary nature, e.g., light house work, office work [Normal] : affect appropriate [de-identified] : Well-developed, well-nourished, cooperative female, nontoxic appearing, in no acute distress. [de-identified] : no dominant mass [de-identified] : Soft, nontender to palpation. Unable to appreciate organomegaly. [de-identified] : no gross focal motor deficits [de-identified] : unable to appreciate discrete LAD

## 2022-06-14 NOTE — ASSESSMENT
[FreeTextEntry1] : Lab work reviewed.\par #1) Colon cancer-clinically stable at present. Patient wishes to continue Keytruda which she feels she is tolerating well.  Had reviewed that the usual duration of immunotherapy would be for up to 2 years or until disease progression or intolerance to the medication.  Patient had had a break from treatment and resumed it with stability of her disease.  Patient is considering quality of life issues in her decision making and wishes to continue immunotherapy. CT C/A/P 3/2022 c/w stable disease. Though increased CEA noted, it appears to be stable at present. Interval f/u imaging to be planned.\par \par #2) history of anemia/leukopenia/thrombocytopenia. With progressive thrombocytopenia, patient had bone marrow biopsy 11/2021–cellular marrow with erythroid predominant maturing trilineage hematopoiesis.  Normal cytogenetics.  Genomic analysis with no mutations identified.\par ?  May have immune component to cytopenia(s).  Had explained that some BM disorders may evolve over time, such as MDS. No overt bleeding noted clinically at present.  Hematologic surveillance for now. GF support as needed.\par \par #3) advise continued f/u with nephrology for renal disease/electrolyte abnormalities.\par \par #4) HM-patient to update breast imaging as scheduled.\par \par Patient was given the opportunity to ask questions. Her questions have been answered to her apparent satisfaction at this time.  She concurs with plans of care.\par

## 2022-06-14 NOTE — HISTORY OF PRESENT ILLNESS
[Disease: _____________________] : Disease: [unfilled] [T: ___] : T[unfilled] [N: ___] : N[unfilled] [M: ___] : M[unfilled] [AJCC Stage: ____] : AJCC Stage: [unfilled] [de-identified] : Patient with history of colon cancer (adenocarcinoma)-5-2011. Stage IIIC-T4 N2a. Status post right colectomy followed by FOLFOX chemotherapy.  With persistently elevated CEA on f/u testing,  3/2013 mesentery mass excision pathology showed a lymph node negative for metastatic carcinoma. Benign fibroadipose tissue.\par Persistently increasing elevated CEA.  CT scan abdomen/pelvis 6/2016, showed enlarging mesenteric adenopathy. Endoscopic ultrasound guided FNA of the bryan-pancreatic, mesenteric mass was positive for malignant cells-adenocarcinoma associated with abundant mucin-compatible with known history of adenocarcinoma, colorectal type, mucinous variant.. 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  Patient obtained 2 surgical opinions and disease was felt to be inoperable by both surgeons.  Patient begun on Xeloda/Avastin.\par 5/2017-CT scan chest/abdomen/pelvis showed stable tiny nodules in the lungs. Sizable mass merging with the head of the pancreas had Increased in size from 11/2016 with some increasing infiltration of the surrounding fat.  Possibility of increasing colonic wall thickness on the colon site of the ileocolic anastomosis. Patient begun on Pembrolizumab (tumor MSI-high).\par 8/2017- CT scan chest/abdomen/pelvis showed marginally worsening mesenteric adenopathy, with short interval followup CT scan abdomen/pelvis 9/2017, stable compared to 8/2017.\par 1/2018-CT scan chest/abdomen/pelvis-stable adenopathy.\par 6/2018-CT scan chest/abdomen/pelvis-stable adenopathy.\par 11/2018-CT chest/abdomen/pelvis-unchanged mass at the root of the mesentery.\par 4/2019-CT scan chest/abdomen/pelvis-stable mesenteric soft tissue mass.\par 8/2019-CT scan chest/abdomen/pelvis-unchanged mesenteric mass.\par 10/2019-CT scan A/P-stable mesenteric mass. Held Pembrolizumab.\par 2/2020-CT scan chest/abdomen/pelvis-stable exam with mesenteric mass without significant change since 10/2019. Resumed Pembrolizumab, which was then held 6/2020 due to progressive renal insufficiency.\par 7/2020-CT scan chest/abdomen/pelvis–no significant change.  Stable mesenteric mass unchanged since 10/2019.  Segment of colonic wall thickening at the level of the anastomosis unchanged.  Enlarged multinodular thyroid with bilateral substernal extension unchanged.\par 10/2020-CT scan C/A/P-stable upper abdominal mesenteric mass. No new suspicious pathology.\par \par 1/2021–CT scan chest/abdomen/pelvis–mesenteric mass inseparable from the pancreatic head encasing the proximal mesenteric vessels minimally enlarged since 10/2020.  Mild mural thickening and ileal colic anastomosis. New course Pembrolizumab begun.\par 5/2021–CT scan chest/abdomen/pelvis–stable mesenteric mass contiguous with pancreatic head.  Decrease in thickening at the ileocolic anastomosis.  No new abnormalities.\par 10/2021- CT C/A/P-stable exam.\par 3/28/2022-CT C/A/P-nonspecific small volume pelvic ascites, stable  since 10/19/2021. An 8.3 x 5.6 cm lobulated mesenteric mass is indeterminate, but unchanged.\par Stable appearance of the chest since 10/19/2021.\par \par  [de-identified] : adenocarcinoma [de-identified] : CEA 12/2013-33.6 [de-identified] : History of right breast cancer-2007. Status post right breast conservation surgery--> RT--> [facility].         \par \par 11/2021 (h/o pancytopenia)-Bone marrow biopsy and bone marrow aspirate-cellular marrow (40-50%) with erythroid predominant maturing\par  trilineage hematopoiesis with no increase in blast population. Normal female cytogenetics. Normal MDS FISH panel.\par Onko Sight Myeloid panel-no Mutations Identified\par  [de-identified] : Sees nephrologist-takes sodium bicarb tabs (Dr. Garcia).\par Remains independent, continues to drive. \par No CP, cough, SOB, fevers. No c/o N/V/change in bowel habits. No c/o abdominal/pelvic pain. No bleeding.\par \par Son is [employer] . He is a source of support for patient.\par \par Had COVID vaccines, along with booster.\par \par \par \par

## 2022-06-15 DIAGNOSIS — R11.2 NAUSEA WITH VOMITING, UNSPECIFIED: ICD-10-CM

## 2022-06-15 DIAGNOSIS — Z51.11 ENCOUNTER FOR ANTINEOPLASTIC CHEMOTHERAPY: ICD-10-CM

## 2022-06-20 ENCOUNTER — OUTPATIENT (OUTPATIENT)
Dept: OUTPATIENT SERVICES | Facility: HOSPITAL | Age: 87
LOS: 1 days | End: 2022-06-20
Payer: MEDICARE

## 2022-06-20 ENCOUNTER — APPOINTMENT (OUTPATIENT)
Dept: MAMMOGRAPHY | Facility: HOSPITAL | Age: 87
End: 2022-06-20
Payer: MEDICARE

## 2022-06-20 ENCOUNTER — APPOINTMENT (OUTPATIENT)
Dept: ULTRASOUND IMAGING | Facility: HOSPITAL | Age: 87
End: 2022-06-20
Payer: MEDICARE

## 2022-06-20 DIAGNOSIS — D35.1 BENIGN NEOPLASM OF PARATHYROID GLAND: Chronic | ICD-10-CM

## 2022-06-20 DIAGNOSIS — Z90.49 ACQUIRED ABSENCE OF OTHER SPECIFIED PARTS OF DIGESTIVE TRACT: Chronic | ICD-10-CM

## 2022-06-20 DIAGNOSIS — Z00.8 ENCOUNTER FOR OTHER GENERAL EXAMINATION: ICD-10-CM

## 2022-06-20 DIAGNOSIS — Z98.890 OTHER SPECIFIED POSTPROCEDURAL STATES: Chronic | ICD-10-CM

## 2022-06-20 PROCEDURE — 77063 BREAST TOMOSYNTHESIS BI: CPT | Mod: 26

## 2022-06-20 PROCEDURE — 76641 ULTRASOUND BREAST COMPLETE: CPT

## 2022-06-20 PROCEDURE — 77067 SCR MAMMO BI INCL CAD: CPT

## 2022-06-20 PROCEDURE — 77067 SCR MAMMO BI INCL CAD: CPT | Mod: 26

## 2022-06-20 PROCEDURE — 76641 ULTRASOUND BREAST COMPLETE: CPT | Mod: 26,50

## 2022-06-20 PROCEDURE — 77063 BREAST TOMOSYNTHESIS BI: CPT

## 2022-06-30 ENCOUNTER — NON-APPOINTMENT (OUTPATIENT)
Age: 87
End: 2022-06-30

## 2022-06-30 LAB
ALBUMIN SERPL ELPH-MCNC: 3.9 G/DL
ALP BLD-CCNC: 73 U/L
ALT SERPL-CCNC: 14 U/L
ANION GAP SERPL CALC-SCNC: 11 MMOL/L
AST SERPL-CCNC: 18 U/L
BASOPHILS # BLD AUTO: 0.02 K/UL
BASOPHILS NFR BLD AUTO: 0.8 %
BILIRUB SERPL-MCNC: 0.4 MG/DL
BUN SERPL-MCNC: 47 MG/DL
CALCIUM SERPL-MCNC: 8.8 MG/DL
CEA SERPL-MCNC: 306 NG/ML
CHLORIDE SERPL-SCNC: 118 MMOL/L
CO2 SERPL-SCNC: 15 MMOL/L
CREAT SERPL-MCNC: 2.65 MG/DL
EGFR: 17 ML/MIN/1.73M2
EOSINOPHIL # BLD AUTO: 0.09 K/UL
EOSINOPHIL NFR BLD AUTO: 3.5 %
GLUCOSE SERPL-MCNC: 119 MG/DL
HCT VFR BLD CALC: 28.2 %
HGB BLD-MCNC: 9.7 G/DL
IMM GRANULOCYTES NFR BLD AUTO: 0.4 %
LYMPHOCYTES # BLD AUTO: 0.73 K/UL
LYMPHOCYTES NFR BLD AUTO: 28.2 %
MAN DIFF?: NORMAL
MCHC RBC-ENTMCNC: 32.3 PG
MCHC RBC-ENTMCNC: 34.4 GM/DL
MCV RBC AUTO: 94 FL
MONOCYTES # BLD AUTO: 0.22 K/UL
MONOCYTES NFR BLD AUTO: 8.5 %
NEUTROPHILS # BLD AUTO: 1.52 K/UL
NEUTROPHILS NFR BLD AUTO: 58.6 %
PLATELET # BLD AUTO: 88 K/UL
POTASSIUM SERPL-SCNC: 4.4 MMOL/L
PROT SERPL-MCNC: 5.9 G/DL
RBC # BLD: 3 M/UL
RBC # FLD: 14.4 %
SODIUM SERPL-SCNC: 144 MMOL/L
TSH SERPL-ACNC: 0.66 UIU/ML
WBC # FLD AUTO: 2.59 K/UL

## 2022-07-05 ENCOUNTER — APPOINTMENT (OUTPATIENT)
Dept: HEMATOLOGY ONCOLOGY | Facility: CLINIC | Age: 87
End: 2022-07-05

## 2022-07-05 ENCOUNTER — APPOINTMENT (OUTPATIENT)
Dept: INFUSION THERAPY | Facility: HOSPITAL | Age: 87
End: 2022-07-05

## 2022-07-05 ENCOUNTER — NON-APPOINTMENT (OUTPATIENT)
Age: 87
End: 2022-07-05

## 2022-07-11 ENCOUNTER — NON-APPOINTMENT (OUTPATIENT)
Age: 87
End: 2022-07-11

## 2022-07-21 LAB
ALBUMIN SERPL ELPH-MCNC: 4.1 G/DL
ALP BLD-CCNC: 76 U/L
ALT SERPL-CCNC: 12 U/L
ANION GAP SERPL CALC-SCNC: 13 MMOL/L
AST SERPL-CCNC: 16 U/L
BASOPHILS # BLD AUTO: 0 K/UL
BASOPHILS NFR BLD AUTO: 0 %
BILIRUB SERPL-MCNC: 0.4 MG/DL
BUN SERPL-MCNC: 42 MG/DL
CALCIUM SERPL-MCNC: 8.9 MG/DL
CHLORIDE SERPL-SCNC: 118 MMOL/L
CO2 SERPL-SCNC: 15 MMOL/L
CREAT SERPL-MCNC: 2.44 MG/DL
EGFR: 19 ML/MIN/1.73M2
EOSINOPHIL # BLD AUTO: 0.08 K/UL
EOSINOPHIL NFR BLD AUTO: 3 %
GLUCOSE SERPL-MCNC: 114 MG/DL
HCT VFR BLD CALC: 29.2 %
HGB BLD-MCNC: 10 G/DL
IMM GRANULOCYTES NFR BLD AUTO: 0.4 %
LYMPHOCYTES # BLD AUTO: 0.6 K/UL
LYMPHOCYTES NFR BLD AUTO: 22.2 %
MAN DIFF?: NORMAL
MCHC RBC-ENTMCNC: 31.6 PG
MCHC RBC-ENTMCNC: 34.2 GM/DL
MCV RBC AUTO: 92.4 FL
MONOCYTES # BLD AUTO: 0.21 K/UL
MONOCYTES NFR BLD AUTO: 7.8 %
NEUTROPHILS # BLD AUTO: 1.8 K/UL
NEUTROPHILS NFR BLD AUTO: 66.6 %
PLATELET # BLD AUTO: 79 K/UL
POTASSIUM SERPL-SCNC: 4.6 MMOL/L
PROT SERPL-MCNC: 5.9 G/DL
RBC # BLD: 3.16 M/UL
RBC # FLD: 14.4 %
SODIUM SERPL-SCNC: 146 MMOL/L
TSH SERPL-ACNC: 0.79 UIU/ML
WBC # FLD AUTO: 2.7 K/UL

## 2022-07-22 ENCOUNTER — NON-APPOINTMENT (OUTPATIENT)
Age: 87
End: 2022-07-22

## 2022-07-26 ENCOUNTER — APPOINTMENT (OUTPATIENT)
Dept: HEMATOLOGY ONCOLOGY | Facility: CLINIC | Age: 87
End: 2022-07-26

## 2022-07-26 ENCOUNTER — NON-APPOINTMENT (OUTPATIENT)
Age: 87
End: 2022-07-26

## 2022-07-26 ENCOUNTER — APPOINTMENT (OUTPATIENT)
Dept: INFUSION THERAPY | Facility: HOSPITAL | Age: 87
End: 2022-07-26

## 2022-07-26 LAB
ALBUMIN SERPL ELPH-MCNC: 4.2 G/DL
ANION GAP SERPL CALC-SCNC: 14 MMOL/L
BASOPHILS # BLD AUTO: 0.02 K/UL
BASOPHILS NFR BLD AUTO: 0.7 %
BUN SERPL-MCNC: 43 MG/DL
CALCIUM SERPL-MCNC: 8.7 MG/DL
CHLORIDE SERPL-SCNC: 116 MMOL/L
CO2 SERPL-SCNC: 14 MMOL/L
CREAT SERPL-MCNC: 2.44 MG/DL
EGFR: 19 ML/MIN/1.73M2
EOSINOPHIL # BLD AUTO: 0.14 K/UL
EOSINOPHIL NFR BLD AUTO: 4.7 %
GLUCOSE SERPL-MCNC: 121 MG/DL
HCT VFR BLD CALC: 31.2 %
HGB BLD-MCNC: 10 G/DL
IMM GRANULOCYTES NFR BLD AUTO: 0.3 %
LYMPHOCYTES # BLD AUTO: 0.84 K/UL
LYMPHOCYTES NFR BLD AUTO: 27.9 %
MAN DIFF?: NORMAL
MCHC RBC-ENTMCNC: 30.3 PG
MCHC RBC-ENTMCNC: 32.1 GM/DL
MCV RBC AUTO: 94.5 FL
MONOCYTES # BLD AUTO: 0.23 K/UL
MONOCYTES NFR BLD AUTO: 7.6 %
NEUTROPHILS # BLD AUTO: 1.77 K/UL
NEUTROPHILS NFR BLD AUTO: 58.8 %
PHOSPHATE SERPL-MCNC: 4.4 MG/DL
PLATELET # BLD AUTO: 85 K/UL
POTASSIUM SERPL-SCNC: 3.9 MMOL/L
RBC # BLD: 3.3 M/UL
RBC # FLD: 14.6 %
SODIUM SERPL-SCNC: 144 MMOL/L
WBC # FLD AUTO: 3.01 K/UL

## 2022-07-26 PROCEDURE — 99443: CPT | Mod: 95

## 2022-07-26 NOTE — HISTORY OF PRESENT ILLNESS
[de-identified] : Patient with history of colon cancer (adenocarcinoma)-5-2011. Stage IIIC-T4 N2a. Status post right colectomy followed by FOLFOX chemotherapy.  With persistently elevated CEA on f/u testing,  3/2013 mesentery mass excision pathology showed a lymph node negative for metastatic carcinoma. Benign fibroadipose tissue.\par Persistently increasing elevated CEA.  CT scan abdomen/pelvis 6/2016, showed enlarging mesenteric adenopathy. Endoscopic ultrasound guided FNA of the bryan-pancreatic, mesenteric mass was positive for malignant cells-adenocarcinoma associated with abundant mucin-compatible with known history of adenocarcinoma, colorectal type, mucinous variant.. 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  Patient obtained 2 surgical opinions and disease was felt to be inoperable by both surgeons.  Patient begun on Xeloda/Avastin.\par 5/2017-CT scan chest/abdomen/pelvis showed stable tiny nodules in the lungs. Sizable mass merging with the head of the pancreas had Increased in size from 11/2016 with some increasing infiltration of the surrounding fat.  Possibility of increasing colonic wall thickness on the colon site of the ileocolic anastomosis. Patient begun on Pembrolizumab (tumor MSI-high).\par 8/2017- CT scan chest/abdomen/pelvis showed marginally worsening mesenteric adenopathy, with short interval followup CT scan abdomen/pelvis 9/2017, stable compared to 8/2017.\par 1/2018-CT scan chest/abdomen/pelvis-stable adenopathy.\par 6/2018-CT scan chest/abdomen/pelvis-stable adenopathy.\par 11/2018-CT chest/abdomen/pelvis-unchanged mass at the root of the mesentery.\par 4/2019-CT scan chest/abdomen/pelvis-stable mesenteric soft tissue mass.\par 8/2019-CT scan chest/abdomen/pelvis-unchanged mesenteric mass.\par 10/2019-CT scan A/P-stable mesenteric mass. Held Pembrolizumab.\par 2/2020-CT scan chest/abdomen/pelvis-stable exam with mesenteric mass without significant change since 10/2019. Resumed Pembrolizumab, which was then held 6/2020 due to progressive renal insufficiency.\par 7/2020-CT scan chest/abdomen/pelvis–no significant change.  Stable mesenteric mass unchanged since 10/2019.  Segment of colonic wall thickening at the level of the anastomosis unchanged.  Enlarged multinodular thyroid with bilateral substernal extension unchanged.\par 10/2020-CT scan C/A/P-stable upper abdominal mesenteric mass. No new suspicious pathology.\par \par 1/2021–CT scan chest/abdomen/pelvis–mesenteric mass inseparable from the pancreatic head encasing the proximal mesenteric vessels minimally enlarged since 10/2020.  Mild mural thickening and ileal colic anastomosis. New course Pembrolizumab begun.\par 5/2021–CT scan chest/abdomen/pelvis–stable mesenteric mass contiguous with pancreatic head.  Decrease in thickening at the ileocolic anastomosis.  No new abnormalities.\par 10/2021- CT C/A/P-stable exam.\par 3/28/2022-CT C/A/P-nonspecific small volume pelvic ascites, stable  since 10/19/2021. An 8.3 x 5.6 cm lobulated mesenteric mass is indeterminate, but unchanged.\par Stable appearance of the chest since 10/19/2021.\par \par  [de-identified] : adenocarcinoma [de-identified] : CEA 12/2013-33.6 [de-identified] : History of right breast cancer-2007. Status post right breast conservation surgery--> RT--> [facility].         \par \par 11/2021 (h/o pancytopenia)-Bone marrow biopsy and bone marrow aspirate-cellular marrow (40-50%) with erythroid predominant maturing\par  trilineage hematopoiesis with no increase in blast population. Normal female cytogenetics. Normal MDS FISH panel.\par Onko Sight Myeloid panel-no Mutations Identified\par  [de-identified] : Sees nephrologist for renal disease-takes sodium bicarb tabs (Dr. Garcia)-has next appt. scheduled.\par Remains independent, continues to drive. \par No CP, cough, SOB, fevers. No c/o N/V/change in bowel habits. No c/o abdominal/pelvic pain. No bleeding.\par \par [facility] is [employer] [employer] . He is a source of support for patient.\par \par Had COVID vaccines, along with booster.\par \par \par \par

## 2022-07-26 NOTE — RESULTS/DATA
[FreeTextEntry1] : 6/2022-mammogram/breast US-\par IMPRESSION:\par No mammographic evidence of malignancy. No discrete solid or cystic lesion is noted upon sonographic evaluation of the right and left breast.\par

## 2022-07-26 NOTE — REASON FOR VISIT
[Home] : at home, [unfilled] , at the time of the visit. [Medical Office: ([facility])___] : at the medical office located in  [Verbal consent obtained from patient] : the patient, [unfilled] [FreeTextEntry2] : colon cancer

## 2022-07-26 NOTE — ASSESSMENT
[FreeTextEntry1] : Lab work, mammogram/breast US results reviewed.\par #1) Colon cancer-clinically stable at present. Patient wishes to continue Keytruda which she feels she is tolerating well.  Had reviewed that the usual duration of immunotherapy would be for up to 2 years or until disease progression or intolerance to the medication.  Patient had had a break from treatment and resumed it with stability of her disease.  Patient is considering quality of life issues in her decision making and wishes to continue immunotherapy. CT C/A/P 3/2022 c/w stable disease. Though increased CEA noted, it appears to be stable at present. In light of immunotherapy currently on hold due to renal issues, will obtain CT scans to re-assess current disease status.\par \par #2) history of anemia/leukopenia/thrombocytopenia. With progressive thrombocytopenia, patient had bone marrow biopsy 11/2021–cellular marrow with erythroid predominant maturing trilineage hematopoiesis.  Normal cytogenetics.  Genomic analysis with no mutations identified.\par ?  May have immune component to cytopenia(s).  Had explained that some BM disorders may evolve over time, such as MDS. No overt bleeding noted clinically at present.  Hematologic surveillance for now. GF support as needed.\par \par #3) advised f/u with nephrology as planned, for renal disease/electrolyte abnormalities.\par \par Patient was given the opportunity to ask questions. Her questions have been answered to her apparent satisfaction at this time.  She concurs with plans of care.\par

## 2022-07-28 ENCOUNTER — NON-APPOINTMENT (OUTPATIENT)
Age: 87
End: 2022-07-28

## 2022-08-10 ENCOUNTER — OUTPATIENT (OUTPATIENT)
Dept: OUTPATIENT SERVICES | Facility: HOSPITAL | Age: 87
LOS: 1 days | Discharge: HOME | End: 2022-08-10

## 2022-08-10 DIAGNOSIS — Z90.49 ACQUIRED ABSENCE OF OTHER SPECIFIED PARTS OF DIGESTIVE TRACT: Chronic | ICD-10-CM

## 2022-08-10 DIAGNOSIS — D35.1 BENIGN NEOPLASM OF PARATHYROID GLAND: Chronic | ICD-10-CM

## 2022-08-10 DIAGNOSIS — C18.9 MALIGNANT NEOPLASM OF COLON, UNSPECIFIED: ICD-10-CM

## 2022-08-10 DIAGNOSIS — Z98.890 OTHER SPECIFIED POSTPROCEDURAL STATES: Chronic | ICD-10-CM

## 2022-08-11 ENCOUNTER — OUTPATIENT (OUTPATIENT)
Dept: OUTPATIENT SERVICES | Facility: HOSPITAL | Age: 87
LOS: 1 days | End: 2022-08-11
Payer: MEDICARE

## 2022-08-11 ENCOUNTER — APPOINTMENT (OUTPATIENT)
Dept: CT IMAGING | Facility: HOSPITAL | Age: 87
End: 2022-08-11

## 2022-08-11 DIAGNOSIS — Z98.890 OTHER SPECIFIED POSTPROCEDURAL STATES: Chronic | ICD-10-CM

## 2022-08-11 DIAGNOSIS — Z90.49 ACQUIRED ABSENCE OF OTHER SPECIFIED PARTS OF DIGESTIVE TRACT: Chronic | ICD-10-CM

## 2022-08-11 DIAGNOSIS — D35.1 BENIGN NEOPLASM OF PARATHYROID GLAND: Chronic | ICD-10-CM

## 2022-08-11 DIAGNOSIS — C18.9 MALIGNANT NEOPLASM OF COLON, UNSPECIFIED: ICD-10-CM

## 2022-08-11 DIAGNOSIS — R97.0 ELEVATED CARCINOEMBRYONIC ANTIGEN [CEA]: ICD-10-CM

## 2022-08-11 PROCEDURE — 71250 CT THORAX DX C-: CPT

## 2022-08-11 PROCEDURE — 71250 CT THORAX DX C-: CPT | Mod: 26,MH

## 2022-08-11 PROCEDURE — 74176 CT ABD & PELVIS W/O CONTRAST: CPT

## 2022-08-11 PROCEDURE — 74176 CT ABD & PELVIS W/O CONTRAST: CPT | Mod: 26,MH

## 2022-08-15 ENCOUNTER — NON-APPOINTMENT (OUTPATIENT)
Age: 87
End: 2022-08-15

## 2022-08-15 LAB
ALBUMIN SERPL ELPH-MCNC: 4 G/DL
ALP BLD-CCNC: 75 U/L
ALT SERPL-CCNC: 11 U/L
ANION GAP SERPL CALC-SCNC: 14 MMOL/L
AST SERPL-CCNC: 12 U/L
BASOPHILS # BLD AUTO: 0.01 K/UL
BASOPHILS NFR BLD AUTO: 0.4 %
BILIRUB SERPL-MCNC: 0.4 MG/DL
BUN SERPL-MCNC: 54 MG/DL
CALCIUM SERPL-MCNC: 8.8 MG/DL
CEA SERPL-MCNC: 305 NG/ML
CHLORIDE SERPL-SCNC: 115 MMOL/L
CO2 SERPL-SCNC: 13 MMOL/L
CREAT SERPL-MCNC: 2.66 MG/DL
EGFR: 17 ML/MIN/1.73M2
EOSINOPHIL # BLD AUTO: 0.1 K/UL
EOSINOPHIL NFR BLD AUTO: 3.5 %
GLUCOSE SERPL-MCNC: 117 MG/DL
HCT VFR BLD CALC: 29.1 %
HGB BLD-MCNC: 9.6 G/DL
IMM GRANULOCYTES NFR BLD AUTO: 0.4 %
LYMPHOCYTES # BLD AUTO: 0.71 K/UL
LYMPHOCYTES NFR BLD AUTO: 24.9 %
MAN DIFF?: NORMAL
MCHC RBC-ENTMCNC: 31.5 PG
MCHC RBC-ENTMCNC: 33 GM/DL
MCV RBC AUTO: 95.4 FL
MONOCYTES # BLD AUTO: 0.19 K/UL
MONOCYTES NFR BLD AUTO: 6.7 %
NEUTROPHILS # BLD AUTO: 1.83 K/UL
NEUTROPHILS NFR BLD AUTO: 64.1 %
PLATELET # BLD AUTO: 80 K/UL
POTASSIUM SERPL-SCNC: 4.1 MMOL/L
PROT SERPL-MCNC: 5.6 G/DL
RBC # BLD: 3.05 M/UL
RBC # FLD: 14.6 %
SODIUM SERPL-SCNC: 142 MMOL/L
WBC # FLD AUTO: 2.85 K/UL

## 2022-08-16 ENCOUNTER — APPOINTMENT (OUTPATIENT)
Dept: INFUSION THERAPY | Facility: HOSPITAL | Age: 87
End: 2022-08-16

## 2022-08-16 ENCOUNTER — APPOINTMENT (OUTPATIENT)
Dept: HEMATOLOGY ONCOLOGY | Facility: CLINIC | Age: 87
End: 2022-08-16

## 2022-08-16 DIAGNOSIS — Z51.12 ENCOUNTER FOR ANTINEOPLASTIC IMMUNOTHERAPY: ICD-10-CM

## 2022-08-16 PROCEDURE — 99442: CPT | Mod: 95

## 2022-08-16 NOTE — ASSESSMENT
[FreeTextEntry1] : Lab work reviewed.\par #1) Colon cancer/h/o elevated CEA-clinically stable at present. Patient wishes to continue Keytruda which she feels she is tolerating well.  Had reviewed that the usual duration of immunotherapy would be for up to 2 years or until disease progression or intolerance to the medication.  Patient had had a break from treatment and resumed it with stability of her disease.  Patient is considering quality of life issues in her decision making and wished to continue immunotherapy. CT C/A/P 3/2022 was c/w stable disease. In light of immunotherapy currently on hold due to renal issues, f/u CT scans done to re-assess current disease status-report pending.\par \par #2) history of anemia/leukopenia/thrombocytopenia. With progressive thrombocytopenia, patient had bone marrow biopsy 11/2021–cellular marrow with erythroid predominant maturing trilineage hematopoiesis.  Normal cytogenetics.  Genomic analysis with no mutations identified.\par ?  May have immune component to cytopenia(s).  Had explained that some BM disorders may evolve over time, such as MDS. No overt bleeding noted clinically at present.  Hematologic surveillance for now. GF support as needed.\par \par #3) advised f/u with nephrology as planned, for renal disease/electrolyte abnormalities.\par \par Patient was given the opportunity to ask questions. Her questions have been answered to her apparent satisfaction at this time.  She concurs with plans of care.\par

## 2022-08-16 NOTE — REASON FOR VISIT
[Follow-Up Visit] : a follow-up [Home] : at home, [unfilled] , at the time of the visit. [Medical Office: ([facility])___] : at the medical office located in  [Verbal consent obtained from patient] : the patient, [unfilled] [FreeTextEntry2] : colon cancer

## 2022-08-16 NOTE — HISTORY OF PRESENT ILLNESS
[Disease: _____________________] : Disease: [unfilled] [T: ___] : T[unfilled] [N: ___] : N[unfilled] [M: ___] : M[unfilled] [AJCC Stage: ____] : AJCC Stage: [unfilled] [de-identified] : Patient with history of colon cancer (adenocarcinoma)-5-2011. Stage IIIC-T4 N2a. Status post right colectomy followed by FOLFOX chemotherapy.  With persistently elevated CEA on f/u testing,  3/2013 mesentery mass excision pathology showed a lymph node negative for metastatic carcinoma. Benign fibroadipose tissue.\par Persistently increasing elevated CEA.  CT scan abdomen/pelvis 6/2016, showed enlarging mesenteric adenopathy. Endoscopic ultrasound guided FNA of the bryan-pancreatic, mesenteric mass was positive for malignant cells-adenocarcinoma associated with abundant mucin-compatible with known history of adenocarcinoma, colorectal type, mucinous variant.. 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  Patient obtained 2 surgical opinions and disease was felt to be inoperable by both surgeons.  Patient begun on Xeloda/Avastin.\par 5/2017-CT scan chest/abdomen/pelvis showed stable tiny nodules in the lungs. Sizable mass merging with the head of the pancreas had Increased in size from 11/2016 with some increasing infiltration of the surrounding fat.  Possibility of increasing colonic wall thickness on the colon site of the ileocolic anastomosis. Patient begun on Pembrolizumab (tumor MSI-high).\par 8/2017- CT scan chest/abdomen/pelvis showed marginally worsening mesenteric adenopathy, with short interval followup CT scan abdomen/pelvis 9/2017, stable compared to 8/2017.\par 1/2018-CT scan chest/abdomen/pelvis-stable adenopathy.\par 6/2018-CT scan chest/abdomen/pelvis-stable adenopathy.\par 11/2018-CT chest/abdomen/pelvis-unchanged mass at the root of the mesentery.\par 4/2019-CT scan chest/abdomen/pelvis-stable mesenteric soft tissue mass.\par 8/2019-CT scan chest/abdomen/pelvis-unchanged mesenteric mass.\par 10/2019-CT scan A/P-stable mesenteric mass. Held Pembrolizumab.\par 2/2020-CT scan chest/abdomen/pelvis-stable exam with mesenteric mass without significant change since 10/2019. Resumed Pembrolizumab, which was then held 6/2020 due to progressive renal insufficiency.\par 7/2020-CT scan chest/abdomen/pelvis–no significant change.  Stable mesenteric mass unchanged since 10/2019.  Segment of colonic wall thickening at the level of the anastomosis unchanged.  Enlarged multinodular thyroid with bilateral substernal extension unchanged.\par 10/2020-CT scan C/A/P-stable upper abdominal mesenteric mass. No new suspicious pathology.\par \par 1/2021–CT scan chest/abdomen/pelvis–mesenteric mass inseparable from the pancreatic head encasing the proximal mesenteric vessels minimally enlarged since 10/2020.  Mild mural thickening and ileal colic anastomosis. New course Pembrolizumab begun.\par 5/2021–CT scan chest/abdomen/pelvis–stable mesenteric mass contiguous with pancreatic head.  Decrease in thickening at the ileocolic anastomosis.  No new abnormalities.\par 10/2021- CT C/A/P-stable exam.\par 3/28/2022-CT C/A/P-nonspecific small volume pelvic ascites, stable  since 10/19/2021. An 8.3 x 5.6 cm lobulated mesenteric mass is indeterminate, but unchanged.\par Stable appearance of the chest since 10/19/2021.\par \par  [de-identified] : adenocarcinoma [de-identified] : CEA 12/2013-33.6 [de-identified] : History of right breast cancer-2007. Status post right breast conservation surgery--> RT--> [facility].         \par \par 11/2021 (h/o pancytopenia)-Bone marrow biopsy and bone marrow aspirate-cellular marrow (40-50%) with erythroid predominant maturing\par  trilineage hematopoiesis with no increase in blast population. Normal female cytogenetics. Normal MDS FISH panel.\par Onko Sight Myeloid panel-no Mutations Identified\par  [de-identified] : Sees nephrologist for renal disease-takes sodium bicarb tabs (Dr. Garcia)-next appt. 9/26/22.\par Remains independent, continues to drive. \par No CP, cough, SOB, fevers. No c/o N/V/change in bowel habits. No c/o abdominal/pelvic pain. No bleeding.\par \par Son is [facility] . He is a source of support for patient.\par \par Has had COVID vaccines, along with booster.\par \par \par \par

## 2022-08-19 ENCOUNTER — NON-APPOINTMENT (OUTPATIENT)
Age: 87
End: 2022-08-19

## 2022-09-01 ENCOUNTER — LABORATORY RESULT (OUTPATIENT)
Age: 87
End: 2022-09-01

## 2022-09-01 LAB
ALBUMIN SERPL ELPH-MCNC: 4.2 G/DL
ALP BLD-CCNC: 72 U/L
ALT SERPL-CCNC: 16 U/L
ANION GAP SERPL CALC-SCNC: 15 MMOL/L
AST SERPL-CCNC: 15 U/L
BILIRUB SERPL-MCNC: 0.4 MG/DL
BUN SERPL-MCNC: 46 MG/DL
CALCIUM SERPL-MCNC: 8.7 MG/DL
CEA SERPL-MCNC: 293 NG/ML
CHLORIDE SERPL-SCNC: 115 MMOL/L
CO2 SERPL-SCNC: 15 MMOL/L
CREAT SERPL-MCNC: 2.55 MG/DL
EGFR: 18 ML/MIN/1.73M2
GLUCOSE SERPL-MCNC: 138 MG/DL
POTASSIUM SERPL-SCNC: 3.6 MMOL/L
PROT SERPL-MCNC: 5.9 G/DL
SODIUM SERPL-SCNC: 145 MMOL/L

## 2022-09-02 ENCOUNTER — NON-APPOINTMENT (OUTPATIENT)
Age: 87
End: 2022-09-02

## 2022-09-02 LAB
BASOPHILS # BLD AUTO: 0.01 K/UL
BASOPHILS NFR BLD AUTO: 0.4 %
EOSINOPHIL # BLD AUTO: 0.1 K/UL
EOSINOPHIL NFR BLD AUTO: 3.7 %
HCT VFR BLD CALC: 28.5 %
HGB BLD-MCNC: 9.5 G/DL
IMM GRANULOCYTES NFR BLD AUTO: 0.4 %
LYMPHOCYTES # BLD AUTO: 0.61 K/UL
LYMPHOCYTES NFR BLD AUTO: 22.8 %
MAN DIFF?: NORMAL
MCHC RBC-ENTMCNC: 31.4 PG
MCHC RBC-ENTMCNC: 33.3 GM/DL
MCV RBC AUTO: 94.1 FL
MONOCYTES # BLD AUTO: 0.18 K/UL
MONOCYTES NFR BLD AUTO: 6.7 %
NEUTROPHILS # BLD AUTO: 1.76 K/UL
NEUTROPHILS NFR BLD AUTO: 66 %
PLATELET # BLD AUTO: 82 K/UL
RBC # BLD: 3.03 M/UL
RBC # FLD: 14.8 %
WBC # FLD AUTO: 2.67 K/UL

## 2022-09-06 ENCOUNTER — APPOINTMENT (OUTPATIENT)
Dept: HEMATOLOGY ONCOLOGY | Facility: CLINIC | Age: 87
End: 2022-09-06

## 2022-09-06 ENCOUNTER — APPOINTMENT (OUTPATIENT)
Dept: INFUSION THERAPY | Facility: HOSPITAL | Age: 87
End: 2022-09-06

## 2022-09-06 PROCEDURE — 99442: CPT | Mod: 95

## 2022-09-06 NOTE — REVIEW OF SYSTEMS
[Loss of Hearing] : loss of hearing [Negative] : Allergic/Immunologic [Abdominal Pain] : no abdominal pain [Fainting] : no fainting

## 2022-09-06 NOTE — HISTORY OF PRESENT ILLNESS
[Disease: _____________________] : Disease: [unfilled] [T: ___] : T[unfilled] [N: ___] : N[unfilled] [M: ___] : M[unfilled] [AJCC Stage: ____] : AJCC Stage: [unfilled] [de-identified] : Patient with history of colon cancer (adenocarcinoma)-5-2011. Stage IIIC-T4 N2a. Status post right colectomy followed by FOLFOX chemotherapy.  With persistently elevated CEA on f/u testing,  3/2013 mesentery mass excision pathology showed a lymph node negative for metastatic carcinoma. Benign fibroadipose tissue.\par Persistently increasing elevated CEA.  CT scan abdomen/pelvis 6/2016, showed enlarging mesenteric adenopathy. Endoscopic ultrasound guided FNA of the bryan-pancreatic, mesenteric mass was positive for malignant cells-adenocarcinoma associated with abundant mucin-compatible with known history of adenocarcinoma, colorectal type, mucinous variant.. 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  Patient obtained 2 surgical opinions and disease was felt to be inoperable by both surgeons.  Patient begun on Xeloda/Avastin.\par 5/2017-CT scan chest/abdomen/pelvis showed stable tiny nodules in the lungs. Sizable mass merging with the head of the pancreas had Increased in size from 11/2016 with some increasing infiltration of the surrounding fat.  Possibility of increasing colonic wall thickness on the colon site of the ileocolic anastomosis. Patient begun on Pembrolizumab (tumor MSI-high).\par 8/2017- CT scan chest/abdomen/pelvis showed marginally worsening mesenteric adenopathy, with short interval followup CT scan abdomen/pelvis 9/2017, stable compared to 8/2017.\par 1/2018-CT scan chest/abdomen/pelvis-stable adenopathy.\par 6/2018-CT scan chest/abdomen/pelvis-stable adenopathy.\par 11/2018-CT chest/abdomen/pelvis-unchanged mass at the root of the mesentery.\par 4/2019-CT scan chest/abdomen/pelvis-stable mesenteric soft tissue mass.\par 8/2019-CT scan chest/abdomen/pelvis-unchanged mesenteric mass.\par 10/2019-CT scan A/P-stable mesenteric mass. Held Pembrolizumab.\par 2/2020-CT scan chest/abdomen/pelvis-stable exam with mesenteric mass without significant change since 10/2019. Resumed Pembrolizumab, which was then held 6/2020 due to progressive renal insufficiency.\par 7/2020-CT scan chest/abdomen/pelvis–no significant change.  Stable mesenteric mass unchanged since 10/2019.  Segment of colonic wall thickening at the level of the anastomosis unchanged.  Enlarged multinodular thyroid with bilateral substernal extension unchanged.\par 10/2020-CT scan C/A/P-stable upper abdominal mesenteric mass. No new suspicious pathology.\par \par 1/2021–CT scan chest/abdomen/pelvis–mesenteric mass inseparable from the pancreatic head encasing the proximal mesenteric vessels minimally enlarged since 10/2020.  Mild mural thickening and ileal colic anastomosis. New course Pembrolizumab begun.\par 5/2021–CT scan chest/abdomen/pelvis–stable mesenteric mass contiguous with pancreatic head.  Decrease in thickening at the ileocolic anastomosis.  No new abnormalities.\par 10/2021- CT C/A/P-stable exam.\par 3/28/2022-CT C/A/P-nonspecific small volume pelvic ascites, stable  since 10/19/2021. An 8.3 x 5.6 cm lobulated mesenteric mass is indeterminate, but unchanged.\par Stable appearance of the chest since 10/19/2021.\par \par 8/19/2022-CT C/A/P-mesenteric mass without significant change.\par \par \par \par  [de-identified] : adenocarcinoma [de-identified] : CEA 12/2013-33.6 [de-identified] : History of right breast cancer-2007. Status post right breast conservation surgery--> RT--> [facility].         \par \par 11/2021 (h/o pancytopenia)-Bone marrow biopsy and bone marrow aspirate-cellular marrow (40-50%) with erythroid predominant maturing\par  trilineage hematopoiesis with no increase in blast population. Normal female cytogenetics. Normal MDS FISH panel.\par Onko Sight Myeloid panel-no Mutations Identified\par  [de-identified] : Sees nephrologist for renal disease-takes sodium bicarb tabs (Dr. Garcia)-next appt. 9/26/22. Immunotherapy treatment on hold pending renal f/u.\par Remains independent, continues to drive. \par No CP, cough, SOB, fevers. No c/o N/V/change in bowel habits. No c/o abdominal/pelvic pain. No bleeding.\par \par Son is [employer] . He is a source of support for patient, as is daughter.\par \par Has had COVID vaccines, along with booster.\par \par \par \par

## 2022-09-06 NOTE — ASSESSMENT
[FreeTextEntry1] : Lab work reviewed.\par #1) Colon cancer/h/o elevated CEA-clinically stable at present. Patient has wished to continue Keytruda which she feels she tolerates well.  Had reviewed that the usual duration of immunotherapy would be for up to 2 years or until disease progression or intolerance to the medication.  Patient had had a break from treatment and resumed it with stability of her disease.  Patient is considering quality of life issues in her decision making. \par Immunotherapy currently on hold due to renal issues-last dose 6/14/22.  CT C/A/P 7/2022 c/w stable disease, and CEA trending downward.\par Reviewed treatment alternatives with respective pro's/con's. Patient wishes for continued treatment break currently-tentatively plan short interval f/u imaging in ~2 months (possible PET scan next visit), and then further management decisions.\par \par #2) history of anemia/leukopenia/thrombocytopenia. With progressive thrombocytopenia, patient had bone marrow biopsy 11/2021–cellular marrow with erythroid predominant maturing trilineage hematopoiesis.  Normal cytogenetics.  Genomic analysis with no mutations identified.\par ?  May have immune component to cytopenia(s).  Kidney disease may contribute to the anemia as well. Had explained that some BM disorders may evolve over time, such as MDS. No overt bleeding noted clinically at present.  Hematologic surveillance for now. GF support as needed.\par \par #3) to f/u with nephrology as planned, for renal disease/electrolyte abnormalities.\par \par Patient was given the opportunity to ask questions. Her questions have been answered to her apparent satisfaction at this time.  She concurs with plans of care.\par

## 2022-09-06 NOTE — REASON FOR VISIT
[Home] : at home, [unfilled] , at the time of the visit. [Medical Office: ([facility])___] : at the medical office located in  [Verbal consent obtained from patient] : the patient, [unfilled] [Follow-Up Visit] : a follow-up [FreeTextEntry2] : colon cancer

## 2022-09-12 NOTE — PRE-ANESTHESIA EVALUATION ADULT - NSANTHTOBACCOSD_GEN_ALL_CORE
Lm requesting appointment for diabetes fup
NYA for patient to schedule her diabetic follow up
Pt is very overdue for diabetes follow up appt. Please call her to schedule appt. Routed to front staff.
No

## 2022-10-05 NOTE — ASU DISCHARGE PLAN (ADULT/PEDIATRIC) - BATHING
Keep soap and water out of eyes Minoxidil Pregnancy And Lactation Text: This medication has not been assigned a Pregnancy Risk Category but animal studies failed to show danger with the topical medication. It is unknown if the medication is excreted in breast milk.

## 2022-10-28 ENCOUNTER — OUTPATIENT (OUTPATIENT)
Dept: OUTPATIENT SERVICES | Facility: HOSPITAL | Age: 87
LOS: 1 days | Discharge: HOME | End: 2022-10-28

## 2022-10-28 DIAGNOSIS — Z90.49 ACQUIRED ABSENCE OF OTHER SPECIFIED PARTS OF DIGESTIVE TRACT: Chronic | ICD-10-CM

## 2022-10-28 DIAGNOSIS — Z98.890 OTHER SPECIFIED POSTPROCEDURAL STATES: Chronic | ICD-10-CM

## 2022-10-28 DIAGNOSIS — D35.1 BENIGN NEOPLASM OF PARATHYROID GLAND: Chronic | ICD-10-CM

## 2022-10-28 DIAGNOSIS — C18.9 MALIGNANT NEOPLASM OF COLON, UNSPECIFIED: ICD-10-CM

## 2022-11-07 ENCOUNTER — APPOINTMENT (OUTPATIENT)
Dept: HEMATOLOGY ONCOLOGY | Facility: CLINIC | Age: 87
End: 2022-11-07

## 2022-11-07 PROCEDURE — 99442: CPT | Mod: 95

## 2022-11-07 NOTE — HISTORY OF PRESENT ILLNESS
[Disease: _____________________] : Disease: [unfilled] [T: ___] : T[unfilled] [N: ___] : N[unfilled] [M: ___] : M[unfilled] [AJCC Stage: ____] : AJCC Stage: [unfilled] [de-identified] : Patient with history of colon cancer (adenocarcinoma)-5-2011. Stage IIIC-T4 N2a. Status post right colectomy followed by FOLFOX chemotherapy.  With persistently elevated CEA on f/u testing,  3/2013 mesentery mass excision pathology showed a lymph node negative for metastatic carcinoma. Benign fibroadipose tissue.\par Persistently increasing elevated CEA.  CT scan abdomen/pelvis 6/2016, showed enlarging mesenteric adenopathy. Endoscopic ultrasound guided FNA of the bryan-pancreatic, mesenteric mass was positive for malignant cells-adenocarcinoma associated with abundant mucin-compatible with known history of adenocarcinoma, colorectal type, mucinous variant.. 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  Patient obtained 2 surgical opinions and disease was felt to be inoperable by both surgeons.  Patient begun on Xeloda/Avastin.\par 5/2017-CT scan chest/abdomen/pelvis showed stable tiny nodules in the lungs. Sizable mass merging with the head of the pancreas had Increased in size from 11/2016 with some increasing infiltration of the surrounding fat.  Possibility of increasing colonic wall thickness on the colon site of the ileocolic anastomosis. Patient begun on Pembrolizumab (tumor MSI-high).\par 8/2017- CT scan chest/abdomen/pelvis showed marginally worsening mesenteric adenopathy, with short interval followup CT scan abdomen/pelvis 9/2017, stable compared to 8/2017.\par 1/2018-CT scan chest/abdomen/pelvis-stable adenopathy.\par 6/2018-CT scan chest/abdomen/pelvis-stable adenopathy.\par 11/2018-CT chest/abdomen/pelvis-unchanged mass at the root of the mesentery.\par 4/2019-CT scan chest/abdomen/pelvis-stable mesenteric soft tissue mass.\par 8/2019-CT scan chest/abdomen/pelvis-unchanged mesenteric mass.\par 10/2019-CT scan A/P-stable mesenteric mass. Held Pembrolizumab.\par 2/2020-CT scan chest/abdomen/pelvis-stable exam with mesenteric mass without significant change since 10/2019. Resumed Pembrolizumab, which was then held 6/2020 due to progressive renal insufficiency.\par 7/2020-CT scan chest/abdomen/pelvis–no significant change.  Stable mesenteric mass unchanged since 10/2019.  Segment of colonic wall thickening at the level of the anastomosis unchanged.  Enlarged multinodular thyroid with bilateral substernal extension unchanged.\par 10/2020-CT scan C/A/P-stable upper abdominal mesenteric mass. No new suspicious pathology.\par \par 1/2021–CT scan chest/abdomen/pelvis–mesenteric mass inseparable from the pancreatic head encasing the proximal mesenteric vessels minimally enlarged since 10/2020.  Mild mural thickening and ileal colic anastomosis. New course Pembrolizumab begun.\par 5/2021–CT scan chest/abdomen/pelvis–stable mesenteric mass contiguous with pancreatic head.  Decrease in thickening at the ileocolic anastomosis.  No new abnormalities.\par 10/2021- CT C/A/P-stable exam.\par 3/28/2022-CT C/A/P-nonspecific small volume pelvic ascites, stable  since 10/19/2021. An 8.3 x 5.6 cm lobulated mesenteric mass is indeterminate, but unchanged.\par Stable appearance of the chest since 10/19/2021.\par \par 8/19/2022-CT C/A/P-mesenteric mass without significant change.\par \par \par \par  [de-identified] : adenocarcinoma [de-identified] : CEA 12/2013-33.6 [de-identified] : History of right breast cancer-2007. Status post right breast conservation surgery--> RT--> [facility].         \par \par 11/2021 (h/o pancytopenia)-Bone marrow biopsy and bone marrow aspirate-cellular marrow (40-50%) with erythroid predominant maturing\par  trilineage hematopoiesis with no increase in blast population. Normal female cytogenetics. Normal MDS FISH panel.\par Onko Sight Myeloid panel-no Mutations Identified\par  [de-identified] : Sees nephrologist for renal disease-takes sodium bicarb tabs (Dr. Garcia)-last appt. 9/26/22. Immunotherapy treatment has been on hold due to renal issues.\par Remains independent, continues to drive. \par No CP, cough, SOB, fevers. No c/o N/V/change in bowel habits. No c/o abdominal/pelvic pain. No bleeding.\par \par Son is [employer] . He is a source of support for patient, as is daughter.\par \par Has had COVID vaccines, along with booster.\par \par \par \par

## 2022-11-07 NOTE — REASON FOR VISIT
[Follow-Up Visit] : a follow-up [Home] : at home, [unfilled] , at the time of the visit. [Medical Office: ([facility])___] : at the medical office located in  [Verbal consent obtained from patient] : the patient, [unfilled] [FreeTextEntry2] : colon cancer

## 2022-11-07 NOTE — ASSESSMENT
[FreeTextEntry1] : Lab work reviewed.\par #1) Colon cancer/h/o elevated CEA-clinically stable at present. Patient has wished to continue Keytruda which she feels she tolerates well.  Had reviewed that the usual duration of immunotherapy would be for up to 2 years or until disease progression or intolerance to the medication.  Patient had had a break from treatment and resumed it with stability of her disease.  Patient is considering quality of life issues in her decision making. \par Immunotherapy currently on hold due to renal issues-last dose 6/14/22.  CT C/A/P 7/2022 c/w stable disease, and CEA trending downward.\par Reviewed treatment alternatives with respective pro's/con's. Patient wishes for continued treatment break currently-f/u PET/CT scan ordered-->then further management decisions.\par \par #2) history of anemia/leukopenia/thrombocytopenia. With progressive thrombocytopenia, patient had bone marrow biopsy 11/2021–cellular marrow with erythroid predominant maturing trilineage hematopoiesis.  Normal cytogenetics.  Genomic analysis with no mutations identified.\par ?  May have immune component to cytopenia(s).  Kidney disease may contribute to the anemia as well. Had explained that some BM disorders may evolve over time, such as MDS. No overt bleeding noted clinically at present.  Hematologic surveillance for now. GF support as needed.\par \par #3) to continue f/u with nephrology, for renal disease/electrolyte abnormalities.\par \par #4) reviewed need for mediport maintenance-to schedule.\par \par Patient was given the opportunity to ask questions. Her questions have been answered to her apparent satisfaction at this time.  She concurs with plans of care.\par

## 2022-12-05 ENCOUNTER — NON-APPOINTMENT (OUTPATIENT)
Age: 87
End: 2022-12-05

## 2022-12-07 ENCOUNTER — RESULT REVIEW (OUTPATIENT)
Age: 87
End: 2022-12-07

## 2022-12-07 ENCOUNTER — APPOINTMENT (OUTPATIENT)
Dept: HEMATOLOGY ONCOLOGY | Facility: CLINIC | Age: 87
End: 2022-12-07

## 2022-12-07 ENCOUNTER — APPOINTMENT (OUTPATIENT)
Dept: INFUSION THERAPY | Facility: HOSPITAL | Age: 87
End: 2022-12-07

## 2022-12-07 VITALS
RESPIRATION RATE: 16 BRPM | WEIGHT: 156.97 LBS | OXYGEN SATURATION: 98 % | TEMPERATURE: 97 F | BODY MASS INDEX: 26.8 KG/M2 | HEIGHT: 64.17 IN | HEART RATE: 52 BPM | DIASTOLIC BLOOD PRESSURE: 68 MMHG | SYSTOLIC BLOOD PRESSURE: 156 MMHG

## 2022-12-07 LAB
ALBUMIN SERPL ELPH-MCNC: 4.2 G/DL — SIGNIFICANT CHANGE UP (ref 3.3–5)
ALP SERPL-CCNC: 68 U/L — SIGNIFICANT CHANGE UP (ref 40–120)
ALT FLD-CCNC: 13 U/L — SIGNIFICANT CHANGE UP (ref 10–45)
ANION GAP SERPL CALC-SCNC: 14 MMOL/L — SIGNIFICANT CHANGE UP (ref 5–17)
ANISOCYTOSIS BLD QL: SLIGHT — SIGNIFICANT CHANGE UP
AST SERPL-CCNC: 13 U/L — SIGNIFICANT CHANGE UP (ref 10–40)
BASOPHILS # BLD AUTO: 0.01 K/UL — SIGNIFICANT CHANGE UP (ref 0–0.2)
BASOPHILS NFR BLD AUTO: 0.4 % — SIGNIFICANT CHANGE UP (ref 0–2)
BILIRUB SERPL-MCNC: 0.3 MG/DL — SIGNIFICANT CHANGE UP (ref 0.2–1.2)
BUN SERPL-MCNC: 53 MG/DL — HIGH (ref 7–23)
CALCIUM SERPL-MCNC: 8.8 MG/DL — SIGNIFICANT CHANGE UP (ref 8.4–10.5)
CEA SERPL-MCNC: 295 NG/ML — HIGH (ref 0–3.8)
CHLORIDE SERPL-SCNC: 116 MMOL/L — HIGH (ref 96–108)
CO2 SERPL-SCNC: 10 MMOL/L — CRITICAL LOW (ref 22–31)
CREAT SERPL-MCNC: 3.16 MG/DL — HIGH (ref 0.5–1.3)
DACRYOCYTES BLD QL SMEAR: SLIGHT — SIGNIFICANT CHANGE UP
EGFR: 14 ML/MIN/1.73M2 — LOW
ELLIPTOCYTES BLD QL SMEAR: SLIGHT — SIGNIFICANT CHANGE UP
EOSINOPHIL # BLD AUTO: 0.04 K/UL — SIGNIFICANT CHANGE UP (ref 0–0.5)
EOSINOPHIL NFR BLD AUTO: 1.5 % — SIGNIFICANT CHANGE UP (ref 0–6)
FERRITIN SERPL-MCNC: 159 NG/ML — HIGH (ref 15–150)
FOLATE SERPL-MCNC: >20 NG/ML — SIGNIFICANT CHANGE UP
GLUCOSE SERPL-MCNC: 105 MG/DL — HIGH (ref 70–99)
HCT VFR BLD CALC: 24.7 % — LOW (ref 34.5–45)
HGB BLD-MCNC: 8.3 G/DL — LOW (ref 11.5–15.5)
IMM GRANULOCYTES NFR BLD AUTO: 0.4 % — SIGNIFICANT CHANGE UP (ref 0–0.9)
IRON SATN MFR SERPL: 25 % — SIGNIFICANT CHANGE UP (ref 14–50)
IRON SATN MFR SERPL: 74 UG/DL — SIGNIFICANT CHANGE UP (ref 30–160)
LYMPHOCYTES # BLD AUTO: 0.67 K/UL — LOW (ref 1–3.3)
LYMPHOCYTES # BLD AUTO: 24.4 % — SIGNIFICANT CHANGE UP (ref 13–44)
MCHC RBC-ENTMCNC: 31.3 PG — SIGNIFICANT CHANGE UP (ref 27–34)
MCHC RBC-ENTMCNC: 33.6 G/DL — SIGNIFICANT CHANGE UP (ref 32–36)
MCV RBC AUTO: 93.2 FL — SIGNIFICANT CHANGE UP (ref 80–100)
MONOCYTES # BLD AUTO: 0.22 K/UL — SIGNIFICANT CHANGE UP (ref 0–0.9)
MONOCYTES NFR BLD AUTO: 8 % — SIGNIFICANT CHANGE UP (ref 2–14)
NEUTROPHILS # BLD AUTO: 1.8 K/UL — SIGNIFICANT CHANGE UP (ref 1.8–7.4)
NEUTROPHILS NFR BLD AUTO: 65.3 % — SIGNIFICANT CHANGE UP (ref 43–77)
NRBC # BLD: 0 /100 WBCS — SIGNIFICANT CHANGE UP (ref 0–0)
PLAT MORPH BLD: NORMAL — SIGNIFICANT CHANGE UP
PLATELET # BLD AUTO: 81 K/UL — LOW (ref 150–400)
POIKILOCYTOSIS BLD QL AUTO: SLIGHT — SIGNIFICANT CHANGE UP
POTASSIUM SERPL-MCNC: 3.7 MMOL/L — SIGNIFICANT CHANGE UP (ref 3.5–5.3)
POTASSIUM SERPL-SCNC: 3.7 MMOL/L — SIGNIFICANT CHANGE UP (ref 3.5–5.3)
PROT SERPL-MCNC: 5.7 G/DL — LOW (ref 6–8.3)
RBC # BLD: 2.65 M/UL — LOW (ref 3.8–5.2)
RBC # FLD: 15.1 % — HIGH (ref 10.3–14.5)
RBC BLD AUTO: ABNORMAL
SCHISTOCYTES BLD QL AUTO: SLIGHT — SIGNIFICANT CHANGE UP
SODIUM SERPL-SCNC: 141 MMOL/L — SIGNIFICANT CHANGE UP (ref 135–145)
TIBC SERPL-MCNC: 297 UG/DL — SIGNIFICANT CHANGE UP (ref 220–430)
UIBC SERPL-MCNC: 223 UG/DL — SIGNIFICANT CHANGE UP (ref 110–370)
VIT B12 SERPL-MCNC: 881 PG/ML — SIGNIFICANT CHANGE UP (ref 232–1245)
WBC # BLD: 2.75 K/UL — LOW (ref 3.8–10.5)
WBC # FLD AUTO: 2.75 K/UL — LOW (ref 3.8–10.5)

## 2022-12-07 PROCEDURE — 99213 OFFICE O/P EST LOW 20 MIN: CPT

## 2022-12-07 NOTE — PHYSICAL EXAM
[Fully active, able to carry on all pre-disease performance without restriction] : Status 0 - Fully active, able to carry on all pre-disease performance without restriction [Normal] : affect appropriate [de-identified] : no dominant mass

## 2022-12-07 NOTE — HISTORY OF PRESENT ILLNESS
[Disease: _____________________] : Disease: [unfilled] [T: ___] : T[unfilled] [N: ___] : N[unfilled] [M: ___] : M[unfilled] [AJCC Stage: ____] : AJCC Stage: [unfilled] [de-identified] : Patient with history of colon cancer (adenocarcinoma)-5-2011. Stage IIIC-T4 N2a. Status post right colectomy followed by FOLFOX chemotherapy.  With persistently elevated CEA on f/u testing,  3/2013 mesentery mass excision pathology showed a lymph node negative for metastatic carcinoma. Benign fibroadipose tissue.\par Persistently increasing elevated CEA.  CT scan abdomen/pelvis 6/2016, showed enlarging mesenteric adenopathy. Endoscopic ultrasound guided FNA of the bryan-pancreatic, mesenteric mass was positive for malignant cells-adenocarcinoma associated with abundant mucin-compatible with known history of adenocarcinoma, colorectal type, mucinous variant.. 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  Patient obtained 2 surgical opinions and disease was felt to be inoperable by both surgeons.  Patient begun on Xeloda/Avastin.\par 5/2017-CT scan chest/abdomen/pelvis showed stable tiny nodules in the lungs. Sizable mass merging with the head of the pancreas had Increased in size from 11/2016 with some increasing infiltration of the surrounding fat.  Possibility of increasing colonic wall thickness on the colon site of the ileocolic anastomosis. Patient begun on Pembrolizumab (tumor MSI-high).\par 8/2017- CT scan chest/abdomen/pelvis showed marginally worsening mesenteric adenopathy, with short interval followup CT scan abdomen/pelvis 9/2017, stable compared to 8/2017.\par 1/2018-CT scan chest/abdomen/pelvis-stable adenopathy.\par 6/2018-CT scan chest/abdomen/pelvis-stable adenopathy.\par 11/2018-CT chest/abdomen/pelvis-unchanged mass at the root of the mesentery.\par 4/2019-CT scan chest/abdomen/pelvis-stable mesenteric soft tissue mass.\par 8/2019-CT scan chest/abdomen/pelvis-unchanged mesenteric mass.\par 10/2019-CT scan A/P-stable mesenteric mass. Held Pembrolizumab.\par 2/2020-CT scan chest/abdomen/pelvis-stable exam with mesenteric mass without significant change since 10/2019. Resumed Pembrolizumab, which was then held 6/2020 due to progressive renal insufficiency.\par 7/2020-CT scan chest/abdomen/pelvis–no significant change.  Stable mesenteric mass unchanged since 10/2019.  Segment of colonic wall thickening at the level of the anastomosis unchanged.  Enlarged multinodular thyroid with bilateral substernal extension unchanged.\par 10/2020-CT scan C/A/P-stable upper abdominal mesenteric mass. No new suspicious pathology.\par \par 1/2021–CT scan chest/abdomen/pelvis–mesenteric mass inseparable from the pancreatic head encasing the proximal mesenteric vessels minimally enlarged since 10/2020.  Mild mural thickening and ileal colic anastomosis. New course Pembrolizumab begun.\par 5/2021–CT scan chest/abdomen/pelvis–stable mesenteric mass contiguous with pancreatic head.  Decrease in thickening at the ileocolic anastomosis.  No new abnormalities.\par 10/2021- CT C/A/P-stable exam.\par 3/28/2022-CT C/A/P-nonspecific small volume pelvic ascites, stable  since 10/19/2021. An 8.3 x 5.6 cm lobulated mesenteric mass is indeterminate, but unchanged.\par Stable appearance of the chest since 10/19/2021.\par \par 8/19/2022-CT C/A/P-mesenteric mass without significant change.\par \par \par \par  [de-identified] : adenocarcinoma [de-identified] : CEA 12/2013-33.6 [de-identified] : History of right breast cancer-2007. Status post right breast conservation surgery--> RT--> [facility].         \par \par 11/2021 (h/o pancytopenia)-Bone marrow biopsy and bone marrow aspirate-cellular marrow (40-50%) with erythroid predominant maturing\par  trilineage hematopoiesis with no increase in blast population. Normal female cytogenetics. Normal MDS FISH panel.\par Onko Sight Myeloid panel-no Mutations Identified\par  [de-identified] : Has PET/CT scan scheduled 12/12/22.\par Sees nephrologist for renal disease-takes sodium bicarb tabs (Dr. Garcia)-last appt. 9/26/22. Immunotherapy treatment has been on hold due to renal issues.\par Remains independent, continues to drive. Active with her local senior center.\par No CP, cough, SOB, fevers. No c/o N/V/change in bowel habits. No c/o abdominal/pelvic pain. No bleeding.\par \par Son is [employer] . He is a source of support for patient, as is daughter.\par \par Has had COVID vaccines, along with booster.\par \par \par \par

## 2022-12-07 NOTE — ASSESSMENT
[FreeTextEntry1] : Lab work reviewed.\par #1) Colon cancer/h/o elevated CEA-clinically stable at present. Patient had wished to continue Keytruda which she feels she tolerates well.  Had reviewed that the usual duration of immunotherapy would be for up to 2 years or until disease progression or intolerance to the medication.  Patient had had a break from treatment and resumed it with stability of her disease.  Patient is considering quality of life issues in her decision making. \par Immunotherapy currently on hold due to renal issues-last dose 6/14/22.  CT C/A/P 7/2022 c/w stable disease, and CEA trending downward.\par Reviewed treatment alternatives with respective pro's/con's. Patient wishes for continued treatment break currently-f/u PET/CT scan yo be done-->then to reconsider further management decisions.\par \par #2) history of anemia/leukopenia/thrombocytopenia. With progressive thrombocytopenia, patient had bone marrow biopsy 11/2021–cellular marrow with erythroid predominant maturing trilineage hematopoiesis.  Normal cytogenetics.  Genomic analysis with no mutations identified.\par ?  May have immune component to cytopenia(s).  Kidney disease may contribute to the anemia as well. Had explained that some BM disorders may evolve over time, such as MDS. No overt bleeding noted clinically at present.  Hematologic surveillance for now. GF support as needed.\par \par #3) to continue f/u with nephrology, for renal disease/electrolyte abnormalities.\par \par #4) reviewed need for mediport maintenance-scheduled today.\par \par Patient was given the opportunity to ask questions. Her questions have been answered to her apparent satisfaction at this time.  She concurs with plans of care.\par

## 2022-12-08 ENCOUNTER — NON-APPOINTMENT (OUTPATIENT)
Age: 87
End: 2022-12-08

## 2022-12-12 ENCOUNTER — OUTPATIENT (OUTPATIENT)
Dept: OUTPATIENT SERVICES | Facility: HOSPITAL | Age: 87
LOS: 1 days | End: 2022-12-12
Payer: MEDICARE

## 2022-12-12 ENCOUNTER — APPOINTMENT (OUTPATIENT)
Dept: NUCLEAR MEDICINE | Facility: CLINIC | Age: 87
End: 2022-12-12

## 2022-12-12 DIAGNOSIS — D64.9 ANEMIA, UNSPECIFIED: ICD-10-CM

## 2022-12-12 DIAGNOSIS — Z98.890 OTHER SPECIFIED POSTPROCEDURAL STATES: Chronic | ICD-10-CM

## 2022-12-12 DIAGNOSIS — C18.9 MALIGNANT NEOPLASM OF COLON, UNSPECIFIED: ICD-10-CM

## 2022-12-12 DIAGNOSIS — R97.0 ELEVATED CARCINOEMBRYONIC ANTIGEN [CEA]: ICD-10-CM

## 2022-12-12 DIAGNOSIS — Z90.49 ACQUIRED ABSENCE OF OTHER SPECIFIED PARTS OF DIGESTIVE TRACT: Chronic | ICD-10-CM

## 2022-12-12 DIAGNOSIS — D35.1 BENIGN NEOPLASM OF PARATHYROID GLAND: Chronic | ICD-10-CM

## 2022-12-12 PROCEDURE — 78815 PET IMAGE W/CT SKULL-THIGH: CPT | Mod: 26,PS,MH

## 2022-12-12 PROCEDURE — A9552: CPT

## 2022-12-12 PROCEDURE — 78815 PET IMAGE W/CT SKULL-THIGH: CPT

## 2022-12-13 ENCOUNTER — NON-APPOINTMENT (OUTPATIENT)
Age: 87
End: 2022-12-13

## 2023-01-04 ENCOUNTER — APPOINTMENT (OUTPATIENT)
Dept: SURGERY | Facility: CLINIC | Age: 88
End: 2023-01-04
Payer: MEDICARE

## 2023-01-04 VITALS — WEIGHT: 159 LBS | BODY MASS INDEX: 27.14 KG/M2 | TEMPERATURE: 97.4 F | HEIGHT: 64 IN

## 2023-01-04 DIAGNOSIS — Z86.19 PERSONAL HISTORY OF OTHER INFECTIOUS AND PARASITIC DISEASES: ICD-10-CM

## 2023-01-04 DIAGNOSIS — Z12.11 ENCOUNTER FOR SCREENING FOR MALIGNANT NEOPLASM OF COLON: ICD-10-CM

## 2023-01-04 DIAGNOSIS — Z80.6 FAMILY HISTORY OF LEUKEMIA: ICD-10-CM

## 2023-01-04 DIAGNOSIS — Z87.39 PERSONAL HISTORY OF OTHER DISEASES OF THE MUSCULOSKELETAL SYSTEM AND CONNECTIVE TISSUE: ICD-10-CM

## 2023-01-04 PROCEDURE — 99214 OFFICE O/P EST MOD 30 MIN: CPT

## 2023-01-04 NOTE — ASSESSMENT
[FreeTextEntry1] : Long discussion regarding all options and risks\par To undergo colonoscopy on 1/31/23\par Bowel prep written and explained\par Discussed with Medicine\par All lab values and imaging studies reviewed

## 2023-01-04 NOTE — PHYSICAL EXAM
[Normal Breath Sounds] : Normal breath sounds [Normal Heart Sounds] : normal heart sounds [Normal Rate and Rhythm] : normal rate and rhythm [Abdominal Masses] : No abdominal masses [Abdomen Tenderness] : ~T ~M No abdominal tenderness [No Rash or Lesion] : No rash or lesion [de-identified] : nl [de-identified] : nl [de-identified] : nl

## 2023-01-04 NOTE — HISTORY OF PRESENT ILLNESS
[de-identified] : The patient is feeling well since her last visit. She is eating well and passing stool easily. Her main problem is weakness in both legs du to the chemo. The patient is scheduled for colonoscopy earlier than usual due to an abnormal PET/CT scan.

## 2023-01-11 ENCOUNTER — APPOINTMENT (OUTPATIENT)
Dept: MRI IMAGING | Facility: HOSPITAL | Age: 88
End: 2023-01-11
Payer: MEDICARE

## 2023-01-11 ENCOUNTER — OUTPATIENT (OUTPATIENT)
Dept: OUTPATIENT SERVICES | Facility: HOSPITAL | Age: 88
LOS: 1 days | End: 2023-01-11
Payer: MEDICARE

## 2023-01-11 ENCOUNTER — OUTPATIENT (OUTPATIENT)
Dept: OUTPATIENT SERVICES | Facility: HOSPITAL | Age: 88
LOS: 1 days | Discharge: HOME | End: 2023-01-11

## 2023-01-11 ENCOUNTER — APPOINTMENT (OUTPATIENT)
Dept: CT IMAGING | Facility: HOSPITAL | Age: 88
End: 2023-01-11
Payer: MEDICARE

## 2023-01-11 DIAGNOSIS — Z90.49 ACQUIRED ABSENCE OF OTHER SPECIFIED PARTS OF DIGESTIVE TRACT: Chronic | ICD-10-CM

## 2023-01-11 DIAGNOSIS — C18.9 MALIGNANT NEOPLASM OF COLON, UNSPECIFIED: ICD-10-CM

## 2023-01-11 DIAGNOSIS — D35.1 BENIGN NEOPLASM OF PARATHYROID GLAND: Chronic | ICD-10-CM

## 2023-01-11 DIAGNOSIS — Z98.890 OTHER SPECIFIED POSTPROCEDURAL STATES: Chronic | ICD-10-CM

## 2023-01-11 PROCEDURE — 74181 MRI ABDOMEN W/O CONTRAST: CPT | Mod: 26,MH

## 2023-01-11 PROCEDURE — 74181 MRI ABDOMEN W/O CONTRAST: CPT

## 2023-01-11 PROCEDURE — 71250 CT THORAX DX C-: CPT | Mod: 26,MH

## 2023-01-11 PROCEDURE — 71250 CT THORAX DX C-: CPT

## 2023-01-18 ENCOUNTER — APPOINTMENT (OUTPATIENT)
Dept: INFUSION THERAPY | Facility: HOSPITAL | Age: 88
End: 2023-01-18

## 2023-01-18 ENCOUNTER — NON-APPOINTMENT (OUTPATIENT)
Age: 88
End: 2023-01-18

## 2023-01-20 ENCOUNTER — NON-APPOINTMENT (OUTPATIENT)
Age: 88
End: 2023-01-20

## 2023-01-28 ENCOUNTER — LABORATORY RESULT (OUTPATIENT)
Age: 88
End: 2023-01-28

## 2023-01-29 NOTE — REASON FOR VISIT
FOLLOW UP WITH YOUR CARDIOLOGIST.  RETURN TO THE EMERGENCY DEPARTMENT AS NEEDED.     [Initial Evaluation] : an initial evaluation [FreeTextEntry1] : To discuss undergoing a colonoscopy

## 2023-01-30 ENCOUNTER — APPOINTMENT (OUTPATIENT)
Dept: SURGERY | Facility: CLINIC | Age: 88
End: 2023-01-30
Payer: MEDICARE

## 2023-01-30 VITALS — WEIGHT: 160 LBS | TEMPERATURE: 97.4 F | BODY MASS INDEX: 27.31 KG/M2 | HEIGHT: 64 IN

## 2023-01-30 DIAGNOSIS — G62.0 DRUG-INDUCED POLYNEUROPATHY: ICD-10-CM

## 2023-01-30 DIAGNOSIS — D70.1 AGRANULOCYTOSIS SECONDARY TO CANCER CHEMOTHERAPY: ICD-10-CM

## 2023-01-30 DIAGNOSIS — Z86.39 PERSONAL HISTORY OF OTHER ENDOCRINE, NUTRITIONAL AND METABOLIC DISEASE: ICD-10-CM

## 2023-01-30 DIAGNOSIS — T45.1X5A DRUG-INDUCED POLYNEUROPATHY: ICD-10-CM

## 2023-01-30 DIAGNOSIS — Z85.858 PERSONAL HISTORY OF MALIGNANT NEOPLASM OF OTHER ENDOCRINE GLANDS: ICD-10-CM

## 2023-01-30 DIAGNOSIS — T45.1X5A AGRANULOCYTOSIS SECONDARY TO CANCER CHEMOTHERAPY: ICD-10-CM

## 2023-01-30 DIAGNOSIS — R19.04 LEFT LOWER QUADRANT ABDOMINAL SWELLING, MASS AND LUMP: ICD-10-CM

## 2023-01-30 DIAGNOSIS — Z82.49 FAMILY HISTORY OF ISCHEMIC HEART DISEASE AND OTHER DISEASES OF THE CIRCULATORY SYSTEM: ICD-10-CM

## 2023-01-30 DIAGNOSIS — Z92.3 PERSONAL HISTORY OF IRRADIATION: ICD-10-CM

## 2023-01-30 PROCEDURE — 99213 OFFICE O/P EST LOW 20 MIN: CPT

## 2023-01-31 ENCOUNTER — NON-APPOINTMENT (OUTPATIENT)
Age: 88
End: 2023-01-31

## 2023-01-31 ENCOUNTER — APPOINTMENT (OUTPATIENT)
Dept: SURGERY | Facility: HOSPITAL | Age: 88
End: 2023-01-31

## 2023-01-31 PROBLEM — G62.0 PERIPHERAL NEUROPATHY DUE TO CHEMOTHERAPY: Status: ACTIVE | Noted: 2017-07-17

## 2023-01-31 PROBLEM — R19.04 ABDOMINAL MASS, LLQ (LEFT LOWER QUADRANT): Status: ACTIVE | Noted: 2023-01-31

## 2023-01-31 NOTE — ASSESSMENT
[FreeTextEntry1] : Long discussion regarding all options and risks\par The colonoscopy will be postponed until the pain has been resolved. \par All lab values and imaging studies reviewed\par Discussed with Medicine

## 2023-01-31 NOTE — PHYSICAL EXAM
[Normal Breath Sounds] : Normal breath sounds [Normal Heart Sounds] : normal heart sounds [Normal Rate and Rhythm] : normal rate and rhythm [Abdominal Masses] : No abdominal masses [Abdomen Tenderness] : ~T ~M No abdominal tenderness [No Rash or Lesion] : No rash or lesion [de-identified] : nl [de-identified] : nl [de-identified] : nl

## 2023-01-31 NOTE — HISTORY OF PRESENT ILLNESS
[de-identified] : This 88 year old woman underwent a right colectomy 12 years ago for a CA. The patient recently underwent a PET/CT which demonstrated liver masses and a stable mesenteric mass near the original anastomosis. The patient is asymptomatic regarding her GI tract. The patient is scheduled for a colonoscopy tomorrow, but she developed left sided abdominal pain yesterday.

## 2023-02-01 ENCOUNTER — INPATIENT (INPATIENT)
Facility: HOSPITAL | Age: 88
LOS: 1 days | Discharge: HOME | DRG: 694 | End: 2023-02-03
Attending: INTERNAL MEDICINE | Admitting: HOSPITALIST
Payer: MEDICARE

## 2023-02-01 VITALS
HEIGHT: 64 IN | HEART RATE: 87 BPM | OXYGEN SATURATION: 99 % | WEIGHT: 156.97 LBS | RESPIRATION RATE: 16 BRPM | SYSTOLIC BLOOD PRESSURE: 185 MMHG | TEMPERATURE: 97 F | DIASTOLIC BLOOD PRESSURE: 82 MMHG

## 2023-02-01 DIAGNOSIS — Z98.890 OTHER SPECIFIED POSTPROCEDURAL STATES: Chronic | ICD-10-CM

## 2023-02-01 DIAGNOSIS — D35.1 BENIGN NEOPLASM OF PARATHYROID GLAND: Chronic | ICD-10-CM

## 2023-02-01 DIAGNOSIS — N13.30 UNSPECIFIED HYDRONEPHROSIS: ICD-10-CM

## 2023-02-01 DIAGNOSIS — Z90.49 ACQUIRED ABSENCE OF OTHER SPECIFIED PARTS OF DIGESTIVE TRACT: Chronic | ICD-10-CM

## 2023-02-01 LAB
ALBUMIN SERPL ELPH-MCNC: 3.3 G/DL — SIGNIFICANT CHANGE UP (ref 3.3–5)
ALP SERPL-CCNC: 86 U/L — SIGNIFICANT CHANGE UP (ref 40–120)
ALT FLD-CCNC: 18 U/L — SIGNIFICANT CHANGE UP (ref 10–45)
ANION GAP SERPL CALC-SCNC: 15 MMOL/L — SIGNIFICANT CHANGE UP (ref 5–17)
ANISOCYTOSIS BLD QL: SLIGHT — SIGNIFICANT CHANGE UP
APPEARANCE UR: ABNORMAL
AST SERPL-CCNC: 14 U/L — SIGNIFICANT CHANGE UP (ref 10–40)
BACTERIA # UR AUTO: ABNORMAL /HPF
BASOPHILS # BLD AUTO: 0.01 K/UL — SIGNIFICANT CHANGE UP (ref 0–0.2)
BASOPHILS NFR BLD AUTO: 0.2 % — SIGNIFICANT CHANGE UP (ref 0–2)
BILIRUB SERPL-MCNC: 0.5 MG/DL — SIGNIFICANT CHANGE UP (ref 0.2–1.2)
BILIRUB UR-MCNC: NEGATIVE — SIGNIFICANT CHANGE UP
BUN SERPL-MCNC: 52 MG/DL — HIGH (ref 7–23)
CALCIUM SERPL-MCNC: 8.5 MG/DL — SIGNIFICANT CHANGE UP (ref 8.4–10.5)
CHLORIDE SERPL-SCNC: 113 MMOL/L — HIGH (ref 96–108)
CO2 SERPL-SCNC: 13 MMOL/L — LOW (ref 22–31)
COLOR SPEC: YELLOW — SIGNIFICANT CHANGE UP
CREAT SERPL-MCNC: 3.85 MG/DL — HIGH (ref 0.5–1.3)
DIFF PNL FLD: ABNORMAL
EGFR: 11 ML/MIN/1.73M2 — LOW
EOSINOPHIL # BLD AUTO: 0.01 K/UL — SIGNIFICANT CHANGE UP (ref 0–0.5)
EOSINOPHIL NFR BLD AUTO: 0.2 % — SIGNIFICANT CHANGE UP (ref 0–6)
EPI CELLS # UR: SIGNIFICANT CHANGE UP
GLUCOSE SERPL-MCNC: 140 MG/DL — HIGH (ref 70–99)
GLUCOSE UR QL: NEGATIVE — SIGNIFICANT CHANGE UP
HCT VFR BLD CALC: 28 % — LOW (ref 34.5–45)
HGB BLD-MCNC: 9.2 G/DL — LOW (ref 11.5–15.5)
HYPOCHROMIA BLD QL: SLIGHT — SIGNIFICANT CHANGE UP
IMM GRANULOCYTES NFR BLD AUTO: 0.3 % — SIGNIFICANT CHANGE UP (ref 0–0.9)
KETONES UR-MCNC: NEGATIVE — SIGNIFICANT CHANGE UP
LEUKOCYTE ESTERASE UR-ACNC: ABNORMAL
LIDOCAIN IGE QN: 47 U/L — LOW (ref 73–393)
LYMPHOCYTES # BLD AUTO: 0.42 K/UL — LOW (ref 1–3.3)
LYMPHOCYTES # BLD AUTO: 6.4 % — LOW (ref 13–44)
MANUAL SMEAR VERIFICATION: SIGNIFICANT CHANGE UP
MCHC RBC-ENTMCNC: 31.9 PG — SIGNIFICANT CHANGE UP (ref 27–34)
MCHC RBC-ENTMCNC: 32.9 GM/DL — SIGNIFICANT CHANGE UP (ref 32–36)
MCV RBC AUTO: 97.2 FL — SIGNIFICANT CHANGE UP (ref 80–100)
MONOCYTES # BLD AUTO: 0.39 K/UL — SIGNIFICANT CHANGE UP (ref 0–0.9)
MONOCYTES NFR BLD AUTO: 6 % — SIGNIFICANT CHANGE UP (ref 2–14)
NEUTROPHILS # BLD AUTO: 5.67 K/UL — SIGNIFICANT CHANGE UP (ref 1.8–7.4)
NEUTROPHILS NFR BLD AUTO: 86.9 % — HIGH (ref 43–77)
NITRITE UR-MCNC: NEGATIVE — SIGNIFICANT CHANGE UP
NRBC # BLD: 0 /100 WBCS — SIGNIFICANT CHANGE UP (ref 0–0)
PH UR: 5 — SIGNIFICANT CHANGE UP (ref 5–8)
PLAT MORPH BLD: NORMAL — SIGNIFICANT CHANGE UP
PLATELET # BLD AUTO: 60 K/UL — LOW (ref 150–400)
POTASSIUM SERPL-MCNC: 3.8 MMOL/L — SIGNIFICANT CHANGE UP (ref 3.5–5.3)
POTASSIUM SERPL-SCNC: 3.8 MMOL/L — SIGNIFICANT CHANGE UP (ref 3.5–5.3)
PROT SERPL-MCNC: 6.3 G/DL — SIGNIFICANT CHANGE UP (ref 6–8.3)
PROT UR-MCNC: 500 MG/DL
RBC # BLD: 2.88 M/UL — LOW (ref 3.8–5.2)
RBC # FLD: 14.6 % — HIGH (ref 10.3–14.5)
RBC BLD AUTO: ABNORMAL
RBC CASTS # UR COMP ASSIST: ABNORMAL /HPF (ref 0–4)
SARS-COV-2 RNA SPEC QL NAA+PROBE: SIGNIFICANT CHANGE UP
SCHISTOCYTES BLD QL AUTO: SLIGHT — SIGNIFICANT CHANGE UP
SODIUM SERPL-SCNC: 141 MMOL/L — SIGNIFICANT CHANGE UP (ref 135–145)
SP GR SPEC: 1.01 — SIGNIFICANT CHANGE UP (ref 1.01–1.02)
UROBILINOGEN FLD QL: NEGATIVE — SIGNIFICANT CHANGE UP
WBC # BLD: 6.52 K/UL — SIGNIFICANT CHANGE UP (ref 3.8–10.5)
WBC # FLD AUTO: 6.52 K/UL — SIGNIFICANT CHANGE UP (ref 3.8–10.5)
WBC UR QL: ABNORMAL /HPF (ref 0–5)

## 2023-02-01 PROCEDURE — 99223 1ST HOSP IP/OBS HIGH 75: CPT

## 2023-02-01 PROCEDURE — 71045 X-RAY EXAM CHEST 1 VIEW: CPT | Mod: 26

## 2023-02-01 PROCEDURE — 74176 CT ABD & PELVIS W/O CONTRAST: CPT | Mod: 26,MA

## 2023-02-01 PROCEDURE — 99285 EMERGENCY DEPT VISIT HI MDM: CPT

## 2023-02-01 PROCEDURE — 93010 ELECTROCARDIOGRAM REPORT: CPT

## 2023-02-01 RX ORDER — TRAMADOL HYDROCHLORIDE 50 MG/1
50 TABLET ORAL THREE TIMES A DAY
Refills: 0 | Status: DISCONTINUED | OUTPATIENT
Start: 2023-02-01 | End: 2023-02-03

## 2023-02-01 RX ORDER — SODIUM BICARBONATE 1 MEQ/ML
650 SYRINGE (ML) INTRAVENOUS THREE TIMES A DAY
Refills: 0 | Status: DISCONTINUED | OUTPATIENT
Start: 2023-02-01 | End: 2023-02-03

## 2023-02-01 RX ORDER — HYDRALAZINE HCL 50 MG
10 TABLET ORAL EVERY 6 HOURS
Refills: 0 | Status: DISCONTINUED | OUTPATIENT
Start: 2023-02-01 | End: 2023-02-03

## 2023-02-01 RX ORDER — SODIUM BICARBONATE 1 MEQ/ML
650 SYRINGE (ML) INTRAVENOUS
Refills: 0 | Status: DISCONTINUED | OUTPATIENT
Start: 2023-02-01 | End: 2023-02-01

## 2023-02-01 RX ORDER — SODIUM CHLORIDE 9 MG/ML
1000 INJECTION, SOLUTION INTRAVENOUS
Refills: 0 | Status: DISCONTINUED | OUTPATIENT
Start: 2023-02-01 | End: 2023-02-01

## 2023-02-01 RX ORDER — HYDRALAZINE HCL 50 MG
25 TABLET ORAL EVERY 8 HOURS
Refills: 0 | Status: DISCONTINUED | OUTPATIENT
Start: 2023-02-01 | End: 2023-02-01

## 2023-02-01 RX ORDER — LABETALOL HCL 100 MG
100 TABLET ORAL ONCE
Refills: 0 | Status: COMPLETED | OUTPATIENT
Start: 2023-02-01 | End: 2023-02-01

## 2023-02-01 RX ORDER — HYDRALAZINE HCL 50 MG
25 TABLET ORAL ONCE
Refills: 0 | Status: COMPLETED | OUTPATIENT
Start: 2023-02-01 | End: 2023-02-01

## 2023-02-01 RX ORDER — HEPARIN SODIUM 5000 [USP'U]/ML
5000 INJECTION INTRAVENOUS; SUBCUTANEOUS EVERY 8 HOURS
Refills: 0 | Status: DISCONTINUED | OUTPATIENT
Start: 2023-02-01 | End: 2023-02-01

## 2023-02-01 RX ORDER — AMLODIPINE BESYLATE 2.5 MG/1
10 TABLET ORAL DAILY
Refills: 0 | Status: DISCONTINUED | OUTPATIENT
Start: 2023-02-02 | End: 2023-02-03

## 2023-02-01 RX ORDER — DOXAZOSIN MESYLATE 4 MG
1 TABLET ORAL DAILY
Refills: 0 | Status: DISCONTINUED | OUTPATIENT
Start: 2023-02-02 | End: 2023-02-03

## 2023-02-01 RX ORDER — SODIUM CHLORIDE 9 MG/ML
1000 INJECTION INTRAMUSCULAR; INTRAVENOUS; SUBCUTANEOUS ONCE
Refills: 0 | Status: DISCONTINUED | OUTPATIENT
Start: 2023-02-01 | End: 2023-02-01

## 2023-02-01 RX ORDER — ACETAMINOPHEN 500 MG
650 TABLET ORAL EVERY 6 HOURS
Refills: 0 | Status: DISCONTINUED | OUTPATIENT
Start: 2023-02-01 | End: 2023-02-03

## 2023-02-01 RX ORDER — HYDRALAZINE HCL 50 MG
50 TABLET ORAL EVERY 8 HOURS
Refills: 0 | Status: DISCONTINUED | OUTPATIENT
Start: 2023-02-01 | End: 2023-02-03

## 2023-02-01 RX ORDER — ASPIRIN/CALCIUM CARB/MAGNESIUM 324 MG
81 TABLET ORAL DAILY
Refills: 0 | Status: DISCONTINUED | OUTPATIENT
Start: 2023-02-02 | End: 2023-02-03

## 2023-02-01 RX ORDER — ONDANSETRON 8 MG/1
4 TABLET, FILM COATED ORAL EVERY 8 HOURS
Refills: 0 | Status: DISCONTINUED | OUTPATIENT
Start: 2023-02-01 | End: 2023-02-03

## 2023-02-01 RX ADMIN — SODIUM CHLORIDE 75 MILLILITER(S): 9 INJECTION, SOLUTION INTRAVENOUS at 13:33

## 2023-02-01 RX ADMIN — Medication 10 MILLIGRAM(S): at 21:13

## 2023-02-01 RX ADMIN — SODIUM CHLORIDE 75 MILLILITER(S): 9 INJECTION, SOLUTION INTRAVENOUS at 14:55

## 2023-02-01 RX ADMIN — Medication 100 MILLIGRAM(S): at 18:44

## 2023-02-01 RX ADMIN — Medication 25 MILLIGRAM(S): at 15:20

## 2023-02-01 NOTE — ED PROVIDER NOTE - OBJECTIVE STATEMENT
88-year-old female presents to the ED complaining of abdominal pain to the left lower quadrant.  Started 2 days ago.  Patient states that she was getting ready to prep for her colonoscopy but did not start yet.  She had onset of left lower quadrant abdominal pain.  No diarrhea or vomiting.  No fever.  Due to the onset of pain, she no longer did her prep.  She did notify her doctor which is Dr. Payton.  Patient has history of colon CA status post resection.  She had a recent PET scan and was advised to follow-up to be arranged for colonoscopy.  Unclear what the PET scan results showed.

## 2023-02-01 NOTE — H&P ADULT - ASSESSMENT
89 yo women with history of Hypertension, CKD IV, Adenocarcinoma of the colon, history of breast cancer, and Type I second degree AV block comes in with LLQ abdominal pain.    #LLQ Pain likely due to Left Hydroureteronephrosis  CT Abdomen and Pelvis No Cont (02.01.23 @ 11:42) > New left hydroureteronephrosis with perinephric edema and renal pelvic fat stranding. Suspect distal stricture or obstruction. Pyelonephritis is not excluded. CT urogram may be of diagnostic benefit. Developing pelvic ascites and trace bilateral pleural effusions.  UA is neg for infection.   Patient reports that she does not urinate much  Pain Meds PRN  Urology Consulted    #History Adenocarcinoma of the colon  CT Abdomen and Pelvis No Cont (02.01.23 @ 11:42) >  Right mesenteric mass without significant change.  Outpatient Oncology Follow Up (Follow Dr. Edmonds)    #Worsening Renal Function  #CKD IV  #Normocytic Anemia likely due to Chronic Kidney Disease  Sodium Bicarb drip as per Nephro  Nephro is on board (Follows with Dr. Garcia)    #Hypertension  Takes amlodipine and doxazosin at home fro BP as per patient  Ordered 1 dose hydralazine since SBP 180s at time of admission  Continue to monitor    #Chronic Thrombocytopenia  Review of chart shows has had low platelets  HOLD chemical DVT Prophylaxis for now. Consider starting tomorrow if platelets continue to stay > 50    #DVT Prophylaxis with SCDs           87 yo women with history of Hypertension, CKD IV, Adenocarcinoma of the colon, history of breast cancer, and Type I second degree AV block comes in with LLQ abdominal pain.    #LLQ Pain likely due to Left Hydroureteronephrosis  CT Abdomen and Pelvis No Cont (02.01.23 @ 11:42) > New left hydroureteronephrosis with perinephric edema and renal pelvic fat stranding. Suspect distal stricture or obstruction. Pyelonephritis is not excluded. CT urogram may be of diagnostic benefit. Developing pelvic ascites and trace bilateral pleural effusions.  UA is neg for infection.   Patient reports that she does not urinate much  Pain Meds PRN  Urology Consulted    #History Adenocarcinoma of the colon  CT Abdomen and Pelvis No Cont (02.01.23 @ 11:42) >  Right mesenteric mass without significant change.  Outpatient Oncology Follow Up (Follow Dr. Edmonds)    #Worsening Renal Function  #CKD IV  #Normocytic Anemia likely due to Chronic Kidney Disease  Sodium Bicarb drip as per Nephro  Nephro is on board (Follows with Dr. Garcia)    #Hypertension  Takes amlodipine and doxazosin at home fro BP as per patient  Ordered 1 dose hydralazine since SBP 180s at time of admission  Continue to monitor    #Chronic Thrombocytopenia  Review of chart shows has had low platelets  HOLD chemical DVT Prophylaxis for now. Consider starting tomorrow if platelets continue to stay > 50    #DVT Prophylaxis with SCDs    Patient updated her daughter about admission    MOLST Form Completed. Patient would like a trial of CPR. She does NOT want to be intubated and does NOT want a PEG tube either. She is ok with IV abx or trial of IVFs.  Patient is willing to revisit decision DNR but currently feels if it will help she wants CPR.       87 yo women with history of Hypertension, CKD IV, Adenocarcinoma of the colon, history of breast cancer, and Type I second degree AV block comes in with LLQ abdominal pain.    #LLQ Pain likely due to Left Hydroureteronephrosis  CT Abdomen and Pelvis No Cont (02.01.23 @ 11:42) > New left hydroureteronephrosis with perinephric edema and renal pelvic fat stranding. Suspect distal stricture or obstruction. Pyelonephritis is not excluded. CT urogram may be of diagnostic benefit. Developing pelvic ascites and trace bilateral pleural effusions.  UA is neg for infection.   Patient reports that she does not urinate much  Pain Meds PRN  Urology Consulted    #History Adenocarcinoma of the colon  CT Abdomen and Pelvis No Cont (02.01.23 @ 11:42) >  Right mesenteric mass without significant change.  Outpatient Oncology Follow Up (Follow Dr. Edmonds)    #Worsening Renal Function  #CKD IV  #Normocytic Anemia likely due to Chronic Kidney Disease  Sodium Bicarb drip as per Nephro  Nephro is on board (Follows with Dr. Garcia)    #Hypertension  Takes amlodipine and doxazosin at home fro BP as per patient  Ordered 1 dose hydralazine PO and labetalol PO since SBP 180s at time of admission  Starting hydralazine 25mg PO q8hrs  Continue to monitor    #Chronic Thrombocytopenia  Review of chart shows has had low platelets  HOLD chemical DVT Prophylaxis for now. Consider starting tomorrow if platelets continue to stay > 50    #DVT Prophylaxis with SCDs    Patient updated her daughter about admission    MOLST Form Completed. Patient would like a trial of CPR. She does NOT want to be intubated and does NOT want a PEG tube either. She is ok with IV abx or trial of IVFs.  Patient is willing to revisit decision DNR but currently feels if it will help she wants CPR.

## 2023-02-01 NOTE — H&P ADULT - NSICDXFAMILYHX_GEN_ALL_CORE_FT
FAMILY HISTORY:  Sibling  Still living? No  FH: breast cancer, Age at diagnosis: Age Unknown  FH: colon cancer, Age at diagnosis: Age Unknown  FH: colon cancer, Age at diagnosis: Age Unknown

## 2023-02-01 NOTE — ED PROVIDER NOTE - CLINICAL SUMMARY MEDICAL DECISION MAKING FREE TEXT BOX
88-year-old female presents to the ED complaining of abdominal pain to the left lower quadrant.  Started 2 days ago.  Patient states that she was getting ready to prep for her colonoscopy but did not start yet.  She had onset of left lower quadrant abdominal pain.  No diarrhea or vomiting.  No fever.  Due to the onset of pain, she no longer did her prep.  She did notify her doctor which is Dr. Payton.  Patient has history of colon CA status post resection.  She had a recent PET scan and was advised to follow-up to be arranged for colonoscopy.  Unclear what the PET scan results showed.    Exam as stated. Due to LLQ tenderness, will get CT abd. Check labs and urine. Offered med for pain (like tylenol), pt states she has no pain (unless area is palpated). 88-year-old female presents to the ED complaining of abdominal pain to the left lower quadrant.  Started 2 days ago.  Patient states that she was getting ready to prep for her colonoscopy but did not start yet.  She had onset of left lower quadrant abdominal pain.  No diarrhea or vomiting.  No fever.  Due to the onset of pain, she no longer did her prep.  She did notify her doctor which is Dr. Payton.  Patient has history of colon CA status post resection.  She had a recent PET scan and was advised to follow-up to be arranged for colonoscopy.  Unclear what the PET scan results showed.    Exam as stated. Due to LLQ tenderness, will get CT abd. Check labs and urine. Offered med for pain (like tylenol), pt states she has no pain (unless area is palpated).    Results reviewed. New left hydro. Development of ascites. Creatinine 3.8 (baseline 3.1). Discussed with Renal, Dr Garcia who is familiar with patient. Recommends admission. D/W Dr Mendoza who will process admission. Advised to contact uro as a consult as well.

## 2023-02-01 NOTE — ED PROVIDER NOTE - PHYSICAL EXAMINATION
General:     NAD, well-nourished, well-appearing  Eyes: PERRL, white sclera  Head:     NC/AT, EOMI  Pharynx: pharynx wnl, oral mucosa moist  Neck:     trachea midline  Lungs:     CTA b/l  CVS:     RRR  Abd:     +BS, soft, tender to the LLQ. Nondistended. Ventral hernia, soft. No flank tenderness.   Ext:   no deformities   Skin: no rash  Neuro: AAOx3, no sensory/motor deficits
4 = No assist / stand by assistance

## 2023-02-01 NOTE — PATIENT PROFILE ADULT - INFLUENZA IMMUNIZATION DATE (APPROXIMATE)
Pt into PACU, awakens easily to voice, at one point states pain 3/10 and epidural restarted. Now pain 0/10. Pts only complaint is discomfort from urinary catheter.    01-Sep-2022

## 2023-02-01 NOTE — ED PROVIDER NOTE - CARE PLAN
1 Principal Discharge DX:	Abdominal pain   Principal Discharge DX:	Hydronephrosis, left  Secondary Diagnosis:	Ascites

## 2023-02-01 NOTE — PATIENT PROFILE ADULT - FALL HARM RISK - HARM RISK INTERVENTIONS

## 2023-02-01 NOTE — H&P ADULT - PARTICIPANTS
gave a copy of MOLST and discussed with patient. Patient to review and speak with her children/Patient See below/Patient

## 2023-02-01 NOTE — ED ADULT NURSE NOTE - CHIEF COMPLAINT
May change dressing tomorrow.  Apply antibiotic ointment to any open areas of skin, may wash burn with soap and water.  We will need to keep this hand clean dry and protected if you are able to work.  
The patient is a 88y Female complaining of abdominal pain.

## 2023-02-01 NOTE — CONSULT NOTE ADULT - SUBJECTIVE AND OBJECTIVE BOX
NEPHROLOGY CONSULTATION    CHIEF COMPLAINT: L flank pain    HPI:  Pt is 87 yo w with history of Hypertension, CKD, Adenocarcinoma of the colon, history of breast cancer, and Type I second degree AV block comes in with LLQ abdominal pain. Patient was suppose to have colonoscopy with Dr. Terry but  did not start the prep and only consumed liquids. She reports nausea and states pain is worse when she lies flat. No trauma, headache, chest pain, shortness of breath, F/C. CT A/P showed new L hydroureteronephrosis with perinephric edema. Seen in ED. Noted to have MAGDI/CKD iso above.    ROS:  as above    Allergies:  No Known Allergies    PAST MEDICAL & SURGICAL HISTORY:  History of breast cancer R  Anemia  Hypertension  History of thrombocytopenia  History of herpes zoster  History of malignant neoplasm of parathyroid gland  Colon cancer  Chronic renal insufficiency  Thyroid nodule  Diabetes mellitus  NIDDM  History of cholecystectomy  History of partial colectomy  and appendectomy at the same time  History of lumpectomy of right breast   and radiation and chemotherapy  Parathyroid adenoma  excision    SOCIAL HISTORY:  negative    FAMILY HISTORY:  FH: colon cancer (Sibling, Sibling)  FH: breast cancer (Sibling)    MEDICATIONS  (STANDING):  dextrose 5% 1000 milliLiter(s) (75 mL/Hr) IV Continuous <Continuous>  hydrALAZINE 25 milliGRAM(s) Oral every 8 hours    Home Medications:  amLODIPine 10 mg oral tablet: 1 tab(s) orally once a day (2023 10:53)  Centrum Silver oral tablet: 1 tab(s) orally once a day (2023 10:53)  doxazosin 1 mg oral tablet: orally once a day (2023 10:53)  Ecotrin Adult Low Strength 81 mg oral delayed release tablet: 1 tab(s) orally once a day (2023 10:53)  sodium bicarbonate 325 mg oral tablet: orally 2 times a day (2023 10:53)  Vitamin B6: orally once a day (2023 11:59)    Vital Signs Last 24 Hrs  T(C): 36.6 (23 @ 20:06), Max: 36.6 (23 @ 20:06)  T(F): 97.9 (23 @ 20:06), Max: 97.9 (23 @ 20:06)  HR: 77 (23 @ 20:06) (56 - 87)  BP: 185/72   RR: 16 (23 @ 20:06) (15 - 16)  SpO2: 98% (23 @ 20:06) (98% - 99%)    s1s2  b/l air entry  soft, ND  no edema  AO    LABS:                        9.2    6.52  )-----------( 60       ( 2023 11:30 )             28.0     02-    141  |  113<H>  |  52<H>  ----------------------------<  140<H>  3.8   |  13<L>  |  3.85<H>    Ca    8.5      2023 11:30    TPro  6.3  /  Alb  3.3  /  TBili  0.5  /  DBili  x   /  AST  14  /  ALT  18  /  AlkPhos  86  02-01    Urinalysis Basic - ( 2023 11:30 )    Color: Yellow / Appearance: Slightly Turbid / S.015 / pH: x  Gluc: x / Ketone: Negative  / Bili: Negative / Urobili: Negative   Blood: x / Protein: 500 mg/dL / Nitrite: Negative   Leuk Esterase: Small / RBC: 26-50 /HPF / WBC 6-10 /HPF   Sq Epi: x / Non Sq Epi: Neg.-Few / Bacteria: Few /HPF    LIVER FUNCTIONS - ( 2023 11:30 )  Alb: 3.3 g/dL / Pro: 6.3 g/dL / ALK PHOS: 86 U/L / ALT: 18 U/L / AST: 14 U/L / GGT: x           A/P:    L flank pain, MAGDI/CKD iso L hydro  Urology to eval  Na Bicarb for met acidosis  Avoid nephrotoxins  F/u BMP, UO  Hydralazine increased for HTN  Will follow    [phone]

## 2023-02-01 NOTE — H&P ADULT - HISTORY OF PRESENT ILLNESS
89 yo women with history of  87 yo women with history of Hypertension, CKD IV, Adenocarcinoma of the colon, history of breast cancer, and Type I second degree AV block comes in with LLQ abdominal pain. Patient was suppose to have colonoscopy with Dr. Terry but  did not start the prep and only consumed liquids. She started to have LLQ pain that was sharp. it eventually went away but reports pain on palpation on her left side. She reports nausea and states pain is worse when she lies flat. She denies trauma, headache, chest pain, shortness of breath.    In ED, temp 97.3, HR 67, /72, RR 15 O2 Sat 99%. CT A/P shows new left hydroureteronephrosis with perinephric edema and renal pelvic fat stranding. Suspect distal stricture or obstruction. Pyelonephritis is   not excluded. CT urogram may be of diagnostic benefit. Developing pelvic ascites and trace bilateral pleural effusions. Right mesenteric mass without significant change.

## 2023-02-01 NOTE — H&P ADULT - NSHPREVIEWOFSYSTEMS_GEN_ALL_CORE
CONSTITUTIONAL: No fever, weight loss, or fatigue  EYES: No eye pain, visual disturbances, or discharge  ENMT:  No difficulty hearing, tinnitus, vertigo; No sinus or throat pain  NECK: No pain or stiffness  RESPIRATORY: No cough, wheezing, chills or hemoptysis; No shortness of breath  CARDIOVASCULAR: No chest pain, palpitations, dizziness, or leg swelling  GASTROINTESTINAL: LLQ pain on deep palpation  GENITOURINARY: No dysuria, frequency, hematuria, or incontinence  NEUROLOGICAL: No headaches, memory loss, loss of strength, numbness, or tremors  SKIN: No itching, burning, rashes, or lesions   MUSCULOSKELETAL: No joint pain or swelling; No muscle, back, or extremity pain  PSYCHIATRIC: No depression, anxiety, mood swings, or difficulty sleeping  ALLERGY AND IMMUNOLOGIC: No hives or eczema    ALL ROS REVIEWED AND NORMAL EXCEPT AS STATED ABOVE

## 2023-02-02 DIAGNOSIS — N13.30 UNSPECIFIED HYDRONEPHROSIS: ICD-10-CM

## 2023-02-02 LAB
ALBUMIN SERPL ELPH-MCNC: 2.7 G/DL — LOW (ref 3.3–5)
ALP SERPL-CCNC: 73 U/L — SIGNIFICANT CHANGE UP (ref 40–120)
ALT FLD-CCNC: 18 U/L — SIGNIFICANT CHANGE UP (ref 10–45)
ANION GAP SERPL CALC-SCNC: 15 MMOL/L — SIGNIFICANT CHANGE UP (ref 5–17)
AST SERPL-CCNC: 12 U/L — SIGNIFICANT CHANGE UP (ref 10–40)
BASOPHILS # BLD AUTO: 0.01 K/UL — SIGNIFICANT CHANGE UP (ref 0–0.2)
BASOPHILS NFR BLD AUTO: 0.2 % — SIGNIFICANT CHANGE UP (ref 0–2)
BILIRUB SERPL-MCNC: 0.4 MG/DL — SIGNIFICANT CHANGE UP (ref 0.2–1.2)
BUN SERPL-MCNC: 60 MG/DL — HIGH (ref 7–23)
CALCIUM SERPL-MCNC: 8.4 MG/DL — SIGNIFICANT CHANGE UP (ref 8.4–10.5)
CHLORIDE SERPL-SCNC: 120 MMOL/L — HIGH (ref 96–108)
CO2 SERPL-SCNC: 15 MMOL/L — LOW (ref 22–31)
CREAT SERPL-MCNC: 3.56 MG/DL — HIGH (ref 0.5–1.3)
EGFR: 12 ML/MIN/1.73M2 — LOW
EOSINOPHIL # BLD AUTO: 0.01 K/UL — SIGNIFICANT CHANGE UP (ref 0–0.5)
EOSINOPHIL NFR BLD AUTO: 0.2 % — SIGNIFICANT CHANGE UP (ref 0–6)
GLUCOSE SERPL-MCNC: 104 MG/DL — HIGH (ref 70–99)
HCT VFR BLD CALC: 23.7 % — LOW (ref 34.5–45)
HGB BLD-MCNC: 7.7 G/DL — LOW (ref 11.5–15.5)
IMM GRANULOCYTES NFR BLD AUTO: 0.2 % — SIGNIFICANT CHANGE UP (ref 0–0.9)
LYMPHOCYTES # BLD AUTO: 0.59 K/UL — LOW (ref 1–3.3)
LYMPHOCYTES # BLD AUTO: 13.3 % — SIGNIFICANT CHANGE UP (ref 13–44)
MAGNESIUM SERPL-MCNC: 1.9 MG/DL — SIGNIFICANT CHANGE UP (ref 1.6–2.6)
MCHC RBC-ENTMCNC: 31.3 PG — SIGNIFICANT CHANGE UP (ref 27–34)
MCHC RBC-ENTMCNC: 32.5 GM/DL — SIGNIFICANT CHANGE UP (ref 32–36)
MCV RBC AUTO: 96.3 FL — SIGNIFICANT CHANGE UP (ref 80–100)
MONOCYTES # BLD AUTO: 0.38 K/UL — SIGNIFICANT CHANGE UP (ref 0–0.9)
MONOCYTES NFR BLD AUTO: 8.6 % — SIGNIFICANT CHANGE UP (ref 2–14)
NEUTROPHILS # BLD AUTO: 3.44 K/UL — SIGNIFICANT CHANGE UP (ref 1.8–7.4)
NEUTROPHILS NFR BLD AUTO: 77.5 % — HIGH (ref 43–77)
NRBC # BLD: 0 /100 WBCS — SIGNIFICANT CHANGE UP (ref 0–0)
OB PNL STL: NEGATIVE — SIGNIFICANT CHANGE UP
PHOSPHATE SERPL-MCNC: 4.6 MG/DL — HIGH (ref 2.5–4.5)
PLATELET # BLD AUTO: 77 K/UL — LOW (ref 150–400)
POTASSIUM SERPL-MCNC: 3.7 MMOL/L — SIGNIFICANT CHANGE UP (ref 3.5–5.3)
POTASSIUM SERPL-SCNC: 3.7 MMOL/L — SIGNIFICANT CHANGE UP (ref 3.5–5.3)
PROT SERPL-MCNC: 5.4 G/DL — LOW (ref 6–8.3)
RBC # BLD: 2.46 M/UL — LOW (ref 3.8–5.2)
RBC # FLD: 14.8 % — HIGH (ref 10.3–14.5)
SODIUM SERPL-SCNC: 150 MMOL/L — HIGH (ref 135–145)
WBC # BLD: 4.44 K/UL — SIGNIFICANT CHANGE UP (ref 3.8–10.5)
WBC # FLD AUTO: 4.44 K/UL — SIGNIFICANT CHANGE UP (ref 3.8–10.5)

## 2023-02-02 RX ORDER — CEFAZOLIN SODIUM 1 G
500 VIAL (EA) INJECTION EVERY 8 HOURS
Refills: 0 | Status: DISCONTINUED | OUTPATIENT
Start: 2023-02-02 | End: 2023-02-02

## 2023-02-02 RX ORDER — CEFAZOLIN SODIUM 1 G
500 VIAL (EA) INJECTION EVERY 12 HOURS
Refills: 0 | Status: DISCONTINUED | OUTPATIENT
Start: 2023-02-02 | End: 2023-02-03

## 2023-02-02 RX ORDER — SODIUM CHLORIDE 9 MG/ML
1000 INJECTION, SOLUTION INTRAVENOUS
Refills: 0 | Status: DISCONTINUED | OUTPATIENT
Start: 2023-02-02 | End: 2023-02-03

## 2023-02-02 RX ORDER — TAMSULOSIN HYDROCHLORIDE 0.4 MG/1
0.4 CAPSULE ORAL AT BEDTIME
Refills: 0 | Status: DISCONTINUED | OUTPATIENT
Start: 2023-02-02 | End: 2023-02-03

## 2023-02-02 RX ADMIN — Medication 650 MILLIGRAM(S): at 21:19

## 2023-02-02 RX ADMIN — Medication 1 MILLIGRAM(S): at 06:29

## 2023-02-02 RX ADMIN — Medication 81 MILLIGRAM(S): at 11:04

## 2023-02-02 RX ADMIN — TAMSULOSIN HYDROCHLORIDE 0.4 MILLIGRAM(S): 0.4 CAPSULE ORAL at 21:19

## 2023-02-02 RX ADMIN — Medication 650 MILLIGRAM(S): at 13:24

## 2023-02-02 RX ADMIN — Medication 50 MILLIGRAM(S): at 06:31

## 2023-02-02 RX ADMIN — Medication 50 MILLIGRAM(S): at 21:19

## 2023-02-02 RX ADMIN — AMLODIPINE BESYLATE 10 MILLIGRAM(S): 2.5 TABLET ORAL at 06:31

## 2023-02-02 RX ADMIN — Medication 100 MILLIGRAM(S): at 18:47

## 2023-02-02 RX ADMIN — Medication 50 MILLIGRAM(S): at 13:25

## 2023-02-02 RX ADMIN — SODIUM CHLORIDE 75 MILLILITER(S): 9 INJECTION, SOLUTION INTRAVENOUS at 19:41

## 2023-02-02 RX ADMIN — SODIUM CHLORIDE 75 MILLILITER(S): 9 INJECTION, SOLUTION INTRAVENOUS at 11:03

## 2023-02-02 RX ADMIN — TRAMADOL HYDROCHLORIDE 50 MILLIGRAM(S): 50 TABLET ORAL at 20:30

## 2023-02-02 RX ADMIN — TRAMADOL HYDROCHLORIDE 50 MILLIGRAM(S): 50 TABLET ORAL at 19:42

## 2023-02-02 RX ADMIN — Medication 650 MILLIGRAM(S): at 06:31

## 2023-02-02 NOTE — PHYSICAL THERAPY INITIAL EVALUATION ADULT - PERTINENT HX OF CURRENT PROBLEM, REHAB EVAL
87 yo women with history of Hypertension, CKD IV, Adenocarcinoma of the colon, history of breast cancer, and Type I second degree AV block comes in with LLQ abdominal pain.

## 2023-02-02 NOTE — CONSULT NOTE ADULT - PROBLEM SELECTOR RECOMMENDATION 9
CT reviewed ? 4mm left distal ureteral stone. hydrate, analgesia, strain urine, start antibiotics and flomax    observe x 24 hours, if does not pass stone, OR tomorrow for cysto ? stent placement

## 2023-02-02 NOTE — PHYSICAL THERAPY INITIAL EVALUATION ADULT - GENERAL OBSERVATIONS, REHAB EVAL
Patient received supine in bed, HOB elevated, +IV, cardiac monitor, A&O x 4. agreed to PT evaluation.

## 2023-02-02 NOTE — CONSULT NOTE ADULT - SUBJECTIVE AND OBJECTIVE BOX
Patient is a 88y old  Female who presents with a chief complaint of Left hydroureteronephrosis (2023 22:46)      HPI:  87 yo women with history of Hypertension, CKD IV, Adenocarcinoma of the colon, history of breast cancer, and Type I second degree AV block comes in with LLQ abdominal pain. Patient was suppose to have colonoscopy with Dr. Terry but  did not start the prep and only consumed liquids. She started to have LLQ pain that was sharp. it eventually went away but reports pain on palpation on her left side. She reports nausea and states pain is worse when she lies flat. She denies trauma, headache, chest pain, shortness of breath.    In ED, temp 97.3, HR 67, /72, RR 15 O2 Sat 99%. CT A/P shows new left hydroureteronephrosis with perinephric edema and renal pelvic fat stranding. Suspect distal stricture or obstruction. Pyelonephritis is   not excluded. CT urogram may be of diagnostic benefit. Developing pelvic ascites and trace bilateral pleural effusions. Right mesenteric mass without significant change.    no prior stone disease, several days of intermittent left sided pain with nausea and dysuria      PAST MEDICAL & SURGICAL HISTORY:  History of breast cancer  right breast      Anemia      Hypertension      History of thrombocytopenia      History of herpes zoster      History of malignant neoplasm of parathyroid gland      Colon cancer      Chronic renal insufficiency      Thyroid nodule      Diabetes mellitus  NIDDM      History of cholecystectomy      History of partial colectomy  and appendectomy at the same time      History of lumpectomy of right breast   and radiation and chemotherapy      Parathyroid adenoma  excision          REVIEW OF SYSTEMS:    CONSTITUTIONAL:  no fever or chills  HEENT: No visual changes  ENDO: No sweating  NECK: No pain or stiffness  MUSCULOSKELETAL: No back pain, no joint pain  RESPIRATORY: No shortness of breath  CARDIOVASCULAR: No chest pain  GASTROINTESTINAL: yes abdominal or epigastric pain. yes nausea,   No diarrhea or constipation.   NEUROLOGICAL: No mental status changes  PSYCH: No depression, no mood changes  SKIN: No itching      MEDICATIONS  (STANDING):  amLODIPine   Tablet 10 milliGRAM(s) Oral daily  aspirin enteric coated 81 milliGRAM(s) Oral daily  doxazosin 1 milliGRAM(s) Oral daily  hydrALAZINE 50 milliGRAM(s) Oral every 8 hours  sodium bicarbonate 650 milliGRAM(s) Oral three times a day  tamsulosin 0.4 milliGRAM(s) Oral at bedtime    MEDICATIONS  (PRN):  acetaminophen     Tablet .. 650 milliGRAM(s) Oral every 6 hours PRN Temp greater or equal to 38C (100.4F), Mild Pain (1 - 3)  hydrALAZINE Injectable 10 milliGRAM(s) IV Push every 6 hours PRN SBP > 160  ondansetron Injectable 4 milliGRAM(s) IV Push every 8 hours PRN Nausea and/or Vomiting  traMADol 50 milliGRAM(s) Oral three times a day PRN Moderate Pain (4 - 6)      Allergies    No Known Allergies    Intolerances        SOCIAL HISTORY: No illicit drug use    FAMILY HISTORY:  FH: colon cancer (Sibling, Sibling)    FH: breast cancer (Sibling)          T(C): 37.6 (23 @ 05:18), Max: 37.6 (23 @ 05:18)  T(F): 99.7 (23 @ 05:18), Max: 99.7 (23 @ 05:18)  HR: 62 (23 @ 05:18) (56 - 87)  BP: 150/50 (23 @ 05:18) (150/50 - 198/65)  RR: 16 (23 @ 05:18) (15 - 16)  SpO2: 95% (23 @ 05:18) (95% - 99%)      PHYSICAL EXAM:    Constitutional: alert, no acute distress  Back: No CVA tenderness  Respiratory: No accessory respiratory muscle use  Abd: Soft, NT/ND  no organomegaly  no hernia  : no bladder distention  Extremities: no edema  Neurological: A/O x 3  Psychiatric: Normal mood, normal affect  Skin: No rashes        Height (cm): 162.6 (23 @ 17:43)  Weight (kg): 71.2 (23 @ 10:02)  BMI (kg/m2): 26.9 (23 @ 17:43)  BSA (m2): 1.76 (23 @ 17:43)    LABS:                        7.7    4.44  )-----------( 77       ( 2023 07:11 )             23.7         Urinalysis Basic - ( 2023 11:30 )    Color: Yellow / Appearance: Slightly Turbid / S.015 / pH: x  Gluc: x / Ketone: Negative  / Bili: Negative / Urobili: Negative   Blood: x / Protein: 500 mg/dL / Nitrite: Negative   Leuk Esterase: Small / RBC: 26-50 /HPF / WBC 6-10 /HPF   Sq Epi: x / Non Sq Epi: Neg.-Few / Bacteria: Few /HPF            RADIOLOGY & ADDITIONAL STUDIES:

## 2023-02-02 NOTE — CHART NOTE - NSCHARTNOTEFT_GEN_A_CORE
Labs and imaging reviewed:     Working dx: 89 yo F with PMhx CKD 4, colon cancer, breast cancer presents with LLQ pain. Found to have hydrouretonephrosis    *Hydrouretonephrosis:  Urology following  Cont IV abx--renally dosed- day 1-ancef   Renal following  Plan for cysto/stent tomorrow--NPO @ [facility]  Cont IVF with bicarb    Dispo: pending course    Case dw DR Michele

## 2023-02-02 NOTE — PHYSICAL THERAPY INITIAL EVALUATION ADULT - ADDITIONAL COMMENTS
Patient lives alone in an apartment complex with elevator access. Patient was independent with all ADLs and ambulation without use of assistive device at home prior to episode. Patient reports use of SAC at times for outdoor ambulation, +. Has RW and SPC at home.

## 2023-02-03 ENCOUNTER — TRANSCRIPTION ENCOUNTER (OUTPATIENT)
Age: 88
End: 2023-02-03

## 2023-02-03 VITALS
RESPIRATION RATE: 17 BRPM | SYSTOLIC BLOOD PRESSURE: 159 MMHG | OXYGEN SATURATION: 97 % | TEMPERATURE: 98 F | DIASTOLIC BLOOD PRESSURE: 58 MMHG | HEART RATE: 70 BPM

## 2023-02-03 DIAGNOSIS — N20.1 CALCULUS OF URETER: ICD-10-CM

## 2023-02-03 LAB
ALBUMIN SERPL ELPH-MCNC: 2.6 G/DL — LOW (ref 3.3–5)
ALP SERPL-CCNC: 66 U/L — SIGNIFICANT CHANGE UP (ref 40–120)
ALT FLD-CCNC: 18 U/L — SIGNIFICANT CHANGE UP (ref 10–45)
ANION GAP SERPL CALC-SCNC: 15 MMOL/L — SIGNIFICANT CHANGE UP (ref 5–17)
AST SERPL-CCNC: 14 U/L — SIGNIFICANT CHANGE UP (ref 10–40)
BILIRUB SERPL-MCNC: 0.3 MG/DL — SIGNIFICANT CHANGE UP (ref 0.2–1.2)
BUN SERPL-MCNC: 63 MG/DL — HIGH (ref 7–23)
CALCIUM SERPL-MCNC: 8 MG/DL — LOW (ref 8.4–10.5)
CHLORIDE SERPL-SCNC: 112 MMOL/L — HIGH (ref 96–108)
CO2 SERPL-SCNC: 14 MMOL/L — LOW (ref 22–31)
CREAT SERPL-MCNC: 3.76 MG/DL — HIGH (ref 0.5–1.3)
EGFR: 11 ML/MIN/1.73M2 — LOW
FERRITIN SERPL-MCNC: 192 NG/ML — HIGH (ref 15–150)
FOLATE SERPL-MCNC: >20 NG/ML — SIGNIFICANT CHANGE UP
GLUCOSE SERPL-MCNC: 122 MG/DL — HIGH (ref 70–99)
HCT VFR BLD CALC: 21.9 % — LOW (ref 34.5–45)
HGB BLD-MCNC: 7.3 G/DL — LOW (ref 11.5–15.5)
IRON SATN MFR SERPL: 15 UG/DL — LOW (ref 30–160)
IRON SATN MFR SERPL: 7 % — LOW (ref 14–50)
MCHC RBC-ENTMCNC: 31.9 PG — SIGNIFICANT CHANGE UP (ref 27–34)
MCHC RBC-ENTMCNC: 33.3 GM/DL — SIGNIFICANT CHANGE UP (ref 32–36)
MCV RBC AUTO: 95.6 FL — SIGNIFICANT CHANGE UP (ref 80–100)
NRBC # BLD: 0 /100 WBCS — SIGNIFICANT CHANGE UP (ref 0–0)
PLATELET # BLD AUTO: 74 K/UL — LOW (ref 150–400)
POTASSIUM SERPL-MCNC: 3.6 MMOL/L — SIGNIFICANT CHANGE UP (ref 3.5–5.3)
POTASSIUM SERPL-SCNC: 3.6 MMOL/L — SIGNIFICANT CHANGE UP (ref 3.5–5.3)
PROT SERPL-MCNC: 5.2 G/DL — LOW (ref 6–8.3)
RBC # BLD: 2.29 M/UL — LOW (ref 3.8–5.2)
RBC # FLD: 14.6 % — HIGH (ref 10.3–14.5)
SODIUM SERPL-SCNC: 141 MMOL/L — SIGNIFICANT CHANGE UP (ref 135–145)
TIBC SERPL-MCNC: 229 UG/DL — SIGNIFICANT CHANGE UP (ref 220–430)
UIBC SERPL-MCNC: 214 UG/DL — SIGNIFICANT CHANGE UP (ref 110–370)
VIT B12 SERPL-MCNC: 1003 PG/ML — SIGNIFICANT CHANGE UP (ref 232–1245)
WBC # BLD: 3.36 K/UL — LOW (ref 3.8–10.5)
WBC # FLD AUTO: 3.36 K/UL — LOW (ref 3.8–10.5)

## 2023-02-03 PROCEDURE — 93005 ELECTROCARDIOGRAM TRACING: CPT

## 2023-02-03 PROCEDURE — 84100 ASSAY OF PHOSPHORUS: CPT

## 2023-02-03 PROCEDURE — 97162 PT EVAL MOD COMPLEX 30 MIN: CPT

## 2023-02-03 PROCEDURE — 80053 COMPREHEN METABOLIC PANEL: CPT

## 2023-02-03 PROCEDURE — 36415 COLL VENOUS BLD VENIPUNCTURE: CPT

## 2023-02-03 PROCEDURE — 83550 IRON BINDING TEST: CPT

## 2023-02-03 PROCEDURE — 71045 X-RAY EXAM CHEST 1 VIEW: CPT

## 2023-02-03 PROCEDURE — 82728 ASSAY OF FERRITIN: CPT

## 2023-02-03 PROCEDURE — 87635 SARS-COV-2 COVID-19 AMP PRB: CPT

## 2023-02-03 PROCEDURE — 99285 EMERGENCY DEPT VISIT HI MDM: CPT | Mod: 25

## 2023-02-03 PROCEDURE — 74176 CT ABD & PELVIS W/O CONTRAST: CPT | Mod: MA

## 2023-02-03 PROCEDURE — 83690 ASSAY OF LIPASE: CPT

## 2023-02-03 PROCEDURE — 85027 COMPLETE CBC AUTOMATED: CPT

## 2023-02-03 PROCEDURE — 82272 OCCULT BLD FECES 1-3 TESTS: CPT

## 2023-02-03 PROCEDURE — 82365 CALCULUS SPECTROSCOPY: CPT

## 2023-02-03 PROCEDURE — 85025 COMPLETE CBC W/AUTO DIFF WBC: CPT

## 2023-02-03 PROCEDURE — 82746 ASSAY OF FOLIC ACID SERUM: CPT

## 2023-02-03 PROCEDURE — 81001 URINALYSIS AUTO W/SCOPE: CPT

## 2023-02-03 PROCEDURE — 83735 ASSAY OF MAGNESIUM: CPT

## 2023-02-03 PROCEDURE — 82607 VITAMIN B-12: CPT

## 2023-02-03 PROCEDURE — 83540 ASSAY OF IRON: CPT

## 2023-02-03 RX ORDER — ACETAMINOPHEN 500 MG
2 TABLET ORAL
Qty: 0 | Refills: 0 | DISCHARGE
Start: 2023-02-03

## 2023-02-03 RX ORDER — SODIUM BICARBONATE 1 MEQ/ML
1300 SYRINGE (ML) INTRAVENOUS THREE TIMES A DAY
Refills: 0 | Status: DISCONTINUED | OUTPATIENT
Start: 2023-02-03 | End: 2023-02-03

## 2023-02-03 RX ADMIN — Medication 50 MILLIGRAM(S): at 14:11

## 2023-02-03 RX ADMIN — Medication 1 MILLIGRAM(S): at 06:40

## 2023-02-03 RX ADMIN — Medication 100 MILLIGRAM(S): at 06:40

## 2023-02-03 RX ADMIN — Medication 650 MILLIGRAM(S): at 06:40

## 2023-02-03 RX ADMIN — Medication 1300 MILLIGRAM(S): at 14:11

## 2023-02-03 RX ADMIN — AMLODIPINE BESYLATE 10 MILLIGRAM(S): 2.5 TABLET ORAL at 06:40

## 2023-02-03 RX ADMIN — Medication 50 MILLIGRAM(S): at 06:40

## 2023-02-03 RX ADMIN — Medication 81 MILLIGRAM(S): at 11:34

## 2023-02-03 NOTE — DISCHARGE NOTE NURSING/CASE MANAGEMENT/SOCIAL WORK - PATIENT PORTAL LINK FT
You can access the FollowMyHealth Patient Portal offered by [facility] by registering at the following website: [email or web address]. By joining [facility]’s FollowMyHealth portal, you will also be able to view your health information using other applications (apps) compatible with our system.

## 2023-02-03 NOTE — PROGRESS NOTE ADULT - PROBLEM SELECTOR PLAN 2
stone viewed in strainer, will send for chemical analysis    OK to resume diet and ? consider discharge.. urology f/u 2 weeks

## 2023-02-03 NOTE — PROGRESS NOTE ADULT - ASSESSMENT
Left hydroureteronephrosis - possible renal calculi. will continue pain meds as needed. will continue to strain urine and will continue flomax. Urology is following  HTN - patient is clinically stable. will continue hydralazine/norvasc  CRI - patient is clinically stable. nephrology is following  Colon cancer - patient is clinically stable. recommend follow up with GI/surgeon/oncology as an out patient  Thrombocytopenia - patient is clinically stable. will follow platelets  Right breast cancer - patein tis clinically stable. recommend follow up with oncology as an outpatient  Anemia - patient is hemodynamically stable. will follow H/H/fe studies  Hypernatremia - patient is clinically stable. will give D5W and follow BMP      Case discussed with PA
passed ureteral stone

## 2023-02-03 NOTE — DISCHARGE NOTE PROVIDER - NSDCFUSCHEDAPPT_GEN_ALL_CORE_FT
Cortney Mack  [facility]  [facility]  Scheduled Appointment: 02/28/2023    [facility]  [facility]  Scheduled Appointment: 02/28/2023

## 2023-02-03 NOTE — DISCHARGE NOTE PROVIDER - NSDCMRMEDTOKEN_GEN_ALL_CORE_FT
acetaminophen 325 mg oral tablet: 2 tab(s) orally every 6 hours, As needed, Temp greater or equal to 38C (100.4F), Mild Pain (1 - 3)  amLODIPine 10 mg oral tablet: 1 tab(s) orally once a day  Centrum Silver oral tablet: 1 tab(s) orally once a day  doxazosin 1 mg oral tablet: orally once a day  Ecotrin Adult Low Strength 81 mg oral delayed release tablet: 1 tab(s) orally once a day  sodium bicarbonate 325 mg oral tablet: orally 2 times a day  Vitamin B6: orally once a day

## 2023-02-03 NOTE — DISCHARGE NOTE PROVIDER - NSDCCPCAREPLAN_GEN_ALL_CORE_FT
PRINCIPAL DISCHARGE DIAGNOSIS  Diagnosis: Hydronephrosis, left  Assessment and Plan of Treatment: You presented with left abdominal pain, Found to have a  kidney stone. Started on antibioitcs and IV fluids. Urology consulted. You passed kidney stone. STable for discharge home with outpatient follow up      SECONDARY DISCHARGE DIAGNOSES  Diagnosis: Ascites  Assessment and Plan of Treatment:

## 2023-02-03 NOTE — CHART NOTE - NSCHARTNOTEFT_GEN_A_CORE
Labs and imaging reviewed:     Working dx: 89 yo F with PMhx CKD 4, colon cancer, breast cancer presents with LLQ pain. Found to have hydrouretonephrosis    *Hydrouretonephrosis:  Urology following  Cont IV abx--renally dosed- day 2-ancef   Renal following  Plan for cysto/stent this afternoon wt DR Burns  Cont IVF with bicarb    Dispo: pending course    Case dw DR Michele.

## 2023-02-03 NOTE — DISCHARGE NOTE PROVIDER - HOSPITAL COURSE
87 yo F with PMhx CKD 4, colon cancer, breast cancer presents with LLQ pain. Found to have hydrouretonephrosis. Urology consulted.  CT A/P shows new left hydroureteronephrosis with perinephric edema and renal pelvic fat stranding. Suspect distal stricture or obstruction.  Patient was scheduled for stent placement; however patient was able to pass the stone on hospital day # 2.  Stone sent for analysis . Stable for discharge home with outpatient follow up.      Discharging provider: Beatirz QUEEN   Code STatus: FULL      **RESULTS**  < from: CT Abdomen and Pelvis No Cont (02.01.23 @ 11:42) >    IMPRESSION:  Absence of oral and IV contrast limits evaluation.    New left hydroureteronephrosis with perinephric edema and renal pelvic   fat stranding. Suspect distal stricture or obstruction. Pyelonephritis is   not excluded. CT urogram may be of diagnostic benefit. Developing pelvic   ascites and trace bilateral pleural effusions.    Right mesenteric mass without significant change.        --- End of Report ---      < end of copied text >

## 2023-02-03 NOTE — DISCHARGE NOTE PROVIDER - CARE PROVIDER_API CALL
Humble Michele (DO)  Internal Medicine  [address], [address]  [place], [place] [id number]  Phone: [phone]  Fax: [phone]  Follow Up Time: 1 week

## 2023-02-03 NOTE — PROGRESS NOTE ADULT - SUBJECTIVE AND OBJECTIVE BOX
Passed stone this am, L flank pain has resolved    Vital Signs Last 24 Hrs  T(C): 36.5 (02-03-23 @ 16:59), Max: 37.2 (02-03-23 @ 10:57)  T(F): 97.7 (02-03-23 @ 16:59), Max: 98.9 (02-03-23 @ 10:57)  HR: 70 (02-03-23 @ 16:59) (66 - 70)  BP: 159/58 (02-03-23 @ 16:59) (127/52 - 181/56)  RR: 17 (02-03-23 @ 16:59) (16 - 17)  SpO2: 97% (02-03-23 @ 16:59) (96% - 97%)    s1s2  b/l air entry  soft, ND  no edema  AO                        7.3    3.36  )-----------( 74       ( 03 Feb 2023 06:10 )             21.9     03 Feb 2023 06:10    141    |  112    |  63     ----------------------------<  122    3.6     |  14     |  3.76     Ca    8.0        03 Feb 2023 06:10  Phos  4.6       02 Feb 2023 07:11  Mg     1.9       02 Feb 2023 07:11    TPro  5.2    /  Alb  2.6    /  TBili  0.3    /  DBili  x      /  AST  14     /  ALT  18     /  AlkPhos  66     03 Feb 2023 06:10    LIVER FUNCTIONS - ( 03 Feb 2023 06:10 )  Alb: 2.6 g/dL / Pro: 5.2 g/dL / ALK PHOS: 66 U/L / ALT: 18 U/L / AST: 14 U/L / GGT: x           acetaminophen     Tablet .. 650 milliGRAM(s) Oral every 6 hours PRN  amLODIPine   Tablet 10 milliGRAM(s) Oral daily  aspirin enteric coated 81 milliGRAM(s) Oral daily  ceFAZolin   IVPB 500 milliGRAM(s) IV Intermittent every 12 hours  hydrALAZINE 50 milliGRAM(s) Oral every 8 hours  hydrALAZINE Injectable 10 milliGRAM(s) IV Push every 6 hours PRN  ondansetron Injectable 4 milliGRAM(s) IV Push every 8 hours PRN  sodium bicarbonate 1300 milliGRAM(s) Oral three times a day  traMADol 50 milliGRAM(s) Oral three times a day PRN      A/P:    Hx colon ca  L flank pain, MAGDI/CKD on adm iso L hydro  Passed stone today  Urology eval appr  Continue Na Bicarb   Avoid nephrotoxins  F/u BMP  Renal, , Heme/Onc f/u as op    [phone]      
Patient is a 88y old  Female who presents with a chief complaint of Left hydroureteronephrosis (02 Feb 2023 13:16)    HPI:  89 yo women with history of Hypertension, CKD IV, Adenocarcinoma of the colon, history of breast cancer, and Type I second degree AV block comes in with LLQ abdominal pain. Patient was suppose to have colonoscopy with Dr. Terry but  did not start the prep and only consumed liquids. She started to have LLQ pain that was sharp. it eventually went away but reports pain on palpation on her left side. She reports nausea and states pain is worse when she lies flat. She denies trauma, headache, chest pain, shortness of breath.    In ED, temp 97.3, HR 67, /72, RR 15 O2 Sat 99%. CT A/P shows new left hydroureteronephrosis with perinephric edema and renal pelvic fat stranding. Suspect distal stricture or obstruction. Pyelonephritis is   not excluded. CT urogram may be of diagnostic benefit. Developing pelvic ascites and trace bilateral pleural effusions. Right mesenteric mass without significant change.    had pain last night .. passed stone this AM .. no pain     Interval Events:  Patient seen and examined at bedside.    MEDICATIONS:  MEDICATIONS  (STANDING):  amLODIPine   Tablet 10 milliGRAM(s) Oral daily  aspirin enteric coated 81 milliGRAM(s) Oral daily  ceFAZolin   IVPB 500 milliGRAM(s) IV Intermittent every 12 hours  hydrALAZINE 50 milliGRAM(s) Oral every 8 hours  sodium bicarbonate 1300 milliGRAM(s) Oral three times a day    MEDICATIONS  (PRN):  acetaminophen     Tablet .. 650 milliGRAM(s) Oral every 6 hours PRN Temp greater or equal to 38C (100.4F), Mild Pain (1 - 3)  hydrALAZINE Injectable 10 milliGRAM(s) IV Push every 6 hours PRN SBP > 160  ondansetron Injectable 4 milliGRAM(s) IV Push every 8 hours PRN Nausea and/or Vomiting  traMADol 50 milliGRAM(s) Oral three times a day PRN Moderate Pain (4 - 6)      Allergies    No Known Allergies    Intolerances        T(C): 37.2 (02-03-23 @ 10:57), Max: 37.6 (02-02-23 @ 05:18)  T(F): 98.9 (02-03-23 @ 10:57), Max: 99.7 (02-02-23 @ 05:18)  HR: 69 (02-03-23 @ 10:57) (56 - 80)  BP: 127/52 (02-03-23 @ 10:57) (127/52 - 198/65)  RR: 16 (02-03-23 @ 10:57) (16 - 17)  SpO2: 96% (02-03-23 @ 10:57) (95% - 98%)            LABS:      CBC Full  -  ( 03 Feb 2023 06:10 )  WBC Count : 3.36 K/uL  RBC Count : 2.29 M/uL  Hemoglobin : 7.3 g/dL  Hematocrit : 21.9 %  Platelet Count - Automated : 74 K/uL  Mean Cell Volume : 95.6 fl  Mean Cell Hemoglobin : 31.9 pg  Mean Cell Hemoglobin Concentration : 33.3 gm/dL  Auto Neutrophil # : x  Auto Lymphocyte # : x  Auto Monocyte # : x  Auto Eosinophil # : x  Auto Basophil # : x  Auto Neutrophil % : x  Auto Lymphocyte % : x  Auto Monocyte % : x  Auto Eosinophil % : x  Auto Basophil % : x    02-03    141  |  112<H>  |  63<H>  ----------------------------<  122<H>  3.6   |  14<L>  |  3.76<H>    Ca    8.0<L>      03 Feb 2023 06:10  Phos  4.6     02-02  Mg     1.9     02-02    TPro  5.2<L>  /  Alb  2.6<L>  /  TBili  0.3  /  DBili  x   /  AST  14  /  ALT  18  /  AlkPhos  66  02-03                  Physical Exam    Constitutional: alert, no acute distress    Abdomen: soft, nontender, nondistended, no HSM    Genitourinary: no bladder distention          
Feels better, L flank pain almost resolved    Vital Signs Last 24 Hrs  T(C): 37.6 (23 @ 05:18), Max: 37.6 (23 @ 05:18)  T(F): 99.7 (23 @ 05:18), Max: 99.7 (23 @ 05:18)  HR: 62 (23 @ 05:18) (56 - 80)  BP: 150/50  RR: 16 (23 @ 05:18) (15 - 16)  SpO2: 95% (23 @ 05:18) (95% - 99%)    s1s2  b/l air entry  soft, ND  no edema  AO                         7.7    4.44  )-----------( 77       ( 2023 07:11 )             23.7     2023 07:11    150    |  120    |  60     ----------------------------<  104    3.7     |  15     |  3.56     Ca    8.4        2023 07:11  Phos  4.6       2023 07:11  Mg     1.9       2023 07:11    TPro  5.4    /  Alb  2.7    /  TBili  0.4    /  DBili  x      /  AST  12     /  ALT  18     /  AlkPhos  73     2023 07:11    LIVER FUNCTIONS - ( 2023 07:11 )  Alb: 2.7 g/dL / Pro: 5.4 g/dL / ALK PHOS: 73 U/L / ALT: 18 U/L / AST: 12 U/L / GGT: x           Urinalysis Basic - ( 2023 11:30 )    Color: Yellow / Appearance: Slightly Turbid / S.015 / pH: x  Gluc: x / Ketone: Negative  / Bili: Negative / Urobili: Negative   Blood: x / Protein: 500 mg/dL / Nitrite: Negative   Leuk Esterase: Small / RBC: 26-50 /HPF / WBC 6-10 /HPF   Sq Epi: x / Non Sq Epi: Neg.-Few / Bacteria: Few /HPF    acetaminophen     Tablet .. 650 milliGRAM(s) Oral every 6 hours PRN  amLODIPine   Tablet 10 milliGRAM(s) Oral daily  aspirin enteric coated 81 milliGRAM(s) Oral daily  ceFAZolin   IVPB 500 milliGRAM(s) IV Intermittent every 12 hours  dextrose 5%. 1000 milliLiter(s) IV Continuous <Continuous>  doxazosin 1 milliGRAM(s) Oral daily  hydrALAZINE 50 milliGRAM(s) Oral every 8 hours  hydrALAZINE Injectable 10 milliGRAM(s) IV Push every 6 hours PRN  ondansetron Injectable 4 milliGRAM(s) IV Push every 8 hours PRN  sodium bicarbonate 650 milliGRAM(s) Oral three times a day  tamsulosin 0.4 milliGRAM(s) Oral at bedtime  traMADol 50 milliGRAM(s) Oral three times a day PRN    A/P:    Hx colon ca, CKD 4  L flank pain, MAGDI/CKD 4 iso L hydro, stone  Urology input appr   Continue Na Bicarb for met acidosis  Avoid nephrotoxins  F/u BMP, CBC, UO  Cr slightly better today  For possible cysto/stent pending clinical course    [phone]      
  HANG MADERA  88y  Female        patient complains of LLQ tenderness. no n/v/fever/chills      REVIEW OF SYSTEMS:    Cardiac HTN  Pulmonary no cough/SOB  GI colon cancer   CRI/right breat cancer  Neuro no headache/numbness/dizziness  Heme thrombocytopenia      FAMILY HISTORY:  FH: colon cancer (Sibling, Sibling)    FH: breast cancer (Sibling)      T(C): 36.8 (23 @ 19:26), Max: 37.6 (23 @ 05:18)  HR: 58 (23 @ 19:26) (58 - 80)  BP: 162/52 (23 @ 19:26) (146/54 - 198/65)  RR: 17 (23 @ 19:26) (16 - 17)  SpO2: 97% (23 @ 19:26) (95% - 97%)  Wt(kg): --Vital Signs Last 24 Hrs  T(C): 36.8 (2023 19:26), Max: 37.6 (2023 05:18)  T(F): 98.2 (2023 19:26), Max: 99.7 (2023 05:18)  HR: 58 (2023 19:26) (58 - 80)  BP: 162/52 (2023 19:26) (146/54 - 198/65)  BP(mean): --  RR: 17 (2023 19:26) (16 - 17)  SpO2: 97% (2023 19:26) (95% - 97%)    Parameters below as of 2023 19:26  Patient On (Oxygen Delivery Method): room air      No Known Allergies      PHYSICAL EXAM:    General patient with LLQ pain  Neck no JVD thyroid stable  Heart regular  Lungs clear  Abdomen LLQ tenderness no rebound no HSM normal bowel sounds non distended  Extremities non tender no edema +pulses  Neurologic alert and oriented x 3 no acute focal changes        Consultant(s) Notes Reviewed:  [x ] YES  [ ] NO      LABS:  CBC Full  -  ( 2023 07:11 )  WBC Count : 4.44 K/uL  RBC Count : 2.46 M/uL  Hemoglobin : 7.7 g/dL  Hematocrit : 23.7 %  Platelet Count - Automated : 77 K/uL  Mean Cell Volume : 96.3 fl  Mean Cell Hemoglobin : 31.3 pg  Mean Cell Hemoglobin Concentration : 32.5 gm/dL  Auto Neutrophil # : 3.44 K/uL  Auto Lymphocyte # : 0.59 K/uL  Auto Monocyte # : 0.38 K/uL  Auto Eosinophil # : 0.01 K/uL  Auto Basophil # : 0.01 K/uL  Auto Neutrophil % : 77.5 %  Auto Lymphocyte % : 13.3 %  Auto Monocyte % : 8.6 %  Auto Eosinophil % : 0.2 %  Auto Basophil % : 0.2 %                          7.7    4.44  )-----------( 77       ( 2023 07:11 )             23.7     02-02    150<H>  |  120<H>  |  60<H>  ----------------------------<  104<H>  3.7   |  15<L>  |  3.56<H>    Ca    8.4      2023 07:11  Phos  4.6     02-  Mg     1.9     02-    TPro  5.4<L>  /  Alb  2.7<L>  /  TBili  0.4  /  DBili  x   /  AST  12  /  ALT  18  /  AlkPhos  73  02-02    LIVER FUNCTIONS - ( 2023 07:11 )  Alb: 2.7 g/dL / Pro: 5.4 g/dL / ALK PHOS: 73 U/L / ALT: 18 U/L / AST: 12 U/L / GGT: x             Urinalysis Basic - ( 2023 11:30 )    Color: Yellow / Appearance: Slightly Turbid / S.015 / pH: x  Gluc: x / Ketone: Negative  / Bili: Negative / Urobili: Negative   Blood: x / Protein: 500 mg/dL / Nitrite: Negative   Leuk Esterase: Small / RBC: 26-50 /HPF / WBC 6-10 /HPF   Sq Epi: x / Non Sq Epi: Neg.-Few / Bacteria: Few /HPF        RADIOLOGY & ADDITIONAL TESTS:      < from: Xray Chest 1 View AP/PA (23 @ 12:27) >    ACC: [id number] EXAM:  XR CHEST AP OR PA 1V   ORDERED BY: ALANIS JACKSON     PROCEDURE DATE:  2023          INTERPRETATION:  INDICATION: Admission. Abdominal pain    Portable chest 12:18 PM    COMPARISON: 2017    Patient rotated.    FINDINGS:  Heart/Vascular: The heart size, mediastinum, hilum and aorta cannot be   adequately evaluated on this projection. Atherosclerotic change.  Pulmonary: Midline trachea. There is no focal infiltrate, congestion or   effusion.  Right axillary dissection clips.  Bones: There is no fracture.  Lines and catheter: Tip of left-sided Port-A-Cath is in the right atrium.   There is no pneumothorax.    Impression:    No acute pulmonary disease.    --- End of Report ---       GUI BRITO DO; Attending Radiologist  This document has been electronically signed. 2023  2:14PM    < end of copied text >              < from: CT Abdomen and Pelvis No Cont (23 @ 11:42) >  ACC: [id number] EXAM:  CT ABDOMEN AND PELVIS   ORDERED BY: ALANIS JACKSON     PROCEDURE DATE:  2023          INTERPRETATION:  CLINICAL INFORMATION: 88 years  Female with LLQ abd   pain. History of mucinous colon cancer. Status post right hemicolectomy   and cannot therapy in . Mesenteric mass excised in  negative for   metastatic disease. Recurred in 2016. Has since received chemotherapy and   immunotherapy.    COMPARISON: None.    CONTRAST/COMPLICATIONS:  IV Contrast: NONE  0 cc administered   0 cc discarded  Oral Contrast: NONE  Complications: None reported at time of study completion    PROCEDURE:  CT of the Abdomen and Pelvis was performed.  Sagittal and coronal reformats were performed.    LIMITATIONS: Evaluation of the solid organs, vascular structures and GI   tract is limited without oral and IV contrast.    FINDINGS:  LOWER CHEST: New trace bilateral pleural effusions and new trace   pericardial effusion.    LIVER: Multiple stable hepatic cysts and hypodensities too small to   characterize.  BILE DUCTS: Normal caliber.  GALLBLADDER: Cholecystectomy.  SPLEEN: Within normal limits.  PANCREAS: Within normal limits.  ADRENALS: Within normal limits.  KIDNEYS/URETERS: Stable right renal cortical scarring and right   parapelvic cysts. No right hydronephrosis. New mild left   hydroureteronephrosis with mild perinephric edema and infiltration of the   left renal hilar fat. No ureteral calculus.    BLADDER: Within normal limits.  REPRODUCTIVE ORGANS: Small calcified uterine myomata.    BOWEL: No bowel obstruction. Right hemicolectomy.  PERITONEUM: Increasing pelvic ascites. Right-sided mesenteric mass   surrounding a surgical clip with associated calcifications measuring 8.4   x 5.6 cm (2:51), previously 8.8 x 5.5 cm. Descending and sigmoid colonic   diverticulosis without diverticulitis.  VESSELS: Atherosclerotic changes.  RETROPERITONEUM/LYMPH NODES: No lymphadenopathy. New presacral edema.  ABDOMINAL WALL: Postoperative changes with diastases recti.  BONES: Within normal limits.    IMPRESSION:  Absence of oral and IV contrast limits evaluation.    New left hydroureteronephrosis with perinephric edema and renal pelvic   fat stranding. Suspect distal stricture or obstruction. Pyelonephritis is   not excluded. CT urogram may be of diagnostic benefit. Developing pelvic   ascites and trace bilateral pleural effusions.    Right mesenteric mass without significant change.    --- End of Report ---      DMITRY XIAO MD; Attending Radiologist  Thisdocument has been electronically signed. 2023 11:59AM    < end of copied text >              < from: 12 Lead ECG (23 @ 12:35) >    Ventricular Rate 42 BPM    Atrial Rate 76 BPM    QRS Duration 112 ms    Q-T Interval 450 ms    QTC Calculation(Bazett) 375 ms    P Axis 90 degrees    R Axis -35 degrees    T Axis -4 degrees    Diagnosis Line Sinus rhythm with probable  2:1 AV blockpossible blocked APC'S  Left axis deviation    Nonspecific T wave abnormality    When compared with ECG of 2017 04:43,  there appears to be a rhythm change  Confirmed by FARIDA PEARSON, HONEY () on 2023 8:46:04 AM    < end of copied text >              acetaminophen     Tablet .. 650 milliGRAM(s) Oral every 6 hours PRN  amLODIPine   Tablet 10 milliGRAM(s) Oral daily  aspirin enteric coated 81 milliGRAM(s) Oral daily  ceFAZolin   IVPB 500 milliGRAM(s) IV Intermittent every 12 hours  dextrose 5%. 1000 milliLiter(s) IV Continuous <Continuous>  doxazosin 1 milliGRAM(s) Oral daily  hydrALAZINE 50 milliGRAM(s) Oral every 8 hours  hydrALAZINE Injectable 10 milliGRAM(s) IV Push every 6 hours PRN  ondansetron Injectable 4 milliGRAM(s) IV Push every 8 hours PRN  sodium bicarbonate 650 milliGRAM(s) Oral three times a day  tamsulosin 0.4 milliGRAM(s) Oral at bedtime  traMADol 50 milliGRAM(s) Oral three times a day PRN

## 2023-02-03 NOTE — DISCHARGE NOTE NURSING/CASE MANAGEMENT/SOCIAL WORK - NSDCPEFALRISK_GEN_ALL_CORE
For information on Fall & Injury Prevention, visit: [email or web address] OR  [email or web address] OR  https://www.cdc.gov/steadi/patient.html

## 2023-02-10 LAB — NIDUS STONE QN: SIGNIFICANT CHANGE UP

## 2023-02-19 ENCOUNTER — NON-APPOINTMENT (OUTPATIENT)
Age: 88
End: 2023-02-19

## 2023-02-20 ENCOUNTER — TRANSCRIPTION ENCOUNTER (OUTPATIENT)
Age: 88
End: 2023-02-20

## 2023-02-21 ENCOUNTER — NON-APPOINTMENT (OUTPATIENT)
Age: 88
End: 2023-02-21

## 2023-02-22 ENCOUNTER — APPOINTMENT (OUTPATIENT)
Dept: DISASTER EMERGENCY | Facility: HOSPITAL | Age: 88
End: 2023-02-22

## 2023-02-23 ENCOUNTER — APPOINTMENT (OUTPATIENT)
Dept: DISASTER EMERGENCY | Facility: HOSPITAL | Age: 88
End: 2023-02-23

## 2023-02-24 ENCOUNTER — APPOINTMENT (OUTPATIENT)
Dept: DISASTER EMERGENCY | Facility: HOSPITAL | Age: 88
End: 2023-02-24

## 2023-02-28 ENCOUNTER — APPOINTMENT (OUTPATIENT)
Dept: INFUSION THERAPY | Facility: HOSPITAL | Age: 88
End: 2023-02-28

## 2023-02-28 ENCOUNTER — APPOINTMENT (OUTPATIENT)
Dept: HEMATOLOGY ONCOLOGY | Facility: CLINIC | Age: 88
End: 2023-02-28

## 2023-03-07 ENCOUNTER — OUTPATIENT (OUTPATIENT)
Dept: OUTPATIENT SERVICES | Facility: HOSPITAL | Age: 88
LOS: 1 days | Discharge: HOME | End: 2023-03-07

## 2023-03-07 DIAGNOSIS — C18.9 MALIGNANT NEOPLASM OF COLON, UNSPECIFIED: ICD-10-CM

## 2023-03-07 DIAGNOSIS — Z90.49 ACQUIRED ABSENCE OF OTHER SPECIFIED PARTS OF DIGESTIVE TRACT: Chronic | ICD-10-CM

## 2023-03-07 DIAGNOSIS — D35.1 BENIGN NEOPLASM OF PARATHYROID GLAND: Chronic | ICD-10-CM

## 2023-03-13 ENCOUNTER — RESULT REVIEW (OUTPATIENT)
Age: 88
End: 2023-03-13

## 2023-03-13 ENCOUNTER — APPOINTMENT (OUTPATIENT)
Dept: INFUSION THERAPY | Facility: HOSPITAL | Age: 88
End: 2023-03-13

## 2023-03-13 LAB
ALBUMIN SERPL ELPH-MCNC: 3.7 G/DL — SIGNIFICANT CHANGE UP (ref 3.3–5)
ALP SERPL-CCNC: 71 U/L — SIGNIFICANT CHANGE UP (ref 40–120)
ALT FLD-CCNC: 11 U/L — SIGNIFICANT CHANGE UP (ref 10–45)
ANION GAP SERPL CALC-SCNC: 15 MMOL/L — SIGNIFICANT CHANGE UP (ref 5–17)
AST SERPL-CCNC: 15 U/L — SIGNIFICANT CHANGE UP (ref 10–40)
BASOPHILS # BLD AUTO: 0.02 K/UL — SIGNIFICANT CHANGE UP (ref 0–0.2)
BASOPHILS NFR BLD AUTO: 0.8 % — SIGNIFICANT CHANGE UP (ref 0–2)
BILIRUB SERPL-MCNC: 0.2 MG/DL — SIGNIFICANT CHANGE UP (ref 0.2–1.2)
BUN SERPL-MCNC: 41 MG/DL — HIGH (ref 7–23)
CALCIUM SERPL-MCNC: 8.8 MG/DL — SIGNIFICANT CHANGE UP (ref 8.4–10.5)
CEA SERPL-MCNC: 340 NG/ML — HIGH (ref 0–3.8)
CHLORIDE SERPL-SCNC: 113 MMOL/L — HIGH (ref 96–108)
CO2 SERPL-SCNC: 14 MMOL/L — LOW (ref 22–31)
CREAT SERPL-MCNC: 2.51 MG/DL — HIGH (ref 0.5–1.3)
EGFR: 18 ML/MIN/1.73M2 — LOW
EOSINOPHIL # BLD AUTO: 0.07 K/UL — SIGNIFICANT CHANGE UP (ref 0–0.5)
EOSINOPHIL NFR BLD AUTO: 2.8 % — SIGNIFICANT CHANGE UP (ref 0–6)
GLUCOSE SERPL-MCNC: 122 MG/DL — HIGH (ref 70–99)
HCT VFR BLD CALC: 26.9 % — LOW (ref 34.5–45)
HGB BLD-MCNC: 8.9 G/DL — LOW (ref 11.5–15.5)
IMM GRANULOCYTES NFR BLD AUTO: 0.4 % — SIGNIFICANT CHANGE UP (ref 0–0.9)
LYMPHOCYTES # BLD AUTO: 0.51 K/UL — LOW (ref 1–3.3)
LYMPHOCYTES # BLD AUTO: 20.3 % — SIGNIFICANT CHANGE UP (ref 13–44)
MCHC RBC-ENTMCNC: 30.3 PG — SIGNIFICANT CHANGE UP (ref 27–34)
MCHC RBC-ENTMCNC: 33.1 G/DL — SIGNIFICANT CHANGE UP (ref 32–36)
MCV RBC AUTO: 91.5 FL — SIGNIFICANT CHANGE UP (ref 80–100)
MONOCYTES # BLD AUTO: 0.15 K/UL — SIGNIFICANT CHANGE UP (ref 0–0.9)
MONOCYTES NFR BLD AUTO: 6 % — SIGNIFICANT CHANGE UP (ref 2–14)
NEUTROPHILS # BLD AUTO: 1.75 K/UL — LOW (ref 1.8–7.4)
NEUTROPHILS NFR BLD AUTO: 69.7 % — SIGNIFICANT CHANGE UP (ref 43–77)
NRBC # BLD: 0 /100 WBCS — SIGNIFICANT CHANGE UP (ref 0–0)
PLATELET # BLD AUTO: 97 K/UL — LOW (ref 150–400)
POTASSIUM SERPL-MCNC: 3.9 MMOL/L — SIGNIFICANT CHANGE UP (ref 3.5–5.3)
POTASSIUM SERPL-SCNC: 3.9 MMOL/L — SIGNIFICANT CHANGE UP (ref 3.5–5.3)
PROT SERPL-MCNC: 5.6 G/DL — LOW (ref 6–8.3)
RBC # BLD: 2.94 M/UL — LOW (ref 3.8–5.2)
RBC # FLD: 14.6 % — HIGH (ref 10.3–14.5)
SODIUM SERPL-SCNC: 142 MMOL/L — SIGNIFICANT CHANGE UP (ref 135–145)
WBC # BLD: 2.51 K/UL — LOW (ref 3.8–10.5)
WBC # FLD AUTO: 2.51 K/UL — LOW (ref 3.8–10.5)

## 2023-03-22 ENCOUNTER — NON-APPOINTMENT (OUTPATIENT)
Age: 88
End: 2023-03-22

## 2023-03-22 ENCOUNTER — APPOINTMENT (OUTPATIENT)
Dept: HEMATOLOGY ONCOLOGY | Facility: CLINIC | Age: 88
End: 2023-03-22
Payer: MEDICARE

## 2023-03-22 VITALS
SYSTOLIC BLOOD PRESSURE: 170 MMHG | TEMPERATURE: 97.3 F | OXYGEN SATURATION: 98 % | HEART RATE: 58 BPM | BODY MASS INDEX: 26.61 KG/M2 | DIASTOLIC BLOOD PRESSURE: 65 MMHG | RESPIRATION RATE: 16 BRPM | WEIGHT: 154.98 LBS

## 2023-03-22 PROCEDURE — 99214 OFFICE O/P EST MOD 30 MIN: CPT

## 2023-03-22 NOTE — ASSESSMENT
[FreeTextEntry1] : Lab work, PET/CT scan report, CT chest and MRI abdomen reports, hospital nephrology and urology notes reviewed.\par #1) Colon cancer/h/o elevated CEA-clinically stable at present. Patient had wished to continue Keytruda which she felt she tolerated well.  Had reviewed that the usual duration of immunotherapy would be for up to 2 years or until disease progression or intolerance to the medication.  Patient had had a break from treatment and resumed it with stability of her disease.  Patient is considering quality of life issues in her decision making. \par Immunotherapy currently on hold due to renal issues-last dose 6/14/22.  \par \par 12/13/2022-PET/CT scan-Morphologically stable RIGHT mesenteric mass demonstrates mild uptake, with portions exhibiting photopenia. Mucinous lesions are often minimally or non-FDG-avid; these findings are consistent with that history.\par 2.  Uptake overlying the RIGHT hemicolectomy anastomosis, where residual/recurrent disease is not excluded.\par 3.  Multiple hepatic hypodensities, some of which may have enlarged. Some of these demonstrate mildly decreased uptake compared to the remaining liver. If this will change patient management, MRI of the liver may be helpful to exclude mucinous metastases.\par 4.  Heterogeneous heterogeneous thyroid with mild uptake and substernal extension.\par 5.  Small RIGHT lung nodule versus motion artifact. Additional previously seen RIGHT-sided micronodule is unchanged.\par \par 1/11/2023-MRI Abdomen-No obvious mucinous liver metastases, however difficult to exclude given lack of intravenous contrast and motion artifact as well as multiplicity of background hepatic cysts. Grossly stable right mesenteric mass\par 1/18/2023-CT chest-Stable two very small nodules in the left upper and left lower lobes when compared to previous exam.\par \par Reviewed treatment alternatives with respective pro's/con's. Patient wishes for continued treatment break currently. Increasing CEA discussed. Colonoscopy as scheduled. Interval f/u imaging will again be planned.\par \par #2) history of anemia/leukopenia/thrombocytopenia. With progressive thrombocytopenia, patient had bone marrow biopsy 11/2021–cellular marrow with erythroid predominant maturing trilineage hematopoiesis.  Normal cytogenetics.  Genomic analysis with no mutations identified.\par ?  May have immune component to cytopenia(s).  Kidney disease may contribute to the anemia as well. Had explained that some BM disorders may evolve over time, such as MDS. No overt bleeding noted clinically at present.  Hematologic surveillance for now. GF support as needed.\par \par #3) to continue f/u with nephrology, for renal disease/electrolyte abnormalities.\par F/U with urology as well.\par \par #4) reviewed need for mediport maintenance while it remains in.\par \par Patient was given the opportunity to ask questions. Her questions have been answered to her apparent satisfaction at this time.  She concurs with plans of care.\par

## 2023-03-22 NOTE — HISTORY OF PRESENT ILLNESS
[de-identified] : Patient with history of colon cancer (adenocarcinoma)-5-2011. Stage IIIC-T4 N2a. Status post right colectomy followed by FOLFOX chemotherapy.  With persistently elevated CEA on f/u testing,  3/2013 mesentery mass excision pathology showed a lymph node negative for metastatic carcinoma. Benign fibroadipose tissue.\par Persistently increasing elevated CEA.  CT scan abdomen/pelvis 6/2016, showed enlarging mesenteric adenopathy. Endoscopic ultrasound guided FNA of the bryan-pancreatic, mesenteric mass was positive for malignant cells-adenocarcinoma associated with abundant mucin-compatible with known history of adenocarcinoma, colorectal type, mucinous variant.. 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  Patient obtained 2 surgical opinions and disease was felt to be inoperable by both surgeons.  Patient begun on Xeloda/Avastin.\par 5/2017-CT scan chest/abdomen/pelvis showed stable tiny nodules in the lungs. Sizable mass merging with the head of the pancreas had Increased in size from 11/2016 with some increasing infiltration of the surrounding fat.  Possibility of increasing colonic wall thickness on the colon site of the ileocolic anastomosis. Patient begun on Pembrolizumab (tumor MSI-high).\par 8/2017- CT scan chest/abdomen/pelvis showed marginally worsening mesenteric adenopathy, with short interval followup CT scan abdomen/pelvis 9/2017, stable compared to 8/2017.\par 1/2018-CT scan chest/abdomen/pelvis-stable adenopathy.\par 6/2018-CT scan chest/abdomen/pelvis-stable adenopathy.\par 11/2018-CT chest/abdomen/pelvis-unchanged mass at the root of the mesentery.\par 4/2019-CT scan chest/abdomen/pelvis-stable mesenteric soft tissue mass.\par 8/2019-CT scan chest/abdomen/pelvis-unchanged mesenteric mass.\par 10/2019-CT scan A/P-stable mesenteric mass. Held Pembrolizumab.\par 2/2020-CT scan chest/abdomen/pelvis-stable exam with mesenteric mass without significant change since 10/2019. Resumed Pembrolizumab, which was then held 6/2020 due to progressive renal insufficiency.\par 7/2020-CT scan chest/abdomen/pelvis–no significant change.  Stable mesenteric mass unchanged since 10/2019.  Segment of colonic wall thickening at the level of the anastomosis unchanged.  Enlarged multinodular thyroid with bilateral substernal extension unchanged.\par 10/2020-CT scan C/A/P-stable upper abdominal mesenteric mass. No new suspicious pathology.\par \par 1/2021–CT scan chest/abdomen/pelvis–mesenteric mass inseparable from the pancreatic head encasing the proximal mesenteric vessels minimally enlarged since 10/2020.  Mild mural thickening and ileal colic anastomosis. New course Pembrolizumab begun.\par 5/2021–CT scan chest/abdomen/pelvis–stable mesenteric mass contiguous with pancreatic head.  Decrease in thickening at the ileocolic anastomosis.  No new abnormalities.\par 10/2021- CT C/A/P-stable exam.\par 3/28/2022-CT C/A/P-nonspecific small volume pelvic ascites, stable  since 10/19/2021. An 8.3 x 5.6 cm lobulated mesenteric mass is indeterminate, but unchanged.\par Stable appearance of the chest since 10/19/2021.\par \par 8/19/2022-CT C/A/P-mesenteric mass without significant change.\par \par 12/13/2022-PET/CT scan-Morphologically stable RIGHT mesenteric mass demonstrates mild uptake, with portions exhibiting photopenia. Mucinous lesions are often minimally or non-FDG-avid; these findings are consistent with that history.\par 2.  Uptake overlying the RIGHT hemicolectomy anastomosis, where residual/recurrent disease is not excluded.\par 3.  Multiple hepatic hypodensities, some of which may have enlarged. Some of these demonstrate mildly decreased uptake compared to the remaining liver. If this will change patient management, MRI of the liver may be helpful to exclude mucinous metastases.\par 4.  Heterogeneous heterogeneous thyroid with mild uptake and substernal extension.\par 5.  Small RIGHT lung nodule versus motion artifact. Additional previously seen RIGHT-sided micronodule is unchanged.\par \par 1/11/2023-MRI Abdomen-No obvious mucinous liver metastases, however difficult to exclude given lack of intravenous contrast and motion artifact as well as multiplicity of background hepatic cysts. Grossly stable right mesenteric mass\par 1/18/2023-CT chest-Stable two very small nodules in the left upper and left lower lobes when compared to previous exam.\par \par \par \par  [de-identified] : adenocarcinoma [de-identified] : History of right breast cancer-2007. Status post right breast conservation surgery--> RT--> [facility].         \par \par 11/2021 (h/o pancytopenia)-Bone marrow biopsy and bone marrow aspirate-cellular marrow (40-50%) with erythroid predominant maturing\par  trilineage hematopoiesis with no increase in blast population. Normal female cytogenetics. Normal MDS FISH panel.\par [employer] Myeloid panel-no Mutations Identified\par  [de-identified] : CEA 12/2013-33.6 [de-identified] : S/P hospitalization for left hydronephrosis secondary to nephrolithiasis, and associated pain. Then got COVID infection.\par Now feels well. \par Has colonoscopy scheduled for next week.\caro Remains independent, continues to drive. Active with her local senior center.\par No CP, cough, SOB, fevers. No c/o N/V/change in bowel habits. No current c/o abdominal/pelvic pain. No bleeding.\par \par Son is [facility] . He is a source of support for patient, as is daughter.\par \caro Has had COVID vaccines, along with booster.\par \par \par

## 2023-03-27 ENCOUNTER — TRANSCRIPTION ENCOUNTER (OUTPATIENT)
Age: 88
End: 2023-03-27

## 2023-03-28 ENCOUNTER — OUTPATIENT (OUTPATIENT)
Dept: OUTPATIENT SERVICES | Facility: HOSPITAL | Age: 88
LOS: 1 days | End: 2023-03-28
Payer: MEDICARE

## 2023-03-28 ENCOUNTER — APPOINTMENT (OUTPATIENT)
Dept: SURGERY | Facility: HOSPITAL | Age: 88
End: 2023-03-28

## 2023-03-28 DIAGNOSIS — D35.1 BENIGN NEOPLASM OF PARATHYROID GLAND: Chronic | ICD-10-CM

## 2023-03-28 DIAGNOSIS — Z90.49 ACQUIRED ABSENCE OF OTHER SPECIFIED PARTS OF DIGESTIVE TRACT: Chronic | ICD-10-CM

## 2023-03-28 DIAGNOSIS — Z85.038 PERSONAL HISTORY OF OTHER MALIGNANT NEOPLASM OF LARGE INTESTINE: ICD-10-CM

## 2023-03-28 DIAGNOSIS — Z86.010 PERSONAL HISTORY OF COLONIC POLYPS: ICD-10-CM

## 2023-03-28 DIAGNOSIS — Z12.11 ENCOUNTER FOR SCREENING FOR MALIGNANT NEOPLASM OF COLON: ICD-10-CM

## 2023-03-28 DIAGNOSIS — Z98.890 OTHER SPECIFIED POSTPROCEDURAL STATES: Chronic | ICD-10-CM

## 2023-03-28 PROCEDURE — G0105: CPT

## 2023-03-28 RX ORDER — SODIUM CHLORIDE 9 MG/ML
500 INJECTION INTRAMUSCULAR; INTRAVENOUS; SUBCUTANEOUS
Refills: 0 | Status: COMPLETED | OUTPATIENT
Start: 2023-03-28 | End: 2023-03-28

## 2023-03-28 RX ADMIN — SODIUM CHLORIDE 75 MILLILITER(S): 9 INJECTION INTRAMUSCULAR; INTRAVENOUS; SUBCUTANEOUS at 11:28

## 2023-05-11 ENCOUNTER — OUTPATIENT (OUTPATIENT)
Dept: OUTPATIENT SERVICES | Facility: HOSPITAL | Age: 88
LOS: 1 days | Discharge: HOME | End: 2023-05-11

## 2023-05-11 DIAGNOSIS — Z90.49 ACQUIRED ABSENCE OF OTHER SPECIFIED PARTS OF DIGESTIVE TRACT: Chronic | ICD-10-CM

## 2023-05-11 DIAGNOSIS — Z98.890 OTHER SPECIFIED POSTPROCEDURAL STATES: Chronic | ICD-10-CM

## 2023-05-11 DIAGNOSIS — D35.1 BENIGN NEOPLASM OF PARATHYROID GLAND: Chronic | ICD-10-CM

## 2023-05-11 DIAGNOSIS — C18.9 MALIGNANT NEOPLASM OF COLON, UNSPECIFIED: ICD-10-CM

## 2023-05-18 ENCOUNTER — LABORATORY RESULT (OUTPATIENT)
Age: 88
End: 2023-05-18

## 2023-05-18 LAB
ALBUMIN SERPL ELPH-MCNC: 4.1 G/DL
ALP BLD-CCNC: 81 U/L
ALT SERPL-CCNC: 13 U/L
ANION GAP SERPL CALC-SCNC: 14 MMOL/L
AST SERPL-CCNC: 15 U/L
BILIRUB SERPL-MCNC: 0.4 MG/DL
BUN SERPL-MCNC: 48 MG/DL
CALCIUM SERPL-MCNC: 8.9 MG/DL
CEA SERPL-MCNC: 337 NG/ML
CHLORIDE SERPL-SCNC: 120 MMOL/L
CO2 SERPL-SCNC: 14 MMOL/L
CREAT SERPL-MCNC: 2.81 MG/DL
EGFR: 16 ML/MIN/1.73M2
GLUCOSE SERPL-MCNC: 108 MG/DL
POTASSIUM SERPL-SCNC: 4.6 MMOL/L
PROT SERPL-MCNC: 5.8 G/DL
SODIUM SERPL-SCNC: 148 MMOL/L

## 2023-05-25 ENCOUNTER — APPOINTMENT (OUTPATIENT)
Dept: HEMATOLOGY ONCOLOGY | Facility: CLINIC | Age: 88
End: 2023-05-25
Payer: MEDICARE

## 2023-05-25 ENCOUNTER — RESULT REVIEW (OUTPATIENT)
Age: 88
End: 2023-05-25

## 2023-05-25 ENCOUNTER — APPOINTMENT (OUTPATIENT)
Dept: INFUSION THERAPY | Facility: HOSPITAL | Age: 88
End: 2023-05-25

## 2023-05-25 VITALS
HEIGHT: 64.02 IN | OXYGEN SATURATION: 98 % | DIASTOLIC BLOOD PRESSURE: 68 MMHG | RESPIRATION RATE: 16 BRPM | SYSTOLIC BLOOD PRESSURE: 166 MMHG | WEIGHT: 149.91 LBS | TEMPERATURE: 98 F | HEART RATE: 78 BPM | BODY MASS INDEX: 25.59 KG/M2

## 2023-05-25 DIAGNOSIS — Z98.890 OTHER SPECIFIED POSTPROCEDURAL STATES: ICD-10-CM

## 2023-05-25 LAB
ALBUMIN SERPL ELPH-MCNC: 4 G/DL — SIGNIFICANT CHANGE UP (ref 3.3–5)
ALP SERPL-CCNC: 77 U/L — SIGNIFICANT CHANGE UP (ref 40–120)
ALT FLD-CCNC: 11 U/L — SIGNIFICANT CHANGE UP (ref 10–45)
ANION GAP SERPL CALC-SCNC: 17 MMOL/L — SIGNIFICANT CHANGE UP (ref 5–17)
AST SERPL-CCNC: 12 U/L — SIGNIFICANT CHANGE UP (ref 10–40)
BASOPHILS # BLD AUTO: 0.01 K/UL — SIGNIFICANT CHANGE UP (ref 0–0.2)
BASOPHILS NFR BLD AUTO: 0.4 % — SIGNIFICANT CHANGE UP (ref 0–2)
BILIRUB SERPL-MCNC: 0.3 MG/DL — SIGNIFICANT CHANGE UP (ref 0.2–1.2)
BUN SERPL-MCNC: 60 MG/DL — HIGH (ref 7–23)
CALCIUM SERPL-MCNC: 8.9 MG/DL — SIGNIFICANT CHANGE UP (ref 8.4–10.5)
CHLORIDE SERPL-SCNC: 113 MMOL/L — HIGH (ref 96–108)
CO2 SERPL-SCNC: 12 MMOL/L — LOW (ref 22–31)
CREAT SERPL-MCNC: 2.63 MG/DL — HIGH (ref 0.5–1.3)
EGFR: 17 ML/MIN/1.73M2 — LOW
EOSINOPHIL # BLD AUTO: 0.05 K/UL — SIGNIFICANT CHANGE UP (ref 0–0.5)
EOSINOPHIL NFR BLD AUTO: 2.1 % — SIGNIFICANT CHANGE UP (ref 0–6)
GLUCOSE SERPL-MCNC: 117 MG/DL — HIGH (ref 70–99)
HCT VFR BLD CALC: 27.6 % — LOW (ref 34.5–45)
HGB BLD-MCNC: 9.1 G/DL — LOW (ref 11.5–15.5)
IMM GRANULOCYTES NFR BLD AUTO: 0.4 % — SIGNIFICANT CHANGE UP (ref 0–0.9)
IRON SATN MFR SERPL: 17 % — SIGNIFICANT CHANGE UP (ref 14–50)
IRON SATN MFR SERPL: 54 UG/DL — SIGNIFICANT CHANGE UP (ref 30–160)
LYMPHOCYTES # BLD AUTO: 0.54 K/UL — LOW (ref 1–3.3)
LYMPHOCYTES # BLD AUTO: 22.4 % — SIGNIFICANT CHANGE UP (ref 13–44)
MCHC RBC-ENTMCNC: 31 PG — SIGNIFICANT CHANGE UP (ref 27–34)
MCHC RBC-ENTMCNC: 33 G/DL — SIGNIFICANT CHANGE UP (ref 32–36)
MCV RBC AUTO: 93.9 FL — SIGNIFICANT CHANGE UP (ref 80–100)
MONOCYTES # BLD AUTO: 0.18 K/UL — SIGNIFICANT CHANGE UP (ref 0–0.9)
MONOCYTES NFR BLD AUTO: 7.5 % — SIGNIFICANT CHANGE UP (ref 2–14)
NEUTROPHILS # BLD AUTO: 1.62 K/UL — LOW (ref 1.8–7.4)
NEUTROPHILS NFR BLD AUTO: 67.2 % — SIGNIFICANT CHANGE UP (ref 43–77)
NRBC # BLD: 0 /100 WBCS — SIGNIFICANT CHANGE UP (ref 0–0)
PLATELET # BLD AUTO: 93 K/UL — LOW (ref 150–400)
POTASSIUM SERPL-MCNC: 4.5 MMOL/L — SIGNIFICANT CHANGE UP (ref 3.5–5.3)
POTASSIUM SERPL-SCNC: 4.5 MMOL/L — SIGNIFICANT CHANGE UP (ref 3.5–5.3)
PROT SERPL-MCNC: 5.7 G/DL — LOW (ref 6–8.3)
RBC # BLD: 2.94 M/UL — LOW (ref 3.8–5.2)
RBC # FLD: 15.9 % — HIGH (ref 10.3–14.5)
RETICS #: 53.8 K/UL — SIGNIFICANT CHANGE UP (ref 25–125)
RETICS/RBC NFR: 1.8 % — SIGNIFICANT CHANGE UP (ref 0.5–2.5)
SODIUM SERPL-SCNC: 141 MMOL/L — SIGNIFICANT CHANGE UP (ref 135–145)
TIBC SERPL-MCNC: 320 UG/DL — SIGNIFICANT CHANGE UP (ref 220–430)
UIBC SERPL-MCNC: 266 UG/DL — SIGNIFICANT CHANGE UP (ref 110–370)
WBC # BLD: 2.41 K/UL — LOW (ref 3.8–10.5)
WBC # FLD AUTO: 2.41 K/UL — LOW (ref 3.8–10.5)

## 2023-05-25 PROCEDURE — 99214 OFFICE O/P EST MOD 30 MIN: CPT

## 2023-05-25 NOTE — PHYSICAL EXAM
[Fully active, able to carry on all pre-disease performance without restriction] : Status 0 - Fully active, able to carry on all pre-disease performance without restriction [Normal] : affect appropriate [de-identified] : no dominant mass

## 2023-05-25 NOTE — HISTORY OF PRESENT ILLNESS
[Disease: _____________________] : Disease: [unfilled] [T: ___] : T[unfilled] [N: ___] : N[unfilled] [M: ___] : M[unfilled] [AJCC Stage: ____] : AJCC Stage: [unfilled] [de-identified] : Patient with history of colon cancer (adenocarcinoma)-5-2011. Stage IIIC-T4 N2a. Status post right colectomy followed by FOLFOX chemotherapy.  With persistently elevated CEA on f/u testing,  3/2013 mesentery mass excision pathology showed a lymph node negative for metastatic carcinoma. Benign fibroadipose tissue.\par Persistently increasing elevated CEA.  CT scan abdomen/pelvis 6/2016, showed enlarging mesenteric adenopathy. Endoscopic ultrasound guided FNA of the bryan-pancreatic, mesenteric mass was positive for malignant cells-adenocarcinoma associated with abundant mucin-compatible with known history of adenocarcinoma, colorectal type, mucinous variant.. 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  Patient obtained 2 surgical opinions and disease was felt to be inoperable by both surgeons.  Patient begun on Xeloda/Avastin.\par 5/2017-CT scan chest/abdomen/pelvis showed stable tiny nodules in the lungs. Sizable mass merging with the head of the pancreas had Increased in size from 11/2016 with some increasing infiltration of the surrounding fat.  Possibility of increasing colonic wall thickness on the colon site of the ileocolic anastomosis. Patient begun on Pembrolizumab (tumor MSI-high).\par 8/2017- CT scan chest/abdomen/pelvis showed marginally worsening mesenteric adenopathy, with short interval followup CT scan abdomen/pelvis 9/2017, stable compared to 8/2017.\par 1/2018-CT scan chest/abdomen/pelvis-stable adenopathy.\par 6/2018-CT scan chest/abdomen/pelvis-stable adenopathy.\par 11/2018-CT chest/abdomen/pelvis-unchanged mass at the root of the mesentery.\par 4/2019-CT scan chest/abdomen/pelvis-stable mesenteric soft tissue mass.\par 8/2019-CT scan chest/abdomen/pelvis-unchanged mesenteric mass.\par 10/2019-CT scan A/P-stable mesenteric mass. Held Pembrolizumab.\par 2/2020-CT scan chest/abdomen/pelvis-stable exam with mesenteric mass without significant change since 10/2019. Resumed Pembrolizumab, which was then held 6/2020 due to progressive renal insufficiency.\par 7/2020-CT scan chest/abdomen/pelvis–no significant change.  Stable mesenteric mass unchanged since 10/2019.  Segment of colonic wall thickening at the level of the anastomosis unchanged.  Enlarged multinodular thyroid with bilateral substernal extension unchanged.\par 10/2020-CT scan C/A/P-stable upper abdominal mesenteric mass. No new suspicious pathology.\par \par 1/2021–CT scan chest/abdomen/pelvis–mesenteric mass inseparable from the pancreatic head encasing the proximal mesenteric vessels minimally enlarged since 10/2020.  Mild mural thickening and ileal colic anastomosis. New course Pembrolizumab begun.\par 5/2021–CT scan chest/abdomen/pelvis–stable mesenteric mass contiguous with pancreatic head.  Decrease in thickening at the ileocolic anastomosis.  No new abnormalities.\par 10/2021- CT C/A/P-stable exam.\par 3/28/2022-CT C/A/P-nonspecific small volume pelvic ascites, stable  since 10/19/2021. An 8.3 x 5.6 cm lobulated mesenteric mass is indeterminate, but unchanged.\par Stable appearance of the chest since 10/19/2021.\par \par 8/19/2022-CT C/A/P-mesenteric mass without significant change.\par \par 12/13/2022-PET/CT scan-Morphologically stable RIGHT mesenteric mass demonstrates mild uptake, with portions exhibiting photopenia. Mucinous lesions are often minimally or non-FDG-avid; these findings are consistent with that history.\par 2.  Uptake overlying the RIGHT hemicolectomy anastomosis, where residual/recurrent disease is not excluded.\par 3.  Multiple hepatic hypodensities, some of which may have enlarged. Some of these demonstrate mildly decreased uptake compared to the remaining liver. If this will change patient management, MRI of the liver may be helpful to exclude mucinous metastases.\par 4.  Heterogeneous heterogeneous thyroid with mild uptake and substernal extension.\par 5.  Small RIGHT lung nodule versus motion artifact. Additional previously seen RIGHT-sided micronodule is unchanged.\par \par 1/11/2023-MRI Abdomen-No obvious mucinous liver metastases, however difficult to exclude given lack of intravenous contrast and motion artifact as well as multiplicity of background hepatic cysts. Grossly stable right mesenteric mass\par 1/18/2023-CT chest-Stable two very small nodules in the left upper and left lower lobes when compared to previous exam.\par \par \par \par  [de-identified] : adenocarcinoma [de-identified] : CEA 12/2013-33.6 [de-identified] : History of right breast cancer-2007. Status post right breast conservation surgery--> RT--> [facility].         \par \par 11/2021 (h/o pancytopenia)-Bone marrow biopsy and bone marrow aspirate-cellular marrow (40-50%) with erythroid predominant maturing\par  trilineage hematopoiesis with no increase in blast population. Normal female cytogenetics. Normal MDS FISH panel.\par [employer] Sight Myeloid panel-no Mutations Identified\par  [de-identified] : Feels well.\par Remains independent, continues to drive. Active with her local senior center.\par No CP, cough, SOB, fevers. No c/o N/V/change in bowel habits. No current c/o abdominal/pelvic pain. No bleeding.\par \par Son is [employer] . He is a source of support for patient, as is daughter.\par \par \par \par \par

## 2023-05-25 NOTE — ASSESSMENT
[FreeTextEntry1] : Lab work reviewed.\par #1) Colon cancer/h/o elevated CEA-clinically stable at present. Patient had wished to continue Keytruda which she felt she tolerated well.  Had reviewed that the usual duration of immunotherapy would be for up to 2 years or until disease progression or intolerance to the medication.  Patient had had a break from treatment and resumed it with stability of her disease.  Patient is considering quality of life issues in her decision making. \par Immunotherapy currently on hold due to renal issues-last dose 6/14/22.  \par \par 12/13/2022-PET/CT scan-Morphologically stable RIGHT mesenteric mass demonstrates mild uptake, with portions exhibiting photopenia. Mucinous lesions are often minimally or non-FDG-avid; these findings are consistent with that history.\par 2.  Uptake overlying the RIGHT hemicolectomy anastomosis, where residual/recurrent disease is not excluded.\par 3.  Multiple hepatic hypodensities, some of which may have enlarged. Some of these demonstrate mildly decreased uptake compared to the remaining liver. If this will change patient management, MRI of the liver may be helpful to exclude mucinous metastases.\par 4.  Heterogeneous heterogeneous thyroid with mild uptake and substernal extension.\par 5.  Small RIGHT lung nodule versus motion artifact. Additional previously seen RIGHT-sided micronodule is unchanged.\par \par 1/11/2023-MRI Abdomen-No obvious mucinous liver metastases, however difficult to exclude given lack of intravenous contrast and motion artifact as well as multiplicity of background hepatic cysts. Grossly stable right mesenteric mass\par 1/18/2023-CT chest-Stable two very small nodules in the left upper and left lower lobes when compared to previous exam.\par \par Reviewed treatment alternatives with respective pro's/con's. Patient wishes for continued treatment break currently. Chronically elevated CEA-fluctuating. Colonoscopy 3/2023 unremarkable. F/U CT imaging ordered.\par \par #2) history of anemia/leukopenia/thrombocytopenia. With progressive thrombocytopenia, patient had bone marrow biopsy 11/2021–cellular marrow with erythroid predominant maturing trilineage hematopoiesis.  Normal cytogenetics.  Genomic analysis with no mutations identified.\par ?  May have immune component to cytopenia(s).  Kidney disease may contribute to the anemia as well. Had explained that some BM disorders may evolve over time, such as MDS. No overt bleeding noted clinically at present.  Hematologic surveillance for now. GF support as needed.\par Should hematologic scenario change/worsen, to consider follow-up BMB.\par \par #3) to continue f/u with nephrology, for renal disease/electrolyte abnormalities.\par F/U with urology as well.\par \par #4) mediport maintenance while it remains in-patient agreeable.\par \par Patient was given the opportunity to ask questions. Her questions have been answered to her apparent satisfaction at this time.  She concurs with plans of care.\par

## 2023-06-22 ENCOUNTER — OUTPATIENT (OUTPATIENT)
Dept: OUTPATIENT SERVICES | Facility: HOSPITAL | Age: 88
LOS: 1 days | End: 2023-06-22
Payer: MEDICARE

## 2023-06-22 ENCOUNTER — APPOINTMENT (OUTPATIENT)
Dept: ULTRASOUND IMAGING | Facility: HOSPITAL | Age: 88
End: 2023-06-22
Payer: MEDICARE

## 2023-06-22 ENCOUNTER — APPOINTMENT (OUTPATIENT)
Dept: MAMMOGRAPHY | Facility: HOSPITAL | Age: 88
End: 2023-06-22
Payer: MEDICARE

## 2023-06-22 ENCOUNTER — APPOINTMENT (OUTPATIENT)
Dept: CT IMAGING | Facility: HOSPITAL | Age: 88
End: 2023-06-22
Payer: MEDICARE

## 2023-06-22 DIAGNOSIS — C18.9 MALIGNANT NEOPLASM OF COLON, UNSPECIFIED: ICD-10-CM

## 2023-06-22 DIAGNOSIS — D35.1 BENIGN NEOPLASM OF PARATHYROID GLAND: Chronic | ICD-10-CM

## 2023-06-22 DIAGNOSIS — Z98.890 OTHER SPECIFIED POSTPROCEDURAL STATES: Chronic | ICD-10-CM

## 2023-06-22 DIAGNOSIS — Z90.49 ACQUIRED ABSENCE OF OTHER SPECIFIED PARTS OF DIGESTIVE TRACT: Chronic | ICD-10-CM

## 2023-06-22 PROCEDURE — 74176 CT ABD & PELVIS W/O CONTRAST: CPT | Mod: 26,MH

## 2023-06-22 PROCEDURE — 76642 ULTRASOUND BREAST LIMITED: CPT

## 2023-06-22 PROCEDURE — 76642 ULTRASOUND BREAST LIMITED: CPT | Mod: 26,RT

## 2023-06-22 PROCEDURE — 74176 CT ABD & PELVIS W/O CONTRAST: CPT

## 2023-06-22 PROCEDURE — 71250 CT THORAX DX C-: CPT | Mod: 26,MH

## 2023-06-22 PROCEDURE — 77065 DX MAMMO INCL CAD UNI: CPT

## 2023-06-22 PROCEDURE — 71250 CT THORAX DX C-: CPT

## 2023-06-22 PROCEDURE — 77065 DX MAMMO INCL CAD UNI: CPT | Mod: 26,GG,RT

## 2023-06-22 PROCEDURE — 77067 SCR MAMMO BI INCL CAD: CPT

## 2023-06-22 PROCEDURE — 77063 BREAST TOMOSYNTHESIS BI: CPT

## 2023-06-23 ENCOUNTER — NON-APPOINTMENT (OUTPATIENT)
Age: 88
End: 2023-06-23

## 2023-06-27 ENCOUNTER — NON-APPOINTMENT (OUTPATIENT)
Age: 88
End: 2023-06-27

## 2023-06-29 ENCOUNTER — NON-APPOINTMENT (OUTPATIENT)
Age: 88
End: 2023-06-29

## 2023-07-03 ENCOUNTER — RESULT REVIEW (OUTPATIENT)
Age: 88
End: 2023-07-03

## 2023-07-03 ENCOUNTER — APPOINTMENT (OUTPATIENT)
Dept: HEMATOLOGY ONCOLOGY | Facility: CLINIC | Age: 88
End: 2023-07-03
Payer: MEDICARE

## 2023-07-03 ENCOUNTER — APPOINTMENT (OUTPATIENT)
Dept: INFUSION THERAPY | Facility: HOSPITAL | Age: 88
End: 2023-07-03

## 2023-07-03 ENCOUNTER — EMERGENCY (EMERGENCY)
Facility: HOSPITAL | Age: 88
LOS: 1 days | Discharge: HOME | End: 2023-07-03
Attending: EMERGENCY MEDICINE | Admitting: EMERGENCY MEDICINE
Payer: MEDICARE

## 2023-07-03 VITALS
BODY MASS INDEX: 25.72 KG/M2 | OXYGEN SATURATION: 97 % | SYSTOLIC BLOOD PRESSURE: 151 MMHG | WEIGHT: 149.91 LBS | DIASTOLIC BLOOD PRESSURE: 66 MMHG | RESPIRATION RATE: 15 BRPM | TEMPERATURE: 97.9 F | HEART RATE: 64 BPM

## 2023-07-03 VITALS
OXYGEN SATURATION: 97 % | TEMPERATURE: 98 F | DIASTOLIC BLOOD PRESSURE: 60 MMHG | RESPIRATION RATE: 15 BRPM | SYSTOLIC BLOOD PRESSURE: 166 MMHG | HEART RATE: 60 BPM

## 2023-07-03 VITALS
OXYGEN SATURATION: 96 % | SYSTOLIC BLOOD PRESSURE: 158 MMHG | TEMPERATURE: 98 F | WEIGHT: 149.91 LBS | HEIGHT: 64 IN | DIASTOLIC BLOOD PRESSURE: 64 MMHG | RESPIRATION RATE: 16 BRPM | HEART RATE: 79 BPM

## 2023-07-03 DIAGNOSIS — Z98.890 OTHER SPECIFIED POSTPROCEDURAL STATES: Chronic | ICD-10-CM

## 2023-07-03 PROBLEM — N64.59 THICKENING OF SKIN OF BREAST: Status: ACTIVE | Noted: 2023-07-03

## 2023-07-03 PROBLEM — Z80.3 FAMILY HISTORY OF MALIGNANT NEOPLASM OF BREAST: Status: ACTIVE | Noted: 2023-07-03

## 2023-07-03 LAB
ALBUMIN SERPL ELPH-MCNC: 2.8 G/DL — LOW (ref 3.3–5)
ALBUMIN SERPL ELPH-MCNC: 3.7 G/DL — SIGNIFICANT CHANGE UP (ref 3.3–5)
ALP SERPL-CCNC: 87 U/L — SIGNIFICANT CHANGE UP (ref 40–120)
ALP SERPL-CCNC: 92 U/L — SIGNIFICANT CHANGE UP (ref 40–120)
ALT FLD-CCNC: 14 U/L — SIGNIFICANT CHANGE UP (ref 10–45)
ALT FLD-CCNC: 15 U/L — SIGNIFICANT CHANGE UP (ref 10–45)
ANION GAP SERPL CALC-SCNC: 13 MMOL/L — SIGNIFICANT CHANGE UP (ref 5–17)
ANION GAP SERPL CALC-SCNC: 18 MMOL/L — HIGH (ref 5–17)
APTT BLD: 30.1 SEC — SIGNIFICANT CHANGE UP (ref 27.5–35.5)
AST SERPL-CCNC: 18 U/L — SIGNIFICANT CHANGE UP (ref 10–40)
AST SERPL-CCNC: 20 U/L — SIGNIFICANT CHANGE UP (ref 10–40)
BASOPHILS # BLD AUTO: 0.01 K/UL — SIGNIFICANT CHANGE UP (ref 0–0.2)
BASOPHILS # BLD AUTO: 0.01 K/UL — SIGNIFICANT CHANGE UP (ref 0–0.2)
BASOPHILS NFR BLD AUTO: 0.4 % — SIGNIFICANT CHANGE UP (ref 0–2)
BASOPHILS NFR BLD AUTO: 0.4 % — SIGNIFICANT CHANGE UP (ref 0–2)
BILIRUB SERPL-MCNC: 0.3 MG/DL — SIGNIFICANT CHANGE UP (ref 0.2–1.2)
BILIRUB SERPL-MCNC: 0.3 MG/DL — SIGNIFICANT CHANGE UP (ref 0.2–1.2)
BLD GP AB SCN SERPL QL: SIGNIFICANT CHANGE UP
BUN SERPL-MCNC: 41 MG/DL — HIGH (ref 7–23)
BUN SERPL-MCNC: 47 MG/DL — HIGH (ref 7–23)
CALCIUM SERPL-MCNC: 8.2 MG/DL — LOW (ref 8.4–10.5)
CALCIUM SERPL-MCNC: 8.4 MG/DL — SIGNIFICANT CHANGE UP (ref 8.4–10.5)
CHLORIDE SERPL-SCNC: 110 MMOL/L — HIGH (ref 96–108)
CHLORIDE SERPL-SCNC: 110 MMOL/L — HIGH (ref 96–108)
CO2 SERPL-SCNC: 12 MMOL/L — LOW (ref 22–31)
CO2 SERPL-SCNC: 18 MMOL/L — LOW (ref 22–31)
CREAT SERPL-MCNC: 2.61 MG/DL — HIGH (ref 0.5–1.3)
CREAT SERPL-MCNC: 2.8 MG/DL — HIGH (ref 0.5–1.3)
EGFR: 16 ML/MIN/1.73M2 — LOW
EGFR: 17 ML/MIN/1.73M2 — LOW
EOSINOPHIL # BLD AUTO: 0 K/UL — SIGNIFICANT CHANGE UP (ref 0–0.5)
EOSINOPHIL # BLD AUTO: 0.01 K/UL — SIGNIFICANT CHANGE UP (ref 0–0.5)
EOSINOPHIL NFR BLD AUTO: 0 % — SIGNIFICANT CHANGE UP (ref 0–6)
EOSINOPHIL NFR BLD AUTO: 0.4 % — SIGNIFICANT CHANGE UP (ref 0–6)
GLUCOSE SERPL-MCNC: 201 MG/DL — HIGH (ref 70–99)
GLUCOSE SERPL-MCNC: 210 MG/DL — HIGH (ref 70–99)
HCT VFR BLD CALC: 22.1 % — LOW (ref 34.5–45)
HCT VFR BLD CALC: 22.6 % — LOW (ref 34.5–45)
HGB BLD-MCNC: 7.5 G/DL — LOW (ref 11.5–15.5)
HGB BLD-MCNC: 7.6 G/DL — LOW (ref 11.5–15.5)
IMM GRANULOCYTES NFR BLD AUTO: 0.4 % — SIGNIFICANT CHANGE UP (ref 0–0.9)
IMM GRANULOCYTES NFR BLD AUTO: 0.4 % — SIGNIFICANT CHANGE UP (ref 0–0.9)
INR BLD: 1.19 RATIO — HIGH (ref 0.88–1.16)
LYMPHOCYTES # BLD AUTO: 0.35 K/UL — LOW (ref 1–3.3)
LYMPHOCYTES # BLD AUTO: 0.37 K/UL — LOW (ref 1–3.3)
LYMPHOCYTES # BLD AUTO: 13.3 % — SIGNIFICANT CHANGE UP (ref 13–44)
LYMPHOCYTES # BLD AUTO: 14.1 % — SIGNIFICANT CHANGE UP (ref 13–44)
MCHC RBC-ENTMCNC: 30.9 PG — SIGNIFICANT CHANGE UP (ref 27–34)
MCHC RBC-ENTMCNC: 31 PG — SIGNIFICANT CHANGE UP (ref 27–34)
MCHC RBC-ENTMCNC: 33.6 GM/DL — SIGNIFICANT CHANGE UP (ref 32–36)
MCHC RBC-ENTMCNC: 33.9 G/DL — SIGNIFICANT CHANGE UP (ref 32–36)
MCV RBC AUTO: 91.3 FL — SIGNIFICANT CHANGE UP (ref 80–100)
MCV RBC AUTO: 91.9 FL — SIGNIFICANT CHANGE UP (ref 80–100)
MONOCYTES # BLD AUTO: 0.21 K/UL — SIGNIFICANT CHANGE UP (ref 0–0.9)
MONOCYTES # BLD AUTO: 0.22 K/UL — SIGNIFICANT CHANGE UP (ref 0–0.9)
MONOCYTES NFR BLD AUTO: 8 % — SIGNIFICANT CHANGE UP (ref 2–14)
MONOCYTES NFR BLD AUTO: 8.4 % — SIGNIFICANT CHANGE UP (ref 2–14)
NEUTROPHILS # BLD AUTO: 2.01 K/UL — SIGNIFICANT CHANGE UP (ref 1.8–7.4)
NEUTROPHILS # BLD AUTO: 2.04 K/UL — SIGNIFICANT CHANGE UP (ref 1.8–7.4)
NEUTROPHILS NFR BLD AUTO: 76.7 % — SIGNIFICANT CHANGE UP (ref 43–77)
NEUTROPHILS NFR BLD AUTO: 77.5 % — HIGH (ref 43–77)
NRBC # BLD: 0 /100 WBCS — SIGNIFICANT CHANGE UP (ref 0–0)
NRBC # BLD: 0 /100 WBCS — SIGNIFICANT CHANGE UP (ref 0–0)
PLATELET # BLD AUTO: 96 K/UL — LOW (ref 150–400)
PLATELET # BLD AUTO: 98 K/UL — LOW (ref 150–400)
POTASSIUM SERPL-MCNC: 3.8 MMOL/L — SIGNIFICANT CHANGE UP (ref 3.5–5.3)
POTASSIUM SERPL-MCNC: 3.9 MMOL/L — SIGNIFICANT CHANGE UP (ref 3.5–5.3)
POTASSIUM SERPL-SCNC: 3.8 MMOL/L — SIGNIFICANT CHANGE UP (ref 3.5–5.3)
POTASSIUM SERPL-SCNC: 3.9 MMOL/L — SIGNIFICANT CHANGE UP (ref 3.5–5.3)
PROT SERPL-MCNC: 5.5 G/DL — LOW (ref 6–8.3)
PROT SERPL-MCNC: 6.1 G/DL — SIGNIFICANT CHANGE UP (ref 6–8.3)
PROTHROM AB SERPL-ACNC: 13.8 SEC — HIGH (ref 10.5–13.4)
RBC # BLD: 2.42 M/UL — LOW (ref 3.8–5.2)
RBC # BLD: 2.46 M/UL — LOW (ref 3.8–5.2)
RBC # FLD: 13.5 % — SIGNIFICANT CHANGE UP (ref 10.3–14.5)
RBC # FLD: 13.7 % — SIGNIFICANT CHANGE UP (ref 10.3–14.5)
SODIUM SERPL-SCNC: 141 MMOL/L — SIGNIFICANT CHANGE UP (ref 135–145)
SODIUM SERPL-SCNC: 141 MMOL/L — SIGNIFICANT CHANGE UP (ref 135–145)
WBC # BLD: 2.62 K/UL — LOW (ref 3.8–10.5)
WBC # BLD: 2.63 K/UL — LOW (ref 3.8–10.5)
WBC # FLD AUTO: 2.62 K/UL — LOW (ref 3.8–10.5)
WBC # FLD AUTO: 2.63 K/UL — LOW (ref 3.8–10.5)

## 2023-07-03 PROCEDURE — 99285 EMERGENCY DEPT VISIT HI MDM: CPT | Mod: 25

## 2023-07-03 PROCEDURE — 93010 ELECTROCARDIOGRAM REPORT: CPT

## 2023-07-03 PROCEDURE — 99284 EMERGENCY DEPT VISIT MOD MDM: CPT

## 2023-07-03 PROCEDURE — 85610 PROTHROMBIN TIME: CPT

## 2023-07-03 PROCEDURE — P9016: CPT

## 2023-07-03 PROCEDURE — 93005 ELECTROCARDIOGRAM TRACING: CPT

## 2023-07-03 PROCEDURE — 85730 THROMBOPLASTIN TIME PARTIAL: CPT

## 2023-07-03 PROCEDURE — 86900 BLOOD TYPING SEROLOGIC ABO: CPT

## 2023-07-03 PROCEDURE — 85025 COMPLETE CBC W/AUTO DIFF WBC: CPT

## 2023-07-03 PROCEDURE — 86901 BLOOD TYPING SEROLOGIC RH(D): CPT

## 2023-07-03 PROCEDURE — 86923 COMPATIBILITY TEST ELECTRIC: CPT

## 2023-07-03 PROCEDURE — 99215 OFFICE O/P EST HI 40 MIN: CPT

## 2023-07-03 PROCEDURE — 80053 COMPREHEN METABOLIC PANEL: CPT

## 2023-07-03 PROCEDURE — 36430 TRANSFUSION BLD/BLD COMPNT: CPT

## 2023-07-03 PROCEDURE — 36415 COLL VENOUS BLD VENIPUNCTURE: CPT

## 2023-07-03 PROCEDURE — 86850 RBC ANTIBODY SCREEN: CPT

## 2023-07-03 NOTE — REASON FOR VISIT
[Consultation] : a consultation visit
Contusion in Adults    WHAT YOU NEED TO KNOW:    A contusion is a bruise that appears on your skin after an injury. A bruise happens when small blood vessels tear but skin does not. When blood vessels tear, blood leaks into nearby tissue, such as soft tissue or muscle.    DISCHARGE INSTRUCTIONS:    Return to the emergency department if:     You have new trouble moving the injured area.      You have tingling or numbness in or near the injured area.      Your hand or foot below the bruise gets cold or turns pale.     Contact your healthcare provider if:     You find a new lump in the injured area.      Your symptoms do not improve with treatment after 4 to 5 days.      You have questions or concerns about your condition or care.    Medicines: You may need any of the following:     NSAIDs help decrease swelling and pain or fever. This medicine is available with or without a doctor's order. NSAIDs can cause stomach bleeding or kidney problems in certain people. If you take blood thinner medicine, always ask your healthcare provider if NSAIDs are safe for you. Always read the medicine label and follow directions.      Prescription pain medicine may be given. Do not wait until the pain is severe before you take your medicine.      Take your medicine as directed. Contact your healthcare provider if you think your medicine is not helping or if you have side effects. Tell him of her if you are allergic to any medicine. Keep a list of the medicines, vitamins, and herbs you take. Include the amounts, and when and why you take them. Bring the list or the pill bottles to follow-up visits. Carry your medicine list with you in case of an emergency.    Follow up with your healthcare provider as directed: You may need to return within a week to check your injury again. Write down your questions so you remember to ask them during your visits.    Help a contusion heal:     Rest the injured area or use it less than usual. If you bruised your leg or foot, you may need crutches or a cane to help you walk. This will help you keep weight off your injured body part.       Apply ice to decrease swelling and pain. Ice may also help prevent tissue damage. Use an ice pack, or put crushed ice in a plastic bag. Cover it with a towel and place it on your bruise for 15 to 20 minutes every hour or as directed.      Use compression to support the area and decrease swelling. Wrap an elastic bandage around the area over the bruised muscle. Make sure the bandage is not too tight. You should be able to fit 1 finger between the bandage and your skin.      Elevate (raise) your injured body part above the level of your heart to help decrease pain and swelling. Use pillows, blankets, or rolled towels to elevate the area as often as you can.      Do not drink alcohol as directed. Alcohol may slow healing.      Do not stretch injured muscles right after your injury. Ask your healthcare provider when and how you may safely stretch after your injury. Gentle stretches can help increase your flexibility.      Do not massage the area or put heating pads on the bruise right after your injury. Heat and massage may slow healing. Your healthcare provider may tell you to apply heat after several days. At that time, heat will start to help the injury heal.    Prevent another contusion:     Stretch and warm up before you play sports or exercise.      Wear protective gear when you play sports. Examples are shin guards and padding.       If you begin a new physical activity, start slowly to give your body a chance to adjust.

## 2023-07-03 NOTE — PHYSICAL EXAM
[Fully active, able to carry on all pre-disease performance without restriction] : Status 0 - Fully active, able to carry on all pre-disease performance without restriction [Normal] : affect appropriate [de-identified] : skin thickening inner right breast

## 2023-07-03 NOTE — ED PROVIDER NOTE - CARE PROVIDER_API CALL
Marley Edmonds [facility]  [address] [place] [id number]  Phone: [phone]  Fax: [phone]  Follow Up Time: 1-3 Days

## 2023-07-03 NOTE — ED ADULT NURSE NOTE - HIV OFFER
[address] [facility] MSW, LISW-S, OSW-C     Visit Date: 2022   Time of appointment: 230PM   Time spent with Patient: 33 minutes. This is patient's first appointment. Reason for Consult:  Diagnoses and all orders for this visit:    Depression, unspecified depression type      Referring Provider/PCP:    Chuyita Hdez MD      Pt provided informed consent for the behavioral health program. Discussed with patient model of service to include the limits of confidentiality (i.e. abuse reporting, suicide intervention, etc.) and short-term intervention focused approach. PRESENTING PROBLEM AND HISTORY  Hasmukh Shah is a 71 y.o. female who presents for new evaluation and treatment of  depression. She has the following symptoms: depressed mood, history of trauma, relationship issues and family conflict. Onset of symptoms was approximately 4 years ago when her son  unexpectedly from a heart attack. She then began taking care of his son (21 now) Blaze who is on the spectrum. This has been pt's identified cause of depression. Symptoms have been unchanged since that time. She denies current suicidal and homicidal ideation. Family history significant for heart disease. Risk factors: none. Previous treatment includes Lexapro. She complains of the following medication side effects: none. MENTAL STATUS EXAM  Mood was euthymic with congruent affect. Suicidal ideation was denied. Homicidal ideation was denied. Hygiene was good . Dress was appropriate. Behavior was Within Normal Limits with No observation or self-report of difficulties ambulating. Attitude was Cooperative. Eye-contact was good. Speech: rate - WNL, rhythm -  WNL, volume - WNL  Verbalizations were goal directed and coherent. Thought processes were intact and goal-oriented without evidence of delusions, hallucinations, obsessions, or tangela; without significant cognitive distortions.    Associations were characterized by intact cognitive processes. Pt was oriented to person, place, time, and general circumstances;  recent:  good and remote:  good. Insight and judgment were estimated to be good, AEB, a fair  understanding of cyclical maladaptive patterns, and the ability to use insight to inform behavior change. CURRENT MEDICATIONS    Current Outpatient Medications:     hydrALAZINE (APRESOLINE) 50 MG tablet, Take 1 tablet by mouth every 8 hours, Disp: 270 tablet, Rfl: 1    metoprolol tartrate (LOPRESSOR) 50 MG tablet, Take 1 tablet by mouth 2 times daily, Disp: 180 tablet, Rfl: 0    benzonatate (TESSALON) 100 MG capsule, take 1 capsule by mouth three times a day if needed for cough, Disp: 60 capsule, Rfl: 0    ezetimibe (ZETIA) 10 MG tablet, Take 1 tablet by mouth daily, Disp: 90 tablet, Rfl: 0    loratadine (CLARITIN) 10 MG tablet, Take 1 tablet by mouth daily, Disp: 90 tablet, Rfl: 0    pregabalin (LYRICA) 50 MG capsule, Take 1 capsule by mouth 2 times daily for 90 days. , Disp: 180 capsule, Rfl: 0    escitalopram (LEXAPRO) 5 MG tablet, Take 1 tablet by mouth daily, Disp: 90 tablet, Rfl: 3    montelukast (SINGULAIR) 10 MG tablet, Take 1 tablet by mouth nightly take 1 tablet by mouth every evening, Disp: 90 tablet, Rfl: 3    olmesartan (BENICAR) 40 MG tablet, Take 1 tablet by mouth daily take 1 tablet by mouth once daily, Disp: 90 tablet, Rfl: 3    omeprazole (PRILOSEC) 20 MG delayed release capsule, Take 1 capsule by mouth Daily take 1 capsule by mouth once daily, Disp: 90 capsule, Rfl: 3    SYNTHROID 100 MCG tablet, Take 1 tablet 5 days a week and 1.5 tab on Saturday and Sunday, Disp: 108 tablet, Rfl: 3    tiZANidine (ZANAFLEX) 4 MG tablet, Take 1 tablet by mouth every 8 hours as needed (muscle spasm) Take 1 tab by mouth 3 times daily, Disp: 90 tablet, Rfl: 3    albuterol sulfate  (90 Base) MCG/ACT inhaler, Inhale 2 puffs into the lungs 4 times daily as needed for Wheezing, Disp: 3 each, Rfl: 3    lidocaine (LIDODERM) 5 %, apply 3 patches to the affected area daily. Leave on for 12 hours and then off for 12 hours. , Disp: 90 patch, Rfl: 3    Coenzyme Q10 (CO Q 10 PO), Take by mouth daily, Disp: , Rfl:     Biotin w/ Vitamins C & E (HAIR/SKIN/NAILS PO), Take by mouth daily VIT C 90MG VIT E 5,000MCG ZINC 8MG COPPER 1MG, Disp: , Rfl:     Melatonin 1 MG SUBL, Place 2 mg under the tongue nightly, Disp: , Rfl:      FAMILY MEDICAL/MH HISTORY   Her family history includes Arthritis in her mother; Asthma in her sister; Breast Cancer (age of onset: 50) in her maternal aunt; Cancer (age of onset: 36) in her maternal aunt; Cancer (age of onset: 50) in her sister; Cancer (age of onset: 61) in her sister; Diabetes in her maternal grandmother and mother; Early Death in her brother; Heart Attack in her father; Heart Disease in her brother, maternal grandmother, and mother; High Blood Pressure in her maternal grandmother, mother, and sister; High Cholesterol in her maternal grandmother, mother, and sister; Other in her sister; Ovarian Cancer in her maternal aunt; Stroke in her maternal grandmother and mother; Substance Abuse in her sister. PATIENT MENTAL HEALTH HISTORY  Currently taking Lexapro    PSYCHOSOCIAL HISTORY  Current living situation: Resides at home with  Zane and grandson Gabe (21)    Work/Education: Retired    Support system: , daughter          DRUG AND ALCOHOL CURRENT USE/HISTORY  TOBACCO:  She reports that she quit smoking about 14 years ago. Her smoking use included cigarettes. She has a 30.00 pack-year smoking history. She has never used smokeless tobacco.  ALCOHOL:  She reports current alcohol use. OTHER SUBSTANCES: She reports no history of drug use. ASSESSMENT  Davidson Mcintyre presented to the appointment today for evaluation and treatment of symptoms of depression. She is currently deemed no risk to herself or others and meets criteria for depression.    Amanda Sood Viola Thomson will also benefit from brief and solution-focused consultation to address cognitive and behavioral interventions for depressive symptoms. Recommended that pt would benefit from family intervention and CPST services upon assessment to help structure through behavior modification and subsequently help reduce depressive features within pt and family. Korin Whalen was in agreement with recommendations. PHQ Scores 3/21/2022 3/20/2021 9/24/2020 7/1/2019 8/16/2018 5/21/2018 11/11/2016   PHQ2 Score 0 0 1 2 0 0 0   PHQ9 Score 0 0 1 2 0 0 0     Interpretation of Total Score Depression Severity: 1-4 = Minimal depression, 5-9 = Mild depression, 10-14 = Moderate depression, 15-19 = Moderately severe depression, 20-27 = Severe depression    How often pt has had thoughts of death or hurting self (if PHQ positive for depression):       No flowsheet data found. Interpretation of IDALMIS-7 score: 5-9 = mild anxiety, 10-14 = moderate anxiety, 15+ = severe anxiety. Recommend referral to behavioral health for scores 10 or greater.       DIAGNOSIS  Diagnoses and all orders for this visit:    Depression, unspecified depression type          INTERVENTION  Discussed prevalence of  depression for general population, Trained in strategies for increasing balanced thinking, Provided education, Established rapport, Pierce-setting to identify pt's primary goals for [facility] visit / overall health and Collaborative treatment planning,Clarified role of [facility] in primary care,Recommended that pt establish with a mental health clinician with whom they can meet regularly for psychotherapy services      PLAN  Recommended discussion with family and follow up with [facility] re: referral to specialist for family intervention and CPST work for greg Springer, MSW, LISW-S, OSW-C Opt out

## 2023-07-03 NOTE — ED PROVIDER NOTE - OBJECTIVE STATEMENT
88-year-old female history of CKD stage IV, colon cancer, breast cancer presents for blood transfusion as sent by her hematologist oncologist Dr. Marley Edmonds.  Patient's hemoglobin found to be 7.5.  Patient reports fatigue.  States she always feels cold however there is no shortness of breath or chest pain.  Denies obvious bleeding.  No history of transfusions.  Was offered the ability to go to the clinic or come to the ED and decided to come to the ED.  Prior chart reviewed.

## 2023-07-03 NOTE — ED ADULT NURSE NOTE - OBJECTIVE STATEMENT
sent for anemia, states she has never had a blood transfusion before. reports symptoms of fatigued legs

## 2023-07-03 NOTE — ED ADULT TRIAGE NOTE - BP NONINVASIVE SYSTOLIC (MM HG)
158 Ria Perry MD  [facility] Division of [facility]  Pager [phone] (M-F 8AM-5PM)  Other Times: [id number]    Patient is a 89y old  Female who presents with a chief complaint of Frequent Falls, concern for Cellulitis. (16 Aug 2021 11:52)    SUBJECTIVE / OVERNIGHT EVENTS: Patient seen and examined at bedside. Denies chest pain/sob.     MEDICATIONS  (STANDING):  amLODIPine   Tablet 5 milliGRAM(s) Oral daily  aspirin enteric coated 81 milliGRAM(s) Oral daily  benzocaine 15 mG/menthol 3.6 mG (Sugar-Free) Lozenge 1 Lozenge Oral three times a day  furosemide    Tablet 20 milliGRAM(s) Oral daily  lidocaine   Patch 1 Patch Transdermal daily  multivitamin 1 Tablet(s) Oral daily  polyethylene glycol 3350 17 Gram(s) Oral daily  senna 2 Tablet(s) Oral at bedtime    MEDICATIONS  (PRN):  acetaminophen   Tablet .. 650 milliGRAM(s) Oral every 6 hours PRN Mild Pain (1 - 3), Moderate Pain (4 - 6), Severe Pain (7 - 10)  albuterol/ipratropium for Nebulization 3 milliLiter(s) Nebulizer every 6 hours PRN Shortness of Breath and/or Wheezing  aluminum hydroxide/magnesium hydroxide/simethicone Suspension 30 milliLiter(s) Oral every 4 hours PRN Dyspepsia  melatonin 3 milliGRAM(s) Oral at bedtime PRN Insomnia      PHYSICAL EXAM:  Vital Signs Last 24 Hrs  T(C): 36.7 (17 Aug 2021 14:20), Max: 36.7 (17 Aug 2021 14:20)  T(F): 98 (17 Aug 2021 14:20), Max: 98 (17 Aug 2021 14:20)  HR: 70 (17 Aug 2021 14:20) (68 - 70)  BP: 127/60 (17 Aug 2021 14:20) (127/60 - 136/55)  RR: 18 (17 Aug 2021 14:20) (18 - 18)  SpO2: 96% (17 Aug 2021 14:20) (93% - 99%)    CONSTITUTIONAL: NAD, well-developed, well-groomed  RESPIRATORY: Normal respiratory effort; lungs are clear to auscultation bilaterally  CARDIOVASCULAR: Regular rate and rhythm, normal S1 and S2, no murmur/rub/gallop; No lower extremity edema  GASTROINTESTINAL: Nontender to palpation, normoactive bowel sounds, no rebound/guarding; No hepatosplenomegaly  MUSCULOSKELETAL:  no clubbing or cyanosis of digits; no joint swelling or tenderness to palpation  NEUROLOGY: non-focal; no gross sensory deficits   PSYCH: A+O to person, place, and time; affect appropriate  SKIN: No rashes; warm     LABS:                        10.5   4.65  )-----------( 149      ( 17 Aug 2021 06:47 )             32.5     08-17    133<L>  |  93<L>  |  13  ----------------------------<  103<H>  4.3   |  32<H>  |  0.61    Ca    8.9      17 Aug 2021 06:47  Phos  3.7     08-17  Mg     1.90     08-17                  RADIOLOGY & ADDITIONAL TESTS:  Results Reviewed:   Imaging Personally Reviewed:  Electrocardiogram Personally Reviewed:    COORDINATION OF CARE:  Care Discussed with Consultants/Other Providers [Y/N]:  Prior or Outpatient Records Reviewed [Y/N]:

## 2023-07-03 NOTE — ED ADULT NURSE NOTE - NSFALLUNIVINTERV_ED_ALL_ED
Bed/Stretcher in lowest position, wheels locked, appropriate side rails in place/Call bell, personal items and telephone in reach/Instruct patient to call for assistance before getting out of bed/chair/stretcher/Non-slip footwear applied when patient is off stretcher/[place] to call system/Physically safe environment - no spills, clutter or unnecessary equipment/Purposeful proactive rounding/Room/bathroom lighting operational, light cord in reach

## 2023-07-03 NOTE — ASSESSMENT
[FreeTextEntry1] : Lab work reviewed.\par #1) Colon cancer/h/o elevated CEA-clinically stable at present. Patient had wished to continue Keytruda which she felt she tolerated well.  Had reviewed that the usual duration of immunotherapy would be for up to 2 years or until disease progression or intolerance to the medication.  Patient had a break from treatment and resumed it with stability of her disease.  \par However, immunotherapy then held due to renal issues-last dose 6/14/22.  \par \par 12/13/2022-PET/CT scan-Morphologically stable RIGHT mesenteric mass demonstrates mild uptake, with portions exhibiting photopenia. Mucinous lesions are often minimally or non-FDG-avid; these findings are consistent with that history.\par 2.  Uptake overlying the RIGHT hemicolectomy anastomosis, where residual/recurrent disease is not excluded.\par 3.  Multiple hepatic hypodensities, some of which may have enlarged. Some of these demonstrate mildly decreased uptake compared to the remaining liver. If this will change patient management, MRI of the liver may be helpful to exclude mucinous metastases.\par 4.  Heterogeneous heterogeneous thyroid with mild uptake and substernal extension.\par 5.  Small RIGHT lung nodule versus motion artifact. Additional previously seen RIGHT-sided micronodule is unchanged.\par \par 1/11/2023-MRI Abdomen-No obvious mucinous liver metastases, however difficult to exclude given lack of intravenous contrast and motion artifact as well as multiplicity of background hepatic cysts. Grossly stable right mesenteric mass\par 1/18/2023-CT chest-Stable two very small nodules in the left upper and left lower lobes when compared to previous exam.\par -patient wished for continued treatment break.\par Colonoscopy 3/2023 unremarkable.\par \par 6/27/2023–CT scan chest/abdomen/pelvis–mild increase in size of large right-sided mesenteric mass, which correlated with prior examinations, likely represents a mucinous metastasis.  Otherwise stable examination.  Numerous cystic liver lesions are not significantly changed and are not well characterized on this unenhanced CT scan.\par Reviewed CT scan results with patient in detail.  Suggestive of mild increase in large mesenteric mass.  Cystic liver lesions overall stable.  Discussed management options.  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 Patient refused regimen suggested, only wishing to resume immunotherapy if possible in hopes it would at least slow POD, considering her QOL in decision making. Advised she needs to see nephrology for re-assessment of her elevated creatinine first, which she is willing to do-now wishing to see nephro-oncology here at [facility]-referral made.\par ?May consider regorafenib pending course.\par \par #2) history of anemia/leukopenia/thrombocytopenia. With progressive thrombocytopenia, patient had bone marrow biopsy 11/2021–cellular marrow with erythroid predominant maturing trilineage hematopoiesis.  Normal cytogenetics.  Genomic analysis with no mutations identified.\par ?  May have immune component to cytopenia(s).  Kidney disease may contribute to the anemia as well. Had explained that some BM disorders may evolve over time, such as MDS. No overt bleeding noted clinically at present.  Supportive hematologic care.\par Should hematologic scenario change/worsen, to consider follow-up BMB.\par \par #3) mediport maintenance while it remains in-patient agreeable.\par \par Patient was given the opportunity to ask questions. Her questions have been answered to her apparent satisfaction at this time.  \par

## 2023-07-03 NOTE — ED PROVIDER NOTE - PATIENT PORTAL LINK FT
You can access the FollowMyHealth Patient Portal offered by [facility] by registering at the following website: [email or web address]. By joining [employer]’s FollowMyHealth portal, you will also be able to view your health information using other applications (apps) compatible with our system.

## 2023-07-03 NOTE — ED PROVIDER NOTE - NSFOLLOWUPINSTRUCTIONS_ED_ALL_ED_FT
- follow up with Dr Edmonds this week    - return for chest pain, palpitation, difficult breathing, feeling faint or other concerns    Blood Transfusion    WHAT YOU NEED TO KNOW:    A blood transfusion is used to give you blood through an IV. You may get only part of the blood, such as red blood cells, platelets, or plasma. The blood may be from you and stored for you to use later. The blood may instead be from another person. Donated blood is tested for HIV, hepatitis, syphilis, West Nile virus, and other diseases.    DISCHARGE INSTRUCTIONS:    Call 911 for any of the following:    You have a skin rash, hives, swelling, or itching.    You have trouble breathing, shortness of breath, wheezing, or coughing.    Your throat tightens or your lips or tongue swell.    You have difficulty swallowing or speaking.  Seek care immediately if:    You develop a high fever and chills.    You are dizzy, lightheaded, confused, or feel like you are going to faint.    You have nausea, diarrhea, or abdominal cramps, or you are vomiting.    You urinate little or not at all.    You develop headaches or double vision.    Your skin or the whites of your eyes look yellow.    You see pinpoint purple spots or purple patches on your body.    You have a seizure.  Contact your healthcare provider if:    You feel tired and weak within 10 days of your transfusion.    You have questions or concerns about blood transfusions.  Medicines:    Antihistamines may help stop mild itching or a rash.    Epinephrine is emergency medicine used to stop anaphylaxis. You may be given epinephrine if you are at risk for anaphylaxis. Your healthcare provider will teach you how to use it.    Take your medicine as directed. Contact your healthcare provider if you think your medicine is not helping or if you have side effects. Tell your provider if you are allergic to any medicine. Keep a list of the medicines, vitamins, and herbs you take. Include the amounts, and when and why you take them. Bring the list or the pill bottles to follow-up visits. Carry your medicine list with you in case of an emergency.  Apply ice to decrease pain and swelling. Use an ice pack, or put ice in a plastic bag and wrap a towel around it. Apply the ice pack or wrapped bag to your transfusion site for 20 minutes each hour or as directed.    Follow up with your healthcare provider as directed: Write down your questions so you remember to ask them during your visits.

## 2023-07-03 NOTE — ED PROVIDER NOTE - CLINICAL SUMMARY MEDICAL DECISION MAKING FREE TEXT BOX
88-year-old female history of CKD stage IV, colon cancer, breast cancer presents for blood transfusion as sent by her hematologist oncologist Dr. Marley Edmonds.  Patient's hemoglobin found to be 7.5.  Patient reports fatigue.  States she always feels cold however there is no shortness of breath or chest pain.  Denies obvious bleeding.  No history of transfusions.  Was offered the ability to go to the clinic or come to the ED and decided to come to the ED.  Prior chart reviewed.  Exam as stated. Vitals stated. Alize CUEVA reviewed. Sent for transfusion. HgB checked 7.6. Will transfuse. Full discussion of risks and benefits. Pt agreeable.

## 2023-07-03 NOTE — HISTORY OF PRESENT ILLNESS
[Disease: _____________________] : Disease: [unfilled] [T: ___] : T[unfilled] [N: ___] : N[unfilled] [M: ___] : M[unfilled] [AJCC Stage: ____] : AJCC Stage: [unfilled] [de-identified] : Patient with history of colon cancer (adenocarcinoma)-5-2011. Stage IIIC-T4 N2a. Status post right colectomy followed by FOLFOX chemotherapy.  With persistently elevated CEA on f/u testing,  3/2013 mesentery mass excision pathology showed a lymph node negative for metastatic carcinoma. Benign fibroadipose tissue.\par Persistently increasing elevated CEA.  CT scan abdomen/pelvis 6/2016, showed enlarging mesenteric adenopathy. Endoscopic ultrasound guided FNA of the bryan-pancreatic, mesenteric mass was positive for malignant cells-adenocarcinoma associated with abundant mucin-compatible with known history of adenocarcinoma, colorectal type, mucinous variant.. 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  Patient obtained 2 surgical opinions and disease was felt to be inoperable by both surgeons.  Patient begun on Xeloda/Avastin.\par 5/2017-CT scan chest/abdomen/pelvis showed stable tiny nodules in the lungs. Sizable mass merging with the head of the pancreas had Increased in size from 11/2016 with some increasing infiltration of the surrounding fat.  Possibility of increasing colonic wall thickness on the colon site of the ileocolic anastomosis. Patient begun on Pembrolizumab (tumor MSI-high).\par 8/2017- CT scan chest/abdomen/pelvis showed marginally worsening mesenteric adenopathy, with short interval followup CT scan abdomen/pelvis 9/2017, stable compared to 8/2017.\par 1/2018-CT scan chest/abdomen/pelvis-stable adenopathy.\par 6/2018-CT scan chest/abdomen/pelvis-stable adenopathy.\par 11/2018-CT chest/abdomen/pelvis-unchanged mass at the root of the mesentery.\par 4/2019-CT scan chest/abdomen/pelvis-stable mesenteric soft tissue mass.\par 8/2019-CT scan chest/abdomen/pelvis-unchanged mesenteric mass.\par 10/2019-CT scan A/P-stable mesenteric mass. Held Pembrolizumab.\par 2/2020-CT scan chest/abdomen/pelvis-stable exam with mesenteric mass without significant change since 10/2019. Resumed Pembrolizumab, which was then held 6/2020 due to progressive renal insufficiency.\par 7/2020-CT scan chest/abdomen/pelvis–no significant change.  Stable mesenteric mass unchanged since 10/2019.  Segment of colonic wall thickening at the level of the anastomosis unchanged.  Enlarged multinodular thyroid with bilateral substernal extension unchanged.\par 10/2020-CT scan C/A/P-stable upper abdominal mesenteric mass. No new suspicious pathology.\par \par 1/2021–CT scan chest/abdomen/pelvis–mesenteric mass inseparable from the pancreatic head encasing the proximal mesenteric vessels minimally enlarged since 10/2020.  Mild mural thickening and ileal colic anastomosis. New course Pembrolizumab begun.\par 5/2021–CT scan chest/abdomen/pelvis–stable mesenteric mass contiguous with pancreatic head.  Decrease in thickening at the ileocolic anastomosis.  No new abnormalities.\par 10/2021- CT C/A/P-stable exam.\par 3/28/2022-CT C/A/P-nonspecific small volume pelvic ascites, stable  since 10/19/2021. An 8.3 x 5.6 cm lobulated mesenteric mass is indeterminate, but unchanged.\par Stable appearance of the chest since 10/19/2021.\par \par 8/19/2022-CT C/A/P-mesenteric mass without significant change.\par \par 12/13/2022-PET/CT scan-Morphologically stable RIGHT mesenteric mass demonstrates mild uptake, with portions exhibiting photopenia. Mucinous lesions are often minimally or non-FDG-avid; these findings are consistent with that history.\par 2.  Uptake overlying the RIGHT hemicolectomy anastomosis, where residual/recurrent disease is not excluded.\par 3.  Multiple hepatic hypodensities, some of which may have enlarged. Some of these demonstrate mildly decreased uptake compared to the remaining liver. If this will change patient management, MRI of the liver may be helpful to exclude mucinous metastases.\par 4.  Heterogeneous heterogeneous thyroid with mild uptake and substernal extension.\par 5.  Small RIGHT lung nodule versus motion artifact. Additional previously seen RIGHT-sided micronodule is unchanged.\par \par 1/11/2023-MRI Abdomen-No obvious mucinous liver metastases, however difficult to exclude given lack of intravenous contrast and motion artifact as well as multiplicity of background hepatic cysts. Grossly stable right mesenteric mass\par 1/18/2023-CT chest-Stable two very small nodules in the left upper and left lower lobes when compared to previous exam.\par \par 6/27/2023–CT scan chest/abdomen/pelvis–mild increase in size of large right-sided mesenteric mass, which correlated with prior examinations, likely represents a mucinous metastasis.  Otherwise stable examination.  Numerous cystic liver lesions are not significantly changed and are not well characterized on this unenhanced CT scan.\par \par  [de-identified] : adenocarcinoma [de-identified] : CEA 12/2013-33.6 [de-identified] : History of right breast cancer-2007. Status post right breast conservation surgery--> RT--> Femara.         \par \par 11/2021 (h/o pancytopenia)-Bone marrow biopsy and bone marrow aspirate-cellular marrow (40-50%) with erythroid predominant maturing\par  trilineage hematopoiesis with no increase in blast population. Normal female cytogenetics. Normal MDS FISH panel.\par Onko Sight Myeloid panel-no Mutations Identified\par  [de-identified] : Granddaughter getting  in August.\par Feels very well.\par Remains independent, continues to drive. Active with her local senior center.\par No CP, cough, SOB, fevers. No c/o N/V/change in bowel habits. No current c/o abdominal/pelvic pain. No bleeding.\par \par Son is [employer] . He is a source of support for patient, as is daughter.\par \par \par \par \par

## 2023-07-04 LAB
CEA SERPL-MCNC: 295 NG/ML — HIGH (ref 0–3.8)
HBV CORE AB SER-ACNC: SIGNIFICANT CHANGE UP
HBV SURFACE AB SER-ACNC: SIGNIFICANT CHANGE UP
HBV SURFACE AG SER-ACNC: SIGNIFICANT CHANGE UP
HCV AB S/CO SERPL IA: 0.05 S/CO — SIGNIFICANT CHANGE UP (ref 0–0.99)
HCV AB SERPL-IMP: SIGNIFICANT CHANGE UP

## 2023-07-05 ENCOUNTER — APPOINTMENT (OUTPATIENT)
Dept: SURGICAL ONCOLOGY | Facility: CLINIC | Age: 88
End: 2023-07-05
Payer: MEDICARE

## 2023-07-05 VITALS
SYSTOLIC BLOOD PRESSURE: 154 MMHG | HEIGHT: 64 IN | WEIGHT: 151 LBS | OXYGEN SATURATION: 96 % | BODY MASS INDEX: 25.78 KG/M2 | HEART RATE: 75 BPM | DIASTOLIC BLOOD PRESSURE: 67 MMHG | RESPIRATION RATE: 16 BRPM

## 2023-07-05 DIAGNOSIS — L98.8 OTHER SPECIFIED DISORDERS OF THE SKIN AND SUBCUTANEOUS TISSUE: ICD-10-CM

## 2023-07-05 DIAGNOSIS — Z80.3 FAMILY HISTORY OF MALIGNANT NEOPLASM OF BREAST: ICD-10-CM

## 2023-07-05 DIAGNOSIS — N64.59 OTHER SIGNS AND SYMPTOMS IN BREAST: ICD-10-CM

## 2023-07-05 PROCEDURE — 11104 PUNCH BX SKIN SINGLE LESION: CPT

## 2023-07-05 PROCEDURE — 99204 OFFICE O/P NEW MOD 45 MIN: CPT | Mod: 25

## 2023-07-05 RX ORDER — MECLIZINE HYDROCHLORIDE 25 MG/1
25 TABLET ORAL
Qty: 30 | Refills: 0 | Status: DISCONTINUED | COMMUNITY
Start: 2023-02-20 | End: 2023-07-05

## 2023-07-05 RX ORDER — AMLODIPINE AND OLMESARTAN MEDOXOMIL 10; 20 MG/1; MG/1
10-20 TABLET ORAL
Refills: 0 | Status: DISCONTINUED | COMMUNITY
End: 2023-07-05

## 2023-07-05 RX ORDER — SODIUM PICOSULFATE, MAGNESIUM OXIDE, AND ANHYDROUS CITRIC ACID 10; 3.5; 12 MG/160ML; G/160ML; G/160ML
10-3.5-12 MG-GM LIQUID ORAL
Qty: 1 | Refills: 0 | Status: DISCONTINUED | COMMUNITY
Start: 2020-08-14 | End: 2023-07-05

## 2023-07-05 RX ORDER — SODIUM PICOSULFATE, MAGNESIUM OXIDE, AND ANHYDROUS CITRIC ACID 10; 3.5; 12 MG/160ML; G/160ML; G/160ML
10-3.5-12 MG-GM LIQUID ORAL
Qty: 1 | Refills: 0 | Status: DISCONTINUED | COMMUNITY
Start: 2023-01-04 | End: 2023-07-05

## 2023-07-05 RX ORDER — FENOFIBRATE 120 MG/1
TABLET ORAL
Refills: 0 | Status: DISCONTINUED | COMMUNITY
End: 2023-07-05

## 2023-07-05 RX ORDER — FENOFIBRATE 160 MG/1
160 TABLET ORAL
Refills: 0 | Status: DISCONTINUED | COMMUNITY
Start: 2017-10-16 | End: 2023-07-05

## 2023-07-05 NOTE — ASSESSMENT
[FreeTextEntry1] : The patient is a 88 year F referred by Dr. Cortney Mack for consultation regarding right breast skin thickening, here for initial visit. \par \par We reviewed the patient's oncologic history and her imaging.\par \par Exam today did not show any obvious areas of skin thickening in the right lower inner breast.  Some skin thickening was noted in the right 2:00 axis by the patient's prior scar.  We discussed with the patient that we could proceed with a skin punch biopsy today.  The patient was agreeable to the plan and consented.\par \par Plan:\par - R breast punch biopsy, follow-up path\par - RTO depending on results

## 2023-07-05 NOTE — PROCEDURE
[FreeTextEntry1] : Skin punch biopsy Right breast [FreeTextEntry2] : skin thickening [FreeTextEntry3] : The skin was cleansed with betadine, then anesthetized with 4 cc of 1% lidocaine with epinephrine instilled in the area of the skin thickening on the right breast.  A 4 mm punch tool was used over the area of skin thickening along the 2:00 axis. The right breast lower inner quadrant was carefully examined and there were no obvious areas of skin thickening appreciated.  The skin specimen was placed into formalin.  Pressure was applied over the punch site with good hemostasis observed.  Steri-Strips were applied over the biopsy site.  A clean dressing applied on top. The patient tolerated the procedure well.

## 2023-07-05 NOTE — CONSULT LETTER
[Dear  ___] : Dear  [unfilled], [Consult Letter:] : I had the pleasure of evaluating your patient, [unfilled]. [Please see my note below.] : Please see my note below. [Consult Closing:] : Thank you very much for allowing me to participate in the care of this patient.  If you have any questions, please do not hesitate to contact me. [Sincerely,] : Sincerely, [FreeTextEntry3] : Alesia Tam MD\par Breast Surgeon\par Division of Surgical Oncology\par Department of Surgery\par [address] [place] [address]\[place] [address] \par Tel: [phone]\par Fax: [phone]\par Email: [email or web address]

## 2023-07-05 NOTE — PHYSICAL EXAM
[Normocephalic] : normocephalic [Atraumatic] : atraumatic [EOMI] : extra ocular movement intact [PERRL] : pupils equal, round and reactive to light [Sclera nonicteric] : sclera nonicteric [Supple] : supple [No Supraclavicular Adenopathy] : no supraclavicular adenopathy [No Cervical Adenopathy] : no cervical adenopathy [Examined in the supine and seated position] : examined in the supine and seated position [No dominant masses] : no dominant masses in right breast  [No dominant masses] : no dominant masses left breast [No Nipple Retraction] : no left nipple retraction [No Nipple Discharge] : no left nipple discharge [No Axillary Lymphadenopathy] : no left axillary lymphadenopathy [No Edema] : no edema [No Rashes] : no rashes [No Ulceration] : no ulceration [de-identified] : non-labored respirations

## 2023-07-05 NOTE — PAST MEDICAL HISTORY
[Postmenopausal] : The patient is postmenopausal [Menarche Age ____] : age at menarche was [unfilled] [Menopause Age____] : age at menopause was [unfilled] [Total Preg ___] : G[unfilled] [History of Hormone Replacement Treatment] : has no history of hormone replacement treatment [FreeTextEntry6] : Denies [FreeTextEntry7] : Denies [FreeTextEntry8] : N/A

## 2023-07-05 NOTE — HISTORY OF PRESENT ILLNESS
[FreeTextEntry1] : The patient is a 88 year F referred by Dr. Cortney Mack for consultation regarding right breast skin thickening, here for initial visit. \par \par Patient has a history of right breast cancer in 2007 s/p right lumpectomy, RT and Femara. She was also diagnosed with colon cancer (adenocarcinoma) in 5/2011. Stage IIIC-T4 N2a s/p right colectomy followed by FOLFOX chemotherapy. \par \par Most recent imaging:\par 6/22/2023 B/L DM (NW) revealed heterogeneously dense breasts \par - R UIQ middle depth: focal asymmetry. Follow spot compression no discrete underlying mass.\par - B/L postsurgical change noted\par - R inner: interval development of asymmetric focal skin thickening noted\par \par 6/22/2023 R US\par - R 2:00 N4: surgical scar\par - R LIQ: focal asymmetric skin thickening noted, corresponds to mmg -> f/u clinically and punch biopsy\par - BR4B\par \par PMH: Colon cancer, breast cancer, HTN, pre-DM\par PSH: Partial colectomy, parathyroid surgery, cholecystectomy, right lumpectomy\par Meds: Amlodipine, doxazosin, aspirin 81, sodium bicarb\par ALL: Denies\par SH: Former tobacco, quit 60 years ago, social.  No EtOH\par FH: Colon cancer, sister 60s; breast cancer, sister at 73.  No genetic testing\par GYN: Menarche 13.  Menopause 56.  G0, P0, 2 adopted children.  OCP none.  Fertility none.  HRT none.

## 2023-07-06 NOTE — CHART NOTE - NSCHARTNOTEFT_GEN_A_CORE
SW placed call to patient to discuss and assist with follow up care.  Patient presented to ED on 7/3/23 for abnormal labs. SW unable to reach patient at this time.  As per HIE, patient does have scheduled oncology appt for 8/14/23.

## 2023-07-12 ENCOUNTER — NON-APPOINTMENT (OUTPATIENT)
Age: 88
End: 2023-07-12

## 2023-07-14 ENCOUNTER — NON-APPOINTMENT (OUTPATIENT)
Age: 88
End: 2023-07-14

## 2023-07-18 ENCOUNTER — APPOINTMENT (OUTPATIENT)
Dept: NEPHROLOGY | Facility: CLINIC | Age: 88
End: 2023-07-18
Payer: MEDICARE

## 2023-07-18 VITALS
TEMPERATURE: 97.3 F | HEIGHT: 64 IN | OXYGEN SATURATION: 99 % | DIASTOLIC BLOOD PRESSURE: 55 MMHG | BODY MASS INDEX: 26.29 KG/M2 | HEART RATE: 73 BPM | SYSTOLIC BLOOD PRESSURE: 159 MMHG | WEIGHT: 154 LBS

## 2023-07-18 DIAGNOSIS — N28.9 DISORDER OF KIDNEY AND URETER, UNSPECIFIED: ICD-10-CM

## 2023-07-18 PROCEDURE — 99205 OFFICE O/P NEW HI 60 MIN: CPT

## 2023-07-18 NOTE — ASSESSMENT
[FreeTextEntry1] : 88 years old woman here for evaluation of an elevated creatinine\par \par Elevated Creatinine -- The question at hand is if her kidney insufficiency is related to her pembrolizumab exposure and if it is safe to resume therapy.  I have reviewed her blood work going back to before she got FOLFOX and before she started pembrolizumab.  I reviewed the trend of her creatinine through periods where she got steroids for IRAE and where pembrolizumab was stopped and restarted.  It is clear that her kidney function has worsened overall since getting FOLFOX and was further worsened after her MAGDI from obstructive stone.  There does not seem to be a clearly convincing temporal correlated between pembrolizumab exposure and her progressive CKD.  I have discussed with the patient that one option would be to pursue a kidney biopsy.  The results of the kidney biopsy may  because if chronic interstitial nephritis were found it would helpful - although her last dose was about a year ago.  I will discuss options with Dr. Edmonds.  Recommend hold aspirin for now, repeat blood work today.

## 2023-07-18 NOTE — HISTORY OF PRESENT ILLNESS
[FreeTextEntry1] : 7/18/23 -- Today I had the pleasure of meeting Veronica Camejo.  She is an 87 y/o woman here for evaluation of an elevated creatinine in the setting of immunotherapy.  The patient had mild renal insufficiency from before her cancer diagnosis her creatinine was trending about 1.4.  She then received FOLFOX therapy and then pembro on and off between 2017 and 2019 and then again up until about a year ago.  Over the last two years her creatinine has trending in the mid 2 range.  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  The question at hand today is if she can safely resume Pembro in the setting of her CKD.  Upon evaluation today she reports no chest pain no shortness of breath no NVD no urinary symptoms.  She basically feels fine.  Of note she had a kidney stone late last year where her creatinine increased to 3.8 but then passed spontaneously.

## 2023-07-18 NOTE — REVIEW OF SYSTEMS
[Fever] : no fever [Feeling Poorly] : not feeling poorly [Eyesight Problems] : no eyesight problems [Nosebleeds] : no nosebleeds [Chest Pain] : no chest pain [Lower Ext Edema] : no lower extremity edema [Shortness Of Breath] : no shortness of breath [Wheezing] : no wheezing [Abdominal Pain] : no abdominal pain [Vomiting] : no vomiting [Dysuria] : no dysuria [Incontinence] : no incontinence [Joint Swelling] : no joint swelling [Joint Stiffness] : no joint stiffness [Itching] : no itching [Change In A Mole] : no change in a mole [Dizziness] : no dizziness [Fainting] : no fainting [Easy Bleeding] : no tendency for easy bleeding [Easy Bruising] : no tendency for easy bruising

## 2023-07-18 NOTE — PHYSICAL EXAM
[General Appearance - Alert] : alert [General Appearance - In No Acute Distress] : in no acute distress [Sclera] : the sclera and conjunctiva were normal [Hearing Threshold Finger Rub Not [place]] : hearing was normal [Neck Cervical Mass (___cm)] : no neck mass was observed [Jugular Venous Distention Increased] : there was no jugular-venous distention [Exaggerated Use Of Accessory Muscles For Inspiration] : no accessory muscle use [Auscultation Breath Sounds / Voice Sounds] : lungs were clear to auscultation bilaterally [Heart Sounds] : normal S1 and S2 [Edema] : there was no peripheral edema [Abdomen Soft] : soft [Abdomen Tenderness] : non-tender [FreeTextEntry1] : walks with cane [] : no rash [Oriented To Time, Place, And Person] : oriented to person, place, and time [Impaired Insight] : insight and judgment were intact

## 2023-07-19 ENCOUNTER — NON-APPOINTMENT (OUTPATIENT)
Age: 88
End: 2023-07-19

## 2023-07-19 LAB
ALBUMIN SERPL ELPH-MCNC: 3.7 G/DL
ANION GAP SERPL CALC-SCNC: 13 MMOL/L
APPEARANCE: CLEAR
BACTERIA: NEGATIVE /HPF
BILIRUBIN URINE: NEGATIVE
BLOOD URINE: NEGATIVE
BUN SERPL-MCNC: 49 MG/DL
CALCIUM SERPL-MCNC: 8.5 MG/DL
CALCIUM SERPL-MCNC: 8.5 MG/DL
CAST: 0 /LPF
CHLORIDE SERPL-SCNC: 112 MMOL/L
CO2 SERPL-SCNC: 13 MMOL/L
COLOR: YELLOW
CREAT SERPL-MCNC: 2.5 MG/DL
CREAT SPEC-SCNC: 35 MG/DL
CREAT SPEC-SCNC: 35 MG/DL
CREAT/PROT UR: 1.6 RATIO
CYSTATIN C SERPL-MCNC: 3.04 MG/L
EGFR: 18 ML/MIN/1.73M2
EPITHELIAL CELLS: 1 /HPF
GFR/BSA.PRED SERPLBLD CYS-BASED-ARV: 15 ML/MIN/1.73M2
GLUCOSE QUALITATIVE U: NEGATIVE MG/DL
GLUCOSE SERPL-MCNC: 113 MG/DL
KETONES URINE: NEGATIVE MG/DL
LEUKOCYTE ESTERASE URINE: NEGATIVE
MICROALBUMIN 24H UR DL<=1MG/L-MCNC: 30.9 MG/DL
MICROALBUMIN/CREAT 24H UR-RTO: 891 MG/G
MICROSCOPIC-UA: NORMAL
NITRITE URINE: NEGATIVE
PARATHYROID HORMONE INTACT: 65 PG/ML
PH URINE: 6
PHOSPHATE SERPL-MCNC: 3.3 MG/DL
POTASSIUM SERPL-SCNC: 5.1 MMOL/L
PROT UR-MCNC: 56 MG/DL
PROTEIN URINE: 100 MG/DL
RED BLOOD CELLS URINE: 0 /HPF
SODIUM SERPL-SCNC: 138 MMOL/L
SPECIFIC GRAVITY URINE: 1.01
UROBILINOGEN URINE: 0.2 MG/DL
WHITE BLOOD CELLS URINE: 1 /HPF

## 2023-07-20 ENCOUNTER — APPOINTMENT (OUTPATIENT)
Dept: PLASTIC SURGERY | Facility: CLINIC | Age: 88
End: 2023-07-20

## 2023-07-20 LAB — ANA SER IF-ACNC: NEGATIVE

## 2023-07-24 LAB — CORE LAB BIOPSY: NORMAL

## 2023-08-07 ENCOUNTER — OUTPATIENT (OUTPATIENT)
Dept: OUTPATIENT SERVICES | Facility: HOSPITAL | Age: 88
LOS: 1 days | Discharge: HOME | End: 2023-08-07

## 2023-08-07 DIAGNOSIS — Z98.890 OTHER SPECIFIED POSTPROCEDURAL STATES: Chronic | ICD-10-CM

## 2023-08-07 DIAGNOSIS — Z90.49 ACQUIRED ABSENCE OF OTHER SPECIFIED PARTS OF DIGESTIVE TRACT: Chronic | ICD-10-CM

## 2023-08-07 DIAGNOSIS — C18.9 MALIGNANT NEOPLASM OF COLON, UNSPECIFIED: ICD-10-CM

## 2023-08-07 DIAGNOSIS — D35.1 BENIGN NEOPLASM OF PARATHYROID GLAND: Chronic | ICD-10-CM

## 2023-08-14 ENCOUNTER — RESULT REVIEW (OUTPATIENT)
Age: 88
End: 2023-08-14

## 2023-08-14 ENCOUNTER — APPOINTMENT (OUTPATIENT)
Dept: HEMATOLOGY ONCOLOGY | Facility: CLINIC | Age: 88
End: 2023-08-14
Payer: MEDICARE

## 2023-08-14 ENCOUNTER — OUTPATIENT (OUTPATIENT)
Dept: OUTPATIENT SERVICES | Facility: HOSPITAL | Age: 88
LOS: 1 days | End: 2023-08-14
Payer: MEDICARE

## 2023-08-14 ENCOUNTER — APPOINTMENT (OUTPATIENT)
Dept: INFUSION THERAPY | Facility: HOSPITAL | Age: 88
End: 2023-08-14

## 2023-08-14 VITALS
SYSTOLIC BLOOD PRESSURE: 138 MMHG | RESPIRATION RATE: 16 BRPM | TEMPERATURE: 96.4 F | HEART RATE: 66 BPM | BODY MASS INDEX: 25.05 KG/M2 | WEIGHT: 145.92 LBS | DIASTOLIC BLOOD PRESSURE: 65 MMHG | OXYGEN SATURATION: 99 %

## 2023-08-14 DIAGNOSIS — Z90.49 ACQUIRED ABSENCE OF OTHER SPECIFIED PARTS OF DIGESTIVE TRACT: Chronic | ICD-10-CM

## 2023-08-14 DIAGNOSIS — D64.9 ANEMIA, UNSPECIFIED: ICD-10-CM

## 2023-08-14 DIAGNOSIS — Z92.89 PERSONAL HISTORY OF OTHER MEDICAL TREATMENT: ICD-10-CM

## 2023-08-14 DIAGNOSIS — Z98.890 OTHER SPECIFIED POSTPROCEDURAL STATES: Chronic | ICD-10-CM

## 2023-08-14 DIAGNOSIS — D35.1 BENIGN NEOPLASM OF PARATHYROID GLAND: Chronic | ICD-10-CM

## 2023-08-14 LAB
ALBUMIN SERPL ELPH-MCNC: 3.7 G/DL — SIGNIFICANT CHANGE UP (ref 3.3–5)
ALP SERPL-CCNC: 88 U/L — SIGNIFICANT CHANGE UP (ref 40–120)
ALT FLD-CCNC: 5 U/L — LOW (ref 10–45)
ANION GAP SERPL CALC-SCNC: 15 MMOL/L — SIGNIFICANT CHANGE UP (ref 5–17)
AST SERPL-CCNC: 18 U/L — SIGNIFICANT CHANGE UP (ref 10–40)
BASOPHILS # BLD AUTO: 0.01 K/UL — SIGNIFICANT CHANGE UP (ref 0–0.2)
BASOPHILS NFR BLD AUTO: 0.4 % — SIGNIFICANT CHANGE UP (ref 0–2)
BILIRUB SERPL-MCNC: 0.3 MG/DL — SIGNIFICANT CHANGE UP (ref 0.2–1.2)
BUN SERPL-MCNC: 46 MG/DL — HIGH (ref 7–23)
CALCIUM SERPL-MCNC: 8.7 MG/DL — SIGNIFICANT CHANGE UP (ref 8.4–10.5)
CEA SERPL-MCNC: 278 NG/ML — HIGH (ref 0–3.8)
CHLORIDE SERPL-SCNC: 106 MMOL/L — SIGNIFICANT CHANGE UP (ref 96–108)
CO2 SERPL-SCNC: 15 MMOL/L — LOW (ref 22–31)
CREAT SERPL-MCNC: 2.62 MG/DL — HIGH (ref 0.5–1.3)
EGFR: 17 ML/MIN/1.73M2 — LOW
EOSINOPHIL # BLD AUTO: 0.02 K/UL — SIGNIFICANT CHANGE UP (ref 0–0.5)
EOSINOPHIL NFR BLD AUTO: 0.7 % — SIGNIFICANT CHANGE UP (ref 0–6)
FERRITIN SERPL-MCNC: 310 NG/ML — SIGNIFICANT CHANGE UP (ref 13–330)
FOLATE SERPL-MCNC: >20 NG/ML — SIGNIFICANT CHANGE UP
GLUCOSE SERPL-MCNC: 181 MG/DL — HIGH (ref 70–99)
HAPTOGLOB SERPL-MCNC: 228 MG/DL — HIGH (ref 34–200)
HCT VFR BLD CALC: 22.4 % — LOW (ref 34.5–45)
HGB BLD-MCNC: 7.3 G/DL — LOW (ref 11.5–15.5)
IMM GRANULOCYTES NFR BLD AUTO: 0.4 % — SIGNIFICANT CHANGE UP (ref 0–0.9)
IRON SATN MFR SERPL: 11 % — LOW (ref 14–50)
IRON SATN MFR SERPL: 30 UG/DL — SIGNIFICANT CHANGE UP (ref 30–160)
LYMPHOCYTES # BLD AUTO: 0.29 K/UL — LOW (ref 1–3.3)
LYMPHOCYTES # BLD AUTO: 10.3 % — LOW (ref 13–44)
MCHC RBC-ENTMCNC: 29 PG — SIGNIFICANT CHANGE UP (ref 27–34)
MCHC RBC-ENTMCNC: 32.6 G/DL — SIGNIFICANT CHANGE UP (ref 32–36)
MCV RBC AUTO: 88.9 FL — SIGNIFICANT CHANGE UP (ref 80–100)
MONOCYTES # BLD AUTO: 0.17 K/UL — SIGNIFICANT CHANGE UP (ref 0–0.9)
MONOCYTES NFR BLD AUTO: 6 % — SIGNIFICANT CHANGE UP (ref 2–14)
NEUTROPHILS # BLD AUTO: 2.32 K/UL — SIGNIFICANT CHANGE UP (ref 1.8–7.4)
NEUTROPHILS NFR BLD AUTO: 82.2 % — HIGH (ref 43–77)
NRBC # BLD: 0 /100 WBCS — SIGNIFICANT CHANGE UP (ref 0–0)
PLATELET # BLD AUTO: 108 K/UL — LOW (ref 150–400)
POTASSIUM SERPL-MCNC: 4.5 MMOL/L — SIGNIFICANT CHANGE UP (ref 3.5–5.3)
POTASSIUM SERPL-SCNC: 4.5 MMOL/L — SIGNIFICANT CHANGE UP (ref 3.5–5.3)
PROT SERPL-MCNC: 6.3 G/DL — SIGNIFICANT CHANGE UP (ref 6–8.3)
RBC # BLD: 2.52 M/UL — LOW (ref 3.8–5.2)
RBC # FLD: 14 % — SIGNIFICANT CHANGE UP (ref 10.3–14.5)
RETICS #: 31.5 K/UL — SIGNIFICANT CHANGE UP (ref 25–125)
RETICS/RBC NFR: 1.3 % — SIGNIFICANT CHANGE UP (ref 0.5–2.5)
SODIUM SERPL-SCNC: 136 MMOL/L — SIGNIFICANT CHANGE UP (ref 135–145)
TIBC SERPL-MCNC: 275 UG/DL — SIGNIFICANT CHANGE UP (ref 220–430)
UIBC SERPL-MCNC: 245 UG/DL — SIGNIFICANT CHANGE UP (ref 110–370)
VIT B12 SERPL-MCNC: 1436 PG/ML — HIGH (ref 232–1245)
WBC # BLD: 2.82 K/UL — LOW (ref 3.8–10.5)
WBC # FLD AUTO: 2.82 K/UL — LOW (ref 3.8–10.5)

## 2023-08-14 PROCEDURE — 86923 COMPATIBILITY TEST ELECTRIC: CPT

## 2023-08-14 PROCEDURE — 86900 BLOOD TYPING SEROLOGIC ABO: CPT

## 2023-08-14 PROCEDURE — 86901 BLOOD TYPING SEROLOGIC RH(D): CPT

## 2023-08-14 PROCEDURE — 99215 OFFICE O/P EST HI 40 MIN: CPT

## 2023-08-14 PROCEDURE — 86850 RBC ANTIBODY SCREEN: CPT

## 2023-08-14 NOTE — ASSESSMENT
[FreeTextEntry1] : Lab work , 7/18/23 and 7/19/23 nephrology notes reviewed. Son Don and granddaughter Babatunde on speaker phone for discussion. #1) Colon cancer/h/o elevated CEA-clinically stable at present. Patient had wished to continue Keytruda which she felt she tolerated well.  Had reviewed that the usual duration of immunotherapy would be for up to 2 years or until disease progression or intolerance to the medication.  Patient had a break from treatment and resumed it with stability of her disease.   However, immunotherapy then held due to renal issues-last dose 6/14/22.    12/13/2022-PET/CT scan-Morphologically stable RIGHT mesenteric mass demonstrates mild uptake, with portions exhibiting photopenia. Mucinous lesions are often minimally or non-FDG-avid; these findings are consistent with that history. 2.  Uptake overlying the RIGHT hemicolectomy anastomosis, where residual/recurrent disease is not excluded. 3.  Multiple hepatic hypodensities, some of which may have enlarged. Some of these demonstrate mildly decreased uptake compared to the remaining liver. If this will change patient management, MRI of the liver may be helpful to exclude mucinous metastases. 4.  Heterogeneous heterogeneous thyroid with mild uptake and substernal extension. 5.  Small RIGHT lung nodule versus motion artifact. Additional previously seen RIGHT-sided micronodule is unchanged.  1/11/2023-MRI Abdomen-No obvious mucinous liver metastases, however difficult to exclude given lack of intravenous contrast and motion artifact as well as multiplicity of background hepatic cysts. Grossly stable right mesenteric mass 1/18/2023-CT chest-Stable two very small nodules in the left upper and left lower lobes when compared to previous exam. -patient wished for continued treatment break. Colonoscopy 3/2023 unremarkable.  6/27/2023-CT scan chest/abdomen/pelvis-mild increase in size of large right-sided mesenteric mass, which correlated with prior examinations, likely represents a mucinous metastasis.  Otherwise stable examination.  Numerous cystic liver lesions are not significantly changed and are not well characterized on this unenhanced CT scan. Reviewed CT scan results with patient in detail.  Suggestive of mild increase in large mesenteric mass.  Cystic liver lesions overall stable.  Discussed management options.  Patient currently currently remains asymptomatic with regard to the mesenteric mass. 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 Recommend consider encorafenib/cetuximab regimen-potential side effects reviewed including but not limited to rash, hypomagnesemia, diarrhea, nausea/vomiting, stomatitis, LFT elevation, fatigue, neuropathy, fever, infusion reaction, hyperglycemia/hyponatremia, cytopenias. Patient refused regimen suggested, only wishing to resume immunotherapy if possible, in hopes it would at least slow POD, considering her QOL in decision making. Saw nephrologist who suggested a kidney biopsy, though patient is declining at this time. If no kidney biopsy to be done, plan to hold further immunotherapy. -- Patient wishes to attempt family function this weekend, further consider options discussed, and have follow-up CT scans prior to next visit–pending these, she will reconsider treatment options. ?May consider regorafenib pending course as well.  #2) history of anemia/leukopenia/thrombocytopenia. With progressive thrombocytopenia, patient had bone marrow biopsy 11/2021-cellular marrow with erythroid predominant maturing trilineage hematopoiesis.  Normal cytogenetics.  Genomic analysis with no mutations identified. ?  May have immune component to cytopenia(s).  Kidney disease may contribute to the anemia as well. Had explained that some BM disorders may evolve over time, such as MDS. No overt bleeding noted clinically at present.  Supportive hematologic care. --With refractory anemia, to consider follow-up BMB-patient is to schedule procedure following her granddaughter's wedding. -- Type and cross and schedule transfusion 2 units packed red blood cells.  #3) mediport maintenance while it remains in-patient agreeable to this.  Patient was given the opportunity to ask questions. Her questions have been answered to her apparent satisfaction at this time.    -->POF/labs. RTC  6 weeks.   **Note–separate phone discussion with patient's son and granddaughter–reviewed patient's diagnosis/prognosis and palliative treatment options with respective pros/cons.  They expressed their understanding of patient's illness and support patient's wishes for quality of life.  They understand that patient's cancer may not be as treatable if/when she agrees to treatment again in the future.  Their questions answered, and they are appreciative of care.

## 2023-08-14 NOTE — HISTORY OF PRESENT ILLNESS
[Disease: _____________________] : Disease: [unfilled] [T: ___] : T[unfilled] [N: ___] : N[unfilled] [M: ___] : M[unfilled] [AJCC Stage: ____] : AJCC Stage: [unfilled] [de-identified] : Patient with history of colon cancer (adenocarcinoma)-5-2011. Stage IIIC-T4 N2a. Status post right colectomy followed by FOLFOX chemotherapy.  With persistently elevated CEA on f/u testing,  3/2013 mesentery mass excision pathology showed a lymph node negative for metastatic carcinoma. Benign fibroadipose tissue. Persistently increasing elevated CEA.  CT scan abdomen/pelvis 6/2016, showed enlarging mesenteric adenopathy. Endoscopic ultrasound guided FNA of the bryan-pancreatic, mesenteric mass was positive for malignant cells-adenocarcinoma associated with abundant mucin-compatible with known history of adenocarcinoma, colorectal type, mucinous variant..  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  8/2016-Patient begun on Avastin/irinotecan (only 1 dose Avastin given). 11/2016-CT chest/abdomen/pelvis showed stable posttreatment findings and stable mesenteric adenopathy.  Patient obtained 2 surgical opinions and disease was felt to be inoperable by both surgeons.  Patient begun on Xeloda/Avastin. 5/2017-CT scan chest/abdomen/pelvis showed stable tiny nodules in the lungs. Sizable mass merging with the head of the pancreas had Increased in size from 11/2016 with some increasing infiltration of the surrounding fat.  Possibility of increasing colonic wall thickness on the colon site of the ileocolic anastomosis. Patient begun on Pembrolizumab (tumor MSI-high). 8/2017- CT scan chest/abdomen/pelvis showed marginally worsening mesenteric adenopathy, with short interval followup CT scan abdomen/pelvis 9/2017, stable compared to 8/2017. 1/2018-CT scan chest/abdomen/pelvis-stable adenopathy. 6/2018-CT scan chest/abdomen/pelvis-stable adenopathy. 11/2018-CT chest/abdomen/pelvis-unchanged mass at the root of the mesentery. 4/2019-CT scan chest/abdomen/pelvis-stable mesenteric soft tissue mass. 8/2019-CT scan chest/abdomen/pelvis-unchanged mesenteric mass. 10/2019-CT scan A/P-stable mesenteric mass. Held Pembrolizumab. 2/2020-CT scan chest/abdomen/pelvis-stable exam with mesenteric mass without significant change since 10/2019. Resumed Pembrolizumab, which was then held 6/2020 due to progressive renal insufficiency. 7/2020-CT scan chest/abdomen/pelvis-no significant change.  Stable mesenteric mass unchanged since 10/2019.  Segment of colonic wall thickening at the level of the anastomosis unchanged.  Enlarged multinodular thyroid with bilateral substernal extension unchanged. 10/2020-CT scan C/A/P-stable upper abdominal mesenteric mass. No new suspicious pathology.  1/2021-CT scan chest/abdomen/pelvis-mesenteric mass inseparable from the pancreatic head encasing the proximal mesenteric vessels minimally enlarged since 10/2020.  Mild mural thickening and ileal colic anastomosis. New course Pembrolizumab begun. 5/2021-CT scan chest/abdomen/pelvis-stable mesenteric mass contiguous with pancreatic head.  Decrease in thickening at the ileocolic anastomosis.  No new abnormalities. 10/2021- CT C/A/P-stable exam. 3/28/2022-CT C/A/P-nonspecific small volume pelvic ascites, stable  since 10/19/2021. An 8.3 x 5.6 cm lobulated mesenteric mass is indeterminate, but unchanged. Stable appearance of the chest since 10/19/2021.  8/19/2022-CT C/A/P-mesenteric mass without significant change.  12/13/2022-PET/CT scan-Morphologically stable RIGHT mesenteric mass demonstrates mild uptake, with portions exhibiting photopenia. Mucinous lesions are often minimally or non-FDG-avid; these findings are consistent with that history. 2.  Uptake overlying the RIGHT hemicolectomy anastomosis, where residual/recurrent disease is not excluded. 3.  Multiple hepatic hypodensities, some of which may have enlarged. Some of these demonstrate mildly decreased uptake compared to the remaining liver. If this will change patient management, MRI of the liver may be helpful to exclude mucinous metastases. 4.  Heterogeneous heterogeneous thyroid with mild uptake and substernal extension. 5.  Small RIGHT lung nodule versus motion artifact. Additional previously seen RIGHT-sided micronodule is unchanged.  1/11/2023-MRI Abdomen-No obvious mucinous liver metastases, however difficult to exclude given lack of intravenous contrast and motion artifact as well as multiplicity of background hepatic cysts. Grossly stable right mesenteric mass 1/18/2023-CT chest-Stable two very small nodules in the left upper and left lower lobes when compared to previous exam.  6/27/2023-CT scan chest/abdomen/pelvis-mild increase in size of large right-sided mesenteric mass, which correlated with prior examinations, likely represents a mucinous metastasis.  Otherwise stable examination.  Numerous cystic liver lesions are not significantly changed and are not well characterized on this unenhanced CT scan.   [de-identified] : adenocarcinoma [de-identified] : CEA 12/2013-33.6 [de-identified] : History of right breast cancer-2007. Status post right breast conservation surgery--> RT--> [facility].         \par  \par  11/2021 (h/o pancytopenia)-Bone marrow biopsy and bone marrow aspirate-cellular marrow (40-50%) with erythroid predominant maturing\par   trilineage hematopoiesis with no increase in blast population. Normal female cytogenetics. Normal MDS FISH panel.\par  [employer] Sight Myeloid panel-no Mutations Identified\par   [de-identified] : Granddaughter getting  this weekend. Feels very well. Remains independent, continues to drive. Active with her local senior center. No CP, cough, SOB, fevers. No c/o N/V/change in bowel habits. No current c/o abdominal/pelvic pain. No bleeding.  Son is [employer] . He is a source of support for patient, as is daughter.

## 2023-08-14 NOTE — PHYSICAL EXAM
[Fully active, able to carry on all pre-disease performance without restriction] : Status 0 - Fully active, able to carry on all pre-disease performance without restriction [Normal] : affect appropriate [de-identified] : skin thickening inner right breast [de-identified] : alert and oriented x 3

## 2023-08-15 ENCOUNTER — APPOINTMENT (OUTPATIENT)
Dept: INFUSION THERAPY | Facility: HOSPITAL | Age: 88
End: 2023-08-15

## 2023-08-16 DIAGNOSIS — Z51.89 ENCOUNTER FOR OTHER SPECIFIED AFTERCARE: ICD-10-CM

## 2023-08-16 DIAGNOSIS — R11.2 NAUSEA WITH VOMITING, UNSPECIFIED: ICD-10-CM

## 2023-08-23 ENCOUNTER — LABORATORY RESULT (OUTPATIENT)
Age: 88
End: 2023-08-23

## 2023-08-23 ENCOUNTER — APPOINTMENT (OUTPATIENT)
Dept: HEMATOLOGY ONCOLOGY | Facility: CLINIC | Age: 88
End: 2023-08-23
Payer: MEDICARE

## 2023-08-23 ENCOUNTER — RESULT REVIEW (OUTPATIENT)
Age: 88
End: 2023-08-23

## 2023-08-23 VITALS
SYSTOLIC BLOOD PRESSURE: 145 MMHG | HEART RATE: 68 BPM | WEIGHT: 147.49 LBS | OXYGEN SATURATION: 99 % | TEMPERATURE: 97.2 F | BODY MASS INDEX: 25.32 KG/M2 | RESPIRATION RATE: 16 BRPM | DIASTOLIC BLOOD PRESSURE: 67 MMHG

## 2023-08-23 LAB
BASOPHILS # BLD AUTO: 0.01 K/UL — SIGNIFICANT CHANGE UP (ref 0–0.2)
BASOPHILS NFR BLD AUTO: 0.3 % — SIGNIFICANT CHANGE UP (ref 0–2)
EOSINOPHIL # BLD AUTO: 0.07 K/UL — SIGNIFICANT CHANGE UP (ref 0–0.5)
EOSINOPHIL NFR BLD AUTO: 2.2 % — SIGNIFICANT CHANGE UP (ref 0–6)
HCT VFR BLD CALC: 29.2 % — LOW (ref 34.5–45)
HGB BLD-MCNC: 9.5 G/DL — LOW (ref 11.5–15.5)
IMM GRANULOCYTES NFR BLD AUTO: 1.3 % — HIGH (ref 0–0.9)
LYMPHOCYTES # BLD AUTO: 0.61 K/UL — LOW (ref 1–3.3)
LYMPHOCYTES # BLD AUTO: 19.1 % — SIGNIFICANT CHANGE UP (ref 13–44)
MCHC RBC-ENTMCNC: 27.8 PG — SIGNIFICANT CHANGE UP (ref 27–34)
MCHC RBC-ENTMCNC: 32.5 G/DL — SIGNIFICANT CHANGE UP (ref 32–36)
MCV RBC AUTO: 85.4 FL — SIGNIFICANT CHANGE UP (ref 80–100)
MONOCYTES # BLD AUTO: 0.31 K/UL — SIGNIFICANT CHANGE UP (ref 0–0.9)
MONOCYTES NFR BLD AUTO: 9.7 % — SIGNIFICANT CHANGE UP (ref 2–14)
NEUTROPHILS # BLD AUTO: 2.16 K/UL — SIGNIFICANT CHANGE UP (ref 1.8–7.4)
NEUTROPHILS NFR BLD AUTO: 67.4 % — SIGNIFICANT CHANGE UP (ref 43–77)
NRBC # BLD: 0 /100 WBCS — SIGNIFICANT CHANGE UP (ref 0–0)
PLATELET # BLD AUTO: 91 K/UL — LOW (ref 150–400)
RBC # BLD: 3.42 M/UL — LOW (ref 3.8–5.2)
RBC # FLD: 18.6 % — HIGH (ref 10.3–14.5)
WBC # BLD: 3.2 K/UL — LOW (ref 3.8–10.5)
WBC # FLD AUTO: 3.2 K/UL — LOW (ref 3.8–10.5)

## 2023-08-23 PROCEDURE — 38222 DX BONE MARROW BX & ASPIR: CPT

## 2023-08-23 NOTE — PROCEDURE
[Bone Marrow Biopsy] : bone marrow biopsy [Bone Marrow Aspiration] : bone marrow aspiration  [Patient] : the patient [Verbal Consent Obtained] : verbal consent was obtained prior to the procedure [Patient identification verified] : patient identification verified [Procedure verified and consent obtained] : procedure verified and consent obtained [Laterality verified and correct site marked] : laterality verified and correct site marked [Right] : site: right [Correct positioning] : correct positioning [Prone] : prone [Superior iliac spine was identified] : the superior iliac spine was identified. [The right posterior iliac crest was prepped with betadine and draped, using sterile technique.] : The right posterior iliac crest was prepped with betadine and draped, using sterile technique. [Lidocaine was injected and into the periosteum overlying the site.] : Lidocaine was injected and into the periosteum overlying the site. [Aspirate] : aspirate [Cytogenetics] : cytogenetics [FISH] : FISH [Biopsy] : biopsy [Flow Cytometry] : flow cytometry [] : The patient was instructed to remove the bandage the following AM. The patient may bathe. Acetaminophen may be taken for discomfort, as per package directions.If there are any other problems, the patient was instructed to call the office. The patient verbalized understanding, and is aware of the office contact numbers. [FreeTextEntry1] : h/o anemia/ thrombocytopenia [FreeTextEntry2] : 8 cc of lidocaine was used for the procedure.   WBC: 3.2 Hgb: 9.5 Hct: 29.2 Plts: 91  Bone marrow aspiration and biopsy were done. MDS and onkosight myeloid panel was requested.

## 2023-08-23 NOTE — REASON FOR VISIT
[Bone Marrow Biopsy] : bone marrow biopsy [Bone Marrow Aspiration] : bone marrow aspiration [FreeTextEntry2] : h/o anemia/ thrombocytopenia

## 2023-09-11 ENCOUNTER — APPOINTMENT (OUTPATIENT)
Dept: CT IMAGING | Facility: HOSPITAL | Age: 88
End: 2023-09-11
Payer: MEDICARE

## 2023-09-11 ENCOUNTER — OUTPATIENT (OUTPATIENT)
Dept: OUTPATIENT SERVICES | Facility: HOSPITAL | Age: 88
LOS: 1 days | End: 2023-09-11
Payer: MEDICARE

## 2023-09-11 DIAGNOSIS — Z98.890 OTHER SPECIFIED POSTPROCEDURAL STATES: Chronic | ICD-10-CM

## 2023-09-11 DIAGNOSIS — Z90.49 ACQUIRED ABSENCE OF OTHER SPECIFIED PARTS OF DIGESTIVE TRACT: Chronic | ICD-10-CM

## 2023-09-11 DIAGNOSIS — C18.9 MALIGNANT NEOPLASM OF COLON, UNSPECIFIED: ICD-10-CM

## 2023-09-11 DIAGNOSIS — D35.1 BENIGN NEOPLASM OF PARATHYROID GLAND: Chronic | ICD-10-CM

## 2023-09-11 PROCEDURE — 74176 CT ABD & PELVIS W/O CONTRAST: CPT

## 2023-09-11 PROCEDURE — 74176 CT ABD & PELVIS W/O CONTRAST: CPT | Mod: 26,MH

## 2023-09-26 ENCOUNTER — APPOINTMENT (OUTPATIENT)
Dept: INFUSION THERAPY | Facility: HOSPITAL | Age: 88
End: 2023-09-26

## 2023-09-26 ENCOUNTER — RESULT REVIEW (OUTPATIENT)
Age: 88
End: 2023-09-26

## 2023-09-26 ENCOUNTER — APPOINTMENT (OUTPATIENT)
Dept: HEMATOLOGY ONCOLOGY | Facility: CLINIC | Age: 88
End: 2023-09-26
Payer: MEDICARE

## 2023-09-26 VITALS — OXYGEN SATURATION: 99 %

## 2023-09-26 VITALS
BODY MASS INDEX: 24.41 KG/M2 | SYSTOLIC BLOOD PRESSURE: 151 MMHG | WEIGHT: 142.2 LBS | TEMPERATURE: 97.2 F | DIASTOLIC BLOOD PRESSURE: 69 MMHG | RESPIRATION RATE: 16 BRPM | HEART RATE: 83 BPM

## 2023-09-26 LAB
ALBUMIN SERPL ELPH-MCNC: 3.7 G/DL — SIGNIFICANT CHANGE UP (ref 3.3–5)
ALP SERPL-CCNC: 82 U/L — SIGNIFICANT CHANGE UP (ref 40–120)
ALT FLD-CCNC: 10 U/L — SIGNIFICANT CHANGE UP (ref 10–45)
ANION GAP SERPL CALC-SCNC: 14 MMOL/L — SIGNIFICANT CHANGE UP (ref 5–17)
AST SERPL-CCNC: 16 U/L — SIGNIFICANT CHANGE UP (ref 10–40)
BASOPHILS # BLD AUTO: 0.01 K/UL — SIGNIFICANT CHANGE UP (ref 0–0.2)
BASOPHILS NFR BLD AUTO: 0.3 % — SIGNIFICANT CHANGE UP (ref 0–2)
BILIRUB SERPL-MCNC: 0.3 MG/DL — SIGNIFICANT CHANGE UP (ref 0.2–1.2)
BUN SERPL-MCNC: 45 MG/DL — HIGH (ref 7–23)
CALCIUM SERPL-MCNC: 9.1 MG/DL — SIGNIFICANT CHANGE UP (ref 8.4–10.5)
CEA SERPL-MCNC: 349 NG/ML — HIGH (ref 0–3.8)
CHLORIDE SERPL-SCNC: 114 MMOL/L — HIGH (ref 96–108)
CO2 SERPL-SCNC: 12 MMOL/L — LOW (ref 22–31)
CREAT SERPL-MCNC: 3.29 MG/DL — HIGH (ref 0.5–1.3)
EGFR: 13 ML/MIN/1.73M2 — LOW
EOSINOPHIL # BLD AUTO: 0.04 K/UL — SIGNIFICANT CHANGE UP (ref 0–0.5)
EOSINOPHIL NFR BLD AUTO: 1.1 % — SIGNIFICANT CHANGE UP (ref 0–6)
GLUCOSE SERPL-MCNC: 143 MG/DL — HIGH (ref 70–99)
HCT VFR BLD CALC: 24.9 % — LOW (ref 34.5–45)
HGB BLD-MCNC: 8 G/DL — LOW (ref 11.5–15.5)
IMM GRANULOCYTES NFR BLD AUTO: 0.3 % — SIGNIFICANT CHANGE UP (ref 0–0.9)
LYMPHOCYTES # BLD AUTO: 0.57 K/UL — LOW (ref 1–3.3)
LYMPHOCYTES # BLD AUTO: 16.2 % — SIGNIFICANT CHANGE UP (ref 13–44)
MCHC RBC-ENTMCNC: 28.3 PG — SIGNIFICANT CHANGE UP (ref 27–34)
MCHC RBC-ENTMCNC: 32.1 G/DL — SIGNIFICANT CHANGE UP (ref 32–36)
MCV RBC AUTO: 88 FL — SIGNIFICANT CHANGE UP (ref 80–100)
MONOCYTES # BLD AUTO: 0.2 K/UL — SIGNIFICANT CHANGE UP (ref 0–0.9)
MONOCYTES NFR BLD AUTO: 5.7 % — SIGNIFICANT CHANGE UP (ref 2–14)
NEUTROPHILS # BLD AUTO: 2.68 K/UL — SIGNIFICANT CHANGE UP (ref 1.8–7.4)
NEUTROPHILS NFR BLD AUTO: 76.4 % — SIGNIFICANT CHANGE UP (ref 43–77)
NRBC # BLD: 0 /100 WBCS — SIGNIFICANT CHANGE UP (ref 0–0)
PLATELET # BLD AUTO: 117 K/UL — LOW (ref 150–400)
POTASSIUM SERPL-MCNC: 4.6 MMOL/L — SIGNIFICANT CHANGE UP (ref 3.5–5.3)
POTASSIUM SERPL-SCNC: 4.6 MMOL/L — SIGNIFICANT CHANGE UP (ref 3.5–5.3)
PROT SERPL-MCNC: 6.2 G/DL — SIGNIFICANT CHANGE UP (ref 6–8.3)
RBC # BLD: 2.83 M/UL — LOW (ref 3.8–5.2)
RBC # FLD: 19.9 % — HIGH (ref 10.3–14.5)
SODIUM SERPL-SCNC: 140 MMOL/L — SIGNIFICANT CHANGE UP (ref 135–145)
WBC # BLD: 3.51 K/UL — LOW (ref 3.8–10.5)
WBC # FLD AUTO: 3.51 K/UL — LOW (ref 3.8–10.5)

## 2023-09-26 PROCEDURE — 99215 OFFICE O/P EST HI 40 MIN: CPT

## 2023-10-13 ENCOUNTER — NON-APPOINTMENT (OUTPATIENT)
Age: 88
End: 2023-10-13

## 2023-10-13 ENCOUNTER — EMERGENCY (EMERGENCY)
Facility: HOSPITAL | Age: 88
LOS: 1 days | Discharge: HOME | End: 2023-10-13
Attending: INTERNAL MEDICINE | Admitting: INTERNAL MEDICINE
Payer: MEDICARE

## 2023-10-13 VITALS
OXYGEN SATURATION: 100 % | HEART RATE: 52 BPM | SYSTOLIC BLOOD PRESSURE: 171 MMHG | RESPIRATION RATE: 18 BRPM | DIASTOLIC BLOOD PRESSURE: 59 MMHG

## 2023-10-13 VITALS
HEIGHT: 64 IN | RESPIRATION RATE: 19 BRPM | OXYGEN SATURATION: 100 % | SYSTOLIC BLOOD PRESSURE: 177 MMHG | DIASTOLIC BLOOD PRESSURE: 69 MMHG | TEMPERATURE: 97 F | WEIGHT: 145.06 LBS | HEART RATE: 78 BPM

## 2023-10-13 DIAGNOSIS — Z90.49 ACQUIRED ABSENCE OF OTHER SPECIFIED PARTS OF DIGESTIVE TRACT: Chronic | ICD-10-CM

## 2023-10-13 DIAGNOSIS — Z98.890 OTHER SPECIFIED POSTPROCEDURAL STATES: Chronic | ICD-10-CM

## 2023-10-13 DIAGNOSIS — D35.1 BENIGN NEOPLASM OF PARATHYROID GLAND: Chronic | ICD-10-CM

## 2023-10-13 LAB
ALBUMIN SERPL ELPH-MCNC: 2.9 G/DL — LOW (ref 3.3–5)
ALP SERPL-CCNC: 71 U/L — SIGNIFICANT CHANGE UP (ref 40–120)
ALT FLD-CCNC: 12 U/L — SIGNIFICANT CHANGE UP (ref 10–45)
ANION GAP SERPL CALC-SCNC: 18 MMOL/L — HIGH (ref 5–17)
AST SERPL-CCNC: 9 U/L — LOW (ref 10–40)
BASOPHILS # BLD AUTO: 0.01 K/UL — SIGNIFICANT CHANGE UP (ref 0–0.2)
BASOPHILS # BLD AUTO: 0.02 K/UL
BASOPHILS NFR BLD AUTO: 0.4 % — SIGNIFICANT CHANGE UP (ref 0–2)
BASOPHILS NFR BLD AUTO: 0.8 %
BILIRUB SERPL-MCNC: 0.4 MG/DL — SIGNIFICANT CHANGE UP (ref 0.2–1.2)
BLD GP AB SCN SERPL QL: SIGNIFICANT CHANGE UP
BUN SERPL-MCNC: 64 MG/DL — HIGH (ref 7–23)
CALCIUM SERPL-MCNC: 8.1 MG/DL — LOW (ref 8.4–10.5)
CHLORIDE SERPL-SCNC: 114 MMOL/L — HIGH (ref 96–108)
CO2 SERPL-SCNC: 11 MMOL/L — LOW (ref 22–31)
CREAT SERPL-MCNC: 3.32 MG/DL — HIGH (ref 0.5–1.3)
EGFR: 13 ML/MIN/1.73M2 — LOW
EOSINOPHIL # BLD AUTO: 0.02 K/UL — SIGNIFICANT CHANGE UP (ref 0–0.5)
EOSINOPHIL # BLD AUTO: 0.03 K/UL
EOSINOPHIL NFR BLD AUTO: 0.8 % — SIGNIFICANT CHANGE UP (ref 0–6)
EOSINOPHIL NFR BLD AUTO: 1.2 %
GLUCOSE SERPL-MCNC: 107 MG/DL — HIGH (ref 70–99)
HCT VFR BLD CALC: 23.4 %
HCT VFR BLD CALC: 23.4 % — LOW (ref 34.5–45)
HGB BLD-MCNC: 7.4 G/DL
HGB BLD-MCNC: 7.5 G/DL — LOW (ref 11.5–15.5)
IMM GRANULOCYTES NFR BLD AUTO: 0 %
IMM GRANULOCYTES NFR BLD AUTO: 0.4 % — SIGNIFICANT CHANGE UP (ref 0–0.9)
INR BLD: 1.07 RATIO — SIGNIFICANT CHANGE UP (ref 0.85–1.18)
LYMPHOCYTES # BLD AUTO: 0.41 K/UL — LOW (ref 1–3.3)
LYMPHOCYTES # BLD AUTO: 0.58 K/UL
LYMPHOCYTES # BLD AUTO: 15.6 % — SIGNIFICANT CHANGE UP (ref 13–44)
LYMPHOCYTES NFR BLD AUTO: 22.3 %
MAN DIFF?: NORMAL
MCHC RBC-ENTMCNC: 29 PG
MCHC RBC-ENTMCNC: 29.2 PG — SIGNIFICANT CHANGE UP (ref 27–34)
MCHC RBC-ENTMCNC: 31.6 GM/DL
MCHC RBC-ENTMCNC: 32.1 GM/DL — SIGNIFICANT CHANGE UP (ref 32–36)
MCV RBC AUTO: 91.1 FL — SIGNIFICANT CHANGE UP (ref 80–100)
MCV RBC AUTO: 91.8 FL
MONOCYTES # BLD AUTO: 0.19 K/UL
MONOCYTES # BLD AUTO: 0.2 K/UL — SIGNIFICANT CHANGE UP (ref 0–0.9)
MONOCYTES NFR BLD AUTO: 7.3 %
MONOCYTES NFR BLD AUTO: 7.6 % — SIGNIFICANT CHANGE UP (ref 2–14)
NEUTROPHILS # BLD AUTO: 1.78 K/UL
NEUTROPHILS # BLD AUTO: 1.98 K/UL — SIGNIFICANT CHANGE UP (ref 1.8–7.4)
NEUTROPHILS NFR BLD AUTO: 68.4 %
NEUTROPHILS NFR BLD AUTO: 75.2 % — SIGNIFICANT CHANGE UP (ref 43–77)
NRBC # BLD: 0 /100 WBCS — SIGNIFICANT CHANGE UP (ref 0–0)
PLATELET # BLD AUTO: 80 K/UL — LOW (ref 150–400)
PLATELET # BLD AUTO: 88 K/UL
POTASSIUM SERPL-MCNC: 3.5 MMOL/L — SIGNIFICANT CHANGE UP (ref 3.5–5.3)
POTASSIUM SERPL-SCNC: 3.5 MMOL/L — SIGNIFICANT CHANGE UP (ref 3.5–5.3)
PROT SERPL-MCNC: 6.4 G/DL — SIGNIFICANT CHANGE UP (ref 6–8.3)
PROTHROM AB SERPL-ACNC: 12.5 SEC — SIGNIFICANT CHANGE UP (ref 9.5–13)
RBC # BLD: 2.55 M/UL
RBC # BLD: 2.57 M/UL — LOW (ref 3.8–5.2)
RBC # FLD: 20.3 % — HIGH (ref 10.3–14.5)
RBC # FLD: 20.8 %
SODIUM SERPL-SCNC: 143 MMOL/L — SIGNIFICANT CHANGE UP (ref 135–145)
WBC # BLD: 2.63 K/UL — LOW (ref 3.8–10.5)
WBC # FLD AUTO: 2.6 K/UL
WBC # FLD AUTO: 2.63 K/UL — LOW (ref 3.8–10.5)

## 2023-10-13 PROCEDURE — 36415 COLL VENOUS BLD VENIPUNCTURE: CPT

## 2023-10-13 PROCEDURE — 85610 PROTHROMBIN TIME: CPT

## 2023-10-13 PROCEDURE — 86923 COMPATIBILITY TEST ELECTRIC: CPT

## 2023-10-13 PROCEDURE — 86900 BLOOD TYPING SEROLOGIC ABO: CPT

## 2023-10-13 PROCEDURE — 86850 RBC ANTIBODY SCREEN: CPT

## 2023-10-13 PROCEDURE — P9016: CPT

## 2023-10-13 PROCEDURE — 80053 COMPREHEN METABOLIC PANEL: CPT

## 2023-10-13 PROCEDURE — 99285 EMERGENCY DEPT VISIT HI MDM: CPT | Mod: 25

## 2023-10-13 PROCEDURE — 99284 EMERGENCY DEPT VISIT MOD MDM: CPT

## 2023-10-13 PROCEDURE — 86901 BLOOD TYPING SEROLOGIC RH(D): CPT

## 2023-10-13 PROCEDURE — 36430 TRANSFUSION BLD/BLD COMPNT: CPT

## 2023-10-13 PROCEDURE — 85025 COMPLETE CBC W/AUTO DIFF WBC: CPT

## 2023-10-13 RX ORDER — SODIUM CHLORIDE 9 MG/ML
1000 INJECTION INTRAMUSCULAR; INTRAVENOUS; SUBCUTANEOUS ONCE
Refills: 0 | Status: COMPLETED | OUTPATIENT
Start: 2023-10-13 | End: 2023-10-13

## 2023-10-13 RX ADMIN — SODIUM CHLORIDE 1000 MILLILITER(S): 9 INJECTION INTRAMUSCULAR; INTRAVENOUS; SUBCUTANEOUS at 10:32

## 2023-10-13 NOTE — ED ADULT TRIAGE NOTE - CHIEF COMPLAINT QUOTE
Patient states "Dr. Edmonds told me to come in to get a blood transfusion, my levels were 7.4". Patient denies chest pain, SOB, headache, dizziness and blurry vision. Patient denies anticoagulation use and abnormal bleeding.

## 2023-10-13 NOTE — ED PROVIDER NOTE - OBJECTIVE STATEMENT
89-year-old female history of colon cancer was referred by oncologist Dr. Edmonds with a hemoglobin of 7 patient was referred for blood transfusion patient is denying any chest pain or fatigue or shortness of breath

## 2023-10-13 NOTE — ED ADULT NURSE NOTE - OBJECTIVE STATEMENT
Pt presents to ED as referred by MD Edmonds for blood transfusion, secondary to hbg of 7.  Pt denies any vomiting or bloody stool.  Denies any chest pain, palpitations, SOB, dizziness, weakness.  Appears well looking, no acute distress noted.

## 2023-10-13 NOTE — ED PROVIDER NOTE - CLINICAL SUMMARY MEDICAL DECISION MAKING FREE TEXT BOX
89-year-old female history of colon cancer was referred by oncologist Dr. Edmonds with a hemoglobin of 7 patient was referred for blood transfusion patient is denying any chest pain or fatigue or shortness of breath  CBC CMP type and crossmatch ordered if the hemoglobin is low will transfuse 1 unit of PRBC 89-year-old female history of colon cancer was referred by oncologist Dr. Edmonds with a hemoglobin of 7 patient was referred for blood transfusion patient is denying any chest pain or fatigue or shortness of breath  CBC CMP type and crossmatch ordered if the hemoglobin is low will transfuse 1 unit of PRBC  Hemoglobin 7.5% 1 unit of PRBC patient asymptomatic discharge

## 2023-10-13 NOTE — ED PROVIDER NOTE - NSFOLLOWUPINSTRUCTIONS_ED_ALL_ED_FT
Follow up with your PMD within 1-2 days.  Rest, increase your fluids, advance your activity as tolerated.   Take all of your other medications as previously prescribed.   Worsening, continued or ANY new concerning symptoms return to the emergency department.  Follow-up with Dr. Edmonds

## 2023-10-13 NOTE — ED PROVIDER NOTE - PHYSICAL EXAMINATION
General:     NAD, well-nourished, well-appearing  Head:     NC/AT, EOMI, oral mucosa moist, pale   Neck:     trachea midline  Lungs:     CTA b/l, no w/r/r  CVS:     S1S2, RRR, no m/g/r  Abd:     +BS, s/nt/nd, no organomegaly  Ext:    2+ radial and pedal pulses, no c/c/e  Neuro: AAOx3, no sensory/motor deficits

## 2023-10-13 NOTE — ED PROVIDER NOTE - PATIENT PORTAL LINK FT
You can access the FollowMyHealth Patient Portal offered by [facility] by registering at the following website: [email or web address]. By joining [facility]’s FollowMyHealth portal, you will also be able to view your health information using other applications (apps) compatible with our system.

## 2023-10-16 NOTE — CHART NOTE - NSCHARTNOTEFT_GEN_A_CORE
The patient is a 89y Female complaining of abnormal lab result on 10/13 as per chart.  Per HIE, the patient has the recommended follow pcp appointment with Dr. Edmonds on 10/25 @ 11:20 am.

## 2023-10-20 ENCOUNTER — OUTPATIENT (OUTPATIENT)
Dept: OUTPATIENT SERVICES | Facility: HOSPITAL | Age: 88
LOS: 1 days | Discharge: HOME | End: 2023-10-20

## 2023-10-20 DIAGNOSIS — Z90.49 ACQUIRED ABSENCE OF OTHER SPECIFIED PARTS OF DIGESTIVE TRACT: Chronic | ICD-10-CM

## 2023-10-20 DIAGNOSIS — Z98.890 OTHER SPECIFIED POSTPROCEDURAL STATES: Chronic | ICD-10-CM

## 2023-10-20 DIAGNOSIS — D35.1 BENIGN NEOPLASM OF PARATHYROID GLAND: Chronic | ICD-10-CM

## 2023-10-20 DIAGNOSIS — C18.9 MALIGNANT NEOPLASM OF COLON, UNSPECIFIED: ICD-10-CM

## 2023-10-23 ENCOUNTER — APPOINTMENT (OUTPATIENT)
Dept: NEPHROLOGY | Facility: CLINIC | Age: 88
End: 2023-10-23
Payer: MEDICARE

## 2023-10-23 VITALS
WEIGHT: 142 LBS | HEIGHT: 64 IN | DIASTOLIC BLOOD PRESSURE: 61 MMHG | HEART RATE: 76 BPM | TEMPERATURE: 97.4 F | OXYGEN SATURATION: 99 % | BODY MASS INDEX: 24.24 KG/M2 | SYSTOLIC BLOOD PRESSURE: 150 MMHG

## 2023-10-23 DIAGNOSIS — I10 ESSENTIAL (PRIMARY) HYPERTENSION: ICD-10-CM

## 2023-10-23 PROCEDURE — 99214 OFFICE O/P EST MOD 30 MIN: CPT

## 2023-10-25 ENCOUNTER — RESULT REVIEW (OUTPATIENT)
Age: 88
End: 2023-10-25

## 2023-10-25 ENCOUNTER — APPOINTMENT (OUTPATIENT)
Dept: HEMATOLOGY ONCOLOGY | Facility: CLINIC | Age: 88
End: 2023-10-25
Payer: MEDICARE

## 2023-10-25 ENCOUNTER — NON-APPOINTMENT (OUTPATIENT)
Age: 88
End: 2023-10-25

## 2023-10-25 ENCOUNTER — APPOINTMENT (OUTPATIENT)
Dept: HEMATOLOGY ONCOLOGY | Facility: CLINIC | Age: 88
End: 2023-10-25

## 2023-10-25 ENCOUNTER — APPOINTMENT (OUTPATIENT)
Dept: INFUSION THERAPY | Facility: HOSPITAL | Age: 88
End: 2023-10-25

## 2023-10-25 VITALS
BODY MASS INDEX: 24.55 KG/M2 | RESPIRATION RATE: 16 BRPM | SYSTOLIC BLOOD PRESSURE: 155 MMHG | TEMPERATURE: 97 F | HEART RATE: 76 BPM | OXYGEN SATURATION: 100 % | WEIGHT: 142.99 LBS | DIASTOLIC BLOOD PRESSURE: 65 MMHG

## 2023-10-25 DIAGNOSIS — D72.819 DECREASED WHITE BLOOD CELL COUNT, UNSPECIFIED: ICD-10-CM

## 2023-10-25 DIAGNOSIS — Z86.2 PERSONAL HISTORY OF DISEASES OF THE BLOOD AND BLOOD-FORMING ORGANS AND CERTAIN DISORDERS INVOLVING THE IMMUNE MECHANISM: ICD-10-CM

## 2023-10-25 LAB
ACANTHOCYTES BLD QL SMEAR: SLIGHT — SIGNIFICANT CHANGE UP
ACANTHOCYTES BLD QL SMEAR: SLIGHT — SIGNIFICANT CHANGE UP
ALBUMIN SERPL ELPH-MCNC: 3.9 G/DL
ANION GAP SERPL CALC-SCNC: 16 MMOL/L
BASOPHILS # BLD AUTO: 0.01 K/UL — SIGNIFICANT CHANGE UP (ref 0–0.2)
BASOPHILS # BLD AUTO: 0.01 K/UL — SIGNIFICANT CHANGE UP (ref 0–0.2)
BASOPHILS NFR BLD AUTO: 0.3 % — SIGNIFICANT CHANGE UP (ref 0–2)
BASOPHILS NFR BLD AUTO: 0.3 % — SIGNIFICANT CHANGE UP (ref 0–2)
BUN SERPL-MCNC: 46 MG/DL
BURR CELLS BLD QL SMEAR: PRESENT — SIGNIFICANT CHANGE UP
BURR CELLS BLD QL SMEAR: PRESENT — SIGNIFICANT CHANGE UP
CALCIUM SERPL-MCNC: 8.3 MG/DL
CHLORIDE SERPL-SCNC: 117 MMOL/L
CO2 SERPL-SCNC: 10 MMOL/L
CREAT SERPL-MCNC: 2.96 MG/DL
DACRYOCYTES BLD QL SMEAR: SLIGHT — SIGNIFICANT CHANGE UP
DACRYOCYTES BLD QL SMEAR: SLIGHT — SIGNIFICANT CHANGE UP
EGFR: 15 ML/MIN/1.73M2
ELLIPTOCYTES BLD QL SMEAR: SLIGHT — SIGNIFICANT CHANGE UP
ELLIPTOCYTES BLD QL SMEAR: SLIGHT — SIGNIFICANT CHANGE UP
EOSINOPHIL # BLD AUTO: 0.06 K/UL — SIGNIFICANT CHANGE UP (ref 0–0.5)
EOSINOPHIL # BLD AUTO: 0.06 K/UL — SIGNIFICANT CHANGE UP (ref 0–0.5)
EOSINOPHIL NFR BLD AUTO: 2.1 % — SIGNIFICANT CHANGE UP (ref 0–6)
EOSINOPHIL NFR BLD AUTO: 2.1 % — SIGNIFICANT CHANGE UP (ref 0–6)
GLUCOSE SERPL-MCNC: 101 MG/DL
HCT VFR BLD CALC: 23.9 % — LOW (ref 34.5–45)
HCT VFR BLD CALC: 23.9 % — LOW (ref 34.5–45)
HGB BLD-MCNC: 8.1 G/DL — LOW (ref 11.5–15.5)
HGB BLD-MCNC: 8.1 G/DL — LOW (ref 11.5–15.5)
IMM GRANULOCYTES NFR BLD AUTO: 0.3 % — SIGNIFICANT CHANGE UP (ref 0–0.9)
IMM GRANULOCYTES NFR BLD AUTO: 0.3 % — SIGNIFICANT CHANGE UP (ref 0–0.9)
LYMPHOCYTES # BLD AUTO: 0.51 K/UL — LOW (ref 1–3.3)
LYMPHOCYTES # BLD AUTO: 0.51 K/UL — LOW (ref 1–3.3)
LYMPHOCYTES # BLD AUTO: 17.5 % — SIGNIFICANT CHANGE UP (ref 13–44)
LYMPHOCYTES # BLD AUTO: 17.5 % — SIGNIFICANT CHANGE UP (ref 13–44)
MCHC RBC-ENTMCNC: 29.9 PG — SIGNIFICANT CHANGE UP (ref 27–34)
MCHC RBC-ENTMCNC: 29.9 PG — SIGNIFICANT CHANGE UP (ref 27–34)
MCHC RBC-ENTMCNC: 33.9 G/DL — SIGNIFICANT CHANGE UP (ref 32–36)
MCHC RBC-ENTMCNC: 33.9 G/DL — SIGNIFICANT CHANGE UP (ref 32–36)
MCV RBC AUTO: 88.2 FL — SIGNIFICANT CHANGE UP (ref 80–100)
MCV RBC AUTO: 88.2 FL — SIGNIFICANT CHANGE UP (ref 80–100)
MONOCYTES # BLD AUTO: 0.17 K/UL — SIGNIFICANT CHANGE UP (ref 0–0.9)
MONOCYTES # BLD AUTO: 0.17 K/UL — SIGNIFICANT CHANGE UP (ref 0–0.9)
MONOCYTES NFR BLD AUTO: 5.8 % — SIGNIFICANT CHANGE UP (ref 2–14)
MONOCYTES NFR BLD AUTO: 5.8 % — SIGNIFICANT CHANGE UP (ref 2–14)
NEUTROPHILS # BLD AUTO: 2.15 K/UL — SIGNIFICANT CHANGE UP (ref 1.8–7.4)
NEUTROPHILS # BLD AUTO: 2.15 K/UL — SIGNIFICANT CHANGE UP (ref 1.8–7.4)
NEUTROPHILS NFR BLD AUTO: 74 % — SIGNIFICANT CHANGE UP (ref 43–77)
NEUTROPHILS NFR BLD AUTO: 74 % — SIGNIFICANT CHANGE UP (ref 43–77)
NRBC # BLD: 0 /100 WBCS — SIGNIFICANT CHANGE UP (ref 0–0)
NRBC # BLD: 0 /100 WBCS — SIGNIFICANT CHANGE UP (ref 0–0)
PHOSPHATE SERPL-MCNC: 4.6 MG/DL
PLAT MORPH BLD: NORMAL — SIGNIFICANT CHANGE UP
PLAT MORPH BLD: NORMAL — SIGNIFICANT CHANGE UP
PLATELET # BLD AUTO: 81 K/UL — LOW (ref 150–400)
PLATELET # BLD AUTO: 81 K/UL — LOW (ref 150–400)
POIKILOCYTOSIS BLD QL AUTO: SLIGHT — SIGNIFICANT CHANGE UP
POIKILOCYTOSIS BLD QL AUTO: SLIGHT — SIGNIFICANT CHANGE UP
POTASSIUM SERPL-SCNC: 4 MMOL/L
RBC # BLD: 2.71 M/UL — LOW (ref 3.8–5.2)
RBC # BLD: 2.71 M/UL — LOW (ref 3.8–5.2)
RBC # FLD: 19.2 % — HIGH (ref 10.3–14.5)
RBC # FLD: 19.2 % — HIGH (ref 10.3–14.5)
RBC BLD AUTO: ABNORMAL
RBC BLD AUTO: ABNORMAL
SCHISTOCYTES BLD QL AUTO: SLIGHT — SIGNIFICANT CHANGE UP
SCHISTOCYTES BLD QL AUTO: SLIGHT — SIGNIFICANT CHANGE UP
SODIUM SERPL-SCNC: 144 MMOL/L
WBC # BLD: 2.91 K/UL — LOW (ref 3.8–10.5)
WBC # BLD: 2.91 K/UL — LOW (ref 3.8–10.5)
WBC # FLD AUTO: 2.91 K/UL — LOW (ref 3.8–10.5)
WBC # FLD AUTO: 2.91 K/UL — LOW (ref 3.8–10.5)

## 2023-10-25 PROCEDURE — 99214 OFFICE O/P EST MOD 30 MIN: CPT

## 2023-10-26 ENCOUNTER — NON-APPOINTMENT (OUTPATIENT)
Age: 88
End: 2023-10-26

## 2023-10-26 DIAGNOSIS — N18.9 CHRONIC KIDNEY DISEASE, UNSPECIFIED: ICD-10-CM

## 2023-10-31 LAB
ALBUMIN SERPL ELPH-MCNC: 3.8 G/DL
ANION GAP SERPL CALC-SCNC: 14 MMOL/L
BUN SERPL-MCNC: 41 MG/DL
CALCIUM SERPL-MCNC: 8.3 MG/DL
CHLORIDE SERPL-SCNC: 120 MMOL/L
CO2 SERPL-SCNC: 15 MMOL/L
CREAT SERPL-MCNC: 3.17 MG/DL
EGFR: 13 ML/MIN/1.73M2
GLUCOSE SERPL-MCNC: 112 MG/DL
PHOSPHATE SERPL-MCNC: 3.2 MG/DL
POTASSIUM SERPL-SCNC: 3.5 MMOL/L
SODIUM SERPL-SCNC: 149 MMOL/L

## 2023-10-31 RX ORDER — SODIUM BICARBONATE 650 MG/1
650 TABLET ORAL EVERY 8 HOURS
Qty: 810 | Refills: 1 | Status: ACTIVE | COMMUNITY
Start: 1900-01-01 | End: 1900-01-01

## 2023-11-06 ENCOUNTER — RESULT REVIEW (OUTPATIENT)
Age: 88
End: 2023-11-06

## 2023-11-06 ENCOUNTER — APPOINTMENT (OUTPATIENT)
Dept: INFUSION THERAPY | Facility: HOSPITAL | Age: 88
End: 2023-11-06

## 2023-11-06 ENCOUNTER — APPOINTMENT (OUTPATIENT)
Dept: HEMATOLOGY ONCOLOGY | Facility: CLINIC | Age: 88
End: 2023-11-06

## 2023-11-06 LAB
BASOPHILS # BLD AUTO: 0.01 K/UL — SIGNIFICANT CHANGE UP (ref 0–0.2)
BASOPHILS # BLD AUTO: 0.01 K/UL — SIGNIFICANT CHANGE UP (ref 0–0.2)
BASOPHILS NFR BLD AUTO: 0.4 % — SIGNIFICANT CHANGE UP (ref 0–2)
BASOPHILS NFR BLD AUTO: 0.4 % — SIGNIFICANT CHANGE UP (ref 0–2)
EOSINOPHIL # BLD AUTO: 0.06 K/UL — SIGNIFICANT CHANGE UP (ref 0–0.5)
EOSINOPHIL # BLD AUTO: 0.06 K/UL — SIGNIFICANT CHANGE UP (ref 0–0.5)
EOSINOPHIL NFR BLD AUTO: 2.3 % — SIGNIFICANT CHANGE UP (ref 0–6)
EOSINOPHIL NFR BLD AUTO: 2.3 % — SIGNIFICANT CHANGE UP (ref 0–6)
HCT VFR BLD CALC: 25 % — LOW (ref 34.5–45)
HCT VFR BLD CALC: 25 % — LOW (ref 34.5–45)
HGB BLD-MCNC: 8.2 G/DL — LOW (ref 11.5–15.5)
HGB BLD-MCNC: 8.2 G/DL — LOW (ref 11.5–15.5)
IMM GRANULOCYTES NFR BLD AUTO: 1.5 % — HIGH (ref 0–0.9)
IMM GRANULOCYTES NFR BLD AUTO: 1.5 % — HIGH (ref 0–0.9)
LYMPHOCYTES # BLD AUTO: 0.55 K/UL — LOW (ref 1–3.3)
LYMPHOCYTES # BLD AUTO: 0.55 K/UL — LOW (ref 1–3.3)
LYMPHOCYTES # BLD AUTO: 21.2 % — SIGNIFICANT CHANGE UP (ref 13–44)
LYMPHOCYTES # BLD AUTO: 21.2 % — SIGNIFICANT CHANGE UP (ref 13–44)
MCHC RBC-ENTMCNC: 30.5 PG — SIGNIFICANT CHANGE UP (ref 27–34)
MCHC RBC-ENTMCNC: 30.5 PG — SIGNIFICANT CHANGE UP (ref 27–34)
MCHC RBC-ENTMCNC: 32.8 G/DL — SIGNIFICANT CHANGE UP (ref 32–36)
MCHC RBC-ENTMCNC: 32.8 G/DL — SIGNIFICANT CHANGE UP (ref 32–36)
MCV RBC AUTO: 92.9 FL — SIGNIFICANT CHANGE UP (ref 80–100)
MCV RBC AUTO: 92.9 FL — SIGNIFICANT CHANGE UP (ref 80–100)
MONOCYTES # BLD AUTO: 0.19 K/UL — SIGNIFICANT CHANGE UP (ref 0–0.9)
MONOCYTES # BLD AUTO: 0.19 K/UL — SIGNIFICANT CHANGE UP (ref 0–0.9)
MONOCYTES NFR BLD AUTO: 7.3 % — SIGNIFICANT CHANGE UP (ref 2–14)
MONOCYTES NFR BLD AUTO: 7.3 % — SIGNIFICANT CHANGE UP (ref 2–14)
NEUTROPHILS # BLD AUTO: 1.75 K/UL — LOW (ref 1.8–7.4)
NEUTROPHILS # BLD AUTO: 1.75 K/UL — LOW (ref 1.8–7.4)
NEUTROPHILS NFR BLD AUTO: 67.3 % — SIGNIFICANT CHANGE UP (ref 43–77)
NEUTROPHILS NFR BLD AUTO: 67.3 % — SIGNIFICANT CHANGE UP (ref 43–77)
NRBC # BLD: 0 /100 WBCS — SIGNIFICANT CHANGE UP (ref 0–0)
NRBC # BLD: 0 /100 WBCS — SIGNIFICANT CHANGE UP (ref 0–0)
PLATELET # BLD AUTO: 78 K/UL — LOW (ref 150–400)
PLATELET # BLD AUTO: 78 K/UL — LOW (ref 150–400)
RBC # BLD: 2.69 M/UL — LOW (ref 3.8–5.2)
RBC # BLD: 2.69 M/UL — LOW (ref 3.8–5.2)
RBC # FLD: 20.1 % — HIGH (ref 10.3–14.5)
RBC # FLD: 20.1 % — HIGH (ref 10.3–14.5)
WBC # BLD: 2.6 K/UL — LOW (ref 3.8–10.5)
WBC # BLD: 2.6 K/UL — LOW (ref 3.8–10.5)
WBC # FLD AUTO: 2.6 K/UL — LOW (ref 3.8–10.5)
WBC # FLD AUTO: 2.6 K/UL — LOW (ref 3.8–10.5)

## 2023-11-13 ENCOUNTER — APPOINTMENT (OUTPATIENT)
Dept: INFUSION THERAPY | Facility: HOSPITAL | Age: 88
End: 2023-11-13

## 2023-11-13 ENCOUNTER — RESULT REVIEW (OUTPATIENT)
Age: 88
End: 2023-11-13

## 2023-11-13 ENCOUNTER — APPOINTMENT (OUTPATIENT)
Dept: HEMATOLOGY ONCOLOGY | Facility: CLINIC | Age: 88
End: 2023-11-13

## 2023-11-13 DIAGNOSIS — D64.9 ANEMIA, UNSPECIFIED: ICD-10-CM

## 2023-11-13 DIAGNOSIS — N18.5 CHRONIC KIDNEY DISEASE, STAGE 5: ICD-10-CM

## 2023-11-13 DIAGNOSIS — D63.1 ANEMIA IN CHRONIC KIDNEY DISEASE: ICD-10-CM

## 2023-11-13 LAB
BASOPHILS # BLD AUTO: 0.01 K/UL — SIGNIFICANT CHANGE UP (ref 0–0.2)
BASOPHILS # BLD AUTO: 0.01 K/UL — SIGNIFICANT CHANGE UP (ref 0–0.2)
BASOPHILS NFR BLD AUTO: 0.4 % — SIGNIFICANT CHANGE UP (ref 0–2)
BASOPHILS NFR BLD AUTO: 0.4 % — SIGNIFICANT CHANGE UP (ref 0–2)
EOSINOPHIL # BLD AUTO: 0.04 K/UL — SIGNIFICANT CHANGE UP (ref 0–0.5)
EOSINOPHIL # BLD AUTO: 0.04 K/UL — SIGNIFICANT CHANGE UP (ref 0–0.5)
EOSINOPHIL NFR BLD AUTO: 1.6 % — SIGNIFICANT CHANGE UP (ref 0–6)
EOSINOPHIL NFR BLD AUTO: 1.6 % — SIGNIFICANT CHANGE UP (ref 0–6)
HCT VFR BLD CALC: 28.2 % — LOW (ref 34.5–45)
HCT VFR BLD CALC: 28.2 % — LOW (ref 34.5–45)
HGB BLD-MCNC: 9.1 G/DL — LOW (ref 11.5–15.5)
HGB BLD-MCNC: 9.1 G/DL — LOW (ref 11.5–15.5)
IMM GRANULOCYTES NFR BLD AUTO: 0.4 % — SIGNIFICANT CHANGE UP (ref 0–0.9)
IMM GRANULOCYTES NFR BLD AUTO: 0.4 % — SIGNIFICANT CHANGE UP (ref 0–0.9)
LYMPHOCYTES # BLD AUTO: 0.44 K/UL — LOW (ref 1–3.3)
LYMPHOCYTES # BLD AUTO: 0.44 K/UL — LOW (ref 1–3.3)
LYMPHOCYTES # BLD AUTO: 17.5 % — SIGNIFICANT CHANGE UP (ref 13–44)
LYMPHOCYTES # BLD AUTO: 17.5 % — SIGNIFICANT CHANGE UP (ref 13–44)
MCHC RBC-ENTMCNC: 31 PG — SIGNIFICANT CHANGE UP (ref 27–34)
MCHC RBC-ENTMCNC: 31 PG — SIGNIFICANT CHANGE UP (ref 27–34)
MCHC RBC-ENTMCNC: 32.3 G/DL — SIGNIFICANT CHANGE UP (ref 32–36)
MCHC RBC-ENTMCNC: 32.3 G/DL — SIGNIFICANT CHANGE UP (ref 32–36)
MCV RBC AUTO: 95.9 FL — SIGNIFICANT CHANGE UP (ref 80–100)
MCV RBC AUTO: 95.9 FL — SIGNIFICANT CHANGE UP (ref 80–100)
MONOCYTES # BLD AUTO: 0.16 K/UL — SIGNIFICANT CHANGE UP (ref 0–0.9)
MONOCYTES # BLD AUTO: 0.16 K/UL — SIGNIFICANT CHANGE UP (ref 0–0.9)
MONOCYTES NFR BLD AUTO: 6.3 % — SIGNIFICANT CHANGE UP (ref 2–14)
MONOCYTES NFR BLD AUTO: 6.3 % — SIGNIFICANT CHANGE UP (ref 2–14)
NEUTROPHILS # BLD AUTO: 1.86 K/UL — SIGNIFICANT CHANGE UP (ref 1.8–7.4)
NEUTROPHILS # BLD AUTO: 1.86 K/UL — SIGNIFICANT CHANGE UP (ref 1.8–7.4)
NEUTROPHILS NFR BLD AUTO: 73.8 % — SIGNIFICANT CHANGE UP (ref 43–77)
NEUTROPHILS NFR BLD AUTO: 73.8 % — SIGNIFICANT CHANGE UP (ref 43–77)
NRBC # BLD: 0 /100 WBCS — SIGNIFICANT CHANGE UP (ref 0–0)
NRBC # BLD: 0 /100 WBCS — SIGNIFICANT CHANGE UP (ref 0–0)
PLATELET # BLD AUTO: 101 K/UL — LOW (ref 150–400)
PLATELET # BLD AUTO: 101 K/UL — LOW (ref 150–400)
RBC # BLD: 2.94 M/UL — LOW (ref 3.8–5.2)
RBC # BLD: 2.94 M/UL — LOW (ref 3.8–5.2)
RBC # FLD: 19.6 % — HIGH (ref 10.3–14.5)
RBC # FLD: 19.6 % — HIGH (ref 10.3–14.5)
WBC # BLD: 2.52 K/UL — LOW (ref 3.8–10.5)
WBC # BLD: 2.52 K/UL — LOW (ref 3.8–10.5)
WBC # FLD AUTO: 2.52 K/UL — LOW (ref 3.8–10.5)
WBC # FLD AUTO: 2.52 K/UL — LOW (ref 3.8–10.5)

## 2023-11-20 ENCOUNTER — RESULT REVIEW (OUTPATIENT)
Age: 88
End: 2023-11-20

## 2023-11-20 ENCOUNTER — APPOINTMENT (OUTPATIENT)
Dept: HEMATOLOGY ONCOLOGY | Facility: CLINIC | Age: 88
End: 2023-11-20

## 2023-11-20 ENCOUNTER — APPOINTMENT (OUTPATIENT)
Dept: INFUSION THERAPY | Facility: HOSPITAL | Age: 88
End: 2023-11-20

## 2023-11-20 LAB
ANISOCYTOSIS BLD QL: SLIGHT — SIGNIFICANT CHANGE UP
ANISOCYTOSIS BLD QL: SLIGHT — SIGNIFICANT CHANGE UP
BASOPHILS # BLD AUTO: 0.03 K/UL — SIGNIFICANT CHANGE UP (ref 0–0.2)
BASOPHILS # BLD AUTO: 0.03 K/UL — SIGNIFICANT CHANGE UP (ref 0–0.2)
BASOPHILS NFR BLD AUTO: 1 % — SIGNIFICANT CHANGE UP (ref 0–2)
BASOPHILS NFR BLD AUTO: 1 % — SIGNIFICANT CHANGE UP (ref 0–2)
BURR CELLS BLD QL SMEAR: PRESENT — SIGNIFICANT CHANGE UP
BURR CELLS BLD QL SMEAR: PRESENT — SIGNIFICANT CHANGE UP
DACRYOCYTES BLD QL SMEAR: SLIGHT — SIGNIFICANT CHANGE UP
DACRYOCYTES BLD QL SMEAR: SLIGHT — SIGNIFICANT CHANGE UP
ELLIPTOCYTES BLD QL SMEAR: SLIGHT — SIGNIFICANT CHANGE UP
ELLIPTOCYTES BLD QL SMEAR: SLIGHT — SIGNIFICANT CHANGE UP
EOSINOPHIL # BLD AUTO: 0.06 K/UL — SIGNIFICANT CHANGE UP (ref 0–0.5)
EOSINOPHIL # BLD AUTO: 0.06 K/UL — SIGNIFICANT CHANGE UP (ref 0–0.5)
EOSINOPHIL NFR BLD AUTO: 2 % — SIGNIFICANT CHANGE UP (ref 0–6)
EOSINOPHIL NFR BLD AUTO: 2 % — SIGNIFICANT CHANGE UP (ref 0–6)
HCT VFR BLD CALC: 32.5 % — LOW (ref 34.5–45)
HCT VFR BLD CALC: 32.5 % — LOW (ref 34.5–45)
HGB BLD-MCNC: 10.4 G/DL — LOW (ref 11.5–15.5)
HGB BLD-MCNC: 10.4 G/DL — LOW (ref 11.5–15.5)
LYMPHOCYTES # BLD AUTO: 0.58 K/UL — LOW (ref 1–3.3)
LYMPHOCYTES # BLD AUTO: 0.58 K/UL — LOW (ref 1–3.3)
LYMPHOCYTES # BLD AUTO: 20 % — SIGNIFICANT CHANGE UP (ref 13–44)
LYMPHOCYTES # BLD AUTO: 20 % — SIGNIFICANT CHANGE UP (ref 13–44)
MCHC RBC-ENTMCNC: 31 PG — SIGNIFICANT CHANGE UP (ref 27–34)
MCHC RBC-ENTMCNC: 31 PG — SIGNIFICANT CHANGE UP (ref 27–34)
MCHC RBC-ENTMCNC: 32 G/DL — SIGNIFICANT CHANGE UP (ref 32–36)
MCHC RBC-ENTMCNC: 32 G/DL — SIGNIFICANT CHANGE UP (ref 32–36)
MCV RBC AUTO: 96.7 FL — SIGNIFICANT CHANGE UP (ref 80–100)
MCV RBC AUTO: 96.7 FL — SIGNIFICANT CHANGE UP (ref 80–100)
MONOCYTES # BLD AUTO: 0.12 K/UL — SIGNIFICANT CHANGE UP (ref 0–0.9)
MONOCYTES # BLD AUTO: 0.12 K/UL — SIGNIFICANT CHANGE UP (ref 0–0.9)
MONOCYTES NFR BLD AUTO: 4 % — SIGNIFICANT CHANGE UP (ref 2–14)
MONOCYTES NFR BLD AUTO: 4 % — SIGNIFICANT CHANGE UP (ref 2–14)
NEUTROPHILS # BLD AUTO: 2.12 K/UL — SIGNIFICANT CHANGE UP (ref 1.8–7.4)
NEUTROPHILS # BLD AUTO: 2.12 K/UL — SIGNIFICANT CHANGE UP (ref 1.8–7.4)
NEUTROPHILS NFR BLD AUTO: 73 % — SIGNIFICANT CHANGE UP (ref 43–77)
NEUTROPHILS NFR BLD AUTO: 73 % — SIGNIFICANT CHANGE UP (ref 43–77)
NRBC # BLD: 0 /100 — SIGNIFICANT CHANGE UP (ref 0–0)
NRBC # BLD: 0 /100 — SIGNIFICANT CHANGE UP (ref 0–0)
NRBC # BLD: SIGNIFICANT CHANGE UP /100 WBCS (ref 0–0)
NRBC # BLD: SIGNIFICANT CHANGE UP /100 WBCS (ref 0–0)
PLAT MORPH BLD: NORMAL — SIGNIFICANT CHANGE UP
PLAT MORPH BLD: NORMAL — SIGNIFICANT CHANGE UP
PLATELET # BLD AUTO: 84 K/UL — LOW (ref 150–400)
PLATELET # BLD AUTO: 84 K/UL — LOW (ref 150–400)
POIKILOCYTOSIS BLD QL AUTO: SLIGHT — SIGNIFICANT CHANGE UP
POIKILOCYTOSIS BLD QL AUTO: SLIGHT — SIGNIFICANT CHANGE UP
RBC # BLD: 3.36 M/UL — LOW (ref 3.8–5.2)
RBC # BLD: 3.36 M/UL — LOW (ref 3.8–5.2)
RBC # FLD: 18.4 % — HIGH (ref 10.3–14.5)
RBC # FLD: 18.4 % — HIGH (ref 10.3–14.5)
RBC BLD AUTO: ABNORMAL
RBC BLD AUTO: ABNORMAL
SCHISTOCYTES BLD QL AUTO: SLIGHT — SIGNIFICANT CHANGE UP
SCHISTOCYTES BLD QL AUTO: SLIGHT — SIGNIFICANT CHANGE UP
WBC # BLD: 2.91 K/UL — LOW (ref 3.8–10.5)
WBC # BLD: 2.91 K/UL — LOW (ref 3.8–10.5)
WBC # FLD AUTO: 2.91 K/UL — LOW (ref 3.8–10.5)
WBC # FLD AUTO: 2.91 K/UL — LOW (ref 3.8–10.5)

## 2023-11-21 ENCOUNTER — NON-APPOINTMENT (OUTPATIENT)
Age: 88
End: 2023-11-21

## 2023-11-26 ENCOUNTER — NON-APPOINTMENT (OUTPATIENT)
Age: 88
End: 2023-11-26

## 2023-11-26 ENCOUNTER — INPATIENT (INPATIENT)
Facility: HOSPITAL | Age: 88
LOS: 6 days | Discharge: HOME | DRG: 872 | End: 2023-12-03
Attending: INTERNAL MEDICINE | Admitting: STUDENT IN AN ORGANIZED HEALTH CARE EDUCATION/TRAINING PROGRAM
Payer: MEDICARE

## 2023-11-26 VITALS
WEIGHT: 141.1 LBS | OXYGEN SATURATION: 96 % | HEART RATE: 91 BPM | TEMPERATURE: 99 F | RESPIRATION RATE: 16 BRPM | HEIGHT: 64 IN | DIASTOLIC BLOOD PRESSURE: 60 MMHG | SYSTOLIC BLOOD PRESSURE: 126 MMHG

## 2023-11-26 DIAGNOSIS — D35.1 BENIGN NEOPLASM OF PARATHYROID GLAND: Chronic | ICD-10-CM

## 2023-11-26 DIAGNOSIS — D70.9 NEUTROPENIA, UNSPECIFIED: ICD-10-CM

## 2023-11-26 DIAGNOSIS — Z98.890 OTHER SPECIFIED POSTPROCEDURAL STATES: Chronic | ICD-10-CM

## 2023-11-26 DIAGNOSIS — Z90.49 ACQUIRED ABSENCE OF OTHER SPECIFIED PARTS OF DIGESTIVE TRACT: Chronic | ICD-10-CM

## 2023-11-26 LAB
ACANTHOCYTES BLD QL SMEAR: SLIGHT — SIGNIFICANT CHANGE UP
ACANTHOCYTES BLD QL SMEAR: SLIGHT — SIGNIFICANT CHANGE UP
ALBUMIN SERPL ELPH-MCNC: 2.8 G/DL — LOW (ref 3.3–5)
ALBUMIN SERPL ELPH-MCNC: 2.8 G/DL — LOW (ref 3.3–5)
ALP SERPL-CCNC: 112 U/L — SIGNIFICANT CHANGE UP (ref 40–120)
ALP SERPL-CCNC: 112 U/L — SIGNIFICANT CHANGE UP (ref 40–120)
ALT FLD-CCNC: 18 U/L — SIGNIFICANT CHANGE UP (ref 10–45)
ALT FLD-CCNC: 18 U/L — SIGNIFICANT CHANGE UP (ref 10–45)
ANION GAP SERPL CALC-SCNC: 15 MMOL/L — SIGNIFICANT CHANGE UP (ref 5–17)
ANION GAP SERPL CALC-SCNC: 15 MMOL/L — SIGNIFICANT CHANGE UP (ref 5–17)
ANISOCYTOSIS BLD QL: SLIGHT — SIGNIFICANT CHANGE UP
ANISOCYTOSIS BLD QL: SLIGHT — SIGNIFICANT CHANGE UP
APPEARANCE UR: ABNORMAL
APPEARANCE UR: ABNORMAL
APTT BLD: 29.7 SEC — SIGNIFICANT CHANGE UP (ref 24.5–35.6)
APTT BLD: 29.7 SEC — SIGNIFICANT CHANGE UP (ref 24.5–35.6)
AST SERPL-CCNC: 21 U/L — SIGNIFICANT CHANGE UP (ref 10–40)
AST SERPL-CCNC: 21 U/L — SIGNIFICANT CHANGE UP (ref 10–40)
BACTERIA # UR AUTO: ABNORMAL /HPF
BACTERIA # UR AUTO: ABNORMAL /HPF
BASOPHILS # BLD AUTO: 0 K/UL — SIGNIFICANT CHANGE UP (ref 0–0.2)
BASOPHILS # BLD AUTO: 0 K/UL — SIGNIFICANT CHANGE UP (ref 0–0.2)
BASOPHILS NFR BLD AUTO: 0 % — SIGNIFICANT CHANGE UP (ref 0–2)
BASOPHILS NFR BLD AUTO: 0 % — SIGNIFICANT CHANGE UP (ref 0–2)
BILIRUB SERPL-MCNC: 1 MG/DL — SIGNIFICANT CHANGE UP (ref 0.2–1.2)
BILIRUB SERPL-MCNC: 1 MG/DL — SIGNIFICANT CHANGE UP (ref 0.2–1.2)
BILIRUB UR-MCNC: NEGATIVE — SIGNIFICANT CHANGE UP
BILIRUB UR-MCNC: NEGATIVE — SIGNIFICANT CHANGE UP
BUN SERPL-MCNC: 45 MG/DL — HIGH (ref 7–23)
BUN SERPL-MCNC: 45 MG/DL — HIGH (ref 7–23)
BURR CELLS BLD QL SMEAR: PRESENT — SIGNIFICANT CHANGE UP
BURR CELLS BLD QL SMEAR: PRESENT — SIGNIFICANT CHANGE UP
CALCIUM SERPL-MCNC: 8.4 MG/DL — SIGNIFICANT CHANGE UP (ref 8.4–10.5)
CALCIUM SERPL-MCNC: 8.4 MG/DL — SIGNIFICANT CHANGE UP (ref 8.4–10.5)
CHLORIDE SERPL-SCNC: 105 MMOL/L — SIGNIFICANT CHANGE UP (ref 96–108)
CHLORIDE SERPL-SCNC: 105 MMOL/L — SIGNIFICANT CHANGE UP (ref 96–108)
CO2 SERPL-SCNC: 22 MMOL/L — SIGNIFICANT CHANGE UP (ref 22–31)
CO2 SERPL-SCNC: 22 MMOL/L — SIGNIFICANT CHANGE UP (ref 22–31)
COLOR SPEC: SIGNIFICANT CHANGE UP
COLOR SPEC: SIGNIFICANT CHANGE UP
CREAT SERPL-MCNC: 3.13 MG/DL — HIGH (ref 0.5–1.3)
CREAT SERPL-MCNC: 3.13 MG/DL — HIGH (ref 0.5–1.3)
DACRYOCYTES BLD QL SMEAR: SLIGHT — SIGNIFICANT CHANGE UP
DACRYOCYTES BLD QL SMEAR: SLIGHT — SIGNIFICANT CHANGE UP
DIFF PNL FLD: NEGATIVE — SIGNIFICANT CHANGE UP
DIFF PNL FLD: NEGATIVE — SIGNIFICANT CHANGE UP
EGFR: 14 ML/MIN/1.73M2 — LOW
EGFR: 14 ML/MIN/1.73M2 — LOW
ELLIPTOCYTES BLD QL SMEAR: SLIGHT — SIGNIFICANT CHANGE UP
ELLIPTOCYTES BLD QL SMEAR: SLIGHT — SIGNIFICANT CHANGE UP
EOSINOPHIL # BLD AUTO: 0 K/UL — SIGNIFICANT CHANGE UP (ref 0–0.5)
EOSINOPHIL # BLD AUTO: 0 K/UL — SIGNIFICANT CHANGE UP (ref 0–0.5)
EOSINOPHIL NFR BLD AUTO: 0 % — SIGNIFICANT CHANGE UP (ref 0–6)
EOSINOPHIL NFR BLD AUTO: 0 % — SIGNIFICANT CHANGE UP (ref 0–6)
EPI CELLS # UR: 1 — SIGNIFICANT CHANGE UP
EPI CELLS # UR: 1 — SIGNIFICANT CHANGE UP
GLUCOSE SERPL-MCNC: 119 MG/DL — HIGH (ref 70–99)
GLUCOSE SERPL-MCNC: 119 MG/DL — HIGH (ref 70–99)
GLUCOSE UR QL: NEGATIVE MG/DL — SIGNIFICANT CHANGE UP
GLUCOSE UR QL: NEGATIVE MG/DL — SIGNIFICANT CHANGE UP
GRAN CASTS # UR COMP ASSIST: 1 — SIGNIFICANT CHANGE UP
GRAN CASTS # UR COMP ASSIST: 1 — SIGNIFICANT CHANGE UP
HCT VFR BLD CALC: 37.4 % — SIGNIFICANT CHANGE UP (ref 34.5–45)
HCT VFR BLD CALC: 37.4 % — SIGNIFICANT CHANGE UP (ref 34.5–45)
HGB BLD-MCNC: 11.3 G/DL — LOW (ref 11.5–15.5)
HGB BLD-MCNC: 11.3 G/DL — LOW (ref 11.5–15.5)
HYALINE CASTS # UR AUTO: PRESENT
HYALINE CASTS # UR AUTO: PRESENT
INR BLD: 1.46 RATIO — HIGH (ref 0.85–1.18)
INR BLD: 1.46 RATIO — HIGH (ref 0.85–1.18)
KETONES UR-MCNC: ABNORMAL MG/DL
KETONES UR-MCNC: ABNORMAL MG/DL
LACTATE SERPL-SCNC: 2.6 MMOL/L — HIGH (ref 0.7–2)
LACTATE SERPL-SCNC: 2.6 MMOL/L — HIGH (ref 0.7–2)
LACTATE SERPL-SCNC: 3.2 MMOL/L — HIGH (ref 0.7–2)
LACTATE SERPL-SCNC: 3.2 MMOL/L — HIGH (ref 0.7–2)
LACTATE SERPL-SCNC: 3.4 MMOL/L — HIGH (ref 0.7–2)
LACTATE SERPL-SCNC: 3.4 MMOL/L — HIGH (ref 0.7–2)
LEUKOCYTE ESTERASE UR-ACNC: NEGATIVE — SIGNIFICANT CHANGE UP
LEUKOCYTE ESTERASE UR-ACNC: NEGATIVE — SIGNIFICANT CHANGE UP
LIDOCAIN IGE QN: 18 U/L — SIGNIFICANT CHANGE UP (ref 16–77)
LIDOCAIN IGE QN: 18 U/L — SIGNIFICANT CHANGE UP (ref 16–77)
LYMPHOCYTES # BLD AUTO: 0.13 K/UL — LOW (ref 1–3.3)
LYMPHOCYTES # BLD AUTO: 0.13 K/UL — LOW (ref 1–3.3)
LYMPHOCYTES # BLD AUTO: 34 % — SIGNIFICANT CHANGE UP (ref 13–44)
LYMPHOCYTES # BLD AUTO: 34 % — SIGNIFICANT CHANGE UP (ref 13–44)
MANUAL SMEAR VERIFICATION: SIGNIFICANT CHANGE UP
MANUAL SMEAR VERIFICATION: SIGNIFICANT CHANGE UP
MCHC RBC-ENTMCNC: 30 PG — SIGNIFICANT CHANGE UP (ref 27–34)
MCHC RBC-ENTMCNC: 30 PG — SIGNIFICANT CHANGE UP (ref 27–34)
MCHC RBC-ENTMCNC: 30.2 GM/DL — LOW (ref 32–36)
MCHC RBC-ENTMCNC: 30.2 GM/DL — LOW (ref 32–36)
MCV RBC AUTO: 99.2 FL — SIGNIFICANT CHANGE UP (ref 80–100)
MCV RBC AUTO: 99.2 FL — SIGNIFICANT CHANGE UP (ref 80–100)
MONOCYTES # BLD AUTO: 0 K/UL — SIGNIFICANT CHANGE UP (ref 0–0.9)
MONOCYTES # BLD AUTO: 0 K/UL — SIGNIFICANT CHANGE UP (ref 0–0.9)
MONOCYTES NFR BLD AUTO: 1 % — LOW (ref 2–14)
MONOCYTES NFR BLD AUTO: 1 % — LOW (ref 2–14)
NEUTROPHILS # BLD AUTO: 0.25 K/UL — LOW (ref 1.8–7.4)
NEUTROPHILS # BLD AUTO: 0.25 K/UL — LOW (ref 1.8–7.4)
NEUTROPHILS NFR BLD AUTO: 65 % — SIGNIFICANT CHANGE UP (ref 43–77)
NEUTROPHILS NFR BLD AUTO: 65 % — SIGNIFICANT CHANGE UP (ref 43–77)
NITRITE UR-MCNC: NEGATIVE — SIGNIFICANT CHANGE UP
NITRITE UR-MCNC: NEGATIVE — SIGNIFICANT CHANGE UP
NRBC # BLD: 1 /100 — HIGH (ref 0–0)
NRBC # BLD: 1 /100 — HIGH (ref 0–0)
OVALOCYTES BLD QL SMEAR: SLIGHT — SIGNIFICANT CHANGE UP
OVALOCYTES BLD QL SMEAR: SLIGHT — SIGNIFICANT CHANGE UP
PH UR: 5 — SIGNIFICANT CHANGE UP (ref 5–8)
PH UR: 5 — SIGNIFICANT CHANGE UP (ref 5–8)
PLAT MORPH BLD: NORMAL — SIGNIFICANT CHANGE UP
PLAT MORPH BLD: NORMAL — SIGNIFICANT CHANGE UP
PLATELET # BLD AUTO: 114 K/UL — LOW (ref 150–400)
PLATELET # BLD AUTO: 114 K/UL — LOW (ref 150–400)
POIKILOCYTOSIS BLD QL AUTO: SLIGHT — SIGNIFICANT CHANGE UP
POIKILOCYTOSIS BLD QL AUTO: SLIGHT — SIGNIFICANT CHANGE UP
POLYCHROMASIA BLD QL SMEAR: SLIGHT — SIGNIFICANT CHANGE UP
POLYCHROMASIA BLD QL SMEAR: SLIGHT — SIGNIFICANT CHANGE UP
POTASSIUM SERPL-MCNC: 3.7 MMOL/L — SIGNIFICANT CHANGE UP (ref 3.5–5.3)
POTASSIUM SERPL-MCNC: 3.7 MMOL/L — SIGNIFICANT CHANGE UP (ref 3.5–5.3)
POTASSIUM SERPL-SCNC: 3.7 MMOL/L — SIGNIFICANT CHANGE UP (ref 3.5–5.3)
POTASSIUM SERPL-SCNC: 3.7 MMOL/L — SIGNIFICANT CHANGE UP (ref 3.5–5.3)
PROT SERPL-MCNC: 6 G/DL — SIGNIFICANT CHANGE UP (ref 6–8.3)
PROT SERPL-MCNC: 6 G/DL — SIGNIFICANT CHANGE UP (ref 6–8.3)
PROT UR-MCNC: 300 MG/DL
PROT UR-MCNC: 300 MG/DL
PROTHROM AB SERPL-ACNC: 16.5 SEC — HIGH (ref 9.5–13)
PROTHROM AB SERPL-ACNC: 16.5 SEC — HIGH (ref 9.5–13)
RAPID RVP RESULT: SIGNIFICANT CHANGE UP
RAPID RVP RESULT: SIGNIFICANT CHANGE UP
RBC # BLD: 3.77 M/UL — LOW (ref 3.8–5.2)
RBC # BLD: 3.77 M/UL — LOW (ref 3.8–5.2)
RBC # FLD: 16.3 % — HIGH (ref 10.3–14.5)
RBC # FLD: 16.3 % — HIGH (ref 10.3–14.5)
RBC BLD AUTO: ABNORMAL
RBC BLD AUTO: ABNORMAL
RBC CASTS # UR COMP ASSIST: 1 /HPF — SIGNIFICANT CHANGE UP (ref 0–4)
RBC CASTS # UR COMP ASSIST: 1 /HPF — SIGNIFICANT CHANGE UP (ref 0–4)
SARS-COV-2 RNA SPEC QL NAA+PROBE: SIGNIFICANT CHANGE UP
SARS-COV-2 RNA SPEC QL NAA+PROBE: SIGNIFICANT CHANGE UP
SCHISTOCYTES BLD QL AUTO: SLIGHT — SIGNIFICANT CHANGE UP
SCHISTOCYTES BLD QL AUTO: SLIGHT — SIGNIFICANT CHANGE UP
SODIUM SERPL-SCNC: 142 MMOL/L — SIGNIFICANT CHANGE UP (ref 135–145)
SODIUM SERPL-SCNC: 142 MMOL/L — SIGNIFICANT CHANGE UP (ref 135–145)
SP GR SPEC: 1.02 — SIGNIFICANT CHANGE UP (ref 1–1.03)
SP GR SPEC: 1.02 — SIGNIFICANT CHANGE UP (ref 1–1.03)
UROBILINOGEN FLD QL: 1 E.U./DL — SIGNIFICANT CHANGE UP (ref 0.2–1)
UROBILINOGEN FLD QL: 1 E.U./DL — SIGNIFICANT CHANGE UP (ref 0.2–1)
WBC # BLD: 0.38 K/UL — CRITICAL LOW (ref 3.8–10.5)
WBC # BLD: 0.38 K/UL — CRITICAL LOW (ref 3.8–10.5)
WBC # FLD AUTO: 0.38 K/UL — CRITICAL LOW (ref 3.8–10.5)
WBC # FLD AUTO: 0.38 K/UL — CRITICAL LOW (ref 3.8–10.5)
WBC UR QL: 1 /HPF — SIGNIFICANT CHANGE UP (ref 0–5)
WBC UR QL: 1 /HPF — SIGNIFICANT CHANGE UP (ref 0–5)

## 2023-11-26 PROCEDURE — 71045 X-RAY EXAM CHEST 1 VIEW: CPT | Mod: 26

## 2023-11-26 PROCEDURE — 74176 CT ABD & PELVIS W/O CONTRAST: CPT | Mod: 26,MA

## 2023-11-26 PROCEDURE — 99285 EMERGENCY DEPT VISIT HI MDM: CPT

## 2023-11-26 PROCEDURE — 99223 1ST HOSP IP/OBS HIGH 75: CPT

## 2023-11-26 PROCEDURE — 93010 ELECTROCARDIOGRAM REPORT: CPT

## 2023-11-26 PROCEDURE — 99497 ADVNCD CARE PLAN 30 MIN: CPT | Mod: 25

## 2023-11-26 RX ORDER — SODIUM CHLORIDE 9 MG/ML
1000 INJECTION INTRAMUSCULAR; INTRAVENOUS; SUBCUTANEOUS ONCE
Refills: 0 | Status: COMPLETED | OUTPATIENT
Start: 2023-11-26 | End: 2023-11-26

## 2023-11-26 RX ORDER — SODIUM CHLORIDE 9 MG/ML
1000 INJECTION INTRAMUSCULAR; INTRAVENOUS; SUBCUTANEOUS ONCE
Refills: 0 | Status: DISCONTINUED | OUTPATIENT
Start: 2023-11-26 | End: 2023-11-26

## 2023-11-26 RX ORDER — ASPIRIN/CALCIUM CARB/MAGNESIUM 324 MG
81 TABLET ORAL DAILY
Refills: 0 | Status: DISCONTINUED | OUTPATIENT
Start: 2023-11-26 | End: 2023-12-03

## 2023-11-26 RX ORDER — ACETAMINOPHEN 500 MG
650 TABLET ORAL EVERY 6 HOURS
Refills: 0 | Status: DISCONTINUED | OUTPATIENT
Start: 2023-11-26 | End: 2023-12-03

## 2023-11-26 RX ORDER — SIMETHICONE 80 MG/1
80 TABLET, CHEWABLE ORAL THREE TIMES A DAY
Refills: 0 | Status: DISCONTINUED | OUTPATIENT
Start: 2023-11-26 | End: 2023-12-03

## 2023-11-26 RX ORDER — SODIUM BICARBONATE 1 MEQ/ML
650 SYRINGE (ML) INTRAVENOUS THREE TIMES A DAY
Refills: 0 | Status: DISCONTINUED | OUTPATIENT
Start: 2023-11-26 | End: 2023-11-27

## 2023-11-26 RX ORDER — SODIUM CHLORIDE 9 MG/ML
1000 INJECTION INTRAMUSCULAR; INTRAVENOUS; SUBCUTANEOUS
Refills: 0 | Status: DISCONTINUED | OUTPATIENT
Start: 2023-11-26 | End: 2023-11-27

## 2023-11-26 RX ORDER — FAMOTIDINE 10 MG/ML
20 INJECTION INTRAVENOUS ONCE
Refills: 0 | Status: COMPLETED | OUTPATIENT
Start: 2023-11-26 | End: 2023-11-26

## 2023-11-26 RX ORDER — MEROPENEM 1 G/30ML
500 INJECTION INTRAVENOUS EVERY 12 HOURS
Refills: 0 | Status: DISCONTINUED | OUTPATIENT
Start: 2023-11-26 | End: 2023-11-28

## 2023-11-26 RX ORDER — SODIUM CHLORIDE 9 MG/ML
500 INJECTION INTRAMUSCULAR; INTRAVENOUS; SUBCUTANEOUS ONCE
Refills: 0 | Status: DISCONTINUED | OUTPATIENT
Start: 2023-11-26 | End: 2023-11-26

## 2023-11-26 RX ORDER — FAMOTIDINE 10 MG/ML
20 INJECTION INTRAVENOUS ONCE
Refills: 0 | Status: DISCONTINUED | OUTPATIENT
Start: 2023-11-26 | End: 2023-11-26

## 2023-11-26 RX ORDER — ACETAMINOPHEN 500 MG
650 TABLET ORAL ONCE
Refills: 0 | Status: DISCONTINUED | OUTPATIENT
Start: 2023-11-26 | End: 2023-11-26

## 2023-11-26 RX ORDER — MEROPENEM 1 G/30ML
500 INJECTION INTRAVENOUS ONCE
Refills: 0 | Status: COMPLETED | OUTPATIENT
Start: 2023-11-26 | End: 2023-11-26

## 2023-11-26 RX ORDER — AMLODIPINE BESYLATE 2.5 MG/1
10 TABLET ORAL DAILY
Refills: 0 | Status: DISCONTINUED | OUTPATIENT
Start: 2023-11-26 | End: 2023-11-27

## 2023-11-26 RX ORDER — ONDANSETRON 8 MG/1
4 TABLET, FILM COATED ORAL ONCE
Refills: 0 | Status: COMPLETED | OUTPATIENT
Start: 2023-11-26 | End: 2023-11-26

## 2023-11-26 RX ORDER — DOXAZOSIN MESYLATE 4 MG
1 TABLET ORAL
Refills: 0 | Status: DISCONTINUED | OUTPATIENT
Start: 2023-11-26 | End: 2023-11-27

## 2023-11-26 RX ADMIN — Medication 1 MILLIGRAM(S): at 20:27

## 2023-11-26 RX ADMIN — FAMOTIDINE 20 MILLIGRAM(S): 10 INJECTION INTRAVENOUS at 12:36

## 2023-11-26 RX ADMIN — MEROPENEM 100 MILLIGRAM(S): 1 INJECTION INTRAVENOUS at 21:51

## 2023-11-26 RX ADMIN — ONDANSETRON 4 MILLIGRAM(S): 8 TABLET, FILM COATED ORAL at 12:06

## 2023-11-26 RX ADMIN — Medication 650 MILLIGRAM(S): at 21:28

## 2023-11-26 RX ADMIN — SODIUM CHLORIDE 75 MILLILITER(S): 9 INJECTION INTRAMUSCULAR; INTRAVENOUS; SUBCUTANEOUS at 19:26

## 2023-11-26 RX ADMIN — SODIUM CHLORIDE 1000 MILLILITER(S): 9 INJECTION INTRAMUSCULAR; INTRAVENOUS; SUBCUTANEOUS at 18:26

## 2023-11-26 RX ADMIN — SODIUM CHLORIDE 1000 MILLILITER(S): 9 INJECTION INTRAMUSCULAR; INTRAVENOUS; SUBCUTANEOUS at 15:25

## 2023-11-26 RX ADMIN — SODIUM CHLORIDE 1000 MILLILITER(S): 9 INJECTION INTRAMUSCULAR; INTRAVENOUS; SUBCUTANEOUS at 12:06

## 2023-11-26 RX ADMIN — SODIUM CHLORIDE 1000 MILLILITER(S): 9 INJECTION INTRAMUSCULAR; INTRAVENOUS; SUBCUTANEOUS at 13:43

## 2023-11-26 RX ADMIN — SODIUM CHLORIDE 1000 MILLILITER(S): 9 INJECTION INTRAMUSCULAR; INTRAVENOUS; SUBCUTANEOUS at 13:06

## 2023-11-26 RX ADMIN — FAMOTIDINE 100 MILLIGRAM(S): 10 INJECTION INTRAVENOUS at 12:06

## 2023-11-26 RX ADMIN — MEROPENEM 100 MILLIGRAM(S): 1 INJECTION INTRAVENOUS at 14:35

## 2023-11-26 NOTE — ED ADULT NURSE NOTE - NSFALLHARMRISKINTERV_ED_ALL_ED

## 2023-11-26 NOTE — PATIENT PROFILE ADULT - BILL PAYMENT
Counseled spontaneous , toxoplasmosis, listeria, fish and weight gain.  Counseled cystic fibrosis screening, cord blood banking and aneuploidy testing - wants first trimester screening.  Her 35th birthday is just after BRITTANY.  Reviewed Zika Virus precautions and travel restrictions.   Had flu shot in December.  Had IUD removed in November with 2 menses after that.  Pap and HPV cotesting negative 16 - viewed in Care Everywhere.  History of 1 elective AB at age 16 that she prefers not be charted/discussed other than with me.  aware and with her during discussion. No complications.   no

## 2023-11-26 NOTE — H&P ADULT - NSHPREVIEWOFSYSTEMS_GEN_ALL_CORE
REVIEW OF SYSTEMS:  CONSTITUTIONAL: +fever, fatigue, chills, malaise  EYES: No eye pain, visual disturbances, or discharge  ENMT:  No difficulty hearing, tinnitus, vertigo; No sinus or throat pain  NECK: No pain or stiffness  RESPIRATORY: No cough, wheezing, chills or hemoptysis; No shortness of breath  CARDIOVASCULAR: No chest pain, palpitations, dizziness, or leg swelling  GASTROINTESTINAL: +nausea, vomiting   GENITOURINARY: No dysuria, frequency, hematuria, or incontinence  NEUROLOGICAL: No headaches, memory loss, loss of strength, numbness, or tremors  SKIN: No itching, burning, rashes, or lesions   LYMPH NODES: No enlarged glands  ENDOCRINE: No heat or cold intolerance; No hair loss  MUSCULOSKELETAL: No joint pain or swelling; No muscle, back, or extremity pain  PSYCHIATRIC: No depression, anxiety, mood swings, or difficulty sleeping  HEME/LYMPH: No easy bruising, or bleeding gums  ALLERGY AND IMMUNOLOGIC: No hives or eczema    ALL ROS REVIEWED AND NORMAL EXCEPT AS STATED ABOVE

## 2023-11-26 NOTE — H&P ADULT - CONVERSATION DETAILS
Discussed diagnosis and treatment plan with patient, who verbalizes  understanding. Code status, including CPR and intubuation reviewed with patient, who elects for Full Code at this time.

## 2023-11-26 NOTE — H&P ADULT - NSHPPHYSICALEXAM_GEN_ALL_CORE
T(C): 37.2 (11-26-23 @ 11:23), Max: 37.2 (11-26-23 @ 11:23)  HR: 91 (11-26-23 @ 11:23) (91 - 91)  BP: 126/60 (11-26-23 @ 11:23) (126/60 - 126/60)  RR: 16 (11-26-23 @ 11:23) (16 - 16)  SpO2: 96% (11-26-23 @ 11:23) (96% - 96%)  Wt(kg): --Vital Signs Last 24 Hrs  T(C): 37.2 (26 Nov 2023 11:23), Max: 37.2 (26 Nov 2023 11:23)  T(F): 99 (26 Nov 2023 11:23), Max: 99 (26 Nov 2023 11:23)  HR: 91 (26 Nov 2023 11:23) (91 - 91)  BP: 126/60 (26 Nov 2023 11:23) (126/60 - 126/60)  BP(mean): --  RR: 16 (26 Nov 2023 11:23) (16 - 16)  SpO2: 96% (26 Nov 2023 11:23) (96% - 96%)    Parameters below as of 26 Nov 2023 11:23  Patient On (Oxygen Delivery Method): room air        PHYSICAL EXAM:  GENERAL: NAD, well-groomed, well-developed  HEAD:  Atraumatic, Normocephalic  EYES: EOMI, PERRLA, conjunctiva and sclera clear  ENMT: No tonsillar erythema, exudates, or enlargement; Moist mucous membranes, Good dentition, No lesions  NECK: Supple, No JVD, Normal thyroid  NERVOUS SYSTEM:  Alert & Oriented X3, Good concentration; Motor Strength 5/5 B/L upper and lower extremities; DTRs 2+ intact and symmetric  CHEST/LUNG: Clear to percussion bilaterally; No rales, rhonchi, wheezing, or rubs  HEART: Regular rate and rhythm; No murmurs, rubs, or gallops  ABDOMEN: Soft, Nontender, Nondistended; Bowel sounds present  EXTREMITIES:  2+ Peripheral Pulses, No clubbing, cyanosis, or edema  LYMPH: No lymphadenopathy noted  SKIN: No rashes or lesions T(C): 37.2 (11-26-23 @ 11:23), Max: 37.2 (11-26-23 @ 11:23)  HR: 91 (11-26-23 @ 11:23) (91 - 91)  BP: 126/60 (11-26-23 @ 11:23) (126/60 - 126/60)  RR: 16 (11-26-23 @ 11:23) (16 - 16)  SpO2: 96% (11-26-23 @ 11:23) (96% - 96%)  Wt(kg): --Vital Signs Last 24 Hrs  T(C): 37.2 (26 Nov 2023 11:23), Max: 37.2 (26 Nov 2023 11:23)  T(F): 99 (26 Nov 2023 11:23), Max: 99 (26 Nov 2023 11:23)  HR: 91 (26 Nov 2023 11:23) (91 - 91)  BP: 126/60 (26 Nov 2023 11:23) (126/60 - 126/60)  BP(mean): --  RR: 16 (26 Nov 2023 11:23) (16 - 16)  SpO2: 96% (26 Nov 2023 11:23) (96% - 96%)    Parameters below as of 26 Nov 2023 11:23  Patient On (Oxygen Delivery Method): room air        PHYSICAL EXAM:  GENERAL: NAD, well-groomed, well-developed, pleasant  HEAD:  Atraumatic, Normocephalic  EYES: EOMI, PERRLA, conjunctiva and sclera clear  ENMT: No tonsillar erythema, exudates, or enlargement; Moist mucous membranes, Good dentition, No lesions  NECK: Supple, No JVD, Normal thyroid  NERVOUS SYSTEM:  Alert & Oriented X3, Good concentration; Motor Strength 5/5 B/L upper and lower extremities; DTRs 2+ intact and symmetric  CHEST/LUNG: Clear to percussion bilaterally; No rales, rhonchi, wheezing, or rubs  HEART: RRR, S1S2  ABDOMEN: Soft, Nontender, Nondistended; Bowel sounds present  EXTREMITIES:  2+ Peripheral Pulses, No clubbing, cyanosis, or edema  LYMPH: No lymphadenopathy noted  SKIN: No rashes or lesions

## 2023-11-26 NOTE — PATIENT PROFILE ADULT - FALL HARM RISK - DEVICES
Propranolol Pregnancy And Lactation Text: This medication is Pregnancy Category C and it isn't known if it is safe during pregnancy. It is excreted in breast milk. Cane

## 2023-11-26 NOTE — H&P ADULT - HISTORY OF PRESENT ILLNESS
88 y/o F with PMH CKD 4, colon cancer, breast cancer, anemia, HTN, thrombocytopenia c/o weakness x2 days associated with nausea, vomiting 2-3x/day, poor PO, fever, and chills. Endorses eating Thanksgiving dinner with her daughter and family, no new foods, or sick contacts. No additional family members with symptoms. Friday afternoon noted malaise and onset of symptoms. Hx significant for chronic diarrhea s/p colon resection. Follows with heme/onc outpatient - due for *** margret 88 y/o F with PMH CKD 4, colon cancer (adenocarcinoma)-5-2011. Stage IIIC-T4 N2a s/p right colectomy followed by chemo,  breast cancer, anemia, HTN, thrombocytopenia c/o weakness x2 days associated with nausea, vomiting 2-3x/day, poor PO, fever, and chills. Endorses eating Thanksgiving dinner with her daughter and family, no new foods, or sick contacts. No additional family members with symptoms. Friday afternoon noted malaise and onset of symptoms. Hx significant for chronic diarrhea s/p colon resection. Follows with heme/onc outpatient - due for procrit 11/27.

## 2023-11-26 NOTE — PATIENT PROFILE ADULT - FALL HARM RISK - RISK INTERVENTIONS

## 2023-11-26 NOTE — ED ADULT NURSE NOTE - OBJECTIVE STATEMENT
Pt came from home, accompanied by daughter for c/o abdominal pain and nausea since Thursday. Pt came from home, accompanied by daughter for c/o abdominal pain and nausea since Thursday. Pt reports that the abdominal pain is intermittent across the center of her abdomen and being unable to tolerate po intake. PMH colon ca, DM and breast ca. Pt denies fevers or urinary symptoms, but endorses chills. On arrival, pt with an oral temp= 99 F.

## 2023-11-26 NOTE — H&P ADULT - ASSESSMENT
Neutropenic fever  -Admit to medicine  -Imaging reviewed ***  -s/p Merrem in ED, continue for now  -Heme/onc consult ***     Vitals q8h  Diet: DASH  Activity: Bedrest   PT/OT as tolerated  DVT ppx: Lovenox  GI ppx: No indication for GI prophylaxis  Standard precautions: Aspiration, fall, safety, seizure, skin  Code Status  HCP  88 y/o F with PMH CKD 4, colon cancer, breast cancer, anemia, HTN, thrombocytopenia c/o weakness x2 days, nausea, vomiting, neutropenia, admitted for neutropenic fever.    Neutropenic fever  -Admit to medicine  -Imaging reviewed -   -s/p Merrem in ED, continue for now  -Heme/onc consult ***     Vitals q8h  Diet: DASH  Activity: Bedrest   PT/OT as tolerated  DVT ppx: Lovenox  GI ppx: No indication for GI prophylaxis  Standard precautions: Aspiration, fall, safety, seizure, skin  Code Status  HCP  88 y/o F with PMH CKD 4, colon cancer (adenocarcinoma)-5-2011. Stage IIIC-T4 N2a s/p right colectomy followed by chemo,  breast cancer, anemia, HTN, thrombocytopenia c/o weakness x2 days associated with nausea, vomiting 2-3x/day, poor PO, fever, and chills admitted for neutropenic fever.    Neutropenic fever  -Admit to medicine  -Imaging reviewed - findings known to patient   -s/p Merrem in ED, continue for now  -Heme/onc consulted    HTN  -Continue home meds - norvasc 10mg qd, doxazosin 1mg qd    CKD4  -Continue home meds    Persistently increasing elevated CEA. CT scan abdomen/pelvis 6/2016, showed enlarging mesenteric adenopathy. Endoscopic ultrasound guided FNA of the bryan-pancreatic, mesenteric mass was positive for malignant cells-adenocarcinoma associated with abundant mucin-compatible with known history of adenocarcinoma, colorectal type, mucinous variant.. 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    Vitals q8h  Diet: DASH  Activity: Bedrest   PT/OT as tolerated  DVT ppx: SCDs  Standard precautions: Aspiration, fall, safety, seizure, skin  Code Status: Full Code  HCP: Daughter [place] [phone] updated at bedside 89 y/o F with PMH CKD 4, colon cancer (adenocarcinoma)-5-2011. Stage IIIC-T4 N2a s/p right colectomy followed by chemo,  breast cancer, anemia, HTN, thrombocytopenia c/o weakness x2 days associated with nausea, vomiting 2-3x/day, poor PO, fever, and chills admitted for neutropenic fever.    Neutropenic fever  Colon cancer - adenocarcinoma   -Admit to medicine  -Imaging reviewed - findings known to patient   -s/p Merrem in ED, continue for now  -Heme/onc consulted  -Chronic diarrhea, stable, though will send stool r/o cdiff    HTN  -Continue home meds - norvasc 10mg qd, doxazosin 1mg bid    CKD4  -Continue home meds - sodium bicarb  -Nephro consult    Vitals q8h  Diet: DASH  Activity: Bedrest   PT/OT as tolerated  DVT ppx: SCDs  Standard precautions: Aspiration, fall, safety, seizure, skin  Code Status: Full Code  HCP: Daughter [place] [phone] updated at bedside 89 y/o F with PMH CKD 4, colon cancer (adenocarcinoma)-5-2011. Stage IIIC-T4 N2a s/p right colectomy followed by chemo,  breast cancer, anemia, HTN, thrombocytopenia c/o weakness x2 days associated with nausea, vomiting 2-3x/day, poor PO, fever, and chills admitted for neutropenic fever.    Neutropenic fever  Colon cancer - adenocarcinoma   -Admit to medicine  -Imaging reviewed - findings known to patient   -Follow urine, stool culture  -s/p Merrem in ED, continue for now  -Chronic diarrhea, stable, though will send stool r/o cdiff  -Heme/onc consulted  -IVF, follow repeat lactate    HTN  -Continue home meds - norvasc 10mg qd, doxazosin 1mg bid    CKD4  -Continue home meds - sodium bicarb  -Nephro consult    Vitals q8h  Diet: DASH  Activity: Bedrest   PT/OT as tolerated  DVT ppx: SCDs  Standard precautions: Aspiration, fall, safety, seizure, skin  Code Status: Full Code  HCP: Daughter [place] [phone] updated at bedside

## 2023-11-26 NOTE — ED PROVIDER NOTE - CLINICAL SUMMARY MEDICAL DECISION MAKING FREE TEXT BOX
89-year-old female with history of colon cancer, anemia, CKD hypertension thrombocytopenia presented to the ED with complaints of nausea nonbloody nonbilious emesis and diarrhea for the past 3 days with associated tactile fever chills.  Patient denies any chest pain shortness of breath, reports generalized abdominal discomfort.  Nonlocalized denies other complaints.  Reports hydrating adequately, but has not been eating 89-year-old female with history of colon cancer, anemia, CKD hypertension thrombocytopenia presented to the ED with complaints of nausea nonbloody nonbilious emesis and diarrhea for the past 3 days with associated tactile fever chills.  Patient denies any chest pain shortness of breath, reports generalized abdominal discomfort.  Nonlocalized denies other complaints.  Reports hydrating adequately, but has not been eating    severe neutropenia   sepsis order set   admit to medicine

## 2023-11-26 NOTE — H&P ADULT - NSHPLABSRESULTS_GEN_ALL_CORE
LABS:                        11.3   0.38  )-----------( 114      ( 26 Nov 2023 12:00 )             37.4     11-26    142  |  105  |  45<H>  ----------------------------<  119<H>  3.7   |  22  |  3.13<H>    Ca    8.4      26 Nov 2023 12:00    TPro  6.0  /  Alb  2.8<L>  /  TBili  1.0  /  DBili  x   /  AST  21  /  ALT  18  /  AlkPhos  112  11-26      Urinalysis Basic - ( 26 Nov 2023 12:00 )    Color: x / Appearance: x / SG: x / pH: x  Gluc: 119 mg/dL / Ketone: x  / Bili: x / Urobili: x   Blood: x / Protein: x / Nitrite: x   Leuk Esterase: x / RBC: x / WBC x   Sq Epi: x / Non Sq Epi: x / Bacteria: x       CAPILLARY BLOOD GLUCOSE            Urinalysis Basic - ( 26 Nov 2023 12:00 )    Color: x / Appearance: x / SG: x / pH: x  Gluc: 119 mg/dL / Ketone: x  / Bili: x / Urobili: x   Blood: x / Protein: x / Nitrite: x   Leuk Esterase: x / RBC: x / WBC x   Sq Epi: x / Non Sq Epi: x / Bacteria: x        RADIOLOGY & ADDITIONAL TESTS:    Consultant(s) Notes Reviewed:  [x ] YES  [ ] NO  Care Discussed with Consultants/Other Providers [ x] YES  [ ] NO  Imaging Personally Reviewed:  [ ] YES  [ ] NO LABS:                        11.3   0.38  )-----------( 114      ( 26 Nov 2023 12:00 )             37.4     11-26    142  |  105  |  45<H>  ----------------------------<  119<H>  3.7   |  22  |  3.13<H>    Ca    8.4      26 Nov 2023 12:00    TPro  6.0  /  Alb  2.8<L>  /  TBili  1.0  /  DBili  x   /  AST  21  /  ALT  18  /  AlkPhos  112  11-26      Urinalysis Basic - ( 26 Nov 2023 12:00 )    Color: x / Appearance: x / SG: x / pH: x  Gluc: 119 mg/dL / Ketone: x  / Bili: x / Urobili: x   Blood: x / Protein: x / Nitrite: x   Leuk Esterase: x / RBC: x / WBC x   Sq Epi: x / Non Sq Epi: x / Bacteria: x       CAPILLARY BLOOD GLUCOSE            Urinalysis Basic - ( 26 Nov 2023 12:00 )    Color: x / Appearance: x / SG: x / pH: x  Gluc: 119 mg/dL / Ketone: x  / Bili: x / Urobili: x   Blood: x / Protein: x / Nitrite: x   Leuk Esterase: x / RBC: x / WBC x   Sq Epi: x / Non Sq Epi: x / Bacteria: x        RADIOLOGY & ADDITIONAL TESTS:    < from: CT Abdomen and Pelvis No Cont (11.26.23 @ 13:00) >    IMPRESSION:  Right upper quadrant mesenteric mass inseparable from the second portion   of the duodenum and pancreatic head not significantly changed in size.   Previously noted internal air is no longer present.    Dilatation of the stomach and duodenum proximal to the lesion. Correlate   for symptoms of partial outlet obstruction.    Small ascites with interval increase.        --- End of Report ---    < end of copied text >      Consultant(s) Notes Reviewed:  [x] YES  [ ] NO  Care Discussed with Consultants/Other Providers [x] YES  [ ] NO  Imaging Personally Reviewed:  [x] YES  [ ] NO

## 2023-11-26 NOTE — ED PROVIDER NOTE - OBJECTIVE STATEMENT
89-year-old female with history of colon cancer, anemia, CKD hypertension thrombocytopenia presented to the ED with complaints of nausea nonbloody nonbilious emesis and diarrhea for the past 3 days with associated tactile fever chills.  Patient denies any chest pain shortness of breath, reports generalized abdominal discomfort.  Nonlocalized denies other complaints.  Reports hydrating adequately, but has not been eating

## 2023-11-27 ENCOUNTER — APPOINTMENT (OUTPATIENT)
Dept: HEMATOLOGY ONCOLOGY | Facility: CLINIC | Age: 88
End: 2023-11-27

## 2023-11-27 ENCOUNTER — APPOINTMENT (OUTPATIENT)
Dept: INFUSION THERAPY | Facility: HOSPITAL | Age: 88
End: 2023-11-27

## 2023-11-27 DIAGNOSIS — D46.9 MYELODYSPLASTIC SYNDROME, UNSPECIFIED: ICD-10-CM

## 2023-11-27 LAB
-  BACTEROIDES FRAGILIS: SIGNIFICANT CHANGE UP
-  BACTEROIDES FRAGILIS: SIGNIFICANT CHANGE UP
-  ENTEROBACTERALES: SIGNIFICANT CHANGE UP
-  ENTEROBACTERALES: SIGNIFICANT CHANGE UP
ALBUMIN SERPL ELPH-MCNC: 1.8 G/DL — LOW (ref 3.3–5)
ALBUMIN SERPL ELPH-MCNC: 1.8 G/DL — LOW (ref 3.3–5)
ALP SERPL-CCNC: 154 U/L — HIGH (ref 40–120)
ALP SERPL-CCNC: 154 U/L — HIGH (ref 40–120)
ALT FLD-CCNC: 61 U/L — HIGH (ref 10–45)
ALT FLD-CCNC: 61 U/L — HIGH (ref 10–45)
ANION GAP SERPL CALC-SCNC: 14 MMOL/L — SIGNIFICANT CHANGE UP (ref 5–17)
ANION GAP SERPL CALC-SCNC: 14 MMOL/L — SIGNIFICANT CHANGE UP (ref 5–17)
AST SERPL-CCNC: 90 U/L — HIGH (ref 10–40)
AST SERPL-CCNC: 90 U/L — HIGH (ref 10–40)
BILIRUB SERPL-MCNC: 1 MG/DL — SIGNIFICANT CHANGE UP (ref 0.2–1.2)
BILIRUB SERPL-MCNC: 1 MG/DL — SIGNIFICANT CHANGE UP (ref 0.2–1.2)
BUN SERPL-MCNC: 55 MG/DL — HIGH (ref 7–23)
BUN SERPL-MCNC: 55 MG/DL — HIGH (ref 7–23)
C DIFF BY PCR RESULT: SIGNIFICANT CHANGE UP
C DIFF BY PCR RESULT: SIGNIFICANT CHANGE UP
CALCIUM SERPL-MCNC: 7.1 MG/DL — LOW (ref 8.4–10.5)
CALCIUM SERPL-MCNC: 7.1 MG/DL — LOW (ref 8.4–10.5)
CHLORIDE SERPL-SCNC: 108 MMOL/L — SIGNIFICANT CHANGE UP (ref 96–108)
CHLORIDE SERPL-SCNC: 108 MMOL/L — SIGNIFICANT CHANGE UP (ref 96–108)
CO2 SERPL-SCNC: 15 MMOL/L — LOW (ref 22–31)
CO2 SERPL-SCNC: 15 MMOL/L — LOW (ref 22–31)
CREAT SERPL-MCNC: 3.82 MG/DL — HIGH (ref 0.5–1.3)
CREAT SERPL-MCNC: 3.82 MG/DL — HIGH (ref 0.5–1.3)
EGFR: 11 ML/MIN/1.73M2 — LOW
EGFR: 11 ML/MIN/1.73M2 — LOW
GLUCOSE SERPL-MCNC: 144 MG/DL — HIGH (ref 70–99)
GLUCOSE SERPL-MCNC: 144 MG/DL — HIGH (ref 70–99)
GRAM STN FLD: ABNORMAL
HCT VFR BLD CALC: 29.9 % — LOW (ref 34.5–45)
HCT VFR BLD CALC: 29.9 % — LOW (ref 34.5–45)
HGB BLD-MCNC: 9 G/DL — LOW (ref 11.5–15.5)
HGB BLD-MCNC: 9 G/DL — LOW (ref 11.5–15.5)
MCHC RBC-ENTMCNC: 30.1 GM/DL — LOW (ref 32–36)
MCHC RBC-ENTMCNC: 30.1 GM/DL — LOW (ref 32–36)
MCHC RBC-ENTMCNC: 30.2 PG — SIGNIFICANT CHANGE UP (ref 27–34)
MCHC RBC-ENTMCNC: 30.2 PG — SIGNIFICANT CHANGE UP (ref 27–34)
MCV RBC AUTO: 100.3 FL — HIGH (ref 80–100)
MCV RBC AUTO: 100.3 FL — HIGH (ref 80–100)
METHOD TYPE: SIGNIFICANT CHANGE UP
METHOD TYPE: SIGNIFICANT CHANGE UP
NRBC # BLD: 0 /100 WBCS — SIGNIFICANT CHANGE UP (ref 0–0)
NRBC # BLD: 0 /100 WBCS — SIGNIFICANT CHANGE UP (ref 0–0)
PLATELET # BLD AUTO: 78 K/UL — LOW (ref 150–400)
PLATELET # BLD AUTO: 78 K/UL — LOW (ref 150–400)
POTASSIUM SERPL-MCNC: 4.5 MMOL/L — SIGNIFICANT CHANGE UP (ref 3.5–5.3)
POTASSIUM SERPL-MCNC: 4.5 MMOL/L — SIGNIFICANT CHANGE UP (ref 3.5–5.3)
POTASSIUM SERPL-SCNC: 4.5 MMOL/L — SIGNIFICANT CHANGE UP (ref 3.5–5.3)
POTASSIUM SERPL-SCNC: 4.5 MMOL/L — SIGNIFICANT CHANGE UP (ref 3.5–5.3)
PROT SERPL-MCNC: 4.9 G/DL — LOW (ref 6–8.3)
PROT SERPL-MCNC: 4.9 G/DL — LOW (ref 6–8.3)
RBC # BLD: 2.98 M/UL — LOW (ref 3.8–5.2)
RBC # BLD: 2.98 M/UL — LOW (ref 3.8–5.2)
RBC # FLD: 16.4 % — HIGH (ref 10.3–14.5)
RBC # FLD: 16.4 % — HIGH (ref 10.3–14.5)
SODIUM SERPL-SCNC: 137 MMOL/L — SIGNIFICANT CHANGE UP (ref 135–145)
SODIUM SERPL-SCNC: 137 MMOL/L — SIGNIFICANT CHANGE UP (ref 135–145)
SPECIMEN SOURCE: SIGNIFICANT CHANGE UP
WBC # BLD: 9.39 K/UL — SIGNIFICANT CHANGE UP (ref 3.8–10.5)
WBC # BLD: 9.39 K/UL — SIGNIFICANT CHANGE UP (ref 3.8–10.5)
WBC # FLD AUTO: 9.39 K/UL — SIGNIFICANT CHANGE UP (ref 3.8–10.5)
WBC # FLD AUTO: 9.39 K/UL — SIGNIFICANT CHANGE UP (ref 3.8–10.5)

## 2023-11-27 PROCEDURE — 93306 TTE W/DOPPLER COMPLETE: CPT | Mod: 26

## 2023-11-27 PROCEDURE — 99232 SBSQ HOSP IP/OBS MODERATE 35: CPT

## 2023-11-27 PROCEDURE — 99223 1ST HOSP IP/OBS HIGH 75: CPT

## 2023-11-27 RX ORDER — PEMBROLIZUMAB 25 MG/ML
0 INJECTION, SOLUTION INTRAVENOUS
Refills: 0 | DISCHARGE

## 2023-11-27 RX ORDER — SODIUM CHLORIDE 9 MG/ML
1000 INJECTION, SOLUTION INTRAVENOUS
Refills: 0 | Status: DISCONTINUED | OUTPATIENT
Start: 2023-11-27 | End: 2023-11-30

## 2023-11-27 RX ADMIN — Medication 1 MILLIGRAM(S): at 18:06

## 2023-11-27 RX ADMIN — MEROPENEM 100 MILLIGRAM(S): 1 INJECTION INTRAVENOUS at 21:51

## 2023-11-27 RX ADMIN — MEROPENEM 100 MILLIGRAM(S): 1 INJECTION INTRAVENOUS at 11:09

## 2023-11-27 RX ADMIN — Medication 81 MILLIGRAM(S): at 11:20

## 2023-11-27 RX ADMIN — Medication 650 MILLIGRAM(S): at 06:25

## 2023-11-27 RX ADMIN — Medication 1 MILLIGRAM(S): at 06:26

## 2023-11-27 RX ADMIN — SODIUM CHLORIDE 75 MILLILITER(S): 9 INJECTION INTRAMUSCULAR; INTRAVENOUS; SUBCUTANEOUS at 04:00

## 2023-11-27 RX ADMIN — Medication 650 MILLIGRAM(S): at 16:27

## 2023-11-27 NOTE — CONSULT NOTE ADULT - SUBJECTIVE AND OBJECTIVE BOX
HPI:   Patient is a 89y female with history of colon cancer s/p colon resection in the past, chronic mesenteric mass, last chemo / immunotx last June, has a port, gets shots for anemia. She was feeling otherwise well until after Thanksgiving she began vomiting, developed some mostly right sided abdomen pain and the abdomen got very hard in the area. She came in yesterday and was profoundly neutropenic but had no recent chemo and now has a normal wbc. She has no further pain, she is growing bacteroides and an enterobacteraciae from the blood. She has some renal dysfunction but no dysuria or flank pain. she has chronic diarrhea, no change and no blood seen in stool or vomit. No sob, no icterus or dark urine. She reports having a stone in her bile duct in the past as well.     REVIEW OF SYSTEMS:  All other review of systems negative (Comprehensive ROS)    PAST MEDICAL & SURGICAL HISTORY:  History of breast cancer  right breast      Anemia      Hypertension      History of thrombocytopenia      History of herpes zoster      History of malignant neoplasm of parathyroid gland      Colon cancer      Chronic renal insufficiency      Thyroid nodule      Diabetes mellitus  NIDDM      History of cholecystectomy      History of partial colectomy  and appendectomy at the same time      History of lumpectomy of right breast  2007 and radiation and chemotherapy      Parathyroid adenoma  excision          Allergies    No Known Allergies    Intolerances        Antimicrobials Day #  :2  meropenem  IVPB 500 milliGRAM(s) IV Intermittent every 12 hours    Other Medications:  acetaminophen     Tablet .. 650 milliGRAM(s) Oral every 6 hours PRN  amLODIPine   Tablet 10 milliGRAM(s) Oral daily  aspirin enteric coated 81 milliGRAM(s) Oral daily  doxazosin 1 milliGRAM(s) Oral <User Schedule>  simethicone 80 milliGRAM(s) Chew three times a day PRN  sodium bicarbonate 650 milliGRAM(s) Oral three times a day  sodium chloride 0.9%. 1000 milliLiter(s) IV Continuous <Continuous>      FAMILY HISTORY:  FH: colon cancer (Sibling, Sibling)    FH: breast cancer (Sibling)        SOCIAL HISTORY:  Smoking: [ ]Yes x[ ]No  ETOH: [ ]Yes [ x]No  Drug Use: [ ]Yes [ x]No   [x ] Single[ ]    T(F): 98.6 (11-27-23 @ 11:14), Max: 100.6 (11-26-23 @ 16:25)  HR: 91 (11-27-23 @ 11:14)  BP: 99/45 (11-27-23 @ 11:14)  RR: 18 (11-27-23 @ 05:39)  SpO2: 91% (11-27-23 @ 05:39)  Wt(kg): --    PHYSICAL EXAM:  General: alert, no acute distress  Eyes:  anicteric, no conjunctival injection, no discharge  Oropharynx: no lesions or injection 	  Neck: supple, without adenopathy  Lungs: clear to auscultation  Heart: regular rate and rhythm; no murmur, rubs or gallops  Abdomen: soft, nondistended, nontender, without mass or organomegaly  Skin: no lesions  Extremities: no clubbing, cyanosis, or edema  Neurologic: alert, oriented, moves all extremities    LAB RESULTS:                        9.0    9.39  )-----------( 78       ( 27 Nov 2023 09:01 )             29.9     11-27    137  |  108  |  55<H>  ----------------------------<  144<H>  4.5   |  15<L>  |  3.82<H>    Ca    7.1<L>      27 Nov 2023 07:06    TPro  4.9<L>  /  Alb  1.8<L>  /  TBili  1.0  /  DBili  x   /  AST  90<H>  /  ALT  61<H>  /  AlkPhos  154<H>  11-27    LIVER FUNCTIONS - ( 27 Nov 2023 07:06 )  Alb: 1.8 g/dL / Pro: 4.9 g/dL / ALK PHOS: 154 U/L / ALT: 61 U/L / AST: 90 U/L / GGT: x           Urinalysis Basic - ( 27 Nov 2023 07:06 )    Color: x / Appearance: x / SG: x / pH: x  Gluc: 144 mg/dL / Ketone: x  / Bili: x / Urobili: x   Blood: x / Protein: x / Nitrite: x   Leuk Esterase: x / RBC: x / WBC x   Sq Epi: x / Non Sq Epi: x / Bacteria: x        MICROBIOLOGY:  RECENT CULTURES:  11-26 @ 14:05 .Blood Blood-Peripheral Blood Culture PCR    Growth in aerobic bottle: Gram Negative Rods  Growth in anaerobic bottle: Gram Negative Rods    Growth in aerobic bottle: Gram Negative Rods  Growth in anaerobic bottle: Gram Negative Rods          RADIOLOGY REVIEWED:  < from: CT Abdomen and Pelvis No Cont (11.26.23 @ 13:00) >    ACC: [id number] EXAM:  CT ABDOMEN AND PELVIS   ORDERED BY: CAROLINA SLAUGHTER   ROSAMARIA     PROCEDURE DATE:  11/26/2023          INTERPRETATION:  CLINICAL INFORMATION: Metastatic colon cancer. Abdominal   pain.    COMPARISON: CT 9/11/2023    CONTRAST/COMPLICATIONS:  IV Contrast: NONE  Oral Contrast: NONE  Complications: None reported at time of study completion    PROCEDURE:  PICC line    FINDINGS:  LOWER CHEST: Small pericardial effusion. Small bilateral pleural   effusions, right greater than left.    LIVER: Hepatic cysts and hypodense foci similar in appearance to prior   imaging 9/11/2023. Evaluation for metastasis is limited in the absence of   intravenous contrast.  BILE DUCTS: Normal caliber.  GALLBLADDER: Within normal limits.  SPLEEN: Within normal limits.  PANCREAS: Mesenteric mass inseparable from the pancreatic head.  ADRENALS: Within normal limits.  KIDNEYS/URETERS: The small nonobstructing left renal calculi. No   hydronephrosis. Bilateral renal cysts as well as a 1.6 cm indeterminate   anterior right upper pole renal lesion (301:32) without change.    BLADDER: Within normal limits.  REPRODUCTIVE ORGANS: Uterus and adnexa within normal limits.    BOWEL: Stomach and proximal duodenum are dilated to the level of a right   upper quadrant mesenteric mass inseparable from the second portion of the   duodenum and pancreatic head. The mass currently measures 8.8 x 6.3 cm   previously 8.1 x 6.5 cm. Previously noted air within the lesion is no   longer present. Right hemicolectomy. Colonic diverticulosis. No bowel   obstruction.  PERITONEUM: Small volume ascites with interval increase.  VESSELS: Atherosclerotic changes.  RETROPERITONEUM/LYMPH NODES: No lymphadenopathy.  ABDOMINAL WALL: Rectus diastases with protrusion of bowel loops.  BONES: Within normal limits.    IMPRESSION:  Right upper quadrant mesenteric mass inseparable from the second portion   of the duodenum and pancreatic head not significantly changed in size.   Previously noted internal air is no longer present.    Dilatation of the stomach and duodenum proximal to the lesion. Correlate   for symptoms of partial outlet obstruction.    Small ascites with interval increase.        --- End of Report ---          WAYNE BAKER MD; Attending Radiologist  This document has been electronically signed.  WAYNE BAKER MD; Attending Radiologist  This document has been electronically signed. Nov 26 2023  1:14PM    < end of copied text >          Impression:Elderly woman with h/o colon ca, off tx for it for over a year, has a chronic port and has a chronic mesenteric mass, has chronic diarrhea since colon resection for cancer, came in yesterdays with vomiting, had some abdomen pain and firm distension of the abdomen that has now resolved. She was severely septic on admission with profound neutropenia as a manifestation of sepsis now resolved. She is growing enteric polymicrobic organisms from mynor blood. suspect gi source. Suspect she may hae had a transient obstruction related to the mesenteric mass or maybe had transient incarceration of abdomen hernia since she had a very hard abdomen with pain in the area of the abdomen wall hernia when she got ill. I am less suspect of a gu or port source given anaerobic organism and polymicrobic bacteremia    Recommendations:  continue meropenem pending identification of the organisms in the blood  repeat blood cx in the am  surgery evaluation  further w/u to be determined

## 2023-11-27 NOTE — PROGRESS NOTE ADULT - SUBJECTIVE AND OBJECTIVE BOX
HANG MADERA  89y  Female    patient is resting comfortably at present and denies/chill/cough/SOB/abdominal pain/nausea/vomting    REVIEW OF SYSTEMS:    Cardiac HTN  Pulmonary no cough/SOB  GI mesenteric mass/colon cancer/ventral hernia s/p n/v/abdominal pain h/o diarrhea   CRI  Neuro no headache/numbness/dizziness  Heme anemia/thrombocytopenia/neutropenia  Endo thyroid nodule     FAMILY HISTORY:  FH: colon cancer (Sibling, Sibling)    FH: breast cancer (Sibling)      T(C): 37 (11-27-23 @ 11:14), Max: 38.1 (11-26-23 @ 16:25)  HR: 91 (11-27-23 @ 11:14) (70 - 91)  BP: 99/45 (11-27-23 @ 11:14) (99/45 - 116/42)  RR: 18 (11-27-23 @ 05:39) (16 - 20)  SpO2: 91% (11-27-23 @ 05:39) (90% - 100%)  Wt(kg): --Vital Signs Last 24 Hrs  T(C): 37 (27 Nov 2023 11:14), Max: 38.1 (26 Nov 2023 16:25)  T(F): 98.6 (27 Nov 2023 11:14), Max: 100.6 (26 Nov 2023 16:25)  HR: 91 (27 Nov 2023 11:14) (70 - 91)  BP: 99/45 (27 Nov 2023 11:14) (99/45 - 116/42)  BP(mean): --  RR: 18 (27 Nov 2023 05:39) (16 - 20)  SpO2: 91% (27 Nov 2023 05:39) (90% - 100%)    Parameters below as of 26 Nov 2023 21:30  Patient On (Oxygen Delivery Method): room air      No Known Allergies      PHYSICAL EXAM:    General non toxic in appearance NAD  Neck no JVD thyroid stable  Heart regular  Lungs clear  Abdomen non tender non distended normal bowel sounds no HSM/CVAT +ventral hernia  Extremities non tender no edema +pulses  Neurologic no acute focal chages      Consultant(s) Notes Reviewed:  [x ] YES  [ ] NO  Care Discussed with Consultants/Other Providers [ x] YES  [ ] NO    LABS:  CBC Full  -  ( 27 Nov 2023 09:01 )  WBC Count : 9.39 K/uL  RBC Count : 2.98 M/uL  Hemoglobin : 9.0 g/dL  Hematocrit : 29.9 %  Platelet Count - Automated : 78 K/uL  Mean Cell Volume : 100.3 fl  Mean Cell Hemoglobin : 30.2 pg  Mean Cell Hemoglobin Concentration : 30.1 gm/dL  Auto Neutrophil # : x  Auto Lymphocyte # : x  Auto Monocyte # : x  Auto Eosinophil # : x  Auto Basophil # : x  Auto Neutrophil % : x  Auto Lymphocyte % : x  Auto Monocyte % : x  Auto Eosinophil % : x  Auto Basophil % : x                          9.0    9.39  )-----------( 78       ( 27 Nov 2023 09:01 )             29.9     11-27    137  |  108  |  55<H>  ----------------------------<  144<H>  4.5   |  15<L>  |  3.82<H>    Ca    7.1<L>      27 Nov 2023 07:06    TPro  4.9<L>  /  Alb  1.8<L>  /  TBili  1.0  /  DBili  x   /  AST  90<H>  /  ALT  61<H>  /  AlkPhos  154<H>  11-27    LIVER FUNCTIONS - ( 27 Nov 2023 07:06 )  Alb: 1.8 g/dL / Pro: 4.9 g/dL / ALK PHOS: 154 U/L / ALT: 61 U/L / AST: 90 U/L / GGT: x           PT/INR - ( 26 Nov 2023 14:05 )   PT: 16.5 sec;   INR: 1.46 ratio         PTT - ( 26 Nov 2023 14:05 )  PTT:29.7 sec  Urinalysis Basic - ( 27 Nov 2023 07:06 )    Color: x / Appearance: x / SG: x / pH: x  Gluc: 144 mg/dL / Ketone: x  / Bili: x / Urobili: x   Blood: x / Protein: x / Nitrite: x   Leuk Esterase: x / RBC: x / WBC x   Sq Epi: x / Non Sq Epi: x / Bacteria: x          Lactate, Blood (11.26.23 @ 14:05)   Lactate, Blood: 3.2      Lactate, Blood (11.26.23 @ 15:30)   Lactate, Blood: 3.4        Lactate, Blood (11.26.23 @ 22:20)   Lactate, Blood: 2.6        Culture - Blood (11.26.23 @ 14:05)   Gram Stain:   Growth in aerobic bottle: Gram Negative Rods   Growth in anaerobic bottle: Gram Negative Rods  - Bacteroides fragilis: Detec  - Enterobacterales: Detec  Specimen Source: .Blood Blood-Peripheral  Organism: Blood Culture PCR  Culture Results:   Growth in anaerobic bottle: Gram Negative Rods   Growth in aerobic bottle: Gram Negative Rods   Direct identification is available within approximately 3-5   hours either by Blood Panel Multiplexed PCR or Direct   MALDI-TOF. Details: [email or web address]  Organism Identification: Blood Culture PCR  Method Type: PCR        Culture - Blood (11.26.23 @ 14:05)   Gram Stain:   Growth in aerobic bottle: Gram Negative Rods   Growth in anaerobic bottle: Gram Negative Rods  Specimen Source: .Blood Blood-Peripheral  Culture Results:   Growth in aerobic bottle: Gram Negative Rods   Growth in anaerobic bottle: Gram Negative Rods          RADIOLOGY & ADDITIONAL TESTS:        < from: CT Abdomen and Pelvis No Cont (11.26.23 @ 13:00) >    ACC: [id number] EXAM:  CT ABDOMEN AND PELVIS   ORDERED BY: CAROLINA BLANK     PROCEDURE DATE:  11/26/2023          INTERPRETATION:  CLINICAL INFORMATION: Metastatic colon cancer. Abdominal   pain.    COMPARISON: CT 9/11/2023    CONTRAST/COMPLICATIONS:  IV Contrast: NONE  Oral Contrast: NONE  Complications: None reported at time of study completion    PROCEDURE:  PICC line    FINDINGS:  LOWER CHEST: Small pericardial effusion. Small bilateral pleural   effusions, right greater than left.    LIVER: Hepatic cysts and hypodense foci similar in appearance to prior   imaging 9/11/2023. Evaluation for metastasis is limited in the absence of   intravenous contrast.  BILE DUCTS: Normal caliber.  GALLBLADDER: Within normal limits.  SPLEEN: Within normal limits.  PANCREAS: Mesenteric mass inseparable from the pancreatic head.  ADRENALS: Within normal limits.  KIDNEYS/URETERS: The small nonobstructing left renal calculi. No   hydronephrosis. Bilateral renal cysts as well as a 1.6 cm indeterminate   anterior right upper pole renal lesion (301:32) without change.    BLADDER: Within normal limits.  REPRODUCTIVE ORGANS: Uterus and adnexa within normal limits.    BOWEL: Stomach and proximal duodenum are dilated to the level of a right   upper quadrant mesenteric mass inseparable from the second portion of the   duodenum and pancreatic head. The mass currently measures 8.8 x 6.3 cm   previously 8.1 x 6.5 cm. Previously noted air within the lesion is no   longer present. Right hemicolectomy. Colonic diverticulosis. No bowel   obstruction.  PERITONEUM: Small volume ascites with interval increase.  VESSELS: Atherosclerotic changes.  RETROPERITONEUM/LYMPH NODES: No lymphadenopathy.  ABDOMINAL WALL: Rectus diastases with protrusion of bowel loops.  BONES: Within normal limits.    IMPRESSION:  Right upper quadrant mesenteric mass inseparable from the second portion   of the duodenum and pancreatic head not significantly changed in size.   Previously noted internal air is no longer present.    Dilatation of the stomach and duodenum proximal to the lesion. Correlate   for symptoms of partial outlet obstruction.    Small ascites with interval increase.        --- End of Report ---          WAYNE BAKER MD; Attending Radiologist  This document has been electronically signed.  WAYNE BAKER MD; Attending Radiologist  This document has been electronically signed. Nov 26 2023  1:14PM    < end of copied text >        < from: Xray Chest 1 View- PORTABLE-Urgent (11.26.23 @ 14:59) >    ACC: [id number] EXAM:  XR CHEST PORTABLE URGENT 1V   ORDERED BY: CAROLINA BLANK     PROCEDURE DATE:  11/26/2023          INTERPRETATION:  Portable chest radiograph    CLINICAL INFORMATION: Screening chest x-ray    TECHNIQUE:  Portable  AP chest radiograph.    COMPARISON: None. .    FINDINGS:  CATHETERS AND TUBES: Mediport catheter tip in SVC.    PULMONARY: The visualized lungs are clear of airspace consolidations or   pleural effusions.   No pneumothorax.    HEART/VASCULAR: The  heart is mildly enlarged in transverse diameter.    BONES: Visualized osseous thorax intact.    IMPRESSION: Mild cardiomegaly.  No radiographic evidence of active chest disease.    --- End of Report ---        JOJO LAINEZ MD; Attending Radiologist  This document has been electronically signed. Nov 26 2023  3:41PM    < end of copied text >          < from: 12 Lead ECG (11.26.23 @ 14:10) >    Ventricular Rate 85 BPM    Atrial Rate 85 BPM    P-R Interval 250 ms    QRS Duration 94 ms    Q-T Interval 364 ms    QTC Calculation(Bazett) 433 ms    P Axis 69 degrees    R Axis -37 degrees    T Axis 12 degrees    Diagnosis Line Sinus rhythm with 1st degree AV block  Left axis deviation  Abnormal ECG  When compared with ECG of 03-JUL-2023 16:27,  Sinus rhythm is no longer with 2nd degree AV block (Mobitz I)  Confirmed by Everton Perez ([id number]) on 11/27/2023 7:29:01 AM    < end of copied text >      acetaminophen     Tablet .. 650 milliGRAM(s) Oral every 6 hours PRN  aspirin enteric coated 81 milliGRAM(s) Oral daily  doxazosin 1 milliGRAM(s) Oral <User Schedule>  meropenem  IVPB 500 milliGRAM(s) IV Intermittent every 12 hours  simethicone 80 milliGRAM(s) Chew three times a day PRN  sodium bicarbonate 650 milliGRAM(s) Oral three times a day  sodium chloride 0.9%. 1000 milliLiter(s) IV Continuous <Continuous>

## 2023-11-27 NOTE — CONSULT NOTE ADULT - ASSESSMENT
IMPRESSION: Despite CT evidence of duodenal obstruction, the patient was tolerating regular diet tonight and denies any abdominal pain, nausea or further vomiting.    PLAN: No indication of any surgical problems at this time

## 2023-11-27 NOTE — PROGRESS NOTE ADULT - PROBLEM SELECTOR PLAN 1
Patient with pancytopenia, previous workup including bone marrow biopsies, most recent in August 2023 showing normocellular marrow with trilineage hematopoiesis with maturation and increased iron store with a Tier II variant of potential clinical significance / abnormal genomic alterations TP53 [id number].  Evidence of nucleated RBCs in the periphery may suggest additional proof of an underlying bone marrow dysplasia,?  MDS.  Patient treated in ambulatory with Procrit.  Unlikely to be a candidate for bone marrow studies or further hematologic investigations.  Case discussed with patient's hematologist, Dr. Edmonds, who concurs and is in agreement with the plan.  Will continue monitor CBC.

## 2023-11-27 NOTE — PROGRESS NOTE ADULT - SUBJECTIVE AND OBJECTIVE BOX
HANG MADERA,  89y Female  MRN: [id number]  ATTENDING: Dr. Nicolle Vicente      HPI:  89F, PMHx colon adenocarcinoma (2011), s/p right colectomy followed by chemotherapy, CKD 4, breast cancer, HTN, anemia, thrombocytopenia admitted with nausea, vomiting, malaise.  Patient treated in ambulatory with Procrit for anemia, under the care of Dr. Edmonds.  Started on empiric antibiotic coverage.  Hematologic profile consistent with WBC 0.38K platelet 114,000, hemoglobin 11.3 g/dL. CT head 11/18 showed chronic microvascular changes, no acute intracranial pathology.  Patient found hypothermic.  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  In the past patient had bone marrow biopsy (August 2023) which showed normocellular marrow with trilineage hematopoiesis with maturation and increased iron stores, with a Tier II variant of potential clinical significance / abnormal genomic alterations TP53 [id number].Also present NRBCs suggestive of possible MDS     PAST MEDICAL & SURGICAL HISTORY:  History of breast cancer right breast  Anemia  Hypertension  History of thrombocytopenia  History of herpes zoster  History of malignant neoplasm of parathyroid gland  Colon cancer  Chronic renal insufficiency  Thyroid nodule  Diabetes mellitus  NIDDM  History of cholecystectomy  History of partial colectomy and appendectomy at the same time  History of lumpectomy of right breast- 2007 and radiation and chemotherapy  Parathyroid adenoma- excision    MEDICATION:  aspirin enteric coated 81 milliGRAM(s) Oral daily  doxazosin 1 milliGRAM(s) Oral <User Schedule>  meropenem  IVPB 500 milliGRAM(s) IV Intermittent every 12 hours  sodium bicarbonate 650 milliGRAM(s) Oral three times a day  sodium chloride 0.9%. 1000 milliLiter(s) IV Continuous <Continuous>    ALLERGIES:  No Known Allergies    FAMILY HISTORY:  Reviewed, non-contributory: [x ]     SOCIAL HISTORY:  Tobacco: YES [ ]  ; NO [ x]; Former smoker [ ]  Alcohol:   YES [ ]  ; NO [x ]; Social alcohol user [ ]    REVIEW SYSTEMS:  Constitutional: no fever, chills, night sweats, no weight loss  HEENT: denies visual changes; no oral ulcers, dysphagia, no epistaxis;   Respiratory: no dyspnea , wheezing, cough, hemoptysis  Cardiovascular: denies acute chest pain, palpitations  GI: no loss of appetite, dark stools, or abdominal tenderness / pain; no change in bowel habits.  Musculoskeletal: no new back pain, bone/ joint pain ,no extremity swelling  Integumentary: denies pruritus; no skin rash, bruises, no  suspicious skin lesions  Neurologic: denies peripheral numbness, no dizziness, no gait problems.  Heme: no reported easy bruisability; no lymph node enlargement    VITALS:  T(C): 37, Max: 38.1 (11-26-23 @ 16:25)  T(F): 98.6, Max: 100.6 (11-26-23 @ 16:25)  HR: 91 (70 - 91)  BP: 99/45 (99/45 - 116/42)  SpO2: 91% (90% - 100%)    PHYSICAL EXAM:  Constitutional: alert, well developed  HEENT: normocephalic, anicteric sclerae, no mucositis or thrush  Respiratory: bilateral clear to auscultation anteriorly  Cardiovascular : S1, S2 regular, rhythmic, no murmurs, gallops or rubs  Abdomen: soft, distended, + normoactive BS, no palpable HS- megaly  Extremities: no tenderness;  -c/c/e, pulses equal bilaterally  Integumentary: no rashes, scars, or lesions suggestive of malignancy  Neurologic: no gross focal deficits    LABS:  (11-27) WBC: 9.39 K/uL,Hemoglobin: 9.0 g/dL, Hematocrit: 29.9 %,  Platelet: 78 K/uL  (11-27) Na: 137 mmol/L ; K: 4.5 mmol/L ; BUN: 55 mg/dL ; Cr: 3.82 mg/dL.  PT/INR - ( 26 Nov 2023 14:05 )   PT: 16.5 sec;   INR: 1.46 ratio    PTT - ( 26 Nov 2023 14:05 )  PTT:29.7 sec    RADIOLOGY:      ACC: [id number] EXAM:  CT ABDOMEN AND PELVIS   ORDERED BY: CAROLINA BLANK     PROCEDURE DATE:  11/26/2023          INTERPRETATION:  CLINICAL INFORMATION: Metastatic colon cancer. Abdominal   pain.    COMPARISON: CT 9/11/2023    CONTRAST/COMPLICATIONS:  IV Contrast: NONE  Oral Contrast: NONE  Complications: None reported at time of study completion    PROCEDURE:  PICC line    FINDINGS:  LOWER CHEST: Small pericardial effusion. Small bilateral pleural   effusions, right greater than left.    LIVER: Hepatic cysts and hypodense foci similar in appearance to prior   imaging 9/11/2023. Evaluation for metastasis is limited in the absence of   intravenous contrast.  BILE DUCTS: Normal caliber.  GALLBLADDER: Within normal limits.  SPLEEN: Within normal limits.  PANCREAS: Mesenteric mass inseparable from the pancreatic head.  ADRENALS: Within normal limits.  KIDNEYS/URETERS: The small nonobstructing left renal calculi. No   hydronephrosis. Bilateral renal cysts as well as a 1.6 cm indeterminate   anterior right upper pole renal lesion (301:32) without change.    BLADDER: Within normal limits.  REPRODUCTIVE ORGANS: Uterus and adnexa within normal limits.    BOWEL: Stomach and proximal duodenum are dilated to the level of a right   upper quadrant mesenteric mass inseparable from the second portion of the   duodenum and pancreatic head. The mass currently measures 8.8 x 6.3 cm   previously 8.1 x 6.5 cm. Previously noted air within the lesion is no   longer present. Right hemicolectomy. Colonic diverticulosis. No bowel   obstruction.  PERITONEUM: Small volume ascites with interval increase.  VESSELS: Atherosclerotic changes.  RETROPERITONEUM/LYMPH NODES: No lymphadenopathy.  ABDOMINAL WALL: Rectus diastases with protrusion of bowel loops.  BONES: Within normal limits.    IMPRESSION:  Right upper quadrant mesenteric mass inseparable from the second portion   of the duodenum and pancreatic head not significantly changed in size.   Previously noted internal air is no longer present.    Dilatation of the stomach and duodenum proximal to the lesion. Correlate   for symptoms of partial outlet obstruction.    Small ascites with interval increase.

## 2023-11-27 NOTE — CONSULT NOTE ADULT - PROBLEM SELECTOR RECOMMENDATION 9
Patient with pancytopenia, previous workup including bone marrow biopsies, most recent in August 2023 showing normocellular marrow with trilineage hematopoiesis with maturation and increased iron store with a Tier II variant of potential clinical significance / abnormal genomic alterations TP53 [id number].  Evidence of nucleated RBCs in the periphery may suggest additional proof of an underlying bone marrow dysplasia,?  MDS.  Patient treated in ambulatory with Procrit.  Unlikely to be a candidate for bone marrow studies or further hematologic investigations.  Case discussed with patient's hematologist, Dr. Edmonds, who concurs and is in agreement with the plan.  Will continue monitor CBC.

## 2023-11-27 NOTE — PROVIDER CONTACT NOTE (CRITICAL VALUE NOTIFICATION) - ACTION/TREATMENT ORDERED:
Notified to Dr. Mazariegos for result. no further ordered at this time. maintained iv fluid NS at 75ml/hr.

## 2023-11-27 NOTE — CONSULT NOTE ADULT - SUBJECTIVE AND OBJECTIVE BOX
NEPHROLOGY CONSULTATION    CHIEF COMPLAINT: fever    HPI:  Pt is 88 yo F with PMH CKD 4, colon cancer (adenocarcinoma) s/p R colectomy followed by chemo, breast cancer, anemia, HTN, thrombocytopenia c/o weakness x few days associated with nausea, vomiting 2-3 x day, poor PO, fever, and chills. Friday noted malaise and onset of symptoms. Hx significant for chronic diarrhea s/p colon resection. Follows with heme/onc outpatient - due for procrit 11/27. Dx w/GNR bacteremia. Asked to eval for MAGDI/CKD.     ROS:  as above    Allergies:  No Known Allergies    PAST MEDICAL & SURGICAL HISTORY:  History of breast cancer  right breast  Anemia  Hypertension  History of thrombocytopenia  History of herpes zoster  History of malignant neoplasm of parathyroid gland  Colon cancer  Chronic renal insufficiency  Thyroid nodule  Diabetes mellitus  NIDDM  History of cholecystectomy  History of partial colectomy  and appendectomy at the same time  History of lumpectomy of right breast  2007 and radiation and chemotherapy  Parathyroid adenoma  excision    SOCIAL HISTORY:  negative    FAMILY HISTORY:  FH: colon cancer (Sibling, Sibling)  FH: breast cancer (Sibling)    MEDICATIONS  (STANDING):  amLODIPine   Tablet 10 milliGRAM(s) Oral daily  aspirin enteric coated 81 milliGRAM(s) Oral daily  doxazosin 1 milliGRAM(s) Oral <User Schedule>  meropenem  IVPB 500 milliGRAM(s) IV Intermittent every 12 hours  sodium bicarbonate 650 milliGRAM(s) Oral three times a day  sodium chloride 0.9%. 1000 milliLiter(s) (75 mL/Hr) IV Continuous <Continuous>    Home Medications:  amLODIPine 10 mg oral tablet: 1 tab(s) orally once a day (27 Nov 2023 12:07)  doxazosin 1 mg oral tablet: 1 tab(s) orally 2 times a day (26 Nov 2023 15:32)  sodium bicarbonate 650 mg oral tablet: 3 tab(s) orally every 8 hours (27 Nov 2023 12:06)    Vital Signs Last 24 Hrs  T(C): 37 (11-27-23 @ 11:14), Max: 38.1 (11-26-23 @ 16:25)  T(F): 98.6 (11-27-23 @ 11:14), Max: 100.6 (11-26-23 @ 16:25)  HR: 91 (11-27-23 @ 11:14) (70 - 91)  BP: 99/45 (11-27-23 @ 11:14) (99/45 - 116/42)  RR: 18 (11-27-23 @ 05:39) (16 - 20)  SpO2: 91% (11-27-23 @ 05:39) (90% - 100%)    LABS:                        9.0    9.39  )-----------( 78       ( 27 Nov 2023 09:01 )             29.9     11-27    137  |  108  |  55<H>  ----------------------------<  144<H>  4.5   |  15<L>  |  3.82<H>    Ca    7.1<L>      27 Nov 2023 07:06    TPro  4.9<L>  /  Alb  1.8<L>  /  TBili  1.0  /  DBili  x   /  AST  90<H>  /  ALT  61<H>  /  AlkPhos  154<H>  11-27    LIVER FUNCTIONS - ( 27 Nov 2023 07:06 )  Alb: 1.8 g/dL / Pro: 4.9 g/dL / ALK PHOS: 154 U/L / ALT: 61 U/L / AST: 90 U/L / GGT: x           Culture - Blood (collected 26 Nov 2023 14:05)  Source: .Blood Blood-Peripheral  Gram Stain (27 Nov 2023 08:01):    Growth in aerobic bottle: Gram Negative Rods    Growth in anaerobic bottle: Gram Negative Rods  Preliminary Report (27 Nov 2023 08:01):    Growth in aerobic bottle: Gram Negative Rods    Growth in anaerobic bottle: Gram Negative Rods    Culture - Blood (collected 26 Nov 2023 14:05)  Source: .Blood Blood-Peripheral  Gram Stain (27 Nov 2023 08:44):    Growth in aerobic bottle: Gram Negative Rods    Growth in anaerobic bottle: Gram Negative Rods  Preliminary Report (27 Nov 2023 08:45):    Growth in anaerobic bottle: Gram Negative Rods    Growth in aerobic bottle: Gram Negative Rods    Direct identification is available within approximately 3-5    hours either by Blood Panel Multiplexed PCR or Direct    MALDI-TOF. Details: [email or web address]  Organism: Blood Culture PCR (27 Nov 2023 09:46)  Organism: Blood Culture PCR (27 Nov 2023 09:46)    A/P:    full consult to follow    [phone]           NEPHROLOGY CONSULTATION    CHIEF COMPLAINT: fever    HPI:  Pt is 89 yo F with PMH CKD 4, colon cancer s/p R colectomy followed by chemo, hx breast cancer, anemia, HTN, thrombocytopenia c/o weakness x few days associated with nausea, vomiting 2-3 x day, poor PO, fever, and chills. Friday noted malaise and worsening chills. Hx significant for chronic diarrhea s/p colon resection. Follows with heme/onc outpatient, on procrit. Dx w/GNR bacteremia. Asked to eval for MAGDI/CKD. Awake, alert, denies CP, SOB, no N/V today, diarrhea "unchanged from usual", no dysuria.     ROS:  as above    Allergies:  No Known Allergies    PAST MEDICAL & SURGICAL HISTORY:  History of breast cancer  right breast  Anemia  Hypertension  History of thrombocytopenia  History of herpes zoster  History of malignant neoplasm of parathyroid gland  Colon cancer  Chronic renal insufficiency  Thyroid nodule  Diabetes mellitus  NIDDM  History of cholecystectomy  History of partial colectomy  and appendectomy at the same time  History of lumpectomy of right breast  2007 and radiation and chemotherapy  Parathyroid adenoma  excision    SOCIAL HISTORY:  negative    FAMILY HISTORY:  FH: colon cancer (Sibling, Sibling)  FH: breast cancer (Sibling)    MEDICATIONS  (STANDING):  amLODIPine   Tablet 10 milliGRAM(s) Oral daily  aspirin enteric coated 81 milliGRAM(s) Oral daily  doxazosin 1 milliGRAM(s) Oral <User Schedule>  meropenem  IVPB 500 milliGRAM(s) IV Intermittent every 12 hours  sodium bicarbonate 650 milliGRAM(s) Oral three times a day  sodium chloride 0.9%. 1000 milliLiter(s) (75 mL/Hr) IV Continuous <Continuous>    Home Medications:  amLODIPine 10 mg oral tablet: 1 tab(s) orally once a day (27 Nov 2023 12:07)  doxazosin 1 mg oral tablet: 1 tab(s) orally 2 times a day (26 Nov 2023 15:32)  sodium bicarbonate 650 mg oral tablet: 3 tab(s) orally every 8 hours (27 Nov 2023 12:06)    Vital Signs Last 24 Hrs  T(C): 37 (11-27-23 @ 11:14), Max: 38.1 (11-26-23 @ 16:25)  T(F): 98.6 (11-27-23 @ 11:14), Max: 100.6 (11-26-23 @ 16:25)  HR: 91 (11-27-23 @ 11:14) (70 - 91)  BP: 99/45 (11-27-23 @ 11:14) (99/45 - 116/42)  RR: 18 (11-27-23 @ 05:39) (16 - 20)  SpO2: 91% (11-27-23 @ 05:39) (90% - 100%)    s1s2  b/l air entry  soft, ND  no edema    LABS:                        9.0    9.39  )-----------( 78       ( 27 Nov 2023 09:01 )             29.9     11-27    137  |  108  |  55<H>  ----------------------------<  144<H>  4.5   |  15<L>  |  3.82<H>    Ca    7.1<L>      27 Nov 2023 07:06    TPro  4.9<L>  /  Alb  1.8<L>  /  TBili  1.0  /  DBili  x   /  AST  90<H>  /  ALT  61<H>  /  AlkPhos  154<H>  11-27    LIVER FUNCTIONS - ( 27 Nov 2023 07:06 )  Alb: 1.8 g/dL / Pro: 4.9 g/dL / ALK PHOS: 154 U/L / ALT: 61 U/L / AST: 90 U/L / GGT: x           Culture - Blood (collected 26 Nov 2023 14:05)  Source: .Blood Blood-Peripheral  Gram Stain (27 Nov 2023 08:01):    Growth in aerobic bottle: Gram Negative Rods    Growth in anaerobic bottle: Gram Negative Rods  Preliminary Report (27 Nov 2023 08:01):    Growth in aerobic bottle: Gram Negative Rods    Growth in anaerobic bottle: Gram Negative Rods    Culture - Blood (collected 26 Nov 2023 14:05)  Source: .Blood Blood-Peripheral  Gram Stain (27 Nov 2023 08:44):    Growth in aerobic bottle: Gram Negative Rods    Growth in anaerobic bottle: Gram Negative Rods  Preliminary Report (27 Nov 2023 08:45):    Growth in anaerobic bottle: Gram Negative Rods    Growth in aerobic bottle: Gram Negative Rods    Direct identification is available within approximately 3-5    hours either by Blood Panel Multiplexed PCR or Direct    MALDI-TOF. Details: [email or web address]  Organism: Blood Culture PCR (27 Nov 2023 09:46)  Organism: Blood Culture PCR (27 Nov 2023 09:46)    A/P:    Hx colon cancer s/p R colectomy, chemo  CKD 4 iso Keytruda (d/c-d ~ 1 year ago)  Adm w/B farg, Enterobacterales bacteremia  CT A/P w/RUQ mesenteric mass, no hydro  Abx per ID   Septic MAGDI/CKD 4  IVF w/Bicarb as ordered  Avoid nephrotoxins  Avoid hypotension  F/u BMP, CBC, UO  Prognosis is guarded overall    [phone]

## 2023-11-27 NOTE — CHART NOTE - NSCHARTNOTEFT_GEN_A_CORE
Off Service NP Hospitalist Note  Case Discussed with Dr. Michele    88 y/o F with PMH CKD 4, colon cancer (adenocarcinoma)-5-2011. Stage IIIC-T4 N2a s/p right colectomy followed by chemo,  breast cancer, anemia, HTN, thrombocytopenia c/o weakness x2 days associated with nausea, vomiting 2-3x/day, poor PO, fever, and chills. Endorses eating Thanksgiving dinner with her daughter and family, no new foods, or sick contacts. No additional family members with symptoms. Friday afternoon noted malaise and onset of symptoms. Hx significant for chronic diarrhea s/p colon resection. Follows with heme/onc outpatient - due for procrit 11/27.    *Neutropenic Fever  *Pancytopenia  *Polymicrobic Bacteremia likely GI source  Pt admitted with fever, nausea and vomiting  CT AP showed RT upper quadrant mesenteric mass, dilitation of stomach and duodenum proximal to lesion, correlate for partial outlet obstruction  ID appreciated, continue meropenem pending identification of organisms in blood  Follow repeat blood cultures, Surgery evaluation - Dr. Payton aware  Monitor labs, fever curve, further workup to be determined    *Myelodysplasia  pt with hx of Colon and Breast Cancer, Heme/Onc appreciated  followed by Dr. Edmonds, recent bone marrow biopsies revealed evidence of nucleated RBCs  Treated with procrit outpatient, unlikely to be good candidate for further bone marrow studies  Continue to monitor CBC    *CKD 4  Nephrology evaluation pending  Continue home sodium bicarb for now  Avoid nephrotoxins, monitor renal indices    *HTN  chronic condition  continue home meds - norvasc 10 and doxazosin 1 BID    *DVT PPx  SCDs    HCP daughter [place]  updated, all questions answered

## 2023-11-27 NOTE — PROVIDER CONTACT NOTE (CRITICAL VALUE NOTIFICATION) - TEST AND RESULT REPORTED:
Blood culture collected on 11/26 first bottle: grow in both bottle, gram negative joy.  2nd bottle : grow in aerobic bottle , gram negative joy.
Lactate level

## 2023-11-27 NOTE — CONSULT NOTE ADULT - SUBJECTIVE AND OBJECTIVE BOX
Full note to follow    PFSH: All noncontributory    MEDICATIONS REVIEWED:acetaminophen     Tablet .. 650 milliGRAM(s) Oral every 6 hours PRN  aspirin enteric coated 81 milliGRAM(s) Oral daily  dextrose 5% 1000 milliLiter(s) IV Continuous <Continuous>  meropenem  IVPB 500 milliGRAM(s) IV Intermittent every 12 hours  simethicone 80 milliGRAM(s) Chew three times a day PRN      ALLERGIES:No Known Allergies      REVIEW OF SYSTEMS:  Comprehensive Review of Systems negative except as noted in HPI      PHYSICAL EXAMINATION:  RESPIRATORY: Clear to auscultation bilaterally, respirations non-labored.  CARDIAC: RR, normal S1S2, no murmurs.  ABDOMEN: soft, BS present, no masses, reducible ventral hernia,  no peritoneal signs, no KLS, nontender  VASCULAR: Equal and normal pulses.  MUSCULOSKELETAL: Full ROM, no deformities.      T(C): 36.8 (11-27-23 @ 19:50), Max: 37.1 (11-27-23 @ 15:57)  HR: 85 (11-27-23 @ 19:50) (84 - 91)  BP: 121/49 (11-27-23 @ 19:50) (99/45 - 121/49)  RR: 20 (11-27-23 @ 19:50) (15 - 20)  SpO2: 93% (11-27-23 @ 19:50) (91% - 94%)                          9.0    9.39  )-----------( 78       ( 27 Nov 2023 09:01 )             29.9       11-27    137  |  108  |  55<H>  ----------------------------<  144<H>  4.5   |  15<L>  |  3.82<H>    Ca    7.1<L>      27 Nov 2023 07:06    TPro  4.9<L>  /  Alb  1.8<L>  /  TBili  1.0  /  DBili  x   /  AST  90<H>  /  ALT  61<H>  /  AlkPhos  154<H>  11-27

## 2023-11-27 NOTE — PROGRESS NOTE ADULT - ASSESSMENT
Bacteremia - patient is clinically stable. will continue antibiotics as per ID  Neutropenia - improved. will continue isolation precautions. will continue antibiotics. await Heme consult  Fever - patient is currently afebrile. will continue antibiotics  HTN - BP is stable. will continue to monitor BP  CRI - patient is clinically stable. nephrology is following. will follow BMP  Mesenteric mas/ventral hernia/bowel obstruction - patient is currently GI stable. will get surgery consult        Case discussed with ID/NP  Called shania Ochoa ([phone])  message left

## 2023-11-27 NOTE — CONSULT NOTE ADULT - SUBJECTIVE AND OBJECTIVE BOX
HANG MADERA,  89y Female  MRN: [id number]  ATTENDING: Dr. Nicolle Vicente      HPI:  89F, PMHx colon adenocarcinoma (2011), s/p right colectomy followed by chemotherapy, CKD 4, breast cancer, HTN, anemia, thrombocytopenia admitted with nausea, vomiting, malaise.  Patient treated in ambulatory with Procrit for anemia, under the care of Dr. Edmonds.  Started on empiric antibiotic coverage.  Hematologic profile consistent with WBC 0.38K platelet 114,000, hemoglobin 11.3 g/dL. CT head 11/18 showed chronic microvascular changes, no acute intracranial pathology.  Patient found hypothermic.  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  In the past patient had bone marrow biopsy (August 2023) which showed normocellular marrow with trilineage hematopoiesis with maturation and increased iron stores, with a Tier II variant of potential clinical significance / abnormal genomic alterations [id number].Also present NRBCs suggestive of possible MDS .    PAST MEDICAL & SURGICAL HISTORY:  History of breast cancerright breast  Anemia  Hypertension  History of thrombocytopenia  History of herpes zoster  History of malignant neoplasm of parathyroid gland  Colon cancer  Chronic renal insufficiency  Thyroid nodule  Diabetes mellitusNIDDM  History of cholecystectomy  History of partial colectomy  and appendectomy at the same time  History of lumpectomy of right breast  2007 and radiation and chemotherapy  Parathyroid adenoma excision    MEDICATION:  aspirin enteric coated 81 milliGRAM(s) Oral daily  doxazosin 1 milliGRAM(s) Oral <User Schedule>  meropenem  IVPB 500 milliGRAM(s) IV Intermittent every 12 hours  sodium bicarbonate 650 milliGRAM(s) Oral three times a day  sodium chloride 0.9%. 1000 milliLiter(s) IV Continuous <Continuous>    ALLERGIES:  No Known Allergies    FAMILY HISTORY:  Reviewed, non-contributory: [ x]     SOCIAL HISTORY:  Tobacco: YES [ ]  ; NO [x ]; Former smoker [ ]  Alcohol:   YES [ ]  ; NO [x ]; Social alcohol user [ ]    REVIEW SYSTEMS:  Constitutional: no fever, chills, night sweats, no weight loss  HEENT: denies visual changes; no oral ulcers, dysphagia, no epistaxis;   Respiratory: no dyspnea , wheezing, cough, hemoptysis  Cardiovascular: denies acute chest pain, palpitations  GI: no loss of appetite, dark stools, or abdominal tenderness / pain; no change in bowel habits.  Musculoskeletal: no new back pain, bone/ joint pain ,no extremity swelling  Integumentary: denies pruritus; no skin rash, bruises, no  suspicious skin lesions  Neurologic: denies peripheral numbness, no dizziness, no gait problems.  Heme: no reported easy bruisability; no lymph node enlargement    VITALS:  T(C): 37, Max: 38.1 (11-26-23 @ 16:25)  T(F): 98.6, Max: 100.6 (11-26-23 @ 16:25)  HR: 91 (72 - 91)  BP: 99/45 (99/45 - 116/42)  SpO2: 91% (90% - 98%)    PHYSICAL EXAM:  Constitutional: alert, well developed  HEENT: normocephalic, anicteric sclerae, no mucositis or thrush  Respiratory: bilateral clear to auscultation anteriorly  Cardiovascular : S1, S2 regular, rhythmic, no murmurs, gallops or rubs  Abdomen: soft, distended, + normoactive BS, no palpable HS- megaly  Extremities: no tenderness;  -c/c/e, pulses equal bilaterally  Integumentary: no rashes, scars, or lesions suggestive of malignancy  Neurologic: no gross focal deficits    LABS:  (11-27) WBC: 9.39 K/uL,Hemoglobin: 9.0 g/dL, Hematocrit: 29.9 %,  Platelet: 78 K/uL  (11-27) Na: 137 mmol/L ; K: 4.5 mmol/L ; BUN: 55 mg/dL ; Cr: 3.82 mg/dL.  PT/INR - ( 26 Nov 2023 14:05 )   PT: 16.5 sec;   INR: 1.46 ratio    PTT - ( 26 Nov 2023 14:05 )  PTT:29.7 sec    RADIOLOGY:  ACC: [id number] EXAM:  CT ABDOMEN AND PELVIS   ORDERED BY: CAROLINA BLANK     PROCEDURE DATE:  11/26/2023          INTERPRETATION:  CLINICAL INFORMATION: Metastatic colon cancer. Abdominal   pain.    COMPARISON: CT 9/11/2023    CONTRAST/COMPLICATIONS:  IV Contrast: NONE  Oral Contrast: NONE  Complications: None reported at time of study completion    PROCEDURE:  PICC line    FINDINGS:  LOWER CHEST: Small pericardial effusion. Small bilateral pleural   effusions, right greater than left.    LIVER: Hepatic cysts and hypodense foci similar in appearance to prior   imaging 9/11/2023. Evaluation for metastasis is limited in the absence of   intravenous contrast.  BILE DUCTS: Normal caliber.  GALLBLADDER: Within normal limits.  SPLEEN: Within normal limits.  PANCREAS: Mesenteric mass inseparable from the pancreatic head.  ADRENALS: Within normal limits.  KIDNEYS/URETERS: The small nonobstructing left renal calculi. No   hydronephrosis. Bilateral renal cysts as well as a 1.6 cm indeterminate   anterior right upper pole renal lesion (301:32) without change.    BLADDER: Within normal limits.  REPRODUCTIVE ORGANS: Uterus and adnexa within normal limits.    BOWEL: Stomach and proximal duodenum are dilated to the level of a right   upper quadrant mesenteric mass inseparable from the second portion of the   duodenum and pancreatic head. The mass currently measures 8.8 x 6.3 cm   previously 8.1 x 6.5 cm. Previously noted air within the lesion is no   longer present. Right hemicolectomy. Colonic diverticulosis. No bowel   obstruction.  PERITONEUM: Small volume ascites with interval increase.  VESSELS: Atherosclerotic changes.  RETROPERITONEUM/LYMPH NODES: No lymphadenopathy.  ABDOMINAL WALL: Rectus diastases with protrusion of bowel loops.  BONES: Within normal limits.    IMPRESSION:  Right upper quadrant mesenteric mass inseparable from the second portion   of the duodenum and pancreatic head not significantly changed in size.   Previously noted internal air is no longer present.    Dilatation of the stomach and duodenum proximal to the lesion. Correlate   for symptoms of partial outlet obstruction.    Small ascites with interval increase.

## 2023-11-28 LAB
ALBUMIN SERPL ELPH-MCNC: 1.9 G/DL — LOW (ref 3.3–5)
ALBUMIN SERPL ELPH-MCNC: 1.9 G/DL — LOW (ref 3.3–5)
ALP SERPL-CCNC: 114 U/L — SIGNIFICANT CHANGE UP (ref 40–120)
ALP SERPL-CCNC: 114 U/L — SIGNIFICANT CHANGE UP (ref 40–120)
ALT FLD-CCNC: 45 U/L — SIGNIFICANT CHANGE UP (ref 10–45)
ALT FLD-CCNC: 45 U/L — SIGNIFICANT CHANGE UP (ref 10–45)
ANION GAP SERPL CALC-SCNC: 16 MMOL/L — SIGNIFICANT CHANGE UP (ref 5–17)
ANION GAP SERPL CALC-SCNC: 16 MMOL/L — SIGNIFICANT CHANGE UP (ref 5–17)
ANISOCYTOSIS BLD QL: SIGNIFICANT CHANGE UP
ANISOCYTOSIS BLD QL: SIGNIFICANT CHANGE UP
AST SERPL-CCNC: 40 U/L — SIGNIFICANT CHANGE UP (ref 10–40)
AST SERPL-CCNC: 40 U/L — SIGNIFICANT CHANGE UP (ref 10–40)
BASOPHILS # BLD AUTO: 0 K/UL — SIGNIFICANT CHANGE UP (ref 0–0.2)
BASOPHILS # BLD AUTO: 0 K/UL — SIGNIFICANT CHANGE UP (ref 0–0.2)
BASOPHILS NFR BLD AUTO: 0 % — SIGNIFICANT CHANGE UP (ref 0–2)
BASOPHILS NFR BLD AUTO: 0 % — SIGNIFICANT CHANGE UP (ref 0–2)
BILIRUB SERPL-MCNC: 0.5 MG/DL — SIGNIFICANT CHANGE UP (ref 0.2–1.2)
BILIRUB SERPL-MCNC: 0.5 MG/DL — SIGNIFICANT CHANGE UP (ref 0.2–1.2)
BUN SERPL-MCNC: 70 MG/DL — HIGH (ref 7–23)
BUN SERPL-MCNC: 70 MG/DL — HIGH (ref 7–23)
BURR CELLS BLD QL SMEAR: PRESENT — SIGNIFICANT CHANGE UP
BURR CELLS BLD QL SMEAR: PRESENT — SIGNIFICANT CHANGE UP
CALCIUM SERPL-MCNC: 7 MG/DL — LOW (ref 8.4–10.5)
CALCIUM SERPL-MCNC: 7 MG/DL — LOW (ref 8.4–10.5)
CHLORIDE SERPL-SCNC: 109 MMOL/L — HIGH (ref 96–108)
CHLORIDE SERPL-SCNC: 109 MMOL/L — HIGH (ref 96–108)
CO2 SERPL-SCNC: 15 MMOL/L — LOW (ref 22–31)
CO2 SERPL-SCNC: 15 MMOL/L — LOW (ref 22–31)
CREAT SERPL-MCNC: 4.39 MG/DL — HIGH (ref 0.5–1.3)
CREAT SERPL-MCNC: 4.39 MG/DL — HIGH (ref 0.5–1.3)
CULTURE RESULTS: SIGNIFICANT CHANGE UP
CULTURE RESULTS: SIGNIFICANT CHANGE UP
DACRYOCYTES BLD QL SMEAR: SLIGHT — SIGNIFICANT CHANGE UP
DACRYOCYTES BLD QL SMEAR: SLIGHT — SIGNIFICANT CHANGE UP
EGFR: 9 ML/MIN/1.73M2 — LOW
EGFR: 9 ML/MIN/1.73M2 — LOW
EOSINOPHIL # BLD AUTO: 0 K/UL — SIGNIFICANT CHANGE UP (ref 0–0.5)
EOSINOPHIL # BLD AUTO: 0 K/UL — SIGNIFICANT CHANGE UP (ref 0–0.5)
EOSINOPHIL NFR BLD AUTO: 0 % — SIGNIFICANT CHANGE UP (ref 0–6)
EOSINOPHIL NFR BLD AUTO: 0 % — SIGNIFICANT CHANGE UP (ref 0–6)
GLUCOSE SERPL-MCNC: 90 MG/DL — SIGNIFICANT CHANGE UP (ref 70–99)
GLUCOSE SERPL-MCNC: 90 MG/DL — SIGNIFICANT CHANGE UP (ref 70–99)
HCT VFR BLD CALC: 29.2 % — LOW (ref 34.5–45)
HCT VFR BLD CALC: 29.2 % — LOW (ref 34.5–45)
HGB BLD-MCNC: 8.7 G/DL — LOW (ref 11.5–15.5)
HGB BLD-MCNC: 8.7 G/DL — LOW (ref 11.5–15.5)
HYPOCHROMIA BLD QL: SIGNIFICANT CHANGE UP
HYPOCHROMIA BLD QL: SIGNIFICANT CHANGE UP
LYMPHOCYTES # BLD AUTO: 0.55 K/UL — LOW (ref 1–3.3)
LYMPHOCYTES # BLD AUTO: 0.55 K/UL — LOW (ref 1–3.3)
LYMPHOCYTES # BLD AUTO: 7 % — LOW (ref 13–44)
LYMPHOCYTES # BLD AUTO: 7 % — LOW (ref 13–44)
MANUAL SMEAR VERIFICATION: SIGNIFICANT CHANGE UP
MANUAL SMEAR VERIFICATION: SIGNIFICANT CHANGE UP
MCHC RBC-ENTMCNC: 29.8 GM/DL — LOW (ref 32–36)
MCHC RBC-ENTMCNC: 29.8 GM/DL — LOW (ref 32–36)
MCHC RBC-ENTMCNC: 30.7 PG — SIGNIFICANT CHANGE UP (ref 27–34)
MCHC RBC-ENTMCNC: 30.7 PG — SIGNIFICANT CHANGE UP (ref 27–34)
MCV RBC AUTO: 103.2 FL — HIGH (ref 80–100)
MCV RBC AUTO: 103.2 FL — HIGH (ref 80–100)
METAMYELOCYTES # FLD: 1 % — HIGH (ref 0–0)
METAMYELOCYTES # FLD: 1 % — HIGH (ref 0–0)
MONOCYTES # BLD AUTO: 0.62 K/UL — SIGNIFICANT CHANGE UP (ref 0–0.9)
MONOCYTES # BLD AUTO: 0.62 K/UL — SIGNIFICANT CHANGE UP (ref 0–0.9)
MONOCYTES NFR BLD AUTO: 8 % — SIGNIFICANT CHANGE UP (ref 2–14)
MONOCYTES NFR BLD AUTO: 8 % — SIGNIFICANT CHANGE UP (ref 2–14)
NEUTROPHILS # BLD AUTO: 6.54 K/UL — SIGNIFICANT CHANGE UP (ref 1.8–7.4)
NEUTROPHILS # BLD AUTO: 6.54 K/UL — SIGNIFICANT CHANGE UP (ref 1.8–7.4)
NEUTROPHILS NFR BLD AUTO: 70 % — SIGNIFICANT CHANGE UP (ref 43–77)
NEUTROPHILS NFR BLD AUTO: 70 % — SIGNIFICANT CHANGE UP (ref 43–77)
NEUTS BAND # BLD: 14 % — HIGH (ref 0–8)
NEUTS BAND # BLD: 14 % — HIGH (ref 0–8)
NRBC # BLD: 0 /100 — SIGNIFICANT CHANGE UP (ref 0–0)
NRBC # BLD: 0 /100 — SIGNIFICANT CHANGE UP (ref 0–0)
PLAT MORPH BLD: NORMAL — SIGNIFICANT CHANGE UP
PLAT MORPH BLD: NORMAL — SIGNIFICANT CHANGE UP
PLATELET # BLD AUTO: 55 K/UL — LOW (ref 150–400)
PLATELET # BLD AUTO: 55 K/UL — LOW (ref 150–400)
POIKILOCYTOSIS BLD QL AUTO: SLIGHT — SIGNIFICANT CHANGE UP
POIKILOCYTOSIS BLD QL AUTO: SLIGHT — SIGNIFICANT CHANGE UP
POTASSIUM SERPL-MCNC: 3.8 MMOL/L — SIGNIFICANT CHANGE UP (ref 3.5–5.3)
POTASSIUM SERPL-MCNC: 3.8 MMOL/L — SIGNIFICANT CHANGE UP (ref 3.5–5.3)
POTASSIUM SERPL-SCNC: 3.8 MMOL/L — SIGNIFICANT CHANGE UP (ref 3.5–5.3)
POTASSIUM SERPL-SCNC: 3.8 MMOL/L — SIGNIFICANT CHANGE UP (ref 3.5–5.3)
PROT SERPL-MCNC: 4.9 G/DL — LOW (ref 6–8.3)
PROT SERPL-MCNC: 4.9 G/DL — LOW (ref 6–8.3)
RBC # BLD: 2.83 M/UL — LOW (ref 3.8–5.2)
RBC # BLD: 2.83 M/UL — LOW (ref 3.8–5.2)
RBC # FLD: 16.4 % — HIGH (ref 10.3–14.5)
RBC # FLD: 16.4 % — HIGH (ref 10.3–14.5)
RBC BLD AUTO: ABNORMAL
RBC BLD AUTO: ABNORMAL
SODIUM SERPL-SCNC: 140 MMOL/L — SIGNIFICANT CHANGE UP (ref 135–145)
SODIUM SERPL-SCNC: 140 MMOL/L — SIGNIFICANT CHANGE UP (ref 135–145)
SPECIMEN SOURCE: SIGNIFICANT CHANGE UP
SPECIMEN SOURCE: SIGNIFICANT CHANGE UP
WBC # BLD: 7.79 K/UL — SIGNIFICANT CHANGE UP (ref 3.8–10.5)
WBC # BLD: 7.79 K/UL — SIGNIFICANT CHANGE UP (ref 3.8–10.5)
WBC # FLD AUTO: 7.79 K/UL — SIGNIFICANT CHANGE UP (ref 3.8–10.5)
WBC # FLD AUTO: 7.79 K/UL — SIGNIFICANT CHANGE UP (ref 3.8–10.5)

## 2023-11-28 PROCEDURE — 99232 SBSQ HOSP IP/OBS MODERATE 35: CPT

## 2023-11-28 RX ORDER — ERTAPENEM SODIUM 1 G/1
500 INJECTION, POWDER, LYOPHILIZED, FOR SOLUTION INTRAMUSCULAR; INTRAVENOUS EVERY 24 HOURS
Refills: 0 | Status: DISCONTINUED | OUTPATIENT
Start: 2023-11-28 | End: 2023-12-03

## 2023-11-28 RX ADMIN — ERTAPENEM SODIUM 100 MILLIGRAM(S): 1 INJECTION, POWDER, LYOPHILIZED, FOR SOLUTION INTRAMUSCULAR; INTRAVENOUS at 16:43

## 2023-11-28 RX ADMIN — MEROPENEM 100 MILLIGRAM(S): 1 INJECTION INTRAVENOUS at 12:05

## 2023-11-28 RX ADMIN — Medication 81 MILLIGRAM(S): at 12:04

## 2023-11-28 NOTE — CHART NOTE - NSCHARTNOTEFT_GEN_A_CORE
Off Service NP Hospitalist Note  Case Discussed with Dr. Michele    labs and imaging reviewed    88 y/o F with PMH CKD 4, colon cancer (adenocarcinoma)-5-2011. Stage IIIC-T4 N2a s/p right colectomy followed by chemo,  breast cancer, anemia, HTN, thrombocytopenia c/o weakness x2 days associated with nausea, vomiting 2-3x/day, poor PO, fever, and chills. Endorses eating Thanksgiving dinner with her daughter and family, no new foods, or sick contacts. No additional family members with symptoms. Friday afternoon noted malaise and onset of symptoms. Hx significant for chronic diarrhea s/p colon resection. Follows with heme/onc outpatient - due for procrit 11/27.    *Neutropenic Fever  *Pancytopenia  *Polymicrobic Bacteremia likely GI source  Pt admitted with fever, nausea and vomiting  CT AP showed RT upper quadrant mesenteric mass, dilitation of stomach and duodenum proximal to lesion, correlate for partial outlet obstruction  ID appreciated, continue meropenem pending identification of organisms in blood  Follow repeat blood cultures, C Diff negative, Surgery evaluation Dr. Payton appreciated, non surgical  Monitor labs, fever curve, further workup to be determined    *Myelodysplasia  pt with hx of Colon and Breast Cancer, Heme/Onc appreciated  followed by Dr. Edmonds, recent bone marrow biopsies revealed evidence of nucleated RBCs  Treated with procrit outpatient, unlikely to be good candidate for further bone marrow studies  Continue to monitor CBC    *Septic MAGDI/CKD 4  Nephrology evaluation appreciated, CKD 4 iso keytruda  IVF with sodium bicarb as per renal, avoid hypotension  Avoid nephrotoxins, monitor renal indices    *HTN  chronic condition  continue home meds - norvasc 10 and doxazosin 1 BID    *DVT PPx  SCDs    HCP daughter [place]  updated, all questions answered.

## 2023-11-28 NOTE — PROGRESS NOTE ADULT - SUBJECTIVE AND OBJECTIVE BOX
HANG MADERA, 89y Female  MRN: [id number]  ATTENDING: Humble Michele    HPI:  89F, PMHx colon adenocarcinoma (2011), s/p right colectomy followed by chemotherapy, CKD 4, breast cancer, HTN, anemia, thrombocytopenia admitted with nausea, vomiting, malaise.  Patient treated in ambulatory with Procrit for anemia, under the care of Dr. Edmonds.  Started on empiric antibiotic coverage.  Hematologic profile consistent with WBC 0.38K platelet 114,000, hemoglobin 11.3 g/dL. CT head 11/18 showed chronic microvascular changes, no acute intracranial pathology.  Patient found hypothermic.  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  In the past patient had bone marrow biopsy (August 2023) which showed normocellular marrow with trilineage hematopoiesis with maturation and increased iron stores, with a Tier II variant of potential clinical significance / abnormal genomic alterations TP53 [id number].Also present NRBCs suggestive of possible MDS .    MEDICATIONS:  acetaminophen     Tablet .. 650 milliGRAM(s) Oral every 6 hours PRN  aspirin enteric coated 81 milliGRAM(s) Oral daily  dextrose 5% 1000 milliLiter(s) IV Continuous <Continuous>  meropenem  IVPB 500 milliGRAM(s) IV Intermittent every 12 hours  simethicone 80 milliGRAM(s) Chew three times a day PRN    All other medications reviewed.    SUBJECTIVE:  Patient stable clinically, denies being in pain    VITALS:  T(C): 36.7 (11-28-23 @ 05:12), Max: 37.1 (11-27-23 @ 15:57)  T(F): 98.1 (11-28-23 @ 05:12), Max: 98.7 (11-27-23 @ 15:57)  HR: 73 (11-28-23 @ 05:12) (73 - 91)  BP: 100/54 (11-28-23 @ 05:12) (99/45 - 121/49)    PHYSICAL EXAM:  Constitutional: alert, well developed  HEENT: normocephalic, anicteric sclerae, no mucositis or thrush  Respiratory: bilateral clear to auscultation anteriorly  Cardiovascular : S1, S2 regular, rhythmic, no murmurs, gallops or rubs  Abdomen: soft, distended, + normoactive BS, no palpable HS- megaly  Extremities: no tenderness;  -c/c/e, pulses equal bilaterally    LABS:  (11-28) WBC: 7.79 K/uL,Hemoglobin: 8.7 g/dL, Hematocrit: 29.2 %, Platelet: 55 K/uL  (11-28) Na: 140 mmol/L ; K: 3.8 mmol/L ; BUN: 70 mg/dL ; Cr: 4.39 mg/dL.    RADIOLOGY:  ACC: [id number] EXAM:  CT ABDOMEN AND PELVIS   ORDERED BY: CAROLINA BLANK     PROCEDURE DATE:  11/26/2023          INTERPRETATION:  CLINICAL INFORMATION: Metastatic colon cancer. Abdominal   pain.    COMPARISON: CT 9/11/2023    CONTRAST/COMPLICATIONS:  IV Contrast: NONE  Oral Contrast: NONE  Complications: None reported at time of study completion    PROCEDURE:  PICC line    FINDINGS:  LOWER CHEST: Small pericardial effusion. Small bilateral pleural   effusions, right greater than left.    LIVER: Hepatic cysts and hypodense foci similar in appearance to prior   imaging 9/11/2023. Evaluation for metastasis is limited in the absence of   intravenous contrast.  BILE DUCTS: Normal caliber.  GALLBLADDER: Within normal limits.  SPLEEN: Within normal limits.  PANCREAS: Mesenteric mass inseparable from the pancreatic head.  ADRENALS: Within normal limits.  KIDNEYS/URETERS: The small nonobstructing left renal calculi. No   hydronephrosis. Bilateral renal cysts as well as a 1.6 cm indeterminate   anterior right upper pole renal lesion (301:32) without change.    BLADDER: Within normal limits.  REPRODUCTIVE ORGANS: Uterus and adnexa within normal limits.    BOWEL: Stomach and proximal duodenum are dilated to the level of a right   upper quadrant mesenteric mass inseparable from the second portion of the   duodenum and pancreatic head. The mass currently measures 8.8 x 6.3 cm   previously 8.1 x 6.5 cm. Previously noted air within the lesion is no   longer present. Right hemicolectomy. Colonic diverticulosis. No bowel   obstruction.  PERITONEUM: Small volume ascites with interval increase.  VESSELS: Atherosclerotic changes.  RETROPERITONEUM/LYMPH NODES: No lymphadenopathy.  ABDOMINAL WALL: Rectus diastases with protrusion of bowel loops.  BONES: Within normal limits.    IMPRESSION:  Right upper quadrant mesenteric mass inseparable from the second portion   of the duodenum and pancreatic head not significantly changed in size.   Previously noted internal air is no longer present.    Dilatation of the stomach and duodenum proximal to the lesion. Correlate   for symptoms of partial outlet obstruction.    Small ascites with interval increase.   HANG MADERA, 89y Female  MRN: [id number]  ATTENDING: Humble Michele    HPI:  89F, PMHx colon adenocarcinoma (2011), s/p right colectomy followed by chemotherapy, CKD 4, breast cancer, HTN, anemia, thrombocytopenia admitted with nausea, vomiting, malaise.  Patient treated in ambulatory with Procrit for anemia, under the care of Dr. Edmonds.  Started on empiric antibiotic coverage.  Hematologic profile consistent with WBC 0.38K platelet 114,000, hemoglobin 11.3 g/dL. CT head 11/18 showed chronic microvascular changes, no acute intracranial pathology.  Patient found hypothermic.  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  In the past patient had bone marrow biopsy (August 2023) which showed normocellular marrow with trilineage hematopoiesis with maturation and increased iron stores, with a Tier II variant of potential clinical significance / abnormal genomic alterations TP53 [id number].Also present NRBCs suggestive of possible MDS .    MEDICATIONS:  acetaminophen     Tablet .. 650 milliGRAM(s) Oral every 6 hours PRN  aspirin enteric coated 81 milliGRAM(s) Oral daily  dextrose 5% 1000 milliLiter(s) IV Continuous <Continuous>  meropenem  IVPB 500 milliGRAM(s) IV Intermittent every 12 hours  simethicone 80 milliGRAM(s) Chew three times a day PRN    All other medications reviewed.    SUBJECTIVE:  Pleasant, in NAD    VITALS:  T(C): 36.7 (11-28-23 @ 05:12), Max: 37.1 (11-27-23 @ 15:57)  T(F): 98.1 (11-28-23 @ 05:12), Max: 98.7 (11-27-23 @ 15:57)  HR: 73 (11-28-23 @ 05:12) (73 - 91)  BP: 100/54 (11-28-23 @ 05:12) (99/45 - 121/49)    PHYSICAL EXAM:  Constitutional: alert, well developed  HEENT: normocephalic, anicteric sclerae, no mucositis or thrush  Respiratory: bilateral clear to auscultation anteriorly  Cardiovascular : S1, S2 regular, rhythmic, no murmurs, gallops or rubs  Abdomen: soft, distended, + normoactive BS, no palpable HS- megaly  Extremities: no tenderness;  -c/c/e, pulses equal bilaterally    LABS:  (11-28) WBC: 7.79 K/uL,Hemoglobin: 8.7 g/dL, Hematocrit: 29.2 %, Platelet: 55 K/uL  (11-28) Na: 140 mmol/L ; K: 3.8 mmol/L ; BUN: 70 mg/dL ; Cr: 4.39 mg/dL.    RADIOLOGY:  ACC: [id number] EXAM:  CT ABDOMEN AND PELVIS   ORDERED BY: CAROLINA BLANK   PROCEDURE DATE:  11/26/2023    INTERPRETATION:  CLINICAL INFORMATION: Metastatic colon cancer. Abdominal pain.  COMPARISON: CT 9/11/2023  CONTRAST/COMPLICATIONS:  IV Contrast: NONE  Oral Contrast: NONE  Complications: None reported at time of study completion  PROCEDURE:  PICC line  FINDINGS:  LOWER CHEST: Small pericardial effusion. Small bilateral pleural   effusions, right greater than left.  LIVER: Hepatic cysts and hypodense foci similar in appearance to prior   imaging 9/11/2023. Evaluation for metastasis is limited in the absence of   intravenous contrast.  BILE DUCTS: Normal caliber.  GALLBLADDER: Within normal limits.  SPLEEN: Within normal limits.  PANCREAS: Mesenteric mass inseparable from the pancreatic head.  ADRENALS: Within normal limits.  KIDNEYS/URETERS: The small nonobstructing left renal calculi. No   hydronephrosis. Bilateral renal cysts as well as a 1.6 cm indeterminate   anterior right upper pole renal lesion (301:32) without change.  BLADDER: Within normal limits.  REPRODUCTIVE ORGANS: Uterus and adnexa within normal limits.    BOWEL: Stomach and proximal duodenum are dilated to the level of a right   upper quadrant mesenteric mass inseparable from the second portion of the   duodenum and pancreatic head. The mass currently measures 8.8 x 6.3 cm   previously 8.1 x 6.5 cm. Previously noted air within the lesion is no   longer present. Right hemicolectomy. Colonic diverticulosis. No bowel   obstruction.  PERITONEUM: Small volume ascites with interval increase.  VESSELS: Atherosclerotic changes.  RETROPERITONEUM/LYMPH NODES: No lymphadenopathy.  ABDOMINAL WALL: Rectus diastases with protrusion of bowel loops.  BONES: Within normal limits.    IMPRESSION:  Right upper quadrant mesenteric mass inseparable from the second portion   of the duodenum and pancreatic head not significantly changed in size.   Previously noted internal air is no longer present.    Dilatation of the stomach and duodenum proximal to the lesion. Correlate   for symptoms of partial outlet obstruction.    Small ascites with interval increase.   HANG MADERA, 89y Female  MRN: [id number]  ATTENDING: Humble Michele    HPI:  89F, PMHx colon adenocarcinoma (2011), s/p right colectomy followed by chemotherapy, CKD 4, breast cancer, HTN, anemia, thrombocytopenia admitted with nausea, vomiting, malaise.  Patient treated in ambulatory with Procrit for anemia, under the care of Dr. Edmonds.  Started on empiric antibiotic coverage.  Hematologic profile consistent with WBC 0.38K platelet 114,000, hemoglobin 11.3 g/dL. CT head 11/18 showed chronic microvascular changes, no acute intracranial pathology.  Patient found hypothermic.  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  In the past patient had bone marrow biopsy (August 2023) which showed normocellular marrow with trilineage hematopoiesis with maturation and increased iron stores, with a Tier II variant of potential clinical significance / abnormal genomic alterations TP53 [id number].Also present NRBCs suggestive of possible MDS .    MEDICATIONS:  acetaminophen     Tablet .. 650 milliGRAM(s) Oral every 6 hours PRN  aspirin enteric coated 81 milliGRAM(s) Oral daily  dextrose 5% 1000 milliLiter(s) IV Continuous <Continuous>  meropenem  IVPB 500 milliGRAM(s) IV Intermittent every 12 hours  simethicone 80 milliGRAM(s) Chew three times a day PRN    All other medications reviewed.    SUBJECTIVE:  Pleasant, in NAD. Denies abdominal pain.   Passes flatus and stool without evidence of obstruction.    VITALS:  T(C): 36.7 (11-28-23 @ 05:12), Max: 37.1 (11-27-23 @ 15:57)  T(F): 98.1 (11-28-23 @ 05:12), Max: 98.7 (11-27-23 @ 15:57)  HR: 73 (11-28-23 @ 05:12) (73 - 91)  BP: 100/54 (11-28-23 @ 05:12) (99/45 - 121/49)    PHYSICAL EXAM:  Constitutional: alert, well developed  HEENT: normocephalic, anicteric sclerae, no mucositis or thrush  Respiratory: bilateral clear to auscultation anteriorly  Cardiovascular : S1, S2 regular, rhythmic, no murmurs, gallops or rubs  Abdomen: soft, distended, + normoactive BS, no palpable HS- megaly  Extremities: no tenderness;  -c/c/e, pulses equal bilaterally    LABS:  (11-28) WBC: 7.79 K/uL,Hemoglobin: 8.7 g/dL, Hematocrit: 29.2 %, Platelet: 55 K/uL  (11-28) Na: 140 mmol/L ; K: 3.8 mmol/L ; BUN: 70 mg/dL ; Cr: 4.39 mg/dL.    RADIOLOGY:  ACC: [id number] EXAM:  CT ABDOMEN AND PELVIS   ORDERED BY: CAROLINA BLANK   PROCEDURE DATE:  11/26/2023    INTERPRETATION:  CLINICAL INFORMATION: Metastatic colon cancer. Abdominal pain.  COMPARISON: CT 9/11/2023  CONTRAST/COMPLICATIONS:  IV Contrast: NONE  Oral Contrast: NONE  Complications: None reported at time of study completion  PROCEDURE:  PICC line  FINDINGS:  LOWER CHEST: Small pericardial effusion. Small bilateral pleural   effusions, right greater than left.  LIVER: Hepatic cysts and hypodense foci similar in appearance to prior   imaging 9/11/2023. Evaluation for metastasis is limited in the absence of   intravenous contrast.  BILE DUCTS: Normal caliber.  GALLBLADDER: Within normal limits.  SPLEEN: Within normal limits.  PANCREAS: Mesenteric mass inseparable from the pancreatic head.  ADRENALS: Within normal limits.  KIDNEYS/URETERS: The small nonobstructing left renal calculi. No   hydronephrosis. Bilateral renal cysts as well as a 1.6 cm indeterminate   anterior right upper pole renal lesion (301:32) without change.  BLADDER: Within normal limits.  REPRODUCTIVE ORGANS: Uterus and adnexa within normal limits.    BOWEL: Stomach and proximal duodenum are dilated to the level of a right   upper quadrant mesenteric mass inseparable from the second portion of the   duodenum and pancreatic head. The mass currently measures 8.8 x 6.3 cm   previously 8.1 x 6.5 cm. Previously noted air within the lesion is no   longer present. Right hemicolectomy. Colonic diverticulosis. No bowel   obstruction.  PERITONEUM: Small volume ascites with interval increase.  VESSELS: Atherosclerotic changes.  RETROPERITONEUM/LYMPH NODES: No lymphadenopathy.  ABDOMINAL WALL: Rectus diastases with protrusion of bowel loops.  BONES: Within normal limits.    IMPRESSION:  Right upper quadrant mesenteric mass inseparable from the second portion   of the duodenum and pancreatic head not significantly changed in size.   Previously noted internal air is no longer present.    Dilatation of the stomach and duodenum proximal to the lesion. Correlate   for symptoms of partial outlet obstruction.    Small ascites with interval increase.

## 2023-11-28 NOTE — PROGRESS NOTE ADULT - TIME BILLING
Discussion regarding all options and risks; discussed with Medicine; all lab values and imaging studies reviewed

## 2023-11-28 NOTE — PROGRESS NOTE ADULT - PROBLEM SELECTOR PLAN 1
Admitted with cytopenias in the setting of gram-negative bacteremia.  Not neutropenic, though persistent thrombocytopenia.  Reviewed CT A/P that showed no clear obstruction and no evidence of perforation.  Reviewed blood cultures growing Citrobacter crusei and Bacteroides species.  Continue antibiotics per ID.  No evidence of transformation into an acute leukemic process.  Will hold growth factor support at this time and offer PRBC as needed.  Continue follow-up hematologic profile while awaiting for blood culture sensitivity.

## 2023-11-28 NOTE — PROGRESS NOTE ADULT - ASSESSMENT
88 yo with history of colon cancer, thrombocytopenia, NIDDM, CKD, HTN, and known mesenteric mass who was admitted 11/26 with less than 12 hours of rigors, N/V and abdominal pain.  CT of A/P without perforation or clear obstruction, she was neutropenic, her blood cultures have grown citrobacter krusei and bacteroides species.  She is clinically improved, tolerating a diet, no N/V and no abdominal pain.  Her neutropenia on admission was transient, rapidly reversed, perhaps it was induced by her GNR bacteremia.  She is no longer neutropenic  Suggest:  1 Await full sensitivities  2 Will narrow coverage to Ertapenenm 500 daily  3 Monitor exam , suspect she had a bacteremic event arise from gut, will be at risk for a recurrence.,

## 2023-11-28 NOTE — PROGRESS NOTE ADULT - ASSESSMENT
IMPRESSION: Mesenteric mass with pressure effect on duodenem                      No evidence of any surgical problems at this time    PLAN: Continue diet as tolerated

## 2023-11-28 NOTE — PROGRESS NOTE ADULT - ASSESSMENT
Bacteremia - patient is clinically stable. will continue antibiotics as per ID  Myelodysplasia - patient is clinically stable. heme following  Mesenteric mass/SBO - patient is GI stable. will continue to observe as per surgery  CRI - patient is clinically stable. nephrology following        Case discussed with patient's daughter  Called son message left to call me back

## 2023-11-28 NOTE — PROGRESS NOTE ADULT - SUBJECTIVE AND OBJECTIVE BOX
CC: f/u for polymicrobial bacteremia  Date of service 11/28/23  Patient reports: she is comfortable, no fevers or chills.    REVIEW OF SYSTEMS:  All other review of systems negative (Comprehensive ROS)  baseline abdominal distension  Antimicrobials Day #  :day 3  meropenem  IVPB 500 milliGRAM(s) IV Intermittent every 12 hours    Other Medications Reviewed  MEDICATIONS  (STANDING):  aspirin enteric coated 81 milliGRAM(s) Oral daily  dextrose 5% 1000 milliLiter(s) (75 mL/Hr) IV Continuous <Continuous>  meropenem  IVPB 500 milliGRAM(s) IV Intermittent every 12 hours    T(F): 98.1 (11-28-23 @ 05:12), Max: 98.7 (11-27-23 @ 15:57)  HR: 73 (11-28-23 @ 05:12)  BP: 100/54 (11-28-23 @ 05:12)  RR: 16 (11-28-23 @ 05:12)  SpO2: 94% (11-28-23 @ 05:12)  Wt(kg): --    PHYSICAL EXAM:  General: alert, no acute distress  Eyes:  anicteric, no conjunctival injection, no discharge  Oropharynx: no lesions or injection 	  Neck: supple, without adenopathy  Lungs: clear to auscultation  Heart: regular rate and rhythm; no murmur, rubs or gallops  Abdomen: soft,distended, nontender, firm abdominal density.  Skin: no lesions  Extremities: no clubbing, cyanosis, or edema  Neurologic: alert, oriented, moves all extremities    LAB RESULTS:                        8.7    7.79  )-----------( 55       ( 28 Nov 2023 05:35 )             29.2     11-28    140  |  109<H>  |  70<H>  ----------------------------<  90  3.8   |  15<L>  |  4.39<H>    Ca    7.0<L>      28 Nov 2023 05:35    TPro  4.9<L>  /  Alb  1.9<L>  /  TBili  0.5  /  DBili  x   /  AST  40  /  ALT  45  /  AlkPhos  114  11-28    LIVER FUNCTIONS - ( 28 Nov 2023 05:35 )  Alb: 1.9 g/dL / Pro: 4.9 g/dL / ALK PHOS: 114 U/L / ALT: 45 U/L / AST: 40 U/L / GGT: x           Urinalysis Basic - ( 28 Nov 2023 05:35 )    Color: x / Appearance: x / SG: x / pH: x  Gluc: 90 mg/dL / Ketone: x  / Bili: x / Urobili: x   Blood: x / Protein: x / Nitrite: x   Leuk Esterase: x / RBC: x / WBC x   Sq Epi: x / Non Sq Epi: x / Bacteria: x      MICROBIOLOGY:  RECENT CULTURES:  11-26 @ 22:20 Clean Catch Clean Catch (Midstream)     <10,000 CFU/mL Normal Urogenital Cheryl      11-26 @ 14:05 .Blood Blood-Peripheral Blood Culture PCR    Growth in aerobic and anaerobic bottles: Citrobacter koseri  See previous culture [id number]  Growth in anaerobic bottle: Gram Negative Rods    Growth in aerobic bottle: Gram Negative Rods  Growth in anaerobic bottle: Gram Negative Rods        RADIOLOGY REVIEWED:  < from: Xray Chest 1 View- PORTABLE-Urgent (11.26.23 @ 14:59) >    IMPRESSION: Mild cardiomegaly.  No radiographic evidence of active chest disease.    < end of copied text >  < from: CT Abdomen and Pelvis No Cont (11.26.23 @ 13:00) >  IMPRESSION:  Right upper quadrant mesenteric mass inseparable from the second portion   of the duodenum and pancreatic head not significantly changed in size.   Previously noted internal air is no longer present.    Dilatation of the stomach and duodenum proximal to the lesion. Correlate   for symptoms of partial outlet obstruction.    Small ascites with interval increase.    < end of copied text >

## 2023-11-28 NOTE — PROGRESS NOTE ADULT - SUBJECTIVE AND OBJECTIVE BOX
Resting, comfortable on RA    Vital Signs Last 24 Hrs  T(C): 36.6 (11-28-23 @ 17:28), Max: 36.7 (11-28-23 @ 05:12)  T(F): 97.9 (11-28-23 @ 17:28), Max: 98.1 (11-28-23 @ 05:12)  HR: 73 (11-28-23 @ 17:28) (73 - 73)  BP: 146/51 (11-28-23 @ 17:28) (100/54 - 146/51)  RR: 15 (11-28-23 @ 17:28) (15 - 16)  SpO2: 96% (11-28-23 @ 17:28) (94% - 96%)    s1s2  b/l air entry  soft, ND  no edema                        8.7    7.79  )-----------( 55       ( 28 Nov 2023 05:35 )             29.2     28 Nov 2023 05:35    140    |  109    |  70     ----------------------------<  90     3.8     |  15     |  4.39     Ca    7.0        28 Nov 2023 05:35    TPro  4.9    /  Alb  1.9    /  TBili  0.5    /  DBili  x      /  AST  40     /  ALT  45     /  AlkPhos  114    28 Nov 2023 05:35    LIVER FUNCTIONS - ( 28 Nov 2023 05:35 )  Alb: 1.9 g/dL / Pro: 4.9 g/dL / ALK PHOS: 114 U/L / ALT: 45 U/L / AST: 40 U/L / GGT: x           Culture - Urine (collected 26 Nov 2023 22:20)  Source: Clean Catch Clean Catch (Midstream)  Final Report (28 Nov 2023 06:32):    <10,000 CFU/mL Normal Urogenital Cheryl    Culture - Blood (collected 26 Nov 2023 14:05)  Source: .Blood Blood-Peripheral  Gram Stain (27 Nov 2023 08:01):    Growth in aerobic bottle: Gram Negative Rods    Growth in anaerobic bottle: Gram Negative Rods  Preliminary Report (28 Nov 2023 12:02):    Growth in aerobic and anaerobic bottles: Citrobacter koseri    See previous culture [id number]    Growth in anaerobic bottle: Gram Negative Rods    Culture - Blood (collected 26 Nov 2023 14:05)  Source: .Blood Blood-Peripheral  Gram Stain (27 Nov 2023 08:44):    Growth in aerobic bottle: Gram Negative Rods    Growth in anaerobic bottle: Gram Negative Rods  Preliminary Report (28 Nov 2023 12:08):    Growth in aerobic and anaerobic bottles: Citrobacter koseri    Growth in anaerobic bottle: Gram Negative Rods    Direct identification is available within approximately 3-5    hours either by Blood Panel Multiplexed PCR or Direct    MALDI-TOF. Details: [email or web address]  Organism: Blood Culture PCR (27 Nov 2023 09:46)  Organism: Blood Culture PCR (27 Nov 2023 09:46)    acetaminophen     Tablet .. 650 milliGRAM(s) Oral every 6 hours PRN  aspirin enteric coated 81 milliGRAM(s) Oral daily  dextrose 5% 1000 milliLiter(s) IV Continuous <Continuous>  ertapenem  IVPB 500 milliGRAM(s) IV Intermittent every 24 hours  simethicone 80 milliGRAM(s) Chew three times a day PRN    A/P:    Hx colon cancer s/p R colectomy, chemo  CKD 4 iso Keytruda (d/c-d ~ 1 year ago)  GNR bacteremia  CT A/P w/RUQ mesenteric mass, no hydro  Abx per ID   Septic MAGDI/CKD 4  Continue IVF w/Bicarb   Avoid nephrotoxins  F/u BMP, CBC, UO  Prognosis is guarded overall    [phone]

## 2023-11-28 NOTE — PROGRESS NOTE ADULT - SUBJECTIVE AND OBJECTIVE BOX
HANG MADERA  89y  Female      patient is resting comfortably and without complaints of fever/chills/n/v/d/abdominal pain      REVIEW OF SYSTEMS:    Cardiac HTN  Pulmonary no cough/SOB  GI s/p n/v no abdominal pain h/o mesenteric mass/colon cancer    CRI  Neuro no headache/dizziness/numbness    FAMILY HISTORY:  FH: colon cancer (Sibling, Sibling)    FH: breast cancer (Sibling)      T(C): 36.7 (11-28-23 @ 05:12), Max: 37.1 (11-27-23 @ 15:57)  HR: 73 (11-28-23 @ 05:12) (73 - 85)  BP: 100/54 (11-28-23 @ 05:12) (100/54 - 121/49)  RR: 16 (11-28-23 @ 05:12) (15 - 20)  SpO2: 94% (11-28-23 @ 05:12) (93% - 94%)  Wt(kg): --Vital Signs Last 24 Hrs  T(C): 36.7 (28 Nov 2023 05:12), Max: 37.1 (27 Nov 2023 15:57)  T(F): 98.1 (28 Nov 2023 05:12), Max: 98.7 (27 Nov 2023 15:57)  HR: 73 (28 Nov 2023 05:12) (73 - 85)  BP: 100/54 (28 Nov 2023 05:12) (100/54 - 121/49)  BP(mean): --  RR: 16 (28 Nov 2023 05:12) (15 - 20)  SpO2: 94% (28 Nov 2023 05:12) (93% - 94%)    Parameters below as of 28 Nov 2023 05:12  Patient On (Oxygen Delivery Method): room air      No Known Allergies      PHYSICAL EXAM:    General NAD non toxic in appearance  Neck no JVD thyroid stable  Heart RRR  Lungs clear  Abdomen non tender non distended normal bowel sounds no HSM/CVAT  Extremities non tender no edema +pulses  Neurologic no acute focal  changes        Consultant(s) Notes Reviewed:  [x ] YES  [ ] NO      LABS:  CBC Full  -  ( 28 Nov 2023 05:35 )  WBC Count : 7.79 K/uL  RBC Count : 2.83 M/uL  Hemoglobin : 8.7 g/dL  Hematocrit : 29.2 %  Platelet Count - Automated : 55 K/uL  Mean Cell Volume : 103.2 fl  Mean Cell Hemoglobin : 30.7 pg  Mean Cell Hemoglobin Concentration : 29.8 gm/dL  Auto Neutrophil # : 6.54 K/uL  Auto Lymphocyte # : 0.55 K/uL  Auto Monocyte # : 0.62 K/uL  Auto Eosinophil # : 0.00 K/uL  Auto Basophil # : 0.00 K/uL  Auto Neutrophil % : 70.0 %  Auto Lymphocyte % : 7.0 %  Auto Monocyte % : 8.0 %  Auto Eosinophil % : 0.0 %  Auto Basophil % : 0.0 %                          8.7    7.79  )-----------( 55       ( 28 Nov 2023 05:35 )             29.2     11-28    140  |  109<H>  |  70<H>  ----------------------------<  90  3.8   |  15<L>  |  4.39<H>    Ca    7.0<L>      28 Nov 2023 05:35    TPro  4.9<L>  /  Alb  1.9<L>  /  TBili  0.5  /  DBili  x   /  AST  40  /  ALT  45  /  AlkPhos  114  11-28    LIVER FUNCTIONS - ( 28 Nov 2023 05:35 )  Alb: 1.9 g/dL / Pro: 4.9 g/dL / ALK PHOS: 114 U/L / ALT: 45 U/L / AST: 40 U/L / GGT: x           PT/INR - ( 26 Nov 2023 14:05 )   PT: 16.5 sec;   INR: 1.46 ratio         PTT - ( 26 Nov 2023 14:05 )  PTT:29.7 sec  Urinalysis Basic - ( 28 Nov 2023 05:35 )    Color: x / Appearance: x / SG: x / pH: x  Gluc: 90 mg/dL / Ketone: x  / Bili: x / Urobili: x   Blood: x / Protein: x / Nitrite: x   Leuk Esterase: x / RBC: x / WBC x   Sq Epi: x / Non Sq Epi: x / Bacteria: x        Culture - Blood (11.26.23 @ 14:05)   - Bacteroides fragilis: Detec  - Enterobacterales: Detec  Gram Stain:   Growth in aerobic bottle: Gram Negative Rods   Growth in anaerobic bottle: Gram Negative Rods  Specimen Source: .Blood Blood-Peripheral  Organism: Blood Culture PCR  Culture Results:   Growth in anaerobic bottle: Gram Negative Rods   Growth in aerobic bottle: Gram Negative Rods   Direct identification is available within approximately 3-5   hours either by Blood Panel Multiplexed PCR or Direct   MALDI-TOF. Details: [email or web address]  Organism Identification: Blood Culture PCR  Method Type: PCR        Culture - Blood (11.26.23 @ 14:05)   Gram Stain:   Growth in aerobic bottle: Gram Negative Rods   Growth in anaerobic bottle: Gram Negative Rods  Specimen Source: .Blood Blood-Peripheral  Culture Results:   Growth in aerobic bottle: Gram Negative Rods   Growth in anaerobic bottle: Gram Negative Rods        acetaminophen     Tablet .. 650 milliGRAM(s) Oral every 6 hours PRN  aspirin enteric coated 81 milliGRAM(s) Oral daily  dextrose 5% 1000 milliLiter(s) IV Continuous <Continuous>  meropenem  IVPB 500 milliGRAM(s) IV Intermittent every 12 hours  simethicone 80 milliGRAM(s) Chew three times a day PRN             no abdominal pain, no bloating, no constipation, no diarrhea, no nausea and no vomiting.

## 2023-11-28 NOTE — PROGRESS NOTE ADULT - SUBJECTIVE AND OBJECTIVE BOX
The patient continues to appear and feel well. She has been tolerating a regular diet and passing flatus and stool easily. The patient stated that her abdomen feels slightly distended, but she denies any abdominal pain.    PFSH: All noncontributory    MEDICATIONS REVIEWED:acetaminophen     Tablet .. 650 milliGRAM(s) Oral every 6 hours PRN  aspirin enteric coated 81 milliGRAM(s) Oral daily  dextrose 5% 1000 milliLiter(s) IV Continuous <Continuous>  meropenem  IVPB 500 milliGRAM(s) IV Intermittent every 12 hours  simethicone 80 milliGRAM(s) Chew three times a day PRN      ALLERGIES:No Known Allergies      REVIEW OF SYSTEMS:  Comprehensive Review of Systems negative except as noted in HPI      PHYSICAL EXAMINATION:  RESPIRATORY: Clear to auscultation bilaterally, respirations non-labored.  CARDIAC: RR, normal S1S2, no murmurs.  ABDOMEN: soft, BS present, nontender and reducible incisional hernia,  no peritoneal signs, no KLS, nontender.  VASCULAR: Equal and normal pulses.  MUSCULOSKELETAL: Full ROM, no deformities.      T(C): 36.7 (11-28-23 @ 05:12), Max: 37.1 (11-27-23 @ 15:57)  HR: 73 (11-28-23 @ 05:12) (73 - 85)  BP: 100/54 (11-28-23 @ 05:12) (100/54 - 121/49)  RR: 16 (11-28-23 @ 05:12) (15 - 20)  SpO2: 94% (11-28-23 @ 05:12) (93% - 94%)                          8.7    7.79  )-----------( 55       ( 28 Nov 2023 05:35 )             29.2       11-28    140  |  109<H>  |  70<H>  ----------------------------<  90  3.8   |  15<L>  |  4.39<H>    Ca    7.0<L>      28 Nov 2023 05:35    TPro  4.9<L>  /  Alb  1.9<L>  /  TBili  0.5  /  DBili  x   /  AST  40  /  ALT  45  /  AlkPhos  114  11-28

## 2023-11-29 LAB
-  AMPICILLIN/SULBACTAM: SIGNIFICANT CHANGE UP
-  AMPICILLIN/SULBACTAM: SIGNIFICANT CHANGE UP
-  AMPICILLIN: SIGNIFICANT CHANGE UP
-  AMPICILLIN: SIGNIFICANT CHANGE UP
-  AZTREONAM: SIGNIFICANT CHANGE UP
-  AZTREONAM: SIGNIFICANT CHANGE UP
-  CEFAZOLIN: SIGNIFICANT CHANGE UP
-  CEFAZOLIN: SIGNIFICANT CHANGE UP
-  CEFEPIME: SIGNIFICANT CHANGE UP
-  CEFEPIME: SIGNIFICANT CHANGE UP
-  CEFOXITIN: SIGNIFICANT CHANGE UP
-  CEFOXITIN: SIGNIFICANT CHANGE UP
-  CEFTRIAXONE: SIGNIFICANT CHANGE UP
-  CEFTRIAXONE: SIGNIFICANT CHANGE UP
-  CIPROFLOXACIN: SIGNIFICANT CHANGE UP
-  CIPROFLOXACIN: SIGNIFICANT CHANGE UP
-  ERTAPENEM: SIGNIFICANT CHANGE UP
-  ERTAPENEM: SIGNIFICANT CHANGE UP
-  GENTAMICIN: SIGNIFICANT CHANGE UP
-  GENTAMICIN: SIGNIFICANT CHANGE UP
-  IMIPENEM: SIGNIFICANT CHANGE UP
-  IMIPENEM: SIGNIFICANT CHANGE UP
-  LEVOFLOXACIN: SIGNIFICANT CHANGE UP
-  LEVOFLOXACIN: SIGNIFICANT CHANGE UP
-  MEROPENEM: SIGNIFICANT CHANGE UP
-  MEROPENEM: SIGNIFICANT CHANGE UP
-  PIPERACILLIN/TAZOBACTAM: SIGNIFICANT CHANGE UP
-  PIPERACILLIN/TAZOBACTAM: SIGNIFICANT CHANGE UP
-  TOBRAMYCIN: SIGNIFICANT CHANGE UP
-  TOBRAMYCIN: SIGNIFICANT CHANGE UP
-  TRIMETHOPRIM/SULFAMETHOXAZOLE: SIGNIFICANT CHANGE UP
-  TRIMETHOPRIM/SULFAMETHOXAZOLE: SIGNIFICANT CHANGE UP
ALBUMIN SERPL ELPH-MCNC: 1.9 G/DL — LOW (ref 3.3–5)
ALBUMIN SERPL ELPH-MCNC: 1.9 G/DL — LOW (ref 3.3–5)
ALP SERPL-CCNC: 232 U/L — HIGH (ref 40–120)
ALP SERPL-CCNC: 232 U/L — HIGH (ref 40–120)
ALT FLD-CCNC: 32 U/L — SIGNIFICANT CHANGE UP (ref 10–45)
ALT FLD-CCNC: 32 U/L — SIGNIFICANT CHANGE UP (ref 10–45)
ANION GAP SERPL CALC-SCNC: 14 MMOL/L — SIGNIFICANT CHANGE UP (ref 5–17)
ANION GAP SERPL CALC-SCNC: 14 MMOL/L — SIGNIFICANT CHANGE UP (ref 5–17)
ANISOCYTOSIS BLD QL: SLIGHT — SIGNIFICANT CHANGE UP
ANISOCYTOSIS BLD QL: SLIGHT — SIGNIFICANT CHANGE UP
AST SERPL-CCNC: 20 U/L — SIGNIFICANT CHANGE UP (ref 10–40)
AST SERPL-CCNC: 20 U/L — SIGNIFICANT CHANGE UP (ref 10–40)
BASOPHILS # BLD AUTO: 0.07 K/UL — SIGNIFICANT CHANGE UP (ref 0–0.2)
BASOPHILS # BLD AUTO: 0.07 K/UL — SIGNIFICANT CHANGE UP (ref 0–0.2)
BASOPHILS NFR BLD AUTO: 1 % — SIGNIFICANT CHANGE UP (ref 0–2)
BASOPHILS NFR BLD AUTO: 1 % — SIGNIFICANT CHANGE UP (ref 0–2)
BILIRUB SERPL-MCNC: 0.5 MG/DL — SIGNIFICANT CHANGE UP (ref 0.2–1.2)
BILIRUB SERPL-MCNC: 0.5 MG/DL — SIGNIFICANT CHANGE UP (ref 0.2–1.2)
BUN SERPL-MCNC: 77 MG/DL — HIGH (ref 7–23)
BUN SERPL-MCNC: 77 MG/DL — HIGH (ref 7–23)
BURR CELLS BLD QL SMEAR: PRESENT — SIGNIFICANT CHANGE UP
BURR CELLS BLD QL SMEAR: PRESENT — SIGNIFICANT CHANGE UP
CALCIUM SERPL-MCNC: 7.5 MG/DL — LOW (ref 8.4–10.5)
CALCIUM SERPL-MCNC: 7.5 MG/DL — LOW (ref 8.4–10.5)
CHLORIDE SERPL-SCNC: 106 MMOL/L — SIGNIFICANT CHANGE UP (ref 96–108)
CHLORIDE SERPL-SCNC: 106 MMOL/L — SIGNIFICANT CHANGE UP (ref 96–108)
CO2 SERPL-SCNC: 17 MMOL/L — LOW (ref 22–31)
CO2 SERPL-SCNC: 17 MMOL/L — LOW (ref 22–31)
CREAT SERPL-MCNC: 4.12 MG/DL — HIGH (ref 0.5–1.3)
CREAT SERPL-MCNC: 4.12 MG/DL — HIGH (ref 0.5–1.3)
CULTURE RESULTS: ABNORMAL
EGFR: 10 ML/MIN/1.73M2 — LOW
EGFR: 10 ML/MIN/1.73M2 — LOW
EOSINOPHIL # BLD AUTO: 0 K/UL — SIGNIFICANT CHANGE UP (ref 0–0.5)
EOSINOPHIL # BLD AUTO: 0 K/UL — SIGNIFICANT CHANGE UP (ref 0–0.5)
EOSINOPHIL NFR BLD AUTO: 0 % — SIGNIFICANT CHANGE UP (ref 0–6)
EOSINOPHIL NFR BLD AUTO: 0 % — SIGNIFICANT CHANGE UP (ref 0–6)
GLUCOSE SERPL-MCNC: 114 MG/DL — HIGH (ref 70–99)
GLUCOSE SERPL-MCNC: 114 MG/DL — HIGH (ref 70–99)
HCT VFR BLD CALC: 31.4 % — LOW (ref 34.5–45)
HCT VFR BLD CALC: 31.4 % — LOW (ref 34.5–45)
HGB BLD-MCNC: 9.5 G/DL — LOW (ref 11.5–15.5)
HGB BLD-MCNC: 9.5 G/DL — LOW (ref 11.5–15.5)
HYPOCHROMIA BLD QL: SLIGHT — SIGNIFICANT CHANGE UP
HYPOCHROMIA BLD QL: SLIGHT — SIGNIFICANT CHANGE UP
LACTATE SERPL-SCNC: 0.9 MMOL/L — SIGNIFICANT CHANGE UP (ref 0.7–2)
LACTATE SERPL-SCNC: 0.9 MMOL/L — SIGNIFICANT CHANGE UP (ref 0.7–2)
LYMPHOCYTES # BLD AUTO: 0.13 K/UL — LOW (ref 1–3.3)
LYMPHOCYTES # BLD AUTO: 0.13 K/UL — LOW (ref 1–3.3)
LYMPHOCYTES # BLD AUTO: 2 % — LOW (ref 13–44)
LYMPHOCYTES # BLD AUTO: 2 % — LOW (ref 13–44)
MAGNESIUM SERPL-MCNC: 1.3 MG/DL — LOW (ref 1.6–2.6)
MAGNESIUM SERPL-MCNC: 1.3 MG/DL — LOW (ref 1.6–2.6)
MANUAL SMEAR VERIFICATION: SIGNIFICANT CHANGE UP
MANUAL SMEAR VERIFICATION: SIGNIFICANT CHANGE UP
MCHC RBC-ENTMCNC: 29.8 PG — SIGNIFICANT CHANGE UP (ref 27–34)
MCHC RBC-ENTMCNC: 29.8 PG — SIGNIFICANT CHANGE UP (ref 27–34)
MCHC RBC-ENTMCNC: 30.3 GM/DL — LOW (ref 32–36)
MCHC RBC-ENTMCNC: 30.3 GM/DL — LOW (ref 32–36)
MCV RBC AUTO: 98.4 FL — SIGNIFICANT CHANGE UP (ref 80–100)
MCV RBC AUTO: 98.4 FL — SIGNIFICANT CHANGE UP (ref 80–100)
METHOD TYPE: SIGNIFICANT CHANGE UP
METHOD TYPE: SIGNIFICANT CHANGE UP
MONOCYTES # BLD AUTO: 0.13 K/UL — SIGNIFICANT CHANGE UP (ref 0–0.9)
MONOCYTES # BLD AUTO: 0.13 K/UL — SIGNIFICANT CHANGE UP (ref 0–0.9)
MONOCYTES NFR BLD AUTO: 2 % — SIGNIFICANT CHANGE UP (ref 2–14)
MONOCYTES NFR BLD AUTO: 2 % — SIGNIFICANT CHANGE UP (ref 2–14)
NEUTROPHILS # BLD AUTO: 6.34 K/UL — SIGNIFICANT CHANGE UP (ref 1.8–7.4)
NEUTROPHILS # BLD AUTO: 6.34 K/UL — SIGNIFICANT CHANGE UP (ref 1.8–7.4)
NEUTROPHILS NFR BLD AUTO: 93 % — HIGH (ref 43–77)
NEUTROPHILS NFR BLD AUTO: 93 % — HIGH (ref 43–77)
NEUTS BAND # BLD: 2 % — SIGNIFICANT CHANGE UP (ref 0–8)
NEUTS BAND # BLD: 2 % — SIGNIFICANT CHANGE UP (ref 0–8)
NRBC # BLD: 0 /100 — SIGNIFICANT CHANGE UP (ref 0–0)
NRBC # BLD: 0 /100 — SIGNIFICANT CHANGE UP (ref 0–0)
PHOSPHATE SERPL-MCNC: 4.3 MG/DL — SIGNIFICANT CHANGE UP (ref 2.5–4.5)
PHOSPHATE SERPL-MCNC: 4.3 MG/DL — SIGNIFICANT CHANGE UP (ref 2.5–4.5)
PLAT MORPH BLD: NORMAL — SIGNIFICANT CHANGE UP
PLAT MORPH BLD: NORMAL — SIGNIFICANT CHANGE UP
PLATELET # BLD AUTO: 45 K/UL — LOW (ref 150–400)
PLATELET # BLD AUTO: 45 K/UL — LOW (ref 150–400)
POIKILOCYTOSIS BLD QL AUTO: SLIGHT — SIGNIFICANT CHANGE UP
POIKILOCYTOSIS BLD QL AUTO: SLIGHT — SIGNIFICANT CHANGE UP
POTASSIUM SERPL-MCNC: 4.2 MMOL/L — SIGNIFICANT CHANGE UP (ref 3.5–5.3)
POTASSIUM SERPL-MCNC: 4.2 MMOL/L — SIGNIFICANT CHANGE UP (ref 3.5–5.3)
POTASSIUM SERPL-SCNC: 4.2 MMOL/L — SIGNIFICANT CHANGE UP (ref 3.5–5.3)
POTASSIUM SERPL-SCNC: 4.2 MMOL/L — SIGNIFICANT CHANGE UP (ref 3.5–5.3)
PROT SERPL-MCNC: 4.9 G/DL — LOW (ref 6–8.3)
PROT SERPL-MCNC: 4.9 G/DL — LOW (ref 6–8.3)
RBC # BLD: 3.19 M/UL — LOW (ref 3.8–5.2)
RBC # BLD: 3.19 M/UL — LOW (ref 3.8–5.2)
RBC # FLD: 16 % — HIGH (ref 10.3–14.5)
RBC # FLD: 16 % — HIGH (ref 10.3–14.5)
RBC BLD AUTO: ABNORMAL
RBC BLD AUTO: ABNORMAL
SCHISTOCYTES BLD QL AUTO: SLIGHT — SIGNIFICANT CHANGE UP
SCHISTOCYTES BLD QL AUTO: SLIGHT — SIGNIFICANT CHANGE UP
SODIUM SERPL-SCNC: 137 MMOL/L — SIGNIFICANT CHANGE UP (ref 135–145)
SODIUM SERPL-SCNC: 137 MMOL/L — SIGNIFICANT CHANGE UP (ref 135–145)
SPECIMEN SOURCE: SIGNIFICANT CHANGE UP
URATE SERPL-MCNC: 11.2 MG/DL — HIGH (ref 2.5–7)
URATE SERPL-MCNC: 11.2 MG/DL — HIGH (ref 2.5–7)
WBC # BLD: 6.67 K/UL — SIGNIFICANT CHANGE UP (ref 3.8–10.5)
WBC # BLD: 6.67 K/UL — SIGNIFICANT CHANGE UP (ref 3.8–10.5)
WBC # FLD AUTO: 6.67 K/UL — SIGNIFICANT CHANGE UP (ref 3.8–10.5)
WBC # FLD AUTO: 6.67 K/UL — SIGNIFICANT CHANGE UP (ref 3.8–10.5)

## 2023-11-29 PROCEDURE — 93010 ELECTROCARDIOGRAM REPORT: CPT

## 2023-11-29 PROCEDURE — ZZZZZ: CPT

## 2023-11-29 PROCEDURE — 99232 SBSQ HOSP IP/OBS MODERATE 35: CPT

## 2023-11-29 PROCEDURE — 99222 1ST HOSP IP/OBS MODERATE 55: CPT

## 2023-11-29 RX ADMIN — SODIUM CHLORIDE 75 MILLILITER(S): 9 INJECTION, SOLUTION INTRAVENOUS at 05:33

## 2023-11-29 RX ADMIN — ERTAPENEM SODIUM 100 MILLIGRAM(S): 1 INJECTION, POWDER, LYOPHILIZED, FOR SOLUTION INTRAMUSCULAR; INTRAVENOUS at 17:57

## 2023-11-29 RX ADMIN — Medication 81 MILLIGRAM(S): at 12:15

## 2023-11-29 NOTE — PROGRESS NOTE ADULT - ASSESSMENT
Sepsis - patient is clinically stable and afebrile. will continue antibiotics as per ID  HTN - patient has a history of HTN. will start Norvasc 5 mg po daily. will monitor BP  Myelodysplasia - patient is clinically stable. will continue to monitor  CRI - patient is clinically stable. will follow BMP  SBO/mesenteric mass - patient is GI stable and is asymptomatic. patient is tolerating po. will continue to monitor      Called patient's son (Don). message left Sepsis - patient is clinically stable and afebrile. will continue antibiotics as per ID  HTN - patient has a history of HTN. will start Norvasc 5 mg po daily. will monitor BP  Myelodysplasia - patient is clinically stable. will continue to monitor  CRI - patient is clinically stable. will follow BMP  SBO/mesenteric mass - patient is GI stable and is asymptomatic. patient is tolerating po. will continue to monitor  A flutter - patient is currently cardiac stable. patient being followed by cardiology      Called patient's son (Don). message left

## 2023-11-29 NOTE — PROGRESS NOTE ADULT - SUBJECTIVE AND OBJECTIVE BOX
HANG MADERA  89y  Female      patient is resting comfortably and without complaints of SOB/chest pain/palpitations/n/v/abdominal pain/dizziness    REVIEW OF SYSTEMS:    Cardiac HTN/A flutter  Pulmonary no cough/SOB  GI mesenteric mass/colon cancer/SBO   CRI  Neuro no headache/dizziness/numbness    FAMILY HISTORY:  FH: colon cancer (Sibling, Sibling)    FH: breast cancer (Sibling)      T(C): 36.7 (11-29-23 @ 20:25), Max: 37.1 (11-29-23 @ 05:48)  HR: 69 (11-29-23 @ 20:25) (69 - 75)  BP: 146/66 (11-29-23 @ 20:25) (146/66 - 170/68)  RR: 17 (11-29-23 @ 20:25) (15 - 17)  SpO2: 96% (11-29-23 @ 20:25) (94% - 97%)  Wt(kg): --Vital Signs Last 24 Hrs  T(C): 36.7 (29 Nov 2023 20:25), Max: 37.1 (29 Nov 2023 05:48)  T(F): 98 (29 Nov 2023 20:25), Max: 98.8 (29 Nov 2023 05:48)  HR: 69 (29 Nov 2023 20:25) (69 - 75)  BP: 146/66 (29 Nov 2023 20:25) (146/66 - 170/68)  BP(mean): --  RR: 17 (29 Nov 2023 20:25) (15 - 17)  SpO2: 96% (29 Nov 2023 20:25) (94% - 97%)    Parameters below as of 29 Nov 2023 20:25  Patient On (Oxygen Delivery Method): room air      No Known Allergies      PHYSICAL EXAM:    General NAD non toxic in appearance  Neck no JVD thyroid stable  Heart regular  Lungs clear  Abdomen non tender non distended normal bowel sounds no HSM/CVAT  Extremities non tender no edema +pulses  Neurologic no acute focal changes alert and oriented x 3        Consultant(s) Notes Reviewed:  [x ] YES  [ ] NO      LABS:  CBC Full  -  ( 29 Nov 2023 06:45 )  WBC Count : 6.67 K/uL  RBC Count : 3.19 M/uL  Hemoglobin : 9.5 g/dL  Hematocrit : 31.4 %  Platelet Count - Automated : 45 K/uL  Mean Cell Volume : 98.4 fl  Mean Cell Hemoglobin : 29.8 pg  Mean Cell Hemoglobin Concentration : 30.3 gm/dL  Auto Neutrophil # : 6.34 K/uL  Auto Lymphocyte # : 0.13 K/uL  Auto Monocyte # : 0.13 K/uL  Auto Eosinophil # : 0.00 K/uL  Auto Basophil # : 0.07 K/uL  Auto Neutrophil % : 93.0 %  Auto Lymphocyte % : 2.0 %  Auto Monocyte % : 2.0 %  Auto Eosinophil % : 0.0 %  Auto Basophil % : 1.0 %                          9.5    6.67  )-----------( 45       ( 29 Nov 2023 06:45 )             31.4     11-29    137  |  106  |  77<H>  ----------------------------<  114<H>  4.2   |  17<L>  |  4.12<H>    Ca    7.5<L>      29 Nov 2023 06:45  Phos  4.3     11-29  Mg     1.3     11-29    TPro  4.9<L>  /  Alb  1.9<L>  /  TBili  0.5  /  DBili  x   /  AST  20  /  ALT  32  /  AlkPhos  232<H>  11-29    LIVER FUNCTIONS - ( 29 Nov 2023 06:45 )  Alb: 1.9 g/dL / Pro: 4.9 g/dL / ALK PHOS: 232 U/L / ALT: 32 U/L / AST: 20 U/L / GGT: x             Urinalysis Basic - ( 29 Nov 2023 06:45 )    Color: x / Appearance: x / SG: x / pH: x  Gluc: 114 mg/dL / Ketone: x  / Bili: x / Urobili: x   Blood: x / Protein: x / Nitrite: x   Leuk Esterase: x / RBC: x / WBC x   Sq Epi: x / Non Sq Epi: x / Bacteria: x      Culture - Urine (11.26.23 @ 22:20)   Specimen Source: Clean Catch Clean Catch (Midstream)  Culture Results:   <10,000 CFU/mL Normal Urogenital Cheryl        Culture - Blood (11.26.23 @ 14:05)   - Bacteroides fragilis: Detec  - Enterobacterales: Detec  Gram Stain:   Growth in aerobic bottle: Gram Negative Rods   Growth in anaerobic bottle: Gram Negative Rods  - Ampicillin: R >16 These ampicillin results predict results for amoxicillin  - Ampicillin/Sulbactam: S <=4/2  - Aztreonam: S <=4  - Cefazolin: S <=2  - Cefepime: S <=2  - Cefoxitin: S <=8  - Ceftriaxone: S <=1  - Ciprofloxacin: S <=0.25  - Ertapenem: S <=0.5  - Gentamicin: S <=2  - Imipenem: S <=1  - Levofloxacin: S <=0.5  - Meropenem: S <=1  - Piperacillin/Tazobactam: S <=8  - Tobramycin: S <=2  - Trimethoprim/Sulfamethoxazole: S <=0.5/9.5  Specimen Source: .Blood Blood-Peripheral  Organism: Blood Culture PCR  Organism: Citrobacter koseri  Culture Results:   Growth in aerobic and anaerobic bottles: Citrobacter koseri   Growth in aerobic and anaerobic bottles: Bacteroides fragilis   "Susceptibilities not performed"   Growth in anaerobic bottle: Streptococcus constellatus   Alpha hemolytic streptoccous (Not Streptococcus pneumoniae or   Enterococcus)   isolated from a single blood culture set may represent contamination.   Contact the Microbiology Department at [phone] if susceptibility   testing is clinically indicated.   Direct identification is available within approximately 3-5   hours either by Blood Panel Multiplexed PCR or Direct   MALDI-TOF. Details: [email or web address]  Organism Identification: Blood Culture PCR   Citrobacter koseri  Method Type: LUAN  Method Type: PCR    Culture - Blood (11.26.23 @ 14:05)   Gram Stain:   Growth in aerobic bottle: Gram Negative Rods   Growth in anaerobic bottle: Gram Negative Rods  Specimen Source: .Blood Blood-Peripheral  Culture Results:   Growth in aerobic and anaerobic bottles: Citrobacter koseri   See previous culture [id number]   Growth in aerobic and anaerobic bottles: Bacteroides fragilis   "Susceptibilities not performed"            acetaminophen     Tablet .. 650 milliGRAM(s) Oral every 6 hours PRN  aspirin enteric coated 81 milliGRAM(s) Oral daily  dextrose 5% 1000 milliLiter(s) IV Continuous <Continuous>  ertapenem  IVPB 500 milliGRAM(s) IV Intermittent every 24 hours  simethicone 80 milliGRAM(s) Chew three times a day PRN

## 2023-11-29 NOTE — PROGRESS NOTE ADULT - SUBJECTIVE AND OBJECTIVE BOX
Resting, comfortable on RA, no N/V    Vital Signs Last 24 Hrs  T(C): 36.7 (11-29-23 @ 20:25), Max: 37.1 (11-29-23 @ 05:48)  T(F): 98 (11-29-23 @ 20:25), Max: 98.8 (11-29-23 @ 05:48)  HR: 69 (11-29-23 @ 20:25) (69 - 75)  BP: 146/66 (11-29-23 @ 20:25) (146/66 - 170/68)  RR: 17 (11-29-23 @ 20:25) (15 - 17)  SpO2: 96% (11-29-23 @ 20:25) (94% - 97%)    s1s2  b/l air entry  soft, ND  no edema                                9.5    6.67  )-----------( 45       ( 29 Nov 2023 06:45 )             31.4     29 Nov 2023 06:45    137    |  106    |  77     ----------------------------<  114    4.2     |  17     |  4.12     Ca    7.5        29 Nov 2023 06:45  Phos  4.3       29 Nov 2023 06:45  Mg     1.3       29 Nov 2023 06:45    TPro  4.9    /  Alb  1.9    /  TBili  0.5    /  DBili  x      /  AST  20     /  ALT  32     /  AlkPhos  232    29 Nov 2023 06:45    LIVER FUNCTIONS - ( 29 Nov 2023 06:45 )  Alb: 1.9 g/dL / Pro: 4.9 g/dL / ALK PHOS: 232 U/L / ALT: 32 U/L / AST: 20 U/L / GGT: x           acetaminophen     Tablet .. 650 milliGRAM(s) Oral every 6 hours PRN  aspirin enteric coated 81 milliGRAM(s) Oral daily  dextrose 5% 1000 milliLiter(s) IV Continuous <Continuous>  ertapenem  IVPB 500 milliGRAM(s) IV Intermittent every 24 hours  simethicone 80 milliGRAM(s) Chew three times a day PRN    A/P:    Hx colon cancer s/p R colectomy, chemo  CKD 4 iso Keytruda (d/c-d ~ 1 year ago)  B frag, Citrobacter k bacteremia  CT A/P w/RUQ mesenteric mass, no hydro  Abx per ID   Septic MAGDI/CKD 4  Cr is lower today   Continue IVF w/Bicarb   Avoid nephrotoxins  F/u CMP, CBC, UO    [phone]

## 2023-11-29 NOTE — CONSULT NOTE ADULT - SUBJECTIVE AND OBJECTIVE BOX
HANG MADERA  [id number]      HPI:  88 y/o F with PMH CKD 4, colon cancer (adenocarcinoma)-. Stage IIIC-T4 N2a s/p right colectomy followed by chemo,  breast cancer, anemia, HTN, thrombocytopenia c/o weakness x2 days associated with nausea, vomiting 2-3x/day, poor PO, fever, and chills. Endorses eating Thanksgiving dinner with her daughter and family, no new foods, or sick contacts. No additional family members with symptoms. Friday afternoon noted malaise and onset of symptoms. Hx significant for chronic diarrhea s/p colon resection. Follows with heme/onc outpatient - due for procrit . (2023 14:10)        ALLERGIES:  No Known Allergies      PAST MEDICAL & SURGICAL HISTORY:  History of breast cancer  right breast      Anemia      Hypertension      History of thrombocytopenia      History of herpes zoster      History of malignant neoplasm of parathyroid gland      Colon cancer      Chronic renal insufficiency      Thyroid nodule      Diabetes mellitus  NIDDM      History of cholecystectomy      History of partial colectomy  and appendectomy at the same time      History of lumpectomy of right breast   and radiation and chemotherapy      Parathyroid adenoma  excision            CURRENT MEDICATIONS:  MEDICATIONS  (STANDING):  aspirin enteric coated 81 milliGRAM(s) Oral daily  dextrose 5% 1000 milliLiter(s) (75 mL/Hr) IV Continuous <Continuous>  ertapenem  IVPB 500 milliGRAM(s) IV Intermittent every 24 hours    MEDICATIONS  (PRN):  acetaminophen     Tablet .. 650 milliGRAM(s) Oral every 6 hours PRN Temp greater or equal to 38C (100.4F)  simethicone 80 milliGRAM(s) Chew three times a day PRN Indigestion      SOCIAL HISTORY:  smoker approx 50 yrs ago      FAMILY HISTORY:  FH: colon cancer (Sibling, Sibling)    FH: breast cancer (Sibling)  mother: htn      ROS:  All 10 systems reviewed and positives noted in HPI    OBJECTIVE:    VITAL SIGNS:  Vital Signs Last 24 Hrs  T(C): 37.1 (2023 05:48), Max: 37.1 (2023 05:48)  T(F): 98.8 (2023 05:48), Max: 98.8 (2023 05:48)  HR: 72 (2023 05:48) (72 - 73)  BP: 170/68 (2023 05:48) (146/51 - 170/68)  BP(mean): --  RR: 16 (2023 05:48) (15 - 16)  SpO2: 94% (2023 05:48) (94% - 96%)    Parameters below as of 2023 05:48  Patient On (Oxygen Delivery Method): room air        PHYSICAL EXAM:  General: well appearing, no distress, [place] in right ear  HEENT: sclera anicteric  Neck: supple, no carotid bruits b/l  CVS: JVP ~ 7 cm H20, RRR, s1, s2, no murmurs/rubs/gallops  Chest: unlabored respirations, clear to auscultation b/l  Abdomen: distended, + BS  Extremities: no lower extremity edema b/l  Neuro: awake, alert & oriented x 3  Psych: normal affect      LABS:                        9.5    6.67  )-----------( 45       ( 2023 06:45 )             31.4         137  |  106  |  77<H>  ----------------------------<  114<H>  4.2   |  17<L>  |  4.12<H>    Ca    7.5<L>      2023 06:45  Phos  4.3       Mg     1.3         TPro  4.9<L>  /  Alb  1.9<L>  /  TBili  0.5  /  DBili  x   /  AST  20  /  ALT  32  /  AlkPhos  232<H>        EC2023; Sinus with 1deg AV block      TTE: < from: TTE Echo Complete w/o Contrast w/ Doppler (23 @ 09:39) >   1. Normal global left ventricular systolic function.   2. Left ventricular ejection fraction, by visual estimation, is 50 to   55%.   3. Calculated LVEF by Simpsons Biplane Method 52%.   4. Mildly increased LV wall thickness.   5. Normal left ventricular internal cavity size.   6. Normal right ventricular size and function.   7. The left atrium is normal in size.   8. The right atrium is normal in size.   9. Mild mitral valve regurgitation.  10. Mild-moderate tricuspid regurgitation.  11. No aortic valve stenosis.  12. Estimated pulmonary artery systolic pressure is 44.8 mmHg assuming a   right atrial pressure of 8 mmHg, which is consistent with mild pulmonary   hypertension.  13. Small pericardial effusion.  14. There is mild inversion of the right atrial wall.       HANG MADERA  [id number]      HPI:  89 y/o F with PMH Hypertension, Chronic kidney disease Stage IV, Colon cancer (adenocarcinoma) Stage IIIC-T4 N2a s/p right colectomy followed by chemotherapy, Breast cancer, Anemia, Thrombocytopenia complaint of weakness x 2 days associated with nausea, vomiting 2-3x/day, poor PO, fever, and chills. Endorses eating Thanksgiving dinner with her daughter and family, no new foods, or sick contacts. No additional family members with symptoms. Friday afternoon noted malaise and onset of symptoms. History significant for chronic diarrhea s/p colon resection. Follows with heme/onc outpatient - due for procrit .       ALLERGIES:  No Known Allergies      PAST MEDICAL & SURGICAL HISTORY:  History of breast cancer  Anemia  Hypertension  History of thrombocytopenia  Colon cancer  Chronic renal insufficiency  Diabetes mellitus  History of cholecystectomy  History of partial colectomy  and appendectomy  History of lumpectomy of right breast  Parathyroid adenoma  excision      CURRENT MEDICATIONS:  MEDICATIONS  (STANDING):  aspirin enteric coated 81 milliGRAM(s) Oral daily  dextrose 5% 1000 milliLiter(s) (75 mL/Hr) IV Continuous <Continuous>  ertapenem  IVPB 500 milliGRAM(s) IV Intermittent every 24 hours    MEDICATIONS  (PRN):  acetaminophen     Tablet .. 650 milliGRAM(s) Oral every 6 hours PRN Temp greater or equal to 38C (100.4F)  simethicone 80 milliGRAM(s) Chew three times a day PRN Indigestion      ROS:  All 10 systems reviewed and positives noted in HPI    OBJECTIVE:    VITAL SIGNS:  Vital Signs Last 24 Hrs  T(C): 37.1 (2023 05:48), Max: 37.1 (2023 05:48)  T(F): 98.8 (2023 05:48), Max: 98.8 (2023 05:48)  HR: 72 (2023 05:48) (72 - 73)  BP: 170/68 (2023 05:48) (146/51 - 170/68)  BP(mean): --  RR: 16 (2023 05:48) (15 - 16)  SpO2: 94% (2023 05:48) (94% - 96%)    Parameters below as of 2023 05:48  Patient On (Oxygen Delivery Method): room air      PHYSICAL EXAM:  General: no acute distress  HEENT: sclera anicteric  Neck: supple  CVS: JVP ~ 7 cm H20, irregular, s1, s2  Chest: unlabored respirations, clear to auscultation b/l  Abdomen: distended, + BS  Extremities: no lower extremity edema b/l  Neuro: awake, alert & oriented  Psych: normal affect      LABS:                        9.5    6.67  )-----------( 45       ( 2023 06:45 )             31.4         137  |  106  |  77<H>  ----------------------------<  114<H>  4.2   |  17<L>  |  4.12<H>    Ca    7.5<L>      2023 06:45  Phos  4.3       Mg     1.3         TPro  4.9<L>  /  Alb  1.9<L>  /  TBili  0.5  /  DBili  x   /  AST  20  /  ALT  32  /  AlkPhos  232<H>        EC2023; Sinus with 1deg AV block      TTE: < from: TTE Echo Complete w/o Contrast w/ Doppler (23 @ 09:39) >   1. Normal global left ventricular systolic function.   2. Left ventricular ejection fraction, by visual estimation, is 50 to   55%.   3. Calculated LVEF by Simpsons Biplane Method 52%.   4. Mildly increased LV wall thickness.   5. Normal left ventricular internal cavity size.   6. Normal right ventricular size and function.   7. The left atrium is normal in size.   8. The right atrium is normal in size.   9. Mild mitral valve regurgitation.  10. Mild-moderate tricuspid regurgitation.  11. No aortic valve stenosis.  12. Estimated pulmonary artery systolic pressure is 44.8 mmHg assuming a   right atrial pressure of 8 mmHg, which is consistent with mild pulmonary   hypertension.  13. Small pericardial effusion.  14. There is mild inversion of the right atrial wall.

## 2023-11-29 NOTE — PROGRESS NOTE ADULT - SUBJECTIVE AND OBJECTIVE BOX
HANG MADERA, 89y Female  MRN: [id number]  ATTENDING: Humble Michele    HPI:  89F, PMHx colon adenocarcinoma (2011), s/p right colectomy followed by chemotherapy, CKD 4, breast cancer, HTN, anemia, thrombocytopenia admitted with nausea, vomiting, malaise.  Patient treated in ambulatory with Procrit for anemia, under the care of Dr. Edmonds.  Started on empiric antibiotic coverage.  Hematologic profile consistent with WBC 0.38K platelet 114,000, hemoglobin 11.3 g/dL. CT head 11/18 showed chronic microvascular changes, no acute intracranial pathology.  Patient found hypothermic.  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  In the past patient had bone marrow biopsy (August 2023) which showed normocellular marrow with trilineage hematopoiesis with maturation and increased iron stores, with a Tier II variant of potential clinical significance / abnormal genomic alterations TP53 [id number].Also present NRBCs suggestive of possible MDS .    MEDICATIONS:  acetaminophen     Tablet .. 650 milliGRAM(s) Oral every 6 hours PRN  aspirin enteric coated 81 milliGRAM(s) Oral daily  dextrose 5% 1000 milliLiter(s) IV Continuous <Continuous>  meropenem  IVPB 500 milliGRAM(s) IV Intermittent every 12 hours  simethicone 80 milliGRAM(s) Chew three times a day PRN  All other medications reviewed.    SUBJECTIVE:  No evidence of active bleeding; overall improving on antibiotics.    VITALS:  T(C): 36.7 (11-28-23 @ 05:12), Max: 37.1 (11-27-23 @ 15:57)  T(F): 98.1 (11-28-23 @ 05:12), Max: 98.7 (11-27-23 @ 15:57)  HR: 73 (11-28-23 @ 05:12) (73 - 91)  BP: 100/54 (11-28-23 @ 05:12) (99/45 - 121/49)    PHYSICAL EXAM:  Constitutional: alert, well developed  HEENT: normocephalic, anicteric sclerae, no mucositis or thrush  Respiratory: bilateral clear to auscultation anteriorly  Cardiovascular : S1, S2 regular, rhythmic, no murmurs, gallops or rubs  Abdomen: soft, distended, + normoactive BS, no palpable HS- megaly  Extremities: no tenderness;  -c/c/e, pulses equal bilaterally    LABS:  (11-28) WBC: 7.79 K/uL,Hemoglobin: 8.7 g/dL, Hematocrit: 29.2 %, Platelet: 55 K/uL  (11-28) Na: 140 mmol/L ; K: 3.8 mmol/L ; BUN: 70 mg/dL ; Cr: 4.39 mg/dL.    RADIOLOGY:  ACC: [id number] EXAM:  CT ABDOMEN AND PELVIS   ORDERED BY: CAROLINA BLANK   PROCEDURE DATE:  11/26/2023    INTERPRETATION:  CLINICAL INFORMATION: Metastatic colon cancer. Abdominal pain.  COMPARISON: CT 9/11/2023  CONTRAST/COMPLICATIONS:  IV Contrast: NONE  Oral Contrast: NONE  Complications: None reported at time of study completion  PROCEDURE:  PICC line  FINDINGS:  LOWER CHEST: Small pericardial effusion. Small bilateral pleural   effusions, right greater than left.  LIVER: Hepatic cysts and hypodense foci similar in appearance to prior   imaging 9/11/2023. Evaluation for metastasis is limited in the absence of   intravenous contrast.  BILE DUCTS: Normal caliber.  GALLBLADDER: Within normal limits.  SPLEEN: Within normal limits.  PANCREAS: Mesenteric mass inseparable from the pancreatic head.  ADRENALS: Within normal limits.  KIDNEYS/URETERS: The small nonobstructing left renal calculi. No   hydronephrosis. Bilateral renal cysts as well as a 1.6 cm indeterminate   anterior right upper pole renal lesion (301:32) without change.  BLADDER: Within normal limits.  REPRODUCTIVE ORGANS: Uterus and adnexa within normal limits.    BOWEL: Stomach and proximal duodenum are dilated to the level of a right   upper quadrant mesenteric mass inseparable from the second portion of the   duodenum and pancreatic head. The mass currently measures 8.8 x 6.3 cm   previously 8.1 x 6.5 cm. Previously noted air within the lesion is no   longer present. Right hemicolectomy. Colonic diverticulosis. No bowel   obstruction.  PERITONEUM: Small volume ascites with interval increase.  VESSELS: Atherosclerotic changes.  RETROPERITONEUM/LYMPH NODES: No lymphadenopathy.  ABDOMINAL WALL: Rectus diastases with protrusion of bowel loops.  BONES: Within normal limits.    IMPRESSION:  Right upper quadrant mesenteric mass inseparable from the second portion   of the duodenum and pancreatic head not significantly changed in size.   Previously noted internal air is no longer present.    Dilatation of the stomach and duodenum proximal to the lesion. Correlate   for symptoms of partial outlet obstruction.    Small ascites with interval increase.

## 2023-11-29 NOTE — PROGRESS NOTE ADULT - PROBLEM SELECTOR PLAN 1
Patient presented with anemia and thrombocytopenia.  Review of bone marrow biopsy from August 2023 was unrevealing, though the presence of NRBC is usually seen in the myelodysplastic process.  Would continue sepsis treatment; blood cultures growing Citrobacter crusei and Bacteroides species.  No need for transfusion support at this time.  Will follow-up hematologic profile throughout hospitalization.

## 2023-11-29 NOTE — CHART NOTE - NSCHARTNOTEFT_GEN_A_CORE
Off Service NP Hospitalist Note  Case Discussed with Dr. Michele    labs and imaging reviewed    89 y/o F with PMH CKD 4, colon cancer (adenocarcinoma)-5-2011. Stage IIIC-T4 N2a s/p right colectomy followed by chemo,  breast cancer, anemia, HTN, thrombocytopenia c/o weakness x2 days associated with nausea, vomiting 2-3x/day, poor PO, fever, and chills. Endorses eating Thanksgiving dinner with her daughter and family, no new foods, or sick contacts. No additional family members with symptoms. Friday afternoon noted malaise and onset of symptoms. Hx significant for chronic diarrhea s/p colon resection. Follows with heme/onc outpatient - due for procrit 11/27.    *Neutropenic Fever  *Pancytopenia  *Polymicrobic Bacteremia likely GI source  Pt admitted with fever, nausea and vomiting  CT AP showed RT upper quadrant mesenteric mass, dilitation of stomach and duodenum proximal to lesion, correlate for partial outlet obstruction  ID appreciated, continue meropenem pending identification of organisms in blood  Follow repeat blood cultures, C Diff negative, Surgery evaluation Dr. Payton appreciated, non surgical  Monitor labs, fever curve, further workup to be determined    *Myelodysplasia  pt with hx of Colon and Breast Cancer, Heme/Onc appreciated  followed by Dr. Edmonds, recent bone marrow biopsies revealed evidence of nucleated RBCs  Treated with procrit outpatient, unlikely to be good candidate for further bone marrow studies  Continue to monitor CBC    *Septic MAGDI/CKD 4  Nephrology evaluation appreciated, CKD 4 iso keytruda  IVF with sodium bicarb as per renal, avoid hypotension  Avoid nephrotoxins, monitor renal indices    *HTN  chronic condition  continue home meds - norvasc 10 and doxazosin 1 BID    *DVT PPx  SCDs    HCP daughter [place]  updated, all questions answered. Off Service NP Hospitalist Note  Case Discussed with Dr. Michele    labs and imaging reviewed    88 y/o F with PMH CKD 4, colon cancer (adenocarcinoma)-5-2011. Stage IIIC-T4 N2a s/p right colectomy followed by chemo,  breast cancer, anemia, HTN, thrombocytopenia c/o weakness x2 days associated with nausea, vomiting 2-3x/day, poor PO, fever, and chills. Endorses eating Thanksgiving dinner with her daughter and family, no new foods, or sick contacts. No additional family members with symptoms. Friday afternoon noted malaise and onset of symptoms. Hx significant for chronic diarrhea s/p colon resection. Follows with heme/onc outpatient - due for procrit 11/27.  Pt states that nausea and vomiting resolved.  No BM   With a flutter over night on monitor - rate 59-69    *Neutropenic Fever  *Pancytopenia  *Polymicrobic Bacteremia likely GI source  Pt admitted with fever, nausea and vomiting  CT AP showed RT upper quadrant mesenteric mass, dilitation of stomach and duodenum proximal to lesion, correlate for partial outlet obstruction  ID appreciated, meropenem changed to ertipenium pending sensitivities --Blood CX  citrobacter krusei and bacteroides species.  C Diff negative,   Surgery evaluation Dr. Payton appreciated, non surgical  Monitor labs, fever curve,     *Myelodysplasia  pt with hx of Colon and Breast Cancer, Heme/Onc appreciated  followed by Dr. Edmonds, recent bone marrow biopsies revealed evidence of nucleated RBCs  Treated with procrit outpatient, unlikely to be good candidate for further bone marrow studies  Hem/ onc following- appreciate recs  Continue to monitor CBC    *Septic MAGDI/CKD 4  Nephrology evaluation appreciated, CKD 4 is on  keytruda  IVF with sodium bicarb as per renal, avoid hypotension  Avoid nephrotoxins, monitor renal indices    *HTN  chronic condition  continue home meds - norvasc 10 and doxazosin 1 BID    *DVT PPx  SCDs    HCP daughter [place]  updated, all questions answered.

## 2023-11-29 NOTE — PROGRESS NOTE ADULT - ASSESSMENT
89 yo with history of colon cancer, thrombocytopenia, NIDDM, CKD, HTN, and known mesenteric mass who was admitted 11/26 with less than 12 hours of rigors, N/V and abdominal pain.  CT of A/P without perforation or clear obstruction, she was neutropenic, her blood cultures have grown citrobacter krusei and bacteroides species.  She is clinically improved, tolerating a diet, no N/V and no abdominal pain.  Her neutropenia on admission was transient, rapidly reversed, perhaps it was induced by her GNR bacteremia.  She is no longer neutropenic. Platelets are decreaseing, ? infection mediated.  Suggest:  1 Await full sensitivities  2 Will ncontinue  Ertapenenm 500 daily  3 Monitor exam , suspect she had a bacteremic event arise from gut, will be at risk for a recurrence.,   4 If she has any rigors or fever she [facility] have [facility]

## 2023-11-29 NOTE — CONSULT NOTE ADULT - ASSESSMENT
Assessment: 88 y/o F with PMH CKD 4, colon cancer (adenocarcinoma)-5-2011. Stage IIIC-T4 N2a s/p right colectomy followed by chemo,  breast cancer, anemia, HTN, thrombocytopenia admitted for neutropenic fever. cardiology consulted for Af/Aflutter. Pt denies chest pain, sob, dizziness or palpitations. Pt states she is aware that her heart occasionally goes into an irregular rhythm. Has not followed up with a cardiologist in some time and doesnt remember who she followed with    Reccomendations:  [ ] AF/ Aflutter: Repeat EKG. pt reports history of "irregular rhythm." Reports undergoing a stress test and "a monitor for a month." Results unknown. However pt is rate controlled and asymptomatic. Due to her thrombocytopenia, she is a poor candidate for AC, may revisit when levels improve    Fanny HOLT-BC   Assessment:  89 y/o F with PMH Hypertension, Chronic kidney disease Stage IV, Colon cancer (adenocarcinoma) Stage IIIC-T4 N2a s/p right colectomy followed by chemotherapy, Breast cancer, Anemia, Thrombocytopenia complaint of weakness x2 days associated with nausea, vomiting 2-3x/day, poor PO, fever, and chills admitted for neutropenic fever. Cardiology consulted for Aflutter. Patient denies chest pain, shortness of breath, dizziness or palpitations. Patient states she is aware that her heart occasionally goes into an irregular rhythm.     Recommendations  [ ] Aflutter: Repeat EKG. Patient reports history of "irregular rhythm." Reports undergoing a stress test and "a monitor for a month" in the remote past. Results unknown. However patient is rate controlled and asymptomatic. Due to her thrombocytopenia, she is a poor candidate for anticoagulation at this time, may revisit when levels improve    Fanny HOLT-BC

## 2023-11-30 LAB
ALBUMIN SERPL ELPH-MCNC: 1.8 G/DL — LOW (ref 3.3–5)
ALBUMIN SERPL ELPH-MCNC: 1.8 G/DL — LOW (ref 3.3–5)
ALP SERPL-CCNC: 232 U/L — HIGH (ref 40–120)
ALP SERPL-CCNC: 232 U/L — HIGH (ref 40–120)
ALT FLD-CCNC: 27 U/L — SIGNIFICANT CHANGE UP (ref 10–45)
ALT FLD-CCNC: 27 U/L — SIGNIFICANT CHANGE UP (ref 10–45)
ANION GAP SERPL CALC-SCNC: 12 MMOL/L — SIGNIFICANT CHANGE UP (ref 5–17)
ANION GAP SERPL CALC-SCNC: 12 MMOL/L — SIGNIFICANT CHANGE UP (ref 5–17)
AST SERPL-CCNC: 19 U/L — SIGNIFICANT CHANGE UP (ref 10–40)
AST SERPL-CCNC: 19 U/L — SIGNIFICANT CHANGE UP (ref 10–40)
BASOPHILS # BLD AUTO: 0.03 K/UL — SIGNIFICANT CHANGE UP (ref 0–0.2)
BASOPHILS # BLD AUTO: 0.03 K/UL — SIGNIFICANT CHANGE UP (ref 0–0.2)
BASOPHILS NFR BLD AUTO: 0.5 % — SIGNIFICANT CHANGE UP (ref 0–2)
BASOPHILS NFR BLD AUTO: 0.5 % — SIGNIFICANT CHANGE UP (ref 0–2)
BILIRUB SERPL-MCNC: 0.4 MG/DL — SIGNIFICANT CHANGE UP (ref 0.2–1.2)
BILIRUB SERPL-MCNC: 0.4 MG/DL — SIGNIFICANT CHANGE UP (ref 0.2–1.2)
BUN SERPL-MCNC: 84 MG/DL — HIGH (ref 7–23)
BUN SERPL-MCNC: 84 MG/DL — HIGH (ref 7–23)
CALCIUM SERPL-MCNC: 7.6 MG/DL — LOW (ref 8.4–10.5)
CALCIUM SERPL-MCNC: 7.6 MG/DL — LOW (ref 8.4–10.5)
CHLORIDE SERPL-SCNC: 106 MMOL/L — SIGNIFICANT CHANGE UP (ref 96–108)
CHLORIDE SERPL-SCNC: 106 MMOL/L — SIGNIFICANT CHANGE UP (ref 96–108)
CO2 SERPL-SCNC: 21 MMOL/L — LOW (ref 22–31)
CO2 SERPL-SCNC: 21 MMOL/L — LOW (ref 22–31)
CREAT SERPL-MCNC: 4.03 MG/DL — HIGH (ref 0.5–1.3)
CREAT SERPL-MCNC: 4.03 MG/DL — HIGH (ref 0.5–1.3)
EGFR: 10 ML/MIN/1.73M2 — LOW
EGFR: 10 ML/MIN/1.73M2 — LOW
EOSINOPHIL # BLD AUTO: 0.13 K/UL — SIGNIFICANT CHANGE UP (ref 0–0.5)
EOSINOPHIL # BLD AUTO: 0.13 K/UL — SIGNIFICANT CHANGE UP (ref 0–0.5)
EOSINOPHIL NFR BLD AUTO: 2.4 % — SIGNIFICANT CHANGE UP (ref 0–6)
EOSINOPHIL NFR BLD AUTO: 2.4 % — SIGNIFICANT CHANGE UP (ref 0–6)
GLUCOSE SERPL-MCNC: 101 MG/DL — HIGH (ref 70–99)
GLUCOSE SERPL-MCNC: 101 MG/DL — HIGH (ref 70–99)
HCT VFR BLD CALC: 30.3 % — LOW (ref 34.5–45)
HCT VFR BLD CALC: 30.3 % — LOW (ref 34.5–45)
HGB BLD-MCNC: 9.3 G/DL — LOW (ref 11.5–15.5)
HGB BLD-MCNC: 9.3 G/DL — LOW (ref 11.5–15.5)
IMM GRANULOCYTES NFR BLD AUTO: 0.4 % — SIGNIFICANT CHANGE UP (ref 0–0.9)
IMM GRANULOCYTES NFR BLD AUTO: 0.4 % — SIGNIFICANT CHANGE UP (ref 0–0.9)
LYMPHOCYTES # BLD AUTO: 0.86 K/UL — LOW (ref 1–3.3)
LYMPHOCYTES # BLD AUTO: 0.86 K/UL — LOW (ref 1–3.3)
LYMPHOCYTES # BLD AUTO: 15.8 % — SIGNIFICANT CHANGE UP (ref 13–44)
LYMPHOCYTES # BLD AUTO: 15.8 % — SIGNIFICANT CHANGE UP (ref 13–44)
MCHC RBC-ENTMCNC: 29.7 PG — SIGNIFICANT CHANGE UP (ref 27–34)
MCHC RBC-ENTMCNC: 29.7 PG — SIGNIFICANT CHANGE UP (ref 27–34)
MCHC RBC-ENTMCNC: 30.7 GM/DL — LOW (ref 32–36)
MCHC RBC-ENTMCNC: 30.7 GM/DL — LOW (ref 32–36)
MCV RBC AUTO: 96.8 FL — SIGNIFICANT CHANGE UP (ref 80–100)
MCV RBC AUTO: 96.8 FL — SIGNIFICANT CHANGE UP (ref 80–100)
MONOCYTES # BLD AUTO: 0.44 K/UL — SIGNIFICANT CHANGE UP (ref 0–0.9)
MONOCYTES # BLD AUTO: 0.44 K/UL — SIGNIFICANT CHANGE UP (ref 0–0.9)
MONOCYTES NFR BLD AUTO: 8.1 % — SIGNIFICANT CHANGE UP (ref 2–14)
MONOCYTES NFR BLD AUTO: 8.1 % — SIGNIFICANT CHANGE UP (ref 2–14)
NEUTROPHILS # BLD AUTO: 3.98 K/UL — SIGNIFICANT CHANGE UP (ref 1.8–7.4)
NEUTROPHILS # BLD AUTO: 3.98 K/UL — SIGNIFICANT CHANGE UP (ref 1.8–7.4)
NEUTROPHILS NFR BLD AUTO: 72.8 % — SIGNIFICANT CHANGE UP (ref 43–77)
NEUTROPHILS NFR BLD AUTO: 72.8 % — SIGNIFICANT CHANGE UP (ref 43–77)
NRBC # BLD: 0 /100 WBCS — SIGNIFICANT CHANGE UP (ref 0–0)
NRBC # BLD: 0 /100 WBCS — SIGNIFICANT CHANGE UP (ref 0–0)
PLATELET # BLD AUTO: 47 K/UL — LOW (ref 150–400)
PLATELET # BLD AUTO: 47 K/UL — LOW (ref 150–400)
POTASSIUM SERPL-MCNC: 4.4 MMOL/L — SIGNIFICANT CHANGE UP (ref 3.5–5.3)
POTASSIUM SERPL-MCNC: 4.4 MMOL/L — SIGNIFICANT CHANGE UP (ref 3.5–5.3)
POTASSIUM SERPL-SCNC: 4.4 MMOL/L — SIGNIFICANT CHANGE UP (ref 3.5–5.3)
POTASSIUM SERPL-SCNC: 4.4 MMOL/L — SIGNIFICANT CHANGE UP (ref 3.5–5.3)
PROT SERPL-MCNC: 4.8 G/DL — LOW (ref 6–8.3)
PROT SERPL-MCNC: 4.8 G/DL — LOW (ref 6–8.3)
RBC # BLD: 3.13 M/UL — LOW (ref 3.8–5.2)
RBC # BLD: 3.13 M/UL — LOW (ref 3.8–5.2)
RBC # FLD: 15.9 % — HIGH (ref 10.3–14.5)
RBC # FLD: 15.9 % — HIGH (ref 10.3–14.5)
SODIUM SERPL-SCNC: 139 MMOL/L — SIGNIFICANT CHANGE UP (ref 135–145)
SODIUM SERPL-SCNC: 139 MMOL/L — SIGNIFICANT CHANGE UP (ref 135–145)
WBC # BLD: 5.46 K/UL — SIGNIFICANT CHANGE UP (ref 3.8–10.5)
WBC # BLD: 5.46 K/UL — SIGNIFICANT CHANGE UP (ref 3.8–10.5)
WBC # FLD AUTO: 5.46 K/UL — SIGNIFICANT CHANGE UP (ref 3.8–10.5)
WBC # FLD AUTO: 5.46 K/UL — SIGNIFICANT CHANGE UP (ref 3.8–10.5)

## 2023-11-30 PROCEDURE — 99232 SBSQ HOSP IP/OBS MODERATE 35: CPT

## 2023-11-30 PROCEDURE — 93010 ELECTROCARDIOGRAM REPORT: CPT

## 2023-11-30 PROCEDURE — 99232 SBSQ HOSP IP/OBS MODERATE 35: CPT | Mod: FS

## 2023-11-30 RX ORDER — MAGNESIUM SULFATE 500 MG/ML
1 VIAL (ML) INJECTION ONCE
Refills: 0 | Status: COMPLETED | OUTPATIENT
Start: 2023-11-30 | End: 2023-11-30

## 2023-11-30 RX ORDER — AMLODIPINE BESYLATE 2.5 MG/1
5 TABLET ORAL DAILY
Refills: 0 | Status: DISCONTINUED | OUTPATIENT
Start: 2023-11-30 | End: 2023-12-01

## 2023-11-30 RX ORDER — ALLOPURINOL 300 MG
100 TABLET ORAL DAILY
Refills: 0 | Status: DISCONTINUED | OUTPATIENT
Start: 2023-11-30 | End: 2023-12-03

## 2023-11-30 RX ORDER — SODIUM BICARBONATE 1 MEQ/ML
650 SYRINGE (ML) INTRAVENOUS THREE TIMES A DAY
Refills: 0 | Status: DISCONTINUED | OUTPATIENT
Start: 2023-11-30 | End: 2023-12-03

## 2023-11-30 RX ADMIN — Medication 81 MILLIGRAM(S): at 11:58

## 2023-11-30 RX ADMIN — Medication 650 MILLIGRAM(S): at 21:35

## 2023-11-30 RX ADMIN — AMLODIPINE BESYLATE 5 MILLIGRAM(S): 2.5 TABLET ORAL at 21:35

## 2023-11-30 RX ADMIN — ERTAPENEM SODIUM 100 MILLIGRAM(S): 1 INJECTION, POWDER, LYOPHILIZED, FOR SOLUTION INTRAMUSCULAR; INTRAVENOUS at 18:20

## 2023-11-30 RX ADMIN — Medication 100 MILLIGRAM(S): at 21:35

## 2023-11-30 RX ADMIN — SODIUM CHLORIDE 75 MILLILITER(S): 9 INJECTION, SOLUTION INTRAVENOUS at 06:21

## 2023-11-30 RX ADMIN — Medication 100 GRAM(S): at 18:20

## 2023-11-30 NOTE — PROGRESS NOTE ADULT - ASSESSMENT
Sepsis - patient is clinically stable. will continue antibiotics as per ID  SBO/mesenteric mass - patient is GI stable and is asymptomatic. will continue to observe  HTN - patient is clinically stable. will continue to monitor BP. Norvasc 5 mg daily  Myelodysplastic syndrome/thrombocytopenia/anemia - patient is clinically stable. recommend follow up with Heme as an outpatient  A flutter - patient is cardiac stable. recommend follow up with cardiologist as an out patient  CRI - patient is clinically stable. nephrology is following      Case discussed with patient's daughter and son

## 2023-11-30 NOTE — DIETITIAN INITIAL EVALUATION ADULT - ADD RECOMMEND
1. Monitor Labs, Skin integrity, and bowel movement   2. Continue to follow on low sodium restricted order  3. Obtain and honor food preferences.    1. Nepro 8oz Daily (Provides 425kcal & 19grams of Protein)   2. Monitor Labs, Skin integrity, and bowel movement   3. Obtain and honor food preferences.  1. Neutropenic Diet (noted on file w/ nutrition office)  2. Nepro 8oz Daily (Provides 425kcal & 19grams of Protein)   3. Monitor Labs, Skin integrity, and bowel movement   4. Obtain and honor food preferences.

## 2023-11-30 NOTE — DIETITIAN INITIAL EVALUATION ADULT - NUTRITIONGOAL OUTCOME1
Pt will consume 75% meals during hospitalization.  Patient to meet at least 75% of assessed needs during hospitalization.

## 2023-11-30 NOTE — DIETITIAN INITIAL EVALUATION ADULT - ORAL INTAKE PTA/DIET HISTORY
PTA pt consumed two meal a day typically consumes eggs, fruit, cornflakes for breakfast and fish, salmon, lamb, and beef for lunch. Pt states her appetite is good. She is not following a particular diet at home. No known food allergies or intolerances. Pt reports taking centrum gummies two times a day, vitamin B6 and D3. No difficulty chewing or swallowing. Patient denies nausea or vomiting. Also, pt denies stomach pain/diarrhea/constipation. Last bowel movement noted this morning. Nutrition focused physical exam findings of loss of muscle on the temple region (moderate) and loss of subcutaneous fat on triceps (mild).  PTA pt consumed two meal a day typically consumes eggs, fruit, cornflakes for breakfast and fish, salmon, lamb, and beef for lunch. Pt states her appetite is good. She is not following a particular diet at home. No known food allergies or intolerances. Pt reports taking centrum gummies two times a day, vitamin B6 and D3. No difficulty chewing or swallowing. Patient denies nausea or vomiting. Also, pt denies stomach pain/diarrhea/constipation.

## 2023-11-30 NOTE — PROGRESS NOTE ADULT - SUBJECTIVE AND OBJECTIVE BOX
DEION MADERAMELA  89y  Female      patient states she is feeling fine. she denies n/v/abdominal pain/fever/chills/chest pain/dizziness/palpitaions/SOB. patient states she is having bowel movements and is tolerating po      REVIEW OF SYSTEMS:    Cardiac HTN/A flutter   Pulmonary no cough/SOB  GI mesenteric mass/colon cancer/SBO   CRI  Neuro no headache/dizziness/numbness    FAMILY HISTORY:  FH: colon cancer (Sibling, Sibling)    FH: breast cancer (Sibling)      T(C): 36.4 (23 @ 16:08), Max: 36.7 (23 @ 20:25)  HR: 65 (23 @ 16:08) (54 - 69)  BP: 148/64 (23 @ 16:08) (138/59 - 148/64)  RR: 17 (23 @ 16:08) (15 - 17)  SpO2: 97% (23 @ 16:08) (96% - 97%)  Wt(kg): --Vital Signs Last 24 Hrs  T(C): 36.4 (2023 16:08), Max: 36.7 (2023 20:25)  T(F): 97.5 (2023 16:08), Max: 98 (2023 20:25)  HR: 65 (2023 16:08) (54 - 69)  BP: 148/64 (2023 16:08) (138/59 - 148/64)  BP(mean): --  RR: 17 (2023 16:08) (15 - 17)  SpO2: 97% (2023 16:08) (96% - 97%)    Parameters below as of 2023 16:08  Patient On (Oxygen Delivery Method): room air      No Known Allergies      PHYSICAL EXAM:    General NAD non toxic in apperarance  Neck no JVD thyroid stable  Heart irregular  Lungs clear  Abdomen non tender non distended normal bowel sounds no HSM/CVAT  Extremities non tender no edema  Neurologic alert and oriented x 3 no acute focal changes      Consultant(s) Notes Reviewed:  [x ] YES  [ ] NO      LABS:  CBC Full  -  ( 2023 06:25 )  WBC Count : 5.46 K/uL  RBC Count : 3.13 M/uL  Hemoglobin : 9.3 g/dL  Hematocrit : 30.3 %  Platelet Count - Automated : 47 K/uL  Mean Cell Volume : 96.8 fl  Mean Cell Hemoglobin : 29.7 pg  Mean Cell Hemoglobin Concentration : 30.7 gm/dL  Auto Neutrophil # : 3.98 K/uL  Auto Lymphocyte # : 0.86 K/uL  Auto Monocyte # : 0.44 K/uL  Auto Eosinophil # : 0.13 K/uL  Auto Basophil # : 0.03 K/uL  Auto Neutrophil % : 72.8 %  Auto Lymphocyte % : 15.8 %  Auto Monocyte % : 8.1 %  Auto Eosinophil % : 2.4 %  Auto Basophil % : 0.5 %                          9.3    5.46  )-----------( 47       ( 2023 06:25 )             30.3     11    139  |  106  |  84<H>  ----------------------------<  101<H>  4.4   |  21<L>  |  4.03<H>    Ca    7.6<L>      2023 06:25  Phos  4.3       Mg     1.3         TPro  4.8<L>  /  Alb  1.8<L>  /  TBili  0.4  /  DBili  x   /  AST  19  /  ALT  27  /  AlkPhos  232<H>      LIVER FUNCTIONS - ( 2023 06:25 )  Alb: 1.8 g/dL / Pro: 4.8 g/dL / ALK PHOS: 232 U/L / ALT: 27 U/L / AST: 19 U/L / GGT: x             Urinalysis Basic - ( 2023 06:25 )    Color: x / Appearance: x / SG: x / pH: x  Gluc: 101 mg/dL / Ketone: x  / Bili: x / Urobili: x   Blood: x / Protein: x / Nitrite: x   Leuk Esterase: x / RBC: x / WBC x   Sq Epi: x / Non Sq Epi: x / Bacteria: x        Culture - Urine (23 @ 22:20)   Specimen Source: Clean Catch Clean Catch (Midstream)  Culture Results:   <10,000 CFU/mL Normal Urogenital Cheryl      Culture - Blood (23 @ 14:05)   - Bacteroides fragilis: Detec  - Enterobacterales: Detec  Gram Stain:   Growth in aerobic bottle: Gram Negative Rods   Growth in anaerobic bottle: Gram Negative Rods  - Ampicillin: R >16 These ampicillin results predict results for amoxicillin  - Ampicillin/Sulbactam: S <=4/2  - Aztreonam: S <=4  - Cefazolin: S <=2  - Cefepime: S <=2  - Cefoxitin: S <=8  - Ceftriaxone: S <=1  - Ciprofloxacin: S <=0.25  - Ertapenem: S <=0.5  - Gentamicin: S <=2  - Imipenem: S <=1  - Levofloxacin: S <=0.5  - Meropenem: S <=1  - Piperacillin/Tazobactam: S <=8  - Tobramycin: S <=2  - Trimethoprim/Sulfamethoxazole: S <=0.5/9.5  Specimen Source: .Blood Blood-Peripheral  Organism: Blood Culture PCR  Organism: Citrobacter koseri  Culture Results:   Growth in aerobic and anaerobic bottles: Citrobacter koseri   Growth in aerobic and anaerobic bottles: Bacteroides fragilis   "Susceptibilities not performed"   Growth in anaerobic bottle: Streptococcus constellatus   Alpha hemolytic streptoccous (Not Streptococcus pneumoniae or   Enterococcus)   isolated from a single blood culture set may represent contamination.   Contact the Microbiology Department at [phone] if susceptibility   testing is clinically indicated.   Direct identification is available within approximately 3-5   hours either by Blood Panel Multiplexed PCR or Direct   MALDI-TOF. Details: [email or web address]  Organism Identification: Blood Culture PCR   Citrobacter koseri  Method Type: LUAN  Method Type: PCR            Culture - Blood (23 @ 14:05)   Gram Stain:   Growth in aerobic bottle: Gram Negative Rods   Growth in anaerobic bottle: Gram Negative Rods  Specimen Source: .Blood Blood-Peripheral  Culture Results:   Growth in aerobic and anaerobic bottles: Citrobacter koseri   See previous culture [id number]   Growth in aerobic and anaerobic bottles: Bacteroides fragilis   "Susceptibilities not performed"          RADIOLOGY & ADDITIONAL TESTS:    < from: TTE Echo Complete w/o Contrast w/ Doppler (23 @ 09:39) >    ACC: [id number] EXAM:  ECHO TTE WO CON COMP W DOPP                          PROCEDURE DATE:  2023          INTERPRETATION:  TRANSTHORACIC ECHOCARDIOGRAM REPORT        Patient Name:   HANG MADERA Patient Location: [address]  [facility] Rec #:  [id number]          Accession #:      [id number]  Account #:      [id number]           Height:           64.2 in 163.0 cm  YOB: 1934        Weight:           141.0 lb 63.96 kg  Patient Age:    89 years         BSA:              1.69 m²  Patient Gender: F                BP:               112/44 mmHg      Date of Exam:        2023 9:39:46 AM  Sonographer:         Elio Alfaro  Referring Physician: BOAZ FLOR    Procedure:     2D Echo/Doppler/Color Doppler Complete.  Indications:   R00.2 - Palpitations  Diagnosis:     I27.20 - Pulmonary hypertension, unspecified  Study Details: Technically adequate study.        2D AND M-MODE MEASUREMENTS (normal ranges within parentheses):  Left Ventricle:                  Normal   Aorta/Left Atrium:              Normal  IVSd (2D):              1.12 cm (0.7-1.1) Aortic Root (2D):    3.06 cm   (2.4-3.7)  LVPWd (2D):             1.01 cm (0.7-1.1) Aortic Root (Mmode): 3.03 cm   (2.4-3.7)  LVIDd (2D):             4.77 cm (3.4-5.7) AoV Cusp Separation: 1.67 cm   (1.5-2.6)  LVIDs (2D):             3.68 cm           Left Atrium (2D):    3.81 cm   (1.9-4.0)  LV FS (2D):             22.9 %   (>25%)   Left Atrium (Mmode): 4.03 cm   (1.9-4.0)  LV EF (2D):              46 %    (>55%)   Right Ventricle:  Relative Wall Thickness  0.42    (<0.42)  TAPSE:           2.36 cm    LV SYSTOLIC FUNCTION BY 2D PLANIMETRY (MOD):  EF-A4C View: 44.7 % EF-A2C View: 47.2 %    SPECTRAL DOPPLER ANALYSIS (where applicable):  Aortic Valve: AoV Max Fred: 1.55 m/s AoV PeakP.7 mmHg AoV Mean P.5 mmHg    LVOT Vmax: 1.46 m/s LVOT VTI: 0.268 m LVOT Diameter: 1.94 cm    AoV Area, Vmax: 2.76 cm² AoV Area, VTI: 2.36 cm² AoV Area, Vmn: 2.23 cm²  Ao VTI: 0.335  Tricuspid Valve and PA/RV Systolic Pressure: TR Max Velocity: 3.03 m/s RA   Pressure: 8 mmHg RVSP/PASP: 44.8 mmHg      PHYSICIAN INTERPRETATION:  Left Ventricle: The left ventricular internal cavity size is normal. Left   ventricular wall thickness is mildly increased.  Global LV systolic function was normal. Left ventricular ejection   fraction, by visual estimation, is 50 to 55%. Calculated LVEF by Simpsons   Biplane Method 52%.  Right Ventricle: Normal right ventricular size and function.  Left Atrium: The left atrium is normal in size.  Right Atrium: The right atrium is normal in size.  Pericardium: A small pericardial effusion is present. The pericardial   effusion is localized near the right atrium and localized near the right   ventricle. There is mild inversion of the right atrial wall.  Mitral Valve: Mitral leaflet mobility is normal. Mild mitral valve   regurgitation is seen.  Tricuspid Valve: The tricuspid valve is normal in structure.   Mild-moderate tricuspid regurgitation is visualized. Estimated pulmonary   artery systolic pressure is 44.8 mmHg assuming a right atrial pressure of   8 mmHg, which is consistent with mild pulmonary hypertension.  Aortic Valve: The aortic valve is trileaflet. No aortic valve stenosis.  Pulmonic Valve: The pulmonic valve was not well visualized. Trace   pulmonic valve regurgitation.  Aorta: Aortic root measured at Sinus of Valsalva is normal.  Venous: The inferior vena cava was dilated, with respiratory size   variation greater than 50%.      Summary:   1. Normal global left ventricular systolic function.   2. Left ventricular ejection fraction, by visual estimation, is 50 to   55%.   3. Calculated LVEF by Simpsons Biplane Method 52%.   4. Mildly increased LV wall thickness.   5. Normal left ventricular internal cavity size.   6. Normal right ventricular size and function.   7. The left atrium is normal in size.   8. The right atrium is normal in size.   9. Mild mitral valve regurgitation.  10. Mild-moderate tricuspid regurgitation.  11. No aortic valve stenosis.  12. Estimated pulmonary artery systolic pressure is 44.8 mmHg assuming a   right atrial pressure of 8 mmHg, which is consistent with mild pulmonary   hypertension.  13. Small pericardial effusion.  14. There is mild inversion of the right atrial wall.    [id number] Everton-Lisa Perez MD Electronically signed on   2023 at 1:06:56 PM        *** Final ***    < end of copied text >          < from: 12 Lead ECG (23 @ 13:43) >  Ventricular Rate 60 BPM    Atrial Rate 300 BPM    QRS Duration 100 ms    Q-T Interval 418 ms    QTC Calculation(Bazett) 418 ms    R Axis -23 degrees    T Axis 3 degrees    Diagnosis Line Atrial flutter with variable AV block  Incomplete right bundle branch block  Abnormal ECG  When compared with ECG of 2023 14:10,  Atrial flutter has replaced Sinus rhythm  T wave inversion now evident in Anterior leads    Confirmed by BERNADINE PEARSON TRISH () on 2023 7:33:50 AM    < end of copied text >          acetaminophen     Tablet .. 650 milliGRAM(s) Oral every 6 hours PRN  allopurinol 100 milliGRAM(s) Oral daily  aspirin enteric coated 81 milliGRAM(s) Oral daily  dextrose 5% 1000 milliLiter(s) IV Continuous <Continuous>  ertapenem  IVPB 500 milliGRAM(s) IV Intermittent every 24 hours  magnesium sulfate  IVPB 1 Gram(s) IV Intermittent once  simethicone 80 milliGRAM(s) Chew three times a day PRN

## 2023-11-30 NOTE — PHYSICAL THERAPY INITIAL EVALUATION ADULT - PERTINENT HX OF CURRENT PROBLEM, REHAB EVAL
88 y/o F with PMH CKD 4, colon cancer (adenocarcinoma)-5-2011. Stage IIIC-T4 N2a s/p right colectomy followed by chemo,  breast cancer, anemia, HTN, thrombocytopenia c/o weakness x2 days associated with nausea, vomiting 2-3x/day, poor PO, fever, and chills. Endorses eating Thanksgiving dinner with her daughter and family, no new foods, or sick contacts. No additional family members with symptoms. Friday afternoon noted malaise and onset of symptoms. Hx significant for chronic diarrhea s/p colon resection. Follows with heme/onc outpatient - due for procrit 11/27.

## 2023-11-30 NOTE — PROGRESS NOTE ADULT - SUBJECTIVE AND OBJECTIVE BOX
CC: f/u for polymicrobial bacteremia    Patient reports: no complaints this AM, no chills, no abdominal pain and no N/V    REVIEW OF SYSTEMS:  All other review of systems negative (Comprehensive ROS)    Antimicrobials Day #  :day 5  ertapenem  IVPB 500 milliGRAM(s) IV Intermittent every 24 hours    Other Medications Reviewed  MEDICATIONS  (STANDING):  allopurinol 100 milliGRAM(s) Oral daily  aspirin enteric coated 81 milliGRAM(s) Oral daily  dextrose 5% 1000 milliLiter(s) (75 mL/Hr) IV Continuous <Continuous>  ertapenem  IVPB 500 milliGRAM(s) IV Intermittent every 24 hours  magnesium sulfate  IVPB 1 Gram(s) IV Intermittent once    T(F): 98 (11-30-23 @ 05:21), Max: 98.3 (11-29-23 @ 16:56)  HR: 54 (11-30-23 @ 05:21)  BP: 138/59 (11-30-23 @ 05:21)  RR: 15 (11-30-23 @ 05:21)  SpO2: 96% (11-30-23 @ 05:21)  Wt(kg): --    PHYSICAL EXAM:  General: alert, no acute distress  Eyes:  anicteric, no conjunctival injection, no discharge  Oropharynx: no lesions or injection 	  Neck: supple, without adenopathy  Lungs: clear to auscultation  Heart: regular rate and rhythm; no murmur, rubs or gallops  Abdomen: soft, , nontender, stable RT sided fullness  Skin: no lesions  Extremities: no clubbing, cyanosis, or edema  Neurologic: alert, oriented, moves all extremities    LAB RESULTS:                        9.3    5.46  )-----------( 47       ( 30 Nov 2023 06:25 )             30.3     11-30    139  |  106  |  84<H>  ----------------------------<  101<H>  4.4   |  21<L>  |  4.03<H>    Ca    7.6<L>      30 Nov 2023 06:25  Phos  4.3     11-29  Mg     1.3     11-29    TPro  4.8<L>  /  Alb  1.8<L>  /  TBili  0.4  /  DBili  x   /  AST  19  /  ALT  27  /  AlkPhos  232<H>  11-30    LIVER FUNCTIONS - ( 30 Nov 2023 06:25 )  Alb: 1.8 g/dL / Pro: 4.8 g/dL / ALK PHOS: 232 U/L / ALT: 27 U/L / AST: 19 U/L / GGT: x           Urinalysis Basic - ( 30 Nov 2023 06:25 )    Color: x / Appearance: x / SG: x / pH: x  Gluc: 101 mg/dL / Ketone: x  / Bili: x / Urobili: x   Blood: x / Protein: x / Nitrite: x   Leuk Esterase: x / RBC: x / WBC x   Sq Epi: x / Non Sq Epi: x / Bacteria: x      MICROBIOLOGY:  RECENT CULTURES:  11-26 @ 22:20 Clean Catch Clean Catch (Midstream)     <10,000 CFU/mL Normal Urogenital Cheryl      11-26 @ 14:05 .Blood Blood-Peripheral Blood Culture PCR  Citrobacter koseri    Growth in aerobic and anaerobic bottles: Citrobacter koseri  See previous culture [id number]  Growth in aerobic and anaerobic bottles: Bacteroides fragilis  "Susceptibilities not performed"    Growth in aerobic bottle: Gram Negative Rods  Growth in anaerobic bottle: Gram Negative Rods        RADIOLOGY REVIEWED:     CC: f/u for polymicrobial bacteremia  date of service 11/30/23  Patient reports: no complaints this AM, no chills, no abdominal pain and no N/V    REVIEW OF SYSTEMS:  All other review of systems negative (Comprehensive ROS)    Antimicrobials Day #  :day 5  ertapenem  IVPB 500 milliGRAM(s) IV Intermittent every 24 hours    Other Medications Reviewed  MEDICATIONS  (STANDING):  allopurinol 100 milliGRAM(s) Oral daily  aspirin enteric coated 81 milliGRAM(s) Oral daily  dextrose 5% 1000 milliLiter(s) (75 mL/Hr) IV Continuous <Continuous>  ertapenem  IVPB 500 milliGRAM(s) IV Intermittent every 24 hours  magnesium sulfate  IVPB 1 Gram(s) IV Intermittent once    T(F): 98 (11-30-23 @ 05:21), Max: 98.3 (11-29-23 @ 16:56)  HR: 54 (11-30-23 @ 05:21)  BP: 138/59 (11-30-23 @ 05:21)  RR: 15 (11-30-23 @ 05:21)  SpO2: 96% (11-30-23 @ 05:21)  Wt(kg): --    PHYSICAL EXAM:  General: alert, no acute distress  Eyes:  anicteric, no conjunctival injection, no discharge  Oropharynx: no lesions or injection 	  Neck: supple, without adenopathy  Lungs: clear to auscultation  Heart: regular rate and rhythm; no murmur, rubs or gallops  Abdomen: soft, , nontender, stable RT sided fullness  Skin: no lesions  Extremities: no clubbing, cyanosis, or edema  Neurologic: alert, oriented, moves all extremities    LAB RESULTS:                        9.3    5.46  )-----------( 47       ( 30 Nov 2023 06:25 )             30.3     11-30    139  |  106  |  84<H>  ----------------------------<  101<H>  4.4   |  21<L>  |  4.03<H>    Ca    7.6<L>      30 Nov 2023 06:25  Phos  4.3     11-29  Mg     1.3     11-29    TPro  4.8<L>  /  Alb  1.8<L>  /  TBili  0.4  /  DBili  x   /  AST  19  /  ALT  27  /  AlkPhos  232<H>  11-30    LIVER FUNCTIONS - ( 30 Nov 2023 06:25 )  Alb: 1.8 g/dL / Pro: 4.8 g/dL / ALK PHOS: 232 U/L / ALT: 27 U/L / AST: 19 U/L / GGT: x           Urinalysis Basic - ( 30 Nov 2023 06:25 )    Color: x / Appearance: x / SG: x / pH: x  Gluc: 101 mg/dL / Ketone: x  / Bili: x / Urobili: x   Blood: x / Protein: x / Nitrite: x   Leuk Esterase: x / RBC: x / WBC x   Sq Epi: x / Non Sq Epi: x / Bacteria: x      MICROBIOLOGY:  RECENT CULTURES:  11-26 @ 22:20 Clean Catch Clean Catch (Midstream)     <10,000 CFU/mL Normal Urogenital Cheryl      11-26 @ 14:05 .Blood Blood-Peripheral Blood Culture PCR  Citrobacter koseri    Growth in aerobic and anaerobic bottles: Citrobacter koseri  See previous culture [id number]  Growth in aerobic and anaerobic bottles: Bacteroides fragilis  "Susceptibilities not performed"    Growth in aerobic bottle: Gram Negative Rods  Growth in anaerobic bottle: Gram Negative Rods        RADIOLOGY REVIEWED:

## 2023-11-30 NOTE — DIETITIAN INITIAL EVALUATION ADULT - PERTINENT MEDS FT
MEDICATIONS  (STANDING):  aspirin enteric coated 81 milliGRAM(s) Oral daily  dextrose 5% 1000 milliLiter(s) (75 mL/Hr) IV Continuous <Continuous>  ertapenem  IVPB 500 milliGRAM(s) IV Intermittent every 24 hours    MEDICATIONS  (PRN):  acetaminophen     Tablet .. 650 milliGRAM(s) Oral every 6 hours PRN Temp greater or equal to 38C (100.4F)  simethicone 80 milliGRAM(s) Chew three times a day PRN Indigestion

## 2023-11-30 NOTE — PROGRESS NOTE ADULT - SUBJECTIVE AND OBJECTIVE BOX
HANG MADERA, 89y Female  MRN: [id number]  ATTENDING: Humble Michele    HPI:  89F, PMHx colon adenocarcinoma (2011), s/p right colectomy followed by chemotherapy, CKD 4, breast cancer, HTN, anemia, thrombocytopenia admitted with nausea, vomiting, malaise.  Patient treated in ambulatory with Procrit for anemia, under the care of Dr. Edmonds.  Started on empiric antibiotic coverage.  Hematologic profile consistent with WBC 0.38K platelet 114,000, hemoglobin 11.3 g/dL. CT head 11/18 showed chronic microvascular changes, no acute intracranial pathology.  Patient found hypothermic.  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  In the past patient had bone marrow biopsy (August 2023) which showed normocellular marrow with trilineage hematopoiesis with maturation and increased iron stores, with a Tier II variant of potential clinical significance / abnormal genomic alterations TP53 [id number].Also present NRBCs suggestive of possible MDS .    MEDICATIONS:  acetaminophen     Tablet .. 650 milliGRAM(s) Oral every 6 hours PRN  aspirin enteric coated 81 milliGRAM(s) Oral daily  dextrose 5% 1000 milliLiter(s) IV Continuous <Continuous>  meropenem  IVPB 500 milliGRAM(s) IV Intermittent every 12 hours  simethicone 80 milliGRAM(s) Chew three times a day PRN  All other medications reviewed.    SUBJECTIVE:  No evidence of active bleeding; overall improving on antibiotics.    VITALS:  T(C): 36.7 (11-28-23 @ 05:12), Max: 37.1 (11-27-23 @ 15:57)  T(F): 98.1 (11-28-23 @ 05:12), Max: 98.7 (11-27-23 @ 15:57)  HR: 73 (11-28-23 @ 05:12) (73 - 91)  BP: 100/54 (11-28-23 @ 05:12) (99/45 - 121/49)    PHYSICAL EXAM:  Constitutional: alert, well developed  HEENT: normocephalic, anicteric sclerae, no mucositis or thrush  Respiratory: bilateral clear to auscultation anteriorly  Cardiovascular : S1, S2 regular, rhythmic, no murmurs, gallops or rubs  Abdomen: soft, distended, + normoactive BS, no palpable HS- megaly  Extremities: no tenderness;  -c/c/e, pulses equal bilaterally    LABS:  (11-28) WBC: 7.79 K/uL,Hemoglobin: 8.7 g/dL, Hematocrit: 29.2 %, Platelet: 55 K/uL  (11-28) Na: 140 mmol/L ; K: 3.8 mmol/L ; BUN: 70 mg/dL ; Cr: 4.39 mg/dL.    RADIOLOGY:  ACC: [id number] EXAM:  CT ABDOMEN AND PELVIS   ORDERED BY: CAROLINA BLANK   PROCEDURE DATE:  11/26/2023    INTERPRETATION:  CLINICAL INFORMATION: Metastatic colon cancer. Abdominal pain.  COMPARISON: CT 9/11/2023  CONTRAST/COMPLICATIONS:  IV Contrast: NONE  Oral Contrast: NONE  Complications: None reported at time of study completion  PROCEDURE:  PICC line  FINDINGS:  LOWER CHEST: Small pericardial effusion. Small bilateral pleural   effusions, right greater than left.  LIVER: Hepatic cysts and hypodense foci similar in appearance to prior   imaging 9/11/2023. Evaluation for metastasis is limited in the absence of   intravenous contrast.  BILE DUCTS: Normal caliber.  GALLBLADDER: Within normal limits.  SPLEEN: Within normal limits.  PANCREAS: Mesenteric mass inseparable from the pancreatic head.  ADRENALS: Within normal limits.  KIDNEYS/URETERS: The small nonobstructing left renal calculi. No   hydronephrosis. Bilateral renal cysts as well as a 1.6 cm indeterminate   anterior right upper pole renal lesion (301:32) without change.  BLADDER: Within normal limits.  REPRODUCTIVE ORGANS: Uterus and adnexa within normal limits.    BOWEL: Stomach and proximal duodenum are dilated to the level of a right   upper quadrant mesenteric mass inseparable from the second portion of the   duodenum and pancreatic head. The mass currently measures 8.8 x 6.3 cm   previously 8.1 x 6.5 cm. Previously noted air within the lesion is no   longer present. Right hemicolectomy. Colonic diverticulosis. No bowel   obstruction.  PERITONEUM: Small volume ascites with interval increase.  VESSELS: Atherosclerotic changes.  RETROPERITONEUM/LYMPH NODES: No lymphadenopathy.  ABDOMINAL WALL: Rectus diastases with protrusion of bowel loops.  BONES: Within normal limits.    IMPRESSION:  Right upper quadrant mesenteric mass inseparable from the second portion   of the duodenum and pancreatic head not significantly changed in size.   Previously noted internal air is no longer present.    Dilatation of the stomach and duodenum proximal to the lesion. Correlate   for symptoms of partial outlet obstruction.    Small ascites with interval increase.   HANG MADERA, 89y Female  MRN: [id number]  ATTENDING: Humble Michele    HPI:  89F, PMHx colon adenocarcinoma (2011), s/p right colectomy followed by chemotherapy, CKD 4, breast cancer, HTN, anemia, thrombocytopenia admitted with nausea, vomiting, malaise.  Patient treated in ambulatory with Procrit for anemia, under the care of Dr. Edmonds.  Started on empiric antibiotic coverage.  Hematologic profile consistent with WBC 0.38K platelet 114,000, hemoglobin 11.3 g/dL. CT head 11/18 showed chronic microvascular changes, no acute intracranial pathology.  Patient found hypothermic.  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  In the past patient had bone marrow biopsy (August 2023) which showed normocellular marrow with trilineage hematopoiesis with maturation and increased iron stores, with a Tier II variant of potential clinical significance / abnormal genomic alterations TP53 [id number].Also present NRBCs suggestive of possible MDS .    MEDICATIONS:  acetaminophen     Tablet .. 650 milliGRAM(s) Oral every 6 hours PRN  aspirin enteric coated 81 milliGRAM(s) Oral daily  dextrose 5% 1000 milliLiter(s) IV Continuous <Continuous>  meropenem  IVPB 500 milliGRAM(s) IV Intermittent every 12 hours  simethicone 80 milliGRAM(s) Chew three times a day PRN  All other medications reviewed.    SUBJECTIVE:  Does not appear septic.  Continues IV antibiotic.  Overall stable.    VITALS:  T(C): 36.7 (11-28-23 @ 05:12), Max: 37.1 (11-27-23 @ 15:57)  T(F): 98.1 (11-28-23 @ 05:12), Max: 98.7 (11-27-23 @ 15:57)  HR: 73 (11-28-23 @ 05:12) (73 - 91)  BP: 100/54 (11-28-23 @ 05:12) (99/45 - 121/49)    PHYSICAL EXAM:  Constitutional: alert, well developed  HEENT: normocephalic, anicteric sclerae, no mucositis or thrush  Respiratory: bilateral clear to auscultation anteriorly  Cardiovascular : S1, S2 regular, rhythmic, no murmurs, gallops or rubs  Abdomen: soft, distended, + normoactive BS, no palpable HS- megaly  Extremities: no tenderness;  -c/c/e, pulses equal bilaterally    LABS:  (11-28) WBC: 7.79 K/uL,Hemoglobin: 8.7 g/dL, Hematocrit: 29.2 %, Platelet: 55 K/uL  (11-28) Na: 140 mmol/L ; K: 3.8 mmol/L ; BUN: 70 mg/dL ; Cr: 4.39 mg/dL.    RADIOLOGY:  ACC: [id number] EXAM:  CT ABDOMEN AND PELVIS   ORDERED BY: CAROLINA BLANK   PROCEDURE DATE:  11/26/2023    INTERPRETATION:  CLINICAL INFORMATION: Metastatic colon cancer. Abdominal pain.  COMPARISON: CT 9/11/2023  CONTRAST/COMPLICATIONS:  IV Contrast: NONE  Oral Contrast: NONE  Complications: None reported at time of study completion  PROCEDURE:  PICC line  FINDINGS:  LOWER CHEST: Small pericardial effusion. Small bilateral pleural   effusions, right greater than left.  LIVER: Hepatic cysts and hypodense foci similar in appearance to prior   imaging 9/11/2023. Evaluation for metastasis is limited in the absence of   intravenous contrast.  BILE DUCTS: Normal caliber.  GALLBLADDER: Within normal limits.  SPLEEN: Within normal limits.  PANCREAS: Mesenteric mass inseparable from the pancreatic head.  ADRENALS: Within normal limits.  KIDNEYS/URETERS: The small nonobstructing left renal calculi. No   hydronephrosis. Bilateral renal cysts as well as a 1.6 cm indeterminate   anterior right upper pole renal lesion (301:32) without change.  BLADDER: Within normal limits.  REPRODUCTIVE ORGANS: Uterus and adnexa within normal limits.    BOWEL: Stomach and proximal duodenum are dilated to the level of a right   upper quadrant mesenteric mass inseparable from the second portion of the   duodenum and pancreatic head. The mass currently measures 8.8 x 6.3 cm   previously 8.1 x 6.5 cm. Previously noted air within the lesion is no   longer present. Right hemicolectomy. Colonic diverticulosis. No bowel   obstruction.  PERITONEUM: Small volume ascites with interval increase.  VESSELS: Atherosclerotic changes.  RETROPERITONEUM/LYMPH NODES: No lymphadenopathy.  ABDOMINAL WALL: Rectus diastases with protrusion of bowel loops.  BONES: Within normal limits.    IMPRESSION:  Right upper quadrant mesenteric mass inseparable from the second portion   of the duodenum and pancreatic head not significantly changed in size.   Previously noted internal air is no longer present.    Dilatation of the stomach and duodenum proximal to the lesion. Correlate   for symptoms of partial outlet obstruction.    Small ascites with interval increase.

## 2023-11-30 NOTE — PROGRESS NOTE ADULT - SUBJECTIVE AND OBJECTIVE BOX
Resting, comfortable on RA, no N/V    Vital Signs Last 24 Hrs  T(C): 36.4 (11-30-23 @ 19:35), Max: 36.7 (11-30-23 @ 05:21)  T(F): 97.6 (11-30-23 @ 19:35), Max: 98 (11-30-23 @ 05:21)  HR: 69 (11-30-23 @ 19:35) (54 - 69)  BP: 175/70 (11-30-23 @ 19:35) (138/59 - 175/70)  RR: 18 (11-30-23 @ 19:35) (15 - 18)  SpO2: 97% (11-30-23 @ 19:35) (96% - 97%)    s1s2  b/l air entry  soft, ND  + edema                                       9.3    5.46  )-----------( 47       ( 30 Nov 2023 06:25 )             30.3     30 Nov 2023 06:25    139    |  106    |  84     ----------------------------<  101    4.4     |  21     |  4.03     Ca    7.6        30 Nov 2023 06:25  Phos  4.3       29 Nov 2023 06:45  Mg     1.3       29 Nov 2023 06:45    TPro  4.8    /  Alb  1.8    /  TBili  0.4    /  DBili  x      /  AST  19     /  ALT  27     /  AlkPhos  232    30 Nov 2023 06:25    LIVER FUNCTIONS - ( 30 Nov 2023 06:25 )  Alb: 1.8 g/dL / Pro: 4.8 g/dL / ALK PHOS: 232 U/L / ALT: 27 U/L / AST: 19 U/L / GGT: x           acetaminophen     Tablet .. 650 milliGRAM(s) Oral every 6 hours PRN  allopurinol 100 milliGRAM(s) Oral daily  amLODIPine   Tablet 5 milliGRAM(s) Oral daily  aspirin enteric coated 81 milliGRAM(s) Oral daily  ertapenem  IVPB 500 milliGRAM(s) IV Intermittent every 24 hours  simethicone 80 milliGRAM(s) Chew three times a day PRN    A/P:    Hx colon cancer s/p R colectomy, chemo  CKD 4 iso Keytruda (d/c-d ~ 1 year ago)  B frag, Citrobacter k bacteremia  CT A/P w/RUQ mesenteric mass, no hydro  Abx per ID   Septic MAGDI/CKD 4  Cr is slowly downtrending   Some edema noted, HTN  Will d/c IVF  Avoid nephrotoxins  F/u CMP, CBC, Mg, UO  Suppl Mg    [phone]

## 2023-11-30 NOTE — CHART NOTE - NSCHARTNOTEFT_GEN_A_CORE
Off Service NP Hospitalist Note  Case Discussed with Dr. Michele    labs and imaging reviewed    88 yo with history of colon cancer, thrombocytopenia, NIDDM, CKD, HTN, and known mesenteric mass who was admitted 11/26 with less than 12 hours of rigors, N/V and abdominal pain. Admitted for neutropenia/bacteremia       *Neutropenic Fever   *Pancytopenia  *Bacteremia suspect GI source   - CT of A/P without perforation or clear obstruction - positive known right upper quadrant mesenteric mass   - Cdiff negative   - Surgery evaluation Dr. Payton - non surgical   - Blood cultures (from 11/26) have grown citrobacter krusei and bacteroides species, sensitivities reviewed, will f/u with ID regarding repeat BC  - Continue Ertapenum 500 daily as per ID   - Clinically improved      *Myelodysplasia  *Hx of Colon and Breast Cancer  - Hem/ onc following- appreciate recs  - Continue to monitor CBC    *Septic MAGDI/CKD 4  - IVF with sodium bicarb as per renal  - Avoid nephrotoxins, monitor renal indices  - Renal following     *HTN  - norvasc 10 and doxazosin 1 both d/c by renal     *DVT PPx  - SCDs Off Service NP Hospitalist Note  Case Discussed with Dr. Michele    labs and imaging reviewed    89 yo with history of colon cancer, thrombocytopenia, NIDDM, CKD, HTN, and known mesenteric mass who was admitted 11/26 with less than 12 hours of rigors, N/V and abdominal pain. Admitted for neutropenia/bacteremia       *Neutropenic Fever   *Pancytopenia  *Bacteremia suspect GI source   - CT of A/P without perforation or clear obstruction - positive known right upper quadrant mesenteric mass   - Cdiff negative   - Surgery evaluation Dr. Payton - non surgical   - Blood cultures (from 11/26) have grown citrobacter krusei and bacteroides species, sensitivities reviewed, will f/u with ID regarding repeat BC  - Continue Ertapenum 500 daily as per ID, anticipate switch to oral in 1-2 days   - Clinically improved      *Myelodysplasia  *Hx of Colon and Breast Cancer  - Hem/ onc following- appreciate recs  - Continue to monitor CBC    *Septic MAGDI/CKD 4  - IVF with sodium bicarb as per renal  - Avoid nephrotoxins, monitor renal indices  - Renal following     *HTN  - norvasc 10 and doxazosin 1 both d/c by renal     *DVT PPx  - SCDs

## 2023-11-30 NOTE — DIETITIAN NUTRITION RISK NOTIFICATION - TREATMENT: THE FOLLOWING DIET HAS BEEN RECOMMENDED
Diet, Regular:   Supplement Feeding Modality:  Oral  Nepro Cans or Servings Per Day:  1       Frequency:  Daily (11-30-23 @ 11:56) [Pending Verification By Attending]  Diet, Regular (11-26-23 @ 14:42) [Active]

## 2023-11-30 NOTE — PROGRESS NOTE ADULT - SUBJECTIVE AND OBJECTIVE BOX
HANG MADERA  [id number]    HPI:  89 y/o F with PMH Hypertension, Chronic kidney disease Stage IV, Colon cancer (adenocarcinoma) Stage IIIC-T4 N2a s/p right colectomy followed by chemotherapy, Breast cancer, Anemia, Thrombocytopenia complaint of weakness x 2 days associated with nausea, vomiting 2-3x/day, poor PO, fever, and chills. Endorses eating Thanksgiving dinner with her daughter and family, no new foods, or sick contacts. No additional family members with symptoms. Friday afternoon noted malaise and onset of symptoms. History significant for chronic diarrhea s/p colon resection. Follows with heme/onc outpatient - due for procrit .     Interval events: Pt denies sob, cp, palpitations. Sinus 60-70s bpm. Appeared to convert to aflutter at times    ALLERGIES:  No Known Allergies      PAST MEDICAL & SURGICAL HISTORY:  History of breast cancer  Anemia  Hypertension  History of thrombocytopenia  Colon cancer  Chronic renal insufficiency  Diabetes mellitus  History of cholecystectomy  History of partial colectomy  and appendectomy  History of lumpectomy of right breast  Parathyroid adenoma  excision      CURRENT MEDICATIONS:  MEDICATIONS  (STANDING):  aspirin enteric coated 81 milliGRAM(s) Oral daily  dextrose 5% 1000 milliLiter(s) (75 mL/Hr) IV Continuous <Continuous>  ertapenem  IVPB 500 milliGRAM(s) IV Intermittent every 24 hours    MEDICATIONS  (PRN):  acetaminophen     Tablet .. 650 milliGRAM(s) Oral every 6 hours PRN Temp greater or equal to 38C (100.4F)  simethicone 80 milliGRAM(s) Chew three times a day PRN Indigestion      ROS:  All 10 systems reviewed and positives noted in HPI    OBJECTIVE:    VITAL SIGNS:  Vital Signs Last 24 Hrs  T(C): 37.1 (2023 05:48), Max: 37.1 (2023 05:48)  T(F): 98.8 (2023 05:48), Max: 98.8 (2023 05:48)  HR: 72 (2023 05:48) (72 - 73)  BP: 170/68 (2023 05:48) (146/51 - 170/68)  BP(mean): --  RR: 16 (2023 05:48) (15 - 16)  SpO2: 94% (2023 05:48) (94% - 96%)    Parameters below as of 2023 05:48  Patient On (Oxygen Delivery Method): room air      PHYSICAL EXAM:  General: no acute distress  HEENT: sclera anicteric  Neck: supple  CVS: JVP ~ 7 cm H20, irregular, s1, s2  Chest: unlabored respirations, clear to auscultation b/l  Abdomen: distended, + BS  Extremities: no lower extremity edema b/l  Neuro: awake, alert & oriented  Psych: normal affect      LABS:                        9.5    6.67  )-----------( 45       ( 2023 06:45 )             31.4         137  |  106  |  77<H>  ----------------------------<  114<H>  4.2   |  17<L>  |  4.12<H>    Ca    7.5<L>      2023 06:45  Phos  4.3       Mg     1.3         TPro  4.9<L>  /  Alb  1.9<L>  /  TBili  0.5  /  DBili  x   /  AST  20  /  ALT  32  /  AlkPhos  232<H>        EC2023; Sinus with 1deg AV block      TTE: < from: TTE Echo Complete w/o Contrast w/ Doppler (23 @ 09:39) >   1. Normal global left ventricular systolic function.   2. Left ventricular ejection fraction, by visual estimation, is 50 to   55%.   3. Calculated LVEF by Simpsons Biplane Method 52%.   4. Mildly increased LV wall thickness.   5. Normal left ventricular internal cavity size.   6. Normal right ventricular size and function.   7. The left atrium is normal in size.   8. The right atrium is normal in size.   9. Mild mitral valve regurgitation.  10. Mild-moderate tricuspid regurgitation.  11. No aortic valve stenosis.  12. Estimated pulmonary artery systolic pressure is 44.8 mmHg assuming a   right atrial pressure of 8 mmHg, which is consistent with mild pulmonary   hypertension.  13. Small pericardial effusion.  14. There is mild inversion of the right atrial wall.

## 2023-11-30 NOTE — PROGRESS NOTE ADULT - PROBLEM SELECTOR PLAN 1
Patient presented with anemia and thrombocytopenia.  Review of bone marrow biopsy from August 2023 was unrevealing, though the presence of NRBC is usually seen in the myelodysplastic process.  Would continue sepsis treatment; blood cultures growing Citrobacter crusei and Bacteroides species.  No need for transfusion support at this time.  Will follow-up hematologic profile throughout hospitalization. Bacteremia with Citrobacter crusei and Bacteroides species.  Continues IV antibiotics per ID.  Hematologic profile reactive to infection, however presence of nucleated RBCs may indicate MDS.  Will follow-up in ambulatory with Dr. Edmonds.

## 2023-11-30 NOTE — DIETITIAN INITIAL EVALUATION ADULT - PERTINENT LABORATORY DATA
11-30    139  |  106  |  84<H>  ----------------------------<  101<H>  4.4   |  21<L>  |  4.03<H>    Ca    7.6<L>      30 Nov 2023 06:25  Phos  4.3     11-29  Mg     1.3     11-29    TPro  4.8<L>  /  Alb  1.8<L>  /  TBili  0.4  /  DBili  x   /  AST  19  /  ALT  27  /  AlkPhos  232<H>  11-30

## 2023-11-30 NOTE — PROGRESS NOTE ADULT - ASSESSMENT
Assessment:  88 y/o F with PMH Hypertension, Chronic kidney disease Stage IV, Colon cancer (adenocarcinoma) Stage IIIC-T4 N2a s/p right colectomy followed by chemotherapy, Breast cancer, Anemia, Thrombocytopenia complaint of weakness x2 days associated with nausea, vomiting 2-3x/day, poor PO, fever, and chills admitted for neutropenic fever. Cardiology consulted for Aflutter. Patient denies chest pain, shortness of breath, dizziness or palpitations. Patient states she is aware that her heart occasionally goes into an irregular rhythm.     Recommendations  [ ] Aflutter: Repeat EKG consistent with AFlutter. Patient reports history of "irregular rhythm" and undergoing a stress test and "a monitor for a month" in the remote past. Results unknown. However patient is rate controlled and asymptomatic. Due to her thrombocytopenia, she is a poor candidate for anticoagulation at this time, may revisit when levels improve and if approved by heme/onc. Follow up for outpatient extended cardiac event monitoring and outpatient evaluation for left atrial appendage closure device.    F[facility] RE[facility]EDO[facility]   Assessment:  89 y/o F with PMH Hypertension, Chronic kidney disease Stage IV, Colon cancer (adenocarcinoma) Stage IIIC-T4 N2a s/p right colectomy followed by chemotherapy, Breast cancer, Anemia, Thrombocytopenia complaint of weakness x2 days associated with nausea, vomiting 2-3x/day, poor PO, fever, and chills admitted for neutropenic fever. Cardiology consulted for Aflutter. Patient denies chest pain, shortness of breath, dizziness or palpitations. Patient states she is aware that her heart occasionally goes into an irregular rhythm.     Recommendations  [ ] Aflutter: Repeat EKG consistent with AFlutter. Patient reports history of "irregular rhythm" and undergoing a stress test and "a monitor for a month" in the remote past. Results unknown. However patient is rate controlled and asymptomatic. Due to her thrombocytopenia, she is a poor candidate for anticoagulation at this time, may revisit when levels improve and if approved by heme/onc. Follow up for outpatient extended cardiac event monitoring and outpatient evaluation for left atrial appendage closure device.    Fanny FOSTERCNP-BC      cardio  Ordinarily would consider anticoagulation however low platelet count increases risk patient understands risks benefits alternatives we also discussed the possible Watchman procedure which would also require short course of anticoagulation will refer confer with hematology Dr. Grey Linn breast and colon cancer

## 2023-11-30 NOTE — DIETITIAN INITIAL EVALUATION ADULT - OTHER INFO
Pt was admitted for Neutropenia with a PMH of CKD stage 4. Pt currently on a regular diet. Skin Bruised (ecchymotic) noted via flow sheet. No indication of edema. labs reviewed (eGFR) on 11/30/2023. Education material provided on low sodium restricted diet.  Pt was admitted for Neutropenia with a PMH of CKD stage 4. Pt currently on a regular diet. Skin Bruised (ecchymotic) noted via flow sheet. No indication of edema. labs reviewed (eGFR) on 11/30/2023. Lab values K+, Phos, Na WNL at this time. Will continue to monitor labs to determine if renal diet appropriate. Last bowel movement noted this morning 11/30/23. Education material provided on renal diet. Nutrition focused physical exam findings of loss of muscle on the temple region (moderate) and loss of subcutaneous fat on triceps (mild).

## 2023-11-30 NOTE — PROGRESS NOTE ADULT - ASSESSMENT
89 yo with history of colon cancer, thrombocytopenia, NIDDM, CKD, HTN, and known mesenteric mass who was admitted 11/26 with less than 12 hours of rigors, N/V and abdominal pain.  CT of A/P without perforation or clear obstruction, she was neutropenic, her blood cultures have grown citrobacter krusei and bacteroides species.  She is clinically improved, tolerating a diet, no N/V and no abdominal pain.  Her neutropenia on admission was transient, rapidly reversed, perhaps it was induced by her GNR bacteremia.  She is no longer neutropenic. Platelets had been  decreaseing, ? infection mediated., they have stabilizeed  Suggest:  1 Citrobacter is resistant  only to ampicillin.  2 Will ncontinue  Ertapenenm 500 daily  3 Monitor exam , suspect she had a bacteremic event arise from gut, will be at risk for a recurrence.,   4 If she has any rigors or fever she should have repeat blood cultures sent to r/o riteshough bacteremia  5 Will consider switch to an oral regimen in next 1-2 days

## 2023-12-01 ENCOUNTER — NON-APPOINTMENT (OUTPATIENT)
Age: 88
End: 2023-12-01

## 2023-12-01 LAB
ANION GAP SERPL CALC-SCNC: 12 MMOL/L — SIGNIFICANT CHANGE UP (ref 5–17)
ANION GAP SERPL CALC-SCNC: 12 MMOL/L — SIGNIFICANT CHANGE UP (ref 5–17)
BUN SERPL-MCNC: 84 MG/DL — HIGH (ref 7–23)
BUN SERPL-MCNC: 84 MG/DL — HIGH (ref 7–23)
CALCIUM SERPL-MCNC: 7.7 MG/DL — LOW (ref 8.4–10.5)
CALCIUM SERPL-MCNC: 7.7 MG/DL — LOW (ref 8.4–10.5)
CHLORIDE SERPL-SCNC: 107 MMOL/L — SIGNIFICANT CHANGE UP (ref 96–108)
CHLORIDE SERPL-SCNC: 107 MMOL/L — SIGNIFICANT CHANGE UP (ref 96–108)
CO2 SERPL-SCNC: 20 MMOL/L — LOW (ref 22–31)
CO2 SERPL-SCNC: 20 MMOL/L — LOW (ref 22–31)
CREAT SERPL-MCNC: 3.82 MG/DL — HIGH (ref 0.5–1.3)
CREAT SERPL-MCNC: 3.82 MG/DL — HIGH (ref 0.5–1.3)
EGFR: 11 ML/MIN/1.73M2 — LOW
EGFR: 11 ML/MIN/1.73M2 — LOW
GLUCOSE SERPL-MCNC: 82 MG/DL — SIGNIFICANT CHANGE UP (ref 70–99)
GLUCOSE SERPL-MCNC: 82 MG/DL — SIGNIFICANT CHANGE UP (ref 70–99)
HCT VFR BLD CALC: 29.4 % — LOW (ref 34.5–45)
HCT VFR BLD CALC: 29.4 % — LOW (ref 34.5–45)
HGB BLD-MCNC: 8.9 G/DL — LOW (ref 11.5–15.5)
HGB BLD-MCNC: 8.9 G/DL — LOW (ref 11.5–15.5)
MAGNESIUM SERPL-MCNC: 1.9 MG/DL — SIGNIFICANT CHANGE UP (ref 1.6–2.6)
MAGNESIUM SERPL-MCNC: 1.9 MG/DL — SIGNIFICANT CHANGE UP (ref 1.6–2.6)
MCHC RBC-ENTMCNC: 29.5 PG — SIGNIFICANT CHANGE UP (ref 27–34)
MCHC RBC-ENTMCNC: 29.5 PG — SIGNIFICANT CHANGE UP (ref 27–34)
MCHC RBC-ENTMCNC: 30.3 GM/DL — LOW (ref 32–36)
MCHC RBC-ENTMCNC: 30.3 GM/DL — LOW (ref 32–36)
MCV RBC AUTO: 97.4 FL — SIGNIFICANT CHANGE UP (ref 80–100)
MCV RBC AUTO: 97.4 FL — SIGNIFICANT CHANGE UP (ref 80–100)
NRBC # BLD: 0 /100 WBCS — SIGNIFICANT CHANGE UP (ref 0–0)
NRBC # BLD: 0 /100 WBCS — SIGNIFICANT CHANGE UP (ref 0–0)
PHOSPHATE SERPL-MCNC: 5.6 MG/DL — HIGH (ref 2.5–4.5)
PHOSPHATE SERPL-MCNC: 5.6 MG/DL — HIGH (ref 2.5–4.5)
PLATELET # BLD AUTO: 63 K/UL — LOW (ref 150–400)
PLATELET # BLD AUTO: 63 K/UL — LOW (ref 150–400)
POTASSIUM SERPL-MCNC: 4.7 MMOL/L — SIGNIFICANT CHANGE UP (ref 3.5–5.3)
POTASSIUM SERPL-MCNC: 4.7 MMOL/L — SIGNIFICANT CHANGE UP (ref 3.5–5.3)
POTASSIUM SERPL-SCNC: 4.7 MMOL/L — SIGNIFICANT CHANGE UP (ref 3.5–5.3)
POTASSIUM SERPL-SCNC: 4.7 MMOL/L — SIGNIFICANT CHANGE UP (ref 3.5–5.3)
RBC # BLD: 3.02 M/UL — LOW (ref 3.8–5.2)
RBC # BLD: 3.02 M/UL — LOW (ref 3.8–5.2)
RBC # FLD: 15.4 % — HIGH (ref 10.3–14.5)
RBC # FLD: 15.4 % — HIGH (ref 10.3–14.5)
SODIUM SERPL-SCNC: 139 MMOL/L — SIGNIFICANT CHANGE UP (ref 135–145)
SODIUM SERPL-SCNC: 139 MMOL/L — SIGNIFICANT CHANGE UP (ref 135–145)
WBC # BLD: 3.89 K/UL — SIGNIFICANT CHANGE UP (ref 3.8–10.5)
WBC # BLD: 3.89 K/UL — SIGNIFICANT CHANGE UP (ref 3.8–10.5)
WBC # FLD AUTO: 3.89 K/UL — SIGNIFICANT CHANGE UP (ref 3.8–10.5)
WBC # FLD AUTO: 3.89 K/UL — SIGNIFICANT CHANGE UP (ref 3.8–10.5)

## 2023-12-01 PROCEDURE — 99232 SBSQ HOSP IP/OBS MODERATE 35: CPT

## 2023-12-01 RX ORDER — HYDRALAZINE HCL 50 MG
25 TABLET ORAL EVERY 8 HOURS
Refills: 0 | Status: DISCONTINUED | OUTPATIENT
Start: 2023-12-01 | End: 2023-12-03

## 2023-12-01 RX ADMIN — AMLODIPINE BESYLATE 5 MILLIGRAM(S): 2.5 TABLET ORAL at 05:53

## 2023-12-01 RX ADMIN — Medication 650 MILLIGRAM(S): at 14:22

## 2023-12-01 RX ADMIN — Medication 81 MILLIGRAM(S): at 11:48

## 2023-12-01 RX ADMIN — Medication 25 MILLIGRAM(S): at 22:48

## 2023-12-01 RX ADMIN — ERTAPENEM SODIUM 100 MILLIGRAM(S): 1 INJECTION, POWDER, LYOPHILIZED, FOR SOLUTION INTRAMUSCULAR; INTRAVENOUS at 18:34

## 2023-12-01 RX ADMIN — Medication 100 MILLIGRAM(S): at 11:48

## 2023-12-01 RX ADMIN — Medication 650 MILLIGRAM(S): at 21:08

## 2023-12-01 RX ADMIN — Medication 650 MILLIGRAM(S): at 05:53

## 2023-12-01 NOTE — PROGRESS NOTE ADULT - PROBLEM SELECTOR PLAN 1
Bacteremia with Citrobacter crusei and Bacteroides species.  Continues IV antibiotics per ID.  Hematologic profile reactive to infection, however presence of nucleated RBCs may indicate MDS.  Will follow-up in ambulatory with Dr. Edmonds. Chronic anemia, possibly MDS based on presence of NRBC and periphery.  Currently being treated for  Citrobacter crusei and Bacteroides species bacteremia with IV per ID.  Reduce amount of unnecessary phlebotomies.

## 2023-12-01 NOTE — PROGRESS NOTE ADULT - ASSESSMENT
89 yo with history of colon cancer, thrombocytopenia, NIDDM, CKD, HTN, and known mesenteric mass who was admitted 11/26 with less than 12 hours of rigors, N/V and abdominal pain.  CT of A/P without perforation or clear obstruction, she was neutropenic, her blood cultures have grown citrobacter krusei and bacteroides species.  She is clinically improved, tolerating a diet, no N/V and no abdominal pain.  Her neutropenia on admission was transient, rapidly reversed, perhaps it was induced by her GNR bacteremia.  She is no longer neutropenic. Platelets had been  decreaseing, ? infection mediated., they have stabilizeed and appear to be increasing   Suggest:  1 Citrobacter is resistant  only to ampicillin.  2 Will ncontinue  Ertapenenm 500 daily, day 6  3 Monitor exam , suspect she had a bacteremic event arise from gut, will be at risk for a recurrence.,   4 If she has any rigors or fever she should have [facility]  5 Will favor a 7 day course of antibiotics, 12/2 can be last dose of Ertapenem. She can be monitored off antibiotics after 12/2 dose. disposition per primary service.

## 2023-12-01 NOTE — PROGRESS NOTE ADULT - ASSESSMENT
Sepsis - patient is clinically improved. patient is afebrile and non toxic in appearance. will continue antibiotics as per ID  Myelodysplasia - patient is clinically stable. recommend out patient follow up with HEME  HTN - patient ics clinically stable. will continue Norvasc. will monitor BP  A flutter -patient is cardiac stable. recommend follow up with cardiology as an out patient  CRI - patient is clinically stable. nephrology is following  Mesenteric mass/SBO - patient is GI stable and is tolerating po and having bowel movements. recommend follow up with surgeon as an out patient

## 2023-12-01 NOTE — PROGRESS NOTE ADULT - SUBJECTIVE AND OBJECTIVE BOX
ASYAALEXHANG  89y  Female      Patient is a 89y old  Female who presents with a chief complaint of neutropenic fever (01 Dec 2023 15:00)      REVIEW OF SYSTEMS:    Cardiac  Pulmonary  GI    Neuro    FAMILY HISTORY:  FH: colon cancer (Sibling, Sibling)    FH: breast cancer (Sibling)      T(C): 36.9 (12-01-23 @ 15:49), Max: 36.9 (12-01-23 @ 15:49)  HR: 69 (12-01-23 @ 15:49) (67 - 69)  BP: 154/64 (12-01-23 @ 15:49) (154/64 - 156/66)  RR: 15 (12-01-23 @ 15:49) (15 - 15)  SpO2: 98% (12-01-23 @ 15:49) (97% - 98%)  Wt(kg): --Vital Signs Last 24 Hrs  T(C): 36.9 (01 Dec 2023 15:49), Max: 36.9 (01 Dec 2023 15:49)  T(F): 98.4 (01 Dec 2023 15:49), Max: 98.4 (01 Dec 2023 15:49)  HR: 69 (01 Dec 2023 15:49) (67 - 69)  BP: 154/64 (01 Dec 2023 15:49) (154/64 - 156/66)  BP(mean): --  RR: 15 (01 Dec 2023 15:49) (15 - 15)  SpO2: 98% (01 Dec 2023 15:49) (97% - 98%)    Parameters below as of 01 Dec 2023 15:49  Patient On (Oxygen Delivery Method): room air      No Known Allergies      PHYSICAL EXAM:    General  Neck  Heart  Lungs  Abdomen  Extremities  Neurologic  Skin      Consultant(s) Notes Reviewed:  [x ] YES  [ ] NO  Care Discussed with Consultants/Other Providers [ x] YES  [ ] NO    LABS:  CBC Full  -  ( 01 Dec 2023 05:48 )  WBC Count : 3.89 K/uL  RBC Count : 3.02 M/uL  Hemoglobin : 8.9 g/dL  Hematocrit : 29.4 %  Platelet Count - Automated : 63 K/uL  Mean Cell Volume : 97.4 fl  Mean Cell Hemoglobin : 29.5 pg  Mean Cell Hemoglobin Concentration : 30.3 gm/dL  Auto Neutrophil # : x  Auto Lymphocyte # : x  Auto Monocyte # : x  Auto Eosinophil # : x  Auto Basophil # : x  Auto Neutrophil % : x  Auto Lymphocyte % : x  Auto Monocyte % : x  Auto Eosinophil % : x  Auto Basophil % : x                          8.9    3.89  )-----------( 63       ( 01 Dec 2023 05:48 )             29.4     12-01    139  |  107  |  84<H>  ----------------------------<  82  4.7   |  20<L>  |  3.82<H>    Ca    7.7<L>      01 Dec 2023 05:48  Phos  5.6     12-01  Mg     1.9     12-01    TPro  4.8<L>  /  Alb  1.8<L>  /  TBili  0.4  /  DBili  x   /  AST  19  /  ALT  27  /  AlkPhos  232<H>  11-30    LIVER FUNCTIONS - ( 30 Nov 2023 06:25 )  Alb: 1.8 g/dL / Pro: 4.8 g/dL / ALK PHOS: 232 U/L / ALT: 27 U/L / AST: 19 U/L / GGT: x             Urinalysis Basic - ( 01 Dec 2023 05:48 )    Color: x / Appearance: x / SG: x / pH: x  Gluc: 82 mg/dL / Ketone: x  / Bili: x / Urobili: x   Blood: x / Protein: x / Nitrite: x   Leuk Esterase: x / RBC: x / WBC x   Sq Epi: x / Non Sq Epi: x / Bacteria: x          Culture - Urine (11.26.23 @ 22:20)   Specimen Source: Clean Catch Clean Catch (Midstream)  Culture Results:   <10,000 CFU/mL Normal Urogenital Cheryl\    Culture - Blood (11.26.23 @ 14:05)   - Bacteroides fragilis: Detec  - Enterobacterales: Detec  Gram Stain:   Growth in aerobic bottle: Gram Negative Rods   Growth in anaerobic bottle: Gram Negative Rods  - Ampicillin: R >16 These ampicillin results predict results for amoxicillin  - Ampicillin/Sulbactam: S <=4/2  - Aztreonam: S <=4  - Cefazolin: S <=2  - Cefepime: S <=2  - Cefoxitin: S <=8  - Ceftriaxone: S <=1  - Ciprofloxacin: S <=0.25  - Ertapenem: S <=0.5  - Gentamicin: S <=2  - Imipenem: S <=1  - Levofloxacin: S <=0.5  - Meropenem: S <=1  - Piperacillin/Tazobactam: S <=8  - Tobramycin: S <=2  - Trimethoprim/Sulfamethoxazole: S <=0.5/9.5  Specimen Source: .Blood Blood-Peripheral  Organism: Blood Culture PCR  Organism: Citrobacter koseri  Culture Results:   Growth in aerobic and anaerobic bottles: Citrobacter koseri   Growth in aerobic and anaerobic bottles: Bacteroides fragilis   "Susceptibilities not performed"   Growth in anaerobic bottle: Streptococcus constellatus   Alpha hemolytic streptoccous (Not Streptococcus pneumoniae or   Enterococcus)   isolated from a single blood culture set may represent contamination.   Contact the Microbiology Department at [phone] if susceptibility   testing is clinically indicated.   Direct identification is available within approximately 3-5   hours either by Blood Panel Multiplexed PCR or Direct   MALDI-TOF. Details: [email or web address]  Organism Identification: Blood Culture PCR   Citrobacter koseri  Method Type: PCR  Method Type: LUAN            acetaminophen     Tablet .. 650 milliGRAM(s) Oral every 6 hours PRN  Culture - Blood (11.26.23 @ 14:05)   Gram Stain:   Growth in aerobic bottle: Gram Negative Rods   Growth in anaerobic bottle: Gram Negative Rods  Specimen Source: .Blood Blood-Peripheral  Culture Results:   Growth in aerobic and anaerobic bottles: Citrobacter koseri   See previous culture [id number]   Growth in aerobic and anaerobic bottles: Bacteroides fragilis   "Susceptibilities not performed"    allopurinol 100 milliGRAM(s) Oral daily  amLODIPine   Tablet 5 milliGRAM(s) Oral daily  aspirin enteric coated 81 milliGRAM(s) Oral daily  ertapenem  IVPB 500 milliGRAM(s) IV Intermittent every 24 hours  simethicone 80 milliGRAM(s) Chew three times a day PRN  sodium bicarbonate 650 milliGRAM(s) Oral three times a day              HANG MADERA  89y  Female      patient states she is feeling fine and denies fever/chills/abdominal pain/nausea/vomiting/chest pain/palpitations/SOB    REVIEW OF SYSTEMS:    Cardiac A flutter/HTN  Pulmonary no cough/SOB  GI mesenteric mass/colon cancer/SBO no n/v/d/pain   CRI no dysuria/hematuria  Neuro no headache/dizziness/numbness  Heme myelodysplasia    FAMILY HISTORY:  FH: colon cancer (Sibling, Sibling)    FH: breast cancer (Sibling)      T(C): 36.9 (12-01-23 @ 15:49), Max: 36.9 (12-01-23 @ 15:49)  HR: 69 (12-01-23 @ 15:49) (67 - 69)  BP: 154/64 (12-01-23 @ 15:49) (154/64 - 156/66)  RR: 15 (12-01-23 @ 15:49) (15 - 15)  SpO2: 98% (12-01-23 @ 15:49) (97% - 98%)  Wt(kg): --Vital Signs Last 24 Hrs  T(C): 36.9 (01 Dec 2023 15:49), Max: 36.9 (01 Dec 2023 15:49)  T(F): 98.4 (01 Dec 2023 15:49), Max: 98.4 (01 Dec 2023 15:49)  HR: 69 (01 Dec 2023 15:49) (67 - 69)  BP: 154/64 (01 Dec 2023 15:49) (154/64 - 156/66)  BP(mean): --  RR: 15 (01 Dec 2023 15:49) (15 - 15)  SpO2: 98% (01 Dec 2023 15:49) (97% - 98%)    Parameters below as of 01 Dec 2023 15:49  Patient On (Oxygen Delivery Method): room air      No Known Allergies      PHYSICAL EXAM:    General NAD non toxic in appearance  Neck regular  Heart irregular  Lungs clear  Abdomen non tender non distended normal bowel sounds no HSM/CVAT  Extremities non tender no edema +pulses  Neurologic alert and oriented x 3 no acute focal changes        Consultant(s) Notes Reviewed:  [x ] YES  [ ] NO      LABS:  CBC Full  -  ( 01 Dec 2023 05:48 )  WBC Count : 3.89 K/uL  RBC Count : 3.02 M/uL  Hemoglobin : 8.9 g/dL  Hematocrit : 29.4 %  Platelet Count - Automated : 63 K/uL  Mean Cell Volume : 97.4 fl  Mean Cell Hemoglobin : 29.5 pg  Mean Cell Hemoglobin Concentration : 30.3 gm/dL  Auto Neutrophil # : x  Auto Lymphocyte # : x  Auto Monocyte # : x  Auto Eosinophil # : x  Auto Basophil # : x  Auto Neutrophil % : x  Auto Lymphocyte % : x  Auto Monocyte % : x  Auto Eosinophil % : x  Auto Basophil % : x                          8.9    3.89  )-----------( 63       ( 01 Dec 2023 05:48 )             29.4     12-01    139  |  107  |  84<H>  ----------------------------<  82  4.7   |  20<L>  |  3.82<H>    Ca    7.7<L>      01 Dec 2023 05:48  Phos  5.6     12-01  Mg     1.9     12-01    TPro  4.8<L>  /  Alb  1.8<L>  /  TBili  0.4  /  DBili  x   /  AST  19  /  ALT  27  /  AlkPhos  232<H>  11-30    LIVER FUNCTIONS - ( 30 Nov 2023 06:25 )  Alb: 1.8 g/dL / Pro: 4.8 g/dL / ALK PHOS: 232 U/L / ALT: 27 U/L / AST: 19 U/L / GGT: x             Urinalysis Basic - ( 01 Dec 2023 05:48 )    Color: x / Appearance: x / SG: x / pH: x  Gluc: 82 mg/dL / Ketone: x  / Bili: x / Urobili: x   Blood: x / Protein: x / Nitrite: x   Leuk Esterase: x / RBC: x / WBC x   Sq Epi: x / Non Sq Epi: x / Bacteria: x        Culture - Urine (11.26.23 @ 22:20)   Specimen Source: Clean Catch Clean Catch (Midstream)  Culture Results:   <10,000 CFU/mL Normal Urogenital Cheryl      Culture - Blood (11.26.23 @ 14:05)   - Bacteroides fragilis: Detec  - Enterobacterales: Detec  Gram Stain:   Growth in aerobic bottle: Gram Negative Rods   Growth in anaerobic bottle: Gram Negative Rods  - Ampicillin: R >16 These ampicillin results predict results for amoxicillin  - Ampicillin/Sulbactam: S <=4/2  - Aztreonam: S <=4  - Cefazolin: S <=2  - Cefepime: S <=2  - Cefoxitin: S <=8  - Ceftriaxone: S <=1  - Ciprofloxacin: S <=0.25  - Ertapenem: S <=0.5  - Gentamicin: S <=2  - Imipenem: S <=1  - Levofloxacin: S <=0.5  - Meropenem: S <=1  - Piperacillin/Tazobactam: S <=8  - Tobramycin: S <=2  - Trimethoprim/Sulfamethoxazole: S <=0.5/9.5  Specimen Source: .Blood Blood-Peripheral  Organism: Blood Culture PCR  Organism: Citrobacter koseri  Culture Results:   Growth in aerobic and anaerobic bottles: Citrobacter koseri   Growth in aerobic and anaerobic bottles: Bacteroides fragilis   "Susceptibilities not performed"   Growth in anaerobic bottle: Streptococcus constellatus   Alpha hemolytic streptoccous (Not Streptococcus pneumoniae or   Enterococcus)   isolated from a single blood culture set may represent contamination.   Contact the Microbiology Department at [phone] if susceptibility   testing is clinically indicated.   Direct identification is available within approximately 3-5   hours either by Blood Panel Multiplexed PCR or Direct   MALDI-TOF. Details: [email or web address]  Organism Identification: Blood Culture PCR   Citrobacter koseri  Method Type: LUAN  Method Type: PCR        Culture - Blood (11.26.23 @ 14:05)   Gram Stain:   Growth in aerobic bottle: Gram Negative Rods   Growth in anaerobic bottle: Gram Negative Rods  Specimen Source: .Blood Blood-Peripheral  Culture Results:   Growth in aerobic and anaerobic bottles: Citrobacter koseri   See previous culture [id number]   Growth in aerobic and anaerobic bottles: Bacteroides fragilis   "Susceptibilities not performed"      acetaminophen     Tablet .. 650 milliGRAM(s) Oral every 6 hours PRN  allopurinol 100 milliGRAM(s) Oral daily  amLODIPine   Tablet 5 milliGRAM(s) Oral daily  aspirin enteric coated 81 milliGRAM(s) Oral daily  ertapenem  IVPB 500 milliGRAM(s) IV Intermittent every 24 hours  simethicone 80 milliGRAM(s) Chew three times a day PRN  sodium bicarbonate 650 milliGRAM(s) Oral three times a day

## 2023-12-01 NOTE — CHART NOTE - NSCHARTNOTEFT_GEN_A_CORE
Off Service NP Hospitalist Note  Case Discussed with Dr. Michele    labs and imaging reviewed    89 yo with history of colon cancer, thrombocytopenia, NIDDM, CKD, HTN, and known mesenteric mass who was admitted 11/26 with less than 12 hours of rigors, N/V and abdominal pain. Admitted for neutropenia/bacteremia     *Neutropenic Fever   *Pancytopenia  *Polymicrobial Bacteremia, suspect GI focus  - CT of A/P without perforation or clear obstruction - positive known right upper quadrant mesenteric mass   - Cdiff negative, blood cultures grew citrobacter and bacteroides species, sensitivities reviewed  - Surgery evaluation Dr. Payton - non surgical   - Continue Ertapenum 500 daily as per ID, anticipate oral stepdown therapy next 24 hours  - Clinically improved    *Myelodysplasia  *Hx of Colon and Breast Cancer  - Hem/ onc following- appreciate recs  - Continue to monitor CBC    *Septic MAGDI/CKD 4  - IVF with sodium bicarb as per renal  - Avoid nephrotoxins, monitor renal indices  - Renal following     *HTN  - norvasc 10 and doxazosin 1 both d/c by renal     *DVT PPx  - SCDs.

## 2023-12-01 NOTE — PROGRESS NOTE ADULT - REASON FOR ADMISSION
neutropenic fever
MDS

## 2023-12-01 NOTE — CHART NOTE - NSCHARTNOTEFT_GEN_A_CORE
Called for new 2nd deg heart block type II on tele monitoring. Ordered EKG which is more consistent with 2nd deg Mobitz type 1 heart block. Pt asymptomatic, VSS. Cardio already on board.

## 2023-12-01 NOTE — PROGRESS NOTE ADULT - PROBLEM SELECTOR PROBLEM 1
Myelodysplasia (myelodysplastic syndrome)

## 2023-12-01 NOTE — PROGRESS NOTE ADULT - SUBJECTIVE AND OBJECTIVE BOX
Resting, on RA    Vital Signs Last 24 Hrs  T(C): 37 (12-01-23 @ 21:11), Max: 37 (12-01-23 @ 21:11)  T(F): 98.6 (12-01-23 @ 21:11), Max: 98.6 (12-01-23 @ 21:11)  HR: 69 (12-01-23 @ 21:11) (67 - 69)  BP: 184/66 (12-01-23 @ 21:11) (154/64 - 184/66)  RR: 16 (12-01-23 @ 21:11) (15 - 16)  SpO2: 96% (12-01-23 @ 21:11) (96% - 98%)    s1s2  b/l air entry  soft, ND  + edema                                              8.9    3.89  )-----------( 63       ( 01 Dec 2023 05:48 )             29.4     01 Dec 2023 05:48    139    |  107    |  84     ----------------------------<  82     4.7     |  20     |  3.82     Ca    7.7        01 Dec 2023 05:48  Phos  5.6       01 Dec 2023 05:48  Mg     1.9       01 Dec 2023 05:48    TPro  4.8    /  Alb  1.8    /  TBili  0.4    /  DBili  x      /  AST  19     /  ALT  27     /  AlkPhos  232    30 Nov 2023 06:25    LIVER FUNCTIONS - ( 30 Nov 2023 06:25 )  Alb: 1.8 g/dL / Pro: 4.8 g/dL / ALK PHOS: 232 U/L / ALT: 27 U/L / AST: 19 U/L / GGT: x           acetaminophen     Tablet .. 650 milliGRAM(s) Oral every 6 hours PRN  allopurinol 100 milliGRAM(s) Oral daily  amLODIPine   Tablet 5 milliGRAM(s) Oral daily  aspirin enteric coated 81 milliGRAM(s) Oral daily  ertapenem  IVPB 500 milliGRAM(s) IV Intermittent every 24 hours  simethicone 80 milliGRAM(s) Chew three times a day PRN  sodium bicarbonate 650 milliGRAM(s) Oral three times a day    A/P:    Hx colon cancer s/p R colectomy, chemo  CKD 4 iso Keytruda (d/c-d ~ 1 year ago)  B frag, Citrobacter k bacteremia  CT A/P w/RUQ mesenteric mass, no hydro  Abx per ID   Septic MAGDI/CKD 4  Cr is improving   HTN, will start Hydralazine  Avoid nephrotoxins  F/u CMP, CBC, Mg, UO    [phone]

## 2023-12-01 NOTE — PROGRESS NOTE ADULT - SUBJECTIVE AND OBJECTIVE BOX
HANG MADERA, 89y Female  MRN: [id number]  ATTENDING: Humble Michele    HPI:  89F, PMHx colon adenocarcinoma (2011), s/p right colectomy followed by chemotherapy, CKD 4, breast cancer, HTN, anemia, thrombocytopenia admitted with nausea, vomiting, malaise.  Patient treated in ambulatory with Procrit for anemia, under the care of Dr. Edmonds.  Started on empiric antibiotic coverage.  Hematologic profile consistent with WBC 0.38K platelet 114,000, hemoglobin 11.3 g/dL. CT head 11/18 showed chronic microvascular changes, no acute intracranial pathology.  Patient found hypothermic.  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  In the past patient had bone marrow biopsy (August 2023) which showed normocellular marrow with trilineage hematopoiesis with maturation and increased iron stores, with a Tier II variant of potential clinical significance / abnormal genomic alterations TP53 [id number].Also present NRBCs suggestive of possible MDS .    MEDICATIONS:  acetaminophen     Tablet .. 650 milliGRAM(s) Oral every 6 hours PRN  aspirin enteric coated 81 milliGRAM(s) Oral daily  dextrose 5% 1000 milliLiter(s) IV Continuous <Continuous>  meropenem  IVPB 500 milliGRAM(s) IV Intermittent every 12 hours  simethicone 80 milliGRAM(s) Chew three times a day PRN  All other medications reviewed.    SUBJECTIVE:      VITALS:  T(C): 36.7 (11-28-23 @ 05:12), Max: 37.1 (11-27-23 @ 15:57)  T(F): 98.1 (11-28-23 @ 05:12), Max: 98.7 (11-27-23 @ 15:57)  HR: 73 (11-28-23 @ 05:12) (73 - 91)  BP: 100/54 (11-28-23 @ 05:12) (99/45 - 121/49)    PHYSICAL EXAM:  Constitutional: alert, well developed  HEENT: normocephalic, anicteric sclerae, no mucositis or thrush  Respiratory: bilateral clear to auscultation anteriorly  Cardiovascular : S1, S2 regular, rhythmic, no murmurs, gallops or rubs  Abdomen: soft, distended, + normoactive BS, no palpable HS- megaly  Extremities: no tenderness;  -c/c/e, pulses equal bilaterally    LABS:  (11-28) WBC: 7.79 K/uL,Hemoglobin: 8.7 g/dL, Hematocrit: 29.2 %, Platelet: 55 K/uL  (11-28) Na: 140 mmol/L ; K: 3.8 mmol/L ; BUN: 70 mg/dL ; Cr: 4.39 mg/dL.    RADIOLOGY:  ACC: [id number] EXAM:  CT ABDOMEN AND PELVIS   ORDERED BY: CAROLINA BLANK   PROCEDURE DATE:  11/26/2023    INTERPRETATION:  CLINICAL INFORMATION: Metastatic colon cancer. Abdominal pain.  COMPARISON: CT 9/11/2023  CONTRAST/COMPLICATIONS:  IV Contrast: NONE  Oral Contrast: NONE  Complications: None reported at time of study completion  PROCEDURE:  PICC line  FINDINGS:  LOWER CHEST: Small pericardial effusion. Small bilateral pleural   effusions, right greater than left.  LIVER: Hepatic cysts and hypodense foci similar in appearance to prior   imaging 9/11/2023. Evaluation for metastasis is limited in the absence of   intravenous contrast.  BILE DUCTS: Normal caliber.  GALLBLADDER: Within normal limits.  SPLEEN: Within normal limits.  PANCREAS: Mesenteric mass inseparable from the pancreatic head.  ADRENALS: Within normal limits.  KIDNEYS/URETERS: The small nonobstructing left renal calculi. No   hydronephrosis. Bilateral renal cysts as well as a 1.6 cm indeterminate   anterior right upper pole renal lesion (301:32) without change.  BLADDER: Within normal limits.  REPRODUCTIVE ORGANS: Uterus and adnexa within normal limits.    BOWEL: Stomach and proximal duodenum are dilated to the level of a right   upper quadrant mesenteric mass inseparable from the second portion of the   duodenum and pancreatic head. The mass currently measures 8.8 x 6.3 cm   previously 8.1 x 6.5 cm. Previously noted air within the lesion is no   longer present. Right hemicolectomy. Colonic diverticulosis. No bowel   obstruction.  PERITONEUM: Small volume ascites with interval increase.  VESSELS: Atherosclerotic changes.  RETROPERITONEUM/LYMPH NODES: No lymphadenopathy.  ABDOMINAL WALL: Rectus diastases with protrusion of bowel loops.  BONES: Within normal limits.    IMPRESSION:  Right upper quadrant mesenteric mass inseparable from the second portion   of the duodenum and pancreatic head not significantly changed in size.   Previously noted internal air is no longer present.    Dilatation of the stomach and duodenum proximal to the lesion. Correlate   for symptoms of partial outlet obstruction.    Small ascites with interval increase.   HANG MADERA, 89y Female  MRN: [id number]  ATTENDING: Humble Michele    HPI:  89F, PMHx colon adenocarcinoma (2011), s/p right colectomy followed by chemotherapy, CKD 4, breast cancer, HTN, anemia, thrombocytopenia admitted with nausea, vomiting, malaise.  Patient treated in ambulatory with Procrit for anemia, under the care of Dr. Edmonds.  Started on empiric antibiotic coverage.  Hematologic profile consistent with WBC 0.38K platelet 114,000, hemoglobin 11.3 g/dL. CT head 11/18 showed chronic microvascular changes, no acute intracranial pathology.  Patient found hypothermic.  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  In the past patient had bone marrow biopsy (August 2023) which showed normocellular marrow with trilineage hematopoiesis with maturation and increased iron stores, with a Tier II variant of potential clinical significance / abnormal genomic alterations TP53 [id number].Also present NRBCs suggestive of possible MDS .    MEDICATIONS:  acetaminophen     Tablet .. 650 milliGRAM(s) Oral every 6 hours PRN  aspirin enteric coated 81 milliGRAM(s) Oral daily  dextrose 5% 1000 milliLiter(s) IV Continuous <Continuous>  meropenem  IVPB 500 milliGRAM(s) IV Intermittent every 12 hours  simethicone 80 milliGRAM(s) Chew three times a day PRN  All other medications reviewed.    SUBJECTIVE:  No new constitutional symptoms    VITALS:  T(C): 36.7 (11-28-23 @ 05:12), Max: 37.1 (11-27-23 @ 15:57)  T(F): 98.1 (11-28-23 @ 05:12), Max: 98.7 (11-27-23 @ 15:57)  HR: 73 (11-28-23 @ 05:12) (73 - 91)  BP: 100/54 (11-28-23 @ 05:12) (99/45 - 121/49)    PHYSICAL EXAM:  Constitutional: alert, well developed  HEENT: normocephalic, anicteric sclerae, no mucositis or thrush  Respiratory: bilateral clear to auscultation anteriorly  Cardiovascular : S1, S2 regular, rhythmic, no murmurs, gallops or rubs  Abdomen: soft, distended, + normoactive BS, no palpable HS- megaly  Extremities: no tenderness;  -c/c/e, pulses equal bilaterally    LABS:  Hemoglobin: 9.2 g/dL (12-02-23 @ 06:10)  Hemoglobin: 8.9 g/dL (12-01-23 @ 05:48)  Hemoglobin: 9.3 g/dL (11-30-23 @ 06:25)    (11-28) WBC: 7.79 K/uL,Hemoglobin: 8.7 g/dL, Hematocrit: 29.2 %, Platelet: 55 K/uL  (11-28) Na: 140 mmol/L ; K: 3.8 mmol/L ; BUN: 70 mg/dL ; Cr: 4.39 mg/dL.    RADIOLOGY:  ACC: [id number] EXAM:  CT ABDOMEN AND PELVIS   ORDERED BY: CAROLINA BLANK   PROCEDURE DATE:  11/26/2023    INTERPRETATION:  CLINICAL INFORMATION: Metastatic colon cancer. Abdominal pain.  COMPARISON: CT 9/11/2023  CONTRAST/COMPLICATIONS:  IV Contrast: NONE  Oral Contrast: NONE  Complications: None reported at time of study completion  PROCEDURE:  PICC line  FINDINGS:  LOWER CHEST: Small pericardial effusion. Small bilateral pleural   effusions, right greater than left.  LIVER: Hepatic cysts and hypodense foci similar in appearance to prior   imaging 9/11/2023. Evaluation for metastasis is limited in the absence of   intravenous contrast.  BILE DUCTS: Normal caliber.  GALLBLADDER: Within normal limits.  SPLEEN: Within normal limits.  PANCREAS: Mesenteric mass inseparable from the pancreatic head.  ADRENALS: Within normal limits.  KIDNEYS/URETERS: The small nonobstructing left renal calculi. No   hydronephrosis. Bilateral renal cysts as well as a 1.6 cm indeterminate   anterior right upper pole renal lesion (301:32) without change.  BLADDER: Within normal limits.  REPRODUCTIVE ORGANS: Uterus and adnexa within normal limits.    BOWEL: Stomach and proximal duodenum are dilated to the level of a right   upper quadrant mesenteric mass inseparable from the second portion of the   duodenum and pancreatic head. The mass currently measures 8.8 x 6.3 cm   previously 8.1 x 6.5 cm. Previously noted air within the lesion is no   longer present. Right hemicolectomy. Colonic diverticulosis. No bowel   obstruction.  PERITONEUM: Small volume ascites with interval increase.  VESSELS: Atherosclerotic changes.  RETROPERITONEUM/LYMPH NODES: No lymphadenopathy.  ABDOMINAL WALL: Rectus diastases with protrusion of bowel loops.  BONES: Within normal limits.    IMPRESSION:  Right upper quadrant mesenteric mass inseparable from the second portion   of the duodenum and pancreatic head not significantly changed in size.   Previously noted internal air is no longer present.    Dilatation of the stomach and duodenum proximal to the lesion. Correlate   for symptoms of partial outlet obstruction.    Small ascites with interval increase.

## 2023-12-02 ENCOUNTER — TRANSCRIPTION ENCOUNTER (OUTPATIENT)
Age: 88
End: 2023-12-02

## 2023-12-02 LAB
ALBUMIN SERPL ELPH-MCNC: 2.1 G/DL — LOW (ref 3.3–5)
ALBUMIN SERPL ELPH-MCNC: 2.1 G/DL — LOW (ref 3.3–5)
ALP SERPL-CCNC: 264 U/L — HIGH (ref 40–120)
ALP SERPL-CCNC: 264 U/L — HIGH (ref 40–120)
ALT FLD-CCNC: 26 U/L — SIGNIFICANT CHANGE UP (ref 10–45)
ALT FLD-CCNC: 26 U/L — SIGNIFICANT CHANGE UP (ref 10–45)
ANION GAP SERPL CALC-SCNC: 15 MMOL/L — SIGNIFICANT CHANGE UP (ref 5–17)
ANION GAP SERPL CALC-SCNC: 15 MMOL/L — SIGNIFICANT CHANGE UP (ref 5–17)
AST SERPL-CCNC: 25 U/L — SIGNIFICANT CHANGE UP (ref 10–40)
AST SERPL-CCNC: 25 U/L — SIGNIFICANT CHANGE UP (ref 10–40)
BILIRUB SERPL-MCNC: 0.3 MG/DL — SIGNIFICANT CHANGE UP (ref 0.2–1.2)
BILIRUB SERPL-MCNC: 0.3 MG/DL — SIGNIFICANT CHANGE UP (ref 0.2–1.2)
BUN SERPL-MCNC: 83 MG/DL — HIGH (ref 7–23)
BUN SERPL-MCNC: 83 MG/DL — HIGH (ref 7–23)
CALCIUM SERPL-MCNC: 8 MG/DL — LOW (ref 8.4–10.5)
CALCIUM SERPL-MCNC: 8 MG/DL — LOW (ref 8.4–10.5)
CHLORIDE SERPL-SCNC: 111 MMOL/L — HIGH (ref 96–108)
CHLORIDE SERPL-SCNC: 111 MMOL/L — HIGH (ref 96–108)
CO2 SERPL-SCNC: 18 MMOL/L — LOW (ref 22–31)
CO2 SERPL-SCNC: 18 MMOL/L — LOW (ref 22–31)
CREAT SERPL-MCNC: 3.62 MG/DL — HIGH (ref 0.5–1.3)
CREAT SERPL-MCNC: 3.62 MG/DL — HIGH (ref 0.5–1.3)
EGFR: 12 ML/MIN/1.73M2 — LOW
EGFR: 12 ML/MIN/1.73M2 — LOW
GLUCOSE SERPL-MCNC: 85 MG/DL — SIGNIFICANT CHANGE UP (ref 70–99)
GLUCOSE SERPL-MCNC: 85 MG/DL — SIGNIFICANT CHANGE UP (ref 70–99)
HCT VFR BLD CALC: 30 % — LOW (ref 34.5–45)
HCT VFR BLD CALC: 30 % — LOW (ref 34.5–45)
HGB BLD-MCNC: 9.2 G/DL — LOW (ref 11.5–15.5)
HGB BLD-MCNC: 9.2 G/DL — LOW (ref 11.5–15.5)
MCHC RBC-ENTMCNC: 29.6 PG — SIGNIFICANT CHANGE UP (ref 27–34)
MCHC RBC-ENTMCNC: 29.6 PG — SIGNIFICANT CHANGE UP (ref 27–34)
MCHC RBC-ENTMCNC: 30.7 GM/DL — LOW (ref 32–36)
MCHC RBC-ENTMCNC: 30.7 GM/DL — LOW (ref 32–36)
MCV RBC AUTO: 96.5 FL — SIGNIFICANT CHANGE UP (ref 80–100)
MCV RBC AUTO: 96.5 FL — SIGNIFICANT CHANGE UP (ref 80–100)
NRBC # BLD: 0 /100 WBCS — SIGNIFICANT CHANGE UP (ref 0–0)
NRBC # BLD: 0 /100 WBCS — SIGNIFICANT CHANGE UP (ref 0–0)
PLATELET # BLD AUTO: 77 K/UL — LOW (ref 150–400)
PLATELET # BLD AUTO: 77 K/UL — LOW (ref 150–400)
POTASSIUM SERPL-MCNC: 5 MMOL/L — SIGNIFICANT CHANGE UP (ref 3.5–5.3)
POTASSIUM SERPL-MCNC: 5 MMOL/L — SIGNIFICANT CHANGE UP (ref 3.5–5.3)
POTASSIUM SERPL-SCNC: 5 MMOL/L — SIGNIFICANT CHANGE UP (ref 3.5–5.3)
POTASSIUM SERPL-SCNC: 5 MMOL/L — SIGNIFICANT CHANGE UP (ref 3.5–5.3)
PROT SERPL-MCNC: 5.3 G/DL — LOW (ref 6–8.3)
PROT SERPL-MCNC: 5.3 G/DL — LOW (ref 6–8.3)
RBC # BLD: 3.11 M/UL — LOW (ref 3.8–5.2)
RBC # BLD: 3.11 M/UL — LOW (ref 3.8–5.2)
RBC # FLD: 15.3 % — HIGH (ref 10.3–14.5)
RBC # FLD: 15.3 % — HIGH (ref 10.3–14.5)
SODIUM SERPL-SCNC: 144 MMOL/L — SIGNIFICANT CHANGE UP (ref 135–145)
SODIUM SERPL-SCNC: 144 MMOL/L — SIGNIFICANT CHANGE UP (ref 135–145)
WBC # BLD: 4.31 K/UL — SIGNIFICANT CHANGE UP (ref 3.8–10.5)
WBC # BLD: 4.31 K/UL — SIGNIFICANT CHANGE UP (ref 3.8–10.5)
WBC # FLD AUTO: 4.31 K/UL — SIGNIFICANT CHANGE UP (ref 3.8–10.5)
WBC # FLD AUTO: 4.31 K/UL — SIGNIFICANT CHANGE UP (ref 3.8–10.5)

## 2023-12-02 RX ADMIN — ERTAPENEM SODIUM 100 MILLIGRAM(S): 1 INJECTION, POWDER, LYOPHILIZED, FOR SOLUTION INTRAMUSCULAR; INTRAVENOUS at 17:53

## 2023-12-02 RX ADMIN — Medication 650 MILLIGRAM(S): at 05:56

## 2023-12-02 RX ADMIN — Medication 25 MILLIGRAM(S): at 21:13

## 2023-12-02 RX ADMIN — Medication 100 MILLIGRAM(S): at 16:07

## 2023-12-02 RX ADMIN — Medication 25 MILLIGRAM(S): at 05:57

## 2023-12-02 RX ADMIN — Medication 650 MILLIGRAM(S): at 21:12

## 2023-12-02 RX ADMIN — Medication 25 MILLIGRAM(S): at 16:07

## 2023-12-02 RX ADMIN — Medication 81 MILLIGRAM(S): at 15:31

## 2023-12-02 RX ADMIN — Medication 650 MILLIGRAM(S): at 15:31

## 2023-12-02 NOTE — PROGRESS NOTE ADULT - PROVIDER SPECIALTY LIST ADULT
Heme/Onc
Infectious Disease
Internal Medicine
Nephrology
Cardiology
Internal Medicine
Nephrology
Heme/Onc
Heme/Onc
Infectious Disease
Internal Medicine
Internal Medicine
Nephrology
Nephrology
Heme/Onc
Surgery
Heme/Onc

## 2023-12-02 NOTE — PROGRESS NOTE ADULT - NUTRITIONAL ASSESSMENT
This patient has been assessed with a concern for Malnutrition and has been determined to have a diagnosis/diagnoses of Moderate protein-calorie malnutrition.    This patient is being managed with:   Diet Regular-  DASH/TLC {Sodium & Cholesterol Restricted}  Entered: Nov 30 2023  8:34PM    Diet Regular-  Supplement Feeding Modality:  Oral  Nepro Cans or Servings Per Day:  1       Frequency:  Daily  Entered: Nov 30 2023  5:49PM    Diet Regular-  Supplement Feeding Modality:  Oral  Nepro Cans or Servings Per Day:  1       Frequency:  Daily  Entered: Nov 30 2023 11:56AM    The following pending diet order is being considered for treatment of Moderate protein-calorie malnutrition:null
This patient has been assessed with a concern for Malnutrition and has been determined to have a diagnosis/diagnoses of Moderate protein-calorie malnutrition.    This patient is being managed with:   Diet Regular-  Supplement Feeding Modality:  Oral  Nepro Cans or Servings Per Day:  1       Frequency:  Daily  Entered: Nov 30 2023 11:56AM    Diet Regular-  Entered: Nov 26 2023  2:41PM    The following pending diet order is being considered for treatment of Moderate protein-calorie malnutrition:null
This patient has been assessed with a concern for Malnutrition and has been determined to have a diagnosis/diagnoses of Moderate protein-calorie malnutrition.    This patient is being managed with:   Diet Regular-  Supplement Feeding Modality:  Oral  Nepro Cans or Servings Per Day:  1       Frequency:  Daily  Entered: Nov 30 2023  5:49PM    Diet Regular-  Supplement Feeding Modality:  Oral  Nepro Cans or Servings Per Day:  1       Frequency:  Daily  Entered: Nov 30 2023 11:56AM    The following pending diet order is being considered for treatment of Moderate protein-calorie malnutrition:null

## 2023-12-02 NOTE — DISCHARGE NOTE PROVIDER - NSDCFUSCHEDAPPT_GEN_ALL_CORE_FT
Osmany Lisa Physician [facility]  [address]  Scheduled Appointment: 01/05/2024     Osmany Lisa Physician [facility]  [address]  Scheduled Appointment: 01/05/2024     Osmany Lisa Physician [facility]  [address]  Scheduled Appointment: 01/05/2024

## 2023-12-02 NOTE — DISCHARGE NOTE PROVIDER - HOSPITAL COURSE
Hospital Course  89 y/o F with PMH CKD 4, colon cancer (adenocarcinoma)-5-2011. Stage IIIC-T4 N2a s/p right colectomy followed by chemo,  breast cancer, anemia, HTN, thrombocytopenia c/o weakness x2 days associated with nausea, vomiting 2-3x/day, poor PO, fever, and chills. Endorses eating Thanksgiving dinner with her daughter and family, no new foods, or sick contacts. No additional family members with symptoms. Friday afternoon noted malaise and onset of symptoms. Hx significant for chronic diarrhea s/p colon resection. Follows with heme/onc outpatient - due for procrit 11/27.    Patient admitted to the hospital on 11/26, hx of colon and breast cancer, known mesenteric mass, presented with rigors, nausea , vomiting and abdominal pain.  CT of AP did not show evidence of obstruction or perforation.  Pt presented with neutropenia, blood cultures grew citrobacter and bacteroides species.  ID followed closely during admission, neutropenia during admission proved to be transient, rapidly reversed, also with decreasing platelets probably due to bacteremia.  Managed with seven days of ertapenem.  Suspect bacteremia is a result of GI focus, pt at risk for recurrence.  Pt continued to clinically improve, medically cleared for discharge and outpatient follow up.       Patient was also found with worsening renal indices on admission, pt has chronic CKD stage 4 likely in setting of keytruda use (stopped > 1 year ago).  IVF with bicarb was used, sepsis resolved, renal indices improved.  Hematology also followed for pancytopenia, pt is followed by Dr. Edmonds, had recent bone marrow studies that showed evidence of nucleated RBCs which may suggest evidence of myelodysplasia.  Unlikely to be a candidate for further heme invasive workup.  Recommend outpatient follow up.     Source of Infection:  Antibiotic / Last Day: seven days of ertapenem    Palliative Care / Advanced Care Planning  Code Status: full code  Patient/Family agreeable to Hospice/Palliative (Y/N)?  Summary of Goals of Care Conversation:    Discharging Provider:  Mayco Padgett NP  Contact Info: Cell [phone] - Please call with any questions or concerns.    Outpatient Provider: Dr. Michele aware            Hospital Course  89 y/o F with PMH CKD 4, colon cancer (adenocarcinoma)-5-2011. Stage IIIC-T4 N2a s/p right colectomy followed by chemo,  breast cancer, anemia, HTN, thrombocytopenia c/o weakness x2 days associated with nausea, vomiting 2-3x/day, poor PO, fever, and chills. Endorses eating Thanksgiving dinner with her daughter and family, no new foods, or sick contacts. No additional family members with symptoms. Friday afternoon noted malaise and onset of symptoms. Hx significant for chronic diarrhea s/p colon resection. Follows with heme/onc outpatient - due for procrit 11/27.    Patient admitted to the hospital on 11/26, hx of colon and breast cancer, known mesenteric mass, presented with rigors, nausea , vomiting and abdominal pain.  CT of AP did not show evidence of obstruction or perforation.  Pt presented with neutropenia, blood cultures grew citrobacter and bacteroides species.  ID followed closely during admission, neutropenia during admission proved to be transient, rapidly reversed, also with decreasing platelets probably due to bacteremia.  Managed with seven days of ertapenem.  Suspect bacteremia is a result of GI focus, pt at risk for recurrence.  Pt continued to clinically improve, medically cleared for discharge and outpatient follow up.       Patient was also found with worsening renal indices on admission, pt has chronic CKD stage 4 likely in setting of keytruda use (stopped > 1 year ago).  IVF with bicarb was used, sepsis resolved, renal indices improved.  Hematology also followed for pancytopenia, pt is followed by Dr. Edmonds, had recent bone marrow studies that showed evidence of nucleated RBCs which may suggest evidence of myelodysplasia.  Unlikely to be a candidate for further heme invasive workup.  Recommend outpatient follow up.     Source of Infection:  Antibiotic / Last Day: seven days of ertapenem    Palliative Care / Advanced Care Planning  Code Status: full code  Patient/Family agreeable to Hospice/Palliative (Y/N)?  Summary of Goals of Care Conversation:    Discharging Provider:  Mayco Padgett NP  Contact Info: Cell [phone] - Please call with any questions or concerns.    Outpatient Provider: Dr. Michele aware            Hospital Course  89 y/o F with PMH CKD 4, colon cancer (adenocarcinoma)-5-2011. Stage IIIC-T4 N2a s/p right colectomy followed by chemo,  breast cancer, anemia, HTN, thrombocytopenia c/o weakness x2 days associated with nausea, vomiting 2-3x/day, poor PO, fever, and chills. Endorses eating Thanksgiving dinner with her daughter and family, no new foods, or sick contacts. No additional family members with symptoms. Friday afternoon noted malaise and onset of symptoms. Hx significant for chronic diarrhea s/p colon resection. Follows with heme/onc outpatient - due for procrit 11/27.    Patient admitted to the hospital on 11/26, hx of colon and breast cancer, known mesenteric mass, presented with rigors, nausea , vomiting and abdominal pain.  CT of AP did not show evidence of obstruction or perforation.  Pt presented with neutropenia, blood cultures grew citrobacter and bacteroides species.  ID followed closely during admission, neutropenia during admission proved to be transient, rapidly reversed, also with decreasing platelets probably due to bacteremia.  Managed with seven days of ertapenem.  Suspect bacteremia is a result of GI focus, pt at risk for recurrence.  Pt continued to clinically improve, medically cleared for discharge and outpatient follow up.       Patient was also found with worsening renal indices on admission, pt has chronic CKD stage 4 likely in setting of keytruda use (stopped > 1 year ago).  IVF with bicarb was used, sepsis resolved, renal indices improved.  Hematology also followed for pancytopenia, pt is followed by Dr. Edmonds, had recent bone marrow studies that showed evidence of nucleated RBCs which may suggest evidence of myelodysplasia.  Unlikely to be a candidate for further heme invasive workup.  Recommend outpatient follow up.     Source of Infection:  Antibiotic / Last Day: seven days of ertapenem    Palliative Care / Advanced Care Planning  Code Status: full code  Patient/Family agreeable to Hospice/Palliative (Y/N)?  Summary of Goals of Care Conversation:    Discharging Provider:  Mayco Padgett NP  Contact Info: Cell [phone] - Please call with any questions or concerns.    Outpatient Provider: Dr. Michele aware            Hospital Course  89 y/o F with PMH CKD 4, colon cancer (adenocarcinoma)-5-2011. Stage IIIC-T4 N2a s/p right colectomy followed by chemo,  breast cancer, anemia, HTN, thrombocytopenia c/o weakness x2 days associated with nausea, vomiting 2-3x/day, poor PO, fever, and chills. Endorses eating Thanksgiving dinner with her daughter and family, no new foods, or sick contacts. No additional family members with symptoms. Friday afternoon noted malaise and onset of symptoms. Hx significant for chronic diarrhea s/p colon resection. Follows with heme/onc outpatient - due for procrit 11/27.    Patient admitted to the hospital on 11/26, hx of colon and breast cancer, known mesenteric mass, presented with rigors, nausea , vomiting and abdominal pain.  CT of AP did not show evidence of obstruction or perforation.  Pt presented with neutropenia, blood cultures grew citrobacter and bacteroides species.  ID followed closely during admission, neutropenia during admission proved to be transient, rapidly reversed, also with decreasing platelets probably due to bacteremia.  Managed with seven days of ertapenem.  Suspect bacteremia is a result of GI focus, pt at risk for recurrence.  Pt continued to clinically improve, medically cleared for discharge and outpatient follow up.       Patient was also found with worsening renal indices on admission, pt has chronic CKD stage 4 likely in setting of keytruda use (stopped > 1 year ago).  IVF with bicarb was used, sepsis resolved, renal indices improved.  Renal attending followed closely, norvasc and cardura were stopped during admisison, started hydralazine for BP control.  Hematology also followed for pancytopenia, pt is followed by Dr. Edmonds, had recent bone marrow studies that showed evidence of nucleated RBCs which may suggest evidence of myelodysplasia.  Unlikely to be a candidate for further heme invasive workup.  Recommend outpatient follow up.     Source of Infection:  Antibiotic / Last Day: seven days of ertapenem    Palliative Care / Advanced Care Planning  Code Status: full code  Patient/Family agreeable to Hospice/Palliative (Y/N)?  Summary of Goals of Care Conversation:    Discharging Provider:  Mayco Padgett NP  Contact Info: Cell [phone] - Please call with any questions or concerns.    Outpatient Provider: Dr. Michele aware            Hospital Course  89 y/o F with PMH CKD 4, colon cancer (adenocarcinoma)-5-2011. Stage IIIC-T4 N2a s/p right colectomy followed by chemo,  breast cancer, anemia, HTN, thrombocytopenia c/o weakness x2 days associated with nausea, vomiting 2-3x/day, poor PO, fever, and chills. Endorses eating Thanksgiving dinner with her daughter and family, no new foods, or sick contacts. No additional family members with symptoms. Friday afternoon noted malaise and onset of symptoms. Hx significant for chronic diarrhea s/p colon resection. Follows with heme/onc outpatient - due for procrit 11/27.    Patient admitted to the hospital on 11/26, hx of colon and breast cancer, known mesenteric mass, presented with rigors, nausea , vomiting and abdominal pain.  CT of AP did not show evidence of obstruction or perforation.  Pt presented with neutropenia, blood cultures grew citrobacter and bacteroides species.  ID followed closely during admission, neutropenia during admission proved to be transient, rapidly reversed, also with decreasing platelets probably due to bacteremia.  Managed with seven days of ertapenem.  Suspect bacteremia is a result of GI focus, pt at risk for recurrence.  Pt continued to clinically improve, medically cleared for discharge and outpatient follow up.       Patient was also found with worsening renal indices on admission, pt has chronic CKD stage 4 likely in setting of keytruda use (stopped > 1 year ago).  IVF with bicarb was used, sepsis resolved, renal indices improved.  Renal attending followed closely, norvasc and cardura were stopped during admisison, started hydralazine for BP control.  Hematology also followed for pancytopenia, pt is followed by Dr. Edmonds, had recent bone marrow studies that showed evidence of nucleated RBCs which may suggest evidence of myelodysplasia.  Unlikely to be a candidate for further heme invasive workup.  Recommend outpatient follow up.     Source of Infection:  Antibiotic / Last Day: seven days of ertapenem    Palliative Care / Advanced Care Planning  Code Status: full code  Patient/Family agreeable to Hospice/Palliative (Y/N)?  Summary of Goals of Care Conversation:    Discharging Provider:  Mayco Padgett NP  Contact Info: Cell [phone] - Please call with any questions or concerns.    Outpatient Provider: Dr. Michele aware            Hospital Course  88 y/o F with PMH CKD 4, colon cancer (adenocarcinoma)-5-2011. Stage IIIC-T4 N2a s/p right colectomy followed by chemo,  breast cancer, anemia, HTN, thrombocytopenia c/o weakness x2 days associated with nausea, vomiting 2-3x/day, poor PO, fever, and chills. Endorses eating Thanksgiving dinner with her daughter and family, no new foods, or sick contacts. No additional family members with symptoms. Friday afternoon noted malaise and onset of symptoms. Hx significant for chronic diarrhea s/p colon resection. Follows with heme/onc outpatient - due for procrit 11/27.    Patient admitted to the hospital on 11/26, hx of colon and breast cancer, known mesenteric mass, presented with rigors, nausea , vomiting and abdominal pain.  CT of AP did not show evidence of obstruction or perforation.  Pt presented with neutropenia, blood cultures grew citrobacter and bacteroides species.  ID followed closely during admission, neutropenia during admission proved to be transient, rapidly reversed, also with decreasing platelets probably due to bacteremia.  Managed with seven days of ertapenem.  Suspect bacteremia is a result of GI focus, pt at risk for recurrence.  Pt continued to clinically improve, medically cleared for discharge and outpatient follow up.       Patient was also found with worsening renal indices on admission, pt has chronic CKD stage 4 likely in setting of keytruda use (stopped > 1 year ago).  IVF with bicarb was used, sepsis resolved, renal indices improved.  Renal attending followed closely, norvasc and cardura were stopped during admisison, started hydralazine for BP control.  Hematology also followed for pancytopenia, pt is followed by Dr. Edmonds, had recent bone marrow studies that showed evidence of nucleated RBCs which may suggest evidence of myelodysplasia.  Unlikely to be a candidate for further heme invasive workup.  Recommend outpatient follow up.     Source of Infection:  Antibiotic / Last Day: seven days of ertapenem    Palliative Care / Advanced Care Planning  Code Status: full code  Patient/Family agreeable to Hospice/Palliative (Y/N)?  Summary of Goals of Care Conversation:    Discharging Provider:  Mayco Padgett NP  Contact Info: Cell [phone] - Please call with any questions or concerns.    Outpatient Provider: Dr. Michele aware

## 2023-12-02 NOTE — DISCHARGE NOTE PROVIDER - CARE PROVIDER_API CALL
Humble Michele  Internal Medicine  [address] [place] [address], [address]  [place] [address]  Phone: [phone]  Fax: [phone]  Follow Up Time:     Cortney Morse  Medical Oncology  [address] [place] [address]  Phone: [phone]  Fax: [phone]  Follow Up Time:    Humble Michele  Internal Medicine  [address] [place] [address], [address]  [place] [address]  Phone: [phone]  Fax: [phone]  Follow Up Time:     Cortney Morse  Medical Oncology  [address] [place] [address]  Phone: [phone]  Fax: [phone]  Follow Up Time:    Humble Michele  Internal Medicine  [address] [place] [address], [address]  [place] [address]  Phone: [phone]  Fax: [phone]  Follow Up Time:     Cortney Morse  Medical Oncology  [address] [place] [address]  Phone: [phone]  Fax: [phone]  Follow Up Time:

## 2023-12-02 NOTE — DISCHARGE NOTE PROVIDER - DETAILS OF MALNUTRITION DIAGNOSIS/DIAGNOSES
This patient has been assessed with a concern for Malnutrition and was treated during this hospitalization for the following Nutrition diagnosis/diagnoses:     -  11/30/2023: Moderate protein-calorie malnutrition

## 2023-12-02 NOTE — CHART NOTE - NSCHARTNOTEFT_GEN_A_CORE
Off Service NP Hospitalist Note  Case Discussed with Dr. Michele    labs and imaging reviewed      88 yo with history of colon cancer, thrombocytopenia, NIDDM, CKD, HTN, and known mesenteric mass who was admitted 11/26 with less than 12 hours of rigors, N/V and abdominal pain. Admitted for neutropenia/bacteremia     *Neutropenic Fever - no longer neutropenic  *Pancytopenia  *Polymicrobial Bacteremia, suspect GI focus  - CT of A/P without perforation or clear obstruction - positive known right upper quadrant mesenteric mass   - Cdiff negative, blood cultures grew citrobacter and bacteroides species, sensitivities reviewed  - Continue Ertapenum 500 daily - seven day course as per ID  - Suspect bacteremic event with gut focus, at risk for recurrence  - Surgery input appreciated, non surgical    *Myelodysplasia  *Hx of Colon and Breast Cancer  - Hem/ onc following- appreciate recs  - Continue to monitor CBC    *Septic MAGDI/CKD 4  - Cr improving  - Avoid nephrotoxins, monitor renal indices  - Renal following     *HTN  - norvasc 10 and doxazosin 1 both d/c by renal     *DVT PPx  - SCDs.    Possible discharge home tomorrow as per primary attending

## 2023-12-02 NOTE — DISCHARGE NOTE PROVIDER - NSDCCPCAREPLAN_GEN_ALL_CORE_FT
PRINCIPAL DISCHARGE DIAGNOSIS  Diagnosis: Bacteremia  Assessment and Plan of Treatment: you received seven days of IV antibiotics and are medically cleared to return home      SECONDARY DISCHARGE DIAGNOSES  Diagnosis: Pancytopenia  Assessment and Plan of Treatment: please follow up with Dr. Edmonds

## 2023-12-02 NOTE — PROGRESS NOTE ADULT - SUBJECTIVE AND OBJECTIVE BOX
ASYAALEX HANG  89y  Female      patient states she is feeing better. she denies cough/abdominal pain/n/v/fever/chills/chest pain/palpitations/dizziness/SOB. patient states she is tolerating po diet      REVIEW OF SYSTEMS:    Cardiac Aflutter/HTN  Pulmonary no cough/SOB  GI mesenteric mass/SBO/colon cancer no n/v/d/pain    CRI no dysuria/hematuria  Neuro no headache/dizziness/numbness  Heme myelodysplasia        FAMILY HISTORY:  FH: colon cancer (Sibling, Sibling)    FH: breast cancer (Sibling)      T(C): 36.8 (12-02-23 @ 20:25), Max: 37 (12-01-23 @ 21:11)  HR: 68 (12-02-23 @ 20:25) (57 - 74)  BP: 163/67 (12-02-23 @ 20:25) (155/62 - 184/66)  RR: 18 (12-02-23 @ 20:25) (15 - 18)  SpO2: 100% (12-02-23 @ 20:25) (95% - 100%)  Wt(kg): --Vital Signs Last 24 Hrs  T(C): 36.8 (02 Dec 2023 20:25), Max: 37 (01 Dec 2023 21:11)  T(F): 98.2 (02 Dec 2023 20:25), Max: 98.6 (01 Dec 2023 21:11)  HR: 68 (02 Dec 2023 20:25) (57 - 74)  BP: 163/67 (02 Dec 2023 20:25) (155/62 - 184/66)  BP(mean): --  RR: 18 (02 Dec 2023 20:25) (15 - 18)  SpO2: 100% (02 Dec 2023 20:25) (95% - 100%)    Parameters below as of 02 Dec 2023 20:25  Patient On (Oxygen Delivery Method): room air      No Known Allergies      PHYSICAL EXAM:    General non toxic in appearance NAD  Neck non JVD thyroid stable  Heart irregular  Lungs clear  Abdomen non tender non distended normal bowel sounds no HSM/CVAT  Extremities nontender no edema +pulses  Neurologic alert and oriented x 3 no acute focal changes          LABS:  CBC Full  -  ( 02 Dec 2023 06:10 )  WBC Count : 4.31 K/uL  RBC Count : 3.11 M/uL  Hemoglobin : 9.2 g/dL  Hematocrit : 30.0 %  Platelet Count - Automated : 77 K/uL  Mean Cell Volume : 96.5 fl  Mean Cell Hemoglobin : 29.6 pg  Mean Cell Hemoglobin Concentration : 30.7 gm/dL  Auto Neutrophil # : x  Auto Lymphocyte # : x  Auto Monocyte # : x  Auto Eosinophil # : x  Auto Basophil # : x  Auto Neutrophil % : x  Auto Lymphocyte % : x  Auto Monocyte % : x  Auto Eosinophil % : x  Auto Basophil % : x                          9.2    4.31  )-----------( 77       ( 02 Dec 2023 06:10 )             30.0     12-02    144  |  111<H>  |  83<H>  ----------------------------<  85  5.0   |  18<L>  |  3.62<H>    Ca    8.0<L>      02 Dec 2023 06:10  Phos  5.6     12-01  Mg     1.9     12-01    TPro  5.3<L>  /  Alb  2.1<L>  /  TBili  0.3  /  DBili  x   /  AST  25  /  ALT  26  /  AlkPhos  264<H>  12-02    LIVER FUNCTIONS - ( 02 Dec 2023 06:10 )  Alb: 2.1 g/dL / Pro: 5.3 g/dL / ALK PHOS: 264 U/L / ALT: 26 U/L / AST: 25 U/L / GGT: x             Urinalysis Basic - ( 02 Dec 2023 06:10 )    Color: x / Appearance: x / SG: x / pH: x  Gluc: 85 mg/dL / Ketone: x  / Bili: x / Urobili: x   Blood: x / Protein: x / Nitrite: x   Leuk Esterase: x / RBC: x / WBC x   Sq Epi: x / Non Sq Epi: x / Bacteria: x              Culture - Urine (11.26.23 @ 22:20)   Specimen Source: Clean Catch Clean Catch (Midstream)  Culture Results:   <10,000 CFU/mL Normal Urogenital Cheryl        Culture - Blood (11.26.23 @ 14:05)   Gram Stain:   Growth in aerobic bottle: Gram Negative Rods   Growth in anaerobic bottle: Gram Negative Rods  Specimen Source: .Blood Blood-Peripheral  Culture Results:   Growth in aerobic and anaerobic bottles: Citrobacter koseri   See previous culture [id number]   Growth in aerobic and anaerobic bottles: Bacteroides fragilis   "Susceptibilities not performed"            Culture - Blood (11.26.23 @ 14:05)   Gram Stain:   Growth in aerobic bottle: Gram Negative Rods   Growth in anaerobic bottle: Gram Negative Rods  Specimen Source: .Blood Blood-Peripheral  Culture Results:   Growth in aerobic and anaerobic bottles: Citrobacter koseri   See previous culture [id number]   Growth in aerobic and anaerobic bottles: Bacteroides fragilis   "Susceptibilities not performed"            acetaminophen     Tablet .. 650 milliGRAM(s) Oral every 6 hours PRN  allopurinol 100 milliGRAM(s) Oral daily  aspirin enteric coated 81 milliGRAM(s) Oral daily  ertapenem  IVPB 500 milliGRAM(s) IV Intermittent every 24 hours  hydrALAZINE 25 milliGRAM(s) Oral every 8 hours  simethicone 80 milliGRAM(s) Chew three times a day PRN  sodium bicarbonate 650 milliGRAM(s) Oral three times a day

## 2023-12-02 NOTE — PROGRESS NOTE ADULT - ASSESSMENT
Sepsis - clinically stable. patient is afebrile. will continue antibiotics  CRI - creatinine improved. nephrology is following. recommend out patient follow up with nephrology. patient is aware  HTN - patient started on hydralazine by nephrology. will monitor BP  Mesenteric mass/SBO - patient is GI stable. patient is clinically stable. will continue to observe. recommend out patient follow up with GI/Oncology/surgeon as an out patient. patient is aware  Myelodysplasia - patient is clinically stable. recommend followup with Heme as an out patient. patient is aware  Aflutter - patient is cardiac stable. recommend follow up with cardiologist as an out patient. patient is aware        Case discussed with NP

## 2023-12-02 NOTE — DISCHARGE NOTE PROVIDER - CARE PROVIDERS DIRECT ADDRESSES
,DirectAddress_Unknown,[email or web address] ,DirectAddress_Unknown,[email or web address] ,DirectAddress_Unknown,[email or web address]

## 2023-12-02 NOTE — DISCHARGE NOTE PROVIDER - NSDCMRMEDTOKEN_GEN_ALL_CORE_FT
amLODIPine 10 mg oral tablet: 1 tab(s) orally once a day  doxazosin 1 mg oral tablet: 1 tab(s) orally 2 times a day  sodium bicarbonate 650 mg oral tablet: 3 tab(s) orally every 8 hours   Adult Aspirin Regimen 81 mg oral delayed release tablet: 1 tab(s) orally once a day  hydrALAZINE 25 mg oral tablet: 1 tab(s) orally every 8 hours  sodium bicarbonate 650 mg oral tablet: 3 tab(s) orally every 8 hours  Zyloprim 100 mg oral tablet: 1 tab(s) orally once a day

## 2023-12-03 ENCOUNTER — TRANSCRIPTION ENCOUNTER (OUTPATIENT)
Age: 88
End: 2023-12-03

## 2023-12-03 VITALS
RESPIRATION RATE: 17 BRPM | DIASTOLIC BLOOD PRESSURE: 62 MMHG | OXYGEN SATURATION: 98 % | HEART RATE: 64 BPM | SYSTOLIC BLOOD PRESSURE: 161 MMHG | TEMPERATURE: 98 F

## 2023-12-03 LAB
ANION GAP SERPL CALC-SCNC: 13 MMOL/L — SIGNIFICANT CHANGE UP (ref 5–17)
ANION GAP SERPL CALC-SCNC: 13 MMOL/L — SIGNIFICANT CHANGE UP (ref 5–17)
BUN SERPL-MCNC: 81 MG/DL — HIGH (ref 7–23)
BUN SERPL-MCNC: 81 MG/DL — HIGH (ref 7–23)
CALCIUM SERPL-MCNC: 8 MG/DL — LOW (ref 8.4–10.5)
CALCIUM SERPL-MCNC: 8 MG/DL — LOW (ref 8.4–10.5)
CHLORIDE SERPL-SCNC: 113 MMOL/L — HIGH (ref 96–108)
CHLORIDE SERPL-SCNC: 113 MMOL/L — HIGH (ref 96–108)
CO2 SERPL-SCNC: 18 MMOL/L — LOW (ref 22–31)
CO2 SERPL-SCNC: 18 MMOL/L — LOW (ref 22–31)
CREAT SERPL-MCNC: 3.5 MG/DL — HIGH (ref 0.5–1.3)
CREAT SERPL-MCNC: 3.5 MG/DL — HIGH (ref 0.5–1.3)
EGFR: 12 ML/MIN/1.73M2 — LOW
EGFR: 12 ML/MIN/1.73M2 — LOW
GLUCOSE SERPL-MCNC: 87 MG/DL — SIGNIFICANT CHANGE UP (ref 70–99)
GLUCOSE SERPL-MCNC: 87 MG/DL — SIGNIFICANT CHANGE UP (ref 70–99)
HCT VFR BLD CALC: 28 % — LOW (ref 34.5–45)
HCT VFR BLD CALC: 28 % — LOW (ref 34.5–45)
HGB BLD-MCNC: 8.7 G/DL — LOW (ref 11.5–15.5)
HGB BLD-MCNC: 8.7 G/DL — LOW (ref 11.5–15.5)
MCHC RBC-ENTMCNC: 30 PG — SIGNIFICANT CHANGE UP (ref 27–34)
MCHC RBC-ENTMCNC: 30 PG — SIGNIFICANT CHANGE UP (ref 27–34)
MCHC RBC-ENTMCNC: 31.1 GM/DL — LOW (ref 32–36)
MCHC RBC-ENTMCNC: 31.1 GM/DL — LOW (ref 32–36)
MCV RBC AUTO: 96.6 FL — SIGNIFICANT CHANGE UP (ref 80–100)
MCV RBC AUTO: 96.6 FL — SIGNIFICANT CHANGE UP (ref 80–100)
NRBC # BLD: 0 /100 WBCS — SIGNIFICANT CHANGE UP (ref 0–0)
NRBC # BLD: 0 /100 WBCS — SIGNIFICANT CHANGE UP (ref 0–0)
PLATELET # BLD AUTO: 105 K/UL — LOW (ref 150–400)
PLATELET # BLD AUTO: 105 K/UL — LOW (ref 150–400)
POTASSIUM SERPL-MCNC: 5.3 MMOL/L — SIGNIFICANT CHANGE UP (ref 3.5–5.3)
POTASSIUM SERPL-MCNC: 5.3 MMOL/L — SIGNIFICANT CHANGE UP (ref 3.5–5.3)
POTASSIUM SERPL-SCNC: 5.3 MMOL/L — SIGNIFICANT CHANGE UP (ref 3.5–5.3)
POTASSIUM SERPL-SCNC: 5.3 MMOL/L — SIGNIFICANT CHANGE UP (ref 3.5–5.3)
RBC # BLD: 2.9 M/UL — LOW (ref 3.8–5.2)
RBC # BLD: 2.9 M/UL — LOW (ref 3.8–5.2)
RBC # FLD: 15.1 % — HIGH (ref 10.3–14.5)
RBC # FLD: 15.1 % — HIGH (ref 10.3–14.5)
SODIUM SERPL-SCNC: 144 MMOL/L — SIGNIFICANT CHANGE UP (ref 135–145)
SODIUM SERPL-SCNC: 144 MMOL/L — SIGNIFICANT CHANGE UP (ref 135–145)
WBC # BLD: 3.95 K/UL — SIGNIFICANT CHANGE UP (ref 3.8–10.5)
WBC # BLD: 3.95 K/UL — SIGNIFICANT CHANGE UP (ref 3.8–10.5)
WBC # FLD AUTO: 3.95 K/UL — SIGNIFICANT CHANGE UP (ref 3.8–10.5)
WBC # FLD AUTO: 3.95 K/UL — SIGNIFICANT CHANGE UP (ref 3.8–10.5)

## 2023-12-03 PROCEDURE — 83690 ASSAY OF LIPASE: CPT

## 2023-12-03 PROCEDURE — 87150 DNA/RNA AMPLIFIED PROBE: CPT

## 2023-12-03 PROCEDURE — 85610 PROTHROMBIN TIME: CPT

## 2023-12-03 PROCEDURE — 74176 CT ABD & PELVIS W/O CONTRAST: CPT | Mod: MA

## 2023-12-03 PROCEDURE — 87493 C DIFF AMPLIFIED PROBE: CPT

## 2023-12-03 PROCEDURE — 84550 ASSAY OF BLOOD/URIC ACID: CPT

## 2023-12-03 PROCEDURE — 85730 THROMBOPLASTIN TIME PARTIAL: CPT

## 2023-12-03 PROCEDURE — 80053 COMPREHEN METABOLIC PANEL: CPT

## 2023-12-03 PROCEDURE — 99285 EMERGENCY DEPT VISIT HI MDM: CPT | Mod: 25

## 2023-12-03 PROCEDURE — 80048 BASIC METABOLIC PNL TOTAL CA: CPT

## 2023-12-03 PROCEDURE — 87086 URINE CULTURE/COLONY COUNT: CPT

## 2023-12-03 PROCEDURE — 87077 CULTURE AEROBIC IDENTIFY: CPT

## 2023-12-03 PROCEDURE — 96365 THER/PROPH/DIAG IV INF INIT: CPT

## 2023-12-03 PROCEDURE — 93005 ELECTROCARDIOGRAM TRACING: CPT

## 2023-12-03 PROCEDURE — 84100 ASSAY OF PHOSPHORUS: CPT

## 2023-12-03 PROCEDURE — 36415 COLL VENOUS BLD VENIPUNCTURE: CPT

## 2023-12-03 PROCEDURE — 71045 X-RAY EXAM CHEST 1 VIEW: CPT

## 2023-12-03 PROCEDURE — 83605 ASSAY OF LACTIC ACID: CPT

## 2023-12-03 PROCEDURE — 0225U NFCT DS DNA&RNA 21 SARSCOV2: CPT

## 2023-12-03 PROCEDURE — 85025 COMPLETE CBC W/AUTO DIFF WBC: CPT

## 2023-12-03 PROCEDURE — 87186 SC STD MICRODIL/AGAR DIL: CPT

## 2023-12-03 PROCEDURE — 81001 URINALYSIS AUTO W/SCOPE: CPT

## 2023-12-03 PROCEDURE — 93306 TTE W/DOPPLER COMPLETE: CPT

## 2023-12-03 PROCEDURE — 87040 BLOOD CULTURE FOR BACTERIA: CPT

## 2023-12-03 PROCEDURE — 97161 PT EVAL LOW COMPLEX 20 MIN: CPT

## 2023-12-03 PROCEDURE — 85027 COMPLETE CBC AUTOMATED: CPT

## 2023-12-03 PROCEDURE — 96375 TX/PRO/DX INJ NEW DRUG ADDON: CPT

## 2023-12-03 PROCEDURE — 83735 ASSAY OF MAGNESIUM: CPT

## 2023-12-03 PROCEDURE — 96361 HYDRATE IV INFUSION ADD-ON: CPT

## 2023-12-03 RX ORDER — ALLOPURINOL 300 MG
1 TABLET ORAL
Qty: 30 | Refills: 0
Start: 2023-12-03 | End: 2024-01-01

## 2023-12-03 RX ORDER — DOXAZOSIN MESYLATE 4 MG
1 TABLET ORAL
Refills: 0 | DISCHARGE

## 2023-12-03 RX ORDER — HYDRALAZINE HCL 50 MG
1 TABLET ORAL
Qty: 90 | Refills: 0
Start: 2023-12-03 | End: 2024-01-01

## 2023-12-03 RX ORDER — ASPIRIN/CALCIUM CARB/MAGNESIUM 324 MG
1 TABLET ORAL
Qty: 30 | Refills: 0
Start: 2023-12-03 | End: 2024-01-01

## 2023-12-03 RX ADMIN — Medication 25 MILLIGRAM(S): at 05:15

## 2023-12-03 RX ADMIN — Medication 100 MILLIGRAM(S): at 12:16

## 2023-12-03 RX ADMIN — Medication 81 MILLIGRAM(S): at 12:16

## 2023-12-03 RX ADMIN — Medication 650 MILLIGRAM(S): at 05:15

## 2023-12-03 NOTE — DISCHARGE NOTE NURSING/CASE MANAGEMENT/SOCIAL WORK - PATIENT PORTAL LINK FT
You can access the FollowMyHealth Patient Portal offered by [facility] by registering at the following website: [email or web address]. By joining [employer]’s FollowMyHealth portal, you will also be able to view your health information using other applications (apps) compatible with our system. You can access the FollowMyHealth Patient Portal offered by [facility] by registering at the following website: [email or web address]. By joining [employer]’s FollowMyHealth portal, you will also be able to view your health information using other applications (apps) compatible with our system. You can access the FollowMyHealth Patient Portal offered by [facility] by registering at the following website: [email or web address]. By joining [facility]’s FollowMyHealth portal, you will also be able to view your health information using other applications (apps) compatible with our system.

## 2023-12-03 NOTE — DISCHARGE NOTE NURSING/CASE MANAGEMENT/SOCIAL WORK - NSDCPEFALRISK_GEN_ALL_CORE
For information on Fall & Injury Prevention, visit: [email or web address]-[email or web address]-mobility OR  [email or web address]://[email or web address].[email or web address]/[email or web address]-[email or web address]-tips-[email or web address]-avoid-injury OR  https://www.cdc.gov/steadi/patient.html For information on Fall & Injury Prevention, visit: https://www.[facility]/news/fall-prevention-protects-[email or web address]-maintains-health-and-mobility OR  https://www.[facility]/news/fall-prevention-tips-to-avoid-injury OR  https://www.cdc.gov/steadi/patient.html For information on Fall & Injury Prevention, visit: https://www.[facility]/news/fall-prevention-protects-and-maintains-health-and-mobility OR  https://www.[facility]/news/fall-prevention-tips-to-avoid-injury OR  https://www.cdc.gov/steadi/patient.html

## 2023-12-03 NOTE — CHART NOTE - NSCHARTNOTESELECT_GEN_ALL_CORE
Off Service Note
Off Service Note
Event Note
Off Service Note

## 2023-12-03 NOTE — CHART NOTE - NSCHARTNOTEFT_GEN_A_CORE
Off Service NP Hospitalist Service  Case Discussed with Dr. Michele    labs and imaging reviewed    88 yo with history of colon cancer, thrombocytopenia, NIDDM, CKD, HTN, and known mesenteric mass who was admitted 11/26 with less than 12 hours of rigors, N/V and abdominal pain. Admitted for neutropenia/bacteremia     *Neutropenic Fever - no longer neutropenic  *Pancytopenia - WBCs normalized  *Polymicrobial Bacteremia, suspect GI focus  - CT of A/P without perforation or clear obstruction - positive known right upper quadrant mesenteric mass   - Cdiff negative, blood cultures grew citrobacter and bacteroides species, sensitivities reviewed  - Completed seven day course of Ertapenem as per ID  - Suspect bacteremic event with gut focus, at risk for recurrence  - Surgery input appreciated, non surgical    *Myelodysplasia  *Hx of Colon and Breast Cancer  - Hem/ onc following- appreciate recs  - Continue to monitor CBC    *Septic MAGDI/CKD 4  - Cr improving  - Avoid nephrotoxins, monitor renal indices  - Renal following     *HTN  - norvasc 10 and doxazosin 1 both d/c by renal     *DVT PPx  - SCDs.    Possible discharge home today pending medical clearance Off Service NP Hospitalist Service  Case Discussed with Dr. Michele    labs and imaging reviewed    89 yo with history of colon cancer, thrombocytopenia, NIDDM, CKD, HTN, and known mesenteric mass who was admitted 11/26 with less than 12 hours of rigors, N/V and abdominal pain. Admitted for neutropenia/bacteremia     *Neutropenic Fever - no longer neutropenic  *Pancytopenia - WBCs normalized  *Polymicrobial Bacteremia, suspect GI focus  - CT of A/P without perforation or clear obstruction - positive known right upper quadrant mesenteric mass   - Cdiff negative, blood cultures grew citrobacter and bacteroides species, sensitivities reviewed  - Completed seven day course of Ertapenem as per ID  - Suspect bacteremic event with gut focus, at risk for recurrence  - Surgery input appreciated, non surgical    *Myelodysplasia  *Hx of Colon and Breast Cancer  - Hem/ onc following- appreciate recs  - Continue to monitor CBC    *Septic MAGDI/CKD 4  - Cr improving  - Avoid nephrotoxins, monitor renal indices  - Renal following     *HTN  - norvasc 10 and doxazosin 1 both d/c by renal     *DVT PPx  - SCDs.    Possible discharge home today pending medical clearance

## 2023-12-08 ENCOUNTER — RESULT REVIEW (OUTPATIENT)
Age: 88
End: 2023-12-08

## 2023-12-08 ENCOUNTER — APPOINTMENT (OUTPATIENT)
Dept: HEMATOLOGY ONCOLOGY | Facility: CLINIC | Age: 88
End: 2023-12-08

## 2023-12-08 ENCOUNTER — APPOINTMENT (OUTPATIENT)
Dept: INFUSION THERAPY | Facility: HOSPITAL | Age: 88
End: 2023-12-08

## 2023-12-08 ENCOUNTER — APPOINTMENT (OUTPATIENT)
Dept: HEMATOLOGY ONCOLOGY | Facility: CLINIC | Age: 88
End: 2023-12-08
Payer: MEDICARE

## 2023-12-08 VITALS
SYSTOLIC BLOOD PRESSURE: 175 MMHG | DIASTOLIC BLOOD PRESSURE: 71 MMHG | RESPIRATION RATE: 14 BRPM | WEIGHT: 145.95 LBS | BODY MASS INDEX: 25.05 KG/M2 | TEMPERATURE: 96.3 F | HEART RATE: 71 BPM | OXYGEN SATURATION: 99 %

## 2023-12-08 DIAGNOSIS — R53.1 WEAKNESS: ICD-10-CM

## 2023-12-08 DIAGNOSIS — Z95.828 PRESENCE OF OTHER VASCULAR IMPLANTS AND GRAFTS: ICD-10-CM

## 2023-12-08 LAB
BASOPHILS # BLD AUTO: 0.03 K/UL — SIGNIFICANT CHANGE UP (ref 0–0.2)
BASOPHILS # BLD AUTO: 0.03 K/UL — SIGNIFICANT CHANGE UP (ref 0–0.2)
BASOPHILS NFR BLD AUTO: 1 % — SIGNIFICANT CHANGE UP (ref 0–2)
BASOPHILS NFR BLD AUTO: 1 % — SIGNIFICANT CHANGE UP (ref 0–2)
EOSINOPHIL # BLD AUTO: 0.06 K/UL — SIGNIFICANT CHANGE UP (ref 0–0.5)
EOSINOPHIL # BLD AUTO: 0.06 K/UL — SIGNIFICANT CHANGE UP (ref 0–0.5)
EOSINOPHIL NFR BLD AUTO: 1.9 % — SIGNIFICANT CHANGE UP (ref 0–6)
EOSINOPHIL NFR BLD AUTO: 1.9 % — SIGNIFICANT CHANGE UP (ref 0–6)
HCT VFR BLD CALC: 33.3 % — LOW (ref 34.5–45)
HCT VFR BLD CALC: 33.3 % — LOW (ref 34.5–45)
HGB BLD-MCNC: 10.5 G/DL — LOW (ref 11.5–15.5)
HGB BLD-MCNC: 10.5 G/DL — LOW (ref 11.5–15.5)
IMM GRANULOCYTES NFR BLD AUTO: 0.6 % — SIGNIFICANT CHANGE UP (ref 0–0.9)
IMM GRANULOCYTES NFR BLD AUTO: 0.6 % — SIGNIFICANT CHANGE UP (ref 0–0.9)
LYMPHOCYTES # BLD AUTO: 0.58 K/UL — LOW (ref 1–3.3)
LYMPHOCYTES # BLD AUTO: 0.58 K/UL — LOW (ref 1–3.3)
LYMPHOCYTES # BLD AUTO: 18.5 % — SIGNIFICANT CHANGE UP (ref 13–44)
LYMPHOCYTES # BLD AUTO: 18.5 % — SIGNIFICANT CHANGE UP (ref 13–44)
MCHC RBC-ENTMCNC: 29.7 PG — SIGNIFICANT CHANGE UP (ref 27–34)
MCHC RBC-ENTMCNC: 29.7 PG — SIGNIFICANT CHANGE UP (ref 27–34)
MCHC RBC-ENTMCNC: 31.5 G/DL — LOW (ref 32–36)
MCHC RBC-ENTMCNC: 31.5 G/DL — LOW (ref 32–36)
MCV RBC AUTO: 94.3 FL — SIGNIFICANT CHANGE UP (ref 80–100)
MCV RBC AUTO: 94.3 FL — SIGNIFICANT CHANGE UP (ref 80–100)
MONOCYTES # BLD AUTO: 0.19 K/UL — SIGNIFICANT CHANGE UP (ref 0–0.9)
MONOCYTES # BLD AUTO: 0.19 K/UL — SIGNIFICANT CHANGE UP (ref 0–0.9)
MONOCYTES NFR BLD AUTO: 6.1 % — SIGNIFICANT CHANGE UP (ref 2–14)
MONOCYTES NFR BLD AUTO: 6.1 % — SIGNIFICANT CHANGE UP (ref 2–14)
NEUTROPHILS # BLD AUTO: 2.25 K/UL — SIGNIFICANT CHANGE UP (ref 1.8–7.4)
NEUTROPHILS # BLD AUTO: 2.25 K/UL — SIGNIFICANT CHANGE UP (ref 1.8–7.4)
NEUTROPHILS NFR BLD AUTO: 71.9 % — SIGNIFICANT CHANGE UP (ref 43–77)
NEUTROPHILS NFR BLD AUTO: 71.9 % — SIGNIFICANT CHANGE UP (ref 43–77)
NRBC # BLD: 0 /100 WBCS — SIGNIFICANT CHANGE UP (ref 0–0)
NRBC # BLD: 0 /100 WBCS — SIGNIFICANT CHANGE UP (ref 0–0)
PLATELET # BLD AUTO: 167 K/UL — SIGNIFICANT CHANGE UP (ref 150–400)
PLATELET # BLD AUTO: 167 K/UL — SIGNIFICANT CHANGE UP (ref 150–400)
RBC # BLD: 3.53 M/UL — LOW (ref 3.8–5.2)
RBC # BLD: 3.53 M/UL — LOW (ref 3.8–5.2)
RBC # FLD: 14.6 % — HIGH (ref 10.3–14.5)
RBC # FLD: 14.6 % — HIGH (ref 10.3–14.5)
WBC # BLD: 3.13 K/UL — LOW (ref 3.8–10.5)
WBC # BLD: 3.13 K/UL — LOW (ref 3.8–10.5)
WBC # FLD AUTO: 3.13 K/UL — LOW (ref 3.8–10.5)
WBC # FLD AUTO: 3.13 K/UL — LOW (ref 3.8–10.5)

## 2023-12-08 PROCEDURE — 99214 OFFICE O/P EST MOD 30 MIN: CPT

## 2023-12-20 ENCOUNTER — LABORATORY RESULT (OUTPATIENT)
Age: 88
End: 2023-12-20

## 2023-12-21 LAB
ALBUMIN SERPL ELPH-MCNC: 3.9 G/DL
ANION GAP SERPL CALC-SCNC: 13 MMOL/L
BASOPHILS # BLD AUTO: 0.04 K/UL
BASOPHILS NFR BLD AUTO: 1.6 %
BUN SERPL-MCNC: 29 MG/DL
CALCIUM SERPL-MCNC: 8.9 MG/DL
CHLORIDE SERPL-SCNC: 115 MMOL/L
CO2 SERPL-SCNC: 14 MMOL/L
CREAT SERPL-MCNC: 3.37 MG/DL
EGFR: 13 ML/MIN/1.73M2
EOSINOPHIL # BLD AUTO: 0.07 K/UL
EOSINOPHIL NFR BLD AUTO: 2.7 %
GLUCOSE SERPL-MCNC: 111 MG/DL
HCT VFR BLD CALC: 37.5 %
HGB BLD-MCNC: 11.3 G/DL
IMM GRANULOCYTES NFR BLD AUTO: 0 %
LYMPHOCYTES # BLD AUTO: 0.78 K/UL
LYMPHOCYTES NFR BLD AUTO: 30.2 %
MAN DIFF?: NORMAL
MCHC RBC-ENTMCNC: 29.5 PG
MCHC RBC-ENTMCNC: 30.1 GM/DL
MCV RBC AUTO: 97.9 FL
MONOCYTES # BLD AUTO: 0.27 K/UL
MONOCYTES NFR BLD AUTO: 10.5 %
NEUTROPHILS # BLD AUTO: 1.42 K/UL
NEUTROPHILS NFR BLD AUTO: 55 %
PHOSPHATE SERPL-MCNC: 4.5 MG/DL
PLATELET # BLD AUTO: 89 K/UL
POTASSIUM SERPL-SCNC: 4.5 MMOL/L
RBC # BLD: 3.83 M/UL
RBC # FLD: 16.6 %
SODIUM SERPL-SCNC: 142 MMOL/L
WBC # FLD AUTO: 2.58 K/UL

## 2024-01-04 ENCOUNTER — NON-APPOINTMENT (OUTPATIENT)
Age: 89
End: 2024-01-04

## 2024-01-04 LAB
BASOPHILS # BLD AUTO: 0.04 K/UL
BASOPHILS # BLD AUTO: 0.04 K/UL
BASOPHILS NFR BLD AUTO: 1.1 %
BASOPHILS NFR BLD AUTO: 1.1 %
EOSINOPHIL # BLD AUTO: 0.03 K/UL
EOSINOPHIL # BLD AUTO: 0.04 K/UL
EOSINOPHIL NFR BLD AUTO: 0.8 %
EOSINOPHIL NFR BLD AUTO: 1.1 %
FERRITIN SERPL-MCNC: 363 NG/ML
HCT VFR BLD CALC: 32.8 %
HCT VFR BLD CALC: 33.3 %
HGB BLD-MCNC: 10 G/DL
HGB BLD-MCNC: 10 G/DL
IMM GRANULOCYTES NFR BLD AUTO: 0.3 %
IMM GRANULOCYTES NFR BLD AUTO: 0.3 %
IRON SATN MFR SERPL: 33 %
IRON SERPL-MCNC: 83 UG/DL
LYMPHOCYTES # BLD AUTO: 0.76 K/UL
LYMPHOCYTES # BLD AUTO: 0.79 K/UL
LYMPHOCYTES NFR BLD AUTO: 21.1 %
LYMPHOCYTES NFR BLD AUTO: 22 %
MAN DIFF?: NORMAL
MAN DIFF?: NORMAL
MCHC RBC-ENTMCNC: 27.9 PG
MCHC RBC-ENTMCNC: 28.6 PG
MCHC RBC-ENTMCNC: 30 GM/DL
MCHC RBC-ENTMCNC: 30.5 GM/DL
MCV RBC AUTO: 93 FL
MCV RBC AUTO: 93.7 FL
MONOCYTES # BLD AUTO: 0.2 K/UL
MONOCYTES # BLD AUTO: 0.21 K/UL
MONOCYTES NFR BLD AUTO: 5.6 %
MONOCYTES NFR BLD AUTO: 5.8 %
NEUTROPHILS # BLD AUTO: 2.52 K/UL
NEUTROPHILS # BLD AUTO: 2.55 K/UL
NEUTROPHILS NFR BLD AUTO: 70.2 %
NEUTROPHILS NFR BLD AUTO: 70.6 %
PLATELET # BLD AUTO: 125 K/UL
PLATELET # BLD AUTO: 130 K/UL
RBC # BLD: 3.5 M/UL
RBC # BLD: 3.58 M/UL
RBC # FLD: 14.9 %
RBC # FLD: 14.9 %
TIBC SERPL-MCNC: 251 UG/DL
UIBC SERPL-MCNC: 169 UG/DL
WBC # FLD AUTO: 3.59 K/UL
WBC # FLD AUTO: 3.61 K/UL

## 2024-01-05 ENCOUNTER — APPOINTMENT (OUTPATIENT)
Dept: NEPHROLOGY | Facility: CLINIC | Age: 89
End: 2024-01-05
Payer: MEDICARE

## 2024-01-05 LAB
ALBUMIN SERPL ELPH-MCNC: 3.7 G/DL
ANION GAP SERPL CALC-SCNC: 15 MMOL/L
BUN SERPL-MCNC: 50 MG/DL
CALCIUM SERPL-MCNC: 8.3 MG/DL
CHLORIDE SERPL-SCNC: 111 MMOL/L
CO2 SERPL-SCNC: 16 MMOL/L
CREAT SERPL-MCNC: 3.63 MG/DL
EGFR: 11 ML/MIN/1.73M2
GLUCOSE SERPL-MCNC: 98 MG/DL
PHOSPHATE SERPL-MCNC: 4.4 MG/DL
POTASSIUM SERPL-SCNC: 4.4 MMOL/L
SODIUM SERPL-SCNC: 142 MMOL/L

## 2024-01-05 PROCEDURE — 99441: CPT | Mod: 93

## 2024-01-13 ENCOUNTER — EMERGENCY (EMERGENCY)
Facility: HOSPITAL | Age: 89
LOS: 1 days | Discharge: HOME | End: 2024-01-13
Attending: INTERNAL MEDICINE | Admitting: INTERNAL MEDICINE
Payer: MEDICARE

## 2024-01-13 VITALS
DIASTOLIC BLOOD PRESSURE: 76 MMHG | TEMPERATURE: 98 F | SYSTOLIC BLOOD PRESSURE: 188 MMHG | HEIGHT: 64 IN | RESPIRATION RATE: 18 BRPM | WEIGHT: 143.96 LBS | OXYGEN SATURATION: 96 % | HEART RATE: 94 BPM

## 2024-01-13 DIAGNOSIS — Z90.49 ACQUIRED ABSENCE OF OTHER SPECIFIED PARTS OF DIGESTIVE TRACT: Chronic | ICD-10-CM

## 2024-01-13 DIAGNOSIS — D35.1 BENIGN NEOPLASM OF PARATHYROID GLAND: Chronic | ICD-10-CM

## 2024-01-13 DIAGNOSIS — Z98.890 OTHER SPECIFIED POSTPROCEDURAL STATES: Chronic | ICD-10-CM

## 2024-01-13 PROCEDURE — 99284 EMERGENCY DEPT VISIT MOD MDM: CPT

## 2024-01-13 NOTE — ED ADULT TRIAGE NOTE - BSA (M2)
"CHIEF COMPLAINT  Pt states his neck pain has been manageable since last office visit. Pt is \"pleased\".    Subjective   Dave Flynn is a 48 y.o. male  who presents to the office for follow-up.He has a history of chronic neck pain. Reports his pain has been relatively unchanged since last office visit.  At last office visit, his pain medication was changed from Hydrocodone 10/325 TID to Hydrocodone 7.5/325 3-4/day.    Complains of pain in his neck. Today his pain is 2/10VAS. Describes the pain as continuous aching and throbbing. Continues with Hydrocodone 7.5/325 3-4/day(avg-3-3.5/day), Gabapentin 600 mg BID, and tizanidine 2 mg at night. Is having some dizziness but is unsure of cause. He notes is it not the pain medication. The dizziness only occurs upon awakening. The regimen helps decrease his pain by 50%. ADL's by self. \"It brings it down to a comfortable level.\"     HAs complaints of occasional shaking of both legs and arms. He notices it specifically when he stretches in the morning upon awakening, as well as with yawning intermittently. Has had a few infrequent instances of this while walking and while showering.  Denies any bowel or bladder incontinence. Denies any dizziness currently. Previously had dizziness with gabapentin.   Neck Pain    This is a chronic problem. The current episode started more than 1 year ago. The problem has been gradually worsening (improving since surgery). The quality of the pain is described as aching, shooting and stabbing. The pain is at a severity of 2/10. The pain is moderate. The symptoms are aggravated by position, stress and twisting. Associated symptoms include numbness (L hand only) and weakness (hands bilat.occasionally). Pertinent negatives include no chest pain, fever, headaches (back of the head) or photophobia. He has tried oral narcotics (gabapentin; surgery) for the symptoms.      PEG Assessment   What number best describes your pain on average in the past " "week?2  What number best describes how, during the past week, pain has interfered with your enjoyment of life?2  What number best describes how, during the past week, pain has interfered with your general activity?  2    The following portions of the patient's history were reviewed and updated as appropriate: allergies, current medications, past family history, past medical history, past social history, past surgical history and problem list.    Review of Systems   Constitutional: Negative for activity change (restricted), appetite change and fever.   HENT: Negative for ear pain, hearing loss and mouth sores.    Eyes: Negative for photophobia and pain.   Respiratory: Negative for apnea, cough, choking and shortness of breath.    Cardiovascular: Negative for chest pain.   Gastrointestinal: Negative for abdominal pain, constipation, diarrhea and nausea.   Genitourinary: Negative for difficulty urinating, dysuria, flank pain and hematuria.   Musculoskeletal: Positive for neck pain and neck stiffness. Negative for back pain.   Neurological: Positive for weakness (hands bilat.occasionally) and numbness (L hand only). Negative for dizziness (pt states every morning), seizures, light-headedness and headaches (back of the head).   Psychiatric/Behavioral: Positive for sleep disturbance. Negative for agitation, confusion, hallucinations, self-injury and suicidal ideas.       Vitals:    02/14/19 1456   BP: 133/85   Pulse: 100   Resp: 16   Temp: 97.9 °F (36.6 °C)   SpO2: 95%   Weight: 83 kg (183 lb)   Height: 175.3 cm (69.02\")   PainSc:   2   PainLoc: Neck  Comment: neck pain ranges from 2-5/10     Objective   Physical Exam   Constitutional: He is oriented to person, place, and time. Vital signs are normal. He appears well-developed and well-nourished. He is cooperative.   HENT:   Head: Normocephalic and atraumatic.   Nose: Nose normal.   Eyes: Conjunctivae and lids are normal.   Neck: Muscular tenderness present. No spinous " process tenderness present. Decreased range of motion present.   Posterior surgical scar   Cardiovascular: Normal rate.   Pulmonary/Chest: Effort normal.   Abdominal: Normal appearance.   Neurological: He is alert and oriented to person, place, and time. Gait abnormal.   Reflex Scores:       Bicep reflexes are 3+ on the right side and 3+ on the left side.       Brachioradialis reflexes are 3+ on the right side and 3+ on the left side.       Patellar reflexes are 2+ on the right side and 2+ on the left side.       Achilles reflexes are 2+ on the right side and 2+ on the left side.  Skin: Skin is warm, dry and intact.   Psychiatric: He has a normal mood and affect. His speech is normal and behavior is normal. Judgment and thought content normal. Cognition and memory are normal.   Nursing note and vitals reviewed.      Assessment/Plan   Dave was seen today for neck pain.    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  -     Cancel: tiZANidine (ZANAFLEX) 2 MG tablet; Take 2 tablets by mouth 2 (Two) Times a Day As Needed for Muscle Spasms.  -     tiZANidine (ZANAFLEX) 2 MG tablet; Take 1 tablet by mouth At Night As Needed for Muscle Spasms.      --- The urine drug screen confirmation from 12-18-18 has been reviewed and the result is APPROPRIATE based on patient history and SINAN report  --- Refill Hydrocodone 7.5/325 but decrease to q8hrs PRN #90.  Patient appears stable with current regimen. No adverse effects. Regarding continuation of opioids, there is no evidence of aberrant behavior or any red flags.  The patient continues with appropriate response to opioid  1.7 therapy. ADL's remain intact by self.   --- Encouraged patient to follow-up with Dr. Martinez. He is having transient myelopathic symptoms. HE had this before surgery and it's not worse, but he is starting to notice is more frequently lately.  --- could consider cervical interventions but will await evaluation with DR. Martinez. He re-affirms twice that he feels as if he is getting better.  --- Follow-up 1 month or sooner if needed.     SINAN REPORT    As part of the patient's treatment plan, I am prescribing controlled substances. The patient has been made aware of appropriate use of such medications, including potential risk of somnolence, limited ability to drive and/or work safely, and the potential for dependence or overdose. It has also bee made clear that these medications are for use by this patient only, without concomitant use of alcohol or other substances unless prescribed.     Patient has completed prescribing agreement detailing terms of continued prescribing of controlled substances, including monitoring SINAN reports, urine drug screening, and pill counts if necessary. The patient is aware that inappropriate use will results in cessation of prescribing such medications.    SINAN report has been reviewed and scanned into the patient's chart.    As the clinician, I personally reviewed the SINAN from 2-12-19 while the patient was in the office today.    History and physical exam exhibit continued safe and appropriate use of controlled substances.      EMR Dragon/Transcription disclaimer:   Much of this encounter note is an electronic transcription/translation of spoken language to printed text. The electronic translation of spoken language may permit erroneous, or at times, nonsensical words or phrases to be inadvertently transcribed; Although I have reviewed the note for such errors, some may still exist.

## 2024-01-14 VITALS
OXYGEN SATURATION: 96 % | SYSTOLIC BLOOD PRESSURE: 155 MMHG | HEART RATE: 85 BPM | DIASTOLIC BLOOD PRESSURE: 72 MMHG | RESPIRATION RATE: 18 BRPM

## 2024-01-14 PROCEDURE — 99283 EMERGENCY DEPT VISIT LOW MDM: CPT

## 2024-01-14 PROCEDURE — 30901 CONTROL OF NOSEBLEED: CPT | Mod: LT

## 2024-01-14 RX ORDER — HYDRALAZINE HCL 50 MG
25 TABLET ORAL ONCE
Refills: 0 | Status: COMPLETED | OUTPATIENT
Start: 2024-01-14 | End: 2024-01-14

## 2024-01-14 RX ORDER — CEPHALEXIN 500 MG
1 CAPSULE ORAL
Qty: 14 | Refills: 0
Start: 2024-01-14 | End: 2024-01-20

## 2024-01-14 RX ORDER — CEPHALEXIN 500 MG
500 CAPSULE ORAL ONCE
Refills: 0 | Status: COMPLETED | OUTPATIENT
Start: 2024-01-14 | End: 2024-01-14

## 2024-01-14 RX ORDER — ACETAMINOPHEN 500 MG
650 TABLET ORAL ONCE
Refills: 0 | Status: COMPLETED | OUTPATIENT
Start: 2024-01-14 | End: 2024-01-14

## 2024-01-14 RX ADMIN — Medication 500 MILLIGRAM(S): at 00:16

## 2024-01-14 RX ADMIN — Medication 25 MILLIGRAM(S): at 02:34

## 2024-01-14 RX ADMIN — Medication 25 MILLIGRAM(S): at 01:34

## 2024-01-14 RX ADMIN — Medication 650 MILLIGRAM(S): at 00:16

## 2024-01-14 RX ADMIN — Medication 650 MILLIGRAM(S): at 00:46

## 2024-01-14 NOTE — ED PROVIDER NOTE - PATIENT PORTAL LINK FT
You can access the FollowMyHealth Patient Portal offered by [facility] by registering at the following website: [email or web address]. By joining [employer]’s FollowMyHealth portal, you will also be able to view your health information using other applications (apps) compatible with our system. You can access the FollowMyHealth Patient Portal offered by [facility] by registering at the following website: [email or web address]. By joining [employer]’s FollowMyHealth portal, you will also be able to view your health information using other applications (apps) compatible with our system.

## 2024-01-14 NOTE — ED PROVIDER NOTE - CARE PROVIDER_API CALL
Gauri Seals Ianc  Otolaryngology  [address], [address]  [place] [address]  Phone: [phone]  Fax: [phone]  Follow Up Time:    Gauri Seals Ianc  Otolaryngology  [address]  [place] [address]  Phone: [phone]  Fax: [phone]  Follow Up Time:

## 2024-01-14 NOTE — ED PROVIDER NOTE - NSFOLLOWUPINSTRUCTIONS_ED_ALL_ED_FT
Follow up with your PMD within 1-2 days.  Rest, increase your fluids, advance your activity as tolerated.   Take all of your other medications as previously prescribed.   Worsening, continued or ANY new concerning symptoms return to the emergency department.  Keflex 500 mg 2 times a day and Tylenol as needed for pain

## 2024-01-14 NOTE — ED ADULT NURSE NOTE - NSFALLHARMRISKINTERV_ED_ALL_ED
Communicate risk of Fall with Harm to all staff, patient, and family/Provide visual cue: red socks, yellow wristband, yellow gown, etc/Reinforce activity limits and safety measures with patient and family/Bed in lowest position, wheels locked, appropriate side rails in place/Call bell, personal items and telephone in reach/Instruct patient to call for assistance before getting out of bed/chair/stretcher/Non-slip footwear applied when patient is off stretcher/[place] to call system/Physically safe environment - no spills, clutter or unnecessary equipment/Purposeful Proactive Rounding/Room/bathroom lighting operational, light cord in reach Communicate risk of Fall with Harm to all staff, patient, and family/Provide visual cue: red socks, yellow wristband, yellow gown, etc/Reinforce activity limits and safety measures with patient and family/Bed in lowest position, wheels locked, appropriate side rails in place/Call bell, personal items and telephone in reach/Instruct patient to call for assistance before getting out of bed/chair/stretcher/Non-slip footwear applied when patient is off stretcher/[place] to call system/Physically safe environment - no spills, clutter or unnecessary equipment/Purposeful Proactive Rounding/Room/bathroom lighting operational, light cord in reach

## 2024-01-14 NOTE — ED PROVIDER NOTE - PHYSICAL EXAMINATION
General:     NAD, well-nourished, well-appearing  Head:     NC/AT, EOMI, oral mucosa moist  HEENT–active left nostril bleeding  Neck:     trachea midline  Lungs:     CTA b/l, no w/r/r  CVS:     S1S2, RRR, no m/g/r  Abd:     +BS, s/nt/nd, no organomegaly  Ext:    2+ radial and pedal pulses, no c/c/e  Neuro: AAOx3, no sensory/motor deficits

## 2024-01-14 NOTE — ED PROVIDER NOTE - CLINICAL SUMMARY MEDICAL DECISION MAKING FREE TEXT BOX
89-year-old female came to the emergency room chief complaint of bleeding from the left nostril started today patient's blood pressure was also elevated has a history of hypertension and chronic renal failure  Left nostril was packed with a Rhino Rocket soaked in tranexamic acid and later on also packed with Surgicel to stop the bleeding 50 mg of hydralazine was given to bring the pressure down  4 AM–the bleeding has stopped plan to discharge the patient patient was also given Keflex and Tylenol for the pain

## 2024-01-14 NOTE — ED ADULT NURSE NOTE - OBJECTIVE STATEMENT
pt aaox3, came in to ED complaining of epistaxis that started today at 10pm. Denies dizziness, headache, chest pain or sob.

## 2024-01-14 NOTE — ED PROVIDER NOTE - OBJECTIVE STATEMENT
89-year-old female came to the emergency room chief complaint of bleeding from the left nostril started today patient's blood pressure was also elevated has a history of hypertension and chronic renal failure

## 2024-01-18 ENCOUNTER — OUTPATIENT (OUTPATIENT)
Dept: OUTPATIENT SERVICES | Facility: HOSPITAL | Age: 89
LOS: 1 days | Discharge: HOME | End: 2024-01-18

## 2024-01-18 DIAGNOSIS — Z90.49 ACQUIRED ABSENCE OF OTHER SPECIFIED PARTS OF DIGESTIVE TRACT: Chronic | ICD-10-CM

## 2024-01-18 DIAGNOSIS — D35.1 BENIGN NEOPLASM OF PARATHYROID GLAND: Chronic | ICD-10-CM

## 2024-01-18 DIAGNOSIS — C18.9 MALIGNANT NEOPLASM OF COLON, UNSPECIFIED: ICD-10-CM

## 2024-01-18 DIAGNOSIS — D63.1 ANEMIA IN CHRONIC KIDNEY DISEASE: ICD-10-CM

## 2024-01-18 DIAGNOSIS — Z98.890 OTHER SPECIFIED POSTPROCEDURAL STATES: Chronic | ICD-10-CM

## 2024-01-18 DIAGNOSIS — N18.5 CHRONIC KIDNEY DISEASE, STAGE 5: ICD-10-CM

## 2024-01-20 NOTE — REVIEW OF SYSTEMS
[Loss of Hearing] : loss of hearing [Abdominal Pain] : no abdominal pain [Fainting] : no fainting [Negative] : Allergic/Immunologic

## 2024-01-20 NOTE — PHYSICAL EXAM
[Fully active, able to carry on all pre-disease performance without restriction] : Status 0 - Fully active, able to carry on all pre-disease performance without restriction [Normal] : affect appropriate [de-identified] : skin thickening inner right breast superiorly-no discrete mass either breast [de-identified] : soft, NT-fullness palpable RUQ [de-identified] : alert and oriented x 3

## 2024-01-20 NOTE — HISTORY OF PRESENT ILLNESS
[Disease: _____________________] : Disease: [unfilled] [T: ___] : T[unfilled] [N: ___] : N[unfilled] [M: ___] : M[unfilled] [AJCC Stage: ____] : AJCC Stage: [unfilled] [de-identified] : Patient with history of colon cancer (adenocarcinoma)-5-2011. Stage IIIC-T4 N2a. Status post right colectomy followed by FOLFOX chemotherapy.  With persistently elevated CEA on f/u testing,  3/2013 mesentery mass excision pathology showed a lymph node negative for metastatic carcinoma. Benign fibroadipose tissue. Persistently increasing elevated CEA.  CT scan abdomen/pelvis 6/2016, showed enlarging mesenteric adenopathy. Endoscopic ultrasound guided FNA of the bryan-pancreatic, mesenteric mass was positive for malignant cells-adenocarcinoma associated with abundant mucin-compatible with known history of adenocarcinoma, colorectal type, mucinous variant..  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  8/2016-Patient begun on Avastin/irinotecan (only 1 dose Avastin given). 11/2016-CT chest/abdomen/pelvis showed stable posttreatment findings and stable mesenteric adenopathy.  Patient obtained 2 surgical opinions and disease was felt to be inoperable by both surgeons.  Patient begun on Xeloda/Avastin. 5/2017-CT scan chest/abdomen/pelvis showed stable tiny nodules in the lungs. Sizable mass merging with the head of the pancreas had Increased in size from 11/2016 with some increasing infiltration of the surrounding fat.  Possibility of increasing colonic wall thickness on the colon site of the ileocolic anastomosis. Patient begun on Pembrolizumab (tumor MSI-high). 8/2017- CT scan chest/abdomen/pelvis showed marginally worsening mesenteric adenopathy, with short interval followup CT scan abdomen/pelvis 9/2017, stable compared to 8/2017. 1/2018-CT scan chest/abdomen/pelvis-stable adenopathy. 6/2018-CT scan chest/abdomen/pelvis-stable adenopathy. 11/2018-CT chest/abdomen/pelvis-unchanged mass at the root of the mesentery. 4/2019-CT scan chest/abdomen/pelvis-stable mesenteric soft tissue mass. 8/2019-CT scan chest/abdomen/pelvis-unchanged mesenteric mass. 10/2019-CT scan A/P-stable mesenteric mass. Held Pembrolizumab. 2/2020-CT scan chest/abdomen/pelvis-stable exam with mesenteric mass without significant change since 10/2019. Resumed Pembrolizumab, which was then held 6/2020 due to progressive renal insufficiency. 7/2020-CT scan chest/abdomen/pelvis-no significant change.  Stable mesenteric mass unchanged since 10/2019.  Segment of colonic wall thickening at the level of the anastomosis unchanged.  Enlarged multinodular thyroid with bilateral substernal extension unchanged. 10/2020-CT scan C/A/P-stable upper abdominal mesenteric mass. No new suspicious pathology.  1/2021-CT scan chest/abdomen/pelvis-mesenteric mass inseparable from the pancreatic head encasing the proximal mesenteric vessels minimally enlarged since 10/2020.  Mild mural thickening and ileal colic anastomosis. New course Pembrolizumab begun. 5/2021-CT scan chest/abdomen/pelvis-stable mesenteric mass contiguous with pancreatic head.  Decrease in thickening at the ileocolic anastomosis.  No new abnormalities. 10/2021- CT C/A/P-stable exam. 3/28/2022-CT C/A/P-nonspecific small volume pelvic ascites, stable  since 10/19/2021. An 8.3 x 5.6 cm lobulated mesenteric mass is indeterminate, but unchanged. Stable appearance of the chest since 10/19/2021.  8/19/2022-CT C/A/P-mesenteric mass without significant change.  12/13/2022-PET/CT scan-Morphologically stable RIGHT mesenteric mass demonstrates mild uptake, with portions exhibiting photopenia. Mucinous lesions are often minimally or non-FDG-avid; these findings are consistent with that history. 2.  Uptake overlying the RIGHT hemicolectomy anastomosis, where residual/recurrent disease is not excluded. 3.  Multiple hepatic hypodensities, some of which may have enlarged. Some of these demonstrate mildly decreased uptake compared to the remaining liver. If this will change patient management, MRI of the liver may be helpful to exclude mucinous metastases. 4.  Heterogeneous heterogeneous thyroid with mild uptake and substernal extension. 5.  Small RIGHT lung nodule versus motion artifact. Additional previously seen RIGHT-sided micronodule is unchanged.  1/11/2023-MRI Abdomen-No obvious mucinous liver metastases, however difficult to exclude given lack of intravenous contrast and motion artifact as well as multiplicity of background hepatic cysts. Grossly stable right mesenteric mass 1/18/2023-CT chest-Stable two very small nodules in the left upper and left lower lobes when compared to previous exam.  6/27/2023-CT scan chest/abdomen/pelvis-mild increase in size of large right-sided mesenteric mass, which correlated with prior examinations, likely represents a mucinous metastasis.  Otherwise stable examination.  Numerous cystic liver lesions are not significantly changed and are not well characterized on this unenhanced CT scan.  9/11/2023-CT scan chest/abdomen/pelvis-essentially necrotic right abdominal mass involving the pancreatic head and possibly the adjacent duodenum slightly decreased in size from 6/22/2023.   [de-identified] : adenocarcinoma [de-identified] : CEA 12/2013-33.6 [de-identified] : History of right breast cancer-2007. Status post right breast conservation surgery--> RT--> [facility].           11/2021 (h/o pancytopenia)-Bone marrow biopsy and bone marrow aspirate-cellular marrow (40-50%) with erythroid predominant maturing  trilineage hematopoiesis with no increase in blast population. Normal female cytogenetics. Normal MDS FISH panel. [facility] [facility] Myeloid panel-no Mutations Identified 8/2023-Bone marrow biopsy and bone marrow aspirate-normocellular marrow with trilineage hematopoiesis with maturation and increased iron stores.  No ring sideroblasts seen flow cytometry myeloid immunophenotypic findings showed no diagnostic abnormalities.  Hematogones noted.  Cytogenetics with normal female karyotype.  MDS FISH panel. [facility] NGS Myeloid Panel Final Report RESULT SUMMARY: ABNORMAL DETECTED GENOMIC ALTERATIONS:   Tier II: Variants of Potential Clinical Significance   TP53 [id number]    [de-identified] :   --Is generally feeling quite well. Saw nephrologist and was told of her progressive CKD. Remains independent, continues to drive. Remains active with her local senior center. No CP, cough, SOB, fevers. No c/o N/V/change in bowel habits. No current c/o abdominal/pelvic pain. No bleeding.  Son is [employer] . He is a source of support for patient, as is daughter.

## 2024-01-20 NOTE — ASSESSMENT
[FreeTextEntry1] : Lab work reviewed. #1) Colon cancer/h/o elevated CEA-clinically stable at present. Patient had wished to continue Keytruda which she felt she tolerated well. Had reviewed that the usual duration of immunotherapy would be for up to 2 years or until disease progression or intolerance to the medication. Patient had a break from treatment and resumed it with stability of her disease. However, immunotherapy then held due to renal issues-last dose 6/14/22.  12/13/2022-PET/CT scan-Morphologically stable RIGHT mesenteric mass demonstrates mild uptake, with portions exhibiting photopenia. Mucinous lesions are often minimally or non-FDG-avid; these findings are consistent with that history. 2. Uptake overlying the RIGHT hemicolectomy anastomosis, where residual/recurrent disease is not excluded. 3. Multiple hepatic hypodensities, some of which may have enlarged. Some of these demonstrate mildly decreased uptake compared to the remaining liver. If this will change patient management, MRI of the liver may be helpful to exclude mucinous metastases. 4. Heterogeneous heterogeneous thyroid with mild uptake and substernal extension. 5. Small RIGHT lung nodule versus motion artifact. Additional previously seen RIGHT-sided micronodule is unchanged.  1/11/2023-MRI Abdomen-No obvious mucinous liver metastases, however difficult to exclude given lack of intravenous contrast and motion artifact as well as multiplicity of background hepatic cysts. Grossly stable right mesenteric mass 1/18/2023-CT chest-Stable two very small nodules in the left upper and left lower lobes when compared to previous exam. -patient wished for continued treatment break. Colonoscopy 3/2023 unremarkable.  6/27/2023-CT scan chest/abdomen/pelvis-mild increase in size of large right-sided mesenteric mass, which correlated with prior examinations, likely represents a mucinous metastasis. Otherwise stable examination. Numerous cystic liver lesions are not significantly changed and are not well characterized on this unenhanced CT scan. Reviewed CT scan results with patient in detail. Suggestive of mild increase in large mesenteric mass. Cystic liver lesions overall stable. Discussed management options. Patient currently currently remains asymptomatic with regard to the mesenteric mass. 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 Recommended consider encorafenib/cetuximab regimen-potential side effects reviewed including but not limited to rash, hypomagnesemia, diarrhea, nausea/vomiting, stomatitis, LFT elevation, fatigue, neuropathy, fever, infusion reaction, hyperglycemia/hyponatremia, cytopenias. Patient refused regimen suggested, only wishing to resume immunotherapy if possible, in hopes it would at least slow POD, considering her QOL in decision making. Saw nephrologist who suggested a kidney biopsy, though patient is declining at this time. If no kidney biopsy to be done, plan to hold further immunotherapy. May consider regorafenib pending course as well, should patient wish to resume DMT.  9/11/2023-CT A/P-central necrotic right abdominal mass involving the pancreatic head and possibly the adjacent duodenum is slightly decreased in size from 6/22/2023. Unchanged small volume ascites. --again had discussed treatment options with respective pro's/con's. As patient's disease was slightly decreased on most recent imaging, and she is without related symptoms, patient has wished to continue with expectant surveillance for now. --clinically stable at present.  #2) history of anemia/leukopenia/thrombocytopenia. With progressive thrombocytopenia, patient had bone marrow biopsy 11/2021-cellular marrow with erythroid predominant maturing trilineage hematopoiesis. Normal cytogenetics. Genomic analysis with no mutations identified. ? May have immune component to cytopenia(s). Kidney disease  contributes to the anemia as well. Had explained that some BM disorders may evolve over time, such as MDS.  With refractory anemia, f/u BMB was done 8/23/2023: Bone marrow biopsy and bone marrow aspirate  - Normocellular marrow with trilineage hematopoiesis with maturation and increased iron stores.  -Karyotype: 46,XX[21]. FISH Result: NORMAL FISH - MDS PANEL. [facility] Advanced NGS Myeloid Panel Final Report-RESULT SUMMARY: ABNORMAL-DETECTED GENOMIC ALTERATIONS:  Tier II: Variants of Potential Clinical Significance  TP53 [id number] Follow clinically for the development of MDS and/or leukemia. Abnormal NGS noted. Supportive heme care for now in this elderly patient with co-morbidities. T/C PRBC for hemoglobin <7/related symptoms.  --procrit support fas needed (CKD contributing to anemia)  #3) mediport maintenance while it remains in-patient agreeable to this.  Patient was given the opportunity to ask questions. Her questions have been answered to her apparent satisfaction. She is agreeable to f/u.  -->POF/labs today and RTO 6 weeks. CBC 2 weeks.  **Note-8/14/2023-had a separate phone discussion with patient's son and granddaughter-had reviewed patient's diagnosis/prognosis and palliative treatment options with respective pros/cons. They expressed their understanding of patient's illness and support patient's wishes for quality of life. They understand that patient's cancer may not be as treatable if/when she agrees to treatment again in the future. Their questions answered, and they were appreciative of care.

## 2024-01-22 NOTE — ASU DISCHARGE PLAN (ADULT/PEDIATRIC) - ***IN THE EVENT THAT YOU DEVELOP A COMPLICATION AND YOU ARE UNABLE TO REACH YOUR OWN PHYSICIAN, YOU MAY CONTACT:
Statement Selected Take the following medications the morning of surgery:    BRING YOUR ALBUTEROL INHALER WITH YOU.  TAKE YOUR GABAPENTIN    TIME OF ARRIVAL WILL BE CALLED TO YOU ONE TO TWO DAYS BEFORE SURGERY      If you are on prescription narcotic pain medication to control your pain you may also take that medication the morning of surgery.    General Instructions:  Do not eat solid food after midnight the night before surgery.  You may drink clear liquids day of surgery but must stop at least one hour before your hospital arrival time.  It is beneficial for you to have a clear drink that contains carbohydrates the day of surgery.  We suggest a 12 to 20 ounce bottle of Gatorade or Powerade for non-diabetic patients or a 12 to 20 ounce bottle of G2 or Powerade Zero for diabetic patients. (Pediatric patients, are not advised to drink a 12 to 20 ounce carbohydrate drink)    Clear liquids are liquids you can see through.  Nothing red in color.     Plain water                               Sports drinks  Sodas                                   Gelatin (Jell-O)  Fruit juices without pulp such as white grape juice and apple juice  Popsicles that contain no fruit or yogurt  Tea or coffee (no cream or milk added)  Gatorade / Powerade  G2 / Powerade Zero    Infants may have breast milk up to four hours before surgery.  Infants drinking formula may drink formula up to six hours before surgery.   Patients who avoid smoking, chewing tobacco and alcohol for 4 weeks prior to surgery have a reduced risk of post-operative complications.  Quit smoking as many days before surgery as you can.  Do not smoke, use chewing tobacco or drink alcohol the day of surgery.   If applicable bring your C-PAP/ BI-PAP machine in with you to preop day of surgery.  Bring any papers given to you in the doctor’s office.  Wear clean comfortable clothes.  Do not wear contact lenses, false eyelashes or make-up.  Bring a case for your glasses.   Bring crutches or walker if  applicable.  Remove all piercings.  Leave jewelry and any other valuables at home.  Hair extensions with metal clips must be removed prior to surgery.  The Pre-Admission Testing nurse will instruct you to bring medications if unable to obtain an accurate list in Pre-Admission Testing.      .    Preventing a Surgical Site Infection:  For 2 to 3 days before surgery, avoid shaving with a razor because the razor can irritate skin and make it easier to develop an infection.    Any areas of open skin can increase the risk of a post-operative wound infection by allowing bacteria to enter and travel throughout the body.  Notify your surgeon if you have any skin wounds / rashes even if it is not near the expected surgical site.  The area will need assessed to determine if surgery should be delayed until it is healed.  The night prior to surgery shower using a fresh bar of anti-bacterial soap (such as Dial) and clean washcloth.  Sleep in a clean bed with clean clothing.  Do not allow pets to sleep with you.  Shower on the morning of surgery using a fresh bar of anti-bacterial soap (such as Dial) and clean washcloth.  Dry with a clean towel and dress in clean clothing.  Ask your surgeon if you will be receiving antibiotics prior to surgery.  Make sure you, your family, and all healthcare providers clean their hands with soap and water or an alcohol based hand  before caring for you or your wound.    Day of surgery:  Your arrival time is approximately two hours before your scheduled surgery time.  Upon arrival, a Pre-op nurse and Anesthesiologist will review your health history, obtain vital signs, and answer questions you may have.  The only belongings needed at this time will be a list of your home medications and if applicable your C-PAP/BI-PAP machine.  A Pre-op nurse will start an IV and you may receive medication in preparation for surgery, including something to help you relax.     Please be aware that surgery  does come with discomfort.  We want to make every effort to control your discomfort so please discuss any uncontrolled symptoms with your nurse.   Your doctor will most likely have prescribed pain medications.      If you are going home after surgery you will receive individualized written care instructions before being discharged.  A responsible adult must drive you to and from the hospital on the day of your surgery and stay with you for 24 hours.  Discharge prescriptions can be filled by the hospital pharmacy during regular pharmacy hours.  If you are having surgery late in the day/evening your prescription may be e-prescribed to your pharmacy.  Please verify your pharmacy hours or chose a 24 hour pharmacy to avoid not having access to your prescription because your pharmacy has closed for the day.    If you are staying overnight following surgery, you will be transported to your hospital room following the recovery period.  [facility] has all private rooms.    If you have any questions please call Pre-Admission Testing at [phone].  Deductibles and co-payments are collected on the day of service. Please be prepared to pay the required co-pay, deductible or deposit on the day of service as defined by your plan.    Call your surgeon immediately if you experience any of the following symptoms:  Sore Throat  Shortness of Breath or difficulty breathing  Cough  Chills  Body soreness or muscle pain  Headache  Fever  New loss of taste or smell  Do not arrive for your surgery ill.  Your procedure will need to be rescheduled to another time.  You will need to call your physician before the day of surgery to avoid any unnecessary exposure to hospital staff as well as other patients.  CHLORHEXIDINE CLOTH INSTRUCTIONS  The morning of surgery follow these instructions using the Chlorhexidine cloths you've been given.  These steps reduce bacteria on the body.  Do not use the cloths near your eyes, ears  mouth, genitalia or on open wounds.  Throw the cloths away after use but do not try to flush them down a toilet.      Open and remove one cloth at a time from the package.    Leave the cloth unfolded and begin the bathing.  Massage the skin with the cloths using gentle pressure to remove bacteria.  Do not scrub harshly.   Follow the steps below with one 2% CHG cloth per area (6 total cloths).  One cloth for neck, shoulders and chest.  One cloth for both arms, hands, fingers and underarms (do underarms last).  One cloth for the abdomen followed by groin.  One cloth for right leg and foot including between the toes.  One cloth for left leg and foot including between the toes.  The last cloth is to be used for the back of the neck, back and buttocks.    Allow the CHG to air dry 3 minutes on the skin which will give it time to work and decrease the chance of irritation.  The skin may feel sticky until it is dry.  Do not rinse with water or any other liquid or you will lose the beneficial effects of the CHG.  If mild skin irritation occurs, do rinse the skin to remove the CHG.  Report this to the nurse at time of admission.  Do not apply lotions, creams, ointments, deodorants or perfumes after using the clothes. Dress in clean clothes before coming to the hospital.

## 2024-01-24 NOTE — CHART NOTE - NSCHARTNOTEFT_GEN_A_CORE
89 y o female presenting to the ED on 1/13/24 complaining of epistaxis.  As per [facility], the patient has a scheduled Hematology Oncology appointment on 1/25/24 @ 10am.

## 2024-01-25 ENCOUNTER — RESULT REVIEW (OUTPATIENT)
Age: 89
End: 2024-01-25

## 2024-01-25 ENCOUNTER — APPOINTMENT (OUTPATIENT)
Dept: INFUSION THERAPY | Facility: HOSPITAL | Age: 89
End: 2024-01-25

## 2024-01-25 ENCOUNTER — APPOINTMENT (OUTPATIENT)
Dept: HEMATOLOGY ONCOLOGY | Facility: CLINIC | Age: 89
End: 2024-01-25
Payer: MEDICARE

## 2024-01-25 ENCOUNTER — APPOINTMENT (OUTPATIENT)
Dept: HEMATOLOGY ONCOLOGY | Facility: CLINIC | Age: 89
End: 2024-01-25

## 2024-01-25 VITALS
BODY MASS INDEX: 23 KG/M2 | DIASTOLIC BLOOD PRESSURE: 67 MMHG | TEMPERATURE: 97.9 F | OXYGEN SATURATION: 98 % | WEIGHT: 134 LBS | RESPIRATION RATE: 16 BRPM | HEART RATE: 76 BPM | SYSTOLIC BLOOD PRESSURE: 156 MMHG

## 2024-01-25 LAB
ALBUMIN SERPL ELPH-MCNC: 4.1 G/DL — SIGNIFICANT CHANGE UP (ref 3.3–5)
ALP SERPL-CCNC: 75 U/L — SIGNIFICANT CHANGE UP (ref 40–120)
ALT FLD-CCNC: 19 U/L — SIGNIFICANT CHANGE UP (ref 10–45)
ANION GAP SERPL CALC-SCNC: 16 MMOL/L — SIGNIFICANT CHANGE UP (ref 5–17)
ANISOCYTOSIS BLD QL: SLIGHT — SIGNIFICANT CHANGE UP
AST SERPL-CCNC: 24 U/L — SIGNIFICANT CHANGE UP (ref 10–40)
BASOPHILS # BLD AUTO: 0.02 K/UL — SIGNIFICANT CHANGE UP (ref 0–0.2)
BASOPHILS NFR BLD AUTO: 0.5 % — SIGNIFICANT CHANGE UP (ref 0–2)
BILIRUB SERPL-MCNC: 0.2 MG/DL — SIGNIFICANT CHANGE UP (ref 0.2–1.2)
BUN SERPL-MCNC: 57 MG/DL — HIGH (ref 7–23)
BURR CELLS BLD QL SMEAR: PRESENT — SIGNIFICANT CHANGE UP
CALCIUM SERPL-MCNC: 9 MG/DL — SIGNIFICANT CHANGE UP (ref 8.4–10.5)
CEA SERPL-MCNC: 653 NG/ML — HIGH (ref 0–3.8)
CHLORIDE SERPL-SCNC: 105 MMOL/L — SIGNIFICANT CHANGE UP (ref 96–108)
CO2 SERPL-SCNC: 14 MMOL/L — LOW (ref 22–31)
CREAT SERPL-MCNC: 3.48 MG/DL — HIGH (ref 0.5–1.3)
DACRYOCYTES BLD QL SMEAR: SLIGHT — SIGNIFICANT CHANGE UP
EGFR: 12 ML/MIN/1.73M2 — LOW
ELLIPTOCYTES BLD QL SMEAR: SLIGHT — SIGNIFICANT CHANGE UP
EOSINOPHIL # BLD AUTO: 0.02 K/UL — SIGNIFICANT CHANGE UP (ref 0–0.5)
EOSINOPHIL NFR BLD AUTO: 0.5 % — SIGNIFICANT CHANGE UP (ref 0–6)
GLUCOSE SERPL-MCNC: 118 MG/DL — HIGH (ref 70–99)
HCT VFR BLD CALC: 24.6 % — LOW (ref 34.5–45)
HGB BLD-MCNC: 8.1 G/DL — LOW (ref 11.5–15.5)
IMM GRANULOCYTES NFR BLD AUTO: 0.2 % — SIGNIFICANT CHANGE UP (ref 0–0.9)
LYMPHOCYTES # BLD AUTO: 0.78 K/UL — LOW (ref 1–3.3)
LYMPHOCYTES # BLD AUTO: 17.7 % — SIGNIFICANT CHANGE UP (ref 13–44)
MCHC RBC-ENTMCNC: 28.8 PG — SIGNIFICANT CHANGE UP (ref 27–34)
MCHC RBC-ENTMCNC: 32.9 G/DL — SIGNIFICANT CHANGE UP (ref 32–36)
MCV RBC AUTO: 87.5 FL — SIGNIFICANT CHANGE UP (ref 80–100)
MONOCYTES # BLD AUTO: 0.25 K/UL — SIGNIFICANT CHANGE UP (ref 0–0.9)
MONOCYTES NFR BLD AUTO: 5.7 % — SIGNIFICANT CHANGE UP (ref 2–14)
NEUTROPHILS # BLD AUTO: 3.33 K/UL — SIGNIFICANT CHANGE UP (ref 1.8–7.4)
NEUTROPHILS NFR BLD AUTO: 75.4 % — SIGNIFICANT CHANGE UP (ref 43–77)
NRBC # BLD: 0 /100 WBCS — SIGNIFICANT CHANGE UP (ref 0–0)
PLAT MORPH BLD: NORMAL — SIGNIFICANT CHANGE UP
PLATELET # BLD AUTO: 108 K/UL — LOW (ref 150–400)
POIKILOCYTOSIS BLD QL AUTO: SLIGHT — SIGNIFICANT CHANGE UP
POTASSIUM SERPL-MCNC: 3.8 MMOL/L — SIGNIFICANT CHANGE UP (ref 3.5–5.3)
POTASSIUM SERPL-SCNC: 3.8 MMOL/L — SIGNIFICANT CHANGE UP (ref 3.5–5.3)
PROT SERPL-MCNC: 6.5 G/DL — SIGNIFICANT CHANGE UP (ref 6–8.3)
RBC # BLD: 2.81 M/UL — LOW (ref 3.8–5.2)
RBC # FLD: 15.2 % — HIGH (ref 10.3–14.5)
RBC BLD AUTO: ABNORMAL
SCHISTOCYTES BLD QL AUTO: SLIGHT — SIGNIFICANT CHANGE UP
SODIUM SERPL-SCNC: 135 MMOL/L — SIGNIFICANT CHANGE UP (ref 135–145)
WBC # BLD: 4.41 K/UL — SIGNIFICANT CHANGE UP (ref 3.8–10.5)
WBC # FLD AUTO: 4.41 K/UL — SIGNIFICANT CHANGE UP (ref 3.8–10.5)

## 2024-01-25 PROCEDURE — 99214 OFFICE O/P EST MOD 30 MIN: CPT

## 2024-01-25 RX ORDER — HYDRALAZINE HYDROCHLORIDE 100 MG/1
100 TABLET ORAL 3 TIMES DAILY
Refills: 0 | Status: ACTIVE | COMMUNITY

## 2024-01-25 RX ORDER — ALLOPURINOL 200 MG/1
TABLET ORAL
Refills: 0 | Status: ACTIVE | COMMUNITY

## 2024-01-29 LAB
ALBUMIN SERPL ELPH-MCNC: 4.2 G/DL
ANION GAP SERPL CALC-SCNC: 15 MMOL/L
BUN SERPL-MCNC: 62 MG/DL
CALCIUM SERPL-MCNC: 9.1 MG/DL
CHLORIDE SERPL-SCNC: 105 MMOL/L
CO2 SERPL-SCNC: 14 MMOL/L
CREAT SERPL-MCNC: 3.62 MG/DL
EGFR: 12 ML/MIN/1.73M2
GLUCOSE SERPL-MCNC: 116 MG/DL
PHOSPHATE SERPL-MCNC: 4.7 MG/DL
POTASSIUM SERPL-SCNC: 4.8 MMOL/L
SODIUM SERPL-SCNC: 134 MMOL/L

## 2024-01-30 ENCOUNTER — RESULT REVIEW (OUTPATIENT)
Age: 89
End: 2024-01-30

## 2024-01-30 ENCOUNTER — APPOINTMENT (OUTPATIENT)
Dept: INFUSION THERAPY | Facility: HOSPITAL | Age: 89
End: 2024-01-30

## 2024-01-30 LAB
BASOPHILS # BLD AUTO: 0.02 K/UL — SIGNIFICANT CHANGE UP (ref 0–0.2)
BASOPHILS NFR BLD AUTO: 0.5 % — SIGNIFICANT CHANGE UP (ref 0–2)
EOSINOPHIL # BLD AUTO: 0.02 K/UL — SIGNIFICANT CHANGE UP (ref 0–0.5)
EOSINOPHIL NFR BLD AUTO: 0.5 % — SIGNIFICANT CHANGE UP (ref 0–6)
HCT VFR BLD CALC: 24.5 % — LOW (ref 34.5–45)
HGB BLD-MCNC: 8.3 G/DL — LOW (ref 11.5–15.5)
IMM GRANULOCYTES NFR BLD AUTO: 3.5 % — HIGH (ref 0–0.9)
LYMPHOCYTES # BLD AUTO: 0.7 K/UL — LOW (ref 1–3.3)
LYMPHOCYTES # BLD AUTO: 16.1 % — SIGNIFICANT CHANGE UP (ref 13–44)
MCHC RBC-ENTMCNC: 29 PG — SIGNIFICANT CHANGE UP (ref 27–34)
MCHC RBC-ENTMCNC: 33.9 G/DL — SIGNIFICANT CHANGE UP (ref 32–36)
MCV RBC AUTO: 85.7 FL — SIGNIFICANT CHANGE UP (ref 80–100)
MONOCYTES # BLD AUTO: 0.33 K/UL — SIGNIFICANT CHANGE UP (ref 0–0.9)
MONOCYTES NFR BLD AUTO: 7.6 % — SIGNIFICANT CHANGE UP (ref 2–14)
NEUTROPHILS # BLD AUTO: 3.12 K/UL — SIGNIFICANT CHANGE UP (ref 1.8–7.4)
NEUTROPHILS NFR BLD AUTO: 71.8 % — SIGNIFICANT CHANGE UP (ref 43–77)
NRBC # BLD: 0 /100 WBCS — SIGNIFICANT CHANGE UP (ref 0–0)
PLATELET # BLD AUTO: 88 K/UL — LOW (ref 150–400)
RBC # BLD: 2.86 M/UL — LOW (ref 3.8–5.2)
RBC # FLD: 15.5 % — HIGH (ref 10.3–14.5)
WBC # BLD: 4.34 K/UL — SIGNIFICANT CHANGE UP (ref 3.8–10.5)
WBC # FLD AUTO: 4.34 K/UL — SIGNIFICANT CHANGE UP (ref 3.8–10.5)

## 2024-01-31 ENCOUNTER — NON-APPOINTMENT (OUTPATIENT)
Age: 89
End: 2024-01-31

## 2024-01-31 ENCOUNTER — APPOINTMENT (OUTPATIENT)
Dept: NEPHROLOGY | Facility: CLINIC | Age: 89
End: 2024-01-31
Payer: MEDICARE

## 2024-01-31 DIAGNOSIS — N18.5 CHRONIC KIDNEY DISEASE, STAGE 5: ICD-10-CM

## 2024-01-31 PROCEDURE — 99441: CPT | Mod: 93

## 2024-02-06 ENCOUNTER — RESULT REVIEW (OUTPATIENT)
Age: 89
End: 2024-02-06

## 2024-02-06 ENCOUNTER — APPOINTMENT (OUTPATIENT)
Dept: INFUSION THERAPY | Facility: HOSPITAL | Age: 89
End: 2024-02-06

## 2024-02-06 LAB
ANISOCYTOSIS BLD QL: SLIGHT — SIGNIFICANT CHANGE UP
BASOPHILS # BLD AUTO: 0.02 K/UL — SIGNIFICANT CHANGE UP (ref 0–0.2)
BASOPHILS NFR BLD AUTO: 0.7 % — SIGNIFICANT CHANGE UP (ref 0–2)
BURR CELLS BLD QL SMEAR: PRESENT — SIGNIFICANT CHANGE UP
DACRYOCYTES BLD QL SMEAR: SLIGHT — SIGNIFICANT CHANGE UP
ELLIPTOCYTES BLD QL SMEAR: SLIGHT — SIGNIFICANT CHANGE UP
EOSINOPHIL # BLD AUTO: 0.01 K/UL — SIGNIFICANT CHANGE UP (ref 0–0.5)
EOSINOPHIL NFR BLD AUTO: 0.3 % — SIGNIFICANT CHANGE UP (ref 0–6)
HCT VFR BLD CALC: 23.8 % — LOW (ref 34.5–45)
HGB BLD-MCNC: 8.2 G/DL — LOW (ref 11.5–15.5)
IMM GRANULOCYTES NFR BLD AUTO: 0.7 % — SIGNIFICANT CHANGE UP (ref 0–0.9)
LYMPHOCYTES # BLD AUTO: 0.3 K/UL — LOW (ref 1–3.3)
LYMPHOCYTES # BLD AUTO: 10.2 % — LOW (ref 13–44)
MCHC RBC-ENTMCNC: 29.1 PG — SIGNIFICANT CHANGE UP (ref 27–34)
MCHC RBC-ENTMCNC: 34.5 G/DL — SIGNIFICANT CHANGE UP (ref 32–36)
MCV RBC AUTO: 84.4 FL — SIGNIFICANT CHANGE UP (ref 80–100)
MONOCYTES # BLD AUTO: 0.24 K/UL — SIGNIFICANT CHANGE UP (ref 0–0.9)
MONOCYTES NFR BLD AUTO: 8.2 % — SIGNIFICANT CHANGE UP (ref 2–14)
NEUTROPHILS # BLD AUTO: 2.35 K/UL — SIGNIFICANT CHANGE UP (ref 1.8–7.4)
NEUTROPHILS NFR BLD AUTO: 79.9 % — HIGH (ref 43–77)
NRBC # BLD: 0 /100 WBCS — SIGNIFICANT CHANGE UP (ref 0–0)
PLAT MORPH BLD: NORMAL — SIGNIFICANT CHANGE UP
PLATELET # BLD AUTO: 113 K/UL — LOW (ref 150–400)
POIKILOCYTOSIS BLD QL AUTO: SLIGHT — SIGNIFICANT CHANGE UP
RBC # BLD: 2.82 M/UL — LOW (ref 3.8–5.2)
RBC # FLD: 17 % — HIGH (ref 10.3–14.5)
RBC BLD AUTO: ABNORMAL
SCHISTOCYTES BLD QL AUTO: SLIGHT — SIGNIFICANT CHANGE UP
WBC # BLD: 2.94 K/UL — LOW (ref 3.8–10.5)
WBC # FLD AUTO: 2.94 K/UL — LOW (ref 3.8–10.5)

## 2024-02-09 ENCOUNTER — INPATIENT (INPATIENT)
Facility: HOSPITAL | Age: 89
LOS: 4 days | Discharge: HOME | DRG: 641 | End: 2024-02-14
Attending: INTERNAL MEDICINE | Admitting: STUDENT IN AN ORGANIZED HEALTH CARE EDUCATION/TRAINING PROGRAM
Payer: MEDICARE

## 2024-02-09 VITALS
WEIGHT: 136.03 LBS | SYSTOLIC BLOOD PRESSURE: 149 MMHG | HEIGHT: 64 IN | DIASTOLIC BLOOD PRESSURE: 63 MMHG | TEMPERATURE: 97 F | OXYGEN SATURATION: 98 % | RESPIRATION RATE: 18 BRPM | HEART RATE: 71 BPM

## 2024-02-09 DIAGNOSIS — N18.9 CHRONIC KIDNEY DISEASE, UNSPECIFIED: ICD-10-CM

## 2024-02-09 DIAGNOSIS — E87.1 HYPO-OSMOLALITY AND HYPONATREMIA: ICD-10-CM

## 2024-02-09 DIAGNOSIS — Z29.9 ENCOUNTER FOR PROPHYLACTIC MEASURES, UNSPECIFIED: ICD-10-CM

## 2024-02-09 DIAGNOSIS — R79.89 OTHER SPECIFIED ABNORMAL FINDINGS OF BLOOD CHEMISTRY: ICD-10-CM

## 2024-02-09 DIAGNOSIS — Z90.49 ACQUIRED ABSENCE OF OTHER SPECIFIED PARTS OF DIGESTIVE TRACT: Chronic | ICD-10-CM

## 2024-02-09 DIAGNOSIS — M10.9 GOUT, UNSPECIFIED: ICD-10-CM

## 2024-02-09 DIAGNOSIS — Z71.89 OTHER SPECIFIED COUNSELING: ICD-10-CM

## 2024-02-09 DIAGNOSIS — Z98.890 OTHER SPECIFIED POSTPROCEDURAL STATES: Chronic | ICD-10-CM

## 2024-02-09 DIAGNOSIS — D35.1 BENIGN NEOPLASM OF PARATHYROID GLAND: Chronic | ICD-10-CM

## 2024-02-09 DIAGNOSIS — I10 ESSENTIAL (PRIMARY) HYPERTENSION: ICD-10-CM

## 2024-02-09 DIAGNOSIS — N18.5 CHRONIC KIDNEY DISEASE, STAGE 5: ICD-10-CM

## 2024-02-09 LAB
ALBUMIN SERPL ELPH-MCNC: 2.8 G/DL — LOW (ref 3.3–5)
ALP SERPL-CCNC: 74 U/L — SIGNIFICANT CHANGE UP (ref 40–120)
ALT FLD-CCNC: 16 U/L — SIGNIFICANT CHANGE UP (ref 10–45)
ANION GAP SERPL CALC-SCNC: 10 MMOL/L — SIGNIFICANT CHANGE UP (ref 5–17)
ANION GAP SERPL CALC-SCNC: 14 MMOL/L — SIGNIFICANT CHANGE UP (ref 5–17)
ANION GAP SERPL CALC-SCNC: 16 MMOL/L — SIGNIFICANT CHANGE UP (ref 5–17)
ANION GAP SERPL CALC-SCNC: 16 MMOL/L — SIGNIFICANT CHANGE UP (ref 5–17)
AST SERPL-CCNC: 13 U/L — SIGNIFICANT CHANGE UP (ref 10–40)
BASOPHILS # BLD AUTO: 0 K/UL — SIGNIFICANT CHANGE UP (ref 0–0.2)
BASOPHILS NFR BLD AUTO: 0 % — SIGNIFICANT CHANGE UP (ref 0–2)
BILIRUB SERPL-MCNC: 0.4 MG/DL — SIGNIFICANT CHANGE UP (ref 0.2–1.2)
BUN SERPL-MCNC: 59 MG/DL — HIGH (ref 7–23)
BUN SERPL-MCNC: 61 MG/DL — HIGH (ref 7–23)
CALCIUM SERPL-MCNC: 7.7 MG/DL — LOW (ref 8.4–10.5)
CALCIUM SERPL-MCNC: 8.2 MG/DL — LOW (ref 8.4–10.5)
CALCIUM SERPL-MCNC: 8.2 MG/DL — LOW (ref 8.4–10.5)
CALCIUM SERPL-MCNC: 8.3 MG/DL — LOW (ref 8.4–10.5)
CHLORIDE SERPL-SCNC: 90 MMOL/L — LOW (ref 96–108)
CHLORIDE SERPL-SCNC: 93 MMOL/L — LOW (ref 96–108)
CHLORIDE SERPL-SCNC: 93 MMOL/L — LOW (ref 96–108)
CHLORIDE SERPL-SCNC: 96 MMOL/L — SIGNIFICANT CHANGE UP (ref 96–108)
CO2 SERPL-SCNC: 13 MMOL/L — LOW (ref 22–31)
CO2 SERPL-SCNC: 14 MMOL/L — LOW (ref 22–31)
CO2 SERPL-SCNC: 15 MMOL/L — LOW (ref 22–31)
CO2 SERPL-SCNC: 20 MMOL/L — LOW (ref 22–31)
CREAT SERPL-MCNC: 3.27 MG/DL — HIGH (ref 0.5–1.3)
CREAT SERPL-MCNC: 3.4 MG/DL — HIGH (ref 0.5–1.3)
CREAT SERPL-MCNC: 3.54 MG/DL — HIGH (ref 0.5–1.3)
CREAT SERPL-MCNC: 3.56 MG/DL — HIGH (ref 0.5–1.3)
EGFR: 12 ML/MIN/1.73M2 — LOW
EGFR: 13 ML/MIN/1.73M2 — LOW
EOSINOPHIL # BLD AUTO: 0.01 K/UL — SIGNIFICANT CHANGE UP (ref 0–0.5)
EOSINOPHIL NFR BLD AUTO: 0.4 % — SIGNIFICANT CHANGE UP (ref 0–6)
GLUCOSE SERPL-MCNC: 124 MG/DL — HIGH (ref 70–99)
GLUCOSE SERPL-MCNC: 128 MG/DL — HIGH (ref 70–99)
GLUCOSE SERPL-MCNC: 129 MG/DL — HIGH (ref 70–99)
GLUCOSE SERPL-MCNC: 142 MG/DL — HIGH (ref 70–99)
HCT VFR BLD CALC: 25.1 % — LOW (ref 34.5–45)
HGB BLD-MCNC: 8.6 G/DL — LOW (ref 11.5–15.5)
IMM GRANULOCYTES NFR BLD AUTO: 0.4 % — SIGNIFICANT CHANGE UP (ref 0–0.9)
LYMPHOCYTES # BLD AUTO: 0.31 K/UL — LOW (ref 1–3.3)
LYMPHOCYTES # BLD AUTO: 11.2 % — LOW (ref 13–44)
MCHC RBC-ENTMCNC: 29.4 PG — SIGNIFICANT CHANGE UP (ref 27–34)
MCHC RBC-ENTMCNC: 34.3 GM/DL — SIGNIFICANT CHANGE UP (ref 32–36)
MCV RBC AUTO: 85.7 FL — SIGNIFICANT CHANGE UP (ref 80–100)
MONOCYTES # BLD AUTO: 0.25 K/UL — SIGNIFICANT CHANGE UP (ref 0–0.9)
MONOCYTES NFR BLD AUTO: 9 % — SIGNIFICANT CHANGE UP (ref 2–14)
NEUTROPHILS # BLD AUTO: 2.2 K/UL — SIGNIFICANT CHANGE UP (ref 1.8–7.4)
NEUTROPHILS NFR BLD AUTO: 79 % — HIGH (ref 43–77)
NRBC # BLD: 0 /100 WBCS — SIGNIFICANT CHANGE UP (ref 0–0)
PLATELET # BLD AUTO: 153 K/UL — SIGNIFICANT CHANGE UP (ref 150–400)
POTASSIUM SERPL-MCNC: 3.7 MMOL/L — SIGNIFICANT CHANGE UP (ref 3.5–5.3)
POTASSIUM SERPL-MCNC: 3.8 MMOL/L — SIGNIFICANT CHANGE UP (ref 3.5–5.3)
POTASSIUM SERPL-SCNC: 3.7 MMOL/L — SIGNIFICANT CHANGE UP (ref 3.5–5.3)
POTASSIUM SERPL-SCNC: 3.8 MMOL/L — SIGNIFICANT CHANGE UP (ref 3.5–5.3)
PROT SERPL-MCNC: 6.2 G/DL — SIGNIFICANT CHANGE UP (ref 6–8.3)
RBC # BLD: 2.93 M/UL — LOW (ref 3.8–5.2)
RBC # FLD: 18.2 % — HIGH (ref 10.3–14.5)
SODIUM SERPL-SCNC: 120 MMOL/L — CRITICAL LOW (ref 135–145)
SODIUM SERPL-SCNC: 122 MMOL/L — LOW (ref 135–145)
SODIUM SERPL-SCNC: 123 MMOL/L — LOW (ref 135–145)
SODIUM SERPL-SCNC: 125 MMOL/L — LOW (ref 135–145)
WBC # BLD: 2.96 K/UL — LOW (ref 3.8–10.5)
WBC # FLD AUTO: 2.96 K/UL — LOW (ref 3.8–10.5)

## 2024-02-09 PROCEDURE — 99291 CRITICAL CARE FIRST HOUR: CPT

## 2024-02-09 PROCEDURE — 99223 1ST HOSP IP/OBS HIGH 75: CPT | Mod: GC

## 2024-02-09 RX ORDER — HYDRALAZINE HCL 50 MG
50 TABLET ORAL EVERY 8 HOURS
Refills: 0 | Status: DISCONTINUED | OUTPATIENT
Start: 2024-02-09 | End: 2024-02-10

## 2024-02-09 RX ORDER — HYDRALAZINE HCL 50 MG
25 TABLET ORAL EVERY 8 HOURS
Refills: 0 | Status: DISCONTINUED | OUTPATIENT
Start: 2024-02-09 | End: 2024-02-09

## 2024-02-09 RX ORDER — SODIUM CHLORIDE 9 MG/ML
1000 INJECTION, SOLUTION INTRAVENOUS
Refills: 0 | Status: DISCONTINUED | OUTPATIENT
Start: 2024-02-09 | End: 2024-02-12

## 2024-02-09 RX ORDER — ASPIRIN/CALCIUM CARB/MAGNESIUM 324 MG
81 TABLET ORAL DAILY
Refills: 0 | Status: DISCONTINUED | OUTPATIENT
Start: 2024-02-09 | End: 2024-02-14

## 2024-02-09 RX ORDER — SODIUM CHLORIDE 9 MG/ML
1000 INJECTION INTRAMUSCULAR; INTRAVENOUS; SUBCUTANEOUS
Refills: 0 | Status: DISCONTINUED | OUTPATIENT
Start: 2024-02-09 | End: 2024-02-09

## 2024-02-09 RX ORDER — HEPARIN SODIUM 5000 [USP'U]/ML
5000 INJECTION INTRAVENOUS; SUBCUTANEOUS EVERY 12 HOURS
Refills: 0 | Status: DISCONTINUED | OUTPATIENT
Start: 2024-02-09 | End: 2024-02-14

## 2024-02-09 RX ORDER — SODIUM BICARBONATE 1 MEQ/ML
650 SYRINGE (ML) INTRAVENOUS EVERY 8 HOURS
Refills: 0 | Status: DISCONTINUED | OUTPATIENT
Start: 2024-02-09 | End: 2024-02-14

## 2024-02-09 RX ADMIN — Medication 650 MILLIGRAM(S): at 15:10

## 2024-02-09 RX ADMIN — HEPARIN SODIUM 5000 UNIT(S): 5000 INJECTION INTRAVENOUS; SUBCUTANEOUS at 18:10

## 2024-02-09 RX ADMIN — SODIUM CHLORIDE 125 MILLILITER(S): 9 INJECTION INTRAMUSCULAR; INTRAVENOUS; SUBCUTANEOUS at 04:13

## 2024-02-09 RX ADMIN — SODIUM CHLORIDE 50 MILLILITER(S): 9 INJECTION, SOLUTION INTRAVENOUS at 16:13

## 2024-02-09 RX ADMIN — Medication 50 MILLIGRAM(S): at 21:09

## 2024-02-09 RX ADMIN — Medication 650 MILLIGRAM(S): at 06:16

## 2024-02-09 RX ADMIN — Medication 50 MILLIGRAM(S): at 15:09

## 2024-02-09 RX ADMIN — Medication 81 MILLIGRAM(S): at 15:10

## 2024-02-09 RX ADMIN — Medication 650 MILLIGRAM(S): at 21:09

## 2024-02-09 RX ADMIN — Medication 25 MILLIGRAM(S): at 06:17

## 2024-02-09 RX ADMIN — HEPARIN SODIUM 5000 UNIT(S): 5000 INJECTION INTRAVENOUS; SUBCUTANEOUS at 06:15

## 2024-02-09 NOTE — ED ADULT TRIAGE NOTE - CHIEF COMPLAINT QUOTE
pt axo3/[place], BIBA c/o nausea, vomiting, and weakness x2 weeks. per pt, she received a call from Dr. Michele x1 hr ago to go to the ED ASAP for hyponatremia found on recent lab work. pt does not know exact value.

## 2024-02-09 NOTE — ED PROVIDER NOTE - CRITICAL CARE ATTENDING CONTRIBUTION TO CARE
Upon my evaluation, this patient had a high probability of imminent or life-threatening deterioration due to hyponatremia, which required my direct attention, intervention, and personal management.  The patient has a  medical condition that impairs one or more vital organ systems.  Frequent personal assessment and adjustment of medical interventions was performed.      I have personally provided 35 minutes of critical care time exclusive of time spent on separately billable procedures. Time includes review of laboratory data, radiology results, discussion with consultants, patient and family; monitoring for potential decompensation, as well as time spent retrieving data and reviewing the chart and documenting the visit. Interventions were performed as documented above.

## 2024-02-09 NOTE — H&P ADULT - NSHPREVIEWOFSYSTEMS_GEN_ALL_CORE
Constitutional: No fevers, chills, or sweats, but pt has nausea, vomiting, fatigue   Cardiac: No chest pain, exertional dyspnea, orthopnea  Respiratory: No shortness of breath, no cough  GI: No abdominal pain, but has nausea and vomiting  Neuro: No headaches, no neck pain/stiffness, no numbness  All other systems reviewed and are negative unless otherwise stated in the HPI.

## 2024-02-09 NOTE — H&P ADULT - ASSESSMENT
89 y/o F PMH of CKD stage IV presents the emergency department today with a reported low sodium on outpatient labs. 89 y/o F PMH of CKD 4, colon cancer (adenocarcinoma)-5-2011 Stage IIIC-T4 N2a s/p right colectomy followed by chemo, breast cancer, anemia, HTN, thrombocytopenia presents to the emergency department today with a reported low sodium on outpatient labs. Admitted for the following:

## 2024-02-09 NOTE — ED PROVIDER NOTE - OBJECTIVE STATEMENT
89-year-old female past medical history of CKD stage IV presents the emergency department today with a reported low sodium on outpatient labs.  Per the patient and daughter she has been not acting herself for the last couple days and more slow and weak than usual.  Denies any fevers no nausea vomiting diarrhea.  There is some decreased appetite.  No reported heavy drinking.  Patient had a sodium of 117 drawn today.  She was notified at 1 AM by her primary care doctor and she subsequently called an ambulance and her to the hospital.  Patient that she has a history of hypernatremia not hyponatremia.  States that her supplements have recently changed per her primary doctor's request

## 2024-02-09 NOTE — CONSULT NOTE ADULT - SUBJECTIVE AND OBJECTIVE BOX
NEPHROLOGY CONSULTATION    CHIEF COMPLAINT: N/V    HPI:  Pt is 89 yo F w/PMH of CKD 4, colon cancer (adenocarcinoma) Stage IIIC-T4 N2a s/p R colectomy followed by chemo, Keytruda, breast cancer, anemia, HTN, thrombocytopenia presents to the emergency department today with a reported low sodium on outpatient labs. Per chart pt has not been acting herself for the last couple days and more slow and weak than usual together with nausea and vomiting. No fevers or chills. There is some decreased appetite. Noted to have Na 122, Cr 3.27. Pt stopped taking Na Bicarb as op.    ROS:  as above    Allergies:  No Known Allergies    PAST MEDICAL & SURGICAL HISTORY:  History of breast cancer  right breast  Anemia  Hypertension  History of thrombocytopenia  History of herpes zoster  History of neoplasm of parathyroid gland  Colon cancer  Chronic renal insufficiency  Thyroid nodule  Diabetes mellitus  NIDDM  cholecystectomy  History of partial colectomy  and appendectomy at the same time  History of lumpectomy of right breast  2007 and radiation and chemotherapy  Parathyroid adenoma  excision    SOCIAL HISTORY:  negative    FAMILY HISTORY:  colon cancer (Sibling, Sibling)  breast cancer (Sibling)    MEDICATIONS  (STANDING):  aspirin enteric coated 81 milliGRAM(s) Oral daily  heparin   Injectable 5000 Unit(s) SubCutaneous every 12 hours  hydrALAZINE 50 milliGRAM(s) Oral every 8 hours  sodium bicarbonate 650 milliGRAM(s) Oral every 8 hours    Home Medications:  sodium bicarbonate 650 mg oral tablet: 3 tab(s) orally every 8 hours (27 Nov 2023 12:06)    Vital Signs Last 24 Hrs  T(C): 36.4 (02-09-24 @ 12:57), Max: 36.4 (02-09-24 @ 10:37)  T(F): 97.6 (02-09-24 @ 12:57), Max: 97.6 (02-09-24 @ 12:57)  HR: 47 (02-09-24 @ 12:57) (47 - 71)  BP: 179/53 (02-09-24 @ 10:37) (149/63 - 179/53)  RR: 17 (02-09-24 @ 12:57) (16 - 18)  SpO2: 97% (02-09-24 @ 12:57) (97% - 98%)    LABS:                        8.6    2.96  )-----------( 153      ( 09 Feb 2024 02:40 )             25.1     02-09    122<L>  |  93<L>  |  59<H>  ----------------------------<  124<H>  3.7   |  13<L>  |  3.27<H>    Ca    8.2<L>      09 Feb 2024 08:49    TPro  6.2  /  Alb  2.8<L>  /  TBili  0.4  /  DBili  x   /  AST  13  /  ALT  16  /  AlkPhos  74  02-09    LIVER FUNCTIONS - ( 09 Feb 2024 02:40 )  Alb: 2.8 g/dL / Pro: 6.2 g/dL / ALK PHOS: 74 U/L / ALT: 16 U/L / AST: 13 U/L / GGT: x           A/P:    full consult to follow    [phone] NEPHROLOGY CONSULTATION    CHIEF COMPLAINT: N/V    HPI:  Pt is 88 yo F w/PMH of CKD 4, colon cancer (adenocarcinoma) Stage IIIC-T4 N2a s/p R colectomy followed by chemo, Keytruda, breast cancer, anemia, HTN, thrombocytopenia presents to the emergency department today with a reported low sodium on outpatient labs. Per chart pt has not been acting herself for the last couple days and more slow and weak than usual together with nausea and vomiting x 1. No fevers or chills. There is some decreased appetite. Pt has chronic diarrhea. Noted to have Na 122, Cr 3.27, met acidosis. Pt was only taking Na Bicarb 1 pill/day as op.    ROS:  as above    Allergies:  No Known Allergies    PAST MEDICAL & SURGICAL HISTORY:  History of breast cancer  right breast  Anemia  Hypertension  History of thrombocytopenia  History of herpes zoster  History of neoplasm of parathyroid gland  Colon cancer  Chronic renal insufficiency  Thyroid nodule  Diabetes mellitus  NIDDM  cholecystectomy  History of partial colectomy  and appendectomy at the same time  History of lumpectomy of right breast  2007 and radiation and chemotherapy  Parathyroid adenoma  excision    SOCIAL HISTORY:  negative    FAMILY HISTORY:  colon cancer (Sibling, Sibling)  breast cancer (Sibling)    MEDICATIONS  (STANDING):  aspirin enteric coated 81 milliGRAM(s) Oral daily  heparin   Injectable 5000 Unit(s) SubCutaneous every 12 hours  hydrALAZINE 50 milliGRAM(s) Oral every 8 hours  sodium bicarbonate 650 milliGRAM(s) Oral every 8 hours    Home Medications:  sodium bicarbonate 650 mg oral tablet: 3 tab(s) orally every 8 hours (27 Nov 2023 12:06)    Vital Signs Last 24 Hrs  T(C): 36.4 (02-09-24 @ 12:57), Max: 36.4 (02-09-24 @ 10:37)  T(F): 97.6 (02-09-24 @ 12:57), Max: 97.6 (02-09-24 @ 12:57)  HR: 47 (02-09-24 @ 12:57) (47 - 71)  BP: 179/53 (02-09-24 @ 10:37) (149/63 - 179/53)  RR: 17 (02-09-24 @ 12:57) (16 - 18)  SpO2: 97% (02-09-24 @ 12:57) (97% - 98%)    s1s2  b/l air entry  soft, ND  no edema     LABS:                        8.6    2.96  )-----------( 153      ( 09 Feb 2024 02:40 )             25.1     02-09    122<L>  |  93<L>  |  59<H>  ----------------------------<  124<H>  3.7   |  13<L>  |  3.27<H>    Ca    8.2<L>      09 Feb 2024 08:49    TPro  6.2  /  Alb  2.8<L>  /  TBili  0.4  /  DBili  x   /  AST  13  /  ALT  16  /  AlkPhos  74  02-09    LIVER FUNCTIONS - ( 09 Feb 2024 02:40 )  Alb: 2.8 g/dL / Pro: 6.2 g/dL / ALK PHOS: 74 U/L / ALT: 16 U/L / AST: 13 U/L / GGT: x           A/P:    Hx colon cancer s/p R colectomy, chemo  CKD 4 iso Keytruda (d/c-d ~ 1 year ago)  Adm w/hyponatremia, met acidosis iso N/V, diarrhea, advanced CKD  Cr is close to recent baseline  Gentle hypertonic IVF w/Bicarb  Avoid nephrotoxins  F/u CMP, CBC, Mg, UO  D/w family at bedside    [phone]

## 2024-02-09 NOTE — PATIENT PROFILE ADULT - NSPROPTRIGHTNOTIFY_GEN_A_NUR
1.   Rest and elevate the affected area as much as possible.  Use the applied splint for immobilization of the injured area until symptoms improve.  Apply ice to the affected area for 20 minutes 3-4 times daily until swelling and pain improve.  2.   Take over the counter Ibuprofen (aka Motrin or Advil) every 6-8 hours as needed for relief of pain and swelling.  For additional pain relief, if needed, you may take over the counter Acetaminophen (aka Tylenol) in between doses of Ibuprofen so that you are alternating each medication every 3-4 hours.  3.   Follow up with your primary care physician if symptoms not improving in 1-2 weeks.  4.   Go to the Emergency Department if new concerning symptoms develop.     
declines

## 2024-02-09 NOTE — H&P ADULT - ATTENDING COMMENTS
88 y/o F with PMH CKD 4, colon cancer (adenocarcinoma)-5-2011. Stage IIIC-T4 N2a s/p right colectomy followed by chemo,  breast cancer, anemia, HTN, thrombocytopenia presents to the ED after outpatient labs reviewed Na 117.     #Hyponatremia - Patient on low salt diet and previously on 9 tablets of sodium bicarb now only taking 1 for the past 10 days. (each tablet 650mg).  - Na 120  - asymptomatic  - NS 125cc/hr (recommended correction rate 418cc/hr)  - Goal correction 4 to 6 mEq/L in a 24-hour period  - Will start off with bicarb 650mg TID   - Monitor BMP q4hr  - Recent Echo w/ EF >50%   - serum osm 269   - Urine Na, Urine Osm   - Renal consult    DNR/DNI (trial of NIV)

## 2024-02-09 NOTE — ED PROVIDER NOTE - PROGRESS NOTE DETAILS
Patient's repeat sodium was 120.  Sodium correction rate was calculated and the patient can take up to 423 mL/h of normal saline.  Will start at a much lower rate of 125 based on her age.  She is neurologically stable at this time.  I discussed the case personally with the hospitalist will admit to his service

## 2024-02-09 NOTE — H&P ADULT - PROBLEM SELECTOR PLAN 3
- Metabolic acidosis from CKD   - Will give NaHCO3 650mg 3 tabs TID   - Go down on the dose as needed

## 2024-02-09 NOTE — H&P ADULT - NSHPPHYSICALEXAM_GEN_ALL_CORE
Vital Signs Last 24 Hrs  T(C): 36.1 (09 Feb 2024 02:05), Max: 36.1 (09 Feb 2024 02:05)  T(F): 97 (09 Feb 2024 02:05), Max: 97 (09 Feb 2024 02:05)  HR: 71 (09 Feb 2024 02:05) (71 - 71)  BP: 149/63 (09 Feb 2024 02:05) (149/63 - 149/63)  BP(mean): --  RR: 18 (09 Feb 2024 02:05) (18 - 18)  SpO2: 98% (09 Feb 2024 02:05) (98% - 98%)    Parameters below as of 09 Feb 2024 02:05  Patient On (Oxygen Delivery Method): room air    Constitutional: Pt lying in bed, awake and alert, NAD  HEENT: EOMI, normal hearing, moist mucous membranes  Neck: Soft and supple, no JVD  Respiratory: CTABL, No wheezing, rales or rhonchi  Cardiovascular: S1S2+, RRR, no M/G/R  Gastrointestinal: BS+, soft, NT/ND, no guarding, no rebound  Extremities: No peripheral edema  Vascular: 2+ peripheral pulses  Neurological: AAOx3, no focal deficits  Musculoskeletal: 5/5 strength b/l upper and lower extremities  Skin: No rashes Vital Signs Last 24 Hrs  T(C): 36.1 (09 Feb 2024 02:05), Max: 36.1 (09 Feb 2024 02:05)  T(F): 97 (09 Feb 2024 02:05), Max: 97 (09 Feb 2024 02:05)  HR: 71 (09 Feb 2024 02:05) (71 - 71)  BP: 149/63 (09 Feb 2024 02:05) (149/63 - 149/63)  BP(mean): --  RR: 18 (09 Feb 2024 02:05) (18 - 18)  SpO2: 98% (09 Feb 2024 02:05) (98% - 98%)    Parameters below as of 09 Feb 2024 02:05  Patient On (Oxygen Delivery Method): room air    Constitutional: Pt lying in bed, awake and alert, NAD  HEENT: EOMI, normal hearing, moist mucous membranes  Neck: Soft and supple, no JVD  Respiratory: CTABL, No wheezing, rales or rhonchi  Cardiovascular: S1S2+, RRR, no M/G/R  Gastrointestinal: BS+, soft, NT/ND, no guarding, no rebound  Extremities: No peripheral edema  Vascular: 2+ peripheral pulses  Neurological: AAOx3, no focal deficits  Skin: No rashes

## 2024-02-09 NOTE — PROGRESS NOTE ADULT - ASSESSMENT
CRI stage 5 - patient is clinically stable. will continue to monitor BMP. patient is refusing dialysis  Hyponatremia - improved. will continue Na bicarb and 1/2 NS IVF. will monitor BMP  Metabolic acidosis - CO2 improved. will monitor BMP  Anemia - patient is hemodynamically stable  HTN - patient is asymptomatic and clinically stable. will continue hydralazine and monitor BP  Colon cancer - patient is currently stable. recommend follow up with oncologist/GI      Case discussed with PA/message left patient's son Maximo to call me back

## 2024-02-09 NOTE — ED ADULT NURSE NOTE - OBJECTIVE STATEMENT
Contacted patient via telephone since I was out of the office during her last follow up appointment with Dr. Maximino Canales on 6/26/2020. Introduced myself and my role with patients at [facility]. Patient completed her active treatment in December 2019 for her Stage 1 ER/IN +, Gms1woc - left breast cancer. States that she is doing well besides some mild fatigue and occasional hot flashes. Reports that her appetite is good and she is sleeping well. Upon inquiring, states that she is doing well with the Arimidex medication daily. Denies joint pain side effect. Reviewed daily Calcium and Vitamin D supplements and DEXA Scan guidelines. Provided support and encouragement. Instructed patient in detail on her Cancer Treatment Summary and Survivorship Care Plan. Patient agreeable to receive her written copy, ACS Booklet of Life After Treatment: The Next Chapter in Your Survivorship Journey and Nutrition Following Cancer Treatment: Oncology Nutrition Guide in the mail. Patient aware of mammogram due annually in July 2020 and her next follow up appointment with Dr. Maximino Canales October 2020. Instructed patient to call with any questions once she receives the packet with my business card in there. Verbally agreed. Patient appreciative of call and information. Hanny Santana, BSW, RN, OCN  Oncology Nurse Navigator pt aaox3, BIBA from home for hyponatrema, also c/o nausea, vomiting, and weakness x2 weeks. per pt, she received a call from Dr. Michele x1 hr ago to go to the ED ASAP for hyponatremia found on recent lab work. pt does not know exact value.

## 2024-02-09 NOTE — CHART NOTE - NSCHARTNOTEFT_GEN_A_CORE
Pt is 89 yo F w/PMH of CKD 4, colon cancer (adenocarcinoma) Stage IIIC-T4 N2a s/p R colectomy followed by chemo, Keytruda, breast cancer, anemia, HTN, thrombocytopenia comes to the ED with a reported low sodium on outpatient labs. Pt with weakness, nausea and vomiting.  Pt has chronic diarrhea. Noted to have Na 122, Cr 3.27, met acidosis. Pt was only taking Na Bicarb 1 pill/day as outpt.    #Acute on chronic Hyponatremia  #CKD4  -Renal following; cont IV fluids w Na Bicarb  -follow bmp daily  -check urine osmolality per renal    #Anemia  #Thrombocytopenia  -probably sec to CKD  -follow cbc daily    #HTN  -cont home meds    Dispo:  discussed with PCP Dr. Michele on plan of care.

## 2024-02-09 NOTE — ED ADULT NURSE NOTE - CHIEF COMPLAINT QUOTE
pt axo3/Habematolel, BIBA c/o nausea, vomiting, and weakness x2 weeks. per pt, she received a call from Dr. Michele x1 hr ago to go to the ED ASAP for hyponatremia found on recent lab work. pt does not know exact value.

## 2024-02-09 NOTE — ED ADULT NURSE NOTE - NSFALLHARMRISKINTERV_ED_ALL_ED

## 2024-02-09 NOTE — PATIENT PROFILE ADULT - FALL HARM RISK - HARM RISK INTERVENTIONS
Assistance with ambulation/Assistance OOB with selected safe patient handling equipment/Communicate Risk of Fall with Harm to all staff/Discuss with provider need for PT consult/Monitor gait and stability/Provide patient with walking aids - walker, cane, crutches/Reinforce activity limits and safety measures with patient and family/Sit up slowly, dangle for a short time, stand at bedside before walking/Tailored Fall Risk Interventions/Visual Cue: Yellow wristband and red socks/Bed in lowest position, wheels locked, appropriate side rails in place/Call bell, personal items and telephone in reach/Instruct patient to call for assistance before getting out of bed or chair/Non-slip footwear when patient is out of bed/[place] to call system/Physically safe environment - no spills, clutter or unnecessary equipment/Purposeful Proactive Rounding/Room/bathroom lighting operational, light cord in reach

## 2024-02-09 NOTE — ED PROVIDER NOTE - CLINICAL SUMMARY MEDICAL DECISION MAKING FREE TEXT BOX
89-year-old female past medical history CKD cancer anemia presenting with hyponatremia.  Will need to repeat the labs here prior to any intervention.  If they are truly that low she is currently neurologically stable.  Will calculate the proper correction rate.  Monitor for any type of neurological abnormalities.  If it is truly in the 117 range patient will require admission

## 2024-02-09 NOTE — H&P ADULT - PROBLEM SELECTOR PLAN 1
- Symptomatic hyponatremia   - likely etiology recent discontinuation of her bicarb tabs  - Order Urine Osm, Urine lytes   - Serum Osm   - IVF at 125 cc/hr - Correct by no more than 6-12 in 24hrs  - Nephrology consult - Symptomatic hyponatremia   - likely etiology recent discontinuation of her bicarb tabs  - Order Urine Osm, Urine lytes   - Serum Osm   - IVF at 125 cc/hr - Correct by no more than 6-12 in 24hrs  - Nephrology consult  - Monitor BMP Q4

## 2024-02-09 NOTE — H&P ADULT - NSVTERISKREFERASSESS_GEN_ALL_CORE
ANTICOAGULATION FOLLOW-UP CLINIC VISIT    Patient Name:  Ángela Dumont  Date:  10/2/2019  Contact Type:  Face to Face    SUBJECTIVE:  Patient Findings         Clinical Outcomes     Negatives:   Major bleeding event, Thromboembolic event, Anticoagulation-related hospital admission, Anticoagulation-related ED visit, Anticoagulation-related fatality           OBJECTIVE    INR Protime   Date Value Ref Range Status   10/02/2019 1.9 (A) 0.86 - 1.14 Final       ASSESSMENT / PLAN  No question data found.  Anticoagulation Summary  As of 10/2/2019    INR goal:   2.0-3.0   TTR:   70.7 % (3.5 y)   INR used for dosin.9! (10/2/2019)   Warfarin maintenance plan:   4 mg (1 mg x 4) every Sun, Wed, Fri; 2 mg (1 mg x 2) all other days   Full warfarin instructions:   10/4: 4 mg; Otherwise 4 mg every Sun, Wed, Fri; 2 mg all other days   Weekly warfarin total:   20 mg   Plan last modified:   Cadence Gonzalez RN (10/2/2019)   Next INR check:   10/16/2019   Target end date:       Indications    Long-term (current) use of anticoagulants [Z79.01] [Z79.01]  Pulmonary embolism (H) [I26.99]  DVT (deep venous thrombosis) (H) [I82.409]             Anticoagulation Episode Summary     INR check location:       Preferred lab:       Send INR reminders to:    NURSE    Comments:         Anticoagulation Care Providers     Provider Role Specialty Phone number    Joaquin Rockwell MD [facility] [phone]            See the Encounter Report to view Anticoagulation Flowsheet and Dosing Calendar (Go to Encounters tab in chart review, and find the Anticoagulation Therapy Visit)    Dosage adjustment made based on physician directed care plan.    Cadence Gonzalez RN                 
Refer to the Assessment tab to view/cancel completed assessment.

## 2024-02-10 LAB
ANION GAP SERPL CALC-SCNC: 13 MMOL/L — SIGNIFICANT CHANGE UP (ref 5–17)
APPEARANCE UR: CLEAR — SIGNIFICANT CHANGE UP
BACTERIA # UR AUTO: ABNORMAL /HPF
BILIRUB UR-MCNC: NEGATIVE — SIGNIFICANT CHANGE UP
BUN SERPL-MCNC: 60 MG/DL — HIGH (ref 7–23)
CALCIUM SERPL-MCNC: 7.9 MG/DL — LOW (ref 8.4–10.5)
CHLORIDE SERPL-SCNC: 98 MMOL/L — SIGNIFICANT CHANGE UP (ref 96–108)
CO2 SERPL-SCNC: 18 MMOL/L — LOW (ref 22–31)
COLOR SPEC: YELLOW — SIGNIFICANT CHANGE UP
CREAT SERPL-MCNC: 3.44 MG/DL — HIGH (ref 0.5–1.3)
DIFF PNL FLD: NEGATIVE — SIGNIFICANT CHANGE UP
EGFR: 12 ML/MIN/1.73M2 — LOW
EPI CELLS # UR: 0 — SIGNIFICANT CHANGE UP
GLUCOSE SERPL-MCNC: 86 MG/DL — SIGNIFICANT CHANGE UP (ref 70–99)
GLUCOSE UR QL: NEGATIVE MG/DL — SIGNIFICANT CHANGE UP
KETONES UR-MCNC: NEGATIVE MG/DL — SIGNIFICANT CHANGE UP
LEUKOCYTE ESTERASE UR-ACNC: ABNORMAL
MAGNESIUM SERPL-MCNC: 1.8 MG/DL — SIGNIFICANT CHANGE UP (ref 1.6–2.6)
NITRITE UR-MCNC: NEGATIVE — SIGNIFICANT CHANGE UP
PH UR: 5.5 — SIGNIFICANT CHANGE UP (ref 5–8)
PHOSPHATE SERPL-MCNC: 4.9 MG/DL — HIGH (ref 2.5–4.5)
POTASSIUM SERPL-MCNC: 3.6 MMOL/L — SIGNIFICANT CHANGE UP (ref 3.5–5.3)
POTASSIUM SERPL-SCNC: 3.6 MMOL/L — SIGNIFICANT CHANGE UP (ref 3.5–5.3)
PROT UR-MCNC: 30 MG/DL
RBC CASTS # UR COMP ASSIST: 0 /HPF — SIGNIFICANT CHANGE UP (ref 0–4)
SODIUM SERPL-SCNC: 129 MMOL/L — LOW (ref 135–145)
SP GR SPEC: 1.01 — SIGNIFICANT CHANGE UP (ref 1–1.03)
TSH SERPL-MCNC: 1.6 UIU/ML — SIGNIFICANT CHANGE UP (ref 0.36–3.74)
URATE SERPL-MCNC: 5 MG/DL — SIGNIFICANT CHANGE UP (ref 2.5–7)
UROBILINOGEN FLD QL: 0.2 MG/DL — SIGNIFICANT CHANGE UP (ref 0.2–1)
WBC UR QL: 3 /HPF — SIGNIFICANT CHANGE UP (ref 0–5)

## 2024-02-10 PROCEDURE — 71045 X-RAY EXAM CHEST 1 VIEW: CPT | Mod: 26

## 2024-02-10 PROCEDURE — 74176 CT ABD & PELVIS W/O CONTRAST: CPT | Mod: 26

## 2024-02-10 RX ORDER — HYDRALAZINE HCL 50 MG
100 TABLET ORAL EVERY 12 HOURS
Refills: 0 | Status: DISCONTINUED | OUTPATIENT
Start: 2024-02-10 | End: 2024-02-12

## 2024-02-10 RX ADMIN — Medication 650 MILLIGRAM(S): at 21:27

## 2024-02-10 RX ADMIN — Medication 100 MILLIGRAM(S): at 18:07

## 2024-02-10 RX ADMIN — HEPARIN SODIUM 5000 UNIT(S): 5000 INJECTION INTRAVENOUS; SUBCUTANEOUS at 18:07

## 2024-02-10 RX ADMIN — Medication 650 MILLIGRAM(S): at 12:46

## 2024-02-10 RX ADMIN — Medication 50 MILLIGRAM(S): at 05:34

## 2024-02-10 RX ADMIN — HEPARIN SODIUM 5000 UNIT(S): 5000 INJECTION INTRAVENOUS; SUBCUTANEOUS at 05:34

## 2024-02-10 RX ADMIN — Medication 50 MILLIGRAM(S): at 12:44

## 2024-02-10 RX ADMIN — Medication 650 MILLIGRAM(S): at 05:34

## 2024-02-10 RX ADMIN — Medication 81 MILLIGRAM(S): at 12:44

## 2024-02-10 NOTE — PROGRESS NOTE ADULT - ASSESSMENT
HTN - BP elevated. patient is asymptomatic. will increase hydralazine to 100 mg BID. will monitor BP  Hyponatremia - sodium improved. patient is clinically improved. will continue 1/2 NS IVF and Na bicarb. will monitor lytes  Metabolic acidosis - improved. will continue Na bicarb. will monitor BMP  Anemia - patient is hemodynamically stable. H/H is stable  CRI stage 5 - patient is clinically stable. patient is refusing dialysis. will monitor BMP. nephrology is following      Case discussed with NP

## 2024-02-10 NOTE — CHART NOTE - NSCHARTNOTEFT_GEN_A_CORE
Discussed case with Dr. Bales, reviewed all imaging and labs    Pt is 89 yo F w/PMH of CKD 4, colon cancer (adenocarcinoma) Stage IIIC-T4 N2a s/p R colectomy followed by chemo, Keytruda, breast cancer, anemia, HTN, thrombocytopenia comes to the ED with a reported low sodium on outpatient labs. Pt with weakness, nausea and vomiting.  Pt has chronic diarrhea. Noted to have Na 122, Cr 3.27, met acidosis. Pt was only taking Na Bicarb 1 pill/day as outpt.    #Acute on chronic Hyponatremia  #CKD4  - Na 125 this AM   - Cr. 3.5 this AM   - Renal following; cont IV fluids w 100meq sodium Bicarb  - PO bicarb   - follow bmp daily  - urine osmolality per renal, pending     #Anemia  #Thrombocytopenia  - probably sec to CKD  - follow cbc daily    #HTN  - cont home meds    Dispo: pending course, monitor electrolytes Discussed case with Dr. Bales, reviewed all imaging and labs    Pt is 89 yo F w/PMH of CKD 4, colon cancer (adenocarcinoma) Stage IIIC-T4 N2a s/p R colectomy followed by chemo, Keytruda, breast cancer, anemia, HTN, thrombocytopenia comes to the ED with a reported low sodium on outpatient labs. Pt with weakness, nausea and vomiting.  Pt has chronic diarrhea. Noted to have Na 122, Cr 3.27, met acidosis. Pt was only taking Na Bicarb 1 pill/day as outpt.    #Acute on chronic Hyponatremia  #CKD4  - Na 129 this AM   - Cr. 3.44 this AM, downtrending   - Renal following; cont IV fluids w 100meq sodium Bicarb  - PO bicarb   - follow bmp daily  - urine osmolality per renal, pending     #Anemia  #Thrombocytopenia  - probably sec to CKD  - follow cbc daily    #HTN  - cont home meds    Dispo: pending course, monitor electrolytes Discussed case with Dr. Bales, reviewed all imaging and labs    Pt is 88 yo F w/PMH of CKD 4, colon cancer (adenocarcinoma) Stage IIIC-T4 N2a s/p R colectomy followed by chemo, Keytruda, breast cancer, anemia, HTN, thrombocytopenia comes to the ED with a reported low sodium on outpatient labs. Pt with weakness, nausea and vomiting.  Pt has chronic diarrhea. Noted to have Na 122, Cr 3.27, met acidosis. Pt was only taking Na Bicarb 1 pill/day as outpt.    #Acute on chronic Hyponatremia  #CKD4  - Na 129 this AM   - Cr. 3.44 this AM, downtrending   - Renal following; cont IV fluids w 100meq sodium Bicarb  - PO bicarb   - follow bmp daily  - urine osmolality per renal, pending     #Anemia  #Thrombocytopenia  - probably sec to CKD  - follow cbc daily    #HTN  - cont home meds    Dispo: pending course, monitor electrolytes    Patient and daughter updated at bedside, in agreement with plan Discussed case with Dr. Bales, reviewed all imaging and labs    Pt is 89 yo F w/PMH of CKD 4, colon cancer (adenocarcinoma) Stage IIIC-T4 N2a s/p R colectomy followed by chemo, Keytruda, breast cancer, anemia, HTN, thrombocytopenia comes to the ED with a reported low sodium on outpatient labs. Pt with weakness, nausea and vomiting.  Pt has chronic diarrhea. Noted to have Na 122, Cr 3.27, met acidosis. Pt was only taking Na Bicarb 1 pill/day as outpt.    #Acute on chronic Hyponatremia  #CKD4  - Na 129 this AM   - Cr. 3.44 this AM, downtrending   - Renal following; cont IV fluids w 100meq sodium Bicarb  - PO bicarb   - follow bmp daily  - urine osmolality per renal, pending     #Mesenteric mass  - CT A/P performed - Mesenteric mass suspect for metastatic disease causing compression of the duodenum with mild dilatation of the stomach  - per CT a/p read - mass is not significantly changed in size compared with November 26, 2023.    #Anemia  #Thrombocytopenia  - 2/2 disease process   - follow cbc daily    #HTN  - cont home meds    Dispo: pending course, monitor electrolytes    Patient and daughter updated at bedside, in agreement with plan Discussed case with Dr. Bales, reviewed all imaging and labs    Pt is 88 yo F w/PMH of CKD 4, colon cancer (adenocarcinoma) Stage IIIC-T4 N2a s/p R colectomy followed by chemo, Keytruda, breast cancer, anemia, HTN, thrombocytopenia comes to the ED with a reported low sodium on outpatient labs. Pt with weakness, nausea and vomiting.  Pt has chronic diarrhea. Noted to have Na 122, Cr 3.27, met acidosis. Pt was only taking Na Bicarb 1 pill/day as outpt.    #Acute on chronic Hyponatremia  #CKD4  - Na 129 this AM   - Cr. 3.44 this AM, downtrending   - Renal following; cont IV fluids w 100meq sodium Bicarb  - PO bicarb   - follow bmp daily  - urine osmolality per renal, pending     #Hx Colon Ca   #Mesenteric mass  - CT A/P performed - Mesenteric mass suspect for metastatic disease causing compression of the duodenum with mild dilatation of the stomach  - per CT a/p read - mass is not significantly changed in size compared with November 26, 2023.    #Anemia  #Thrombocytopenia  - 2/2 disease process   - follow cbc daily    #HTN  - cont home meds    Dispo: pending course, monitor electrolytes    Patient and daughter updated at bedside, in agreement with plan Discussed case with Dr. Bales, reviewed all imaging and labs    Pt is 88 yo F w/PMH of CKD 4, colon cancer (adenocarcinoma) Stage IIIC-T4 N2a s/p R colectomy followed by chemo, Keytruda, breast cancer, anemia, HTN, thrombocytopenia comes to the ED with a reported low sodium on outpatient labs. Pt with weakness, nausea and vomiting.  Pt has chronic diarrhea. Noted to have Na 122, Cr 3.27, met acidosis. Pt was only taking Na Bicarb 1 pill/day as outpt.    #Acute on chronic Hyponatremia  #CKD4  - Na 129 this AM   - Cr. 3.44 this AM, downtrending   - Renal following; cont IV fluids w 100meq sodium Bicarb  - PO bicarb   - follow bmp daily  - urine osmolality per renal, pending     #Hx Colon Ca   #Mesenteric mass  - CT A/P performed - Mesenteric mass suspect for metastatic disease causing compression of the duodenum with mild dilatation of the stomach  - per CT a/p read - mass is not significantly changed in size compared with November 26, 2023.    #Anemia  #Thrombocytopenia  - 2/2 disease process   - follow cbc daily    #HTN  - hydralazine increased to 100mg bid     Dispo: pending course, monitor electrolytes    Patient and daughter updated at bedside, in agreement with plan

## 2024-02-11 LAB
ANION GAP SERPL CALC-SCNC: 13 MMOL/L — SIGNIFICANT CHANGE UP (ref 5–17)
BLD GP AB SCN SERPL QL: SIGNIFICANT CHANGE UP
BUN SERPL-MCNC: 61 MG/DL — HIGH (ref 7–23)
CALCIUM SERPL-MCNC: 7.7 MG/DL — LOW (ref 8.4–10.5)
CHLORIDE SERPL-SCNC: 101 MMOL/L — SIGNIFICANT CHANGE UP (ref 96–108)
CO2 SERPL-SCNC: 19 MMOL/L — LOW (ref 22–31)
CREAT SERPL-MCNC: 3.88 MG/DL — HIGH (ref 0.5–1.3)
EGFR: 11 ML/MIN/1.73M2 — LOW
GLUCOSE SERPL-MCNC: 91 MG/DL — SIGNIFICANT CHANGE UP (ref 70–99)
HCT VFR BLD CALC: 19 % — CRITICAL LOW (ref 34.5–45)
HCT VFR BLD CALC: 19.3 % — CRITICAL LOW (ref 34.5–45)
HGB BLD-MCNC: 6.4 G/DL — CRITICAL LOW (ref 11.5–15.5)
HGB BLD-MCNC: 6.4 G/DL — CRITICAL LOW (ref 11.5–15.5)
MCHC RBC-ENTMCNC: 28.6 PG — SIGNIFICANT CHANGE UP (ref 27–34)
MCHC RBC-ENTMCNC: 28.8 PG — SIGNIFICANT CHANGE UP (ref 27–34)
MCHC RBC-ENTMCNC: 33.2 GM/DL — SIGNIFICANT CHANGE UP (ref 32–36)
MCHC RBC-ENTMCNC: 33.7 GM/DL — SIGNIFICANT CHANGE UP (ref 32–36)
MCV RBC AUTO: 85.6 FL — SIGNIFICANT CHANGE UP (ref 80–100)
MCV RBC AUTO: 86.2 FL — SIGNIFICANT CHANGE UP (ref 80–100)
NRBC # BLD: 0 /100 WBCS — SIGNIFICANT CHANGE UP (ref 0–0)
NRBC # BLD: 0 /100 WBCS — SIGNIFICANT CHANGE UP (ref 0–0)
OSMOLALITY UR: 150 MOSM/KG — SIGNIFICANT CHANGE UP (ref 50–1200)
PLATELET # BLD AUTO: 100 K/UL — LOW (ref 150–400)
PLATELET # BLD AUTO: 73 K/UL — LOW (ref 150–400)
POTASSIUM SERPL-MCNC: 3.8 MMOL/L — SIGNIFICANT CHANGE UP (ref 3.5–5.3)
POTASSIUM SERPL-SCNC: 3.8 MMOL/L — SIGNIFICANT CHANGE UP (ref 3.5–5.3)
RBC # BLD: 2.22 M/UL — LOW (ref 3.8–5.2)
RBC # BLD: 2.24 M/UL — LOW (ref 3.8–5.2)
RBC # FLD: 19.2 % — HIGH (ref 10.3–14.5)
RBC # FLD: 19.3 % — HIGH (ref 10.3–14.5)
SODIUM SERPL-SCNC: 133 MMOL/L — LOW (ref 135–145)
SODIUM UR-SCNC: <20 MMOL/L — SIGNIFICANT CHANGE UP
WBC # BLD: 2.46 K/UL — LOW (ref 3.8–10.5)
WBC # BLD: 2.51 K/UL — LOW (ref 3.8–10.5)
WBC # FLD AUTO: 2.46 K/UL — LOW (ref 3.8–10.5)
WBC # FLD AUTO: 2.51 K/UL — LOW (ref 3.8–10.5)

## 2024-02-11 RX ADMIN — Medication 650 MILLIGRAM(S): at 06:09

## 2024-02-11 RX ADMIN — HEPARIN SODIUM 5000 UNIT(S): 5000 INJECTION INTRAVENOUS; SUBCUTANEOUS at 06:10

## 2024-02-11 RX ADMIN — Medication 100 MILLIGRAM(S): at 06:09

## 2024-02-11 RX ADMIN — SODIUM CHLORIDE 50 MILLILITER(S): 9 INJECTION, SOLUTION INTRAVENOUS at 23:18

## 2024-02-11 RX ADMIN — HEPARIN SODIUM 5000 UNIT(S): 5000 INJECTION INTRAVENOUS; SUBCUTANEOUS at 17:28

## 2024-02-11 RX ADMIN — Medication 81 MILLIGRAM(S): at 11:22

## 2024-02-11 RX ADMIN — Medication 650 MILLIGRAM(S): at 21:09

## 2024-02-11 RX ADMIN — Medication 100 MILLIGRAM(S): at 17:28

## 2024-02-11 RX ADMIN — SODIUM CHLORIDE 50 MILLILITER(S): 9 INJECTION, SOLUTION INTRAVENOUS at 04:21

## 2024-02-11 RX ADMIN — Medication 650 MILLIGRAM(S): at 15:00

## 2024-02-11 NOTE — PROGRESS NOTE ADULT - ASSESSMENT
Anemia - Hg 6.4. patient receiving blood transfusion. patient s hemodynamically stable. will follow H/H  Stage 5 CRI - patient is clinically stable. no c/o n/v. will follow BMP. patient is refusing dialyisis  HTN - hydralazine increased. patient is clinically stable. will monitor BP  Metabolic acidosis - patient is clinically stable. will monitor BMP  Colon cancer/mesenteric mass - patient is clinically stable. patient is aware of mesenteric mass and refusing treatment. patient is tolerating po diet and having bowel movements      Case discussed with patient's daughter

## 2024-02-11 NOTE — CHART NOTE - NSCHARTNOTEFT_GEN_A_CORE
Discussed case with Dr. Bales, reviewed all imaging and labs    Pt is 88 yo F w/PMH of CKD 4, colon cancer (adenocarcinoma) Stage IIIC-T4 N2a s/p R colectomy followed by chemo, Keytruda, breast cancer, anemia, HTN, thrombocytopenia comes to the ED with a reported low sodium on outpatient labs. Pt with weakness, nausea and vomiting.  Pt has chronic diarrhea. Noted to have Na 122, Cr 3.27, met acidosis. Pt was only taking Na Bicarb 1 pill/day as outpt.    #Acute on chronic Hyponatremia  #CKD4  - Na ___ this AM   - Cr. ____this AM, downtrending   - Renal following; cont IV fluids w 100meq sodium Bicarb  - PO bicarb   - follow bmp daily  - urine osmolality per renal, pending     #Hx Colon Ca   #Mesenteric mass  - CT A/P performed - Mesenteric mass suspect for metastatic disease causing compression of the duodenum with mild dilatation of the stomach  - per CT a/p read - mass is not significantly changed in size compared with November 26, 2023     #Anemia  #Thrombocytopenia  - 2/2 disease process   - follow cbc daily    #HTN  - hydralazine 100mg bid     Dispo: pending course, monitor electrolytes    Patient and daughter updated at bedside, in agreement with plan. Discussed case with Dr. Bales, reviewed all imaging and labs    Pt is 88 yo F w/PMH of CKD 4, colon cancer (adenocarcinoma) Stage IIIC-T4 N2a s/p R colectomy followed by chemo, Keytruda, breast cancer, anemia, HTN, thrombocytopenia comes to the ED with a reported low sodium on outpatient labs. Pt with weakness, nausea and vomiting.  Pt has chronic diarrhea. Noted to have Na 122, Cr 3.27, met acidosis. Pt was only taking Na Bicarb 1 pill/day as outpt.    #Acute on chronic Hyponatremia  #CKD4  - Na 133 this AM, improving   - Cr. 3.88  - Renal following; cont IV fluids w 100meq sodium Bicarb  - PO bicarb   - follow bmp daily  - urine osmolality per renal, pending     #Hx Colon Ca   #Mesenteric mass  - CT A/P performed - Mesenteric mass suspect for metastatic disease causing compression of the duodenum with mild dilatation of the stomach  - per CT a/p read - mass is not significantly changed in size compared with November 26, 2023   - discussed with patient, states it is followed outpatient and being monitored     #Anemia  #Thrombocytopenia  - 2/2 disease process   - H/H 6.4/19.3, continue to trend   - type and screen obtained, consented for blood  - one unit PRBC given 2/11    #HTN  - hydralazine 100mg bid     Dispo: pending course    Patient and daughter updated at bedside, in agreement with plan Discussed case with Dr. Bales, reviewed all imaging and labs    Pt is 89 yo F w/PMH of CKD 4, colon cancer (adenocarcinoma) Stage IIIC-T4 N2a s/p R colectomy followed by chemo, Keytruda, breast cancer, anemia, HTN, thrombocytopenia comes to the ED with a reported low sodium on outpatient labs. Pt with weakness, nausea and vomiting.  Pt has chronic diarrhea. Noted to have Na 122, Cr 3.27, met acidosis. Pt was only taking Na Bicarb 1 pill/day as outpt.    #Acute on chronic Hyponatremia  #CKD4  - Na 133 this AM, improving   - Cr. 3.88  - Renal following; cont IV fluids w 100meq sodium Bicarb  - PO bicarb   - follow bmp daily  - urine osmolality per renal, pending     #Hx Colon Ca   #Mesenteric mass  - CT A/P performed - Mesenteric mass suspect for metastatic disease causing compression of the duodenum with mild dilatation of the stomach  - per CT a/p read - mass is not significantly changed in size compared with November 26, 2023   - discussed with patient, states it is followed outpatient and being monitored     #Anemia  #Thrombocytopenia  - 2/2 disease process   - H/H 6.4/19.3, continue to trend   - type and screen obtained, consented for blood  - 2 units PRBC given 2/11, Dr. Bales in agreement     #HTN  - hydralazine 100mg bid     Dispo: pending course    Patient and daughter updated at bedside, in agreement with plan Discussed case with Dr. Bales, reviewed all imaging and labs    Pt is 89 yo F w/PMH of CKD 4, colon cancer (adenocarcinoma) Stage IIIC-T4 N2a s/p R colectomy followed by chemo, Keytruda, breast cancer, anemia, HTN, thrombocytopenia comes to the ED with a reported low sodium on outpatient labs. Pt with weakness, nausea and vomiting.  Pt has chronic diarrhea. Noted to have Na 122, Cr 3.27, met acidosis. Pt was only taking Na Bicarb 1 pill/day as outpt.    #Acute on chronic Hyponatremia  #CKD4  - Na 133 this AM, improving   - Cr. 3.88  - Renal following; cont IV fluids w 100meq sodium Bicarb  - PO bicarb   - follow bmp daily  - urine osmolality per renal, pending     #Hx Colon Ca   #Mesenteric mass  - CT A/P performed - Mesenteric mass suspect for metastatic disease causing compression of the duodenum with mild dilatation of the stomach  - per CT a/p read - mass is not significantly changed in size compared with November 26, 2023   - discussed with patient, states it is followed outpatient with Dr. Edmonds and being monitored     #Anemia  #Thrombocytopenia  - 2/2 disease process   - H/H 6.4/19.3, continue to trend   - type and screen obtained, consented for blood  - 2 units PRBC given 2/11, Dr. Bales in agreement     #HTN  - hydralazine 100mg bid     Dispo: pending course    Patient and daughter updated at bedside, in agreement with plan

## 2024-02-12 LAB
ANA TITR SER: NEGATIVE — SIGNIFICANT CHANGE UP
ANION GAP SERPL CALC-SCNC: 10 MMOL/L — SIGNIFICANT CHANGE UP (ref 5–17)
BUN SERPL-MCNC: 74 MG/DL — HIGH (ref 7–23)
CALCIUM SERPL-MCNC: 7.8 MG/DL — LOW (ref 8.4–10.5)
CHLORIDE SERPL-SCNC: 103 MMOL/L — SIGNIFICANT CHANGE UP (ref 96–108)
CO2 SERPL-SCNC: 24 MMOL/L — SIGNIFICANT CHANGE UP (ref 22–31)
CREAT SERPL-MCNC: 4.06 MG/DL — HIGH (ref 0.5–1.3)
EGFR: 10 ML/MIN/1.73M2 — LOW
GLUCOSE SERPL-MCNC: 90 MG/DL — SIGNIFICANT CHANGE UP (ref 70–99)
HCT VFR BLD CALC: 25.8 % — LOW (ref 34.5–45)
HGB BLD-MCNC: 8.4 G/DL — LOW (ref 11.5–15.5)
MCHC RBC-ENTMCNC: 28.5 PG — SIGNIFICANT CHANGE UP (ref 27–34)
MCHC RBC-ENTMCNC: 32.6 GM/DL — SIGNIFICANT CHANGE UP (ref 32–36)
MCV RBC AUTO: 87.5 FL — SIGNIFICANT CHANGE UP (ref 80–100)
NRBC # BLD: 0 /100 WBCS — SIGNIFICANT CHANGE UP (ref 0–0)
PLATELET # BLD AUTO: 83 K/UL — LOW (ref 150–400)
POTASSIUM SERPL-MCNC: 4 MMOL/L — SIGNIFICANT CHANGE UP (ref 3.5–5.3)
POTASSIUM SERPL-SCNC: 4 MMOL/L — SIGNIFICANT CHANGE UP (ref 3.5–5.3)
RBC # BLD: 2.95 M/UL — LOW (ref 3.8–5.2)
RBC # FLD: 20.1 % — HIGH (ref 10.3–14.5)
SODIUM SERPL-SCNC: 137 MMOL/L — SIGNIFICANT CHANGE UP (ref 135–145)
WBC # BLD: 3.04 K/UL — LOW (ref 3.8–10.5)
WBC # FLD AUTO: 3.04 K/UL — LOW (ref 3.8–10.5)

## 2024-02-12 RX ORDER — AMLODIPINE BESYLATE 2.5 MG/1
10 TABLET ORAL DAILY
Refills: 0 | Status: DISCONTINUED | OUTPATIENT
Start: 2024-02-13 | End: 2024-02-14

## 2024-02-12 RX ORDER — AMLODIPINE BESYLATE 2.5 MG/1
5 TABLET ORAL DAILY
Refills: 0 | Status: DISCONTINUED | OUTPATIENT
Start: 2024-02-12 | End: 2024-02-12

## 2024-02-12 RX ORDER — HYDRALAZINE HCL 50 MG
100 TABLET ORAL EVERY 8 HOURS
Refills: 0 | Status: DISCONTINUED | OUTPATIENT
Start: 2024-02-12 | End: 2024-02-14

## 2024-02-12 RX ADMIN — HEPARIN SODIUM 5000 UNIT(S): 5000 INJECTION INTRAVENOUS; SUBCUTANEOUS at 05:38

## 2024-02-12 RX ADMIN — Medication 100 MILLIGRAM(S): at 05:38

## 2024-02-12 RX ADMIN — Medication 650 MILLIGRAM(S): at 21:11

## 2024-02-12 RX ADMIN — HEPARIN SODIUM 5000 UNIT(S): 5000 INJECTION INTRAVENOUS; SUBCUTANEOUS at 18:23

## 2024-02-12 RX ADMIN — Medication 100 MILLIGRAM(S): at 21:10

## 2024-02-12 RX ADMIN — Medication 650 MILLIGRAM(S): at 05:38

## 2024-02-12 RX ADMIN — AMLODIPINE BESYLATE 5 MILLIGRAM(S): 2.5 TABLET ORAL at 13:41

## 2024-02-12 RX ADMIN — Medication 81 MILLIGRAM(S): at 13:41

## 2024-02-12 RX ADMIN — Medication 650 MILLIGRAM(S): at 13:41

## 2024-02-12 NOTE — DIETITIAN INITIAL EVALUATION ADULT - PERTINENT MEDS FT
MEDICATIONS  (STANDING):  aspirin enteric coated 81 milliGRAM(s) Oral daily  heparin   Injectable 5000 Unit(s) SubCutaneous every 12 hours  hydrALAZINE 100 milliGRAM(s) Oral every 12 hours  sodium bicarbonate 650 milliGRAM(s) Oral every 8 hours  sodium chloride 0.45% 1000 milliLiter(s) (50 mL/Hr) IV Continuous <Continuous>    MEDICATIONS  (PRN):

## 2024-02-12 NOTE — CHART NOTE - NSCHARTNOTEFT_GEN_A_CORE
Discussed case with Dr. Bales, reviewed all imaging and labs    Pt is 88 yo F w/PMH of CKD 4, colon cancer (adenocarcinoma) Stage IIIC-T4 N2a s/p R colectomy followed by chemo, Keytruda, breast cancer, anemia, HTN, thrombocytopenia comes to the ED with a reported low sodium on outpatient labs. Pt with weakness, nausea and vomiting.  Pt has chronic diarrhea. Noted to have Na 122, Cr 3.27, met acidosis. Pt was only taking Na Bicarb 1 pill/day as outpt.    #Acute on chronic Hyponatremia  #CKD4  - Na 133 this AM, improving   - Cr. 3.88  - Renal following; cont IV fluids w 100meq sodium Bicarb  - PO bicarb   - follow bmp daily  - urine osmolality per renal, pending     #Hx Colon Ca   #Mesenteric mass  - CT A/P performed - Mesenteric mass suspect for metastatic disease causing compression of the duodenum with mild dilatation of the stomach  - per CT a/p read - mass is not significantly changed in size compared with November 26, 2023   - discussed with patient, states it is followed outpatient with Dr. Edmonds and being monitored     #Anemia  #Thrombocytopenia  - 2/2 disease process   - H/H 8.4/25.8 s/p 2 units PRBC 2/11  - keep active t/s     #HTN  - hydralazine 100mg bid   - monitor VS     Dispo: pending course    Patient and daughter updated at bedside, in agreement with plan. Discussed case with Dr. Bales, reviewed all imaging and labs    Pt is 89 yo F w/PMH of CKD 4, colon cancer (adenocarcinoma) Stage IIIC-T4 N2a s/p R colectomy followed by chemo, Keytruda, breast cancer, anemia, HTN, thrombocytopenia comes to the ED with a reported low sodium on outpatient labs. Pt with weakness, nausea and vomiting.  Pt has chronic diarrhea. Noted to have Na 122, Cr 3.27, met acidosis. Pt was only taking Na Bicarb 1 pill/day as outpt.    #Acute on chronic Hyponatremia; resolved   #CKD4  - Na 137 this AM   - Cr. 4.06  - Renal following; cont IV fluids w 100meq sodium Bicarb  - PO bicarb   - follow bmp daily  - urine osmolality per renal, pending     #Hx Colon Ca   #Mesenteric mass  - CT A/P performed - Mesenteric mass suspect for metastatic disease causing compression of the duodenum with mild dilatation of the stomach  - per CT a/p read - mass is not significantly changed in size compared with November 26, 2023   - discussed with patient, states it is followed outpatient with Dr. Edmonds and being monitored     #Anemia  #Thrombocytopenia  - 2/2 disease process   - H/H 8.4/25.8 s/p 2 units PRBC 2/11  - keep active t/s     #HTN  - hydralazine 100mg bid   - monitor VS     Dispo: pending course    Patient and daughter updated at bedside, in agreement with plan. Discussed case with Dr. Bales, reviewed all imaging and labs    Pt is 89 yo F w/PMH of CKD 4, colon cancer (adenocarcinoma) Stage IIIC-T4 N2a s/p R colectomy followed by chemo, Keytruda, breast cancer, anemia, HTN, thrombocytopenia comes to the ED with a reported low sodium on outpatient labs. Pt with weakness, nausea and vomiting.  Pt has chronic diarrhea. Noted to have Na 122, Cr 3.27, met acidosis. Pt was only taking Na Bicarb 1 pill/day as outpt.    #Acute on chronic Hyponatremia; resolved   #CKD4  - Na 137 this AM   - Cr. 4.06  - Renal following; cont IV fluids w 100meq sodium Bicarb  - PO bicarb   - follow bmp daily  - urine osmolality per renal, pending   - PT pending     #Hx Colon Ca   #Mesenteric mass  - CT A/P performed - Mesenteric mass suspect for metastatic disease causing compression of the duodenum with mild dilatation of the stomach  - per CT a/p read - mass is not significantly changed in size compared with November 26, 2023   - discussed with patient, states it is followed outpatient with Dr. Edmonds and being monitored     #Anemia  #Thrombocytopenia  - 2/2 disease process   - H/H 8.4/25.8 s/p 2 units PRBC 2/11  - keep active t/s     #HTN  - hydralazine 100mg bid   - monitor VS     Dispo: pending course    Patient and daughter updated at bedside, in agreement with plan. Discussed case with Dr. Bales, reviewed all imaging and labs    Pt is 89 yo F w/PMH of CKD 4, colon cancer (adenocarcinoma) Stage IIIC-T4 N2a s/p R colectomy followed by chemo, Keytruda, breast cancer, anemia, HTN, thrombocytopenia comes to the ED with a reported low sodium on outpatient labs. Pt with weakness, nausea and vomiting.  Pt has chronic diarrhea. Noted to have Na 122, Cr 3.27, met acidosis. Pt was only taking Na Bicarb 1 pill/day as outpt.    #Acute on chronic Hyponatremia; resolved   #CKD4  - Na 137 this AM   - Cr. 4.06  - Renal following; cont IV fluids w 100meq sodium Bicarb  - PO bicarb   - follow bmp daily  - urine osmolality per renal, pending   - PT pending     #Hx Colon Ca   #Mesenteric mass  - CT A/P performed - Mesenteric mass suspect for metastatic disease causing compression of the duodenum with mild dilatation of the stomach  - per CT a/p read - mass is not significantly changed in size compared with November 26, 2023   - discussed with patient, states it is followed outpatient with Dr. Edmonds and being monitored     #Anemia  #Thrombocytopenia  - 2/2 disease process   - H/H 8.4/25.8 s/p 2 units PRBC 2/11  - keep active t/s     #HTN  - hydralazine 100mg bid   - added amlodipine   - monitor VS     Dispo: pending course    Patient and daughter updated at bedside, in agreement with plan. Discussed case with Dr. Bales, reviewed all imaging and labs    Pt is 89 yo F w/PMH of CKD 4, colon cancer (adenocarcinoma) Stage IIIC-T4 N2a s/p R colectomy followed by chemo, Keytruda, breast cancer, anemia, HTN, thrombocytopenia comes to the ED with a reported low sodium on outpatient labs. Pt with weakness, nausea and vomiting.  Pt has chronic diarrhea. Noted to have Na 122, Cr 3.27, met acidosis. Pt was only taking Na Bicarb 1 pill/day as outpt.    #Acute on chronic Hyponatremia; resolved   #CKD4  - Na 137 this AM   - Cr. 4.06  - d/c sodium bicarb gtt   - PO bicarb   - follow bmp daily  - urine osmolality per renal, pending   - nephro recs appreciated   - PT pending     #Hx Colon Ca   #Mesenteric mass  - CT A/P performed - Mesenteric mass suspect for metastatic disease causing compression of the duodenum with mild dilatation of the stomach  - per CT a/p read - mass is not significantly changed in size compared with November 26, 2023   - discussed with patient, states it is followed outpatient with Dr. Edmonds and being monitored     #Anemia  #Thrombocytopenia  - 2/2 disease process   - H/H 8.4/25.8 s/p 2 units PRBC 2/11  - keep active t/s     #HTN  - hydralazine 100mg bid   - added amlodipine   - monitor VS     Dispo: pending course    Patient and daughter updated at bedside, in agreement with plan. Discussed case with Dr. Bales, reviewed all imaging and labs    Pt is 88 yo F w/PMH of CKD 4, colon cancer (adenocarcinoma) Stage IIIC-T4 N2a s/p R colectomy followed by chemo, Keytruda, breast cancer, anemia, HTN, thrombocytopenia comes to the ED with a reported low sodium on outpatient labs. Pt with weakness, nausea and vomiting.  Pt has chronic diarrhea. Noted to have Na 122, Cr 3.27, met acidosis. Pt was only taking Na Bicarb 1 pill/day as outpt.    #Acute on chronic Hyponatremia; resolved   #CKD4  - Na 137 this AM   - Cr. 4.06  - d/c sodium bicarb gtt   - PO bicarb   - follow bmp daily  - urine osmolality per renal, pending   - nephro recs appreciated   - PT pending     #Hx Colon Ca   #Mesenteric mass  - CT A/P performed - Mesenteric mass suspect for metastatic disease causing compression of the duodenum with mild dilatation of the stomach  - per CT a/p read - mass is not significantly changed in size compared with November 26, 2023   - discussed with patient, states it is followed outpatient with Dr. Edmonds and being monitored     #Anemia  #Thrombocytopenia  - 2/2 disease process   - H/H 8.4/25.8 s/p 2 units PRBC 2/11  - keep active t/s     #HTN  - hydralazine increased to 100mg tid per nephro  - added amlodipine   - monitor VS     Dispo: pending course    Patient and daughter updated at bedside, in agreement with plan.

## 2024-02-12 NOTE — DIETITIAN INITIAL EVALUATION ADULT - PERTINENT LABORATORY DATA
02-12    137  |  103  |  74<H>  ----------------------------<  90  4.0   |  24  |  4.06<H>    Ca    7.8<L>      12 Feb 2024 06:28

## 2024-02-12 NOTE — DIETITIAN INITIAL EVALUATION ADULT - PROBLEM SELECTOR PLAN 1
- Symptomatic hyponatremia   - likely etiology recent discontinuation of her bicarb tabs  - Order Urine Osm, Urine lytes   - Serum Osm   - IVF at 125 cc/hr - Correct by no more than 6-12 in 24hrs  - Nephrology consult  - Monitor BMP Q4

## 2024-02-12 NOTE — DIETITIAN INITIAL EVALUATION ADULT - OTHER INFO
88 y/o F PMH of CKD 4, colon cancer (adenocarcinoma)-5-2011 Stage IIIC-T4 N2a s/p right colectomy followed by chemo, breast cancer, anemia, HTN, thrombocytopenia presents to the emergency department today with a reported low sodium on outpatient labs.  Per the patient and daughter she has been not acting herself for the last couple days and more slow and weak than usual together with nausea and vomiting.  Denies any fevers or chills. There is some decreased appetite.  No reported heavy drinking.  Patient had a sodium of 117 drawn today at her doctors visit. She was notified at 1 AM by her primary care doctor and she subsequently called an ambulance to come to the hospital. Patient stating that she has a history of hypernatremia and hyponatremia. Upon further questioning, it was revealed that the patient was supposed to be taking sodium bicarb 650mg 3 tabs Q8. About a week and a half ago, the patient stopped taking 9 tabs per day and was only taking 1 bicarb tab per day.     Met with pt this AM at bedside. Pt was seated upright and able to articulate her nutrition hx well. NKFA nor food intolerances reported. Pt reported good appetite PTA, which persists (pt taking % POs per RN flowsheets). Pt denied dry mouth/mouth sores and chewing/swallowing difficulties. Pt denied V/D/C, endorsed previous nausea that has now resolved, stated last BM this AM 2/12. Pt reported UBW ~137lbs (consistent with current weight).     *Note: Pt with borderline T2DM - glucose remains stable at the moment.  Previous RD note (11/20/23):  - weight 141 lbs  - Nutrition dx: acute moderate malnutrition

## 2024-02-12 NOTE — PROGRESS NOTE ADULT - ASSESSMENT
Today's ideas:    1. Start Pepcid (Famotidine) anti acid med (reduces but does not shut off stomach acid) 20mg twice per day for a few weeks.     2. Other option to the Magic Mouthwash: large tablets of \"Sucralfate\" (Carafate) that you dissolve in water a Stage 5 renal failure - patient refusing dialysis . family is aware and agrees. will continue to monitor BMP  Hyponatremia - resolved. IVF discontinued. will continue Na Bicarb. will follow BMP  Metabolic acidosis - improved. will monitor BMP  HTN - BP elevated. patient is asymptomatic. Norvasc added. will monitor BP  Anemia - patient is hemodynamically stable. will monitor CBC  Colon cancer/mesenteric mass - patient is currently stable. recommend follow up with oncology      Case discussed with NP/patient's son Maximo

## 2024-02-12 NOTE — DIETITIAN INITIAL EVALUATION ADULT - ADD RECOMMEND
1. Continue with liberalized diet  - RD will continue to monitor and reassess need for renal restrictions  - Honor food preferences  - Appreciate continued PO intake % documentation in RN flowsheets  2. Continue to monitor lytes, specifically Na, and replete prn  3. Appreciate weekly weight trends

## 2024-02-13 ENCOUNTER — TRANSCRIPTION ENCOUNTER (OUTPATIENT)
Age: 89
End: 2024-02-13

## 2024-02-13 LAB
ANION GAP SERPL CALC-SCNC: 13 MMOL/L — SIGNIFICANT CHANGE UP (ref 5–17)
BUN SERPL-MCNC: 78 MG/DL — HIGH (ref 7–23)
CALCIUM SERPL-MCNC: 8.4 MG/DL — SIGNIFICANT CHANGE UP (ref 8.4–10.5)
CHLORIDE SERPL-SCNC: 105 MMOL/L — SIGNIFICANT CHANGE UP (ref 96–108)
CO2 SERPL-SCNC: 21 MMOL/L — LOW (ref 22–31)
CREAT SERPL-MCNC: 3.82 MG/DL — HIGH (ref 0.5–1.3)
EGFR: 11 ML/MIN/1.73M2 — LOW
GLUCOSE SERPL-MCNC: 99 MG/DL — SIGNIFICANT CHANGE UP (ref 70–99)
HCT VFR BLD CALC: 28.7 % — LOW (ref 34.5–45)
HGB BLD-MCNC: 9.4 G/DL — LOW (ref 11.5–15.5)
MCHC RBC-ENTMCNC: 28.4 PG — SIGNIFICANT CHANGE UP (ref 27–34)
MCHC RBC-ENTMCNC: 32.8 GM/DL — SIGNIFICANT CHANGE UP (ref 32–36)
MCV RBC AUTO: 86.7 FL — SIGNIFICANT CHANGE UP (ref 80–100)
NRBC # BLD: 0 /100 WBCS — SIGNIFICANT CHANGE UP (ref 0–0)
PLATELET # BLD AUTO: 104 K/UL — LOW (ref 150–400)
POTASSIUM SERPL-MCNC: 3.9 MMOL/L — SIGNIFICANT CHANGE UP (ref 3.5–5.3)
POTASSIUM SERPL-SCNC: 3.9 MMOL/L — SIGNIFICANT CHANGE UP (ref 3.5–5.3)
RBC # BLD: 3.31 M/UL — LOW (ref 3.8–5.2)
RBC # FLD: 20.6 % — HIGH (ref 10.3–14.5)
SODIUM SERPL-SCNC: 139 MMOL/L — SIGNIFICANT CHANGE UP (ref 135–145)
WBC # BLD: 3.19 K/UL — LOW (ref 3.8–10.5)
WBC # FLD AUTO: 3.19 K/UL — LOW (ref 3.8–10.5)

## 2024-02-13 RX ORDER — DOXAZOSIN MESYLATE 4 MG
2 TABLET ORAL AT BEDTIME
Refills: 0 | Status: DISCONTINUED | OUTPATIENT
Start: 2024-02-13 | End: 2024-02-14

## 2024-02-13 RX ADMIN — Medication 650 MILLIGRAM(S): at 23:43

## 2024-02-13 RX ADMIN — Medication 2 MILLIGRAM(S): at 23:43

## 2024-02-13 RX ADMIN — Medication 100 MILLIGRAM(S): at 05:18

## 2024-02-13 RX ADMIN — Medication 100 MILLIGRAM(S): at 13:34

## 2024-02-13 RX ADMIN — AMLODIPINE BESYLATE 10 MILLIGRAM(S): 2.5 TABLET ORAL at 05:18

## 2024-02-13 RX ADMIN — Medication 650 MILLIGRAM(S): at 13:33

## 2024-02-13 RX ADMIN — HEPARIN SODIUM 5000 UNIT(S): 5000 INJECTION INTRAVENOUS; SUBCUTANEOUS at 23:43

## 2024-02-13 RX ADMIN — HEPARIN SODIUM 5000 UNIT(S): 5000 INJECTION INTRAVENOUS; SUBCUTANEOUS at 05:18

## 2024-02-13 RX ADMIN — Medication 100 MILLIGRAM(S): at 23:43

## 2024-02-13 RX ADMIN — Medication 650 MILLIGRAM(S): at 05:18

## 2024-02-13 RX ADMIN — Medication 81 MILLIGRAM(S): at 12:30

## 2024-02-13 NOTE — DISCHARGE NOTE NURSING/CASE MANAGEMENT/SOCIAL WORK - PATIENT PORTAL LINK FT
You can access the FollowMyHealth Patient Portal offered by [facility] by registering at the following website: [email or web address]. By joining [employer]’s FollowMyHealth portal, you will also be able to view your health information using other applications (apps) compatible with our system.

## 2024-02-13 NOTE — DISCHARGE NOTE PROVIDER - HOSPITAL COURSE
Hospital Course    Pt is 89 yo F w/PMH of CKD 4, colon cancer (adenocarcinoma) Stage IIIC-T4 N2a s/p R colectomy followed by chemo, Keytruda, breast cancer, anemia, HTN, thrombocytopenia comes to the ED with a reported low sodium on outpatient labs. Pt with weakness, nausea and vomiting.  Pt has chronic diarrhea. Noted to have Na 122, Cr 3.27, metabolic acidosis. Pt was only taking Na Bicarb 1 pill/day as outpt.  She was admitted with Acute on chronic Hyponatremia; along with CKD4.  She was evaluated by Nephrology and was started on Sodium bicarb gtt.  Pt also with anemia; sec. to CKD.  Her hgb dropped to 6, she received 2 units of PRBC's on this admission.  She has a hx of Colon Ca and a Mesenteric mass  - CT A/P performed - Mesenteric mass suspect for metastatic disease causing compression of the duodenum with mild dilatation of the stomach, mass is not significantly changed in size compared with November 26, 2023.  Pt is being followed outpatient with Dr. Edmonds, HEME/ONC and being monitored.    Pt stable for d/c home.   She was evaluated by PT; has no skilled needs.     Discharging Provider:  BERNICE Engle  Contact Info: Cell [phone] - Please call with any questions or concerns.    Outpatient Provider:  Dr Michele

## 2024-02-13 NOTE — DISCHARGE NOTE PROVIDER - CARE PROVIDER_API CALL
Humble Michele  Internal Medicine  [address] [place] [address], [address]  [place] [address]  Phone: [phone]  Fax: [phone]  Follow Up Time:

## 2024-02-13 NOTE — DISCHARGE NOTE PROVIDER - NSDCFUSCHEDAPPT_GEN_ALL_CORE_FT
[facility]  Scheduled Appointment: 02/15/2024    [facility]  Scheduled Appointment: 02/22/2024    Osmany Lisa  [facility]  NEPHRO [address]  Scheduled Appointment: 03/27/2024    Cortney Mack  [facility]  Scheduled Appointment: 04/03/2024    [facility]  Scheduled Appointment: 04/03/2024    Osmany Lisa  [facility]  NE[facility] [address]  Scheduled Appointment: 04/19/2024     [facility]  Scheduled Appointment: 02/22/2024    Osmany Lisa  [facility]  NE[facility] [address]  Scheduled Appointment: 03/27/2024    Cortney Mack  [facility]  Scheduled Appointment: 04/03/2024    [facility]  Scheduled Appointment: 04/03/2024    Osmany Lisa  [facility]  NE[facility] [address]  Scheduled Appointment: 04/19/2024

## 2024-02-13 NOTE — PROGRESS NOTE ADULT - ASSESSMENT
Hyponatremia - resolved. will continue Na bicarb. will continue Na bicarb. will follow BMP. recommend follow up with nephrology as an out patient  Metabolic acidosis - improved. will follow BMP. recommend follow up with nephrology as an out patient  Stage 5 CRI - pateint is clinically stable. she is refusing dialysis. recommend follow up with nephrology as an out patient  HTN - patient is clinically stable. Norvasc increased. will monitor BP  Colon cancer/mesenteric mass - recommend follow up with oncologist  Anemia - patient is hemodynamically stable. H/H improved. will follow H/H    Case discussed with patient's daughter/PA Hyponatremia - resolved. will continue Na bicarb. will continue Na bicarb. will follow BMP. recommend follow up with nephrology as an out patient  Metabolic acidosis - improved. will follow BMP. recommend follow up with nephrology as an out patient  Stage 5 CRI - pateint is clinically stable. she is refusing dialysis. recommend follow up with nephrology as an out patient  HTN - patient is clinically stable. Norvasc increased. nephrology is following. will monitor BP  Colon cancer/mesenteric mass - recommend follow up with oncologist  Anemia - patient is hemodynamically stable. H/H improved. will follow H/H    Case discussed with patient's daughter/PA

## 2024-02-13 NOTE — DISCHARGE NOTE PROVIDER - NSDCCPCAREPLAN_GEN_ALL_CORE_FT
PRINCIPAL DISCHARGE DIAGNOSIS  Diagnosis: Hyponatremia  Assessment and Plan of Treatment:       SECONDARY DISCHARGE DIAGNOSES  Diagnosis: MAGDI (acute kidney injury)  Assessment and Plan of Treatment:      PRINCIPAL DISCHARGE DIAGNOSIS  Diagnosis: Hyponatremia  Assessment and Plan of Treatment: You were diagnosed with low sodium, likely due to poor oral intake. You got fluids and your sodium improved. Continue to take sodium bicarbonate. Follow-up with Dr Michele in 1 week to repeat labs.      SECONDARY DISCHARGE DIAGNOSES  Diagnosis: Hypertension  Assessment and Plan of Treatment: We have adjusted your blood pressure medications and sent new scripts to your pharmacy.

## 2024-02-13 NOTE — PHYSICAL THERAPY INITIAL EVALUATION ADULT - PERTINENT HX OF CURRENT PROBLEM, REHAB EVAL
88 y/o F PMH of CKD 4, colon cancer (adenocarcinoma)-5-2011 Stage IIIC-T4 N2a s/p right colectomy followed by chemo, breast cancer, anemia, HTN, thrombocytopenia presents to the emergency department today with a reported low sodium on outpatient labs.  Per the patient and daughter she has been not acting herself for the last couple days and more slow and weak than usual together with nausea and vomiting.  Denies any fevers or chills. There is some decreased appetite.  No reported heavy drinking.  Patient had a sodium of 117 drawn today at her doctors visit. She was notified at 1 AM by her primary care doctor and she subsequently called an ambulance to come to the hospital. Patient stating that she has a history of hypernatremia and hyponatremia. Upon further questioning, it was revealed that the patient was supposed to be taking sodium bicarb 650mg 3 tabs Q8. About a week and a half ago, the patient stopped taking 9 tabs per day and was only taking 1 bicarb tab per day.

## 2024-02-13 NOTE — CHART NOTE - NSCHARTNOTEFT_GEN_A_CORE
Discussed case with Dr. Bales, reviewed all imaging and labs    Pt is 88 yo F w/PMH of CKD 4, colon cancer (adenocarcinoma) Stage IIIC-T4 N2a s/p R colectomy followed by chemo, Keytruda, breast cancer, anemia, HTN, thrombocytopenia comes to the ED with a reported low sodium on outpatient labs. Pt with weakness, nausea and vomiting.  Pt has chronic diarrhea. Noted to have Na 122, Cr 3.27, met acidosis. Pt was only taking Na Bicarb 1 pill/day as outpt.    #Acute on chronic Hyponatremia; resolved   #CKD4  - Na 139 today, Creat trending down  - pt off sodium bicarb gtt   - cont PO bicarb   - follow bmp daily  - urine osmolality per renal, pending   - nephro recs appreciated   - PT eval pending     #Hx Colon Ca   #Mesenteric mass  - CT A/P performed - Mesenteric mass suspect for metastatic disease causing compression of the duodenum with mild dilatation of the stomach  - per CT a/p read - mass is not significantly changed in size compared with November 26, 2023   - discussed with patient, states it is followed outpatient with Dr. Edmonds and being monitored     #Anemia  #Thrombocytopenia  - follow cbc  - Hgb and platelets trending up, she is s/p 2 units PRBC 2/11  - keep active t/s     #HTN  - hydralazine increased to 100mg tid per nephro  - added amlodipine   - monitor VS     Dispo: pending course

## 2024-02-13 NOTE — DISCHARGE NOTE PROVIDER - NSDCMRMEDTOKEN_GEN_ALL_CORE_FT
Adult Aspirin Regimen 81 mg oral delayed release tablet: 1 tab(s) orally once a day  cephalexin 500 mg oral capsule: 1 cap(s) orally 2 times a day  hydrALAZINE 25 mg oral tablet: 1 tab(s) orally every 8 hours  sodium bicarbonate 650 mg oral tablet: 3 tab(s) orally every 8 hours  Zyloprim 100 mg oral tablet: 1 tab(s) orally once a day   amLODIPine 10 mg oral tablet: 1 tab(s) orally once a day  doxazosin 2 mg oral tablet: 1 tab(s) orally once a day (at bedtime)  hydrALAZINE 100 mg oral tablet: 1 tab(s) orally every 8 hours  sodium bicarbonate 650 mg oral tablet: 3 tab(s) orally every 8 hours

## 2024-02-14 VITALS
SYSTOLIC BLOOD PRESSURE: 144 MMHG | OXYGEN SATURATION: 95 % | TEMPERATURE: 99 F | RESPIRATION RATE: 16 BRPM | DIASTOLIC BLOOD PRESSURE: 62 MMHG | HEART RATE: 70 BPM | WEIGHT: 152.34 LBS

## 2024-02-14 LAB
ALBUMIN SERPL ELPH-MCNC: 2.3 G/DL — LOW (ref 3.3–5)
ALP SERPL-CCNC: 49 U/L — SIGNIFICANT CHANGE UP (ref 40–120)
ALT FLD-CCNC: 10 U/L — SIGNIFICANT CHANGE UP (ref 10–45)
ANION GAP SERPL CALC-SCNC: 12 MMOL/L — SIGNIFICANT CHANGE UP (ref 5–17)
AST SERPL-CCNC: 12 U/L — SIGNIFICANT CHANGE UP (ref 10–40)
BILIRUB SERPL-MCNC: 0.4 MG/DL — SIGNIFICANT CHANGE UP (ref 0.2–1.2)
BUN SERPL-MCNC: 91 MG/DL — HIGH (ref 7–23)
CALCIUM SERPL-MCNC: 8.3 MG/DL — LOW (ref 8.4–10.5)
CHLORIDE SERPL-SCNC: 107 MMOL/L — SIGNIFICANT CHANGE UP (ref 96–108)
CO2 SERPL-SCNC: 21 MMOL/L — LOW (ref 22–31)
CREAT SERPL-MCNC: 4.28 MG/DL — HIGH (ref 0.5–1.3)
EGFR: 9 ML/MIN/1.73M2 — LOW
GLUCOSE SERPL-MCNC: 101 MG/DL — HIGH (ref 70–99)
HCT VFR BLD CALC: 27.4 % — LOW (ref 34.5–45)
HGB BLD-MCNC: 8.9 G/DL — LOW (ref 11.5–15.5)
MAGNESIUM SERPL-MCNC: 1.8 MG/DL — SIGNIFICANT CHANGE UP (ref 1.6–2.6)
MCHC RBC-ENTMCNC: 28.6 PG — SIGNIFICANT CHANGE UP (ref 27–34)
MCHC RBC-ENTMCNC: 32.5 GM/DL — SIGNIFICANT CHANGE UP (ref 32–36)
MCV RBC AUTO: 88.1 FL — SIGNIFICANT CHANGE UP (ref 80–100)
NRBC # BLD: 0 /100 WBCS — SIGNIFICANT CHANGE UP (ref 0–0)
PHOSPHATE SERPL-MCNC: 5.2 MG/DL — HIGH (ref 2.5–4.5)
PLATELET # BLD AUTO: 109 K/UL — LOW (ref 150–400)
POTASSIUM SERPL-MCNC: 4.7 MMOL/L — SIGNIFICANT CHANGE UP (ref 3.5–5.3)
POTASSIUM SERPL-SCNC: 4.7 MMOL/L — SIGNIFICANT CHANGE UP (ref 3.5–5.3)
PROT SERPL-MCNC: 5.2 G/DL — LOW (ref 6–8.3)
RBC # BLD: 3.11 M/UL — LOW (ref 3.8–5.2)
RBC # FLD: 20.9 % — HIGH (ref 10.3–14.5)
SODIUM SERPL-SCNC: 140 MMOL/L — SIGNIFICANT CHANGE UP (ref 135–145)
WBC # BLD: 3.11 K/UL — LOW (ref 3.8–10.5)
WBC # FLD AUTO: 3.11 K/UL — LOW (ref 3.8–10.5)

## 2024-02-14 PROCEDURE — 84100 ASSAY OF PHOSPHORUS: CPT

## 2024-02-14 PROCEDURE — 84300 ASSAY OF URINE SODIUM: CPT

## 2024-02-14 PROCEDURE — 86923 COMPATIBILITY TEST ELECTRIC: CPT

## 2024-02-14 PROCEDURE — 83935 ASSAY OF URINE OSMOLALITY: CPT

## 2024-02-14 PROCEDURE — 99291 CRITICAL CARE FIRST HOUR: CPT

## 2024-02-14 PROCEDURE — 85025 COMPLETE CBC W/AUTO DIFF WBC: CPT

## 2024-02-14 PROCEDURE — 86900 BLOOD TYPING SEROLOGIC ABO: CPT

## 2024-02-14 PROCEDURE — 36430 TRANSFUSION BLD/BLD COMPNT: CPT

## 2024-02-14 PROCEDURE — 80053 COMPREHEN METABOLIC PANEL: CPT

## 2024-02-14 PROCEDURE — 81001 URINALYSIS AUTO W/SCOPE: CPT

## 2024-02-14 PROCEDURE — 80048 BASIC METABOLIC PNL TOTAL CA: CPT

## 2024-02-14 PROCEDURE — 71045 X-RAY EXAM CHEST 1 VIEW: CPT

## 2024-02-14 PROCEDURE — 83735 ASSAY OF MAGNESIUM: CPT

## 2024-02-14 PROCEDURE — P9016: CPT

## 2024-02-14 PROCEDURE — 36415 COLL VENOUS BLD VENIPUNCTURE: CPT

## 2024-02-14 PROCEDURE — 86038 ANTINUCLEAR ANTIBODIES: CPT

## 2024-02-14 PROCEDURE — 84550 ASSAY OF BLOOD/URIC ACID: CPT

## 2024-02-14 PROCEDURE — 86850 RBC ANTIBODY SCREEN: CPT

## 2024-02-14 PROCEDURE — 97161 PT EVAL LOW COMPLEX 20 MIN: CPT

## 2024-02-14 PROCEDURE — 74176 CT ABD & PELVIS W/O CONTRAST: CPT

## 2024-02-14 PROCEDURE — 99239 HOSP IP/OBS DSCHRG MGMT >30: CPT

## 2024-02-14 PROCEDURE — 86901 BLOOD TYPING SEROLOGIC RH(D): CPT

## 2024-02-14 PROCEDURE — 84443 ASSAY THYROID STIM HORMONE: CPT

## 2024-02-14 PROCEDURE — 85027 COMPLETE CBC AUTOMATED: CPT

## 2024-02-14 RX ORDER — AMLODIPINE BESYLATE 2.5 MG/1
1 TABLET ORAL
Qty: 30 | Refills: 0
Start: 2024-02-14

## 2024-02-14 RX ORDER — SODIUM BICARBONATE 1 MEQ/ML
3 SYRINGE (ML) INTRAVENOUS
Refills: 0 | DISCHARGE

## 2024-02-14 RX ORDER — DOXAZOSIN MESYLATE 4 MG
4 TABLET ORAL AT BEDTIME
Refills: 0 | Status: DISCONTINUED | OUTPATIENT
Start: 2024-02-14 | End: 2024-02-14

## 2024-02-14 RX ORDER — HYDRALAZINE HCL 50 MG
1 TABLET ORAL
Qty: 90 | Refills: 0
Start: 2024-02-14

## 2024-02-14 RX ORDER — SODIUM BICARBONATE 1 MEQ/ML
3 SYRINGE (ML) INTRAVENOUS
Qty: 90 | Refills: 0
Start: 2024-02-14

## 2024-02-14 RX ORDER — DOXAZOSIN MESYLATE 4 MG
1 TABLET ORAL
Qty: 30 | Refills: 0
Start: 2024-02-14

## 2024-02-14 RX ADMIN — Medication 100 MILLIGRAM(S): at 14:00

## 2024-02-14 RX ADMIN — HEPARIN SODIUM 5000 UNIT(S): 5000 INJECTION INTRAVENOUS; SUBCUTANEOUS at 05:52

## 2024-02-14 RX ADMIN — Medication 650 MILLIGRAM(S): at 05:52

## 2024-02-14 RX ADMIN — Medication 100 MILLIGRAM(S): at 05:52

## 2024-02-14 RX ADMIN — AMLODIPINE BESYLATE 10 MILLIGRAM(S): 2.5 TABLET ORAL at 05:51

## 2024-02-14 RX ADMIN — Medication 650 MILLIGRAM(S): at 14:00

## 2024-02-14 NOTE — PROGRESS NOTE ADULT - ASSESSMENT
89F with CKD5, hx colon ca s/p right colectomy and chemo, hx breat cancer, possible MDS, anemia, HTN, thrombocytopenia, presents to [facility] due to incidental finding of hyponatremia on outpatient labs, which has since resolved, and is now stable for dischagre.    #Hyponatremia: resolved  #CKD5: Cr stable, outpatient renal follow-up  #Anemia of chronic disease: stable  #Essential HTN: medications adjusted. Was on Hydralazine 50 q8h and home, now on double the dose. Was also taking Norvasc 10 mg at home. Was on doxazosin 1 mg at home. Will send all new meds to pharmacy. BP much improved this morning.   #Mesenteric mass: outpatient oncology follow-up. Known to patient.    Stable for d/c today. TIme spent on d/c 36 min including medication education, reconciliation, and reviewing discharge documentation. Case d/w patient's daughter, [place], [phone]. All questions answered. Agrees with plan 89F with CKD5, hx colon ca s/p right colectomy and chemo, hx breat cancer, possible MDS, anemia, HTN, thrombocytopenia, presents to [facility] due to incidental finding of hyponatremia on outpatient labs, which has since resolved, and is now stable for dischagre.    #Hyponatremia: resolved  #CKD5: Cr stable, outpatient renal follow-up  #Anemia of chronic disease: stable  #Essential HTN: medications adjusted. Was on Hydralazine 50 q8h and home, now on double the dose. Was also taking Norvasc 10 mg at home. Was on doxazosin 1 mg at home. Will send all new meds to pharmacy. BP much improved this morning.   #Mesenteric mass: outpatient oncology follow-up. Known to patient.    *Confirmed that patient on aspirin for primary prophylaxis - risks > benefits due to thrombocytopenia and anemia. Would stop ASA at this time. Would further discuss with Dr Michele at next outpatient visit.     Stable for d/c today. TIme spent on d/c 36 min including medication education, reconciliation, and reviewing discharge documentation. Case d/w patient's daughter, [place], [phone]. All questions answered. Agrees with plan

## 2024-02-14 NOTE — PROGRESS NOTE ADULT - PROVIDER SPECIALTY LIST ADULT
Internal Medicine
Nephrology
Internal Medicine
Nephrology
Hospitalist

## 2024-02-14 NOTE — PROGRESS NOTE ADULT - SUBJECTIVE AND OBJECTIVE BOX
HANG MADERA  89y  Female      patient is resting comfortably and without complaints      REVIEW OF SYSTEMS:    Cardiac HTN  Pulmonary no cough/SOB  GI colon cancer/mesenteric mass   renal insufficency  Neuro no headache/numbness/dizziness  Heme anemia    FAMILY HISTORY:  FH: colon cancer (Sibling, Sibling)    FH: breast cancer (Sibling)      T(C): 36.8 (02-12-24 @ 20:34), Max: 36.8 (02-12-24 @ 05:22)  HR: 67 (02-12-24 @ 20:34) (51 - 67)  BP: 180/60 (02-12-24 @ 20:34) (165/45 - 200/67)  RR: 17 (02-12-24 @ 20:34) (16 - 17)  SpO2: 96% (02-12-24 @ 20:34) (95% - 97%)  Wt(kg): --Vital Signs Last 24 Hrs  T(C): 36.8 (12 Feb 2024 20:34), Max: 36.8 (12 Feb 2024 05:22)  T(F): 98.3 (12 Feb 2024 20:34), Max: 98.3 (12 Feb 2024 20:34)  HR: 67 (12 Feb 2024 20:34) (51 - 67)  BP: 180/60 (12 Feb 2024 20:34) (165/45 - 200/67)  BP(mean): --  RR: 17 (12 Feb 2024 20:34) (16 - 17)  SpO2: 96% (12 Feb 2024 20:34) (95% - 97%)    Parameters below as of 12 Feb 2024 20:34  Patient On (Oxygen Delivery Method): room air      No Known Allergies      PHYSICAL EXAM:    General NAD  Neck no JVD thyroid stable  Heart regular  Lungs clear  Abdomen non tender non distended normal bowel sounds no HSM/CVAT  Extremities non tender no edema +pulses  Neurologic alert and oriented x 3 no acute focal changes        Consultant(s) Notes Reviewed:  [x ] YES  [ ] NO      LABS:  CBC Full  -  ( 12 Feb 2024 06:28 )  WBC Count : 3.04 K/uL  RBC Count : 2.95 M/uL  Hemoglobin : 8.4 g/dL  Hematocrit : 25.8 %  Platelet Count - Automated : 83 K/uL  Mean Cell Volume : 87.5 fl  Mean Cell Hemoglobin : 28.5 pg  Mean Cell Hemoglobin Concentration : 32.6 gm/dL  Auto Neutrophil # : x  Auto Lymphocyte # : x  Auto Monocyte # : x  Auto Eosinophil # : x  Auto Basophil # : x  Auto Neutrophil % : x  Auto Lymphocyte % : x  Auto Monocyte % : x  Auto Eosinophil % : x  Auto Basophil % : x                          8.4    3.04  )-----------( 83       ( 12 Feb 2024 06:28 )             25.8     02-12    137  |  103  |  74<H>  ----------------------------<  90  4.0   |  24  |  4.06<H>    Ca    7.8<L>      12 Feb 2024 06:28          Urinalysis Basic - ( 12 Feb 2024 06:28 )    Color: x / Appearance: x / SG: x / pH: x  Gluc: 90 mg/dL / Ketone: x  / Bili: x / Urobili: x   Blood: x / Protein: x / Nitrite: x   Leuk Esterase: x / RBC: x / WBC x   Sq Epi: x / Non Sq Epi: x / Bacteria: x                      aspirin enteric coated 81 milliGRAM(s) Oral daily  heparin   Injectable 5000 Unit(s) SubCutaneous every 12 hours  hydrALAZINE 100 milliGRAM(s) Oral every 8 hours  sodium bicarbonate 650 milliGRAM(s) Oral every 8 hours        
  HANG MADERA  89y  Female      patient states she is feeling better. no complaints of chest pain/SOB/abdominal pain      REVIEW OF SYSTEMS:    Cardiac HTN  Pulmonary no cough/SOB  GI colon cancer/h/o mesenteric mass   renal insufficiency  Neuro no headache/numbness/dizziness    FAMILY HISTORY:  FH: colon cancer (Sibling, Sibling)    FH: breast cancer (Sibling)      T(C): 36.9 (02-11-24 @ 05:00), Max: 36.9 (02-11-24 @ 05:00)  HR: 60 (02-11-24 @ 05:00) (50 - 60)  BP: 158/50 (02-11-24 @ 05:00) (153/51 - 185/58)  RR: 18 (02-11-24 @ 05:00) (18 - 18)  SpO2: 97% (02-11-24 @ 05:00) (96% - 97%)  Wt(kg): --Vital Signs Last 24 Hrs  T(C): 36.9 (11 Feb 2024 05:00), Max: 36.9 (11 Feb 2024 05:00)  T(F): 98.4 (11 Feb 2024 05:00), Max: 98.4 (11 Feb 2024 05:00)  HR: 60 (11 Feb 2024 05:00) (50 - 60)  BP: 158/50 (11 Feb 2024 05:00) (153/51 - 185/58)  BP(mean): --  RR: 18 (11 Feb 2024 05:00) (18 - 18)  SpO2: 97% (11 Feb 2024 05:00) (96% - 97%)    Parameters below as of 11 Feb 2024 05:00  Patient On (Oxygen Delivery Method): room air      No Known Allergies      PHYSICAL EXAM:    General NAD  Neck non tender no JVD thyroid stable  Heart regular  Lungs clear   Abdomen non tender non distended normal bowel sounds no HSM/CVAT  Extremities non tender no edema +pulses  Neurologic alert and oriented x 3 no acute focal changes          LABS:  CBC Full  -  ( 11 Feb 2024 08:40 )  WBC Count : 2.51 K/uL  RBC Count : 2.22 M/uL  Hemoglobin : 6.4 g/dL  Hematocrit : 19.0 %  Platelet Count - Automated : 73 K/uL  Mean Cell Volume : 85.6 fl  Mean Cell Hemoglobin : 28.8 pg  Mean Cell Hemoglobin Concentration : 33.7 gm/dL  Auto Neutrophil # : x  Auto Lymphocyte # : x  Auto Monocyte # : x  Auto Eosinophil # : x  Auto Basophil # : x  Auto Neutrophil % : x  Auto Lymphocyte % : x  Auto Monocyte % : x  Auto Eosinophil % : x  Auto Basophil % : x                          6.4    2.51  )-----------( 73       ( 11 Feb 2024 08:40 )             19.0     02-11    133<L>  |  101  |  61<H>  ----------------------------<  91  3.8   |  19<L>  |  3.88<H>    Ca    7.7<L>      11 Feb 2024 06:58  Phos  4.9     02-10  Mg     1.8     02-10          Urinalysis Basic - ( 11 Feb 2024 06:58 )    Color: x / Appearance: x / SG: x / pH: x  Gluc: 91 mg/dL / Ketone: x  / Bili: x / Urobili: x   Blood: x / Protein: x / Nitrite: x   Leuk Esterase: x / RBC: x / WBC x   Sq Epi: x / Non Sq Epi: x / Bacteria: x            < from: CT Abdomen and Pelvis No Cont (02.10.24 @ 10:15) >  ACC: [id number] EXAM:  CT ABDOMEN AND PELVIS   ORDERED BY: FLOWER RESENDIZ     PROCEDURE DATE:  02/10/2024          INTERPRETATION:  CLINICAL INFORMATION: Nausea and vomiting. History of   colon cancer. Rule out pathology.    COMPARISON: November 26, 2023    CONTRAST/COMPLICATIONS:  IV Contrast: NONE  Oral Contrast: NONE  Complications: None reported at time of study completion    PROCEDURE:  CT of the Abdomen and Pelvis was performed.  Sagittal and coronal reformats were performed.    FINDINGS:  LOWER CHEST: Coronary artery calcifications.    LIVER: Numerous cysts.  BILE DUCTS: Normal caliber.  GALLBLADDER: Within normal limits.  SPLEEN: Within normal limits.  PANCREAS/PERITONEUM: Multilocular cystic mass containing calcifications   and inseparable from the pancreatic head measures 8.6 x 6.6, not   significantly changed.  ADRENALS: Within normal limits.  KIDNEYS/URETERS: Mildly atrophic and scarred kidneys. Nonobstructing 7 mm   left lower pole stone. Multiple small bilateral cysts, better visualized   on prior MRI January 11, 2023.    BLADDER: Within normal limits.  REPRODUCTIVE ORGANS: Uterine calcification, likely from a calcified   fibroid. No adnexal mass.    BOWEL: Mildly distended stomach and compression of the second and third   portions of the duodenum by the patient's large mesenteric mass. Status   post right colon resection and ileocolic anastomosis. Colonic   diverticulosis.  PERITONEUM: No ascites.  VESSELS: Within normal limits.  RETROPERITONEUM/LYMPH NODES: No lymphadenopathy.  ABDOMINAL WALL: Within normal limits.  BONES: No lytic or blastic lesion. Spinal degenerative changes.    IMPRESSION:    Mesenteric mass suspect for metastatic disease causing compression of the   duodenum with mild dilatation of the stomach. Please correlate clinically   for gastric outlet obstruction.    The mass is not significantly changed in size compared with November 26, 2023.    --- End of Report ---            NEMEISO TAVARES MD; Attending Radiologist  This document has been electronically signed. Feb 10 2024  1:29PM    < end of copied text >            aspirin enteric coated 81 milliGRAM(s) Oral daily  heparin   Injectable 5000 Unit(s) SubCutaneous every 12 hours  hydrALAZINE 100 milliGRAM(s) Oral every 12 hours  sodium bicarbonate 650 milliGRAM(s) Oral every 8 hours  sodium chloride 0.45% 1000 milliLiter(s) IV Continuous <Continuous>          
Feels better overall    Vital Signs Last 24 Hrs  T(C): 36.6 (02-12-24 @ 12:14), Max: 36.8 (02-12-24 @ 05:22)  T(F): 97.8 (02-12-24 @ 12:14), Max: 98.2 (02-12-24 @ 05:22)  HR: 51 (02-12-24 @ 12:14) (51 - 51)  BP: 165/45   RR: 16 (02-12-24 @ 12:14) (16 - 16)  SpO2: 97% (02-12-24 @ 12:14) (95% - 97%)    s1s2  b/l air entry  soft, ND  no edema                         8.4    3.04  )-----------( 83       ( 12 Feb 2024 06:28 )             25.8     12 Feb 2024 06:28    137    |  103    |  74     ----------------------------<  90     4.0     |  24     |  4.06     Ca    7.8        12 Feb 2024 06:28    aspirin enteric coated 81 milliGRAM(s) Oral daily  heparin   Injectable 5000 Unit(s) SubCutaneous every 12 hours  hydrALAZINE 100 milliGRAM(s) Oral every 8 hours  sodium bicarbonate 650 milliGRAM(s) Oral every 8 hours    A/P:    Hx colon cancer s/p R colectomy, chemo  CKD 4 iso Keytruda (d/c-d ~ 1 year ago)  Adm w/hyponatremia, met acidosis iso N/V, diarrhea, advanced CKD  Na has corrected  Agree w/d/c IVF  Avoid nephrotoxins  Norvasc added for HTN  Continue Na Bicarb   F/u CMP, CBC, Mg, UO    [phone]
No distress    Vital Signs Last 24 Hrs  T(C): 37.1 (02-14-24 @ 05:06), Max: 37.1 (02-14-24 @ 05:06)  T(F): 98.7 (02-14-24 @ 05:06), Max: 98.7 (02-14-24 @ 05:06)  HR: 70 (02-14-24 @ 05:06) (70 - 70)  BP: 144/62 (02-14-24 @ 05:06) (144/62 - 144/62)  RR: 16 (02-14-24 @ 05:06) (16 - 16)  SpO2: 95% (02-14-24 @ 05:06) (95% - 95%)    s1s2  b/l air entry  soft, ND  no edema                                         8.9    3.11  )-----------( 109      ( 14 Feb 2024 06:38 )             27.4     14 Feb 2024 06:38    140    |  107    |  91     ----------------------------<  101    4.7     |  21     |  4.28     Ca    8.3        14 Feb 2024 06:38  Phos  5.2       14 Feb 2024 06:38  Mg     1.8       14 Feb 2024 06:38    TPro  5.2    /  Alb  2.3    /  TBili  0.4    /  DBili  x      /  AST  12     /  ALT  10     /  AlkPhos  49     14 Feb 2024 06:38    LIVER FUNCTIONS - ( 14 Feb 2024 06:38 )  Alb: 2.3 g/dL / Pro: 5.2 g/dL / ALK PHOS: 49 U/L / ALT: 10 U/L / AST: 12 U/L / GGT: x           amLODIPine   Tablet 10 milliGRAM(s) Oral daily  aspirin enteric coated 81 milliGRAM(s) Oral daily  doxazosin 4 milliGRAM(s) Oral at bedtime  heparin   Injectable 5000 Unit(s) SubCutaneous every 12 hours  hydrALAZINE 100 milliGRAM(s) Oral every 8 hours  sodium bicarbonate 650 milliGRAM(s) Oral every 8 hours    A/P:    Hx colon cancer s/p R colectomy, chemo  CKD 4 iso Keytruda (d/c-d ~ 1 year ago)  Adm w/hyponatremia, met acidosis iso N/V, diarrhea, advanced CKD  Na has corrected  Cr fluctuates, but overall stable renal fx   Avoid nephrotoxins  Continue Na Bicarb   Low Na diet   Renal f/u as op    [phone]
[facility] NEPHROLOGY SERVICES, [facility]  NEPHROLOGY AND HYPERTENSION  [address]  [address]  [place] [address]  [phone]    MD FABIANA SO, MD FLOWER RESENDIZ, MD ROGERIO GUPTA, MD HNOEY DUMONT MD          Patient events noted    MEDICATIONS  (STANDING):  aspirin enteric coated 81 milliGRAM(s) Oral daily  heparin   Injectable 5000 Unit(s) SubCutaneous every 12 hours  hydrALAZINE 100 milliGRAM(s) Oral every 12 hours  sodium bicarbonate 650 milliGRAM(s) Oral every 8 hours  sodium chloride 0.45% 1000 milliLiter(s) (50 mL/Hr) IV Continuous <Continuous>    MEDICATIONS  (PRN):      02-09-24 @ 07:01  -  02-10-24 @ 07:00  --------------------------------------------------------  IN: 0 mL / OUT: 2 mL / NET: -2 mL    02-10-24 @ 07:01  -  02-10-24 @ 21:25  --------------------------------------------------------  IN: 200 mL / OUT: 0 mL / NET: 200 mL      PHYSICAL EXAM:      T(C): 36.8 (02-10-24 @ 20:44), Max: 36.9 (02-10-24 @ 00:40)  HR: 53 (02-10-24 @ 20:44) (50 - 62)  BP: 164/54 (02-10-24 @ 20:44) (153/51 - 193/50)  RR: 18 (02-10-24 @ 20:44) (16 - 18)  SpO2: 96% (02-10-24 @ 20:44) (96% - 97%)  Wt(kg): --  Lungs clear  Heart S1S2  Abd soft NT ND  Extremities:   tr edema                                    8.6    2.96  )-----------( 153      ( 09 Feb 2024 02:40 )             25.1     02-10    129<L>  |  98  |  60<H>  ----------------------------<  86  3.6   |  18<L>  |  3.44<H>    Ca    7.9<L>      10 Feb 2024 06:30  Phos  4.9     02-10  Mg     1.8     02-10    TPro  6.2  /  Alb  2.8<L>  /  TBili  0.4  /  DBili  x   /  AST  13  /  ALT  16  /  AlkPhos  74  02-09      LIVER FUNCTIONS - ( 09 Feb 2024 02:40 )  Alb: 2.8 g/dL / Pro: 6.2 g/dL / ALK PHOS: 74 U/L / ALT: 16 U/L / AST: 13 U/L / GGT: x           Creatinine Trend: 3.44<--, 3.56<--, 3.54<--, 3.27<--, 3.40<--, 3.48<--      A/P:    Hx colon cancer s/p R colectomy, chemo  CKD 4 iso Keytruda (d/c-d ~ 1 year ago)  Adm w/hyponatremia, met acidosis iso N/V, diarrhea, advanced CKD  Cr is close to recent baseline  Gentle hypertonic IVF w/Bicarb  Avoid nephrotoxins  F/u CMP, CBC, Mg, UO      Calos Yang MD
[facility] NEPHROLOGY SERVICES, [facility]  NEPHROLOGY AND HYPERTENSION  [address]  [address]  [place] [address]  [phone]    MD FABIANA SO, MD DOUGLAS MONACO MD CHRISTOPHER CAPUTO, MD HONEY DUMONT MD          Patient events noted    MEDICATIONS  (STANDING):  aspirin enteric coated 81 milliGRAM(s) Oral daily  heparin   Injectable 5000 Unit(s) SubCutaneous every 12 hours  hydrALAZINE 100 milliGRAM(s) Oral every 12 hours  sodium bicarbonate 650 milliGRAM(s) Oral every 8 hours  sodium chloride 0.45% 1000 milliLiter(s) (50 mL/Hr) IV Continuous <Continuous>    MEDICATIONS  (PRN):      02-10-24 @ 07:01  -  02-11-24 @ 07:00  --------------------------------------------------------  IN: 200 mL / OUT: 0 mL / NET: 200 mL      PHYSICAL EXAM:      T(C): 36.8 (02-11-24 @ 19:49), Max: 36.9 (02-11-24 @ 05:00)  HR: 53 (02-11-24 @ 19:49) (50 - 65)  BP: 179/52 (02-11-24 @ 19:49) (158/50 - 181/53)  RR: 18 (02-11-24 @ 19:49) (17 - 20)  SpO2: 96% (02-11-24 @ 19:49) (96% - 97%)  Wt(kg): --  Lungs clear  Heart S1S2  Abd soft NT ND  Extremities:   tr edema                                    6.4    2.51  )-----------( 73       ( 11 Feb 2024 08:40 )             19.0     02-11    133<L>  |  101  |  61<H>  ----------------------------<  91  3.8   |  19<L>  |  3.88<H>    Ca    7.7<L>      11 Feb 2024 06:58  Phos  4.9     02-10  Mg     1.8     02-10          Creatinine Trend: 3.88<--, 3.44<--, 3.56<--, 3.54<--, 3.27<--, 3.40<--      A/P:  Acute on chronic anemia, for PRBC tx  Hx colon cancer s/p R colectomy, chemo  CKD 4 iso Keytruda (d/c-d ~ 1 year ago)  Adm w/hyponatremia, met acidosis iso N/V, diarrhea, advanced CKD  Cr is close to recent baseline  Gentle hypertonic IVF w/Bicarb  Avoid nephrotoxins  F/u CMP, CBC, Mg, UO        Calos Yang MD
W/o complaints     Vital Signs Last 24 Hrs  T(C): 36.7 (02-13-24 @ 12:13), Max: 36.9 (02-13-24 @ 05:08)  T(F): 98.1 (02-13-24 @ 12:13), Max: 98.5 (02-13-24 @ 05:08)  HR: 66 (02-13-24 @ 12:13) (63 - 71)  BP: 176/63 (02-13-24 @ 12:13) (161/61 - 194/62)  RR: 15 (02-13-24 @ 12:13) (15 - 17)  SpO2: 96% (02-13-24 @ 12:13) (95% - 96%)    s1s2  b/l air entry  soft, ND  no edema                                 9.4    3.19  )-----------( 104      ( 13 Feb 2024 06:17 )             28.7     13 Feb 2024 06:17    139    |  105    |  78     ----------------------------<  99     3.9     |  21     |  3.82     Ca    8.4        13 Feb 2024 06:17    amLODIPine   Tablet 10 milliGRAM(s) Oral daily  aspirin enteric coated 81 milliGRAM(s) Oral daily  doxazosin 2 milliGRAM(s) Oral at bedtime  heparin   Injectable 5000 Unit(s) SubCutaneous every 12 hours  hydrALAZINE 100 milliGRAM(s) Oral every 8 hours  sodium bicarbonate 650 milliGRAM(s) Oral every 8 hours    A/P:    Hx colon cancer s/p R colectomy, chemo  CKD 4 iso Keytruda (d/c-d ~ 1 year ago)  Adm w/hyponatremia, met acidosis iso N/V, diarrhea, advanced CKD  Na has corrected  Cr is close to recent baseline  Avoid nephrotoxins  Will titrate Doxazosin for HTN  Continue Na Bicarb   Low Na diet   F/u CMP, CBC, Mg, Phos  Renal f/u as op    [phone]
Patient is a 89y old  Female who presents with a chief complaint of Hyponatremia (13 Feb 2024 17:07)      Patient seen and examined at bedside. No overnight events reported. BP much improved this morning.     ALLERGIES:  No Known Allergies    MEDICATIONS  (STANDING):  amLODIPine   Tablet 10 milliGRAM(s) Oral daily  aspirin enteric coated 81 milliGRAM(s) Oral daily  doxazosin 2 milliGRAM(s) Oral at bedtime  heparin   Injectable 5000 Unit(s) SubCutaneous every 12 hours  hydrALAZINE 100 milliGRAM(s) Oral every 8 hours  sodium bicarbonate 650 milliGRAM(s) Oral every 8 hours    MEDICATIONS  (PRN):    Vital Signs Last 24 Hrs  T(F): 98.7 (14 Feb 2024 05:06), Max: 98.7 (14 Feb 2024 05:06)  HR: 70 (14 Feb 2024 05:06) (66 - 71)  BP: 144/62 (14 Feb 2024 05:06) (144/62 - 183/61)  RR: 16 (14 Feb 2024 05:06) (15 - 16)  SpO2: 95% (14 Feb 2024 05:06) (95% - 96%)  I&O's Summary    PHYSICAL EXAM:  General: NAD, A/O x 3  ENT: No gross hearing impairment, Moist mucous membranes, no thrush  Neck: Supple, No JVD  Lungs: Clear to auscultation bilaterally, good air entry, non-labored breathing  Cardio: RRR, S1/S2, + systolic murmur  Abdomen: Soft, Nontender, Nondistended; Bowel sounds present  Extremities: No calf tenderness, No cyanosis, No pitting edema  Psych: Appropriate mood and affect    LABS:                        8.9    3.11  )-----------( 109      ( 14 Feb 2024 06:38 )             27.4     02-14    140  |  107  |  91  ----------------------------<  101  4.7   |  21  |  4.28    Ca    8.3      14 Feb 2024 06:38  Phos  5.2     02-14  Mg     1.8     02-14    TPro  5.2  /  Alb  2.3  /  TBili  0.4  /  DBili  x   /  AST  12  /  ALT  10  /  AlkPhos  49  02-14    Urinalysis Basic - ( 14 Feb 2024 06:38 )    Color: x / Appearance: x / SG: x / pH: x  Gluc: 101 mg/dL / Ketone: x  / Bili: x / Urobili: x   Blood: x / Protein: x / Nitrite: x   Leuk Esterase: x / RBC: x / WBC x   Sq Epi: x / Non Sq Epi: x / Bacteria: x  
  HANG MADERA  89y  Female      patient is feeling much better. she denies n/v/fatigue/SOB/chest pain/abdominal pain    REVIEW OF SYSTEMS:    Cardiac HTN  Pulmonary no cough/SOB  GI colon cancer/mesenteric mass   CRI  Neuro no headache/dizziness/numbness    FAMILY HISTORY:  FH: colon cancer (Sibling, Sibling)    FH: breast cancer (Sibling)      T(C): 36.7 (02-13-24 @ 12:13), Max: 36.9 (02-13-24 @ 05:08)  HR: 66 (02-13-24 @ 12:13) (63 - 71)  BP: 176/63 (02-13-24 @ 12:13) (161/61 - 194/62)  RR: 15 (02-13-24 @ 12:13) (15 - 17)  SpO2: 96% (02-13-24 @ 12:13) (95% - 96%)  Wt(kg): --Vital Signs Last 24 Hrs  T(C): 36.7 (13 Feb 2024 12:13), Max: 36.9 (13 Feb 2024 05:08)  T(F): 98.1 (13 Feb 2024 12:13), Max: 98.5 (13 Feb 2024 05:08)  HR: 66 (13 Feb 2024 12:13) (63 - 71)  BP: 176/63 (13 Feb 2024 12:13) (161/61 - 194/62)  BP(mean): --  RR: 15 (13 Feb 2024 12:13) (15 - 17)  SpO2: 96% (13 Feb 2024 12:13) (95% - 96%)    Parameters below as of 13 Feb 2024 12:13  Patient On (Oxygen Delivery Method): room air      No Known Allergies      PHYSICAL EXAM:    General NAD   Neck no JVD thyroid stable  Heart regular  Lungs clear  Abdomen non tender non distended normal bowel sounds no HSM/CVAT  Extremities non tender no edema +pulses  Neurologic alert and oriented x 3 no acute focal changes        Consultant(s) Notes Reviewed:  [x ] YES  [ ] NO      LABS:  CBC Full  -  ( 13 Feb 2024 06:17 )  WBC Count : 3.19 K/uL  RBC Count : 3.31 M/uL  Hemoglobin : 9.4 g/dL  Hematocrit : 28.7 %  Platelet Count - Automated : 104 K/uL  Mean Cell Volume : 86.7 fl  Mean Cell Hemoglobin : 28.4 pg  Mean Cell Hemoglobin Concentration : 32.8 gm/dL  Auto Neutrophil # : x  Auto Lymphocyte # : x  Auto Monocyte # : x  Auto Eosinophil # : x  Auto Basophil # : x  Auto Neutrophil % : x  Auto Lymphocyte % : x  Auto Monocyte % : x  Auto Eosinophil % : x  Auto Basophil % : x                          9.4    3.19  )-----------( 104      ( 13 Feb 2024 06:17 )             28.7     02-13    139  |  105  |  78<H>  ----------------------------<  99  3.9   |  21<L>  |  3.82<H>    Ca    8.4      13 Feb 2024 06:17          Urinalysis Basic - ( 13 Feb 2024 06:17 )    Color: x / Appearance: x / SG: x / pH: x  Gluc: 99 mg/dL / Ketone: x  / Bili: x / Urobili: x   Blood: x / Protein: x / Nitrite: x   Leuk Esterase: x / RBC: x / WBC x   Sq Epi: x / Non Sq Epi: x / Bacteria: x                      amLODIPine   Tablet 10 milliGRAM(s) Oral daily  aspirin enteric coated 81 milliGRAM(s) Oral daily  doxazosin 2 milliGRAM(s) Oral at bedtime  heparin   Injectable 5000 Unit(s) SubCutaneous every 12 hours  hydrALAZINE 100 milliGRAM(s) Oral every 8 hours  sodium bicarbonate 650 milliGRAM(s) Oral every 8 hours              
  HANG MADERA  89y  Female    patient states she is feeling better. she denies n/v abdominal pain      REVIEW OF SYSTEMS:      General metabolic acidosis/hyponatremia  Cardiac HTN  Pulmonary no cough/SOB  GI colon cancer   CRI stage 5  Neuro no headache/numbness/dizziness  Heme anemia    FAMILY HISTORY:  FH: colon cancer (Sibling, Sibling)    FH: breast cancer (Sibling)      T(C): 36.8 (02-10-24 @ 12:35), Max: 37 (02-09-24 @ 20:30)  HR: 62 (02-10-24 @ 12:35) (53 - 62)  BP: 193/50 (02-10-24 @ 12:35) (157/52 - 193/50)  RR: 17 (02-10-24 @ 12:35) (16 - 17)  SpO2: 97% (02-10-24 @ 12:35) (96% - 98%)  Wt(kg): --Vital Signs Last 24 Hrs  T(C): 36.8 (10 Feb 2024 12:35), Max: 37 (09 Feb 2024 20:30)  T(F): 98.2 (10 Feb 2024 12:35), Max: 98.6 (09 Feb 2024 20:30)  HR: 62 (10 Feb 2024 12:35) (53 - 62)  BP: 193/50 (10 Feb 2024 12:35) (157/52 - 193/50)  BP(mean): --  RR: 17 (10 Feb 2024 12:35) (16 - 17)  SpO2: 97% (10 Feb 2024 12:35) (96% - 98%)    Parameters below as of 10 Feb 2024 12:35  Patient On (Oxygen Delivery Method): room air      No Known Allergies      PHYSICAL EXAM:    General NAD  Neck thyroid stable no JVD  Heart RRR  Lungs clear  Abdomen non tender non distended normal bowel sounds no HSM/CVAT  Extremities non tender no edema +pulses  Neurologic alert and oriented x 3 no acute focal changes      LABS:  CBC Full  -  ( 09 Feb 2024 02:40 )  WBC Count : 2.96 K/uL  RBC Count : 2.93 M/uL  Hemoglobin : 8.6 g/dL  Hematocrit : 25.1 %  Platelet Count - Automated : 153 K/uL  Mean Cell Volume : 85.7 fl  Mean Cell Hemoglobin : 29.4 pg  Mean Cell Hemoglobin Concentration : 34.3 gm/dL  Auto Neutrophil # : 2.20 K/uL  Auto Lymphocyte # : 0.31 K/uL  Auto Monocyte # : 0.25 K/uL  Auto Eosinophil # : 0.01 K/uL  Auto Basophil # : 0.00 K/uL  Auto Neutrophil % : 79.0 %  Auto Lymphocyte % : 11.2 %  Auto Monocyte % : 9.0 %  Auto Eosinophil % : 0.4 %  Auto Basophil % : 0.0 %                          8.6    2.96  )-----------( 153      ( 09 Feb 2024 02:40 )             25.1     02-10    129<L>  |  98  |  60<H>  ----------------------------<  86  3.6   |  18<L>  |  3.44<H>    Ca    7.9<L>      10 Feb 2024 06:30  Phos  4.9     02-10  Mg     1.8     02-10    TPro  6.2  /  Alb  2.8<L>  /  TBili  0.4  /  DBili  x   /  AST  13  /  ALT  16  /  AlkPhos  74  02-09    LIVER FUNCTIONS - ( 09 Feb 2024 02:40 )  Alb: 2.8 g/dL / Pro: 6.2 g/dL / ALK PHOS: 74 U/L / ALT: 16 U/L / AST: 13 U/L / GGT: x             Urinalysis Basic - ( 10 Feb 2024 06:30 )    Color: x / Appearance: x / SG: x / pH: x  Gluc: 86 mg/dL / Ketone: x  / Bili: x / Urobili: x   Blood: x / Protein: x / Nitrite: x   Leuk Esterase: x / RBC: x / WBC x   Sq Epi: x / Non Sq Epi: x / Bacteria: x                      aspirin enteric coated 81 milliGRAM(s) Oral daily  heparin   Injectable 5000 Unit(s) SubCutaneous every 12 hours  hydrALAZINE 50 milliGRAM(s) Oral every 8 hours  sodium bicarbonate 650 milliGRAM(s) Oral every 8 hours  sodium chloride 0.45% 1000 milliLiter(s) IV Continuous <Continuous>            
  MADELINDEION PARTIDAMELA  89y  Female      patient states she is feeling better and with less fatigue. she denies n/v/abdominal pain/confusion      REVIEW OF SYSTEMS:    General metabolic acidosis/hyponatremia  Cardiac HTN  Pulmonary no cough/SOB  GI colon cancer s/p n/v   CRI  Neuro no headache/dizziness/numbness  Heme anemia      FAMILY HISTORY:  FH: colon cancer (Sibling, Sibling)    FH: breast cancer (Sibling)      T(C): 36.4 (02-09-24 @ 12:57), Max: 36.4 (02-09-24 @ 10:37)  HR: 47 (02-09-24 @ 12:57) (47 - 71)  BP: 179/53 (02-09-24 @ 10:37) (149/63 - 179/53)  RR: 17 (02-09-24 @ 12:57) (16 - 18)  SpO2: 97% (02-09-24 @ 12:57) (97% - 98%)  Wt(kg): --Vital Signs Last 24 Hrs  T(C): 36.4 (09 Feb 2024 12:57), Max: 36.4 (09 Feb 2024 10:37)  T(F): 97.6 (09 Feb 2024 12:57), Max: 97.6 (09 Feb 2024 12:57)  HR: 47 (09 Feb 2024 12:57) (47 - 71)  BP: 179/53 (09 Feb 2024 10:37) (149/63 - 179/53)  BP(mean): --  RR: 17 (09 Feb 2024 12:57) (16 - 18)  SpO2: 97% (09 Feb 2024 12:57) (97% - 98%)    Parameters below as of 09 Feb 2024 12:57  Patient On (Oxygen Delivery Method): room air      No Known Allergies      PHYSICAL EXAM:    General resting comfortably NAD  Neck thyroid stable no JVD non tender  Heart RRR  Lungs clear  Abdomen non tender non distended normal bowel sounds no HSM/CVAT  Extremities non tender no edema +pulses  Neurologic alert and oriented x 3 no acute focal changes        Consultant(s) Notes Reviewed:  [x ] YES  [ ] NO  Care Di    LABS:  CBC Full  -  ( 09 Feb 2024 02:40 )  WBC Count : 2.96 K/uL  RBC Count : 2.93 M/uL  Hemoglobin : 8.6 g/dL  Hematocrit : 25.1 %  Platelet Count - Automated : 153 K/uL  Mean Cell Volume : 85.7 fl  Mean Cell Hemoglobin : 29.4 pg  Mean Cell Hemoglobin Concentration : 34.3 gm/dL  Auto Neutrophil # : 2.20 K/uL  Auto Lymphocyte # : 0.31 K/uL  Auto Monocyte # : 0.25 K/uL  Auto Eosinophil # : 0.01 K/uL  Auto Basophil # : 0.00 K/uL  Auto Neutrophil % : 79.0 %  Auto Lymphocyte % : 11.2 %  Auto Monocyte % : 9.0 %  Auto Eosinophil % : 0.4 %  Auto Basophil % : 0.0 %                          8.6    2.96  )-----------( 153      ( 09 Feb 2024 02:40 )             25.1     02-09    123<L>  |  93<L>  |  61<H>  ----------------------------<  129<H>  3.7   |  20<L>  |  3.54<H>    Ca    8.3<L>      09 Feb 2024 13:30    TPro  6.2  /  Alb  2.8<L>  /  TBili  0.4  /  DBili  x   /  AST  13  /  ALT  16  /  AlkPhos  74  02-09    LIVER FUNCTIONS - ( 09 Feb 2024 02:40 )  Alb: 2.8 g/dL / Pro: 6.2 g/dL / ALK PHOS: 74 U/L / ALT: 16 U/L / AST: 13 U/L / GGT: x             Urinalysis Basic - ( 09 Feb 2024 13:30 )    Color: x / Appearance: x / SG: x / pH: x  Gluc: 129 mg/dL / Ketone: x  / Bili: x / Urobili: x   Blood: x / Protein: x / Nitrite: x   Leuk Esterase: x / RBC: x / WBC x   Sq Epi: x / Non Sq Epi: x / Bacteria: x            aspirin enteric coated 81 milliGRAM(s) Oral daily  heparin   Injectable 5000 Unit(s) SubCutaneous every 12 hours  hydrALAZINE 50 milliGRAM(s) Oral every 8 hours  sodium bicarbonate 650 milliGRAM(s) Oral every 8 hours  sodium chloride 0.45% 1000 milliLiter(s) IV Continuous <Continuous>

## 2024-02-15 ENCOUNTER — APPOINTMENT (OUTPATIENT)
Dept: INFUSION THERAPY | Facility: HOSPITAL | Age: 89
End: 2024-02-15

## 2024-02-15 NOTE — HISTORY OF PRESENT ILLNESS
[Disease: _____________________] : Disease: [unfilled] [T: ___] : T[unfilled] [N: ___] : N[unfilled] [M: ___] : M[unfilled] [AJCC Stage: ____] : AJCC Stage: [unfilled] [de-identified] : Patient with history of colon cancer (adenocarcinoma)-5-2011. Stage IIIC-T4 N2a. Status post right colectomy followed by FOLFOX chemotherapy.  With persistently elevated CEA on f/u testing,  3/2013 mesentery mass excision pathology showed a lymph node negative for metastatic carcinoma. Benign fibroadipose tissue. Persistently increasing elevated CEA.  CT scan abdomen/pelvis 6/2016, showed enlarging mesenteric adenopathy. Endoscopic ultrasound guided FNA of the bryan-pancreatic, mesenteric mass was positive for malignant cells-adenocarcinoma associated with abundant mucin-compatible with known history of adenocarcinoma, colorectal type, mucinous variant..  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  8/2016-Patient begun on Avastin/irinotecan (only 1 dose Avastin given). 11/2016-CT chest/abdomen/pelvis showed stable posttreatment findings and stable mesenteric adenopathy.  Patient obtained 2 surgical opinions and disease was felt to be inoperable by both surgeons.  Patient begun on Xeloda/Avastin. 5/2017-CT scan chest/abdomen/pelvis showed stable tiny nodules in the lungs. Sizable mass merging with the head of the pancreas had Increased in size from 11/2016 with some increasing infiltration of the surrounding fat.  Possibility of increasing colonic wall thickness on the colon site of the ileocolic anastomosis. Patient begun on Pembrolizumab (tumor MSI-high). 8/2017- CT scan chest/abdomen/pelvis showed marginally worsening mesenteric adenopathy, with short interval followup CT scan abdomen/pelvis 9/2017, stable compared to 8/2017. 1/2018-CT scan chest/abdomen/pelvis-stable adenopathy. 6/2018-CT scan chest/abdomen/pelvis-stable adenopathy. 11/2018-CT chest/abdomen/pelvis-unchanged mass at the root of the mesentery. 4/2019-CT scan chest/abdomen/pelvis-stable mesenteric soft tissue mass. 8/2019-CT scan chest/abdomen/pelvis-unchanged mesenteric mass. 10/2019-CT scan A/P-stable mesenteric mass. Held Pembrolizumab. 2/2020-CT scan chest/abdomen/pelvis-stable exam with mesenteric mass without significant change since 10/2019. Resumed Pembrolizumab, which was then held 6/2020 due to progressive renal insufficiency. 7/2020-CT scan chest/abdomen/pelvis-no significant change.  Stable mesenteric mass unchanged since 10/2019.  Segment of colonic wall thickening at the level of the anastomosis unchanged.  Enlarged multinodular thyroid with bilateral substernal extension unchanged. 10/2020-CT scan C/A/P-stable upper abdominal mesenteric mass. No new suspicious pathology.  1/2021-CT scan chest/abdomen/pelvis-mesenteric mass inseparable from the pancreatic head encasing the proximal mesenteric vessels minimally enlarged since 10/2020.  Mild mural thickening and ileal colic anastomosis. New course Pembrolizumab begun. 5/2021-CT scan chest/abdomen/pelvis-stable mesenteric mass contiguous with pancreatic head.  Decrease in thickening at the ileocolic anastomosis.  No new abnormalities. 10/2021- CT C/A/P-stable exam. 3/28/2022-CT C/A/P-nonspecific small volume pelvic ascites, stable  since 10/19/2021. An 8.3 x 5.6 cm lobulated mesenteric mass is indeterminate, but unchanged. Stable appearance of the chest since 10/19/2021.  8/19/2022-CT C/A/P-mesenteric mass without significant change.  12/13/2022-PET/CT scan-Morphologically stable RIGHT mesenteric mass demonstrates mild uptake, with portions exhibiting photopenia. Mucinous lesions are often minimally or non-FDG-avid; these findings are consistent with that history. 2.  Uptake overlying the RIGHT hemicolectomy anastomosis, where residual/recurrent disease is not excluded. 3.  Multiple hepatic hypodensities, some of which may have enlarged. Some of these demonstrate mildly decreased uptake compared to the remaining liver. If this will change patient management, MRI of the liver may be helpful to exclude mucinous metastases. 4.  Heterogeneous heterogeneous thyroid with mild uptake and substernal extension. 5.  Small RIGHT lung nodule versus motion artifact. Additional previously seen RIGHT-sided micronodule is unchanged.  1/11/2023-MRI Abdomen-No obvious mucinous liver metastases, however difficult to exclude given lack of intravenous contrast and motion artifact as well as multiplicity of background hepatic cysts. Grossly stable right mesenteric mass 1/18/2023-CT chest-Stable two very small nodules in the left upper and left lower lobes when compared to previous exam.  6/27/2023-CT scan chest/abdomen/pelvis-mild increase in size of large right-sided mesenteric mass, which correlated with prior examinations, likely represents a mucinous metastasis.  Otherwise stable examination.  Numerous cystic liver lesions are not significantly changed and are not well characterized on this unenhanced CT scan.  9/11/2023-CT scan chest/abdomen/pelvis-essentially necrotic right abdominal mass involving the pancreatic head and possibly the adjacent duodenum slightly decreased in size from 6/22/2023.   [de-identified] : adenocarcinoma [de-identified] : CEA 12/2013-33.6 [de-identified] : History of right breast cancer-2007. Status post right breast conservation surgery--> RT--> [facility].           11/2021 (h/o pancytopenia)-Bone marrow biopsy and bone marrow aspirate-cellular marrow (40-50%) with erythroid predominant maturing  trilineage hematopoiesis with no increase in blast population. Normal female cytogenetics. Normal MDS FISH panel. [employer] Myeloid panel-no Mutations Identified 8/2023-Bone marrow biopsy and bone marrow aspirate-normocellular marrow with trilineage hematopoiesis with maturation and increased iron stores.  No ring sideroblasts seen flow cytometry myeloid immunophenotypic findings showed no diagnostic abnormalities.  Hematogones noted.  Cytogenetics with normal female karyotype.  MDS FISH panel. [employer] NGS Myeloid Panel Final Report RESULT SUMMARY: ABNORMAL DETECTED GENOMIC ALTERATIONS:   Tier II: Variants of Potential Clinical Significance   [id number]    [de-identified] : S/P hospitalization for hyponatremia/renal failure. Is declining HD.  --S/P ED visit ~2 weeks ago for nose bleed-was packed-and saw ENT MD in f/u. Saw nephrologist and was told of her progressive CKD. Taking sodium bicarb-4 tabs/day. On diet for renal disease-has lost weight. Remains independent, continues to drive. Remains active with her local senior center. No CP, cough, SOB, fevers. No c/o N/V/change in bowel habits. No current c/o abdominal/pelvic pain.  Son is Yandel Canseco . He is a source of support for patient, as is daughter.

## 2024-02-15 NOTE — RESULTS/DATA
[FreeTextEntry1] : 2/14/2023-hemoglobin 8.9, hematocrit 27.4, WBC 3.11, platelet count 109,000, creatinine 4.28.

## 2024-02-15 NOTE — PHYSICAL EXAM
[Fully active, able to carry on all pre-disease performance without restriction] : Status 0 - Fully active, able to carry on all pre-disease performance without restriction [Normal] : affect appropriate [de-identified] : skin thickening inner right breast superiorly-no discrete mass either breast [de-identified] : soft, NT-fullness palpable RUQ [de-identified] : alert and oriented x 3

## 2024-02-21 ENCOUNTER — APPOINTMENT (OUTPATIENT)
Dept: HEMATOLOGY ONCOLOGY | Facility: CLINIC | Age: 89
End: 2024-02-21
Payer: MEDICARE

## 2024-02-21 ENCOUNTER — APPOINTMENT (OUTPATIENT)
Dept: INFUSION THERAPY | Facility: HOSPITAL | Age: 89
End: 2024-02-21

## 2024-02-21 ENCOUNTER — RESULT REVIEW (OUTPATIENT)
Age: 89
End: 2024-02-21

## 2024-02-21 VITALS
BODY MASS INDEX: 24.03 KG/M2 | HEART RATE: 82 BPM | WEIGHT: 139.99 LBS | TEMPERATURE: 97.4 F | RESPIRATION RATE: 16 BRPM | SYSTOLIC BLOOD PRESSURE: 139 MMHG | OXYGEN SATURATION: 96 % | DIASTOLIC BLOOD PRESSURE: 48 MMHG

## 2024-02-21 LAB
BASOPHILS # BLD AUTO: 0 K/UL — SIGNIFICANT CHANGE UP (ref 0–0.2)
BASOPHILS NFR BLD AUTO: 0 % — SIGNIFICANT CHANGE UP (ref 0–2)
EOSINOPHIL # BLD AUTO: 0.01 K/UL — SIGNIFICANT CHANGE UP (ref 0–0.5)
EOSINOPHIL NFR BLD AUTO: 0.3 % — SIGNIFICANT CHANGE UP (ref 0–6)
HCT VFR BLD CALC: 33.3 % — LOW (ref 34.5–45)
HGB BLD-MCNC: 10.4 G/DL — LOW (ref 11.5–15.5)
IMM GRANULOCYTES NFR BLD AUTO: 0.6 % — SIGNIFICANT CHANGE UP (ref 0–0.9)
LYMPHOCYTES # BLD AUTO: 0.42 K/UL — LOW (ref 1–3.3)
LYMPHOCYTES # BLD AUTO: 13 % — SIGNIFICANT CHANGE UP (ref 13–44)
MCHC RBC-ENTMCNC: 28.8 PG — SIGNIFICANT CHANGE UP (ref 27–34)
MCHC RBC-ENTMCNC: 31.2 G/DL — LOW (ref 32–36)
MCV RBC AUTO: 92.1 FL — SIGNIFICANT CHANGE UP (ref 80–100)
MONOCYTES # BLD AUTO: 0.19 K/UL — SIGNIFICANT CHANGE UP (ref 0–0.9)
MONOCYTES NFR BLD AUTO: 5.9 % — SIGNIFICANT CHANGE UP (ref 2–14)
NEUTROPHILS # BLD AUTO: 2.58 K/UL — SIGNIFICANT CHANGE UP (ref 1.8–7.4)
NEUTROPHILS NFR BLD AUTO: 80.2 % — HIGH (ref 43–77)
NRBC # BLD: 0 /100 WBCS — SIGNIFICANT CHANGE UP (ref 0–0)
PLATELET # BLD AUTO: 99 K/UL — LOW (ref 150–400)
RBC # BLD: 3.61 M/UL — LOW (ref 3.8–5.2)
RBC # FLD: 21.1 % — HIGH (ref 10.3–14.5)
WBC # BLD: 3.22 K/UL — LOW (ref 3.8–10.5)
WBC # FLD AUTO: 3.22 K/UL — LOW (ref 3.8–10.5)

## 2024-02-21 PROCEDURE — 99215 OFFICE O/P EST HI 40 MIN: CPT

## 2024-02-21 RX ORDER — PEMBROLIZUMAB 25 MG/ML
100 INJECTION, SOLUTION INTRAVENOUS
Refills: 0 | Status: DISCONTINUED | COMMUNITY
Start: 2017-05-23 | End: 2024-02-21

## 2024-02-26 ENCOUNTER — RESULT REVIEW (OUTPATIENT)
Age: 89
End: 2024-02-26

## 2024-02-26 ENCOUNTER — APPOINTMENT (OUTPATIENT)
Dept: HEMATOLOGY ONCOLOGY | Facility: CLINIC | Age: 89
End: 2024-02-26

## 2024-02-26 ENCOUNTER — APPOINTMENT (OUTPATIENT)
Dept: INFUSION THERAPY | Facility: HOSPITAL | Age: 89
End: 2024-02-26

## 2024-02-26 LAB
ANISOCYTOSIS BLD QL: SLIGHT — SIGNIFICANT CHANGE UP
BASOPHILS # BLD AUTO: 0 K/UL — SIGNIFICANT CHANGE UP (ref 0–0.2)
BASOPHILS NFR BLD AUTO: 0 % — SIGNIFICANT CHANGE UP (ref 0–2)
BURR CELLS BLD QL SMEAR: PRESENT — SIGNIFICANT CHANGE UP
DACRYOCYTES BLD QL SMEAR: SLIGHT — SIGNIFICANT CHANGE UP
ELLIPTOCYTES BLD QL SMEAR: SLIGHT — SIGNIFICANT CHANGE UP
EOSINOPHIL # BLD AUTO: 0 K/UL — SIGNIFICANT CHANGE UP (ref 0–0.5)
EOSINOPHIL NFR BLD AUTO: 0 % — SIGNIFICANT CHANGE UP (ref 0–6)
HCT VFR BLD CALC: 30.9 % — LOW (ref 34.5–45)
HGB BLD-MCNC: 9.7 G/DL — LOW (ref 11.5–15.5)
LYMPHOCYTES # BLD AUTO: 0.5 K/UL — LOW (ref 1–3.3)
LYMPHOCYTES # BLD AUTO: 17 % — SIGNIFICANT CHANGE UP (ref 13–44)
MCHC RBC-ENTMCNC: 29.6 PG — SIGNIFICANT CHANGE UP (ref 27–34)
MCHC RBC-ENTMCNC: 31.4 G/DL — LOW (ref 32–36)
MCV RBC AUTO: 94.2 FL — SIGNIFICANT CHANGE UP (ref 80–100)
MONOCYTES # BLD AUTO: 0.09 K/UL — SIGNIFICANT CHANGE UP (ref 0–0.9)
MONOCYTES NFR BLD AUTO: 3 % — SIGNIFICANT CHANGE UP (ref 2–14)
MYELOCYTES NFR BLD: 1 % — HIGH (ref 0–0)
NEUTROPHILS # BLD AUTO: 2.31 K/UL — SIGNIFICANT CHANGE UP (ref 1.8–7.4)
NEUTROPHILS NFR BLD AUTO: 79 % — HIGH (ref 43–77)
NRBC # BLD: 0 /100 WBCS — SIGNIFICANT CHANGE UP (ref 0–0)
NRBC # BLD: SIGNIFICANT CHANGE UP /100 WBCS (ref 0–0)
PLAT MORPH BLD: NORMAL — SIGNIFICANT CHANGE UP
PLATELET # BLD AUTO: 127 K/UL — LOW (ref 150–400)
POIKILOCYTOSIS BLD QL AUTO: SLIGHT — SIGNIFICANT CHANGE UP
RBC # BLD: 3.28 M/UL — LOW (ref 3.8–5.2)
RBC # FLD: 22 % — HIGH (ref 10.3–14.5)
RBC BLD AUTO: ABNORMAL
SCHISTOCYTES BLD QL AUTO: SLIGHT — SIGNIFICANT CHANGE UP
WBC # BLD: 2.92 K/UL — LOW (ref 3.8–10.5)
WBC # FLD AUTO: 2.92 K/UL — LOW (ref 3.8–10.5)

## 2024-02-28 ENCOUNTER — INPATIENT (INPATIENT)
Facility: HOSPITAL | Age: 89
LOS: 4 days | Discharge: HOME | DRG: 683 | End: 2024-03-04
Attending: INTERNAL MEDICINE | Admitting: STUDENT IN AN ORGANIZED HEALTH CARE EDUCATION/TRAINING PROGRAM
Payer: MEDICARE

## 2024-02-28 VITALS
RESPIRATION RATE: 19 BRPM | DIASTOLIC BLOOD PRESSURE: 63 MMHG | TEMPERATURE: 99 F | OXYGEN SATURATION: 98 % | HEIGHT: 64 IN | HEART RATE: 84 BPM | SYSTOLIC BLOOD PRESSURE: 175 MMHG | WEIGHT: 138.01 LBS

## 2024-02-28 DIAGNOSIS — D35.1 BENIGN NEOPLASM OF PARATHYROID GLAND: Chronic | ICD-10-CM

## 2024-02-28 DIAGNOSIS — Z90.49 ACQUIRED ABSENCE OF OTHER SPECIFIED PARTS OF DIGESTIVE TRACT: Chronic | ICD-10-CM

## 2024-02-28 DIAGNOSIS — Z98.890 OTHER SPECIFIED POSTPROCEDURAL STATES: Chronic | ICD-10-CM

## 2024-02-28 LAB
ALBUMIN SERPL ELPH-MCNC: 3.1 G/DL — LOW (ref 3.3–5)
ALP SERPL-CCNC: 62 U/L — SIGNIFICANT CHANGE UP (ref 40–120)
ALT FLD-CCNC: 15 U/L — SIGNIFICANT CHANGE UP (ref 10–45)
ANION GAP SERPL CALC-SCNC: 20 MMOL/L — HIGH (ref 5–17)
AST SERPL-CCNC: 27 U/L — SIGNIFICANT CHANGE UP (ref 10–40)
BASOPHILS # BLD AUTO: 0.01 K/UL — SIGNIFICANT CHANGE UP (ref 0–0.2)
BASOPHILS NFR BLD AUTO: 0.4 % — SIGNIFICANT CHANGE UP (ref 0–2)
BILIRUB SERPL-MCNC: 0.6 MG/DL — SIGNIFICANT CHANGE UP (ref 0.2–1.2)
BUN SERPL-MCNC: 84 MG/DL — HIGH (ref 7–23)
CALCIUM SERPL-MCNC: 8.5 MG/DL — SIGNIFICANT CHANGE UP (ref 8.4–10.5)
CHLORIDE SERPL-SCNC: 116 MMOL/L — HIGH (ref 96–108)
CO2 SERPL-SCNC: 11 MMOL/L — LOW (ref 22–31)
CREAT SERPL-MCNC: 4.88 MG/DL — HIGH (ref 0.5–1.3)
EGFR: 8 ML/MIN/1.73M2 — LOW
EOSINOPHIL # BLD AUTO: 0.01 K/UL — SIGNIFICANT CHANGE UP (ref 0–0.5)
EOSINOPHIL NFR BLD AUTO: 0.4 % — SIGNIFICANT CHANGE UP (ref 0–6)
GLUCOSE SERPL-MCNC: 108 MG/DL — HIGH (ref 70–99)
HCT VFR BLD CALC: 32.2 % — LOW (ref 34.5–45)
HGB BLD-MCNC: 9.9 G/DL — LOW (ref 11.5–15.5)
IMM GRANULOCYTES NFR BLD AUTO: 0.8 % — SIGNIFICANT CHANGE UP (ref 0–0.9)
LYMPHOCYTES # BLD AUTO: 0.46 K/UL — LOW (ref 1–3.3)
LYMPHOCYTES # BLD AUTO: 17.6 % — SIGNIFICANT CHANGE UP (ref 13–44)
MCHC RBC-ENTMCNC: 28.9 PG — SIGNIFICANT CHANGE UP (ref 27–34)
MCHC RBC-ENTMCNC: 30.7 GM/DL — LOW (ref 32–36)
MCV RBC AUTO: 94.2 FL — SIGNIFICANT CHANGE UP (ref 80–100)
MONOCYTES # BLD AUTO: 0.27 K/UL — SIGNIFICANT CHANGE UP (ref 0–0.9)
MONOCYTES NFR BLD AUTO: 10.3 % — SIGNIFICANT CHANGE UP (ref 2–14)
NEUTROPHILS # BLD AUTO: 1.84 K/UL — SIGNIFICANT CHANGE UP (ref 1.8–7.4)
NEUTROPHILS NFR BLD AUTO: 70.5 % — SIGNIFICANT CHANGE UP (ref 43–77)
NRBC # BLD: 2 /100 WBCS — HIGH (ref 0–0)
PLATELET # BLD AUTO: 153 K/UL — SIGNIFICANT CHANGE UP (ref 150–400)
POTASSIUM SERPL-MCNC: 5.1 MMOL/L — SIGNIFICANT CHANGE UP (ref 3.5–5.3)
POTASSIUM SERPL-SCNC: 5.1 MMOL/L — SIGNIFICANT CHANGE UP (ref 3.5–5.3)
PROT SERPL-MCNC: 6.4 G/DL — SIGNIFICANT CHANGE UP (ref 6–8.3)
RBC # BLD: 3.42 M/UL — LOW (ref 3.8–5.2)
RBC # FLD: 23 % — HIGH (ref 10.3–14.5)
SODIUM SERPL-SCNC: 147 MMOL/L — HIGH (ref 135–145)
WBC # BLD: 2.61 K/UL — LOW (ref 3.8–10.5)
WBC # FLD AUTO: 2.61 K/UL — LOW (ref 3.8–10.5)

## 2024-02-28 PROCEDURE — 99233 SBSQ HOSP IP/OBS HIGH 50: CPT

## 2024-02-28 PROCEDURE — 71045 X-RAY EXAM CHEST 1 VIEW: CPT | Mod: 26

## 2024-02-28 PROCEDURE — 99223 1ST HOSP IP/OBS HIGH 75: CPT | Mod: GC

## 2024-02-28 PROCEDURE — 99285 EMERGENCY DEPT VISIT HI MDM: CPT

## 2024-02-28 PROCEDURE — 93010 ELECTROCARDIOGRAM REPORT: CPT

## 2024-02-28 RX ORDER — SODIUM CHLORIDE 9 MG/ML
500 INJECTION INTRAMUSCULAR; INTRAVENOUS; SUBCUTANEOUS ONCE
Refills: 0 | Status: COMPLETED | OUTPATIENT
Start: 2024-02-28 | End: 2024-02-28

## 2024-02-28 RX ORDER — HYDRALAZINE HCL 50 MG
100 TABLET ORAL ONCE
Refills: 0 | Status: COMPLETED | OUTPATIENT
Start: 2024-02-28 | End: 2024-02-28

## 2024-02-28 RX ADMIN — SODIUM CHLORIDE 1000 MILLILITER(S): 9 INJECTION INTRAMUSCULAR; INTRAVENOUS; SUBCUTANEOUS at 23:04

## 2024-02-28 NOTE — ED PROVIDER NOTE - NS ED MD DISPO DIVISION
Received message from Mayelin Rascon, [facility] , to call [address], as patient was complaining about increased swelling in his legs. I called and spoke with nurse Candi Quiroz and she states patient was having more shortness of breath with exertion, so she gave him a pain pill. His weights the past 3 days were:  1/7: 271, 1/8: 267, 1/9: 262. She states she is unsure about his legs because she does not know his baseline. I called [facility] back and advised Nurse per Areli Dunbar:        If his weight is down, then I would just continue to watch him. He can take an extra diuretic if he needs. She states understanding and has no further questions. Robert Cove

## 2024-02-28 NOTE — ED ADULT TRIAGE NOTE - CHIEF COMPLAINT QUOTE
sent  by  dr meredith for elevated   bun  and  creat.  note  pt  has left medi port   pt here  for admission

## 2024-02-28 NOTE — ED PROVIDER NOTE - OBJECTIVE STATEMENT
89 y/o F with h/o CKD ref by PMD c/o elevated BUN and creatine , had Blood test done today at [address] [place] [address]

## 2024-02-28 NOTE — ED ADULT TRIAGE NOTE - RESPIRATORY RATE (BREATHS/MIN)
02/01/24 1251   Discharge Planning   Living Arrangements Parent   Support Systems Family members;Children   Assistance Needed none   Type of Residence Private residence  (apt)   Who is requesting discharge planning? Provider   Home or Post Acute Services None   Patient expects to be discharged to: home   Does the patient need discharge transport arranged? Yes   RoundTrip coordination needed? Yes   What day is the transport expected? 02/01/24     Met / pt. Pt reports he came to [place] to stay w/ his dtr over the Christmas holiday and was unable to find  ride home.  Did not have enough medications for an extended stay. He has all his medications in supply at his home in [place]. Pt reports he didn't realize he could have them sent to [facility] in [place].  Pt reports this has happened to him  before.  Suggested he bring all his medications with him when he comes to visit as this situation has presented itself several times.  Pt has not seen his pcp at [facility] in over a yr.  Dr Willingham's office contacted and an appt with another provider was scheduled ( w/ pt permission) for 2/6/24 at 3:45pm . This appt was scheduled w/ pt actively participating. Pt was reminded to call [facility] for transportation tomorrow d/t the weekend coming up.  Pt in agreement. Pt uses lifestyle by [facility] for blood glucose monitoring and has refills at home.  Pt will be arranged an uber for discharge to his home in [place].  Pt is in full agreement. Unit sec. Is arranging transport. Morgan Brewer was updated. Pt is a home discharge. No other needs   19

## 2024-02-28 NOTE — ED ADULT NURSE NOTE - NSFALLHARMRISKINTERV_ED_ALL_ED

## 2024-02-28 NOTE — ED PROVIDER NOTE - CLINICAL SUMMARY MEDICAL DECISION MAKING FREE TEXT BOX
89 y/o F with h/o CKD ref by PMD c/o elevated BUN and creatine , had Blood test done today at [address] [place] [address], pt had elevated Bun/cr with metabolic acidosis , admit for renal   eval

## 2024-02-28 NOTE — ED ADULT NURSE NOTE - OBJECTIVE STATEMENT
Patient presents to ED due to being sent by primary MD for elevated BUN, Creatinine. Alert and oriented x 4. No signs or symptoms of nausea, vomiting or SOB. discomfort or pain.

## 2024-02-29 DIAGNOSIS — E87.20 ACIDOSIS, UNSPECIFIED: ICD-10-CM

## 2024-02-29 LAB
A1C WITH ESTIMATED AVERAGE GLUCOSE RESULT: 4.6 % — SIGNIFICANT CHANGE UP (ref 4–5.6)
ALBUMIN SERPL ELPH-MCNC: 2.9 G/DL — LOW (ref 3.3–5)
ALP SERPL-CCNC: 59 U/L — SIGNIFICANT CHANGE UP (ref 40–120)
ALT FLD-CCNC: 10 U/L — SIGNIFICANT CHANGE UP (ref 10–45)
ANION GAP SERPL CALC-SCNC: 19 MMOL/L — HIGH (ref 5–17)
AST SERPL-CCNC: 11 U/L — SIGNIFICANT CHANGE UP (ref 10–40)
BILIRUB SERPL-MCNC: 0.5 MG/DL — SIGNIFICANT CHANGE UP (ref 0.2–1.2)
BUN SERPL-MCNC: 85 MG/DL — HIGH (ref 7–23)
CALCIUM SERPL-MCNC: 8.2 MG/DL — LOW (ref 8.4–10.5)
CALCIUM SERPL-MCNC: 8.3 MG/DL — LOW (ref 8.4–10.5)
CHLORIDE SERPL-SCNC: 117 MMOL/L — HIGH (ref 96–108)
CHOLEST SERPL-MCNC: 110 MG/DL — SIGNIFICANT CHANGE UP
CO2 SERPL-SCNC: 11 MMOL/L — LOW (ref 22–31)
CREAT SERPL-MCNC: 4.75 MG/DL — HIGH (ref 0.5–1.3)
EGFR: 8 ML/MIN/1.73M2 — LOW
ESTIMATED AVERAGE GLUCOSE: 85 MG/DL — SIGNIFICANT CHANGE UP (ref 68–114)
FERRITIN SERPL-MCNC: 114 NG/ML — SIGNIFICANT CHANGE UP (ref 13–330)
GLUCOSE SERPL-MCNC: 111 MG/DL — HIGH (ref 70–99)
HCT VFR BLD CALC: 30.7 % — LOW (ref 34.5–45)
HDLC SERPL-MCNC: 64 MG/DL — SIGNIFICANT CHANGE UP
HGB BLD-MCNC: 9.7 G/DL — LOW (ref 11.5–15.5)
IRON SATN MFR SERPL: 14 % — SIGNIFICANT CHANGE UP (ref 14–50)
IRON SATN MFR SERPL: 31 UG/DL — SIGNIFICANT CHANGE UP (ref 30–160)
LIPID PNL WITH DIRECT LDL SERPL: 34 MG/DL — SIGNIFICANT CHANGE UP
MAGNESIUM SERPL-MCNC: 2.1 MG/DL — SIGNIFICANT CHANGE UP (ref 1.6–2.6)
MCHC RBC-ENTMCNC: 29.7 PG — SIGNIFICANT CHANGE UP (ref 27–34)
MCHC RBC-ENTMCNC: 31.6 GM/DL — LOW (ref 32–36)
MCV RBC AUTO: 93.9 FL — SIGNIFICANT CHANGE UP (ref 80–100)
NON HDL CHOLESTEROL: 46 MG/DL — SIGNIFICANT CHANGE UP
NRBC # BLD: 2 /100 WBCS — HIGH (ref 0–0)
OSMOLALITY UR: 355 MOSM/KG — SIGNIFICANT CHANGE UP (ref 50–1200)
PHOSPHATE SERPL-MCNC: 5.3 MG/DL — HIGH (ref 2.5–4.5)
PLATELET # BLD AUTO: 137 K/UL — LOW (ref 150–400)
POTASSIUM SERPL-MCNC: 4.5 MMOL/L — SIGNIFICANT CHANGE UP (ref 3.5–5.3)
POTASSIUM SERPL-SCNC: 4.5 MMOL/L — SIGNIFICANT CHANGE UP (ref 3.5–5.3)
PROT SERPL-MCNC: 5.9 G/DL — LOW (ref 6–8.3)
PTH-INTACT FLD-MCNC: 76 PG/ML — HIGH (ref 15–65)
RBC # BLD: 3.27 M/UL — LOW (ref 3.8–5.2)
RBC # FLD: 23.1 % — HIGH (ref 10.3–14.5)
SODIUM SERPL-SCNC: 147 MMOL/L — HIGH (ref 135–145)
TIBC SERPL-MCNC: 219 UG/DL — LOW (ref 220–430)
TRIGL SERPL-MCNC: 51 MG/DL — SIGNIFICANT CHANGE UP
UIBC SERPL-MCNC: 189 UG/DL — SIGNIFICANT CHANGE UP (ref 110–370)
VIT D25+D1,25 OH+D1,25 PNL SERPL-MCNC: 24.6 PG/ML — SIGNIFICANT CHANGE UP (ref 19.9–79.3)
WBC # BLD: 2.31 K/UL — LOW (ref 3.8–10.5)
WBC # FLD AUTO: 2.31 K/UL — LOW (ref 3.8–10.5)

## 2024-02-29 PROCEDURE — 99233 SBSQ HOSP IP/OBS HIGH 50: CPT

## 2024-02-29 PROCEDURE — 74176 CT ABD & PELVIS W/O CONTRAST: CPT | Mod: 26

## 2024-02-29 RX ORDER — SODIUM BICARBONATE 1 MEQ/ML
1950 SYRINGE (ML) INTRAVENOUS THREE TIMES A DAY
Refills: 0 | Status: DISCONTINUED | OUTPATIENT
Start: 2024-02-29 | End: 2024-02-29

## 2024-02-29 RX ORDER — ONDANSETRON 8 MG/1
4 TABLET, FILM COATED ORAL EVERY 8 HOURS
Refills: 0 | Status: DISCONTINUED | OUTPATIENT
Start: 2024-02-29 | End: 2024-03-04

## 2024-02-29 RX ORDER — HYDRALAZINE HCL 50 MG
100 TABLET ORAL EVERY 8 HOURS
Refills: 0 | Status: DISCONTINUED | OUTPATIENT
Start: 2024-02-29 | End: 2024-03-04

## 2024-02-29 RX ORDER — SODIUM CHLORIDE 9 MG/ML
1000 INJECTION, SOLUTION INTRAVENOUS
Refills: 0 | Status: DISCONTINUED | OUTPATIENT
Start: 2024-02-29 | End: 2024-03-03

## 2024-02-29 RX ORDER — ACETAMINOPHEN 500 MG
650 TABLET ORAL EVERY 6 HOURS
Refills: 0 | Status: DISCONTINUED | OUTPATIENT
Start: 2024-02-29 | End: 2024-03-04

## 2024-02-29 RX ORDER — HEPARIN SODIUM 5000 [USP'U]/ML
5000 INJECTION INTRAVENOUS; SUBCUTANEOUS EVERY 12 HOURS
Refills: 0 | Status: DISCONTINUED | OUTPATIENT
Start: 2024-02-29 | End: 2024-03-04

## 2024-02-29 RX ORDER — SODIUM BICARBONATE 1 MEQ/ML
650 SYRINGE (ML) INTRAVENOUS EVERY 8 HOURS
Refills: 0 | Status: DISCONTINUED | OUTPATIENT
Start: 2024-02-29 | End: 2024-02-29

## 2024-02-29 RX ORDER — DOXAZOSIN MESYLATE 4 MG
4 TABLET ORAL AT BEDTIME
Refills: 0 | Status: DISCONTINUED | OUTPATIENT
Start: 2024-02-29 | End: 2024-03-04

## 2024-02-29 RX ORDER — DOXAZOSIN MESYLATE 4 MG
2 TABLET ORAL AT BEDTIME
Refills: 0 | Status: DISCONTINUED | OUTPATIENT
Start: 2024-02-29 | End: 2024-02-29

## 2024-02-29 RX ORDER — AMLODIPINE BESYLATE 2.5 MG/1
10 TABLET ORAL DAILY
Refills: 0 | Status: DISCONTINUED | OUTPATIENT
Start: 2024-02-29 | End: 2024-03-04

## 2024-02-29 RX ORDER — SODIUM CHLORIDE 9 MG/ML
1000 INJECTION, SOLUTION INTRAVENOUS
Refills: 0 | Status: DISCONTINUED | OUTPATIENT
Start: 2024-02-29 | End: 2024-02-29

## 2024-02-29 RX ORDER — LANOLIN ALCOHOL/MO/W.PET/CERES
3 CREAM (GRAM) TOPICAL AT BEDTIME
Refills: 0 | Status: DISCONTINUED | OUTPATIENT
Start: 2024-02-29 | End: 2024-03-04

## 2024-02-29 RX ADMIN — HEPARIN SODIUM 5000 UNIT(S): 5000 INJECTION INTRAVENOUS; SUBCUTANEOUS at 17:27

## 2024-02-29 RX ADMIN — Medication 100 MILLIGRAM(S): at 00:34

## 2024-02-29 RX ADMIN — Medication 100 MILLIGRAM(S): at 14:50

## 2024-02-29 RX ADMIN — Medication 100 MILLIGRAM(S): at 05:31

## 2024-02-29 RX ADMIN — SODIUM CHLORIDE 75 MILLILITER(S): 9 INJECTION, SOLUTION INTRAVENOUS at 19:55

## 2024-02-29 RX ADMIN — AMLODIPINE BESYLATE 10 MILLIGRAM(S): 2.5 TABLET ORAL at 05:31

## 2024-02-29 RX ADMIN — SODIUM CHLORIDE 75 MILLILITER(S): 9 INJECTION, SOLUTION INTRAVENOUS at 03:30

## 2024-02-29 RX ADMIN — HEPARIN SODIUM 5000 UNIT(S): 5000 INJECTION INTRAVENOUS; SUBCUTANEOUS at 05:31

## 2024-02-29 RX ADMIN — Medication 650 MILLIGRAM(S): at 06:06

## 2024-02-29 RX ADMIN — Medication 100 MILLIGRAM(S): at 22:02

## 2024-02-29 RX ADMIN — Medication 4 MILLIGRAM(S): at 22:02

## 2024-02-29 NOTE — H&P ADULT - NSHPPHYSICALEXAM_GEN_ALL_CORE
T(C): 37.1 (02-28-24 @ 22:02), Max: 37.1 (02-28-24 @ 22:02)  HR: 84 (02-28-24 @ 22:02) (84 - 84)  BP: 175/63 (02-28-24 @ 22:02) (175/63 - 175/63)  RR: 19 (02-28-24 @ 22:02) (19 - 19)  SpO2: 98% (02-28-24 @ 22:02) (98% - 98%)    CONSTITUTIONAL: Well groomed, no apparent distress  EYES: EOMI, No conjunctival or scleral injection, non-icteric  ENMT: Oral mucosa with moist membranes.  RESP: No respiratory distress, no use of accessory muscles; CTA b/l, no WRR  CV: RRR, +systolic murmur; no JVD; trace peripheral edema  GI: Soft, NT, ND, no rebound, no guarding  MSK: No digital clubbing or cyanosis  SKIN: No rashes or ulcers noted; no subcutaneous nodules or induration palpable  NEURO: grossly moves all 4 extremities  PSYCH: Appropriate insight/judgment; A+O x 3, mood and affect appropriate, recent/remote memory intact

## 2024-02-29 NOTE — H&P ADULT - CONVERSATION DETAILS
Patient has named daughter Mali and son Maximo both as health care agents.  Patient would like all life-saving measures performed as deemed medically necessary including chest compressions, intubation, IV medications. She expressed her wish to not "come back as a vegetable" however she does not want to withhold life-saving measures at this time. She is aware of her prognosis and that she may end up requiring hemodialysis.

## 2024-02-29 NOTE — H&P ADULT - NSHPLABSRESULTS_GEN_ALL_CORE
LABS:                        9.9    2.61  )-----------( 153      ( 28 Feb 2024 23:00 )             32.2     02-28    147<H>  |  116<H>  |  84<H>  ----------------------------<  108<H>  5.1   |  11<L>  |  4.88<H>    Ca    8.5      28 Feb 2024 23:00    TPro  6.4  /  Alb  3.1<L>  /  TBili  0.6  /  DBili  x   /  AST  27  /  ALT  15  /  AlkPhos  62  02-28      Urinalysis Basic - ( 28 Feb 2024 23:00 )    Color: x / Appearance: x / SG: x / pH: x  Gluc: 108 mg/dL / Ketone: x  / Bili: x / Urobili: x   Blood: x / Protein: x / Nitrite: x   Leuk Esterase: x / RBC: x / WBC x   Sq Epi: x / Non Sq Epi: x / Bacteria: x       CAPILLARY BLOOD GLUCOSE            Urinalysis Basic - ( 28 Feb 2024 23:00 )    Color: x / Appearance: x / SG: x / pH: x  Gluc: 108 mg/dL / Ketone: x  / Bili: x / Urobili: x   Blood: x / Protein: x / Nitrite: x   Leuk Esterase: x / RBC: x / WBC x   Sq Epi: x / Non Sq Epi: x / Bacteria: x        RADIOLOGY & ADDITIONAL TESTS:      Consultant(s) Notes Reviewed:  [x ] YES  [ ] NO  Care Discussed with Consultants/Other Providers [ x] YES  [ ] NO  Imaging Personally Reviewed:  [ ] YES  [ ] NO Klisyri Pregnancy And Lactation Text: It is unknown if this medication can harm a developing fetus or if it is excreted in breast milk.

## 2024-02-29 NOTE — H&P ADULT - ATTENDING COMMENTS
#Worsening CKD  - C/w sodium bicarb 650 TID   - PTH, phosph level   - Vitamin D level  - Renal and bladder US  - LR 75cc/hr  - UA/Ucx  - Urine lytes  - A1c, Lipid panel  - Iron, tibc, ferritin  - Renal consult

## 2024-02-29 NOTE — H&P ADULT - HISTORY OF PRESENT ILLNESS
89F h/o CKD4, colon adenocarcinoma s/p rt colectomy and chemo, keytruda, breast cancer, anemia, HTN, thrombocytopenia, sent by PMD for BUN Cr elevation.    Recently admitted to [facility] 2/9-2/14 for acute on chronic hyponatremia and metabolic acidosis with weakness, nausea, vomiting. 89F h/o CKD4, colon adenocarcinoma s/p rt colectomy and chemo, keytruda, breast cancer, anemia, HTN, thrombocytopenia, sent by PMD for BUN Cr elevation for medical evaluation. She denies headache, nausea, dizziness, chest pain, abdominal pain, urinary changes. In ED patient was afebrile, HR 84, /63, saturating well on RA. Labs were significant for Na 147 and bicarb 11, AG 20, BUN 84, Cr 4.88.  Recently admitted to [facility] 2/9-2/14 for acute on chronic hyponatremia and metabolic acidosis with weakness, nausea, vomiting. Patient reports good compliance with her discharge medications. She has previously taken sodium bicarb 1950mg tid however it was a/w severe HTN.

## 2024-02-29 NOTE — H&P ADULT - ASSESSMENT
89F h/o CKD4, colon adenocarcinoma s/p rt colectomy and chemo, keytruda, breast cancer, anemia, HTN, thrombocytopenia, admitted for acute renal failure.    #Acute on chronic renal failure likely 2/2 HTN  #Metabolic acidosis  #Hypernatremia  - sodium bicarb 650mg tid  - LR @ 75mL/h  - UA/UCx, urine lytes 89F h/o CKD4, colon adenocarcinoma s/p rt colectomy and chemo, keytruda, breast cancer, anemia, HTN, thrombocytopenia, admitted for acute renal failure.    #Acute on chronic renal failure likely 2/2 HTN  #Metabolic acidosis  #Hypernatremia  - Na 147, bicarb 11  - BUN 84, Cr 4.88  - sodium bicarb 650mg tid - patient previously did not tolerate higher dose  - LR @ 75mL/h  - c/w home amlodipine 10mg qd, hydralazine 100mg q8h, doxazosin 2mg qhs  - UA/UCx, urine lytes  - AM lipid profile, A1c, iron studies, PTH, vit D, phosphorus  - Renal and bladder US  - Renal consult    #Anemia, chronic  - from CKD  - Hgb 9.9 appears stable compared to prior admission  - f/u AM CBC    #DVT ppx  - SQH    Full code.    Discussed with Dr. Marin

## 2024-02-29 NOTE — PATIENT PROFILE ADULT - BILL PAYMENT
Spoke with Dr. Leonard and requested an order for pt's nightly Clonazepam. Also, asked MD to review all home meds. Pt states she takes Losartan. MD to review and order accordingly. Will continue to monitor.   no

## 2024-02-29 NOTE — CONSULT NOTE ADULT - SUBJECTIVE AND OBJECTIVE BOX
NEPHROLOGY CONSULTATION    CHIEF COMPLAINT: abn labs    HPI:  Pt is 89 yo F w/h/o CKD 5, colon adenocarcinoma s/p R colectomy and chemo, s/p keytruda, breast cancer, anemia, HTN, thrombocytopenia, sent by PMD for MAGDI/CKD, abn lytes. No headache, nausea, dizziness, chest pain, abdominal pain, urinary changes, hx chronic diarrhea since colon sx, chemo. Recently admitted to [facility] 2/9-2/14 for hyponatremia and metabolic acidosis with weakness, nausea, vomiting.     ROS:  as above    Allergies:  No Known Allergies    PAST MEDICAL & SURGICAL HISTORY:  History of breast cancer R  Anemia  Hypertension  History of thrombocytopenia  History of herpes zoster  History of malignant neoplasm of parathyroid gland  Colon cancer  Chronic renal insufficiency  Thyroid nodule  Diabetes mellitus  NIDDM  History of cholecystectomy  History of partial colectomy  and appendectomy at the same time  History of lumpectomy of right breast  2007 and radiation and chemotherapy  Parathyroid adenoma  excision    SOCIAL HISTORY:  negative    FAMILY HISTORY:  colon cancer (Sibling, Sibling)  breast cancer (Sibling)    MEDICATIONS  (STANDING):  amLODIPine   Tablet 10 milliGRAM(s) Oral daily  doxazosin 2 milliGRAM(s) Oral at bedtime  heparin   Injectable 5000 Unit(s) SubCutaneous every 12 hours  hydrALAZINE 100 milliGRAM(s) Oral every 8 hours  lactated ringers. 1000 milliLiter(s) (75 mL/Hr) IV Continuous <Continuous>  sodium bicarbonate 650 milliGRAM(s) Oral every 8 hours    Vital Signs Last 24 Hrs  T(C): 36.8 (02-29-24 @ 01:34), Max: 37.1 (02-28-24 @ 22:02)  T(F): 98.2 (02-29-24 @ 01:34), Max: 98.8 (02-28-24 @ 22:02)  HR: 79 (02-29-24 @ 01:34) (79 - 84)  BP: 143/57 (02-29-24 @ 01:34) (143/57 - 175/63)  RR: 17 (02-29-24 @ 01:34) (17 - 19)  SpO2: 99% (02-29-24 @ 01:34) (98% - 99%)      LABS:                        9.7    2.31  )-----------( 137      ( 29 Feb 2024 07:57 )             30.7     02-29    147<H>  |  117<H>  |  85<H>  ----------------------------<  111<H>  4.5   |  11<L>  |  4.75<H>    Ca    8.3<L>      29 Feb 2024 07:57  Phos  5.3     02-29  Mg     2.1     02-29    TPro  5.9<L>  /  Alb  2.9<L>  /  TBili  0.5  /  DBili  x   /  AST  11  /  ALT  10  /  AlkPhos  59  02-29    LIVER FUNCTIONS - ( 29 Feb 2024 07:57 )  Alb: 2.9 g/dL / Pro: 5.9 g/dL / ALK PHOS: 59 U/L / ALT: 10 U/L / AST: 11 U/L / GGT: x           A/P:    full consult to follow    [phone] NEPHROLOGY CONSULTATION    CHIEF COMPLAINT: abn labs    HPI:  Pt is 89 yo F w/h/o CKD 5, colon adenocarcinoma s/p R colectomy and chemo, s/p keytruda, breast cancer, HTN, sent by PMD for MAGDI/CKD, abn lytes. No headache, nausea, dizziness, chest pain, abdominal pain, urinary changes, hx chronic diarrhea since colon sx/chemo. Recently admitted to [facility] 2/9-2/14 for hyponatremia and metabolic acidosis with weakness, nausea, vomiting. Seen in ED, alert, asymptomatic. Her po Na Bicarb has recently been reduced due to HTN.     ROS:  as above    Allergies:  No Known Allergies    PAST MEDICAL & SURGICAL HISTORY:  History of breast cancer R  Anemia  Hypertension  History of thrombocytopenia  History of herpes zoster  History of malignant neoplasm of parathyroid gland  Colon cancer  Chronic renal insufficiency  Thyroid nodule  Diabetes mellitus  NIDDM  History of cholecystectomy  History of partial colectomy  and appendectomy at the same time  History of lumpectomy of right breast  2007 and radiation and chemotherapy  Parathyroid adenoma  excision    SOCIAL HISTORY:  negative    FAMILY HISTORY:  colon cancer (Sibling, Sibling)  breast cancer (Sibling)    MEDICATIONS  (STANDING):  amLODIPine   Tablet 10 milliGRAM(s) Oral daily  doxazosin 2 milliGRAM(s) Oral at bedtime  heparin   Injectable 5000 Unit(s) SubCutaneous every 12 hours  hydrALAZINE 100 milliGRAM(s) Oral every 8 hours  lactated ringers. 1000 milliLiter(s) (75 mL/Hr) IV Continuous <Continuous>  sodium bicarbonate 650 milliGRAM(s) Oral every 8 hours    Vital Signs Last 24 Hrs  T(C): 36.3 (02-29-24 @ 14:47), Max: 37.1 (02-28-24 @ 22:02)  T(F): 97.4 (02-29-24 @ 14:47), Max: 98.8 (02-28-24 @ 22:02)  HR: 63 (02-29-24 @ 14:47) (63 - 84)  BP: 151/54 (02-29-24 @ 14:47) (143/57 - 175/63)  RR: 16 (02-29-24 @ 14:47) (16 - 19)  SpO2: 98% (02-29-24 @ 14:47) (98% - 99%)    s1s2  b/l air entry  soft, ND  no edema    LABS:                        9.7    2.31  )-----------( 137      ( 29 Feb 2024 07:57 )             30.7     02-29    147<H>  |  117<H>  |  85<H>  ----------------------------<  111<H>  4.5   |  11<L>  |  4.75<H>    Ca    8.3<L>      29 Feb 2024 07:57  Phos  5.3     02-29  Mg     2.1     02-29    TPro  5.9<L>  /  Alb  2.9<L>  /  TBili  0.5  /  DBili  x   /  AST  11  /  ALT  10  /  AlkPhos  59  02-29    LIVER FUNCTIONS - ( 29 Feb 2024 07:57 )  Alb: 2.9 g/dL / Pro: 5.9 g/dL / ALK PHOS: 59 U/L / ALT: 10 U/L / AST: 11 U/L / GGT: x           A/P:    Hx colon cancer s/p R colectomy, chemo  Progressive CKD iso Keytruda (d/c-d ~ 1 year ago)  Recent baseline Cr ~ 4.  Met acidosis iso chronic diarrhea, advanced CKD  Mild hypernatremia  Hemodynamic MAGDI/CKD  Gentle hypotonic IVF w/Bicarb  Will adjust BP meds  Avoid nephrotoxins  Low Na diet   D/w pt and son at bedside  Pt and family wish to avoid RRT if at all possible, and leaning against it even if/when becomes unavoidable as it is unlikely to improve quality of life  Conservative approach is reasonable   Will follow     [phone]

## 2024-02-29 NOTE — GOALS OF CARE CONVERSATION - ADVANCED CARE PLANNING - CONVERSATION DETAILS
Pt. sent to [facility] from home by Dr. Bales because of elevated BUN and creatinine. Pt. is A&Ox3. Has left mediport. Has hx. colon ca. and partial colectomy. Has hx. breast ca. and parathyroid ca.  pt. had signed a MOLST DNR/I on 2/9/24. I printed this out and reviewed it with her. She thought about it, and decided she wants to keep this MOLST DNR/I in effect. Copy placed in chart. SOPHIA Padgett made aware of patient's current GOC.

## 2024-03-01 LAB
ALBUMIN SERPL ELPH-MCNC: 2.6 G/DL — LOW (ref 3.3–5)
ALP SERPL-CCNC: 51 U/L — SIGNIFICANT CHANGE UP (ref 40–120)
ALT FLD-CCNC: 10 U/L — SIGNIFICANT CHANGE UP (ref 10–45)
ANION GAP SERPL CALC-SCNC: 16 MMOL/L — SIGNIFICANT CHANGE UP (ref 5–17)
AST SERPL-CCNC: 8 U/L — LOW (ref 10–40)
BILIRUB SERPL-MCNC: 0.4 MG/DL — SIGNIFICANT CHANGE UP (ref 0.2–1.2)
BUN SERPL-MCNC: 82 MG/DL — HIGH (ref 7–23)
CALCIUM SERPL-MCNC: 8.1 MG/DL — LOW (ref 8.4–10.5)
CHLORIDE SERPL-SCNC: 119 MMOL/L — HIGH (ref 96–108)
CO2 SERPL-SCNC: 13 MMOL/L — LOW (ref 22–31)
CREAT SERPL-MCNC: 4.57 MG/DL — HIGH (ref 0.5–1.3)
CULTURE RESULTS: SIGNIFICANT CHANGE UP
EGFR: 9 ML/MIN/1.73M2 — LOW
GLUCOSE SERPL-MCNC: 109 MG/DL — HIGH (ref 70–99)
HCT VFR BLD CALC: 29.2 % — LOW (ref 34.5–45)
HGB BLD-MCNC: 9.1 G/DL — LOW (ref 11.5–15.5)
MAGNESIUM SERPL-MCNC: 2 MG/DL — SIGNIFICANT CHANGE UP (ref 1.6–2.6)
MCHC RBC-ENTMCNC: 29.3 PG — SIGNIFICANT CHANGE UP (ref 27–34)
MCHC RBC-ENTMCNC: 31.2 GM/DL — LOW (ref 32–36)
MCV RBC AUTO: 93.9 FL — SIGNIFICANT CHANGE UP (ref 80–100)
NRBC # BLD: 1 /100 WBCS — HIGH (ref 0–0)
PHOSPHATE SERPL-MCNC: 5.7 MG/DL — HIGH (ref 2.5–4.5)
PLATELET # BLD AUTO: 123 K/UL — LOW (ref 150–400)
POTASSIUM SERPL-MCNC: 4.3 MMOL/L — SIGNIFICANT CHANGE UP (ref 3.5–5.3)
POTASSIUM SERPL-SCNC: 4.3 MMOL/L — SIGNIFICANT CHANGE UP (ref 3.5–5.3)
PROT SERPL-MCNC: 5.3 G/DL — LOW (ref 6–8.3)
RBC # BLD: 3.11 M/UL — LOW (ref 3.8–5.2)
RBC # FLD: 23.1 % — HIGH (ref 10.3–14.5)
SODIUM SERPL-SCNC: 148 MMOL/L — HIGH (ref 135–145)
SPECIMEN SOURCE: SIGNIFICANT CHANGE UP
WBC # BLD: 1.86 K/UL — LOW (ref 3.8–10.5)
WBC # FLD AUTO: 1.86 K/UL — LOW (ref 3.8–10.5)

## 2024-03-01 PROCEDURE — 99232 SBSQ HOSP IP/OBS MODERATE 35: CPT

## 2024-03-01 PROCEDURE — 99223 1ST HOSP IP/OBS HIGH 75: CPT

## 2024-03-01 RX ADMIN — Medication 100 MILLIGRAM(S): at 21:04

## 2024-03-01 RX ADMIN — AMLODIPINE BESYLATE 10 MILLIGRAM(S): 2.5 TABLET ORAL at 05:48

## 2024-03-01 RX ADMIN — Medication 100 MILLIGRAM(S): at 14:13

## 2024-03-01 RX ADMIN — Medication 100 MILLIGRAM(S): at 05:48

## 2024-03-01 RX ADMIN — HEPARIN SODIUM 5000 UNIT(S): 5000 INJECTION INTRAVENOUS; SUBCUTANEOUS at 17:21

## 2024-03-01 RX ADMIN — SODIUM CHLORIDE 75 MILLILITER(S): 9 INJECTION, SOLUTION INTRAVENOUS at 14:12

## 2024-03-01 RX ADMIN — HEPARIN SODIUM 5000 UNIT(S): 5000 INJECTION INTRAVENOUS; SUBCUTANEOUS at 05:48

## 2024-03-01 RX ADMIN — Medication 4 MILLIGRAM(S): at 21:04

## 2024-03-01 NOTE — CONSULT NOTE ADULT - SUBJECTIVE AND OBJECTIVE BOX
This is a 89 year old woman with a history of CKD4, colon adenocarcinoma s/p rt colectomy and chemo, keytruda, breast cancer, anemia, HTN, thrombocytopenia, sent by PMD for BUN Cr elevation.  CT scan demonstrated increasing size of a mesenteric mass.  This mass has been persistent for the past 1.5 year s and has not been problematic for some time. Currently on this scan it is slightly larger and contains pockets of gas, but patient is not symptomatic for this.    MEDICATIONS  (STANDING):  amLODIPine   Tablet 10 milliGRAM(s) Oral daily  dextrose 5% 1000 milliLiter(s) (75 mL/Hr) IV Continuous <Continuous>  doxazosin 4 milliGRAM(s) Oral at bedtime  heparin   Injectable 5000 Unit(s) SubCutaneous every 12 hours  hydrALAZINE 100 milliGRAM(s) Oral every 8 hours    MEDICATIONS  (PRN):  acetaminophen     Tablet .. 650 milliGRAM(s) Oral every 6 hours PRN Temp greater or equal to 38C (100.4F), Mild Pain (1 - 3)  aluminum hydroxide/magnesium hydroxide/simethicone Suspension 30 milliLiter(s) Oral every 4 hours PRN Dyspepsia  melatonin 3 milliGRAM(s) Oral at bedtime PRN Insomnia  ondansetron Injectable 4 milliGRAM(s) IV Push every 8 hours PRN Nausea and/or Vomiting  Vital Signs Last 24 Hrs  T(C): 36.1 (01 Mar 2024 19:47), Max: 36.6 (01 Mar 2024 05:19)  T(F): 97 (01 Mar 2024 19:47), Max: 97.8 (01 Mar 2024 05:19)  HR: 64 (01 Mar 2024 19:47) (61 - 68)  BP: 147/46 (01 Mar 2024 19:47) (105/64 - 160/63)  BP(mean): --  RR: 16 (01 Mar 2024 19:47) (16 - 16)  SpO2: 96% (01 Mar 2024 19:47) (96% - 99%)    Parameters below as of 01 Mar 2024 19:47  Patient On (Oxygen Delivery Method): room air      03-01    148<H>  |  119<H>  |  82<H>  ----------------------------<  109<H>  4.3   |  13<L>  |  4.57<H>    Ca    8.1<L>      01 Mar 2024 06:49  Phos  5.7     03-01  Mg     2.0     03-01    TPro  5.3<L>  /  Alb  2.6<L>  /  TBili  0.4  /  DBili  x   /  AST  8<L>  /  ALT  10  /  AlkPhos  51  03-01                        9.1    1.86  )-----------( 123      ( 01 Mar 2024 06:49 )             29.2

## 2024-03-01 NOTE — CONSULT NOTE ADULT - SUBJECTIVE AND OBJECTIVE BOX
This 89 year old woman underwent a right colectomy for CA of the ascending colon 13 years ago. The pathology report described a large tumor in the mesentery abutting the wall of the ascending colon without any intraluminal involvement. Three years later another large mesenteric mass was demonstrated in the transverse colon mesentery - again, there was no intraluminal tumor. The patient has been treated with chemotherapy by Dr. Edmonds. Two days ago, an elevated creatinine was revealed, and the patient was referred to the ED and admitted. A CT scan demonstrated the mesenteric mass near the anastomosis which now has a few air bubbles and is pressing on the duodenum. Today, she is feeling well, as she denies any abdominal pain and is eating well and passing stool easily.     PFSH: All noncontributory    MEDICATIONS REVIEWED:acetaminophen     Tablet .. 650 milliGRAM(s) Oral every 6 hours PRN  aluminum hydroxide/magnesium hydroxide/simethicone Suspension 30 milliLiter(s) Oral every 4 hours PRN  amLODIPine   Tablet 10 milliGRAM(s) Oral daily  dextrose 5% 1000 milliLiter(s) IV Continuous <Continuous>  doxazosin 4 milliGRAM(s) Oral at bedtime  heparin   Injectable 5000 Unit(s) SubCutaneous every 12 hours  hydrALAZINE 100 milliGRAM(s) Oral every 8 hours  melatonin 3 milliGRAM(s) Oral at bedtime PRN  ondansetron Injectable 4 milliGRAM(s) IV Push every 8 hours PRN      ALLERGIES:No Known Allergies      REVIEW OF SYSTEMS:  Comprehensive Review of Systems negative except as noted in HPI      PHYSICAL EXAMINATION:  RESPIRATORY: Clear to auscultation bilaterally, respirations non-labored.  CARDIAC: RR, normal S1S2, no murmurs.  ABDOMEN: soft, BS present, no masses, no hernias, no peritoneal signs, no KLS, nontender.  VASCULAR: Equal and normal pulses.  MUSCULOSKELETAL: Full ROM, no deformities.      T(C): 36.6 (03-01-24 @ 05:19), Max: 36.6 (03-01-24 @ 05:19)  HR: 68 (03-01-24 @ 05:19) (61 - 68)  BP: 158/60 (03-01-24 @ 05:19) (105/64 - 160/63)  RR: 16 (03-01-24 @ 05:19) (16 - 16)  SpO2: 98% (03-01-24 @ 05:19) (98% - 99%)                          9.1    1.86  )-----------( 123      ( 01 Mar 2024 06:49 )             29.2       03-01    148<H>  |  119<H>  |  82<H>  ----------------------------<  109<H>  4.3   |  13<L>  |  4.57<H>    Ca    8.1<L>      01 Mar 2024 06:49  Phos  5.7     03-01  Mg     2.0     03-01    TPro  5.3<L>  /  Alb  2.6<L>  /  TBili  0.4  /  DBili  x   /  AST  8<L>  /  ALT  10  /  AlkPhos  51  03-01

## 2024-03-01 NOTE — CONSULT NOTE ADULT - ASSESSMENT
IMPRESSION: Locally advanced, recurrent colon CA    PLAN: No surgical problems evident at this time
This is a 89 year old woman with a history of CKD4, colon adenocarcinoma s/p rt colectomy and chemo, keytruda, breast cancer, anemia, HTN, thrombocytopenia, sent by PMD for BUN Cr elevation.  CT scan demonstrated increasing size of a mesenteric mass.  This mass has been persistent for the past 1.5 year s and has not been problematic for some time. Currently on this scan it is slightly larger and contains pockets of gas, but patient is not symptomatic for this.  Currently the mesenteric mass is stable albeit a bit larger, and not causing any major symptoms No current plans to restart chemotherapy at this time, patient has been off therapy for nearly 2 years and the mass has not advanced very much.  Current major issue is the renal failure discussed possibility of starting dialysis which was also encouraged by hter PCP. patient still remains somewhat apprehensive about starting renal replacement therapy.

## 2024-03-02 LAB
ALBUMIN SERPL ELPH-MCNC: 2.5 G/DL — LOW (ref 3.3–5)
ALP SERPL-CCNC: 52 U/L — SIGNIFICANT CHANGE UP (ref 40–120)
ALT FLD-CCNC: 12 U/L — SIGNIFICANT CHANGE UP (ref 10–45)
ANION GAP SERPL CALC-SCNC: 15 MMOL/L — SIGNIFICANT CHANGE UP (ref 5–17)
AST SERPL-CCNC: 10 U/L — SIGNIFICANT CHANGE UP (ref 10–40)
BASOPHILS # BLD AUTO: 0 K/UL — SIGNIFICANT CHANGE UP (ref 0–0.2)
BASOPHILS NFR BLD AUTO: 0 % — SIGNIFICANT CHANGE UP (ref 0–2)
BILIRUB SERPL-MCNC: 0.4 MG/DL — SIGNIFICANT CHANGE UP (ref 0.2–1.2)
BUN SERPL-MCNC: 81 MG/DL — HIGH (ref 7–23)
CALCIUM SERPL-MCNC: 7.6 MG/DL — LOW (ref 8.4–10.5)
CHLORIDE SERPL-SCNC: 117 MMOL/L — HIGH (ref 96–108)
CO2 SERPL-SCNC: 15 MMOL/L — LOW (ref 22–31)
CREAT ?TM UR-MCNC: 41 MG/DL — SIGNIFICANT CHANGE UP
CREAT SERPL-MCNC: 4.4 MG/DL — HIGH (ref 0.5–1.3)
EGFR: 9 ML/MIN/1.73M2 — LOW
EOSINOPHIL # BLD AUTO: 0.06 K/UL — SIGNIFICANT CHANGE UP (ref 0–0.5)
EOSINOPHIL NFR BLD AUTO: 2.6 % — SIGNIFICANT CHANGE UP (ref 0–6)
GLUCOSE SERPL-MCNC: 96 MG/DL — SIGNIFICANT CHANGE UP (ref 70–99)
HCT VFR BLD CALC: 28.3 % — LOW (ref 34.5–45)
HGB BLD-MCNC: 8.8 G/DL — LOW (ref 11.5–15.5)
IMM GRANULOCYTES NFR BLD AUTO: 0.4 % — SIGNIFICANT CHANGE UP (ref 0–0.9)
LYMPHOCYTES # BLD AUTO: 0.43 K/UL — LOW (ref 1–3.3)
LYMPHOCYTES # BLD AUTO: 18.9 % — SIGNIFICANT CHANGE UP (ref 13–44)
MAGNESIUM SERPL-MCNC: 1.7 MG/DL — SIGNIFICANT CHANGE UP (ref 1.6–2.6)
MCHC RBC-ENTMCNC: 29 PG — SIGNIFICANT CHANGE UP (ref 27–34)
MCHC RBC-ENTMCNC: 31.1 GM/DL — LOW (ref 32–36)
MCV RBC AUTO: 93.4 FL — SIGNIFICANT CHANGE UP (ref 80–100)
MONOCYTES # BLD AUTO: 0.23 K/UL — SIGNIFICANT CHANGE UP (ref 0–0.9)
MONOCYTES NFR BLD AUTO: 10.1 % — SIGNIFICANT CHANGE UP (ref 2–14)
NEUTROPHILS # BLD AUTO: 1.55 K/UL — LOW (ref 1.8–7.4)
NEUTROPHILS NFR BLD AUTO: 68 % — SIGNIFICANT CHANGE UP (ref 43–77)
NRBC # BLD: 0 /100 WBCS — SIGNIFICANT CHANGE UP (ref 0–0)
PHOSPHATE SERPL-MCNC: 5.4 MG/DL — HIGH (ref 2.5–4.5)
PLATELET # BLD AUTO: 104 K/UL — LOW (ref 150–400)
POTASSIUM SERPL-MCNC: 4.4 MMOL/L — SIGNIFICANT CHANGE UP (ref 3.5–5.3)
POTASSIUM SERPL-SCNC: 4.4 MMOL/L — SIGNIFICANT CHANGE UP (ref 3.5–5.3)
PROT SERPL-MCNC: 5.2 G/DL — LOW (ref 6–8.3)
RBC # BLD: 3.03 M/UL — LOW (ref 3.8–5.2)
RBC # FLD: 23.6 % — HIGH (ref 10.3–14.5)
SODIUM SERPL-SCNC: 147 MMOL/L — HIGH (ref 135–145)
SODIUM UR-SCNC: 78 MMOL/L — SIGNIFICANT CHANGE UP
WBC # BLD: 2.28 K/UL — LOW (ref 3.8–10.5)
WBC # FLD AUTO: 2.28 K/UL — LOW (ref 3.8–10.5)

## 2024-03-02 PROCEDURE — 99232 SBSQ HOSP IP/OBS MODERATE 35: CPT

## 2024-03-02 RX ADMIN — Medication 4 MILLIGRAM(S): at 21:09

## 2024-03-02 RX ADMIN — Medication 100 MILLIGRAM(S): at 05:52

## 2024-03-02 RX ADMIN — AMLODIPINE BESYLATE 10 MILLIGRAM(S): 2.5 TABLET ORAL at 05:52

## 2024-03-02 RX ADMIN — Medication 100 MILLIGRAM(S): at 21:09

## 2024-03-02 RX ADMIN — HEPARIN SODIUM 5000 UNIT(S): 5000 INJECTION INTRAVENOUS; SUBCUTANEOUS at 17:56

## 2024-03-02 RX ADMIN — SODIUM CHLORIDE 75 MILLILITER(S): 9 INJECTION, SOLUTION INTRAVENOUS at 01:42

## 2024-03-02 RX ADMIN — Medication 100 MILLIGRAM(S): at 14:06

## 2024-03-02 RX ADMIN — SODIUM CHLORIDE 75 MILLILITER(S): 9 INJECTION, SOLUTION INTRAVENOUS at 17:56

## 2024-03-02 RX ADMIN — HEPARIN SODIUM 5000 UNIT(S): 5000 INJECTION INTRAVENOUS; SUBCUTANEOUS at 05:52

## 2024-03-03 LAB
ANION GAP SERPL CALC-SCNC: 14 MMOL/L — SIGNIFICANT CHANGE UP (ref 5–17)
BUN SERPL-MCNC: 79 MG/DL — HIGH (ref 7–23)
CALCIUM SERPL-MCNC: 7.4 MG/DL — LOW (ref 8.4–10.5)
CHLORIDE SERPL-SCNC: 114 MMOL/L — HIGH (ref 96–108)
CO2 SERPL-SCNC: 17 MMOL/L — LOW (ref 22–31)
CREAT SERPL-MCNC: 4.33 MG/DL — HIGH (ref 0.5–1.3)
EGFR: 9 ML/MIN/1.73M2 — LOW
GLUCOSE BLDC GLUCOMTR-MCNC: 94 MG/DL — SIGNIFICANT CHANGE UP (ref 70–99)
GLUCOSE SERPL-MCNC: 98 MG/DL — SIGNIFICANT CHANGE UP (ref 70–99)
HCT VFR BLD CALC: 27.6 % — LOW (ref 34.5–45)
HGB BLD-MCNC: 8.4 G/DL — LOW (ref 11.5–15.5)
MCHC RBC-ENTMCNC: 28.8 PG — SIGNIFICANT CHANGE UP (ref 27–34)
MCHC RBC-ENTMCNC: 30.4 GM/DL — LOW (ref 32–36)
MCV RBC AUTO: 94.5 FL — SIGNIFICANT CHANGE UP (ref 80–100)
NRBC # BLD: 0 /100 WBCS — SIGNIFICANT CHANGE UP (ref 0–0)
PLATELET # BLD AUTO: 92 K/UL — LOW (ref 150–400)
POTASSIUM SERPL-MCNC: 4.3 MMOL/L — SIGNIFICANT CHANGE UP (ref 3.5–5.3)
POTASSIUM SERPL-SCNC: 4.3 MMOL/L — SIGNIFICANT CHANGE UP (ref 3.5–5.3)
RBC # BLD: 2.92 M/UL — LOW (ref 3.8–5.2)
RBC # FLD: 23.6 % — HIGH (ref 10.3–14.5)
SODIUM SERPL-SCNC: 145 MMOL/L — SIGNIFICANT CHANGE UP (ref 135–145)
WBC # BLD: 2.4 K/UL — LOW (ref 3.8–10.5)
WBC # FLD AUTO: 2.4 K/UL — LOW (ref 3.8–10.5)

## 2024-03-03 PROCEDURE — 99232 SBSQ HOSP IP/OBS MODERATE 35: CPT

## 2024-03-03 RX ORDER — SODIUM BICARBONATE 1 MEQ/ML
1300 SYRINGE (ML) INTRAVENOUS EVERY 12 HOURS
Refills: 0 | Status: DISCONTINUED | OUTPATIENT
Start: 2024-03-03 | End: 2024-03-03

## 2024-03-03 RX ORDER — SODIUM BICARBONATE 1 MEQ/ML
1300 SYRINGE (ML) INTRAVENOUS THREE TIMES A DAY
Refills: 0 | Status: DISCONTINUED | OUTPATIENT
Start: 2024-03-03 | End: 2024-03-04

## 2024-03-03 RX ORDER — CALCIUM ACETATE 667 MG
667 TABLET ORAL
Refills: 0 | Status: DISCONTINUED | OUTPATIENT
Start: 2024-03-03 | End: 2024-03-04

## 2024-03-03 RX ADMIN — Medication 1300 MILLIGRAM(S): at 21:25

## 2024-03-03 RX ADMIN — Medication 1300 MILLIGRAM(S): at 17:05

## 2024-03-03 RX ADMIN — Medication 100 MILLIGRAM(S): at 22:02

## 2024-03-03 RX ADMIN — HEPARIN SODIUM 5000 UNIT(S): 5000 INJECTION INTRAVENOUS; SUBCUTANEOUS at 17:05

## 2024-03-03 RX ADMIN — Medication 4 MILLIGRAM(S): at 21:24

## 2024-03-03 RX ADMIN — Medication 100 MILLIGRAM(S): at 15:21

## 2024-03-03 RX ADMIN — HEPARIN SODIUM 5000 UNIT(S): 5000 INJECTION INTRAVENOUS; SUBCUTANEOUS at 05:30

## 2024-03-03 RX ADMIN — SODIUM CHLORIDE 75 MILLILITER(S): 9 INJECTION, SOLUTION INTRAVENOUS at 08:16

## 2024-03-03 RX ADMIN — Medication 100 MILLIGRAM(S): at 05:28

## 2024-03-03 RX ADMIN — AMLODIPINE BESYLATE 10 MILLIGRAM(S): 2.5 TABLET ORAL at 05:29

## 2024-03-03 NOTE — PHYSICAL THERAPY INITIAL EVALUATION ADULT - ADDITIONAL COMMENTS
pt lives alone in apt w/elevator, uses cane or rw to ambulate in community, usually ambulates with out device at home, independent w/ADL's, +, also owns rollator and shower chair

## 2024-03-03 NOTE — PROGRESS NOTE ADULT - ASSESSMENT
CRI stage 5 - patient is clinically stable. creatinine is stable. patient is refusing dialysis. will follow BMP  Metabolic acidosis - improved. will monitor BMP  HTN - BP is stable. will continue doxazosin/amlodipine/hydralazine. will monitor BP  Colon cancer/mesenteric mass - patient is clinically stable. no intervention as per Oncology/surgery  Anemia/leukopenia/thrombocytopenia - patient is hemodynamically stable. will monitor CBC      Case discussed with patient's daughter/NP
CRF stage 5 - creatinine stable. IVF as per nephrology. will follow BMP  HTN - BP is stable. will continue Norvasc/doxazsin/hydralazine. will monitor BP. nephrology is following  Colon cancer/mesenteric mass - Surgery/oncology notes appreciated.  no treatment workup as per oncology and surgeron  Anemia/leukopenia/thrombocytopenia - patient is clinically stable and hemodynamically stable. will follow CBC. Heme/Onc following  Hypernatremia - IVF as per nephrology. will follow BMP  Metabolic acidosis - improved. will follow BMP. nephrology is following      Case discussed with NP
CRI stage 5 - nephrology following. will continue to follow BMP. patient is considering diaylsis   HTN - patient is clinically stable. will continue meds. will monitor BP  Anemia - patient is hemodynamically stable. will continue to monitor CBC  Colon cancer with mesenteric mass - patient is clinically stable. recommend follow up with oncology as an outpatient  Metabolic acidosis - will follow CO2/BMP. nephrology is following        Case discussed with patient's son/NP/RN
CRI stage 5 - creatinine stable. will continue present management. will follow BMP  HTN - BP is stable. patient is clinically stable. will continue hydralazine/doxazosin/amlodipine  Anemia/thrombocytopenia/leukopenia - patient is clinically stable. will follow CBC  Metabolic acidosis - improved. will follow BMP  Colon cancer/mesenteric mass - patient is clinically stable. no intervention as per oncology/surgeon        Case discussed with NP/patient's daughter
IMPRESSION: No evidence of any surgical problems                        PLAN: Continue her usual diet           Probable discharge tomorrow as per Medicine

## 2024-03-03 NOTE — PHYSICAL THERAPY INITIAL EVALUATION ADULT - PERTINENT HX OF CURRENT PROBLEM, REHAB EVAL
Patient is an 89F h/o CKD4, colon adenocarcinoma s/p rt colectomy and chemo, keytruda, breast cancer, anemia, HTN, thrombocytopenia, sent by PMD for BUN Cr elevation for medical evaluation. She denies headache, nausea, dizziness, chest pain, abdominal pain, urinary changes. In ED patient was afebrile, HR 84, /63, saturating well on RA. Labs were significant for Na 147 and bicarb 11, AG 20, BUN 84, Cr 4.88. Recently admitted to [facility] 2/9-2/14 for acute on chronic hyponatremia and metabolic acidosis with weakness, nausea, vomiting. Patient reports good compliance with her discharge medications.

## 2024-03-03 NOTE — CHART NOTE - NSCHARTNOTEFT_GEN_A_CORE
Off Service NP Hospitalist Note  Case Discussed with Dr. Michele      89F h/o CKD4, colon adenocarcinoma s/p rt colectomy and chemo, keytruda, breast cancer, anemia, HTN, thrombocytopenia, admitted for acute renal failure.    *Progression of CKD5  *Metabolic Acidosis 2/2 CKD  *Hypernatremia  pt with known hx of CKD 5, not on dialysis, in setting of keytruda that was stopped over a year ago  Now admitted with worsening BUN/Cr, metabolic acidosis, baseline Cr ~4  Avoid nephrotoxins  renal recs  Continue sodium bicarb for now, IVF, low sodium diet  Trend BMP    *Colon Adenocarcinoma with mesenteric mass  pt has hx of right colectomy followed by chemotherapy  CT AP No Cont 2/29 showed right sided mesenteric mass inseparable from pancreatic head now with air lucencies  Surgery consult, Dr. Payton - locally advanced, recurrent colon Ca, no acute intervention warranted  Heme recs    *Possible MDS  *Pancytopenia  *Anemia of CKD  pt with chronic anemia, possible MDS, now pancytopenic  no evidence of acute blood loss at this time  will continue to monitor    *HTN  chronic condition  continue home blood pressure meds - hydralazine, norvasc    *Prophylactic Measure  heparin    *Goals of Care  DNR/DNI    discussed case with Dr. Michele.    DC likely monday
Off Service NP Hospitalist Note  Case Discussed with Dr. Michele    labs and imaging reviewed    < from: CT Abdomen and Pelvis No Cont (02.29.24 @ 14:47) >      IMPRESSION:  Right-sided mesenteric mass inseparable from the pancreatic head is   slightly larger than on 2/10/2024 now contains air lucencies, either   related to infection, necrosis, communication with bowel or intervention.    Mild gastric distention reidentified likely related to compression by the   mesenteric mass.    Increasing anasarca.    Splenomegaly.    Nonobstructing left renal calculus.    Findings were discussed with Dr. Fernanda Hernandez 2/29/2024 3:32 PM by Dr. Mercedes Xiao with read back confirmation.    --- End of Report ---        MERCEDES XIAO MD; Attending Radiologist  This document has been electronically signed. Feb 29 2024  3:33PM    < end of copied text >      89F h/o CKD4, colon adenocarcinoma s/p rt colectomy and chemo, keytruda, breast cancer, anemia, HTN, thrombocytopenia, admitted for acute renal failure.    *Progression of CKD5  *Metabolic Acidosis 2/2 CKD  *Hypernatremia  pt with known hx of CKD 5, not on dialysis, in setting of keytruda that was stopped over a year ago  Now admitted with worsening BUN/Cr, metabolic acidosis  Avoid nephrotoxins  Full renal consult pending  Continue sodium bicarb for now, IVF  Trend BMP    *Colon Adenocarcinoma with mesenteric mass  pt has hx of right colectomy followed by chemotherapy  CT AP No Cont 2/29 showed right sided mesenteric mass inseparable from pancreatic head now with air lucencies  Surgery consult, Dr. Payton aware    *Possible MDS  *Pancytopenia  *Anemia of CKD  pt with chronic anemia, possible MDS, now pancytopenic  no evidence of acute blood loss at this time  will continue to monitor    *HTN  chronic condition  continue home blood pressure meds - hydralazine, norvasc    *Prophylactic Measure  heparin    *Goals of Care  DNR/DNI    discussed case with Dr. Michele
Off Service NP Hospitalist Note  Case Discussed with Dr. Michele    labs and imaging reviewed    < from: CT Abdomen and Pelvis No Cont (02.29.24 @ 14:47) >      IMPRESSION:  Right-sided mesenteric mass inseparable from the pancreatic head is   slightly larger than on 2/10/2024 now contains air lucencies, either   related to infection, necrosis, communication with bowel or intervention.    Mild gastric distention reidentified likely related to compression by the   mesenteric mass.    Increasing anasarca.    Splenomegaly.    Nonobstructing left renal calculus.    Findings were discussed with Dr. Fernanda Hernandez 2/29/2024 3:32 PM by Dr. Mercedes Xiao with read back confirmation.    --- End of Report ---        MERCEDES XIAO MD; Attending Radiologist  This document has been electronically signed. Feb 29 2024  3:33PM    < end of copied text >      89F h/o CKD4, colon adenocarcinoma s/p rt colectomy and chemo, keytruda, breast cancer, anemia, HTN, thrombocytopenia, admitted for acute renal failure.    *Progression of CKD5  *Metabolic Acidosis 2/2 CKD  *Hypernatremia  pt with known hx of CKD 5, not on dialysis, in setting of keytruda that was stopped over a year ago  Now admitted with worsening BUN/Cr, metabolic acidosis, baseline Cr ~4  Avoid nephrotoxins  Full renal consult pending  Continue sodium bicarb for now, IVF, low sodium diet  Trend BMP    *Colon Adenocarcinoma with mesenteric mass  pt has hx of right colectomy followed by chemotherapy  CT AP No Cont 2/29 showed right sided mesenteric mass inseparable from pancreatic head now with air lucencies  Surgery consult, Dr. Payton - locally advanced, recurrent colon Ca, no acute intervention warranted  Heme Consult, Dr. Espana aware    *Possible MDS  *Pancytopenia  *Anemia of CKD  pt with chronic anemia, possible MDS, now pancytopenic  no evidence of acute blood loss at this time  will continue to monitor    *HTN  chronic condition  continue home blood pressure meds - hydralazine, norvasc    *Prophylactic Measure  heparin    *Goals of Care  DNR/DNI    discussed case with Dr. Michele.
Off Service NP Hospitalist Note  Case Discussed with Dr. Michele      89F h/o CKD4, colon adenocarcinoma s/p rt colectomy and chemo, keytruda, breast cancer, anemia, HTN, thrombocytopenia, admitted for acute renal failure.    *Progression of CKD5  *Metabolic Acidosis 2/2 CKD  *Hypernatremia  pt with known hx of CKD 5, not on dialysis, in setting of keytruda that was stopped over a year ago  Now admitted with worsening BUN/Cr, metabolic acidosis, baseline Cr ~4  Avoid nephrotoxins  renal recs  Continue sodium bicarb for now, IVF, low sodium diet  Trend BMP    *Colon Adenocarcinoma with mesenteric mass  pt has hx of right colectomy followed by chemotherapy  CT AP No Cont 2/29 showed right sided mesenteric mass inseparable from pancreatic head now with air lucencies  Surgery consult, Dr. Payton - locally advanced, recurrent colon Ca, no acute intervention warranted  Heme recs    *Possible MDS  *Pancytopenia  *Anemia of CKD  pt with chronic anemia, possible MDS, now pancytopenic  no evidence of acute blood loss at this time  will continue to monitor    *HTN  chronic condition  continue home blood pressure meds - hydralazine, norvasc    *Prophylactic Measure  heparin    *Goals of Care  DNR/DNI    discussed case with Dr. Michele.    DC likely monday.

## 2024-03-03 NOTE — PROGRESS NOTE ADULT - TIME BILLING
Discussed all options and risks; all lab values and imaging studies reviewed; discussed with Medicine show

## 2024-03-04 ENCOUNTER — TRANSCRIPTION ENCOUNTER (OUTPATIENT)
Age: 89
End: 2024-03-04

## 2024-03-04 VITALS
DIASTOLIC BLOOD PRESSURE: 65 MMHG | RESPIRATION RATE: 18 BRPM | HEART RATE: 89 BPM | TEMPERATURE: 98 F | OXYGEN SATURATION: 98 % | SYSTOLIC BLOOD PRESSURE: 125 MMHG

## 2024-03-04 LAB
ANION GAP SERPL CALC-SCNC: 14 MMOL/L — SIGNIFICANT CHANGE UP (ref 5–17)
BUN SERPL-MCNC: 88 MG/DL — HIGH (ref 7–23)
CALCIUM SERPL-MCNC: 7.9 MG/DL — LOW (ref 8.4–10.5)
CHLORIDE SERPL-SCNC: 114 MMOL/L — HIGH (ref 96–108)
CO2 SERPL-SCNC: 17 MMOL/L — LOW (ref 22–31)
CREAT SERPL-MCNC: 4.49 MG/DL — HIGH (ref 0.5–1.3)
EGFR: 9 ML/MIN/1.73M2 — LOW
GLUCOSE SERPL-MCNC: 85 MG/DL — SIGNIFICANT CHANGE UP (ref 70–99)
HCT VFR BLD CALC: 29 % — LOW (ref 34.5–45)
HGB BLD-MCNC: 8.8 G/DL — LOW (ref 11.5–15.5)
MCHC RBC-ENTMCNC: 29.3 PG — SIGNIFICANT CHANGE UP (ref 27–34)
MCHC RBC-ENTMCNC: 30.3 GM/DL — LOW (ref 32–36)
MCV RBC AUTO: 96.7 FL — SIGNIFICANT CHANGE UP (ref 80–100)
NRBC # BLD: 0 /100 WBCS — SIGNIFICANT CHANGE UP (ref 0–0)
PHOSPHATE SERPL-MCNC: 5.7 MG/DL — HIGH (ref 2.5–4.5)
PLATELET # BLD AUTO: 81 K/UL — LOW (ref 150–400)
POTASSIUM SERPL-MCNC: 4.7 MMOL/L — SIGNIFICANT CHANGE UP (ref 3.5–5.3)
POTASSIUM SERPL-SCNC: 4.7 MMOL/L — SIGNIFICANT CHANGE UP (ref 3.5–5.3)
RBC # BLD: 3 M/UL — LOW (ref 3.8–5.2)
RBC # FLD: 23.4 % — HIGH (ref 10.3–14.5)
SODIUM SERPL-SCNC: 145 MMOL/L — SIGNIFICANT CHANGE UP (ref 135–145)
WBC # BLD: 2.26 K/UL — LOW (ref 3.8–10.5)
WBC # FLD AUTO: 2.26 K/UL — LOW (ref 3.8–10.5)

## 2024-03-04 PROCEDURE — 82728 ASSAY OF FERRITIN: CPT

## 2024-03-04 PROCEDURE — 93005 ELECTROCARDIOGRAM TRACING: CPT

## 2024-03-04 PROCEDURE — 82570 ASSAY OF URINE CREATININE: CPT

## 2024-03-04 PROCEDURE — 36415 COLL VENOUS BLD VENIPUNCTURE: CPT

## 2024-03-04 PROCEDURE — 85027 COMPLETE CBC AUTOMATED: CPT

## 2024-03-04 PROCEDURE — 71045 X-RAY EXAM CHEST 1 VIEW: CPT

## 2024-03-04 PROCEDURE — 87086 URINE CULTURE/COLONY COUNT: CPT

## 2024-03-04 PROCEDURE — 84100 ASSAY OF PHOSPHORUS: CPT

## 2024-03-04 PROCEDURE — 97162 PT EVAL MOD COMPLEX 30 MIN: CPT

## 2024-03-04 PROCEDURE — 84300 ASSAY OF URINE SODIUM: CPT

## 2024-03-04 PROCEDURE — 83036 HEMOGLOBIN GLYCOSYLATED A1C: CPT

## 2024-03-04 PROCEDURE — 83735 ASSAY OF MAGNESIUM: CPT

## 2024-03-04 PROCEDURE — 99285 EMERGENCY DEPT VISIT HI MDM: CPT

## 2024-03-04 PROCEDURE — 99232 SBSQ HOSP IP/OBS MODERATE 35: CPT

## 2024-03-04 PROCEDURE — 82652 VIT D 1 25-DIHYDROXY: CPT

## 2024-03-04 PROCEDURE — 80048 BASIC METABOLIC PNL TOTAL CA: CPT

## 2024-03-04 PROCEDURE — 74176 CT ABD & PELVIS W/O CONTRAST: CPT | Mod: MC

## 2024-03-04 PROCEDURE — 82310 ASSAY OF CALCIUM: CPT

## 2024-03-04 PROCEDURE — 80061 LIPID PANEL: CPT

## 2024-03-04 PROCEDURE — 85025 COMPLETE CBC W/AUTO DIFF WBC: CPT

## 2024-03-04 PROCEDURE — 82962 GLUCOSE BLOOD TEST: CPT

## 2024-03-04 PROCEDURE — 83970 ASSAY OF PARATHORMONE: CPT

## 2024-03-04 PROCEDURE — 83935 ASSAY OF URINE OSMOLALITY: CPT

## 2024-03-04 PROCEDURE — 80053 COMPREHEN METABOLIC PANEL: CPT

## 2024-03-04 PROCEDURE — 83550 IRON BINDING TEST: CPT

## 2024-03-04 PROCEDURE — 83540 ASSAY OF IRON: CPT

## 2024-03-04 RX ORDER — CALCIUM ACETATE 667 MG
1 TABLET ORAL
Qty: 1 | Refills: 0
Start: 2024-03-04 | End: 2024-04-02

## 2024-03-04 RX ORDER — SODIUM BICARBONATE 1 MEQ/ML
2 SYRINGE (ML) INTRAVENOUS
Qty: 180 | Refills: 0
Start: 2024-03-04 | End: 2024-04-02

## 2024-03-04 RX ADMIN — Medication 667 MILLIGRAM(S): at 11:21

## 2024-03-04 RX ADMIN — Medication 1300 MILLIGRAM(S): at 06:14

## 2024-03-04 RX ADMIN — HEPARIN SODIUM 5000 UNIT(S): 5000 INJECTION INTRAVENOUS; SUBCUTANEOUS at 06:15

## 2024-03-04 RX ADMIN — Medication 100 MILLIGRAM(S): at 06:15

## 2024-03-04 RX ADMIN — Medication 667 MILLIGRAM(S): at 08:18

## 2024-03-04 RX ADMIN — AMLODIPINE BESYLATE 10 MILLIGRAM(S): 2.5 TABLET ORAL at 06:14

## 2024-03-04 NOTE — DISCHARGE NOTE PROVIDER - HOSPITAL COURSE
89F h/o CKD4, colon adenocarcinoma s/p rt colectomy and chemo, keytruda, breast cancer, anemia, HTN, thrombocytopenia, admitted for acute renal failure, progression of CKD5, Metabolic Acidosis 2/2 CKD, and Hypernatremia. Consulted by nephrology. Pt and family wish to avoid RRT if at all possible, and leaning against it even if/when becomes unavoidable as it is unlikely to improve quality of life. CT AP No Cont 2/29 showed right sided mesenteric mass inseparable from pancreatic head now with air lucencies. Surgery consult, Dr. Payton - locally advanced, recurrent colon Ca, no acute intervention warranted.       *Goals of Care  DNR/DNI    Discharging Provider:  Ronny Rodriguez NP  Contact Info: [phone]. Please call with any questions or concerns.    Outpatient Provider:  Dr. Michele    (notified)

## 2024-03-04 NOTE — PROGRESS NOTE ADULT - PROVIDER SPECIALTY LIST ADULT
Internal Medicine
Internal Medicine
Nephrology
Nephrology
Internal Medicine
Internal Medicine
Nephrology
Nephrology
Surgery

## 2024-03-04 NOTE — DISCHARGE NOTE PROVIDER - CARE PROVIDER_API CALL
Humble Michele  Internal Medicine  [address] [place] [address], [address]  [place] [address]  Phone: [phone]  Fax: [phone]  Follow Up Time:

## 2024-03-04 NOTE — DISCHARGE NOTE PROVIDER - NSDCFUSCHEDAPPT_GEN_ALL_CORE_FT
Osmany Lisa  [facility]  [address]  Scheduled Appointment: 03/27/2024    Cortney Mack  [facility]  Scheduled Appointment: 04/03/2024    [facility]  Scheduled Appointment: 04/03/2024    Osmany Lisa  [facility]  [address]  Scheduled Appointment: 04/19/2024

## 2024-03-04 NOTE — DISCHARGE NOTE PROVIDER - NSDCCPCAREPLAN_GEN_ALL_CORE_FT
PRINCIPAL DISCHARGE DIAGNOSIS  Diagnosis: Acute renal failure (ARF)  Assessment and Plan of Treatment: Continue taking sodium bicarb, phoslo and your regular medications. Follow up with your medical team. Return to the ED for worsening complaints.      SECONDARY DISCHARGE DIAGNOSES  Diagnosis: ARF (acute renal failure)  Assessment and Plan of Treatment:     Diagnosis: Metabolic acidosis  Assessment and Plan of Treatment:

## 2024-03-04 NOTE — DISCHARGE NOTE NURSING/CASE MANAGEMENT/SOCIAL WORK - PATIENT PORTAL LINK FT
You can access the FollowMyHealth Patient Portal offered by [facility] by registering at the following website: [email or web address]. By joining [facility]’s FollowMyHealth portal, you will also be able to view your health information using other applications (apps) compatible with our system.

## 2024-03-04 NOTE — PROGRESS NOTE ADULT - SUBJECTIVE AND OBJECTIVE BOX
ASYAALEX HANG  89y  Female      patient states she is feeling fine      REVIEW OF SYSTEMS:    Cardiac HTN  Pulmonary no cough/SOB  GI  colon cancer/mesenteric mass no n/v/pain h/o chronic diarrhea   CRI  Neuro no headache/numbness/dizziness  Heme anemia/leukopenia/thrombocytopenia    FAMILY HISTORY:  FH: colon cancer (Sibling, Sibling)    FH: breast cancer (Sibling)      T(C): 36.1 (03-01-24 @ 19:47), Max: 36.6 (03-01-24 @ 05:19)  HR: 64 (03-01-24 @ 19:47) (61 - 68)  BP: 147/46 (03-01-24 @ 19:47) (139/46 - 160/63)  RR: 16 (03-01-24 @ 19:47) (16 - 16)  SpO2: 96% (03-01-24 @ 19:47) (96% - 98%)  Wt(kg): --Vital Signs Last 24 Hrs  T(C): 36.1 (01 Mar 2024 19:47), Max: 36.6 (01 Mar 2024 05:19)  T(F): 97 (01 Mar 2024 19:47), Max: 97.8 (01 Mar 2024 05:19)  HR: 64 (01 Mar 2024 19:47) (61 - 68)  BP: 147/46 (01 Mar 2024 19:47) (139/46 - 160/63)  BP(mean): --  RR: 16 (01 Mar 2024 19:47) (16 - 16)  SpO2: 96% (01 Mar 2024 19:47) (96% - 98%)    Parameters below as of 01 Mar 2024 19:47  Patient On (Oxygen Delivery Method): room air      No Known Allergies      PHYSICAL EXAM:    General NAD  Neck no JVD thyroid stable  Heart regular  Lungs clear  Abdomen non tender non distended normal bowel sounds no HSM/CVAT  Extremities non tender no edema +pulses  Neurologic alert and oriented x e no acute focal changes        Consultant(s) Notes Reviewed:  [x ] YES  [ ] NO  Care Discussed with Consultants/Other Providers [ x] YES  [ ] NO    LABS:  CBC Full  -  ( 01 Mar 2024 06:49 )  WBC Count : 1.86 K/uL  RBC Count : 3.11 M/uL  Hemoglobin : 9.1 g/dL  Hematocrit : 29.2 %  Platelet Count - Automated : 123 K/uL  Mean Cell Volume : 93.9 fl  Mean Cell Hemoglobin : 29.3 pg  Mean Cell Hemoglobin Concentration : 31.2 gm/dL  Auto Neutrophil # : x  Auto Lymphocyte # : x  Auto Monocyte # : x  Auto Eosinophil # : x  Auto Basophil # : x  Auto Neutrophil % : x  Auto Lymphocyte % : x  Auto Monocyte % : x  Auto Eosinophil % : x  Auto Basophil % : x                          9.1    1.86  )-----------( 123      ( 01 Mar 2024 06:49 )             29.2     03-01    148<H>  |  119<H>  |  82<H>  ----------------------------<  109<H>  4.3   |  13<L>  |  4.57<H>    Ca    8.1<L>      01 Mar 2024 06:49  Phos  5.7     03-01  Mg     2.0     03-01    TPro  5.3<L>  /  Alb  2.6<L>  /  TBili  0.4  /  DBili  x   /  AST  8<L>  /  ALT  10  /  AlkPhos  51  03-01    LIVER FUNCTIONS - ( 01 Mar 2024 06:49 )  Alb: 2.6 g/dL / Pro: 5.3 g/dL / ALK PHOS: 51 U/L / ALT: 10 U/L / AST: 8 U/L / GGT: x             Urinalysis Basic - ( 01 Mar 2024 06:49 )    Color: x / Appearance: x / SG: x / pH: x  Gluc: 109 mg/dL / Ketone: x  / Bili: x / Urobili: x   Blood: x / Protein: x / Nitrite: x   Leuk Esterase: x / RBC: x / WBC x   Sq Epi: x / Non Sq Epi: x / Bacteria: x                    RADIOLOGY & ADDITIONAL TESTS:    < from: CT Abdomen and Pelvis No Cont (02.29.24 @ 14:47) >    ACC: [id number] EXAM:  CT ABDOMEN AND PELVIS   ORDERED BY: FLOWER RESENDIZ     PROCEDURE DATE:  02/29/2024          INTERPRETATION:  CLINICAL INFORMATION: 89 years  Female with MAGDI, N/V      r/o mass, hydro. History of colon cancer. Status post right hemicolectomy   and chemotherapy in 2011. Mesenteric mass excised in 2013 negative for   metastatic disease. Recurred in 2016.    COMPARISON: 11/26/2023    CONTRAST/COMPLICATIONS:  IV Contrast: NONE  Oral Contrast: NONE  Complications: None reportedat time of study completion    PROCEDURE:  CT of the Abdomen and Pelvis was performed.  Sagittal and coronal reformats were performed.    FINDINGS:  LOWER CHEST: Trace pericardial effusion, improved from prior. Trace   bilateral pleural effusions.    LIVER: Multiple cysts.  BILE DUCTS: Normal caliber.  GALLBLADDER: Cholecystectomy.  SPLEEN: Splenomegaly spanning 13.5 cm.  PANCREAS: Redemonstrated 10.1 x 6.2 cm right mesenteric mass, previously   8.6 x 6.6 cm, again inseparable from the pancreatichead. Air lucencies   are now present within the mass. Surrounding surgical clips unchanged.  ADRENALS: Within normal limits.  KIDNEYS/URETERS: Bilateral cysts. Right renal cortical scarring. Right   parapelvic cyst. No hydronephrosis. Nonobstructingleft lower pole   calculus. Persistent infiltration of the renal hilar fat.    BLADDER: Within normal limits.  REPRODUCTIVE ORGANS: Small calcified fibroid.    BOWEL: Mildly distended stomach, again with partially compression of the   second and third portions of the duodenum by the mesenteric mass. Right   hemicolectomy with ileocolic anastomosis. Descending and sigmoid colonic   diverticulosis without diverticulitis.  PERITONEUM: Small pelvic ascites.  VESSELS: Atherosclerotic changes.  RETROPERITONEUM/LYMPH NODES: No lymphadenopathy.  ABDOMINAL WALL: Increasing anasarca.  BONES: Within normal limits.    IMPRESSION:  Right-sided mesenteric mass inseparable from the pancreatic head is   slightly larger than on 2/10/2024 now contains air lucencies, either   related to infection, necrosis, communication with bowel or intervention.    Mild gastric distention reidentified likely related to compression by the   mesenteric mass.    Increasing anasarca.    Splenomegaly.    Nonobstructing left renal calculus.    Findings were discussed with Dr. Fernanda Hernandez 2/29/2024 3:32 PM by Dr. Dmitry Xiao with read back confirmation.    --- End of Report ---      DMITRY XIAO MD; Attending Radiologist  This document has been electronically signed. Feb 29 2024  3:33PM    < end of copied text >        acetaminophen     Tablet .. 650 milliGRAM(s) Oral every 6 hours PRN  aluminum hydroxide/magnesium hydroxide/simethicone Suspension 30 milliLiter(s) Oral every 4 hours PRN  amLODIPine   Tablet 10 milliGRAM(s) Oral daily  dextrose 5% 1000 milliLiter(s) IV Continuous <Continuous>  doxazosin 4 milliGRAM(s) Oral at bedtime  heparin   Injectable 5000 Unit(s) SubCutaneous every 12 hours  hydrALAZINE 100 milliGRAM(s) Oral every 8 hours  melatonin 3 milliGRAM(s) Oral at bedtime PRN  ondansetron Injectable 4 milliGRAM(s) IV Push every 8 hours PRN              
[facility] NEPHROLOGY SERVICES, [facility]  NEPHROLOGY AND HYPERTENSION  [address]  [address]  [place] [address]  [phone]    MD FABIANA SO MD YELENA ROSENBERG, MD BINNY KOSHY, MD CHRISTOPHER CAPUTO, MD EDWARD BOVER, MD          Patient events noted  No distress      MEDICATIONS  (STANDING):  amLODIPine   Tablet 10 milliGRAM(s) Oral daily  doxazosin 4 milliGRAM(s) Oral at bedtime  heparin   Injectable 5000 Unit(s) SubCutaneous every 12 hours  hydrALAZINE 100 milliGRAM(s) Oral every 8 hours  sodium bicarbonate 1300 milliGRAM(s) Oral every 12 hours    MEDICATIONS  (PRN):  acetaminophen     Tablet .. 650 milliGRAM(s) Oral every 6 hours PRN Temp greater or equal to 38C (100.4F), Mild Pain (1 - 3)  aluminum hydroxide/magnesium hydroxide/simethicone Suspension 30 milliLiter(s) Oral every 4 hours PRN Dyspepsia  melatonin 3 milliGRAM(s) Oral at bedtime PRN Insomnia  ondansetron Injectable 4 milliGRAM(s) IV Push every 8 hours PRN Nausea and/or Vomiting      PHYSICAL EXAM:      T(C): 36.7 (03-03-24 @ 20:36), Max: 37.4 (03-03-24 @ 05:18)  HR: 68 (03-03-24 @ 20:36) (68 - 74)  BP: 154/50 (03-03-24 @ 20:36) (132/59 - 157/57)  RR: 16 (03-03-24 @ 20:36) (16 - 16)  SpO2: 96% (03-03-24 @ 20:36) (94% - 96%)  Wt(kg): --  Lungs clear  Heart S1S2  Abd soft NT ND  Extremities:   tr edema                                    8.4    2.40  )-----------( 92       ( 03 Mar 2024 05:45 )             27.6     03-03    145  |  114<H>  |  79<H>  ----------------------------<  98  4.3   |  17<L>  |  4.33<H>    Ca    7.4<L>      03 Mar 2024 05:45  Phos  5.4     03-02  Mg     1.7     03-02    TPro  5.2<L>  /  Alb  2.5<L>  /  TBili  0.4  /  DBili  x   /  AST  10  /  ALT  12  /  AlkPhos  52  03-02      LIVER FUNCTIONS - ( 02 Mar 2024 06:19 )  Alb: 2.5 g/dL / Pro: 5.2 g/dL / ALK PHOS: 52 U/L / ALT: 12 U/L / AST: 10 U/L / GGT: x           Creatinine Trend: 4.33<--, 4.40<--, 4.57<--, 4.75<--, 4.88<--, 4.28<--      A/P:    Hx colon cancer s/p R colectomy, chemo  Progressive CKD iso Keytruda (d/c-d ~ 1 year ago)  Recent baseline Cr ~ 4.  Met acidosis iso chronic diarrhea, advanced CKD  Mild hypernatremia  Hemodynamic/pre-renal MAGDI/CKD   Gentle hypotonic IVF w/Bicarb  Avoid nephrotoxins  Low Na diet   Pt and family wish to avoid RRT if at all possible, and leaning against it even if/when becomes unavoidable as it is unlikely to improve quality of life  Conservative approach is reasonable   D/C IVF; PO bicarbonate  Will follow     Calos Yang MD
[facility] NEPHROLOGY SERVICES, [facility]  NEPHROLOGY AND HYPERTENSION  [address]  [address]  [place] [address]  [phone]    MD FABIANA SO MD YELENA ROSENBERG, MD BINNY KOSHY, MD CHRISTOPHER CAPUTO, MD EDWARD BOVER, MD          Patient events noted  No distress    MEDICATIONS  (STANDING):  amLODIPine   Tablet 10 milliGRAM(s) Oral daily  dextrose 5% 1000 milliLiter(s) (75 mL/Hr) IV Continuous <Continuous>  doxazosin 4 milliGRAM(s) Oral at bedtime  heparin   Injectable 5000 Unit(s) SubCutaneous every 12 hours  hydrALAZINE 100 milliGRAM(s) Oral every 8 hours    MEDICATIONS  (PRN):  acetaminophen     Tablet .. 650 milliGRAM(s) Oral every 6 hours PRN Temp greater or equal to 38C (100.4F), Mild Pain (1 - 3)  aluminum hydroxide/magnesium hydroxide/simethicone Suspension 30 milliLiter(s) Oral every 4 hours PRN Dyspepsia  melatonin 3 milliGRAM(s) Oral at bedtime PRN Insomnia  ondansetron Injectable 4 milliGRAM(s) IV Push every 8 hours PRN Nausea and/or Vomiting      PHYSICAL EXAM:      T(C): 36.2 (03-02-24 @ 12:18), Max: 36.4 (03-02-24 @ 05:17)  HR: 70 (03-02-24 @ 14:05) (70 - 79)  BP: 139/55 (03-02-24 @ 14:05) (139/55 - 157/50)  RR: 16 (03-02-24 @ 12:18) (16 - 16)  SpO2: 97% (03-02-24 @ 12:18) (96% - 97%)  Wt(kg): --  Lungs clear  Heart S1S2  Abd soft NT ND  Extremities:   tr edema                                    8.8    2.28  )-----------( 104      ( 02 Mar 2024 06:19 )             28.3     03-02    147<H>  |  117<H>  |  81<H>  ----------------------------<  96  4.4   |  15<L>  |  4.40<H>    Ca    7.6<L>      02 Mar 2024 06:19  Phos  5.4     03-02  Mg     1.7     03-02    TPro  5.2<L>  /  Alb  2.5<L>  /  TBili  0.4  /  DBili  x   /  AST  10  /  ALT  12  /  AlkPhos  52  03-02      LIVER FUNCTIONS - ( 02 Mar 2024 06:19 )  Alb: 2.5 g/dL / Pro: 5.2 g/dL / ALK PHOS: 52 U/L / ALT: 12 U/L / AST: 10 U/L / GGT: x           Creatinine Trend: 4.40<--, 4.57<--, 4.75<--, 4.88<--, 4.28<--, 3.82<--    A/P:    Hx colon cancer s/p R colectomy, chemo  Progressive CKD iso Keytruda (d/c-d ~ 1 year ago)  Recent baseline Cr ~ 4.  Met acidosis iso chronic diarrhea, advanced CKD  Mild hypernatremia  Hemodynamic/pre-renal MAGDI/CKD   Gentle hypotonic IVF w/Bicarb  Avoid nephrotoxins  Low Na diet   Pt and family wish to avoid RRT if at all possible, and leaning against it even if/when becomes unavoidable as it is unlikely to improve quality of life  Conservative approach is reasonable   Will follow         Calos Yang MD
No complaints     Vital Signs Last 24 Hrs  T(C): 36.1 (03-01-24 @ 19:47), Max: 36.6 (03-01-24 @ 05:19)  T(F): 97 (03-01-24 @ 19:47), Max: 97.8 (03-01-24 @ 05:19)  HR: 64 (03-01-24 @ 19:47) (64 - 68)  BP: 147/46 (03-01-24 @ 19:47) (139/46 - 158/60)  RR: 16 (03-01-24 @ 19:47) (16 - 16)  SpO2: 96% (03-01-24 @ 19:47) (96% - 98%)    s1s2  b/l air entry  soft, ND  no edema                        9.1    1.86  )-----------( 123      ( 01 Mar 2024 06:49 )             29.2     01 Mar 2024 06:49    148    |  119    |  82     ----------------------------<  109    4.3     |  13     |  4.57     Ca    8.1        01 Mar 2024 06:49  Phos  5.7       01 Mar 2024 06:49  Mg     2.0       01 Mar 2024 06:49    TPro  5.3    /  Alb  2.6    /  TBili  0.4    /  DBili  x      /  AST  8      /  ALT  10     /  AlkPhos  51     01 Mar 2024 06:49    LIVER FUNCTIONS - ( 01 Mar 2024 06:49 )  Alb: 2.6 g/dL / Pro: 5.3 g/dL / ALK PHOS: 51 U/L / ALT: 10 U/L / AST: 8 U/L / GGT: x           acetaminophen     Tablet .. 650 milliGRAM(s) Oral every 6 hours PRN  aluminum hydroxide/magnesium hydroxide/simethicone Suspension 30 milliLiter(s) Oral every 4 hours PRN  amLODIPine   Tablet 10 milliGRAM(s) Oral daily  dextrose 5% 1000 milliLiter(s) IV Continuous <Continuous>  doxazosin 4 milliGRAM(s) Oral at bedtime  heparin   Injectable 5000 Unit(s) SubCutaneous every 12 hours  hydrALAZINE 100 milliGRAM(s) Oral every 8 hours  melatonin 3 milliGRAM(s) Oral at bedtime PRN  ondansetron Injectable 4 milliGRAM(s) IV Push every 8 hours PRN    A/P:    Hx colon cancer s/p R colectomy, chemo  Progressive CKD iso Keytruda (d/c-d ~ 1 year ago)  Recent baseline Cr ~ 4.  Met acidosis iso chronic diarrhea, advanced CKD  Mild hypernatremia  Hemodynamic/pre-renal MAGDI/CKD   Gentle hypotonic IVF w/Bicarb  Avoid nephrotoxins  Low Na diet   Pt and family wish to avoid RRT if at all possible, and leaning against it even if/when becomes unavoidable as it is unlikely to improve quality of life  Conservative approach is reasonable   Will follow     [phone]
The patient is feeling well. She denies any abdominal pain and has been eating well and has been passing stool easily. The patient is afebrile and the WBC remains low.    PFSH: All noncontributory    MEDICATIONS REVIEWED:acetaminophen     Tablet .. 650 milliGRAM(s) Oral every 6 hours PRN  aluminum hydroxide/magnesium hydroxide/simethicone Suspension 30 milliLiter(s) Oral every 4 hours PRN  amLODIPine   Tablet 10 milliGRAM(s) Oral daily  doxazosin 4 milliGRAM(s) Oral at bedtime  heparin   Injectable 5000 Unit(s) SubCutaneous every 12 hours  hydrALAZINE 100 milliGRAM(s) Oral every 8 hours  melatonin 3 milliGRAM(s) Oral at bedtime PRN  ondansetron Injectable 4 milliGRAM(s) IV Push every 8 hours PRN  sodium bicarbonate 1300 milliGRAM(s) Oral every 12 hours      ALLERGIES:No Known Allergies      PHYSICAL EXAMINATION:  RESPIRATORY: Clear to auscultation bilaterally, respirations non-labored.  CARDIAC: RR, normal S1S2, no murmurs.  ABDOMEN: soft, BS present, no masses, no peritoneal signs, no KLS, nontender, soft reducible incisional hernia  VASCULAR: Equal and normal pulses.  MUSCULOSKELETAL: Full ROM, no deformities.      T(C): 37.4 (03-03-24 @ 05:18), Max: 37.4 (03-03-24 @ 05:18)  HR: 71 (03-03-24 @ 05:18) (70 - 74)  BP: 137/56 (03-03-24 @ 05:18) (137/56 - 157/57)  RR: 16 (03-03-24 @ 05:18) (16 - 16)  SpO2: 95% (03-03-24 @ 05:18) (94% - 97%)                          8.4    2.40  )-----------( 92       ( 03 Mar 2024 05:45 )             27.6       03-03    145  |  114<H>  |  79<H>  ----------------------------<  98  4.3   |  17<L>  |  4.33<H>    Ca    7.4<L>      03 Mar 2024 05:45  Phos  5.4     03-02  Mg     1.7     03-02    TPro  5.2<L>  /  Alb  2.5<L>  /  TBili  0.4  /  DBili  x   /  AST  10  /  ALT  12  /  AlkPhos  52  03-02      
  HANG MADERA  89y  Female      patient is without complaints      REVIEW OF SYSTEMS:    Cardiac HTN  Pulmonary no cough/SOB  GI colon cancer/mesenteric mass no n/v/d/pain   CRI/metabolic acidosis  Neuro no headache/dizziness/numbness  Heme anemia/thrombocytopenia/leukopenia    FAMILY HISTORY:  FH: colon cancer (Sibling, Sibling)    FH: breast cancer (Sibling)      T(C): 37.4 (03-03-24 @ 05:18), Max: 37.4 (03-03-24 @ 05:18)  HR: 71 (03-03-24 @ 05:18) (70 - 74)  BP: 137/56 (03-03-24 @ 05:18) (137/56 - 157/57)  RR: 16 (03-03-24 @ 05:18) (16 - 16)  SpO2: 95% (03-03-24 @ 05:18) (94% - 95%)  Wt(kg): --Vital Signs Last 24 Hrs  T(C): 37.4 (03 Mar 2024 05:18), Max: 37.4 (03 Mar 2024 05:18)  T(F): 99.3 (03 Mar 2024 05:18), Max: 99.3 (03 Mar 2024 05:18)  HR: 71 (03 Mar 2024 05:18) (70 - 74)  BP: 137/56 (03 Mar 2024 05:18) (137/56 - 157/57)  BP(mean): 83 (03 Mar 2024 05:18) (83 - 83)  RR: 16 (03 Mar 2024 05:18) (16 - 16)  SpO2: 95% (03 Mar 2024 05:18) (94% - 95%)    Parameters below as of 03 Mar 2024 05:18  Patient On (Oxygen Delivery Method): room air      No Known Allergies      PHYSICAL EXAM:    General NAD  Neck no JVD thyroid stable  Heart regular  Lungs clear  Abdomen non tender non distended normal bowel sounds no HSM/CVAT  Extremities non tender no edema +pulses  Neurologic alert and oriented x 3 no acute focal changes        Consultant(s) Notes Reviewed:  [x ] YES  [ ] NO      LABS:  CBC Full  -  ( 03 Mar 2024 05:45 )  WBC Count : 2.40 K/uL  RBC Count : 2.92 M/uL  Hemoglobin : 8.4 g/dL  Hematocrit : 27.6 %  Platelet Count - Automated : 92 K/uL  Mean Cell Volume : 94.5 fl  Mean Cell Hemoglobin : 28.8 pg  Mean Cell Hemoglobin Concentration : 30.4 gm/dL  Auto Neutrophil # : x  Auto Lymphocyte # : x  Auto Monocyte # : x  Auto Eosinophil # : x  Auto Basophil # : x  Auto Neutrophil % : x  Auto Lymphocyte % : x  Auto Monocyte % : x  Auto Eosinophil % : x  Auto Basophil % : x                          8.4    2.40  )-----------( 92       ( 03 Mar 2024 05:45 )             27.6     03-03    145  |  114<H>  |  79<H>  ----------------------------<  98  4.3   |  17<L>  |  4.33<H>    Ca    7.4<L>      03 Mar 2024 05:45  Phos  5.4     03-02  Mg     1.7     03-02    TPro  5.2<L>  /  Alb  2.5<L>  /  TBili  0.4  /  DBili  x   /  AST  10  /  ALT  12  /  AlkPhos  52  03-02    LIVER FUNCTIONS - ( 02 Mar 2024 06:19 )  Alb: 2.5 g/dL / Pro: 5.2 g/dL / ALK PHOS: 52 U/L / ALT: 12 U/L / AST: 10 U/L / GGT: x             Urinalysis Basic - ( 03 Mar 2024 05:45 )    Color: x / Appearance: x / SG: x / pH: x  Gluc: 98 mg/dL / Ketone: x  / Bili: x / Urobili: x   Blood: x / Protein: x / Nitrite: x   Leuk Esterase: x / RBC: x / WBC x   Sq Epi: x / Non Sq Epi: x / Bacteria: x      CAPILLARY BLOOD GLUCOSE      POCT Blood Glucose.: 94 mg/dL (03 Mar 2024 08:17)          acetaminophen     Tablet .. 650 milliGRAM(s) Oral every 6 hours PRN  aluminum hydroxide/magnesium hydroxide/simethicone Suspension 30 milliLiter(s) Oral every 4 hours PRN  amLODIPine   Tablet 10 milliGRAM(s) Oral daily  doxazosin 4 milliGRAM(s) Oral at bedtime  heparin   Injectable 5000 Unit(s) SubCutaneous every 12 hours  hydrALAZINE 100 milliGRAM(s) Oral every 8 hours  melatonin 3 milliGRAM(s) Oral at bedtime PRN  ondansetron Injectable 4 milliGRAM(s) IV Push every 8 hours PRN  sodium bicarbonate 1300 milliGRAM(s) Oral every 12 hours          
  HANG MADERA  89y  Female      patient is without complaints      REVIEW OF SYSTEMS:    Cardiac HTN  Pulmonary no cough/SOB  GI colon cancer/mesenteric mass no n/v/pain   metabolic acidosis/CRI  Neuro no headache/dizziness/numbness  Heme anemia/thrombocytopenia/leukopenia    FAMILY HISTORY:  FH: colon cancer (Sibling, Sibling)    FH: breast cancer (Sibling)      T(C): 36.2 (03-02-24 @ 12:18), Max: 36.4 (03-02-24 @ 05:17)  HR: 73 (03-02-24 @ 12:18) (64 - 79)  BP: 142/59 (03-02-24 @ 12:18) (142/59 - 157/50)  RR: 16 (03-02-24 @ 12:18) (16 - 16)  SpO2: 97% (03-02-24 @ 12:18) (96% - 97%)  Wt(kg): --Vital Signs Last 24 Hrs  T(C): 36.2 (02 Mar 2024 12:18), Max: 36.4 (02 Mar 2024 05:17)  T(F): 97.2 (02 Mar 2024 12:18), Max: 97.6 (02 Mar 2024 05:17)  HR: 73 (02 Mar 2024 12:18) (64 - 79)  BP: 142/59 (02 Mar 2024 12:18) (142/59 - 157/50)  BP(mean): 86 (02 Mar 2024 05:17) (86 - 86)  RR: 16 (02 Mar 2024 12:18) (16 - 16)  SpO2: 97% (02 Mar 2024 12:18) (96% - 97%)    Parameters below as of 02 Mar 2024 12:18  Patient On (Oxygen Delivery Method): room air      No Known Allergies      PHYSICAL EXAM:    General NAD  Neck no JVD thyroid stable  Heart regular  Lungs clear  Abdomen non tender non distended normal bowel sounds no HSM/CVAT  Extremities non tender no edema +pulses  Neurologic alert and oriented x 3 no acute focal changes        Consultant(s) Notes Reviewed:  [x ] YES  [ ] NO      LABS:  CBC Full  -  ( 02 Mar 2024 06:19 )  WBC Count : 2.28 K/uL  RBC Count : 3.03 M/uL  Hemoglobin : 8.8 g/dL  Hematocrit : 28.3 %  Platelet Count - Automated : 104 K/uL  Mean Cell Volume : 93.4 fl  Mean Cell Hemoglobin : 29.0 pg  Mean Cell Hemoglobin Concentration : 31.1 gm/dL  Auto Neutrophil # : 1.55 K/uL  Auto Lymphocyte # : 0.43 K/uL  Auto Monocyte # : 0.23 K/uL  Auto Eosinophil # : 0.06 K/uL  Auto Basophil # : 0.00 K/uL  Auto Neutrophil % : 68.0 %  Auto Lymphocyte % : 18.9 %  Auto Monocyte % : 10.1 %  Auto Eosinophil % : 2.6 %  Auto Basophil % : 0.0 %                          8.8    2.28  )-----------( 104      ( 02 Mar 2024 06:19 )             28.3     03-02    147<H>  |  117<H>  |  81<H>  ----------------------------<  96  4.4   |  15<L>  |  4.40<H>    Ca    7.6<L>      02 Mar 2024 06:19  Phos  5.4     03-02  Mg     1.7     03-02    TPro  5.2<L>  /  Alb  2.5<L>  /  TBili  0.4  /  DBili  x   /  AST  10  /  ALT  12  /  AlkPhos  52  03-02    LIVER FUNCTIONS - ( 02 Mar 2024 06:19 )  Alb: 2.5 g/dL / Pro: 5.2 g/dL / ALK PHOS: 52 U/L / ALT: 12 U/L / AST: 10 U/L / GGT: x             Urinalysis Basic - ( 02 Mar 2024 06:19 )    Color: x / Appearance: x / SG: x / pH: x  Gluc: 96 mg/dL / Ketone: x  / Bili: x / Urobili: x   Blood: x / Protein: x / Nitrite: x   Leuk Esterase: x / RBC: x / WBC x   Sq Epi: x / Non Sq Epi: x / Bacteria: x      acetaminophen     Tablet .. 650 milliGRAM(s) Oral every 6 hours PRN  aluminum hydroxide/magnesium hydroxide/simethicone Suspension 30 milliLiter(s) Oral every 4 hours PRN  amLODIPine   Tablet 10 milliGRAM(s) Oral daily  dextrose 5% 1000 milliLiter(s) IV Continuous <Continuous>  doxazosin 4 milliGRAM(s) Oral at bedtime  heparin   Injectable 5000 Unit(s) SubCutaneous every 12 hours  hydrALAZINE 100 milliGRAM(s) Oral every 8 hours  melatonin 3 milliGRAM(s) Oral at bedtime PRN  ondansetron Injectable 4 milliGRAM(s) IV Push every 8 hours PRN              
No complaints     Vital Signs Last 24 Hrs  T(C): 36.5 (03-04-24 @ 11:46), Max: 36.7 (03-04-24 @ 05:23)  T(F): 97.7 (03-04-24 @ 11:46), Max: 98 (03-04-24 @ 05:23)  HR: 89 (03-04-24 @ 11:46) (63 - 89)  BP: 125/65 (03-04-24 @ 11:46) (125/65 - 156/53)  RR: 18 (03-04-24 @ 11:46) (17 - 18)  SpO2: 98% (03-04-24 @ 11:46) (95% - 98%)    s1s2  b/l air entry  soft, ND  no edema                                8.8    2.26  )-----------( 81       ( 04 Mar 2024 06:26 )             29.0     04 Mar 2024 06:26    145    |  114    |  88     ----------------------------<  85     4.7     |  17     |  4.49     Ca    7.9        04 Mar 2024 06:26  Phos  5.7       04 Mar 2024 06:26    acetaminophen     Tablet .. 650 milliGRAM(s) Oral every 6 hours PRN  amLODIPine   Tablet 10 milliGRAM(s) Oral daily  calcium acetate 667 milliGRAM(s) Oral three times a day with meals  doxazosin 4 milliGRAM(s) Oral at bedtime  heparin   Injectable 5000 Unit(s) SubCutaneous every 12 hours  hydrALAZINE 100 milliGRAM(s) Oral every 8 hours  melatonin 3 milliGRAM(s) Oral at bedtime PRN  ondansetron Injectable 4 milliGRAM(s) IV Push every 8 hours PRN  sodium bicarbonate 1300 milliGRAM(s) Oral three times a day    A/P:    Hx colon cancer s/p R colectomy, chemo  Progressive CKD iso Keytruda (d/c-d ~ 1 year ago)  Met acidosis iso chronic diarrhea, advanced CKD  Avoid nephrotoxins  Low Na, low Phos diet   Pt and family wish to avoid RRT if at all possible  Conservative approach is reasonable   Continue Na Bicarb and Phoslo as op  Continue Norvasc, Hydralazine and Doxazosin as op  Encourage po hydration   Close op f/u    [phone]
  HANG MADERA  89y  Female      patient states she is feeling fine      REVIEW OF SYSTEMS:    Cardiac HTN  Pulmonary no cough/SOB  GI colon cancer/mesenteric mass no n/v/d/pain   CRI/metabolic acidosis  Neuro no headache/numbness/dizziness  Heme anemia/thrombocytopenia    FAMILY HISTORY:  FH: colon cancer (Sibling, Sibling)    FH: breast cancer (Sibling)      T(C): 36.8 (02-29-24 @ 01:34), Max: 37.1 (02-28-24 @ 22:02)  HR: 79 (02-29-24 @ 01:34) (79 - 84)  BP: 143/57 (02-29-24 @ 01:34) (143/57 - 175/63)  RR: 17 (02-29-24 @ 01:34) (17 - 19)  SpO2: 99% (02-29-24 @ 01:34) (98% - 99%)  Wt(kg): --Vital Signs Last 24 Hrs  T(C): 36.8 (29 Feb 2024 01:34), Max: 37.1 (28 Feb 2024 22:02)  T(F): 98.2 (29 Feb 2024 01:34), Max: 98.8 (28 Feb 2024 22:02)  HR: 79 (29 Feb 2024 01:34) (79 - 84)  BP: 143/57 (29 Feb 2024 01:34) (143/57 - 175/63)  BP(mean): --  RR: 17 (29 Feb 2024 01:34) (17 - 19)  SpO2: 99% (29 Feb 2024 01:34) (98% - 99%)    Parameters below as of 29 Feb 2024 01:34  Patient On (Oxygen Delivery Method): room air      No Known Allergies      PHYSICAL EXAM:    General NAD  Neck no JVD thyroid stable  Heart regular  Lungs clear  Abdomen non tender non distended normal bowel sounds no HSM/CVAT  Extremities non tender no edema +pulses   Neurologic alert and oriented x 3 no acute focal changes        Consultant(s) Notes Reviewed:  [x ] YES  [ ] NO      LABS:  CBC Full  -  ( 29 Feb 2024 07:57 )  WBC Count : 2.31 K/uL  RBC Count : 3.27 M/uL  Hemoglobin : 9.7 g/dL  Hematocrit : 30.7 %  Platelet Count - Automated : 137 K/uL  Mean Cell Volume : 93.9 fl  Mean Cell Hemoglobin : 29.7 pg  Mean Cell Hemoglobin Concentration : 31.6 gm/dL  Auto Neutrophil # : x  Auto Lymphocyte # : x  Auto Monocyte # : x  Auto Eosinophil # : x  Auto Basophil # : x  Auto Neutrophil % : x  Auto Lymphocyte % : x  Auto Monocyte % : x  Auto Eosinophil % : x  Auto Basophil % : x                          9.7    2.31  )-----------( 137      ( 29 Feb 2024 07:57 )             30.7     02-29    147<H>  |  117<H>  |  85<H>  ----------------------------<  111<H>  4.5   |  11<L>  |  4.75<H>    Ca    8.3<L>      29 Feb 2024 07:57  Phos  5.3     02-29  Mg     2.1     02-29    TPro  5.9<L>  /  Alb  2.9<L>  /  TBili  0.5  /  DBili  x   /  AST  11  /  ALT  10  /  AlkPhos  59  02-29    LIVER FUNCTIONS - ( 29 Feb 2024 07:57 )  Alb: 2.9 g/dL / Pro: 5.9 g/dL / ALK PHOS: 59 U/L / ALT: 10 U/L / AST: 11 U/L / GGT: x             Urinalysis Basic - ( 29 Feb 2024 07:57 )    Color: x / Appearance: x / SG: x / pH: x  Gluc: 111 mg/dL / Ketone: x  / Bili: x / Urobili: x   Blood: x / Protein: x / Nitrite: x   Leuk Esterase: x / RBC: x / WBC x   Sq Epi: x / Non Sq Epi: x / Bacteria: x        < from: Xray Chest 1 View- PORTABLE-Urgent (02.28.24 @ 23:12) >  ACC: [id number] EXAM:  XR CHEST PORTABLE URGENT 1V   ORDERED BY: PAULINE REYES     PROCEDURE DATE:  02/28/2024          INTERPRETATION:  Portable AP chest radiographs    COMPARISON: 2/10/2024 chest x-ray.    CLINICAL INFORMATION: Cough.    FINDINGS:  CATHETERS AND TUBES: Mediport catheter tip in SVC.    PULMONARY: The airway is midline.  There are no airspace consolidations or radiographic evidence of   pulmonary nodules..  No pleural effusion or pneumothorax.    HEART/VASCULAR: The heart size and mediastinum configuration are within   the limits of normal.    BONES: The visualized osseous thorax is intact.    IMPRESSION:    No radiographic evidence of active chest disease.  If cough persists   despite conservative therapy and further evaluation of lung parenchyma   required, a noncontrast CT exam suggested to exclude occult pathology not   evident on plain film radiography..--- End of Report ---      JOJO LAINEZ MD; Attending Radiologist  This document has been electronically signed. Feb 29 2024  9:13AM    < end of copied text >          < from: 12 Lead ECG (02.28.24 @ 23:13) >    Ventricular Rate 63 BPM    Atrial Rate 63 BPM    P-R Interval 306 ms    QRS Duration 108 ms    Q-T Interval 422 ms    QTC Calculation(Bazett) 431 ms    P Axis 64 degrees    R Axis -48 degrees    T Axis 8 degrees    Diagnosis Line Sinus rhythm with 1st degree AV block  Left anterior fascicular block  Moderate voltage criteria for LVH, may be normal variant ( R in aVL , [place] product )  Abnormal ECG  When compared with ECG of 30-NOV-2023 22:52,  HI interval has increased    Confirmed by BERNADINE PEARSON, TRISH ([id number]) on 2/29/2024 9:16:06 AM    < end of copied text >              acetaminophen     Tablet .. 650 milliGRAM(s) Oral every 6 hours PRN  aluminum hydroxide/magnesium hydroxide/simethicone Suspension 30 milliLiter(s) Oral every 4 hours PRN  amLODIPine   Tablet 10 milliGRAM(s) Oral daily  dextrose 5% + sodium chloride 0.45% 1000 milliLiter(s) IV Continuous <Continuous>  doxazosin 2 milliGRAM(s) Oral at bedtime  heparin   Injectable 5000 Unit(s) SubCutaneous every 12 hours  hydrALAZINE 100 milliGRAM(s) Oral every 8 hours  melatonin 3 milliGRAM(s) Oral at bedtime PRN  ondansetron Injectable 4 milliGRAM(s) IV Push every 8 hours PRN

## 2024-03-04 NOTE — DISCHARGE NOTE PROVIDER - NSDCMRMEDTOKEN_GEN_ALL_CORE_FT
amLODIPine 10 mg oral tablet: 1 tab(s) orally once a day  calcium acetate 667 mg oral tablet: 1 tab(s) orally 3 times a day (with meals)  doxazosin 2 mg oral tablet: 1 tab(s) orally once a day (at bedtime)  hydrALAZINE 100 mg oral tablet: 1 tab(s) orally every 8 hours  sodium bicarbonate 650 mg oral tablet: 2 tab(s) orally 3 times a day

## 2024-03-06 NOTE — ED ADULT NURSE NOTE - NSFALLASSISTNEEDED_ED_ALL_ED
PA has been submitted. Routing to PA dept for an update.    Standing/Walking/Toileting/Moving from bed to stretcher

## 2024-03-14 ENCOUNTER — LABORATORY RESULT (OUTPATIENT)
Age: 89
End: 2024-03-14

## 2024-03-14 LAB
BASOPHILS # BLD AUTO: 0.01 K/UL
BASOPHILS NFR BLD AUTO: 0.4 %
EOSINOPHIL # BLD AUTO: 0.01 K/UL
EOSINOPHIL NFR BLD AUTO: 0.4 %
HCT VFR BLD CALC: 34.5 %
HGB BLD-MCNC: 10.4 G/DL
IMM GRANULOCYTES NFR BLD AUTO: 0.4 %
LYMPHOCYTES # BLD AUTO: 0.66 K/UL
LYMPHOCYTES NFR BLD AUTO: 24.2 %
MAN DIFF?: NORMAL
MCHC RBC-ENTMCNC: 29.4 PG
MCHC RBC-ENTMCNC: 30.1 GM/DL
MCV RBC AUTO: 97.5 FL
MONOCYTES # BLD AUTO: 0.3 K/UL
MONOCYTES NFR BLD AUTO: 11 %
NEUTROPHILS # BLD AUTO: 1.74 K/UL
NEUTROPHILS NFR BLD AUTO: 63.6 %
PLATELET # BLD AUTO: 99 K/UL
RBC # BLD: 3.54 M/UL
RBC # FLD: 18.9 %
WBC # FLD AUTO: 2.73 K/UL

## 2024-03-21 ENCOUNTER — OUTPATIENT (OUTPATIENT)
Dept: OUTPATIENT SERVICES | Facility: HOSPITAL | Age: 89
LOS: 1 days | Discharge: HOME | End: 2024-03-21

## 2024-03-21 DIAGNOSIS — Z98.890 OTHER SPECIFIED POSTPROCEDURAL STATES: Chronic | ICD-10-CM

## 2024-03-21 DIAGNOSIS — Z90.49 ACQUIRED ABSENCE OF OTHER SPECIFIED PARTS OF DIGESTIVE TRACT: Chronic | ICD-10-CM

## 2024-03-21 DIAGNOSIS — N18.5 CHRONIC KIDNEY DISEASE, STAGE 5: ICD-10-CM

## 2024-03-21 DIAGNOSIS — C18.9 MALIGNANT NEOPLASM OF COLON, UNSPECIFIED: ICD-10-CM

## 2024-03-21 DIAGNOSIS — D35.1 BENIGN NEOPLASM OF PARATHYROID GLAND: Chronic | ICD-10-CM

## 2024-03-21 DIAGNOSIS — D63.1 ANEMIA IN CHRONIC KIDNEY DISEASE: ICD-10-CM

## 2024-03-27 ENCOUNTER — APPOINTMENT (OUTPATIENT)
Dept: NEPHROLOGY | Facility: CLINIC | Age: 89
End: 2024-03-27

## 2024-04-03 ENCOUNTER — APPOINTMENT (OUTPATIENT)
Dept: HEMATOLOGY ONCOLOGY | Facility: CLINIC | Age: 89
End: 2024-04-03
Payer: MEDICARE

## 2024-04-03 DIAGNOSIS — R97.0 ELEVATED CARCINOEMBRYONIC ANTIGEN [CEA]: ICD-10-CM

## 2024-04-11 ENCOUNTER — RESULT REVIEW (OUTPATIENT)
Age: 89
End: 2024-04-11

## 2024-04-11 ENCOUNTER — APPOINTMENT (OUTPATIENT)
Dept: HEMATOLOGY ONCOLOGY | Facility: CLINIC | Age: 89
End: 2024-04-11
Payer: MEDICARE

## 2024-04-11 ENCOUNTER — APPOINTMENT (OUTPATIENT)
Dept: INFUSION THERAPY | Facility: HOSPITAL | Age: 89
End: 2024-04-11

## 2024-04-11 VITALS
WEIGHT: 139.99 LBS | OXYGEN SATURATION: 96 % | HEIGHT: 64.02 IN | TEMPERATURE: 97.4 F | DIASTOLIC BLOOD PRESSURE: 62 MMHG | HEART RATE: 73 BPM | SYSTOLIC BLOOD PRESSURE: 156 MMHG | BODY MASS INDEX: 23.9 KG/M2 | RESPIRATION RATE: 16 BRPM

## 2024-04-11 LAB
ANISOCYTOSIS BLD QL: SLIGHT — SIGNIFICANT CHANGE UP
BASOPHILS # BLD AUTO: 0.01 K/UL — SIGNIFICANT CHANGE UP (ref 0–0.2)
BASOPHILS NFR BLD AUTO: 0.4 % — SIGNIFICANT CHANGE UP (ref 0–2)
BURR CELLS BLD QL SMEAR: PRESENT — SIGNIFICANT CHANGE UP
DACRYOCYTES BLD QL SMEAR: SLIGHT — SIGNIFICANT CHANGE UP
ELLIPTOCYTES BLD QL SMEAR: SLIGHT — SIGNIFICANT CHANGE UP
EOSINOPHIL # BLD AUTO: 0 K/UL — SIGNIFICANT CHANGE UP (ref 0–0.5)
EOSINOPHIL NFR BLD AUTO: 0 % — SIGNIFICANT CHANGE UP (ref 0–6)
HCT VFR BLD CALC: 30.3 % — LOW (ref 34.5–45)
HGB BLD-MCNC: 9.2 G/DL — LOW (ref 11.5–15.5)
IMM GRANULOCYTES NFR BLD AUTO: 0.4 % — SIGNIFICANT CHANGE UP (ref 0–0.9)
LYMPHOCYTES # BLD AUTO: 0.38 K/UL — LOW (ref 1–3.3)
LYMPHOCYTES # BLD AUTO: 13.8 % — SIGNIFICANT CHANGE UP (ref 13–44)
MCHC RBC-ENTMCNC: 29 PG — SIGNIFICANT CHANGE UP (ref 27–34)
MCHC RBC-ENTMCNC: 30.4 G/DL — LOW (ref 32–36)
MCV RBC AUTO: 95.6 FL — SIGNIFICANT CHANGE UP (ref 80–100)
MONOCYTES # BLD AUTO: 0.2 K/UL — SIGNIFICANT CHANGE UP (ref 0–0.9)
MONOCYTES NFR BLD AUTO: 7.3 % — SIGNIFICANT CHANGE UP (ref 2–14)
NEUTROPHILS # BLD AUTO: 2.15 K/UL — SIGNIFICANT CHANGE UP (ref 1.8–7.4)
NEUTROPHILS NFR BLD AUTO: 78.1 % — HIGH (ref 43–77)
NRBC # BLD: 0 /100 WBCS — SIGNIFICANT CHANGE UP (ref 0–0)
PLAT MORPH BLD: NORMAL — SIGNIFICANT CHANGE UP
PLATELET # BLD AUTO: 98 K/UL — LOW (ref 150–400)
POIKILOCYTOSIS BLD QL AUTO: SLIGHT — SIGNIFICANT CHANGE UP
RBC # BLD: 3.17 M/UL — LOW (ref 3.8–5.2)
RBC # FLD: 16.6 % — HIGH (ref 10.3–14.5)
RBC BLD AUTO: ABNORMAL
SCHISTOCYTES BLD QL AUTO: SLIGHT — SIGNIFICANT CHANGE UP
WBC # BLD: 2.75 K/UL — LOW (ref 3.8–10.5)
WBC # FLD AUTO: 2.75 K/UL — LOW (ref 3.8–10.5)

## 2024-04-11 PROCEDURE — G2211 COMPLEX E/M VISIT ADD ON: CPT

## 2024-04-11 PROCEDURE — 99214 OFFICE O/P EST MOD 30 MIN: CPT

## 2024-04-11 RX ORDER — AMLODIPINE BESYLATE 5 MG/1
TABLET ORAL
Refills: 0 | Status: ACTIVE | COMMUNITY

## 2024-04-11 RX ORDER — DOXAZOSIN 2 MG/1
2 TABLET ORAL
Refills: 0 | Status: ACTIVE | COMMUNITY

## 2024-04-11 RX ORDER — CALCIUM ACETATE 667 MG/1
CAPSULE ORAL
Refills: 0 | Status: ACTIVE | COMMUNITY

## 2024-04-11 RX ORDER — HYDRALAZINE HYDROCHLORIDE 100 MG/1
100 TABLET ORAL
Refills: 0 | Status: ACTIVE | COMMUNITY

## 2024-04-11 NOTE — PHYSICAL EXAM
[de-identified] : soft, NT-fullness palpable RUQ; +ventral hernia [de-identified] : alert and oriented x 3

## 2024-04-11 NOTE — HISTORY OF PRESENT ILLNESS
[de-identified] : Patient with history of colon cancer (adenocarcinoma)-5-2011. Stage IIIC-T4 N2a. Status post right colectomy followed by FOLFOX chemotherapy.  With persistently elevated CEA on f/u testing,  3/2013 mesentery mass excision pathology showed a lymph node negative for metastatic carcinoma. Benign fibroadipose tissue. Persistently increasing elevated CEA.  CT scan abdomen/pelvis 6/2016, showed enlarging mesenteric adenopathy. Endoscopic ultrasound guided FNA of the bryan-pancreatic, mesenteric mass was positive for malignant cells-adenocarcinoma associated with abundant mucin-compatible with known history of adenocarcinoma, colorectal type, mucinous variant..  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  8/2016-Patient begun on Avastin/irinotecan (only 1 dose Avastin given). 11/2016-CT chest/abdomen/pelvis showed stable posttreatment findings and stable mesenteric adenopathy.  Patient obtained 2 surgical opinions and disease was felt to be inoperable by both surgeons.  Patient begun on Xeloda/Avastin. 5/2017-CT scan chest/abdomen/pelvis showed stable tiny nodules in the lungs. Sizable mass merging with the head of the pancreas had Increased in size from 11/2016 with some increasing infiltration of the surrounding fat.  Possibility of increasing colonic wall thickness on the colon site of the ileocolic anastomosis. Patient begun on Pembrolizumab (tumor MSI-high). 8/2017- CT scan chest/abdomen/pelvis showed marginally worsening mesenteric adenopathy, with short interval followup CT scan abdomen/pelvis 9/2017, stable compared to 8/2017. 1/2018-CT scan chest/abdomen/pelvis-stable adenopathy. 6/2018-CT scan chest/abdomen/pelvis-stable adenopathy. 11/2018-CT chest/abdomen/pelvis-unchanged mass at the root of the mesentery. 4/2019-CT scan chest/abdomen/pelvis-stable mesenteric soft tissue mass. 8/2019-CT scan chest/abdomen/pelvis-unchanged mesenteric mass. 10/2019-CT scan A/P-stable mesenteric mass. Held Pembrolizumab. 2/2020-CT scan chest/abdomen/pelvis-stable exam with mesenteric mass without significant change since 10/2019. Resumed Pembrolizumab, which was then held 6/2020 due to progressive renal insufficiency. 7/2020-CT scan chest/abdomen/pelvis-no significant change.  Stable mesenteric mass unchanged since 10/2019.  Segment of colonic wall thickening at the level of the anastomosis unchanged.  Enlarged multinodular thyroid with bilateral substernal extension unchanged. 10/2020-CT scan C/A/P-stable upper abdominal mesenteric mass. No new suspicious pathology.  1/2021-CT scan chest/abdomen/pelvis-mesenteric mass inseparable from the pancreatic head encasing the proximal mesenteric vessels minimally enlarged since 10/2020.  Mild mural thickening and ileal colic anastomosis. New course Pembrolizumab begun. 5/2021-CT scan chest/abdomen/pelvis-stable mesenteric mass contiguous with pancreatic head.  Decrease in thickening at the ileocolic anastomosis.  No new abnormalities. 10/2021- CT C/A/P-stable exam. 3/28/2022-CT C/A/P-nonspecific small volume pelvic ascites, stable  since 10/19/2021. An 8.3 x 5.6 cm lobulated mesenteric mass is indeterminate, but unchanged. Stable appearance of the chest since 10/19/2021.  8/19/2022-CT C/A/P-mesenteric mass without significant change.  12/13/2022-PET/CT scan-Morphologically stable RIGHT mesenteric mass demonstrates mild uptake, with portions exhibiting photopenia. Mucinous lesions are often minimally or non-FDG-avid; these findings are consistent with that history. 2.  Uptake overlying the RIGHT hemicolectomy anastomosis, where residual/recurrent disease is not excluded. 3.  Multiple hepatic hypodensities, some of which may have enlarged. Some of these demonstrate mildly decreased uptake compared to the remaining liver. If this will change patient management, MRI of the liver may be helpful to exclude mucinous metastases. 4.  Heterogeneous heterogeneous thyroid with mild uptake and substernal extension. 5.  Small RIGHT lung nodule versus motion artifact. Additional previously seen RIGHT-sided micronodule is unchanged.  1/11/2023-MRI Abdomen-No obvious mucinous liver metastases, however difficult to exclude given lack of intravenous contrast and motion artifact as well as multiplicity of background hepatic cysts. Grossly stable right mesenteric mass 1/18/2023-CT chest-Stable two very small nodules in the left upper and left lower lobes when compared to previous exam.  6/27/2023-CT scan chest/abdomen/pelvis-mild increase in size of large right-sided mesenteric mass, which correlated with prior examinations, likely represents a mucinous metastasis.  Otherwise stable examination.  Numerous cystic liver lesions are not significantly changed and are not well characterized on this unenhanced CT scan.  9/11/2023-CT scan chest/abdomen/pelvis-essentially necrotic right abdominal mass involving the pancreatic head and possibly the adjacent duodenum slightly decreased in size from 6/22/2023.  2/2024-Patient opting for supportive care.  [de-identified] : adenocarcinoma [de-identified] : CEA 12/2013-33.6 [de-identified] : History of right breast cancer-2007. Status post right breast conservation surgery--> RT--> [facility].           11/2021 (h/o pancytopenia)-Bone marrow biopsy and bone marrow aspirate-cellular marrow (40-50%) with erythroid predominant maturing  trilineage hematopoiesis with no increase in blast population. Normal female cytogenetics. Normal MDS FISH panel. [employer]ight Myeloid panel-no Mutations Identified 8/2023-Bone marrow biopsy and bone marrow aspirate-normocellular marrow with trilineage hematopoiesis with maturation and increased iron stores.  No ring sideroblasts seen flow cytometry myeloid immunophenotypic findings showed no diagnostic abnormalities.  Hematogones noted.  Cytogenetics with normal female karyotype.  MDS FISH panel. [employer] Advanced NGS Myeloid Panel Final Report RESULT SUMMARY: ABNORMAL DETECTED GENOMIC ALTERATIONS:   Tier II: Variants of Potential Clinical Significance   TP53 [id number]    [de-identified] : Never went on hospice.  Last procrit from PCP was 4 days ago. Driving again, remains independent. Dr. Garcia is handling renal issues/meds-no plans for dialysis, per patient. No CP, cough, SOB, fevers. No c/o N/V/change in bowel habits. No current c/o abdominal/pelvic pain. Generally feels tired at times.  Son is [employer] . He has been a source of support for patient, as is daughter now.

## 2024-04-11 NOTE — ASSESSMENT
[FreeTextEntry1] : Lab work reviewed. #1) Colon cancer/h/o elevated CEA. Patient had wished to continue Keytruda which she felt she tolerated well. Had reviewed that the usual duration of immunotherapy would be for up to 2 years or until disease progression or intolerance to the medication. Patient had a break from treatment and resumed it with stability of her disease. However, immunotherapy then held due to renal issues-last dose 6/14/22.  12/13/2022-PET/CT scan-Morphologically stable RIGHT mesenteric mass demonstrates mild uptake, with portions exhibiting photopenia. Mucinous lesions are often minimally or non-FDG-avid; these findings are consistent with that history. 2. Uptake overlying the RIGHT hemicolectomy anastomosis, where residual/recurrent disease is not excluded. 3. Multiple hepatic hypodensities, some of which may have enlarged. Some of these demonstrate mildly decreased uptake compared to the remaining liver. If this will change patient management, MRI of the liver may be helpful to exclude mucinous metastases. 4. Heterogeneous heterogeneous thyroid with mild uptake and substernal extension. 5. Small RIGHT lung nodule versus motion artifact. Additional previously seen RIGHT-sided micronodule is unchanged.  1/11/2023-MRI Abdomen-No obvious mucinous liver metastases, however difficult to exclude given lack of intravenous contrast and motion artifact as well as multiplicity of background hepatic cysts. Grossly stable right mesenteric mass 1/18/2023-CT chest-Stable two very small nodules in the left upper and left lower lobes when compared to previous exam. -patient wished for continued treatment break. Colonoscopy 3/2023 unremarkable.  6/27/2023-CT scan chest/abdomen/pelvis-mild increase in size of large right-sided mesenteric mass, which correlated with prior examinations, likely represents a mucinous metastasis. Otherwise stable examination. Numerous cystic liver lesions are not significantly changed and are not well characterized on this unenhanced CT scan. Had reviewed CT scan results with patient in detail. Suggestive of mild increase in large mesenteric mass. Cystic liver lesions overall stable. Discussed management options. Patient currently currently remains asymptomatic with regard to the mesenteric mass. 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 Recommended consider encorafenib/cetuximab regimen-potential side effects were reviewed including but not limited to rash, hypomagnesemia, diarrhea, nausea/vomiting, stomatitis, LFT elevation, fatigue, neuropathy, fever, infusion reaction, hyperglycemia/hyponatremia, cytopenias. Patient refused regimen suggested, only wishing to resume immunotherapy if possible, in hopes it would at least slow POD, considering her QOL in decision making. Saw nephrologist who suggested a kidney biopsy, though patient declined this. With no kidney biopsy to be done, held further immunotherapy. Then could consider regorafenib pending course as well, should patient wish to resume DMT.  9/11/2023-CT A/P-central necrotic right abdominal mass involving the pancreatic head and possibly the adjacent duodenum is slightly decreased in size from 6/22/2023. Unchanged small volume ascites. --again had discussed treatment options with respective pro's/con's. As patient's disease was slightly decreased on most recent imaging, and she was without related symptoms, patient wished to continue with expectant surveillance. 2/2024- Opting for supportive care  #2) history of anemia/leukopenia/thrombocytopenia. With progressive thrombocytopenia, patient had bone marrow biopsy 11/2021-cellular marrow with erythroid predominant maturing trilineage hematopoiesis. Normal cytogenetics. Genomic analysis with no mutations identified. ? May have immune component to cytopenia(s). Kidney disease  contributes to the anemia as well. Had explained that some BM disorders may evolve over time, such as MDS.  With refractory anemia, f/u BMB was done 8/23/2023: Bone marrow biopsy and bone marrow aspirate  - Normocellular marrow with trilineage hematopoiesis with maturation and increased iron stores.  -Karyotype: 46,XX[21]. FISH Result: NORMAL FISH - MDS PANEL. [employer] Advanced NGS Myeloid Panel Final Report-RESULT SUMMARY: ABNORMAL-DETECTED GENOMIC ALTERATIONS:  Tier II: Variants of Potential Clinical Significance  TP53 [id number] --Follow clinically for the development of MDS and/or leukemia. Abnormal NGS noted.  --had discussed management options.  Patient has opted for conservative care.  Supportive heme care for this elderly patient with co-morbidities, and who is declining HD.  She wishes for DNR CODE STATUS.   --T/C PRBC for hemoglobin <7/related symptoms.  --procrit support as needed (CKD contributing to anemia)-PCP provides the injections  #3) mediport maintenance-patient agreeable to continue this for now.  Patient was given the opportunity to ask questions. Her questions have been answered to her apparent satisfaction. She wishes for continued follow-up.  -->POF/labs; Procrit 40,000 units SQ weekly, pending interval lab work. RTO 2 months.         [AdvancecareDate] : 02/21/24

## 2024-04-19 ENCOUNTER — APPOINTMENT (OUTPATIENT)
Dept: NEPHROLOGY | Facility: CLINIC | Age: 89
End: 2024-04-19

## 2024-04-19 LAB
BASOPHILS # BLD AUTO: 0.01 K/UL
BASOPHILS NFR BLD AUTO: 0.2 %
EOSINOPHIL # BLD AUTO: 0.02 K/UL
EOSINOPHIL NFR BLD AUTO: 0.5 %
HCT VFR BLD CALC: 34 %
HGB BLD-MCNC: 10.1 G/DL
IMM GRANULOCYTES NFR BLD AUTO: 0.2 %
LYMPHOCYTES # BLD AUTO: 0.61 K/UL
LYMPHOCYTES NFR BLD AUTO: 15.2 %
MAN DIFF?: NORMAL
MCHC RBC-ENTMCNC: 28.1 PG
MCHC RBC-ENTMCNC: 29.7 GM/DL
MCV RBC AUTO: 94.7 FL
MONOCYTES # BLD AUTO: 0.31 K/UL
MONOCYTES NFR BLD AUTO: 7.7 %
NEUTROPHILS # BLD AUTO: 3.06 K/UL
NEUTROPHILS NFR BLD AUTO: 76.2 %
PLATELET # BLD AUTO: 127 K/UL
RBC # BLD: 3.59 M/UL
RBC # FLD: 17.6 %
WBC # FLD AUTO: 4.02 K/UL

## 2024-04-29 ENCOUNTER — LABORATORY RESULT (OUTPATIENT)
Age: 89
End: 2024-04-29

## 2024-04-30 LAB
BASOPHILS # BLD AUTO: 0.01 K/UL
BASOPHILS # BLD AUTO: 0.02 K/UL
BASOPHILS NFR BLD AUTO: 0.3 %
BASOPHILS NFR BLD AUTO: 0.6 %
EOSINOPHIL # BLD AUTO: 0.01 K/UL
EOSINOPHIL # BLD AUTO: 0.02 K/UL
EOSINOPHIL NFR BLD AUTO: 0.3 %
EOSINOPHIL NFR BLD AUTO: 0.6 %
HCT VFR BLD CALC: 32.4 %
HCT VFR BLD CALC: 34.5 %
HGB BLD-MCNC: 10.1 G/DL
HGB BLD-MCNC: 9.8 G/DL
IMM GRANULOCYTES NFR BLD AUTO: 0.3 %
IMM GRANULOCYTES NFR BLD AUTO: 0.3 %
LYMPHOCYTES # BLD AUTO: 0.36 K/UL
LYMPHOCYTES # BLD AUTO: 0.43 K/UL
LYMPHOCYTES NFR BLD AUTO: 11.4 %
LYMPHOCYTES NFR BLD AUTO: 12.3 %
MAN DIFF?: NORMAL
MAN DIFF?: NORMAL
MCHC RBC-ENTMCNC: 27.9 PG
MCHC RBC-ENTMCNC: 28 PG
MCHC RBC-ENTMCNC: 29.3 GM/DL
MCHC RBC-ENTMCNC: 30.2 GM/DL
MCV RBC AUTO: 92.6 FL
MCV RBC AUTO: 95.3 FL
MONOCYTES # BLD AUTO: 0.21 K/UL
MONOCYTES # BLD AUTO: 0.21 K/UL
MONOCYTES NFR BLD AUTO: 6 %
MONOCYTES NFR BLD AUTO: 6.6 %
NEUTROPHILS # BLD AUTO: 2.55 K/UL
NEUTROPHILS # BLD AUTO: 2.83 K/UL
NEUTROPHILS NFR BLD AUTO: 80.5 %
NEUTROPHILS NFR BLD AUTO: 80.8 %
PLATELET # BLD AUTO: 149 K/UL
PLATELET # BLD AUTO: 158 K/UL
RBC # BLD: 3.5 M/UL
RBC # BLD: 3.62 M/UL
RBC # FLD: 16.9 %
RBC # FLD: 17 %
WBC # FLD AUTO: 3.16 K/UL
WBC # FLD AUTO: 3.51 K/UL

## 2024-05-23 ENCOUNTER — OUTPATIENT (OUTPATIENT)
Dept: OUTPATIENT SERVICES | Facility: HOSPITAL | Age: 89
LOS: 1 days | Discharge: HOME | End: 2024-05-23

## 2024-05-23 DIAGNOSIS — Z90.49 ACQUIRED ABSENCE OF OTHER SPECIFIED PARTS OF DIGESTIVE TRACT: Chronic | ICD-10-CM

## 2024-05-23 DIAGNOSIS — D63.1 ANEMIA IN CHRONIC KIDNEY DISEASE: ICD-10-CM

## 2024-05-23 DIAGNOSIS — D35.1 BENIGN NEOPLASM OF PARATHYROID GLAND: Chronic | ICD-10-CM

## 2024-05-23 DIAGNOSIS — N18.5 CHRONIC KIDNEY DISEASE, STAGE 5: ICD-10-CM

## 2024-05-23 DIAGNOSIS — C18.9 MALIGNANT NEOPLASM OF COLON, UNSPECIFIED: ICD-10-CM

## 2024-05-23 DIAGNOSIS — Z98.890 OTHER SPECIFIED POSTPROCEDURAL STATES: Chronic | ICD-10-CM

## 2024-05-24 NOTE — PHYSICAL EXAM
[Fully active, able to carry on all pre-disease performance without restriction] : Status 0 - Fully active, able to carry on all pre-disease performance without restriction [Normal] : affect appropriate [de-identified] : soft, NT-fullness palpable RUQ; +ventral hernia [de-identified] : alert and oriented x 3

## 2024-05-24 NOTE — HISTORY OF PRESENT ILLNESS
[Disease: _____________________] : Disease: [unfilled] [T: ___] : T[unfilled] [N: ___] : N[unfilled] [M: ___] : M[unfilled] [AJCC Stage: ____] : AJCC Stage: [unfilled] [de-identified] : Patient with history of colon cancer (adenocarcinoma)-5-2011. Stage IIIC-T4 N2a. Status post right colectomy followed by FOLFOX chemotherapy.  With persistently elevated CEA on f/u testing,  3/2013 mesentery mass excision pathology showed a lymph node negative for metastatic carcinoma. Benign fibroadipose tissue. Persistently increasing elevated CEA.  CT scan abdomen/pelvis 6/2016, showed enlarging mesenteric adenopathy. Endoscopic ultrasound guided FNA of the bryan-pancreatic, mesenteric mass was positive for malignant cells-adenocarcinoma associated with abundant mucin-compatible with known history of adenocarcinoma, colorectal type, mucinous variant..  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  8/2016-Patient begun on Avastin/irinotecan (only 1 dose Avastin given). 11/2016-CT chest/abdomen/pelvis showed stable posttreatment findings and stable mesenteric adenopathy.  Patient obtained 2 surgical opinions and disease was felt to be inoperable by both surgeons.  Patient begun on Xeloda/Avastin. 5/2017-CT scan chest/abdomen/pelvis showed stable tiny nodules in the lungs. Sizable mass merging with the head of the pancreas had Increased in size from 11/2016 with some increasing infiltration of the surrounding fat.  Possibility of increasing colonic wall thickness on the colon site of the ileocolic anastomosis. Patient begun on Pembrolizumab (tumor MSI-high). 8/2017- CT scan chest/abdomen/pelvis showed marginally worsening mesenteric adenopathy, with short interval followup CT scan abdomen/pelvis 9/2017, stable compared to 8/2017. 1/2018-CT scan chest/abdomen/pelvis-stable adenopathy. 6/2018-CT scan chest/abdomen/pelvis-stable adenopathy. 11/2018-CT chest/abdomen/pelvis-unchanged mass at the root of the mesentery. 4/2019-CT scan chest/abdomen/pelvis-stable mesenteric soft tissue mass. 8/2019-CT scan chest/abdomen/pelvis-unchanged mesenteric mass. 10/2019-CT scan A/P-stable mesenteric mass. Held Pembrolizumab. 2/2020-CT scan chest/abdomen/pelvis-stable exam with mesenteric mass without significant change since 10/2019. Resumed Pembrolizumab, which was then held 6/2020 due to progressive renal insufficiency. 7/2020-CT scan chest/abdomen/pelvis-no significant change.  Stable mesenteric mass unchanged since 10/2019.  Segment of colonic wall thickening at the level of the anastomosis unchanged.  Enlarged multinodular thyroid with bilateral substernal extension unchanged. 10/2020-CT scan C/A/P-stable upper abdominal mesenteric mass. No new suspicious pathology.  1/2021-CT scan chest/abdomen/pelvis-mesenteric mass inseparable from the pancreatic head encasing the proximal mesenteric vessels minimally enlarged since 10/2020.  Mild mural thickening and ileal colic anastomosis. New course Pembrolizumab begun. 5/2021-CT scan chest/abdomen/pelvis-stable mesenteric mass contiguous with pancreatic head.  Decrease in thickening at the ileocolic anastomosis.  No new abnormalities. 10/2021- CT C/A/P-stable exam. 3/28/2022-CT C/A/P-nonspecific small volume pelvic ascites, stable  since 10/19/2021. An 8.3 x 5.6 cm lobulated mesenteric mass is indeterminate, but unchanged. Stable appearance of the chest since 10/19/2021.  8/19/2022-CT C/A/P-mesenteric mass without significant change.  12/13/2022-PET/CT scan-Morphologically stable RIGHT mesenteric mass demonstrates mild uptake, with portions exhibiting photopenia. Mucinous lesions are often minimally or non-FDG-avid; these findings are consistent with that history. 2.  Uptake overlying the RIGHT hemicolectomy anastomosis, where residual/recurrent disease is not excluded. 3.  Multiple hepatic hypodensities, some of which may have enlarged. Some of these demonstrate mildly decreased uptake compared to the remaining liver. If this will change patient management, MRI of the liver may be helpful to exclude mucinous metastases. 4.  Heterogeneous heterogeneous thyroid with mild uptake and substernal extension. 5.  Small RIGHT lung nodule versus motion artifact. Additional previously seen RIGHT-sided micronodule is unchanged.  1/11/2023-MRI Abdomen-No obvious mucinous liver metastases, however difficult to exclude given lack of intravenous contrast and motion artifact as well as multiplicity of background hepatic cysts. Grossly stable right mesenteric mass 1/18/2023-CT chest-Stable two very small nodules in the left upper and left lower lobes when compared to previous exam.  6/27/2023-CT scan chest/abdomen/pelvis-mild increase in size of large right-sided mesenteric mass, which correlated with prior examinations, likely represents a mucinous metastasis.  Otherwise stable examination.  Numerous cystic liver lesions are not significantly changed and are not well characterized on this unenhanced CT scan.  9/11/2023-CT scan chest/abdomen/pelvis-essentially necrotic right abdominal mass involving the pancreatic head and possibly the adjacent duodenum slightly decreased in size from 6/22/2023.  2/2024-Patient opting for supportive care.  [de-identified] : adenocarcinoma [de-identified] : CEA 12/2013-33.6 [de-identified] :   --Never went on hospice.  Last procrit from PCP was 4 days ago. Driving again, remains independent. Dr. Garcia is handling renal issues/meds-no plans for dialysis, per patient. No CP, cough, SOB, fevers. No c/o N/V/change in bowel habits. No current c/o abdominal/pelvic pain. Generally feels tired at times.  Son is [employer] . He has been a source of support for patient, as is daughter now.      [de-identified] : History of right breast cancer-2007. Status post right breast conservation surgery--> RT--> [facility].           11/2021 (h/o pancytopenia)-Bone marrow biopsy and bone marrow aspirate-cellular marrow (40-50%) with erythroid predominant maturing  trilineage hematopoiesis with no increase in blast population. Normal female cytogenetics. Normal MDS FISH panel. Onko Sight Myeloid panel-no Mutations Identified 8/2023-Bone marrow biopsy and bone marrow aspirate-normocellular marrow with trilineage hematopoiesis with maturation and increased iron stores.  No ring sideroblasts seen flow cytometry myeloid immunophenotypic findings showed no diagnostic abnormalities.  Hematogones noted.  Cytogenetics with normal female karyotype.  MDS FISH panel. [employer] Advanced NGS Myeloid Panel Final Report RESULT SUMMARY: ABNORMAL DETECTED GENOMIC ALTERATIONS:   Tier II: Variants of Potential Clinical Significance   TP53 p.[id number]

## 2024-05-24 NOTE — REVIEW OF SYSTEMS
[Abdominal Pain] : no abdominal pain [Loss of Hearing] : loss of hearing [Fainting] : no fainting [Negative] : Allergic/Immunologic

## 2024-05-24 NOTE — ASSESSMENT
[FreeTextEntry1] : Lab work reviewed. #1) Colon cancer/h/o elevated CEA. Patient had wished to continue Keytruda which she felt she tolerated well. Had reviewed that the usual duration of immunotherapy would be for up to 2 years or until disease progression or intolerance to the medication. Patient had a break from treatment and resumed it with stability of her disease. However, immunotherapy then held due to renal issues-last dose 6/14/22.  12/13/2022-PET/CT scan-Morphologically stable RIGHT mesenteric mass demonstrates mild uptake, with portions exhibiting photopenia. Mucinous lesions are often minimally or non-FDG-avid; these findings are consistent with that history. 2. Uptake overlying the RIGHT hemicolectomy anastomosis, where residual/recurrent disease is not excluded. 3. Multiple hepatic hypodensities, some of which may have enlarged. Some of these demonstrate mildly decreased uptake compared to the remaining liver. If this will change patient management, MRI of the liver may be helpful to exclude mucinous metastases. 4. Heterogeneous heterogeneous thyroid with mild uptake and substernal extension. 5. Small RIGHT lung nodule versus motion artifact. Additional previously seen RIGHT-sided micronodule is unchanged.  1/11/2023-MRI Abdomen-No obvious mucinous liver metastases, however difficult to exclude given lack of intravenous contrast and motion artifact as well as multiplicity of background hepatic cysts. Grossly stable right mesenteric mass 1/18/2023-CT chest-Stable two very small nodules in the left upper and left lower lobes when compared to previous exam. -patient wished for continued treatment break. Colonoscopy 3/2023 unremarkable.  6/27/2023-CT scan chest/abdomen/pelvis-mild increase in size of large right-sided mesenteric mass, which correlated with prior examinations, likely represents a mucinous metastasis. Otherwise stable examination. Numerous cystic liver lesions are not significantly changed and are not well characterized on this unenhanced CT scan. Had reviewed CT scan results with patient in detail. Suggestive of mild increase in large mesenteric mass. Cystic liver lesions overall stable. Discussed management options. Patient currently currently remains asymptomatic with regard to the mesenteric mass. 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 Recommended consider encorafenib/cetuximab regimen-potential side effects were reviewed including but not limited to rash, hypomagnesemia, diarrhea, nausea/vomiting, stomatitis, LFT elevation, fatigue, neuropathy, fever, infusion reaction, hyperglycemia/hyponatremia, cytopenias. Patient refused regimen suggested, only wishing to resume immunotherapy if possible, in hopes it would at least slow POD, considering her QOL in decision making. Saw nephrologist who suggested a kidney biopsy, though patient declined this. With no kidney biopsy to be done, held further immunotherapy. Then could consider regorafenib pending course as well, should patient wish to resume DMT.  9/11/2023-CT A/P-central necrotic right abdominal mass involving the pancreatic head and possibly the adjacent duodenum is slightly decreased in size from 6/22/2023. Unchanged small volume ascites. --again had discussed treatment options with respective pro's/con's. As patient's disease was slightly decreased on most recent imaging, and she was without related symptoms, patient wished to continue with expectant surveillance. 2/2024- Opting for supportive care  --clinically stable at present  #2) history of anemia/leukopenia/thrombocytopenia. With progressive thrombocytopenia, patient had bone marrow biopsy 11/2021-cellular marrow with erythroid predominant maturing trilineage hematopoiesis. Normal cytogenetics. Genomic analysis with no mutations identified. ? May have immune component to cytopenia(s). Kidney disease  contributes to the anemia as well. Had explained that some BM disorders may evolve over time, such as MDS.  With refractory anemia, f/u BMB was done 8/23/2023: Bone marrow biopsy and bone marrow aspirate  - Normocellular marrow with trilineage hematopoiesis with maturation and increased iron stores.  -Karyotype: 46,XX[21]. FISH Result: NORMAL FISH - MDS PANEL. 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 Abnormal NGS noted.  --had discussed management options.  Patient has opted for conservative care.  Supportive heme care for this elderly patient with co-morbidities, and who is declining HD.  She wishes for DNR CODE STATUS.   --T/C PRBC for hemoglobin <7/related symptoms.  --procrit support as needed (CKD contributing to anemia)-PCP provides the injections based on interval lab work  #3) mediport maintenance-patient agreeable to continue this for now.  Patient was given the opportunity to ask questions. Her questions have been answered to her apparent satisfaction. She wishes for continued follow-up.  -->POF/labs; Procrit 40,000 units SQ weekly, pending interval lab work. RTO 2 months.         [With Patient/Caregiver] : With Patient/Caregiver [AdvancecareDate] : 02/21/24 [Designated Health Care Proxy] : Designated Health Care Proxy [Name: ___] : Name: [unfilled] [Relationship: ___] : Relationship: [unfilled] [DNR] : DNR

## 2024-05-27 ENCOUNTER — INPATIENT (INPATIENT)
Facility: HOSPITAL | Age: 89
LOS: 3 days | Discharge: HOME | DRG: 812 | End: 2024-05-31
Attending: INTERNAL MEDICINE | Admitting: STUDENT IN AN ORGANIZED HEALTH CARE EDUCATION/TRAINING PROGRAM
Payer: MEDICARE

## 2024-05-27 VITALS
SYSTOLIC BLOOD PRESSURE: 130 MMHG | DIASTOLIC BLOOD PRESSURE: 61 MMHG | WEIGHT: 141.98 LBS | TEMPERATURE: 98 F | RESPIRATION RATE: 17 BRPM | OXYGEN SATURATION: 96 % | HEART RATE: 73 BPM | HEIGHT: 64 IN

## 2024-05-27 DIAGNOSIS — I10 ESSENTIAL (PRIMARY) HYPERTENSION: ICD-10-CM

## 2024-05-27 DIAGNOSIS — D35.1 BENIGN NEOPLASM OF PARATHYROID GLAND: Chronic | ICD-10-CM

## 2024-05-27 DIAGNOSIS — N18.9 CHRONIC KIDNEY DISEASE, UNSPECIFIED: ICD-10-CM

## 2024-05-27 DIAGNOSIS — D64.9 ANEMIA, UNSPECIFIED: ICD-10-CM

## 2024-05-27 DIAGNOSIS — Z71.89 OTHER SPECIFIED COUNSELING: ICD-10-CM

## 2024-05-27 DIAGNOSIS — Z98.890 OTHER SPECIFIED POSTPROCEDURAL STATES: Chronic | ICD-10-CM

## 2024-05-27 DIAGNOSIS — Z29.9 ENCOUNTER FOR PROPHYLACTIC MEASURES, UNSPECIFIED: ICD-10-CM

## 2024-05-27 DIAGNOSIS — Z90.49 ACQUIRED ABSENCE OF OTHER SPECIFIED PARTS OF DIGESTIVE TRACT: Chronic | ICD-10-CM

## 2024-05-27 DIAGNOSIS — N17.9 ACUTE KIDNEY FAILURE, UNSPECIFIED: ICD-10-CM

## 2024-05-27 DIAGNOSIS — R19.7 DIARRHEA, UNSPECIFIED: ICD-10-CM

## 2024-05-27 LAB
ALBUMIN SERPL ELPH-MCNC: 2.6 G/DL — LOW (ref 3.3–5)
ALP SERPL-CCNC: 56 U/L — SIGNIFICANT CHANGE UP (ref 40–120)
ALT FLD-CCNC: 17 U/L — SIGNIFICANT CHANGE UP (ref 10–45)
ANION GAP SERPL CALC-SCNC: 17 MMOL/L — SIGNIFICANT CHANGE UP (ref 5–17)
APPEARANCE UR: CLEAR — SIGNIFICANT CHANGE UP
APTT BLD: 30.1 SEC — SIGNIFICANT CHANGE UP (ref 24.5–35.6)
AST SERPL-CCNC: 38 U/L — SIGNIFICANT CHANGE UP (ref 10–40)
BACTERIA # UR AUTO: ABNORMAL /HPF
BASOPHILS # BLD AUTO: 0.01 K/UL — SIGNIFICANT CHANGE UP (ref 0–0.2)
BASOPHILS NFR BLD AUTO: 0.3 % — SIGNIFICANT CHANGE UP (ref 0–2)
BILIRUB SERPL-MCNC: 0.5 MG/DL — SIGNIFICANT CHANGE UP (ref 0.2–1.2)
BILIRUB UR-MCNC: NEGATIVE — SIGNIFICANT CHANGE UP
BLD GP AB SCN SERPL QL: SIGNIFICANT CHANGE UP
BUN SERPL-MCNC: 107 MG/DL — HIGH (ref 7–23)
CALCIUM SERPL-MCNC: 8.1 MG/DL — LOW (ref 8.4–10.5)
CHLORIDE SERPL-SCNC: 110 MMOL/L — HIGH (ref 96–108)
CO2 SERPL-SCNC: 15 MMOL/L — LOW (ref 22–31)
COLOR SPEC: YELLOW — SIGNIFICANT CHANGE UP
CREAT SERPL-MCNC: 6.76 MG/DL — HIGH (ref 0.5–1.3)
DIFF PNL FLD: NEGATIVE — SIGNIFICANT CHANGE UP
EGFR: 5 ML/MIN/1.73M2 — LOW
EOSINOPHIL # BLD AUTO: 0.01 K/UL — SIGNIFICANT CHANGE UP (ref 0–0.5)
EOSINOPHIL NFR BLD AUTO: 0.3 % — SIGNIFICANT CHANGE UP (ref 0–6)
EPI CELLS # UR: 0 — SIGNIFICANT CHANGE UP
GLUCOSE BLDC GLUCOMTR-MCNC: 206 MG/DL — HIGH (ref 70–99)
GLUCOSE SERPL-MCNC: 146 MG/DL — HIGH (ref 70–99)
GLUCOSE UR QL: NEGATIVE MG/DL — SIGNIFICANT CHANGE UP
HCT VFR BLD CALC: 27.4 % — LOW (ref 34.5–45)
HGB BLD-MCNC: 8.2 G/DL — LOW (ref 11.5–15.5)
IMM GRANULOCYTES NFR BLD AUTO: 0.3 % — SIGNIFICANT CHANGE UP (ref 0–0.9)
INR BLD: 1.11 RATIO — SIGNIFICANT CHANGE UP (ref 0.85–1.18)
KETONES UR-MCNC: NEGATIVE MG/DL — SIGNIFICANT CHANGE UP
LEUKOCYTE ESTERASE UR-ACNC: ABNORMAL
LYMPHOCYTES # BLD AUTO: 0.42 K/UL — LOW (ref 1–3.3)
LYMPHOCYTES # BLD AUTO: 13.2 % — SIGNIFICANT CHANGE UP (ref 13–44)
MAGNESIUM SERPL-MCNC: 1.8 MG/DL — SIGNIFICANT CHANGE UP (ref 1.6–2.6)
MCHC RBC-ENTMCNC: 27.4 PG — SIGNIFICANT CHANGE UP (ref 27–34)
MCHC RBC-ENTMCNC: 29.9 GM/DL — LOW (ref 32–36)
MCV RBC AUTO: 91.6 FL — SIGNIFICANT CHANGE UP (ref 80–100)
MONOCYTES # BLD AUTO: 0.24 K/UL — SIGNIFICANT CHANGE UP (ref 0–0.9)
MONOCYTES NFR BLD AUTO: 7.5 % — SIGNIFICANT CHANGE UP (ref 2–14)
NEUTROPHILS # BLD AUTO: 2.49 K/UL — SIGNIFICANT CHANGE UP (ref 1.8–7.4)
NEUTROPHILS NFR BLD AUTO: 78.4 % — HIGH (ref 43–77)
NITRITE UR-MCNC: NEGATIVE — SIGNIFICANT CHANGE UP
NRBC # BLD: 0 /100 WBCS — SIGNIFICANT CHANGE UP (ref 0–0)
PH UR: 5 — SIGNIFICANT CHANGE UP (ref 5–8)
PHOSPHATE SERPL-MCNC: 4.4 MG/DL — SIGNIFICANT CHANGE UP (ref 2.5–4.5)
PLATELET # BLD AUTO: 89 K/UL — LOW (ref 150–400)
POTASSIUM SERPL-MCNC: 5.1 MMOL/L — SIGNIFICANT CHANGE UP (ref 3.5–5.3)
POTASSIUM SERPL-SCNC: 5.1 MMOL/L — SIGNIFICANT CHANGE UP (ref 3.5–5.3)
PROT SERPL-MCNC: 6.3 G/DL — SIGNIFICANT CHANGE UP (ref 6–8.3)
PROT UR-MCNC: 100 MG/DL
PROTHROM AB SERPL-ACNC: 12.9 SEC — SIGNIFICANT CHANGE UP (ref 9.5–13)
RBC # BLD: 2.99 M/UL — LOW (ref 3.8–5.2)
RBC # FLD: 18.8 % — HIGH (ref 10.3–14.5)
RBC CASTS # UR COMP ASSIST: 0 /HPF — SIGNIFICANT CHANGE UP (ref 0–4)
SODIUM SERPL-SCNC: 142 MMOL/L — SIGNIFICANT CHANGE UP (ref 135–145)
SP GR SPEC: 1.01 — SIGNIFICANT CHANGE UP (ref 1–1.03)
UROBILINOGEN FLD QL: 0.2 MG/DL — SIGNIFICANT CHANGE UP (ref 0.2–1)
WBC # BLD: 3.18 K/UL — LOW (ref 3.8–10.5)
WBC # FLD AUTO: 3.18 K/UL — LOW (ref 3.8–10.5)
WBC UR QL: 7 /HPF — HIGH (ref 0–5)

## 2024-05-27 PROCEDURE — 99285 EMERGENCY DEPT VISIT HI MDM: CPT

## 2024-05-27 PROCEDURE — 93010 ELECTROCARDIOGRAM REPORT: CPT

## 2024-05-27 PROCEDURE — 99223 1ST HOSP IP/OBS HIGH 75: CPT | Mod: GC

## 2024-05-27 RX ORDER — ERYTHROPOIETIN 10000 [IU]/ML
40000 INJECTION, SOLUTION INTRAVENOUS; SUBCUTANEOUS ONCE
Refills: 0 | Status: COMPLETED | OUTPATIENT
Start: 2024-05-27 | End: 2024-05-27

## 2024-05-27 RX ORDER — SODIUM CHLORIDE 9 MG/ML
1000 INJECTION, SOLUTION INTRAVENOUS
Refills: 0 | Status: DISCONTINUED | OUTPATIENT
Start: 2024-05-27 | End: 2024-05-31

## 2024-05-27 RX ORDER — CHOLECALCIFEROL (VITAMIN D3) 125 MCG
1000 CAPSULE ORAL DAILY
Refills: 0 | Status: DISCONTINUED | OUTPATIENT
Start: 2024-05-27 | End: 2024-05-27

## 2024-05-27 RX ORDER — CHOLECALCIFEROL (VITAMIN D3) 125 MCG
1 CAPSULE ORAL
Refills: 0 | DISCHARGE

## 2024-05-27 RX ORDER — DEXTROSE 10 % IN WATER 10 %
125 INTRAVENOUS SOLUTION INTRAVENOUS ONCE
Refills: 0 | Status: DISCONTINUED | OUTPATIENT
Start: 2024-05-27 | End: 2024-05-28

## 2024-05-27 RX ORDER — SODIUM BICARBONATE 1 MEQ/ML
1300 SYRINGE (ML) INTRAVENOUS THREE TIMES A DAY
Refills: 0 | Status: DISCONTINUED | OUTPATIENT
Start: 2024-05-27 | End: 2024-05-27

## 2024-05-27 RX ORDER — SODIUM CHLORIDE 9 MG/ML
850 INJECTION, SOLUTION INTRAVENOUS
Refills: 0 | Status: DISCONTINUED | OUTPATIENT
Start: 2024-05-27 | End: 2024-05-28

## 2024-05-27 RX ORDER — CALCIUM ACETATE 667 MG
667 TABLET ORAL
Refills: 0 | Status: DISCONTINUED | OUTPATIENT
Start: 2024-05-27 | End: 2024-05-31

## 2024-05-27 RX ORDER — DEXTROSE 50 % IN WATER 50 %
12.5 SYRINGE (ML) INTRAVENOUS ONCE
Refills: 0 | Status: DISCONTINUED | OUTPATIENT
Start: 2024-05-27 | End: 2024-05-31

## 2024-05-27 RX ORDER — DEXTROSE 50 % IN WATER 50 %
15 SYRINGE (ML) INTRAVENOUS ONCE
Refills: 0 | Status: DISCONTINUED | OUTPATIENT
Start: 2024-05-27 | End: 2024-05-31

## 2024-05-27 RX ORDER — DEXTROSE 50 % IN WATER 50 %
25 SYRINGE (ML) INTRAVENOUS ONCE
Refills: 0 | Status: DISCONTINUED | OUTPATIENT
Start: 2024-05-27 | End: 2024-05-31

## 2024-05-27 RX ORDER — HEPARIN SODIUM 5000 [USP'U]/ML
5000 INJECTION INTRAVENOUS; SUBCUTANEOUS EVERY 12 HOURS
Refills: 0 | Status: DISCONTINUED | OUTPATIENT
Start: 2024-05-27 | End: 2024-05-31

## 2024-05-27 RX ORDER — DOXAZOSIN MESYLATE 4 MG
2 TABLET ORAL AT BEDTIME
Refills: 0 | Status: DISCONTINUED | OUTPATIENT
Start: 2024-05-27 | End: 2024-05-31

## 2024-05-27 RX ORDER — HYDRALAZINE HCL 50 MG
100 TABLET ORAL THREE TIMES A DAY
Refills: 0 | Status: DISCONTINUED | OUTPATIENT
Start: 2024-05-27 | End: 2024-05-31

## 2024-05-27 RX ORDER — INSULIN LISPRO 100/ML
VIAL (ML) SUBCUTANEOUS
Refills: 0 | Status: DISCONTINUED | OUTPATIENT
Start: 2024-05-28 | End: 2024-05-31

## 2024-05-27 RX ORDER — AMLODIPINE BESYLATE 2.5 MG/1
10 TABLET ORAL DAILY
Refills: 0 | Status: DISCONTINUED | OUTPATIENT
Start: 2024-05-27 | End: 2024-05-31

## 2024-05-27 RX ORDER — LANOLIN ALCOHOL/MO/W.PET/CERES
3 CREAM (GRAM) TOPICAL AT BEDTIME
Refills: 0 | Status: DISCONTINUED | OUTPATIENT
Start: 2024-05-27 | End: 2024-05-31

## 2024-05-27 RX ORDER — GLUCAGON INJECTION, SOLUTION 0.5 MG/.1ML
1 INJECTION, SOLUTION SUBCUTANEOUS ONCE
Refills: 0 | Status: DISCONTINUED | OUTPATIENT
Start: 2024-05-27 | End: 2024-05-31

## 2024-05-27 RX ORDER — ONDANSETRON 8 MG/1
4 TABLET, FILM COATED ORAL EVERY 8 HOURS
Refills: 0 | Status: DISCONTINUED | OUTPATIENT
Start: 2024-05-27 | End: 2024-05-31

## 2024-05-27 RX ORDER — ACETAMINOPHEN 500 MG
650 TABLET ORAL EVERY 6 HOURS
Refills: 0 | Status: DISCONTINUED | OUTPATIENT
Start: 2024-05-27 | End: 2024-05-31

## 2024-05-27 RX ORDER — ERYTHROPOIETIN 10000 [IU]/ML
40000 INJECTION, SOLUTION INTRAVENOUS; SUBCUTANEOUS ONCE
Refills: 0 | Status: DISCONTINUED | OUTPATIENT
Start: 2024-05-27 | End: 2024-05-27

## 2024-05-27 RX ORDER — MAGNESIUM SULFATE 500 MG/ML
1 VIAL (ML) INJECTION ONCE
Refills: 0 | Status: COMPLETED | OUTPATIENT
Start: 2024-05-27 | End: 2024-05-27

## 2024-05-27 RX ORDER — SODIUM CHLORIDE 9 MG/ML
1000 INJECTION, SOLUTION INTRAVENOUS
Refills: 0 | Status: DISCONTINUED | OUTPATIENT
Start: 2024-05-27 | End: 2024-05-28

## 2024-05-27 RX ORDER — SODIUM CHLORIDE 9 MG/ML
500 INJECTION INTRAMUSCULAR; INTRAVENOUS; SUBCUTANEOUS ONCE
Refills: 0 | Status: COMPLETED | OUTPATIENT
Start: 2024-05-27 | End: 2024-05-27

## 2024-05-27 RX ORDER — INSULIN LISPRO 100/ML
VIAL (ML) SUBCUTANEOUS AT BEDTIME
Refills: 0 | Status: DISCONTINUED | OUTPATIENT
Start: 2024-05-27 | End: 2024-05-31

## 2024-05-27 RX ORDER — CEFTRIAXONE 500 MG/1
1000 INJECTION, POWDER, FOR SOLUTION INTRAMUSCULAR; INTRAVENOUS EVERY 24 HOURS
Refills: 0 | Status: DISCONTINUED | OUTPATIENT
Start: 2024-05-27 | End: 2024-05-29

## 2024-05-27 RX ADMIN — SODIUM CHLORIDE 1000 MILLILITER(S): 9 INJECTION INTRAMUSCULAR; INTRAVENOUS; SUBCUTANEOUS at 19:33

## 2024-05-27 RX ADMIN — Medication 1300 MILLIGRAM(S): at 23:00

## 2024-05-27 RX ADMIN — CEFTRIAXONE 100 MILLIGRAM(S): 500 INJECTION, POWDER, FOR SOLUTION INTRAMUSCULAR; INTRAVENOUS at 23:06

## 2024-05-27 NOTE — ED PROVIDER NOTE - CLINICAL SUMMARY MEDICAL DECISION MAKING FREE TEXT BOX
89-year-old female presents to the emergency department for abnormal lab.  According to Dr. Garcia her nephrologist, the patient should come to the emergency department for admission for IV fluids for a few days.  Patient has history of anemia.  Patient has history of chronic kidney disease.  Other history includes: Colon adenocarcinoma status post right colectomy and chemo, breast cancer, hypertension, thrombocytopenia.  Patient gets Procrit and she is due to receive that today.  Patient describes feeling generalized weakness especially when getting up to ambulate.  Exam as stated.  Plan for IV fluids.  Will contact Dr. Garcia for further instruction.  Plan for admission.Awaiting callback

## 2024-05-27 NOTE — ED ADULT NURSE NOTE - NSFALLHARMRISKINTERV_ED_ALL_ED

## 2024-05-27 NOTE — H&P ADULT - PROBLEM SELECTOR PLAN 1
- Unclear etiology - Unclear etiology  - Ordered UA and UCx   - Ordered Urine lytes to further characterize  - Ordered renal sono   - Monitor electrolytes and replete   - c/w Sodium bicarb TID as prescribed   - Avoid nephrotoxic meds  - Nephro consult placed - Dr. aGrcia   - c/w IVF @ 50cc/hr  - c/w doxazosin - MAGDI on CKD stage 5  - Unclear etiology  - Ordered UA and UCx   - Ordered Urine lytes to further characterize  - Ordered renal sono   - Monitor electrolytes and replete   - c/w Sodium bicarb TID as prescribed   - Avoid nephrotoxic meds  - Nephro consult placed - Dr. Garcia   - c/w IVF @ 50cc/hr  - c/w doxazosin

## 2024-05-27 NOTE — H&P ADULT - PROBLEM SELECTOR PLAN 4
- c/w Heparin - GI PCR ordered to rule out infectious etiologies   - Likely secondary to colon resection   - Unlikely to be infectious

## 2024-05-27 NOTE — H&P ADULT - HISTORY OF PRESENT ILLNESS
89-year-old female with PMH of CKD, T2DM, colon cancer, parathyroid ca, HTN presents to the emergency department for abnormal lab.  According to Dr. Garcia her nephrologist, the patient should come to the emergency department for admission for IV fluids for a few days.  Patient has history of anemia.  Patient has history of chronic kidney disease.  Other history includes: Colon adenocarcinoma status post right colectomy and chemo, breast cancer, hypertension, thrombocytopenia.  Patient gets Procrit and she is due to receive that today.  Patient describes feeling generalized weakness especially when getting up to ambulate    ED Course: T 98, HR 73, /61, RR 17, SpO2 96% on RA. Labs showing WBC of 3.18, low H/H of 8.2/27.4, BUN elevation to 107, Cr of 6.76, eGFR of 5. UA and UCx ordered but not collected yet. Patient received a 500cc bolus of IVF. Patient was admitted to the floors for further management. Of note patient's baseline BUN/Cr is 85/4.5.  89-year-old female with PMH of CKD, T2DM, Breast Cancer, colon cancer, parathyroid ca, HTN presents to the emergency department for abnormal lab.  Patient was supposed to get her procrit injection today. After seeing her recent labs, she was asked by Dr. Garcia to present to the emergency department for admission to the hospital. Patient describes feeling generalized weakness especially when getting up to ambulate for the past few months. Of note, patient does have loose BM every day since she has a history of colon resection. This has been ongoing since 2011. She also states that whenever she goes to the bathroom she makes about a cup of urine each time. Pt does, however, still make urine. She denied dysuria, suprapubic tenderness, CVA tenderness. Denied changes in mental status.      ED Course: T 98, HR 73, /61, RR 17, SpO2 96% on RA. Labs showing WBC of 3.18, low H/H of 8.2/27.4, BUN elevation to 107, Cr of 6.76, eGFR of 5. UA and UCx ordered but not collected yet. Patient received a 500cc bolus of IVF. Patient was admitted to the floors for further management. Of note patient's baseline BUN/Cr is 85/4.5.  89-year-old female with PMH of CKD, Breast Cancer, colon cancer s/p resection, parathyroid ca, HTN, thrombocytopenia presents to the emergency department for abnormal lab.  Patient was supposed to get her procrit injection today. After seeing her recent labs, she was asked by Dr. Garcia to present to the emergency department for admission to the hospital. Patient describes feeling generalized weakness especially when getting up to ambulate for the past few months. Of note, patient does have loose BM every day since she has a history of colon resection. This has been ongoing since 2011. She also states that whenever she goes to the bathroom she makes about a cup of urine each time. Pt does, however, still make urine. She denied dysuria, suprapubic tenderness, CVA tenderness. Denied changes in mental status.      ED Course: T 98, HR 73, /61, RR 17, SpO2 96% on RA. Labs showing WBC of 3.18, low H/H of 8.2/27.4, BUN elevation to 107, Cr of 6.76, eGFR of 5. UA and UCx ordered but not collected yet. Patient received a 500cc bolus of IVF. Patient was admitted to the floors for further management. Of note patient's baseline BUN/Cr is 85/4.5.

## 2024-05-27 NOTE — ED ADULT TRIAGE NOTE - CHIEF COMPLAINT QUOTE
Pt sent in by Dr. Garcia, stated she has abnormal lab results, h/o breast/colon Ca/anemia, stated she feels weak

## 2024-05-27 NOTE — H&P ADULT - ASSESSMENT
89-year-old female with PMH of CKD, T2DM, colon cancer, parathyroid ca, HTN presents to the emergency department for abnormal lab. Sent in by Dr. Garcia for MAGDI on CKD.  89-year-old female with PMH of CKD, T2DM, colon cancer, breast cancer, parathyroid ca, HTN presents to the emergency department for abnormal lab. Sent in by Dr. Garcia for MAGDI on CKD.  89-year-old female with PMH of CKD, colon cancer s/p resection, breast cancer, parathyroid ca, HTN, thrombocytopenia presents to the emergency department for abnormal labs. Sent in by Dr. Garcia for MAGDI on CKD.

## 2024-05-27 NOTE — H&P ADULT - NSHPADDITIONALINFOADULT_GEN_ALL_CORE
Continue with vit D and multivitamin as prescribed     Case d/w Dr. Herbert Continue with vit D and multivitamin as prescribed   For history of questionable diabetes, will order ISS qSC/HS and bedtime - d/c if no longer needed     Case d/w Dr. Herbert

## 2024-05-27 NOTE — ED PROVIDER NOTE - OBJECTIVE STATEMENT
89-year-old female presents to the emergency department for abnormal lab.  According to Dr. Garcia her nephrologist, the patient should come to the emergency department for admission for IV fluids for a few days.  Patient has history of anemia.  Patient has history of chronic kidney disease.  Other history includes: Colon adenocarcinoma status post right colectomy and chemo, breast cancer, hypertension, thrombocytopenia.  Patient gets Procrit and she is due to receive that today.  Patient describes feeling generalized weakness especially when getting up to ambulate.

## 2024-05-27 NOTE — H&P ADULT - NSHPPHYSICALEXAM_GEN_ALL_CORE
Vital Signs Last 24 Hrs  T(C): 36.7 (27 May 2024 17:23), Max: 36.7 (27 May 2024 17:23)  T(F): 98 (27 May 2024 17:23), Max: 98 (27 May 2024 17:23)  HR: 73 (27 May 2024 17:23) (73 - 73)  BP: 130/61 (27 May 2024 17:23) (130/61 - 130/61)  BP(mean): --  RR: 17 (27 May 2024 17:23) (17 - 17)  SpO2: 96% (27 May 2024 17:23) (96% - 96%)    Parameters below as of 27 May 2024 17:23  Patient On (Oxygen Delivery Method): room air    Constitutional: Pt lying in bed, awake and alert, NAD  HEENT: EOMI, normal hearing, moist mucous membranes  Neck: Soft and supple, no JVD  Respiratory: CTABL, No wheezing, rales or rhonchi  Cardiovascular: S1S2+, RRR, no M/G/R  Gastrointestinal: BS+, soft, NT/ND, no guarding, no rebound  Extremities: No peripheral edema  Vascular: 2+ peripheral pulses  Neurological: AAOx3, no focal deficits  Musculoskeletal: 5/5 strength b/l upper and lower extremities  Skin: No rashes Vital Signs Last 24 Hrs  T(C): 36.7 (27 May 2024 17:23), Max: 36.7 (27 May 2024 17:23)  T(F): 98 (27 May 2024 17:23), Max: 98 (27 May 2024 17:23)  HR: 73 (27 May 2024 17:23) (73 - 73)  BP: 130/61 (27 May 2024 17:23) (130/61 - 130/61)  BP(mean): --  RR: 17 (27 May 2024 17:23) (17 - 17)  SpO2: 96% (27 May 2024 17:23) (96% - 96%)    Parameters below as of 27 May 2024 17:23  Patient On (Oxygen Delivery Method): room air    Constitutional: Pt lying in bed, awake and alert, NAD  HEENT: EOMI, normal hearing, moist mucous membranes  Neck: Soft and supple, no JVD  Respiratory: CTABL, No wheezing, rales or rhonchi  Cardiovascular: S1S2+, RRR, has systolic ejection murmur  Gastrointestinal: BS+, soft, NT/ND, no guarding, no rebound, as surgical scar and hernia  Extremities: No peripheral edema  Vascular: 2+ peripheral pulses  Neurological: AAOx3, no focal deficits  Musculoskeletal: 5/5 strength b/l upper and lower extremities  Skin: No rashes

## 2024-05-27 NOTE — H&P ADULT - NSHPREVIEWOFSYSTEMS_GEN_ALL_CORE
Constitutional: No fevers, chills, or sweats.  Cardiac: No chest pain, exertional dyspnea, orthopnea  Respiratory: No shortness of breath, no cough  GI: No abdominal pain, no N/V/D  Neuro: No headaches, no neck pain/stiffness, no numbness  All other systems reviewed and are negative unless otherwise stated in the HPI. Constitutional: No fevers, chills, or sweats. However, patient does have weakness  Cardiac: No chest pain, exertional dyspnea, orthopnea  Respiratory: No shortness of breath, no cough  GI: No abdominal pain, no N/V/D  : No dysuria, no suprapubic pain, no flank pain  Neuro: No headaches, no neck pain/stiffness, no numbness  All other systems reviewed and are negative unless otherwise stated in the HPI.

## 2024-05-27 NOTE — H&P ADULT - PROBLEM SELECTOR PLAN 2
- Likely from CKD  - c/w Procrit   - STAT dose ordered for tonight   - Fecal occult not necessary at this time but please order if clinically indicated

## 2024-05-27 NOTE — ED ADULT NURSE NOTE - OBJECTIVE STATEMENT
Patient presents to the emergency department for abnormal lab.  According to Dr. Garcia her nephrologist, the patient should come to the emergency department for admission for IV fluids for a few days.  patient complaint of generalized weakness. Alert and oriented x 4. No nausea, vomiting, dizziness or SOB.

## 2024-05-27 NOTE — H&P ADULT - ATTENDING COMMENTS
#MAGDI on CKD5 likely dehydration 2/2 frequent loose bowel movements from colon resection  - LR 50cc/hr  - Renal and bladder sonogram  - UA, Urine Na, Urine Creatinine  - Monitor I&Os  - GI PCR  - Renal Consult    #Anemia of chronic disease  - procrit 40,000 units weekly    #Locally advanced recurrent colon ca  - Last heme/onc notes states supportive care as per family wishes  - c/w GOC discussion       Full Code  Renal diet  Heparin 5000 units q12hr #MAGDI on CKD5 likely dehydration 2/2 frequent loose bowel movements from colon resection  #UTI  - LR 50cc/hr  - Renal and bladder sonogram  - UA positive, start ceftriaxone, f/up urine culture  - Urine Na, Urine Creatinine  - Monitor I&Os  - GI PCR  - Renal Consult    #Anemia of chronic disease  - procrit 40,000 units weekly    #Locally advanced recurrent colon ca  - Last heme/onc notes states supportive care as per family wishes  - c/w GOC discussion       Full Code  Renal diet  Heparin 5000 units q12hr

## 2024-05-28 DIAGNOSIS — C18.9 MALIGNANT NEOPLASM OF COLON, UNSPECIFIED: ICD-10-CM

## 2024-05-28 LAB
A1C WITH ESTIMATED AVERAGE GLUCOSE RESULT: 4.6 % — SIGNIFICANT CHANGE UP (ref 4–5.6)
ALBUMIN SERPL ELPH-MCNC: 2.2 G/DL — LOW (ref 3.3–5)
ALP SERPL-CCNC: 48 U/L — SIGNIFICANT CHANGE UP (ref 40–120)
ALT FLD-CCNC: 14 U/L — SIGNIFICANT CHANGE UP (ref 10–45)
ANION GAP SERPL CALC-SCNC: 16 MMOL/L — SIGNIFICANT CHANGE UP (ref 5–17)
AST SERPL-CCNC: 12 U/L — SIGNIFICANT CHANGE UP (ref 10–40)
BASOPHILS # BLD AUTO: 0.01 K/UL — SIGNIFICANT CHANGE UP (ref 0–0.2)
BASOPHILS NFR BLD AUTO: 0.4 % — SIGNIFICANT CHANGE UP (ref 0–2)
BILIRUB SERPL-MCNC: 0.4 MG/DL — SIGNIFICANT CHANGE UP (ref 0.2–1.2)
BUN SERPL-MCNC: 110 MG/DL — HIGH (ref 7–23)
CALCIUM SERPL-MCNC: 7.8 MG/DL — LOW (ref 8.4–10.5)
CHLORIDE SERPL-SCNC: 108 MMOL/L — SIGNIFICANT CHANGE UP (ref 96–108)
CHOLEST SERPL-MCNC: 93 MG/DL — SIGNIFICANT CHANGE UP
CO2 SERPL-SCNC: 15 MMOL/L — LOW (ref 22–31)
CREAT SERPL-MCNC: 6.54 MG/DL — HIGH (ref 0.5–1.3)
EGFR: 6 ML/MIN/1.73M2 — LOW
EOSINOPHIL # BLD AUTO: 0.02 K/UL — SIGNIFICANT CHANGE UP (ref 0–0.5)
EOSINOPHIL NFR BLD AUTO: 0.8 % — SIGNIFICANT CHANGE UP (ref 0–6)
ESTIMATED AVERAGE GLUCOSE: 85 MG/DL — SIGNIFICANT CHANGE UP (ref 68–114)
FERRITIN SERPL-MCNC: 203 NG/ML — SIGNIFICANT CHANGE UP (ref 13–330)
GLUCOSE BLDC GLUCOMTR-MCNC: 117 MG/DL — HIGH (ref 70–99)
GLUCOSE BLDC GLUCOMTR-MCNC: 121 MG/DL — HIGH (ref 70–99)
GLUCOSE BLDC GLUCOMTR-MCNC: 173 MG/DL — HIGH (ref 70–99)
GLUCOSE BLDC GLUCOMTR-MCNC: 192 MG/DL — HIGH (ref 70–99)
GLUCOSE SERPL-MCNC: 117 MG/DL — HIGH (ref 70–99)
HCT VFR BLD CALC: 23.8 % — LOW (ref 34.5–45)
HCT VFR BLD CALC: 24.5 % — LOW (ref 34.5–45)
HDLC SERPL-MCNC: 52 MG/DL — SIGNIFICANT CHANGE UP
HGB BLD-MCNC: 6.9 G/DL — CRITICAL LOW (ref 11.5–15.5)
HGB BLD-MCNC: 7.4 G/DL — LOW (ref 11.5–15.5)
IMM GRANULOCYTES NFR BLD AUTO: 0 % — SIGNIFICANT CHANGE UP (ref 0–0.9)
IRON SATN MFR SERPL: 11 % — LOW (ref 14–50)
IRON SATN MFR SERPL: 19 UG/DL — LOW (ref 30–160)
LIPID PNL WITH DIRECT LDL SERPL: 30 MG/DL — SIGNIFICANT CHANGE UP
LYMPHOCYTES # BLD AUTO: 0.37 K/UL — LOW (ref 1–3.3)
LYMPHOCYTES # BLD AUTO: 15.4 % — SIGNIFICANT CHANGE UP (ref 13–44)
MAGNESIUM SERPL-MCNC: 2.2 MG/DL — SIGNIFICANT CHANGE UP (ref 1.6–2.6)
MCHC RBC-ENTMCNC: 26.6 PG — LOW (ref 27–34)
MCHC RBC-ENTMCNC: 27 PG — SIGNIFICANT CHANGE UP (ref 27–34)
MCHC RBC-ENTMCNC: 29 GM/DL — LOW (ref 32–36)
MCHC RBC-ENTMCNC: 30.2 GM/DL — LOW (ref 32–36)
MCV RBC AUTO: 89.4 FL — SIGNIFICANT CHANGE UP (ref 80–100)
MCV RBC AUTO: 91.9 FL — SIGNIFICANT CHANGE UP (ref 80–100)
MONOCYTES # BLD AUTO: 0.23 K/UL — SIGNIFICANT CHANGE UP (ref 0–0.9)
MONOCYTES NFR BLD AUTO: 9.6 % — SIGNIFICANT CHANGE UP (ref 2–14)
NEUTROPHILS # BLD AUTO: 1.77 K/UL — LOW (ref 1.8–7.4)
NEUTROPHILS NFR BLD AUTO: 73.8 % — SIGNIFICANT CHANGE UP (ref 43–77)
NON HDL CHOLESTEROL: 42 MG/DL — SIGNIFICANT CHANGE UP
NRBC # BLD: 0 /100 WBCS — SIGNIFICANT CHANGE UP (ref 0–0)
NRBC # BLD: 0 /100 WBCS — SIGNIFICANT CHANGE UP (ref 0–0)
PHOSPHATE SERPL-MCNC: 5 MG/DL — HIGH (ref 2.5–4.5)
PLATELET # BLD AUTO: 123 K/UL — LOW (ref 150–400)
PLATELET # BLD AUTO: 143 K/UL — LOW (ref 150–400)
POTASSIUM SERPL-MCNC: 4.3 MMOL/L — SIGNIFICANT CHANGE UP (ref 3.5–5.3)
POTASSIUM SERPL-SCNC: 4.3 MMOL/L — SIGNIFICANT CHANGE UP (ref 3.5–5.3)
PROT SERPL-MCNC: 5.3 G/DL — LOW (ref 6–8.3)
RBC # BLD: 2.59 M/UL — LOW (ref 3.8–5.2)
RBC # BLD: 2.74 M/UL — LOW (ref 3.8–5.2)
RBC # FLD: 18.5 % — HIGH (ref 10.3–14.5)
RBC # FLD: 18.6 % — HIGH (ref 10.3–14.5)
SODIUM SERPL-SCNC: 139 MMOL/L — SIGNIFICANT CHANGE UP (ref 135–145)
TIBC SERPL-MCNC: 177 UG/DL — LOW (ref 220–430)
TRIGL SERPL-MCNC: 41 MG/DL — SIGNIFICANT CHANGE UP
UIBC SERPL-MCNC: 158 UG/DL — SIGNIFICANT CHANGE UP (ref 110–370)
WBC # BLD: 2.4 K/UL — LOW (ref 3.8–10.5)
WBC # BLD: 2.43 K/UL — LOW (ref 3.8–10.5)
WBC # FLD AUTO: 2.4 K/UL — LOW (ref 3.8–10.5)
WBC # FLD AUTO: 2.43 K/UL — LOW (ref 3.8–10.5)

## 2024-05-28 PROCEDURE — 99223 1ST HOSP IP/OBS HIGH 75: CPT

## 2024-05-28 PROCEDURE — 76700 US EXAM ABDOM COMPLETE: CPT | Mod: 26

## 2024-05-28 PROCEDURE — 74176 CT ABD & PELVIS W/O CONTRAST: CPT | Mod: 26

## 2024-05-28 RX ORDER — CHLORHEXIDINE GLUCONATE 213 G/1000ML
1 SOLUTION TOPICAL DAILY
Refills: 0 | Status: DISCONTINUED | OUTPATIENT
Start: 2024-05-28 | End: 2024-05-31

## 2024-05-28 RX ORDER — ALLOPURINOL 300 MG
100 TABLET ORAL DAILY
Refills: 0 | Status: DISCONTINUED | OUTPATIENT
Start: 2024-05-28 | End: 2024-05-31

## 2024-05-28 RX ORDER — SODIUM CHLORIDE 9 MG/ML
1000 INJECTION, SOLUTION INTRAVENOUS
Refills: 0 | Status: DISCONTINUED | OUTPATIENT
Start: 2024-05-28 | End: 2024-05-31

## 2024-05-28 RX ADMIN — Medication 100 MILLIGRAM(S): at 21:23

## 2024-05-28 RX ADMIN — SODIUM CHLORIDE 80 MILLILITER(S): 9 INJECTION, SOLUTION INTRAVENOUS at 17:53

## 2024-05-28 RX ADMIN — Medication 1: at 12:29

## 2024-05-28 RX ADMIN — Medication 2 MILLIGRAM(S): at 00:18

## 2024-05-28 RX ADMIN — SODIUM CHLORIDE 50 MILLILITER(S): 9 INJECTION, SOLUTION INTRAVENOUS at 00:19

## 2024-05-28 RX ADMIN — Medication 667 MILLIGRAM(S): at 17:42

## 2024-05-28 RX ADMIN — HEPARIN SODIUM 5000 UNIT(S): 5000 INJECTION INTRAVENOUS; SUBCUTANEOUS at 17:41

## 2024-05-28 RX ADMIN — SODIUM CHLORIDE 75 MILLILITER(S): 9 INJECTION, SOLUTION INTRAVENOUS at 10:15

## 2024-05-28 RX ADMIN — Medication 100 MILLIGRAM(S): at 12:31

## 2024-05-28 RX ADMIN — Medication 667 MILLIGRAM(S): at 12:30

## 2024-05-28 RX ADMIN — Medication 100 MILLIGRAM(S): at 06:26

## 2024-05-28 RX ADMIN — AMLODIPINE BESYLATE 10 MILLIGRAM(S): 2.5 TABLET ORAL at 06:26

## 2024-05-28 RX ADMIN — Medication 1 TABLET(S): at 12:30

## 2024-05-28 RX ADMIN — Medication 100 MILLIGRAM(S): at 00:18

## 2024-05-28 RX ADMIN — ERYTHROPOIETIN 40000 UNIT(S): 10000 INJECTION, SOLUTION INTRAVENOUS; SUBCUTANEOUS at 00:18

## 2024-05-28 RX ADMIN — Medication 100 GRAM(S): at 00:19

## 2024-05-28 RX ADMIN — Medication 667 MILLIGRAM(S): at 10:15

## 2024-05-28 RX ADMIN — HEPARIN SODIUM 5000 UNIT(S): 5000 INJECTION INTRAVENOUS; SUBCUTANEOUS at 06:25

## 2024-05-28 RX ADMIN — Medication 2 MILLIGRAM(S): at 21:23

## 2024-05-28 RX ADMIN — CEFTRIAXONE 100 MILLIGRAM(S): 500 INJECTION, POWDER, FOR SOLUTION INTRAMUSCULAR; INTRAVENOUS at 21:22

## 2024-05-28 NOTE — DIETITIAN INITIAL EVALUATION ADULT - PERTINENT LABORATORY DATA
05-28    139  |  108  |  110<H>  ----------------------------<  117<H>  4.3   |  15<L>  |  6.54<H>    Ca    7.8<L>      28 May 2024 07:50  Phos  5.0     05-28  Mg     2.2     05-28    TPro  5.3<L>  /  Alb  2.2<L>  /  TBili  0.4  /  DBili  x   /  AST  12  /  ALT  14  /  AlkPhos  48  05-28  POCT Blood Glucose.: 117 mg/dL (05-28-24 @ 08:12)  A1C with Estimated Average Glucose Result: 4.6 % (02-29-24 @ 07:57)

## 2024-05-28 NOTE — PATIENT PROFILE ADULT - NSPROGENOTHERPROVIDER_GEN_A_NUR
Occupational Therapy    Visit Type: treatment  SUBJECTIVE  Patient agreed to participate in therapy this date.  \"I'm doing good, just need them to get everything figured out\"   Patient / Family Goal: return to previous functional status, maximize function and return home    OBJECTIVE     Cognitive Status   Level of Consciousness   - alert  Arousal Alertness   - appropriate responses to stimuli  Affect/Behavior    - cooperative and pleasant  Orientation    - Oriented to: person, place, time and situation  Functional Communication   - Overall Status: within functional limits    Patient Activity Tolerance: 2 to 1 activity to rest         Transfers  Assistive devices: gait belt, 2-wheeled walker  - Sit to stand: supervision  - Stand to sit: supervision      Functional Ambulation  - Assistance: supervision  - Assistive device: gait belt and 2-wheeled walker  Activities of Daily Living (ADLs)  Grooming/Oral Hygiene:   - Grooming assist: supervision       - Oral hygiene assist: supervision  - Position: standing at sink  - Assist needed for: wash/dry hands and teeth care  Lower Body Dressing:   - Assist: set up  - Position: chair  - Assist needed for: thread left lower extremity into underwear, thread right lower extremity into underwear and pull up over hips  Interventions    Training provided: ADL training, balance retraining, bed mobility training, body mechanics, functional ambulation, transfer training and activity tolerance  Skilled input: verbal instruction/cues  Verbal Consent: Writer verbally educated and received verbal consent for hand placement, positioning of patient, and techniques to be performed today from patient for clothing adjustments for techniques and therapist position for techniques as described above and how they are pertinent to the patient's plan of care.         Education:   - Present and ready to learn: patient  Education provided during session:  - Results of above outlined education: Needs  reinforcement and Verbalizes understanding    ASSESSMENT   Progress: slow progress  Interferring components: decreased activity tolerance, decreased insight into deficit, medical status limitations and surgical precautions    Discharge needs based on today's assessment:  - Current level of function: significantly below baseline level of function  - Therapy needs at discharge: does not require ongoing therapy  - Activities of daily living (ADLs) requiring support at discharge: bed mobility, transfers, dressing, grooming, bathing and toileting  - Impairments that require further therapy intervention: pain, ROM, strength, vision, balance, safety awareness, activity tolerance and proprioception  AM-PAC  - Prior Level of Function: IND/MOD I ([facility] 22-24)       Key: MOD A=moderate assistance, IND/MOD I=independent/modified independent  - Generalized Current Level of Function     - Current Self-Cares: 19       Scoring Key= >21 Modified Independent; 20-21 Supervision; 18-19 Minimal assist; 13-17 Moderate assist; 9-12 Max assist; <9 Total assist      PLAN (while hospitalized)  Suggestions for next session as indicated: Lb dressing, progress mobility with cueing for environment, ADLs up at sink    OT Frequency: 3-5x week    PT/OT Mobility Equipment for Discharge: has 2ww  Agreement to plan and goals: patient agrees with goals and treatment plan      GOALS  Review Date: 12/13/2023  Long Term Goals: (to be met by time of discharge from hospital)  Grooming: Patient will complete grooming tasks modified independent.  Upper body dressing: Patient will complete upper body dressing modified independent.  Lower body dressing: Patient will complete lower body dressing modified independent.  Toileting: Patient will complete toileting modified independent.  Bathing: Patient will complete bathingmodified independent Toilet transfer: Patient will complete toilet transfer with modified independent.   Home setting transfer: Patient will  complete home setting transfers with modified independent.     Documented in the chart in the following areas: Assessment/Plan.    Patient at End of Session:   Location: in chair  Safety measures: call light within reach  Handoff to: nurse      Therapy procedure time and total treatment time can be found documented on the Time Entry flowsheet   none

## 2024-05-28 NOTE — CONSULT NOTE ADULT - PROBLEM SELECTOR RECOMMENDATION 9
History of breast cancer and colon cancer, no longer a systemic treatment candidate given comorbidities; not actively treated oncologically at this time, admitted with pancytopenia.   Reviewed results of CT abdomen consistent with suspicion of portal vein thrombus versus tumor thrombus and enlarged bilateral pleural effusion and pericardial effusion as well as developing left hydronephrosis.  There is increasing anasarca and a pancreatic head/mesenteric mass larger than prior studies. Reviewed results of bone marrow studies from August 2023 consistent with possible MDS.  Would continue to transfuse as needed. Consider indefinite anticoagulation given secondary hypercoagulable state. Patient will remain under expectant surveillance during hospitalization.  Plan to follow-up at [facility] upon discharge.

## 2024-05-28 NOTE — CONSULT NOTE ADULT - SUBJECTIVE AND OBJECTIVE BOX
HANG MADERA,  89y Female  MRN: [id number]  ATTENDING: Dr. Humble Michele      HPI:  89-year-old female with PMH of CKD, Breast Cancer, colon cancer s/p resection, parathyroid ca, HTN, thrombocytopenia presents to the emergency department for abnormal lab.  Patient was supposed to get her procrit injection today. After seeing her recent labs, she was asked by Dr. Garcia to present to the emergency department for admission to the hospital. Patient describes feeling generalized weakness especially when getting up to ambulate for the past few months. Of note, patient does have loose BM every day since she has a history of colon resection. This has been ongoing since 2011. She also states that whenever she goes to the bathroom she makes about a cup of urine each time. Pt does, however, still make urine. She denied dysuria, suprapubic tenderness, CVA tenderness. Denied changes in mental status.      ED Course: T 98, HR 73, /61, RR 17, SpO2 96% on RA. Labs showing WBC of 3.18, low H/H of 8.2/27.4, BUN elevation to 107, Cr of 6.76, eGFR of 5. UA and UCx ordered but not collected yet. Patient received a 500cc bolus of IVF. Patient was admitted to the floors for further management. Of note patient's baseline BUN/Cr is 85/4.5.  (27 May 2024 19:33)      PAST MEDICAL & SURGICAL HISTORY:  History of breast cancer  right breast      Anemia      Hypertension      History of thrombocytopenia      History of herpes zoster      History of malignant neoplasm of parathyroid gland      Colon cancer      Chronic renal insufficiency      Thyroid nodule      Diabetes mellitus  NIDDM      History of cholecystectomy      History of partial colectomy  and appendectomy at the same time      History of lumpectomy of right breast  2007 and radiation and chemotherapy      Parathyroid adenoma  excision          MEDICATION:  allopurinol 100 milliGRAM(s) Oral daily  amLODIPine   Tablet 10 milliGRAM(s) Oral daily  calcium acetate 667 milliGRAM(s) Oral three times a day with meals  cefTRIAXone   IVPB 1000 milliGRAM(s) IV Intermittent every 24 hours  chlorhexidine 2% Cloths 1 Application(s) Topical daily  dextrose 5% 1000 milliLiter(s) IV Continuous <Continuous>  dextrose 5%. 1000 milliLiter(s) IV Continuous <Continuous>  dextrose 5%. 1000 milliLiter(s) IV Continuous <Continuous>  dextrose 50% Injectable 25 Gram(s) IV Push once  dextrose 50% Injectable 12.5 Gram(s) IV Push once  doxazosin 2 milliGRAM(s) Oral at bedtime  glucagon  Injectable 1 milliGRAM(s) IntraMuscular once  heparin   Injectable 5000 Unit(s) SubCutaneous every 12 hours  hydrALAZINE 100 milliGRAM(s) Oral three times a day  insulin lispro (ADMELOG) corrective regimen sliding scale   SubCutaneous three times a day before meals  insulin lispro (ADMELOG) corrective regimen sliding scale   SubCutaneous at bedtime  multivitamin 1 Tablet(s) Oral daily      ALLERGIES:  No Known Allergies      FAMILY HISTORY:  Reviewed, non-contributory: [ ]   Maternal-  Paternal-    SOCIAL HISTORY:  Tobacco: YES [ ]  ; NO [ ]; Former smoker [ ]  Alcohol:   YES [ ]  ; NO [ ]; Social alcohol user [ ]  Occupation/ marital status/ children:    REVIEW SYSTEMS:  Constitutional: no fever, chills, night sweats, no weight loss  HEENT: denies visual changes; no oral ulcers, dysphagia, no epistaxis;   Respiratory: no dyspnea , wheezing, cough, hemoptysis  Cardiovascular: denies acute chest pain, palpitations  GI: no loss of appetite, dark stools, or abdominal tenderness / pain; no change in bowel habits.  Musculoskeletal: no new back pain, bone/ joint pain ,no extremity swelling  Integumentary: denies pruritus; no skin rash, bruises, no  suspicious skin lesions  Neurologic: denies peripheral numbness, no dizziness, no gait problems.  Heme: no reported easy bruisability; no lymph node enlargement    VITALS:  T(C): 36.6, Max: 36.9 (05-27-24 @ 20:30)  T(F): 97.8, Max: 98.4 (05-27-24 @ 20:30)  HR: 72 (67 - 95)  BP: 139/57 (126/49 - 151/50)  SpO2: 95% (95% - 98%)    PHYSICAL EXAM:  Constitutional: alert, well developed  HEENT: normocephalic, anicteric sclerae, no mucositis or thrush  Respiratory: bilateral clear to auscultation anteriorly  Cardiovascular : S1, S2 regular, rhythmic, no murmurs, gallops or rubs  Abdomen: soft, distended, + normoactive BS, no palpable HS- megaly  Extremities: no tenderness;  -c/c/e, pulses equal bilaterally  Integumentary: no rashes, scars, or lesions suggestive of malignancy  Neurologic: no gross focal deficits    LABS:  (05-28) WBC: 2.43 K/uL,Hemoglobin: 7.4 g/dL, Hematocrit: 24.5 %,    Platelet: 143 K/uL    (05-28) Na: 139 mmol/L ; K: 4.3 mmol/L ; BUN: 110 mg/dL ; Cr: 6.54 mg/dL.      PT/INR - ( 27 May 2024 19:00 )   PT: 12.9 sec;   INR: 1.11 ratio         PTT - ( 27 May 2024 19:00 )  PTT:30.1 sec    RADIOLOGY:               HANG MADERA,  89y Female  MRN: [id number]  ATTENDING: Dr. Humble Michele      HPI:  89F, PMHx HTN, herpes zoster, CKD, multinodular thyroid,breast cancer, colon cancer (previously on Keytruda), refractory anemia/MDS, treated supportively and ambulatory (erythropoietin supplements), no recent exposure to systemic chemotherapy, is admitted with generalized weakness.  Treated in ambulatory with Procrit and intermittent transfusion support, directed to the ED after found with a , creatinine 6.76.  In ED found pancytopenic (hemoglobin 6.9 g/dL, WBC 2.4K, platelet 123,000).  Patient receiving supportive care in ambulatory, discontinued Keytruda since June 2023. Reviewed results of CT abdomen consistent with suspicion of portal vein thrombus versus tumor thrombus and enlarged bilateral pleural effusion and pericardial effusion as well as developing left hydronephrosis.  There is increasing anasarca and a pancreatic head/mesenteric mass larger than prior studies. Oncology consulted as above.    PAST MEDICAL & SURGICAL HISTORY:  History of breast cancer-right breast  Anemia  Hypertension  History of thrombocytopenia  History of herpes zoster  History of malignant neoplasm of parathyroid gland  Colon cancer  Chronic renal insufficiency  Thyroid nodule  Diabetes mellitus-NIDDM  History of cholecystectomy  History of partial colectomy and appendectomy at the same time  History of lumpectomy of right breast 2007 and radiation and chemotherapy  Parathyroid adenoma excision    MEDICATION:  allopurinol 100 milliGRAM(s) Oral daily  amLODIPine   Tablet 10 milliGRAM(s) Oral daily  calcium acetate 667 milliGRAM(s) Oral three times a day with meals  cefTRIAXone   IVPB 1000 milliGRAM(s) IV Intermittent every 24 hours  chlorhexidine 2% Cloths 1 Application(s) Topical daily  dextrose 5% 1000 milliLiter(s) IV Continuous <Continuous>  dextrose 5%. 1000 milliLiter(s) IV Continuous <Continuous>  dextrose 5%. 1000 milliLiter(s) IV Continuous <Continuous>  dextrose 50% Injectable 25 Gram(s) IV Push once  dextrose 50% Injectable 12.5 Gram(s) IV Push once  doxazosin 2 milliGRAM(s) Oral at bedtime  glucagon  Injectable 1 milliGRAM(s) IntraMuscular once  heparin   Injectable 5000 Unit(s) SubCutaneous every 12 hours  hydrALAZINE 100 milliGRAM(s) Oral three times a day  insulin lispro (ADMELOG) corrective regimen sliding scale   SubCutaneous three times a day before meals  insulin lispro (ADMELOG) corrective regimen sliding scale   SubCutaneous at bedtime  multivitamin 1 Tablet(s) Oral daily    ALLERGIES:  No Known Allergies    FAMILY HISTORY:  Reviewed, non-contributory: [x ]     SOCIAL HISTORY:  Tobacco: YES [ ]  ; NO [ x]; Former smoker [ ]  Alcohol:   YES [ ]  ; NO [ x]; Social alcohol user [ ]    REVIEW SYSTEMS:  Constitutional: no fever, chills, night sweats, no weight loss  HEENT: denies visual changes; no oral ulcers, dysphagia, no epistaxis;   Respiratory: no dyspnea , wheezing, cough, hemoptysis  Cardiovascular: denies acute chest pain, palpitations  GI: no loss of appetite, dark stools, or abdominal tenderness / pain; no change in bowel habits.  Musculoskeletal: no new back pain, bone/ joint pain ,no extremity swelling  Integumentary: denies pruritus; no skin rash, bruises, no  suspicious skin lesions  Neurologic: denies peripheral numbness, no dizziness, no gait problems.  Heme: no reported easy bruisability; no lymph node enlargement    VITALS:  T(C): 36.6, Max: 36.9 (05-27-24 @ 20:30)  T(F): 97.8, Max: 98.4 (05-27-24 @ 20:30)  HR: 72 (67 - 95)  BP: 139/57 (126/49 - 151/50)  SpO2: 95% (95% - 98%)    PHYSICAL EXAM:  Constitutional: alert, well developed  HEENT: normocephalic, anicteric sclerae, no mucositis or thrush  Respiratory: bilateral clear to auscultation anteriorly  Cardiovascular : S1, S2 regular, rhythmic, no murmurs, gallops or rubs  Abdomen: soft, distended, + normoactive BS, no palpable HS- megaly  Extremities: no tenderness;  -c/c/e, pulses equal bilaterally  Integumentary: no rashes, scars, or lesions suggestive of malignancy  Neurologic: no gross focal deficits    LABS:  (05-28) WBC: 2.43 K/uL,Hemoglobin: 7.4 g/dL, Hematocrit: 24.5 %,  Platelet: 143 K/u  (05-28) Na: 139 mmol/L ; K: 4.3 mmol/L ; BUN: 110 mg/dL ; Cr: 6.54 mg/dL.  PT/INR - ( 27 May 2024 19:00 )   PT: 12.9 sec;   INR: 1.11 ratio    PTT - ( 27 May 2024 19:00 )  PTT:30.1 sec    RADIOLOGY:  ACC: [id number] EXAM:  CT ABDOMEN AND PELVIS   ORDERED BY: FLOWER RESENDIZ     PROCEDURE DATE:  05/28/2024          INTERPRETATION:  CLINICAL INFORMATION: 89 years  Female with MAGDI, anemia      r/o mass, hydro.  . History of colon cancer. Status post right   hemicolectomy and chemotherapy in 2011. Mesenteric mass excised in 2013   negative for metastatic disease. Recurred in 2016.    COMPARISON: CT abdomen and pelvis 2/29/2024    CONTRAST/COMPLICATIONS:  IV Contrast: NONE  Oral Contrast: NONE  Complications: None reported at time of study completion    PROCEDURE:  CT of the Abdomen and Pelvis was performed.  Sagittal and coronal reformats were performed.    LIMITATIONS: Evaluation of the solid organs, vascular structures and GI   tract is limited without oral and IV contrast.    FINDINGS:  LOWER CHEST: Enlarging bilateral pleural effusions. Small but enlarging   pericardial effusion. Low-attenuation blood pool secondary to anemia.    LIVER: Innumerable cysts and hypodensities too small to characterize.   Soft tissue attenuation within the portal vein concerning for thrombus or   tumor thrombus.  BILE DUCTS: Normal caliber.  GALLBLADDER: Cholecystectomy.  SPLEEN: Stable splenomegaly spanning 13.4 cm.  PANCREAS: Enlarging 10.4 x 8.6 cmright mesenteric mass, previously 10.1   x 6.2 cm, inseparable from the pancreatic head. Increasing air within the   mass. Surrounding surgical clips unchanged. Surrounding mesenteric edema   unchanged.  ADRENALS: Mild left adrenal thickening.  KIDNEYS/URETERS: Bilateral cysts. Right renal atrophy. Developing   moderate left hydronephrosis.    BLADDER: Minimally distended.  REPRODUCTIVE ORGANS: Unremarkable uterus.    BOWEL: No bowel obstruction. Appendix is not visualized. No evidence of   inflammation in the pericecal region.. Right-sided small bowel   anastomosis. Mild gastric distention likely related to compression by the   mass.  PERITONEUM: Small ascites unchanged. Presacral edema.  VESSELS: Atherosclerotic changes.  RETROPERITONEUM/LYMPH NODES: No lymphadenopathy.  ABDOMINAL WALL: Increasing anasarca. Rectus diastases  BONES: Within normal limits.    IMPRESSION:  Suspect portal vein thrombus versus tumor thrombus. If IV contrast cannot   be administered recommend ultrasound evaluation.    Enlarging bilateral pleural effusions and enlarging pericardial effusion.    Developing left hydronephrosis.    Pancreatic head/mesenteric mass is larger than prior and contains more   air than previously, either related to necrosis or communication with   bowel.    Increasing anasarca.

## 2024-05-28 NOTE — DIETITIAN INITIAL EVALUATION ADULT - ORAL INTAKE PTA/DIET HISTORY
Patient endorses good appetite prior to admission. Patient does not follow any therapeutic meal patterns. No known food allergies/intolerances.

## 2024-05-28 NOTE — PROGRESS NOTE ADULT - SUBJECTIVE AND OBJECTIVE BOX
HANG MADERA  89y  Female            REVIEW OF SYSTEMS:    Cardiac HTN  Pulmonary no cough/SOB  GI colon cancer with mets   CRI  Neuro no headache/dizziness/numbness  Heme anemia/thrombocytopenia  Endo DM    FAMILY HISTORY:  FH: colon cancer (Sibling, Sibling)    FH: breast cancer (Sibling)      T(C): 36.6 (05-28-24 @ 13:52), Max: 36.9 (05-27-24 @ 20:30)  HR: 72 (05-28-24 @ 13:52) (67 - 95)  BP: 139/57 (05-28-24 @ 13:52) (126/49 - 151/50)  RR: 18 (05-28-24 @ 13:52) (17 - 19)  SpO2: 95% (05-28-24 @ 13:52) (95% - 98%)  Wt(kg): --Vital Signs Last 24 Hrs  T(C): 36.6 (28 May 2024 13:52), Max: 36.9 (27 May 2024 20:30)  T(F): 97.8 (28 May 2024 13:52), Max: 98.4 (27 May 2024 20:30)  HR: 72 (28 May 2024 13:52) (67 - 95)  BP: 139/57 (28 May 2024 13:52) (126/49 - 151/50)  BP(mean): --  RR: 18 (28 May 2024 13:52) (17 - 19)  SpO2: 95% (28 May 2024 13:52) (95% - 98%)    Parameters below as of 28 May 2024 13:52  Patient On (Oxygen Delivery Method): room air      No Known Allergies      PHYSICAL EXAM:    General resting comfortably  Neck no JVD non tender  Heart regular  Lungs clear  Abdomen non tender non distended normal bowel sounds no HSM/CVAT  Extremities no edema +pulses non tender  Neurologic alert and oriented x 3 no acute focal changes        Consultant(s) Notes Reviewed:  [x ] YES  [ ] NO      LABS:  CBC Full  -  ( 28 May 2024 12:59 )  WBC Count : 2.43 K/uL  RBC Count : 2.74 M/uL  Hemoglobin : 7.4 g/dL  Hematocrit : 24.5 %  Platelet Count - Automated : 143 K/uL  Mean Cell Volume : 89.4 fl  Mean Cell Hemoglobin : 27.0 pg  Mean Cell Hemoglobin Concentration : 30.2 gm/dL  Auto Neutrophil # : x  Auto Lymphocyte # : x  Auto Monocyte # : x  Auto Eosinophil # : x  Auto Basophil # : x  Auto Neutrophil % : x  Auto Lymphocyte % : x  Auto Monocyte % : x  Auto Eosinophil % : x  Auto Basophil % : x                          7.4    2.43  )-----------( 143      ( 28 May 2024 12:59 )             24.5     05-28    139  |  108  |  110<H>  ----------------------------<  117<H>  4.3   |  15<L>  |  6.54<H>    Ca    7.8<L>      28 May 2024 07:50  Phos  5.0     05-28  Mg     2.2     05-28    TPro  5.3<L>  /  Alb  2.2<L>  /  TBili  0.4  /  DBili  x   /  AST  12  /  ALT  14  /  AlkPhos  48  05-28    LIVER FUNCTIONS - ( 28 May 2024 07:50 )  Alb: 2.2 g/dL / Pro: 5.3 g/dL / ALK PHOS: 48 U/L / ALT: 14 U/L / AST: 12 U/L / GGT: x           PT/INR - ( 27 May 2024 19:00 )   PT: 12.9 sec;   INR: 1.11 ratio         PTT - ( 27 May 2024 19:00 )  PTT:30.1 sec  Urinalysis Basic - ( 28 May 2024 07:50 )    Color: x / Appearance: x / SG: x / pH: x  Gluc: 117 mg/dL / Ketone: x  / Bili: x / Urobili: x   Blood: x / Protein: x / Nitrite: x   Leuk Esterase: x / RBC: x / WBC x   Sq Epi: x / Non Sq Epi: x / Bacteria: x      CAPILLARY BLOOD GLUCOSE      POCT Blood Glucose.: 173 mg/dL (28 May 2024 12:29)        POCT Blood Glucose (05.28.24 @ 08:12)   POCT Blood Glucose.: 117 mg/dL  POCT Blood Glucose (05.27.24 @ 23:03)   POCT Blood Glucose.: 206 mg/dL      < from: CT Abdomen and Pelvis No Cont (05.28.24 @ 12:52) >    ACC: [id number] EXAM:  CT ABDOMEN AND PELVIS   ORDERED BY: FLOWER RESENDIZ     PROCEDURE DATE:  05/28/2024          INTERPRETATION:  CLINICAL INFORMATION: 89 years  Female with MAGDI, anemia      r/o mass, hydro.  . History of colon cancer. Status post right   hemicolectomy and chemotherapy in 2011. Mesenteric mass excised in 2013   negative for metastatic disease. Recurred in 2016.    COMPARISON: CT abdomen and pelvis 2/29/2024    CONTRAST/COMPLICATIONS:  IV Contrast: NONE  Oral Contrast: NONE  Complications: None reported at time of study completion    PROCEDURE:  CT of the Abdomen and Pelvis was performed.  Sagittal and coronal reformats were performed.    LIMITATIONS: Evaluation of the solid organs, vascular structures and GI   tract is limited without oral and IV contrast.    FINDINGS:  LOWER CHEST: Enlarging bilateral pleural effusions. Small but enlarging   pericardial effusion. Low-attenuation blood pool secondary to anemia.    LIVER: Innumerable cysts and hypodensities too small to characterize.   Soft tissue attenuation within the portal vein concerning for thrombus or   tumor thrombus.  BILE DUCTS: Normal caliber.  GALLBLADDER: Cholecystectomy.  SPLEEN: Stable splenomegaly spanning 13.4 cm.  PANCREAS: Enlarging 10.4 x 8.6 cmright mesenteric mass, previously 10.1   x 6.2 cm, inseparable from the pancreatic head. Increasing air within the   mass. Surrounding surgical clips unchanged. Surrounding mesenteric edema   unchanged.  ADRENALS: Mild left adrenal thickening.  KIDNEYS/URETERS: Bilateral cysts. Right renal atrophy. Developing   moderate left hydronephrosis.    BLADDER: Minimally distended.  REPRODUCTIVE ORGANS: Unremarkable uterus.    BOWEL: No bowel obstruction. Appendix is not visualized. No evidence of   inflammation in the pericecal region.. Right-sided small bowel   anastomosis. Mild gastric distention likely related to compression by the   mass.  PERITONEUM: Small ascites unchanged. Presacral edema.  VESSELS: Atherosclerotic changes.  RETROPERITONEUM/LYMPH NODES: No lymphadenopathy.  ABDOMINAL WALL: Increasing anasarca. Rectus diastases  BONES: Within normal limits.    IMPRESSION:  Suspect portal vein thrombus versus tumor thrombus. If IV contrast cannot   be administered recommend ultrasound evaluation.    Enlarging bilateral pleural effusions and enlarging pericardial effusion.    Developing left hydronephrosis.    Pancreatic head/mesenteric mass is larger than prior and contains more   air than previously, either related to necrosis or communication with   bowel.    Increasing anasarca.    Findings were discussed with BERNICE Handley 5/28/2024 2:55 PM by Dr. Dmitry Xiao with read back confirmation.    --- End of Report ---        DMITRY XIAO MD; Attending Radiologist  This document has been electronically signed. May 28 2024  2:58PM    < end of copied text >                < from: 12 Lead ECG (05.27.24 @ 18:39) >    Ventricular Rate 65 BPM    Atrial Rate 65 BPM    P-R Interval 290 ms    QRS Duration 102 ms    Q-T Interval 414 ms    QTC Calculation(Bazett) 430 ms    P Axis 76 degrees    R Axis -36 degrees    T Axis 30 degrees    Diagnosis Line Sinus rhythm [id number] degree AV block  Left axis deviation  Minimal voltage criteria for LVH, may be normal variant  Abnormal ECG  When compared with ECG of 28-FEB-2024 23:13,  No significant change was found  Confirmed by Everton Perez ([id number]) on 5/28/2024 3:49:05 PM    < end of copied text >      acetaminophen     Tablet .. 650 milliGRAM(s) Oral every 6 hours PRN  allopurinol 100 milliGRAM(s) Oral daily  aluminum hydroxide/magnesium hydroxide/simethicone Suspension 30 milliLiter(s) Oral every 4 hours PRN  amLODIPine   Tablet 10 milliGRAM(s) Oral daily  calcium acetate 667 milliGRAM(s) Oral three times a day with meals  cefTRIAXone   IVPB 1000 milliGRAM(s) IV Intermittent every 24 hours  chlorhexidine 2% Cloths 1 Application(s) Topical daily  dextrose 5% 1000 milliLiter(s) IV Continuous <Continuous>  dextrose 5%. 1000 milliLiter(s) IV Continuous <Continuous>  dextrose 5%. 1000 milliLiter(s) IV Continuous <Continuous>  dextrose 50% Injectable 25 Gram(s) IV Push once  dextrose 50% Injectable 12.5 Gram(s) IV Push once  dextrose Oral Gel 15 Gram(s) Oral once PRN  doxazosin 2 milliGRAM(s) Oral at bedtime  glucagon  Injectable 1 milliGRAM(s) IntraMuscular once  heparin   Injectable 5000 Unit(s) SubCutaneous every 12 hours  hydrALAZINE 100 milliGRAM(s) Oral three times a day  insulin lispro (ADMELOG) corrective regimen sliding scale   SubCutaneous three times a day before meals  insulin lispro (ADMELOG) corrective regimen sliding scale   SubCutaneous at bedtime  melatonin 3 milliGRAM(s) Oral at bedtime PRN  multivitamin 1 Tablet(s) Oral daily  ondansetron Injectable 4 milliGRAM(s) IV Push every 8 hours PRN

## 2024-05-28 NOTE — GOALS OF CARE CONVERSATION - ADVANCED CARE PLANNING - CONVERSATION DETAILS
Met with patient at bedside. She is A&Ox3. She is here from home with generalized weakness r/t anemia. Pt. Has hx. CKD5, breast ca., colon ca./colectomy/chemo, parathyroid ca., HTN. Pt's PCP is Dr. Michele. Pt. had signed [facility] DNR/DNI on 2/29/24. I printed this out and reviewed it with her. She states she has rescinded this, and her GOC is now Full Code. Attempt resuscitation and intubation in the event of cardiac arrest.

## 2024-05-28 NOTE — DIETITIAN INITIAL EVALUATION ADULT - PROBLEM SELECTOR PLAN 1
- MAGDI on CKD stage 5  - Unclear etiology  - Ordered UA and UCx   - Ordered Urine lytes to further characterize  - Ordered renal sono   - Monitor electrolytes and replete   - c/w Sodium bicarb TID as prescribed   - Avoid nephrotoxic meds  - Nephro consult placed - Dr. Garcia   - c/w IVF @ 50cc/hr  - c/w doxazosin

## 2024-05-28 NOTE — CONSULT NOTE ADULT - SUBJECTIVE AND OBJECTIVE BOX
NEPHROLOGY CONSULTATION    CHIEF COMPLAINT: MAGDI    HPI:  Pt is 88 yo f with PMH CKD 5, Breast Cancer, colon cancer s/p resection, parathyroid ca, HTN, thrombocytopenia presents to the emergency department for abnormal labs. Patient was supposed to get her procrit injection today. Patient describes feeling generalized weakness especially when getting up to ambulate for the past few months. Of note, patient does have chronic loose BMs since colon resection in 2011. No CP, SOB, N/V/F/C.      ROS:  as above    Allergies:  No Known Allergies    PAST MEDICAL & SURGICAL HISTORY:  History of breast cancer R  Anemia  Hypertension  History of thrombocytopenia  History of herpes zoster  History of malignant neoplasm of parathyroid gland  Colon cancer  Chronic renal insufficiency  Thyroid nodule  NIDDM  History of cholecystectomy  History of partial colectomy  and appendectomy at the same time  History of lumpectomy of right breast  2007 and radiation and chemotherapy  Parathyroid adenoma  excision    SOCIAL HISTORY:  negative    FAMILY HISTORY:  colon cancer (Siblings)  breast cancer (Sibling)    MEDICATIONS  (STANDING):  amLODIPine   Tablet 10 milliGRAM(s) Oral daily  calcium acetate 667 milliGRAM(s) Oral three times a day with meals  cefTRIAXone   IVPB 1000 milliGRAM(s) IV Intermittent every 24 hours  dextrose 5% 1000 milliLiter(s) (80 mL/Hr) IV Continuous <Continuous>  dextrose 5%. 1000 milliLiter(s) (100 mL/Hr) IV Continuous <Continuous>  dextrose 5%. 1000 milliLiter(s) (50 mL/Hr) IV Continuous <Continuous>  dextrose 50% Injectable 25 Gram(s) IV Push once  dextrose 50% Injectable 12.5 Gram(s) IV Push once  doxazosin 2 milliGRAM(s) Oral at bedtime  glucagon  Injectable 1 milliGRAM(s) IntraMuscular once  heparin   Injectable 5000 Unit(s) SubCutaneous every 12 hours  hydrALAZINE 100 milliGRAM(s) Oral three times a day  insulin lispro (ADMELOG) corrective regimen sliding scale   SubCutaneous three times a day before meals  insulin lispro (ADMELOG) corrective regimen sliding scale   SubCutaneous at bedtime  multivitamin 1 Tablet(s) Oral daily    Vital Signs Last 24 Hrs  T(C): 36.6 (05-28-24 @ 05:29), Max: 36.9 (05-27-24 @ 20:30)  T(F): 97.8 (05-28-24 @ 05:29), Max: 98.4 (05-27-24 @ 20:30)  HR: 95 (05-28-24 @ 05:29) (67 - 95)  BP: 126/49 (05-28-24 @ 05:29) (126/49 - 151/50)  RR: 17 (05-28-24 @ 05:29) (17 - 19)  SpO2: 98% (05-27-24 @ 20:30) (96% - 98%)    s1s2  b/l air entry  soft, ND  no edema  AO    LABS:                        7.4    2.43  )-----------( 143      ( 28 May 2024 12:59 )             24.5     05-28    139  |  108  |  110<H>  ----------------------------<  117<H>  4.3   |  15<L>  |  6.54<H>    Ca    7.8<L>      28 May 2024 07:50  Phos  5.0     05-28  Mg     2.2     05-28    TPro  5.3<L>  /  Alb  2.2<L>  /  TBili  0.4  /  DBili  x   /  AST  12  /  ALT  14  /  AlkPhos  48  05-28    LIVER FUNCTIONS - ( 28 May 2024 07:50 )  Alb: 2.2 g/dL / Pro: 5.3 g/dL / ALK PHOS: 48 U/L / ALT: 14 U/L / AST: 12 U/L / GGT: x           PT/INR - ( 27 May 2024 19:00 )   PT: 12.9 sec;   INR: 1.11 ratio       PTT - ( 27 May 2024 19:00 )  PTT:30.1 sec    A/P:    Hx colon cancer s/p R colectomy, chemo  Pre-renal MAGDI/CKD vs progression of CKD  Hx of tx w/Keytruda (d/c-d over a year ago)  Met acidosis iso chronic diarrhea, advanced CKD  Avoid nephrotoxins  Low Na, low Phos diet   IVF w/Bicarb   Per prior d/w pt and family, trying to avoid RRT if at all possible  CBC, BMP in am  F/u CT A/P no contrast   Bld tx per medicine   Will follow     [phone]   NEPHROLOGY CONSULTATION    CHIEF COMPLAINT: MAGDI/CKD    HPI:  Pt is 89 yo f with PMH CKD 5, Breast Cancer, colon cancer s/p resection, parathyroid ca, HTN, thrombocytopenia presents to the emergency department yesterday for abnormal labs. Patient describes feeling generalized weakness especially when getting up to ambulate for the past few months. Of note, patient has chronic loose BMs since colon resection in 2011. No CP, SOB, N/V/F/C.      ROS:  as above    Allergies:  No Known Allergies    PAST MEDICAL & SURGICAL HISTORY:  History of breast cancer R  Anemia  Hypertension  History of thrombocytopenia  History of herpes zoster  History of malignant neoplasm of parathyroid gland  Colon cancer  Chronic renal insufficiency  Thyroid nodule  NIDDM  History of cholecystectomy  History of partial colectomy  and appendectomy at the same time  History of lumpectomy of right breast  2007 and radiation and chemotherapy  Parathyroid adenoma  excision    SOCIAL HISTORY:  negative    FAMILY HISTORY:  colon cancer (Siblings)  breast cancer (Sibling)    MEDICATIONS  (STANDING):  amLODIPine   Tablet 10 milliGRAM(s) Oral daily  calcium acetate 667 milliGRAM(s) Oral three times a day with meals  cefTRIAXone   IVPB 1000 milliGRAM(s) IV Intermittent every 24 hours  dextrose 5% 1000 milliLiter(s) (80 mL/Hr) IV Continuous <Continuous>  dextrose 5%. 1000 milliLiter(s) (100 mL/Hr) IV Continuous <Continuous>  dextrose 5%. 1000 milliLiter(s) (50 mL/Hr) IV Continuous <Continuous>  dextrose 50% Injectable 25 Gram(s) IV Push once  dextrose 50% Injectable 12.5 Gram(s) IV Push once  doxazosin 2 milliGRAM(s) Oral at bedtime  glucagon  Injectable 1 milliGRAM(s) IntraMuscular once  heparin   Injectable 5000 Unit(s) SubCutaneous every 12 hours  hydrALAZINE 100 milliGRAM(s) Oral three times a day  insulin lispro (ADMELOG) corrective regimen sliding scale   SubCutaneous three times a day before meals  insulin lispro (ADMELOG) corrective regimen sliding scale   SubCutaneous at bedtime  multivitamin 1 Tablet(s) Oral daily    Vital Signs Last 24 Hrs  T(C): 36.6 (05-28-24 @ 05:29), Max: 36.9 (05-27-24 @ 20:30)  T(F): 97.8 (05-28-24 @ 05:29), Max: 98.4 (05-27-24 @ 20:30)  HR: 95 (05-28-24 @ 05:29) (67 - 95)  BP: 126/49 (05-28-24 @ 05:29) (126/49 - 151/50)  RR: 17 (05-28-24 @ 05:29) (17 - 19)  SpO2: 98% (05-27-24 @ 20:30) (96% - 98%)    s1s2  b/l air entry  soft, ND  no edema  AO    LABS:                        7.4    2.43  )-----------( 143      ( 28 May 2024 12:59 )             24.5     05-28    139  |  108  |  110<H>  ----------------------------<  117<H>  4.3   |  15<L>  |  6.54<H>    Ca    7.8<L>      28 May 2024 07:50  Phos  5.0     05-28  Mg     2.2     05-28    TPro  5.3<L>  /  Alb  2.2<L>  /  TBili  0.4  /  DBili  x   /  AST  12  /  ALT  14  /  AlkPhos  48  05-28    LIVER FUNCTIONS - ( 28 May 2024 07:50 )  Alb: 2.2 g/dL / Pro: 5.3 g/dL / ALK PHOS: 48 U/L / ALT: 14 U/L / AST: 12 U/L / GGT: x           PT/INR - ( 27 May 2024 19:00 )   PT: 12.9 sec;   INR: 1.11 ratio       PTT - ( 27 May 2024 19:00 )  PTT:30.1 sec    A/P:    Hx colon cancer s/p R colectomy, chemo  CT A/P noted, possibly portal vein thrombosis, new L hydro, pancreatic head/mesenteric mass larger than prior   MAGDI/CKD iso of L hydro, pre-renal/hemodynamic   Met acidosis iso chronic diarrhea, MAGDI/CKD  Avoid nephrotoxins  Low Na, low Phos diet   Bld tx per medicine   IVF w/Bicarb (pt barely got IVF since adm due to difficulty w/IV placement)  Given CT findings, recommend Heme/onc, surgery,  eval if c/w goc  CBC, BMP in am  Overall options are limited and prognosis is poor  Pt is a poor candidate for RRT given above and it is unlikely to alter outcome and prognosis in this case  Palliative eval is most appropriate    D/w medicine     [phone]

## 2024-05-28 NOTE — GOALS OF CARE CONVERSATION - ADVANCED CARE PLANNING - NS PRO AD PATIENT TYPE
Impression: Age-related nuclear cataract, bilateral: H25.13. Plan: CAT account for issues with OD. Patient education regarding findings. No treatment recommended at this time. Continue to monitor annually.
Impression: Long term (current) use of insulin: Z79.4.  Plan: See #2
Impression: Myopia, bilateral: H52.13. Plan: Glasses Rx and Contact lens Rx updated. Discussed proper wearing schedule and replacement of Contact lenses.
Impression: Type 2 diabetes mellitus without complications: L59.8. Plan: Diabetes insulin: no non-proliferative diabetic retinopathy, no signs of neovascularization noted. Discussed ocular and systemic benefits of blood sugar control. Continue management under PCP.
Do Not Resuscitate (DNR)/Medical Orders for Life-Sustaining Treatment (MOLST)

## 2024-05-28 NOTE — DIETITIAN INITIAL EVALUATION ADULT - OTHER INFO
Patient reports good appetite at this time. Denies any chewing/swallowing difficulties. CKD5 noted. K+ and Na WNL. Phos: 5.0 <H> (5/28/24). Addressed with renal diet. Phoslo ordered. Hx T2DM noted. Last A1C: 4.6% (2/29/24). Pending updated A1C. Discussed renal diet restrictions.

## 2024-05-28 NOTE — DIETITIAN INITIAL EVALUATION ADULT - PROBLEM SELECTOR PLAN 4
- GI PCR ordered to rule out infectious etiologies   - Likely secondary to colon resection   - Unlikely to be infectious

## 2024-05-28 NOTE — PATIENT PROFILE ADULT - FALL HARM RISK - HARM RISK INTERVENTIONS

## 2024-05-28 NOTE — DIETITIAN INITIAL EVALUATION ADULT - PERTINENT MEDS FT
MEDICATIONS  (STANDING):  amLODIPine   Tablet 10 milliGRAM(s) Oral daily  calcium acetate 667 milliGRAM(s) Oral three times a day with meals  cefTRIAXone   IVPB 1000 milliGRAM(s) IV Intermittent every 24 hours  dextrose 5% 1000 milliLiter(s) (80 mL/Hr) IV Continuous <Continuous>  dextrose 5%. 1000 milliLiter(s) (100 mL/Hr) IV Continuous <Continuous>  dextrose 5%. 1000 milliLiter(s) (50 mL/Hr) IV Continuous <Continuous>  dextrose 50% Injectable 25 Gram(s) IV Push once  dextrose 50% Injectable 12.5 Gram(s) IV Push once  doxazosin 2 milliGRAM(s) Oral at bedtime  glucagon  Injectable 1 milliGRAM(s) IntraMuscular once  heparin   Injectable 5000 Unit(s) SubCutaneous every 12 hours  hydrALAZINE 100 milliGRAM(s) Oral three times a day  insulin lispro (ADMELOG) corrective regimen sliding scale   SubCutaneous three times a day before meals  insulin lispro (ADMELOG) corrective regimen sliding scale   SubCutaneous at bedtime  multivitamin 1 Tablet(s) Oral daily    MEDICATIONS  (PRN):  acetaminophen     Tablet .. 650 milliGRAM(s) Oral every 6 hours PRN Temp greater or equal to 38C (100.4F), Mild Pain (1 - 3)  aluminum hydroxide/magnesium hydroxide/simethicone Suspension 30 milliLiter(s) Oral every 4 hours PRN Dyspepsia  dextrose Oral Gel 15 Gram(s) Oral once PRN Blood Glucose LESS THAN 70 milliGRAM(s)/deciliter  melatonin 3 milliGRAM(s) Oral at bedtime PRN Insomnia  ondansetron Injectable 4 milliGRAM(s) IV Push every 8 hours PRN Nausea and/or Vomiting

## 2024-05-29 LAB
ALBUMIN SERPL ELPH-MCNC: 2.2 G/DL — LOW (ref 3.3–5)
ALP SERPL-CCNC: 48 U/L — SIGNIFICANT CHANGE UP (ref 40–120)
ALT FLD-CCNC: 12 U/L — SIGNIFICANT CHANGE UP (ref 10–45)
ANION GAP SERPL CALC-SCNC: 15 MMOL/L — SIGNIFICANT CHANGE UP (ref 5–17)
AST SERPL-CCNC: 10 U/L — SIGNIFICANT CHANGE UP (ref 10–40)
BILIRUB SERPL-MCNC: 0.4 MG/DL — SIGNIFICANT CHANGE UP (ref 0.2–1.2)
BLD GP AB SCN SERPL QL: SIGNIFICANT CHANGE UP
BUN SERPL-MCNC: 110 MG/DL — HIGH (ref 7–23)
CALCIUM SERPL-MCNC: 7.8 MG/DL — LOW (ref 8.4–10.5)
CHLORIDE SERPL-SCNC: 106 MMOL/L — SIGNIFICANT CHANGE UP (ref 96–108)
CO2 SERPL-SCNC: 17 MMOL/L — LOW (ref 22–31)
CREAT ?TM UR-MCNC: 53 MG/DL — SIGNIFICANT CHANGE UP
CREAT SERPL-MCNC: 6.62 MG/DL — HIGH (ref 0.5–1.3)
EGFR: 6 ML/MIN/1.73M2 — LOW
GLUCOSE BLDC GLUCOMTR-MCNC: 122 MG/DL — HIGH (ref 70–99)
GLUCOSE BLDC GLUCOMTR-MCNC: 143 MG/DL — HIGH (ref 70–99)
GLUCOSE BLDC GLUCOMTR-MCNC: 166 MG/DL — HIGH (ref 70–99)
GLUCOSE BLDC GLUCOMTR-MCNC: 215 MG/DL — HIGH (ref 70–99)
GLUCOSE SERPL-MCNC: 124 MG/DL — HIGH (ref 70–99)
HCT VFR BLD CALC: 23 % — LOW (ref 34.5–45)
HGB BLD-MCNC: 7 G/DL — CRITICAL LOW (ref 11.5–15.5)
MCHC RBC-ENTMCNC: 27.3 PG — SIGNIFICANT CHANGE UP (ref 27–34)
MCHC RBC-ENTMCNC: 30.4 GM/DL — LOW (ref 32–36)
MCV RBC AUTO: 89.8 FL — SIGNIFICANT CHANGE UP (ref 80–100)
NRBC # BLD: 0 /100 WBCS — SIGNIFICANT CHANGE UP (ref 0–0)
OB PNL STL: POSITIVE
PLATELET # BLD AUTO: 84 K/UL — LOW (ref 150–400)
POTASSIUM SERPL-MCNC: 4.4 MMOL/L — SIGNIFICANT CHANGE UP (ref 3.5–5.3)
POTASSIUM SERPL-SCNC: 4.4 MMOL/L — SIGNIFICANT CHANGE UP (ref 3.5–5.3)
PROT SERPL-MCNC: 5.3 G/DL — LOW (ref 6–8.3)
RBC # BLD: 2.56 M/UL — LOW (ref 3.8–5.2)
RBC # FLD: 18.3 % — HIGH (ref 10.3–14.5)
SODIUM SERPL-SCNC: 138 MMOL/L — SIGNIFICANT CHANGE UP (ref 135–145)
SODIUM UR-SCNC: 28 MMOL/L — SIGNIFICANT CHANGE UP
URATE SERPL-MCNC: 10.1 MG/DL — HIGH (ref 2.5–7)
WBC # BLD: 2.09 K/UL — LOW (ref 3.8–10.5)
WBC # FLD AUTO: 2.09 K/UL — LOW (ref 3.8–10.5)

## 2024-05-29 PROCEDURE — 99223 1ST HOSP IP/OBS HIGH 75: CPT

## 2024-05-29 PROCEDURE — 99232 SBSQ HOSP IP/OBS MODERATE 35: CPT

## 2024-05-29 PROCEDURE — 99497 ADVNCD CARE PLAN 30 MIN: CPT | Mod: 25

## 2024-05-29 RX ORDER — RASBURICASE 7.5 MG
3 KIT INTRAVENOUS ONCE
Refills: 0 | Status: COMPLETED | OUTPATIENT
Start: 2024-05-29 | End: 2024-05-30

## 2024-05-29 RX ADMIN — Medication 100 MILLIGRAM(S): at 22:08

## 2024-05-29 RX ADMIN — Medication 667 MILLIGRAM(S): at 17:03

## 2024-05-29 RX ADMIN — Medication 100 MILLIGRAM(S): at 14:43

## 2024-05-29 RX ADMIN — Medication 667 MILLIGRAM(S): at 12:01

## 2024-05-29 RX ADMIN — Medication 2: at 12:18

## 2024-05-29 RX ADMIN — HEPARIN SODIUM 5000 UNIT(S): 5000 INJECTION INTRAVENOUS; SUBCUTANEOUS at 17:03

## 2024-05-29 RX ADMIN — Medication 2 MILLIGRAM(S): at 22:08

## 2024-05-29 RX ADMIN — HEPARIN SODIUM 5000 UNIT(S): 5000 INJECTION INTRAVENOUS; SUBCUTANEOUS at 05:51

## 2024-05-29 RX ADMIN — AMLODIPINE BESYLATE 10 MILLIGRAM(S): 2.5 TABLET ORAL at 05:51

## 2024-05-29 RX ADMIN — CHLORHEXIDINE GLUCONATE 1 APPLICATION(S): 213 SOLUTION TOPICAL at 05:51

## 2024-05-29 RX ADMIN — Medication 100 MILLIGRAM(S): at 05:51

## 2024-05-29 RX ADMIN — Medication 1 TABLET(S): at 12:00

## 2024-05-29 RX ADMIN — Medication 667 MILLIGRAM(S): at 09:00

## 2024-05-29 RX ADMIN — Medication 100 MILLIGRAM(S): at 12:00

## 2024-05-29 NOTE — CONSULT NOTE ADULT - CONVERSATION DETAILS
Introduced palliative care team to pt at bedside and discussed role as supportive care team. Pt shared her cancer hx including R breast lumpectomy s/p radiation, colon resection s/p chemo, and stated she was on Keytruda since 2016 until 2 years ago via L port. Pt lost 2 siblings to cancer and has a younger brother who is currently cancer free, who hopes to live as long as their father did(101 y/o).   I addressed the [facility] conversation she had with our palliative care nurse Delfina yesterday and pt confirmed she rescinded her prior DNR/DNI. She expressed she feels well except for some weakness but her goal is to live until her 89th birthday. She is amendable to trial of CPR/chest compressions/intubation if needed but does not want to be left a "vegetable". Pt remains FULL CODE  Pt states she is aware she is not a candidate for RRT and likely not a candidate for any further chemotherapy due to her age and comorbidities. She states she spoke with surgery to see if there was any intervention if possible. She wants to try to make it to 89 y/o however.  I admired her optimism and she was appreciative of the visit. Supportive care given

## 2024-05-29 NOTE — PROGRESS NOTE ADULT - PROBLEM SELECTOR PLAN 1
Complex medical history including colon cancer, breast cancer and MDS, transfusion dependent, treated supportively and ambulatory.  Presently admitted with pancytopenia and a CT of the abdomen showing suspicion for portal vein thrombus (unclear if tumor thrombus).  CT also showing increased anasarca and pleural and pericardial effusions consistent with progression of disease.  Hematologic profile showing pancytopenia.  Hemoglobin 7 g/dL today–will transfuse 1 unit PRBC.  Continue to monitor CBC, watch for fever or active bleeding.  Questions answered.

## 2024-05-29 NOTE — PROGRESS NOTE ADULT - SUBJECTIVE AND OBJECTIVE BOX
No distress    Vital Signs Last 24 Hrs  T(C): 36.5 (05-29-24 @ 19:11), Max: 36.7 (05-29-24 @ 05:37)  T(F): 97.7 (05-29-24 @ 19:11), Max: 98 (05-29-24 @ 05:37)  HR: 82 (05-29-24 @ 19:11) (64 - 82)  BP: 160/68 (05-29-24 @ 19:11) (131/52 - 160/68)  RR: 16 (05-29-24 @ 19:11) (16 - 18)  SpO2: 96% (05-29-24 @ 19:11) (93% - 96%)    s1s2  b/l air entry  soft, ND  no edema  AO                        7.0    2.09  )-----------( 84       ( 29 May 2024 06:51 )             23.0     29 May 2024 06:51    138    |  106    |  110    ----------------------------<  124    4.4     |  17     |  6.62     Ca    7.8        29 May 2024 06:51  Phos  5.0       28 May 2024 07:50  Mg     2.2       28 May 2024 07:50    TPro  5.3    /  Alb  2.2    /  TBili  0.4    /  DBili  x      /  AST  10     /  ALT  12     /  AlkPhos  48     29 May 2024 06:51    LIVER FUNCTIONS - ( 29 May 2024 06:51 )  Alb: 2.2 g/dL / Pro: 5.3 g/dL / ALK PHOS: 48 U/L / ALT: 12 U/L / AST: 10 U/L / GGT: x           acetaminophen     Tablet .. 650 milliGRAM(s) Oral every 6 hours PRN  allopurinol 100 milliGRAM(s) Oral daily  aluminum hydroxide/magnesium hydroxide/simethicone Suspension 30 milliLiter(s) Oral every 4 hours PRN  amLODIPine   Tablet 10 milliGRAM(s) Oral daily  calcium acetate 667 milliGRAM(s) Oral three times a day with meals  chlorhexidine 2% Cloths 1 Application(s) Topical daily  dextrose 5% 1000 milliLiter(s) IV Continuous <Continuous>  dextrose 5%. 1000 milliLiter(s) IV Continuous <Continuous>  dextrose 5%. 1000 milliLiter(s) IV Continuous <Continuous>  dextrose 50% Injectable 25 Gram(s) IV Push once  dextrose 50% Injectable 12.5 Gram(s) IV Push once  dextrose Oral Gel 15 Gram(s) Oral once PRN  doxazosin 2 milliGRAM(s) Oral at bedtime  glucagon  Injectable 1 milliGRAM(s) IntraMuscular once  heparin   Injectable 5000 Unit(s) SubCutaneous every 12 hours  hydrALAZINE 100 milliGRAM(s) Oral three times a day  insulin lispro (ADMELOG) corrective regimen sliding scale   SubCutaneous three times a day before meals  insulin lispro (ADMELOG) corrective regimen sliding scale   SubCutaneous at bedtime  melatonin 3 milliGRAM(s) Oral at bedtime PRN  multivitamin 1 Tablet(s) Oral daily  ondansetron Injectable 4 milliGRAM(s) IV Push every 8 hours PRN  rasburicase IVPB 3 milliGRAM(s) IV Intermittent once    A/P:    Hx colon cancer s/p R colectomy, chemo  CT A/P noted, new mild L hydro, pancreatic head/mesenteric mass larger than prior   MAGDI/CKD iso of new L hydro, pre-renal/hemodynamic   Met acidosis iso chronic diarrhea, MAGDI/CKD  Avoid nephrotoxins  Low Na, low Phos diet   Bld tx per medicine, heme/onc   IVF w/Bicarb   CBC, BMP in am  Overall options are limited and prognosis is poor  Pt is a poor candidate for RRT given above and it is unlikely to alter outcome and prognosis in this case  Pt wish is to avoid RRT if at all possible, but she would like to discuss w/family   Palliative eval is appr  Will follow    [phone]

## 2024-05-29 NOTE — CONSULT NOTE ADULT - ASSESSMENT
89-year-old female with PMH of CKD, colon cancer s/p resection, breast cancer, parathyroid ca, HTN, thrombocytopenia presents to the emergency department for abnormal labs. Sent in by Dr. Garcia for MAGDI on CKD.     Metastatic colon CA  - hx breast CA s/p R lumpectomy s/p radiation  - hx colon CA s/p resection s/p chemotherapy; pt was on Keytruda 2016  - pt now with CT readings above; surgery consulted   - pt currently w/o pain; Zofran PRN nausea    MAGDI on CKD  - pt reports being asked about HD in the past and being recommended against given her age/ comorbidities.   - Renal followin - Pt is a poor candidate for RRT given clinical status and it is unlikely to alter outcome and prognosis in this case    Advanced care planning  - HCP: son/daughter Maximo/Mali Camejo  - see GOC note above, Pt is FULL CODE    Encounter for Palliative Care  - Introduced the palliative care team to pt at bedside, see GOC note above. Pt remains FULL CODE  - Will continue to follow for ongoing GOC conversations and supportive care.

## 2024-05-29 NOTE — CONSULT NOTE ADULT - SUBJECTIVE AND OBJECTIVE BOX
HPI:  89-year-old female with PMH of CKD, Breast Cancer, colon cancer s/p resection, parathyroid ca, HTN, thrombocytopenia presents to the emergency department for abnormal lab.  Patient was supposed to get her procrit injection today. After seeing her recent labs, she was asked by Dr. Garcia to present to the emergency department for admission to the hospital. Patient describes feeling generalized weakness especially when getting up to ambulate for the past few months. Of note, patient does have loose BM every day since she has a history of colon resection. This has been ongoing since 2011. She also states that whenever she goes to the bathroom she makes about a cup of urine each time. Pt does, however, still make urine. She denied dysuria, suprapubic tenderness, CVA tenderness. Denied changes in mental status.      ED Course: T 98, HR 73, /61, RR 17, SpO2 96% on RA. Labs showing WBC of 3.18, low H/H of 8.2/27.4, BUN elevation to 107, Cr of 6.76, eGFR of 5. UA and UCx ordered but not collected yet. Patient received a 500cc bolus of IVF. Patient was admitted to the floors for further management. Of note patient's baseline BUN/Cr is 85/4.5.  (27 May 2024 19:33)    Palliative care c/s for GOC. Pt seen and examined at bedside this morning. She denies any cp, sob, or abd pain. She has intermittent nausea when she stands sometimes but denies vomiting. She does acknowledge feeling weak with some gait instability.     PERTINENT PM/SXH:   History of breast cancer    Anemia    Hypertension    History of thrombocytopenia    History of herpes zoster    History of malignant neoplasm of parathyroid gland    Colon cancer    Chronic renal insufficiency    Thyroid nodule    Diabetes mellitus      History of cholecystectomy    History of partial colectomy    History of lumpectomy of right breast    Parathyroid adenoma      FAMILY HISTORY:  FH: colon cancer (Sibling, Sibling)    FH: breast cancer (Sibling)      Family Hx substance abuse [ ]yes [ ]no  ITEMS NOT CHECKED ARE NOT PRESENT    SOCIAL HISTORY:   Significant other/partner[ ]  Children[ X]  Latter-day/Spirituality: Jain  Substance hx:  none Tobacco hx:  none   Alcohol hx: none   Home Opioid hx:  [ ] I-Stop Reference No:  Living Situation:  Home     ADVANCE DIRECTIVES:    DNR/MOLST - no -FULL CODE  DECISION MAKER(s):  [ ] Health Care Proxy(s) children         Name(s): Phone Number(s): Rudy Camejo [phone]    BASELINE (I)ADL(s) (prior to admission):  [place]:  Total      Allergies  No Known Allergies    Intolerances    MEDICATIONS  (STANDING):  allopurinol 100 milliGRAM(s) Oral daily  amLODIPine   Tablet 10 milliGRAM(s) Oral daily  calcium acetate 667 milliGRAM(s) Oral three times a day with meals  chlorhexidine 2% Cloths 1 Application(s) Topical daily  dextrose 5% 1000 milliLiter(s) (80 mL/Hr) IV Continuous <Continuous>  dextrose 5%. 1000 milliLiter(s) (100 mL/Hr) IV Continuous <Continuous>  dextrose 5%. 1000 milliLiter(s) (50 mL/Hr) IV Continuous <Continuous>  dextrose 50% Injectable 25 Gram(s) IV Push once  dextrose 50% Injectable 12.5 Gram(s) IV Push once  doxazosin 2 milliGRAM(s) Oral at bedtime  glucagon  Injectable 1 milliGRAM(s) IntraMuscular once  heparin   Injectable 5000 Unit(s) SubCutaneous every 12 hours  hydrALAZINE 100 milliGRAM(s) Oral three times a day  insulin lispro (ADMELOG) corrective regimen sliding scale   SubCutaneous three times a day before meals  insulin lispro (ADMELOG) corrective regimen sliding scale   SubCutaneous at bedtime  multivitamin 1 Tablet(s) Oral daily  rasburicase IVPB 3 milliGRAM(s) IV Intermittent once    MEDICATIONS  (PRN):  acetaminophen     Tablet .. 650 milliGRAM(s) Oral every 6 hours PRN Temp greater or equal to 38C (100.4F), Mild Pain (1 - 3)  aluminum hydroxide/magnesium hydroxide/simethicone Suspension 30 milliLiter(s) Oral every 4 hours PRN Dyspepsia  dextrose Oral Gel 15 Gram(s) Oral once PRN Blood Glucose LESS THAN 70 milliGRAM(s)/deciliter  melatonin 3 milliGRAM(s) Oral at bedtime PRN Insomnia  ondansetron Injectable 4 milliGRAM(s) IV Push every 8 hours PRN Nausea and/or Vomiting    PRESENT SYMPTOMS:      Pain: [ ]yes [ X]no  QOL impact -   Location -                    Aggravating factors -  Quality -  Radiation -  Timing-  Severity (0-10 scale):  Minimal acceptable level (0-10 scale):       Dyspnea:                          none  Anxiety:                             none  Depressed:                      none  Fatigue:                         mild  Nausea:                   mild  Loss of appetite:             none  Constipation:                    none      Other Symptoms:  [ X]All other review of systems negative       Chaplaincy Referral:   Deferred   Palliative Performance Status Version 2:        60 %    [email or web address]    PHYSICAL EXAM:  Vital Signs Last 24 Hrs  T(C): 36.7 (29 May 2024 05:37), Max: 36.7 (29 May 2024 05:37)  T(F): 98 (29 May 2024 05:37), Max: 98 (29 May 2024 05:37)  HR: 64 (29 May 2024 05:37) (64 - 75)  BP: 131/52 (29 May 2024 05:37) (124/57 - 139/57)  BP(mean): --  RR: 18 (29 May 2024 05:37) (18 - 18)  SpO2: 93% (29 May 2024 05:37) (93% - 95%)    Parameters below as of 29 May 2024 05:37  Patient On (Oxygen Delivery Method): room air     I&O's Summary    GENERAL: pt laying in bed in NAD  HEENT: NC/AT, MMM  PULMONARY:  CTAB, no wheezing  CARDIOVASCULAR:  RRR, no murmur  GASTROINTESTINAL: soft, nontender, ventral hernia noted  Last BM: 5/28  MUSCULOSKELETAL: no edema to BLE, +weakness  Psych: A&Ox3, appropriate affect      LABS:                        7.0    2.09  )-----------( 84       ( 29 May 2024 06:51 )             23.0   05-29    138  |  106  |  110<H>  ----------------------------<  124<H>  4.4   |  17<L>  |  6.62<H>    Ca    7.8<L>      29 May 2024 06:51  Phos  5.0     05-28  Mg     2.2     05-28    TPro  5.3<L>  /  Alb  2.2<L>  /  TBili  0.4  /  DBili  x   /  AST  10  /  ALT  12  /  AlkPhos  48  05-29  PT/INR - ( 27 May 2024 19:00 )   PT: 12.9 sec;   INR: 1.11 ratio         PTT - ( 27 May 2024 19:00 )  PTT:30.1 sec    Urinalysis Basic - ( 29 May 2024 06:51 )    Color: x / Appearance: x / SG: x / pH: x  Gluc: 124 mg/dL / Ketone: x  / Bili: x / Urobili: x   Blood: x / Protein: x / Nitrite: x   Leuk Esterase: x / RBC: x / WBC x   Sq Epi: x / Non Sq Epi: x / Bacteria: x      RADIOLOGY & ADDITIONAL STUDIES:  PROCEDURE DATE:  05/28/2024          INTERPRETATION:  CLINICAL INFORMATION: 89 years  Female with MAGDI, anemia      r/o mass, hydro.  . History of colon cancer. Status post right   hemicolectomy and chemotherapy in 2011. Mesenteric mass excised in 2013   negative for metastatic disease. Recurred in 2016.    COMPARISON: CT abdomen and pelvis 2/29/2024    CONTRAST/COMPLICATIONS:  IV Contrast: NONE  Oral Contrast: NONE  Complications: None reported at time of study completion    PROCEDURE:  CT of the Abdomen and Pelvis was performed.  Sagittal and coronal reformats were performed.    LIMITATIONS: Evaluation of the solid organs, vascular structures and GI   tract is limited without oral and IV contrast.    FINDINGS:  LOWER CHEST: Enlarging bilateral pleural effusions. Small but enlarging   pericardial effusion. Low-attenuation blood pool secondary to anemia.    LIVER: Innumerable cysts and hypodensities too small to characterize.   Soft tissue attenuation within the portal vein concerning for thrombus or   tumor thrombus.  BILE DUCTS: Normal caliber.  GALLBLADDER: Cholecystectomy.  SPLEEN: Stable splenomegaly spanning 13.4 cm.  PANCREAS: Enlarging 10.4 x 8.6 cm right mesenteric mass, previously 10.1   x 6.2 cm, inseparable from the pancreatic head. Increasing air within the   mass. Surrounding surgical clips unchanged. Surrounding mesenteric edema   unchanged.  ADRENALS: Mild left adrenal thickening.  KIDNEYS/URETERS: Bilateral cysts. Right renal atrophy. Developing   moderate left hydronephrosis.    BLADDER: Minimally distended.  REPRODUCTIVE ORGANS: Unremarkable uterus.    BOWEL: No bowel obstruction. Appendix is not visualized. No evidence of   inflammation in the pericecal region.. Right-sided small bowel   anastomosis. Mild gastric distention likely related to compression by the   mass.  PERITONEUM: Small ascites unchanged. Presacral edema.  VESSELS: Atherosclerotic changes.  RETROPERITONEUM/LYMPH NODES: No lymphadenopathy.  ABDOMINAL WALL: Increasing anasarca. Rectus diastases  BONES: Within normal limits.    IMPRESSION:  Suspect portal vein thrombus versus tumor thrombus. If IV contrast cannot   be administered recommend ultrasound evaluation.    Enlarging bilateral pleural effusions and enlarging pericardial effusion.    Developing left hydronephrosis.    Pancreatic head/mesenteric mass is larger than prior and contains more   air than previously, either related to necrosis or communication with   bowel.    Increasing anasarca.    Findings were discussed with BERNICE Handley 5/28/2024 2:55 PM by Dr. Mercedes Gonzáles with read back confirmation.    --- End of Report ---        PROTEIN CALORIE MALNUTRITION PRESENT: [ ]mild [ ]moderate [ ]severe [ ]underweight [ ]morbid obesity  https://www.andeal.org/vault/2440/web/files/ONC/Table_Clinical%20Characteristics%20to%20Document%20Malnutrition-White%20JV%20et%20al%202012.pdf    Height (cm): 162.6 (05-27-24 @ 20:30), 162.6 (04-11-24 @ 11:01), 162.6 (02-28-24 @ 22:02)  Weight (kg): 71.4 (05-27-24 @ 20:30), 63.5 (04-11-24 @ 11:01), 62.6 (02-28-24 @ 22:02)  BMI (kg/m2): 27 (05-27-24 @ 20:30), 24 (04-11-24 @ 11:01), 23.7 (02-28-24 @ 22:02)    [ ]PPSV2 < or = to 30% [ ]significant weight loss  [ ]poor nutritional intake  [ ]anasarca[ ]Artificial Nutrition      Other REFERRALS:  [ ]Hospice  [ ]Child Life  [ ]Social Work  [ ]Case management [ ]Holistic Therapy

## 2024-05-29 NOTE — PROGRESS NOTE ADULT - SUBJECTIVE AND OBJECTIVE BOX
HANG MADERA, 89y Female  MRN: [id number]  ATTENDING: Humble Michele    HPI:  89F, PMHx HTN, herpes zoster, CKD, multinodular thyroid,breast cancer, colon cancer (previously on Keytruda), refractory anemia/MDS, treated supportively and ambulatory (erythropoietin supplements), no recent exposure to systemic chemotherapy, is admitted with generalized weakness.  Treated in ambulatory with Procrit and intermittent transfusion support, directed to the ED after found with a , creatinine 6.76.  In ED found pancytopenic (hemoglobin 6.9 g/dL, WBC 2.4K, platelet 123,000).  Patient receiving supportive care in ambulatory, discontinued Keytruda since June 2023. Reviewed results of CT abdomen consistent with suspicion of portal vein thrombus versus tumor thrombus and enlarged bilateral pleural effusion and pericardial effusion as well as developing left hydronephrosis.  There is increasing anasarca and a pancreatic head/mesenteric mass larger than prior studies. Oncology consulted as above.    MEDICATIONS:  acetaminophen     Tablet .. 650 milliGRAM(s) Oral every 6 hours PRN  allopurinol 100 milliGRAM(s) Oral daily  aluminum hydroxide/magnesium hydroxide/simethicone Suspension 30 milliLiter(s) Oral every 4 hours PRN  amLODIPine   Tablet 10 milliGRAM(s) Oral daily  calcium acetate 667 milliGRAM(s) Oral three times a day with meals  chlorhexidine 2% Cloths 1 Application(s) Topical daily  dextrose 5% 1000 milliLiter(s) IV Continuous <Continuous>  dextrose 5%. 1000 milliLiter(s) IV Continuous <Continuous>  dextrose 5%. 1000 milliLiter(s) IV Continuous <Continuous>  dextrose 50% Injectable 25 Gram(s) IV Push once  dextrose 50% Injectable 12.5 Gram(s) IV Push once  dextrose Oral Gel 15 Gram(s) Oral once PRN  doxazosin 2 milliGRAM(s) Oral at bedtime  glucagon  Injectable 1 milliGRAM(s) IntraMuscular once  heparin   Injectable 5000 Unit(s) SubCutaneous every 12 hours  hydrALAZINE 100 milliGRAM(s) Oral three times a day  insulin lispro (ADMELOG) corrective regimen sliding scale   SubCutaneous three times a day before meals  insulin lispro (ADMELOG) corrective regimen sliding scale   SubCutaneous at bedtime  melatonin 3 milliGRAM(s) Oral at bedtime PRN  multivitamin 1 Tablet(s) Oral daily  ondansetron Injectable 4 milliGRAM(s) IV Push every 8 hours PRN  rasburicase IVPB 3 milliGRAM(s) IV Intermittent once    All other medications reviewed.    SUBJECTIVE:  lying in bed, in NAD    VITALS:  T(C): 36.4 (05-29-24 @ 14:09), Max: 36.7 (05-29-24 @ 05:37)  T(F): 97.6 (05-29-24 @ 14:09), Max: 98 (05-29-24 @ 05:37)  HR: 70 (05-29-24 @ 14:09) (64 - 75)  BP: 132/51 (05-29-24 @ 14:09) (124/57 - 132/51)      PHYSICAL EXAM:  Constitutional: alert, well developed  HEENT: normocephalic, anicteric sclerae, no mucositis or thrush  Respiratory: bilateral clear to auscultation anteriorly  Cardiovascular : S1, S2 regular, rhythmic, no murmurs, gallops or rubs  Abdomen: soft, distended, + normoactive BS, no palpable HS- megaly  Extremities: no tenderness;  -c/c/e, pulses equal bilaterally    LABS:  (05-29) WBC: 2.09 K/uL,Hemoglobin: 7.0 g/dL, Hematocrit: 23.0 %,  Platelet: 84 K/uL  (05-29) Na: 138 mmol/L ; K: 4.4 mmol/L ; BUN: 110 mg/dL ; Cr: 6.62 mg/dL.    RADIOLOGY:  ACC: [id number] EXAM:  CT ABDOMEN AND PELVIS   ORDERED BY: FLOWER RESENDIZ     PROCEDURE DATE:  05/28/2024          INTERPRETATION:  CLINICAL INFORMATION: 89 years  Female with MAGDI, anemia      r/o mass, hydro.  . History of colon cancer. Status post right   hemicolectomy and chemotherapy in 2011. Mesenteric mass excised in 2013   negative for metastatic disease. Recurred in 2016.    COMPARISON: CT abdomen and pelvis 2/29/2024    CONTRAST/COMPLICATIONS:  IV Contrast: NONE  Oral Contrast: NONE  Complications: None reported at time of study completion    PROCEDURE:  CT of the Abdomen and Pelvis was performed.  Sagittal and coronal reformats were performed.    LIMITATIONS: Evaluation of the solid organs, vascular structures and GI   tract is limited without oral and IV contrast.    FINDINGS:  LOWER CHEST: Enlarging bilateral pleural effusions. Small but enlarging   pericardial effusion. Low-attenuation blood pool secondary to anemia.    LIVER: Innumerable cysts and hypodensities too small to characterize.   Soft tissue attenuation within the portal vein concerning for thrombus or   tumor thrombus.  BILE DUCTS: Normal caliber.  GALLBLADDER: Cholecystectomy.  SPLEEN: Stable splenomegaly spanning 13.4 cm.  PANCREAS: Enlarging 10.4 x 8.6 cmright mesenteric mass, previously 10.1   x 6.2 cm, inseparable from the pancreatic head. Increasing air within the   mass. Surrounding surgical clips unchanged. Surrounding mesenteric edema   unchanged.  ADRENALS: Mild left adrenal thickening.  KIDNEYS/URETERS: Bilateral cysts. Right renal atrophy. Developing   moderate left hydronephrosis.    BLADDER: Minimally distended.  REPRODUCTIVE ORGANS: Unremarkable uterus.    BOWEL: No bowel obstruction. Appendix is not visualized. No evidence of   inflammation in the pericecal region.. Right-sided small bowel   anastomosis. Mild gastric distention likely related to compression by the   mass.  PERITONEUM: Small ascites unchanged. Presacral edema.  VESSELS: Atherosclerotic changes.  RETROPERITONEUM/LYMPH NODES: No lymphadenopathy.  ABDOMINAL WALL: Increasing anasarca. Rectus diastases  BONES: Within normal limits.    IMPRESSION:  Suspect portal vein thrombus versus tumor thrombus. If IV contrast cannot   be administered recommend ultrasound evaluation.    Enlarging bilateral pleural effusions and enlarging pericardial effusion.    Developing left hydronephrosis.    Pancreatic head/mesenteric mass is larger than prior and contains more   air than previously, either related to necrosis or communication with   bowel.    Increasing anasarca.

## 2024-05-29 NOTE — PROGRESS NOTE ADULT - SUBJECTIVE AND OBJECTIVE BOX
MADELINMASHAALEX HANG  89y  Female      patient is resting comfortably. she denies fatigue/pain/n/v    REVIEW OF SYSTEMS:    Cardiac HTN  Pulmonary no cough/SOB  GI colon cancer with mets   CRI  Neuro no headache/dizziness/numbness  Heme anemia   Endo DM    FAMILY HISTORY:  FH: colon cancer (Sibling, Sibling)    FH: breast cancer (Sibling)      T(C): 36.4 (05-29-24 @ 14:09), Max: 36.7 (05-29-24 @ 05:37)  HR: 70 (05-29-24 @ 14:09) (64 - 75)  BP: 132/51 (05-29-24 @ 14:09) (124/57 - 132/51)  RR: 16 (05-29-24 @ 14:09) (16 - 18)  SpO2: 96% (05-29-24 @ 14:09) (93% - 96%)  Wt(kg): --Vital Signs Last 24 Hrs  T(C): 36.4 (29 May 2024 14:09), Max: 36.7 (29 May 2024 05:37)  T(F): 97.6 (29 May 2024 14:09), Max: 98 (29 May 2024 05:37)  HR: 70 (29 May 2024 14:09) (64 - 75)  BP: 132/51 (29 May 2024 14:09) (124/57 - 132/51)  BP(mean): --  RR: 16 (29 May 2024 14:09) (16 - 18)  SpO2: 96% (29 May 2024 14:09) (93% - 96%)    Parameters below as of 29 May 2024 14:09  Patient On (Oxygen Delivery Method): room air      No Known Allergies      PHYSICAL EXAM:    General NAD  Neck no JVD thyroid stable  Heart regular  Lungs clear  Abdomen non tender non distended normal bowel sounds no HSM/CVAT  Extremities non tender no edema +pulses  Neurologic alert and oriented x 3 no acute focal changes        Consultant(s) Notes Reviewed:  [x ] YES  [ ] NO      LABS:  CBC Full  -  ( 29 May 2024 06:51 )  WBC Count : 2.09 K/uL  RBC Count : 2.56 M/uL  Hemoglobin : 7.0 g/dL  Hematocrit : 23.0 %  Platelet Count - Automated : 84 K/uL  Mean Cell Volume : 89.8 fl  Mean Cell Hemoglobin : 27.3 pg  Mean Cell Hemoglobin Concentration : 30.4 gm/dL  Auto Neutrophil # : x  Auto Lymphocyte # : x  Auto Monocyte # : x  Auto Eosinophil # : x  Auto Basophil # : x  Auto Neutrophil % : x  Auto Lymphocyte % : x  Auto Monocyte % : x  Auto Eosinophil % : x  Auto Basophil % : x                          7.0    2.09  )-----------( 84       ( 29 May 2024 06:51 )             23.0     05-29    138  |  106  |  110<H>  ----------------------------<  124<H>  4.4   |  17<L>  |  6.62<H>    Ca    7.8<L>      29 May 2024 06:51  Phos  5.0     05-28  Mg     2.2     05-28    TPro  5.3<L>  /  Alb  2.2<L>  /  TBili  0.4  /  DBili  x   /  AST  10  /  ALT  12  /  AlkPhos  48  05-29    LIVER FUNCTIONS - ( 29 May 2024 06:51 )  Alb: 2.2 g/dL / Pro: 5.3 g/dL / ALK PHOS: 48 U/L / ALT: 12 U/L / AST: 10 U/L / GGT: x             Urinalysis Basic - ( 29 May 2024 06:51 )    Color: x / Appearance: x / SG: x / pH: x  Gluc: 124 mg/dL / Ketone: x  / Bili: x / Urobili: x   Blood: x / Protein: x / Nitrite: x   Leuk Esterase: x / RBC: x / WBC x   Sq Epi: x / Non Sq Epi: x / Bacteria: x      CAPILLARY BLOOD GLUCOSE      POCT Blood Glucose.: 122 mg/dL (29 May 2024 16:43)    POCT Blood Glucose.: 122 mg/dL  POCT Blood Glucose (05.29.24 @ 12:04)   POCT Blood Glucose.: 215 mg/dL  POCT Blood Glucose (05.29.24 @ 08:32)   POCT Blood Glucose.: 143 mg/dL  POCT Blood Glucose (05.28.24 @ 21:21)   POCT Blood Glucose.: 192 mg/dL  POCT Blood Glucose (05.28.24 @ 17:36)   POCT Blood Glucose.: 121 mg/dL  POCT Blood Glucose (05.28.24 @ 12:29)   POCT Blood Glucose.: 173 mg/dL  POCT Blood Glucose (05.28.24 @ 08:12)   POCT Blood Glucose.: 117 mg/dL      RADIOLOGY & ADDITIONAL TESTS:    < from: US Abdomen Complete (US Abdomen Complete .) (05.28.24 @ 17:11) >    ACC: [id number] EXAM:  US ABDOMEN COMPLETE   ORDERED BY: SAVANNA COREAS     PROCEDURE DATE:  05/28/2024          INTERPRETATION:  CLINICAL INFORMATION: Anemia, acute kidney injury,   mesenteric mass    COMPARISON: CT 5/20/2024    TECHNIQUE: Sonography of the abdomen.    FINDINGS:  Liver: Multiple hepatic cysts. Patent portal vein with hepatopedal flow  Bile ducts: Normal caliber. Common bile duct measures 6 mm.  Gallbladder: Cholecystectomy.  Pancreas: Visualized portions are within normallimits.  Spleen: 13.5 cm. Splenomegaly.  Right kidney: 12.3 cm. Mild cortical atrophy and increased echotexture.   Multiple renal cysts measure up to 2.8 cm. No obstructive uropathy.  Left kidney: 9 cm. Mild hydronephrosis.  Ascites: Trace perisplenic ascites. Small pleural effusions  Aorta and IVC: Visualized portions are within normal limits.    12.8 x 7.8 x 9.9 cm RLQ mesenteric mass    IMPRESSION:  1.  RLQ mesenteric mass, corresponding to recent CT.  2.  Patent main portal vein with hepatopedal flow  3.  Mild LEFT hydronephrosis, unchanged    --- End of Report ---        STAR FORTE MD; Attending Radiologist  This document has been electronically signed. May 29 2024  8:05AM    < end of copied text >    acetaminophen     Tablet .. 650 milliGRAM(s) Oral every 6 hours PRN  allopurinol 100 milliGRAM(s) Oral daily  aluminum hydroxide/magnesium hydroxide/simethicone Suspension 30 milliLiter(s) Oral every 4 hours PRN  amLODIPine   Tablet 10 milliGRAM(s) Oral daily  calcium acetate 667 milliGRAM(s) Oral three times a day with meals  chlorhexidine 2% Cloths 1 Application(s) Topical daily  dextrose 5% 1000 milliLiter(s) IV Continuous <Continuous>  dextrose 5%. 1000 milliLiter(s) IV Continuous <Continuous>  dextrose 5%. 1000 milliLiter(s) IV Continuous <Continuous>  dextrose 50% Injectable 25 Gram(s) IV Push once  dextrose 50% Injectable 12.5 Gram(s) IV Push once  dextrose Oral Gel 15 Gram(s) Oral once PRN  doxazosin 2 milliGRAM(s) Oral at bedtime  glucagon  Injectable 1 milliGRAM(s) IntraMuscular once  heparin   Injectable 5000 Unit(s) SubCutaneous every 12 hours  hydrALAZINE 100 milliGRAM(s) Oral three times a day  insulin lispro (ADMELOG) corrective regimen sliding scale   SubCutaneous three times a day before meals  insulin lispro (ADMELOG) corrective regimen sliding scale   SubCutaneous at bedtime  melatonin 3 milliGRAM(s) Oral at bedtime PRN  multivitamin 1 Tablet(s) Oral daily  ondansetron Injectable 4 milliGRAM(s) IV Push every 8 hours PRN  rasburicase IVPB 3 milliGRAM(s) IV Intermittent once

## 2024-05-30 ENCOUNTER — APPOINTMENT (OUTPATIENT)
Dept: INFUSION THERAPY | Facility: HOSPITAL | Age: 89
End: 2024-05-30

## 2024-05-30 ENCOUNTER — APPOINTMENT (OUTPATIENT)
Dept: HEMATOLOGY ONCOLOGY | Facility: CLINIC | Age: 89
End: 2024-05-30

## 2024-05-30 ENCOUNTER — TRANSCRIPTION ENCOUNTER (OUTPATIENT)
Age: 89
End: 2024-05-30

## 2024-05-30 DIAGNOSIS — C50.911 MALIGNANT NEOPLASM OF UNSPECIFIED SITE OF RIGHT FEMALE BREAST: ICD-10-CM

## 2024-05-30 DIAGNOSIS — D64.9 ANEMIA, UNSPECIFIED: ICD-10-CM

## 2024-05-30 DIAGNOSIS — C18.9 MALIGNANT NEOPLASM OF COLON, UNSPECIFIED: ICD-10-CM

## 2024-05-30 LAB
ALBUMIN SERPL ELPH-MCNC: 2.1 G/DL — LOW (ref 3.3–5)
ALP SERPL-CCNC: 49 U/L — SIGNIFICANT CHANGE UP (ref 40–120)
ALT FLD-CCNC: 10 U/L — SIGNIFICANT CHANGE UP (ref 10–45)
ANION GAP SERPL CALC-SCNC: 17 MMOL/L — SIGNIFICANT CHANGE UP (ref 5–17)
AST SERPL-CCNC: 16 U/L — SIGNIFICANT CHANGE UP (ref 10–40)
BILIRUB SERPL-MCNC: 0.4 MG/DL — SIGNIFICANT CHANGE UP (ref 0.2–1.2)
BUN SERPL-MCNC: 109 MG/DL — HIGH (ref 7–23)
CALCIUM SERPL-MCNC: 7.3 MG/DL — LOW (ref 8.4–10.5)
CHLORIDE SERPL-SCNC: 108 MMOL/L — SIGNIFICANT CHANGE UP (ref 96–108)
CO2 SERPL-SCNC: 18 MMOL/L — LOW (ref 22–31)
CREAT SERPL-MCNC: 6.6 MG/DL — HIGH (ref 0.5–1.3)
EGFR: 6 ML/MIN/1.73M2 — LOW
GI PCR PANEL: SIGNIFICANT CHANGE UP
GLUCOSE BLDC GLUCOMTR-MCNC: 135 MG/DL — HIGH (ref 70–99)
GLUCOSE BLDC GLUCOMTR-MCNC: 152 MG/DL — HIGH (ref 70–99)
GLUCOSE BLDC GLUCOMTR-MCNC: 175 MG/DL — HIGH (ref 70–99)
GLUCOSE BLDC GLUCOMTR-MCNC: 189 MG/DL — HIGH (ref 70–99)
GLUCOSE BLDC GLUCOMTR-MCNC: 62 MG/DL — LOW (ref 70–99)
GLUCOSE BLDC GLUCOMTR-MCNC: 63 MG/DL — LOW (ref 70–99)
GLUCOSE BLDC GLUCOMTR-MCNC: 80 MG/DL — SIGNIFICANT CHANGE UP (ref 70–99)
GLUCOSE BLDC GLUCOMTR-MCNC: 98 MG/DL — SIGNIFICANT CHANGE UP (ref 70–99)
GLUCOSE SERPL-MCNC: 142 MG/DL — HIGH (ref 70–99)
HCT VFR BLD CALC: 25.7 % — LOW (ref 34.5–45)
HGB BLD-MCNC: 7.7 G/DL — LOW (ref 11.5–15.5)
MCHC RBC-ENTMCNC: 26.9 PG — LOW (ref 27–34)
MCHC RBC-ENTMCNC: 30 GM/DL — LOW (ref 32–36)
MCV RBC AUTO: 89.9 FL — SIGNIFICANT CHANGE UP (ref 80–100)
NRBC # BLD: 0 /100 WBCS — SIGNIFICANT CHANGE UP (ref 0–0)
PLATELET # BLD AUTO: 87 K/UL — LOW (ref 150–400)
POTASSIUM SERPL-MCNC: 4.3 MMOL/L — SIGNIFICANT CHANGE UP (ref 3.5–5.3)
POTASSIUM SERPL-SCNC: 4.3 MMOL/L — SIGNIFICANT CHANGE UP (ref 3.5–5.3)
PROT SERPL-MCNC: 5.1 G/DL — LOW (ref 6–8.3)
RBC # BLD: 2.86 M/UL — LOW (ref 3.8–5.2)
RBC # FLD: 18.2 % — HIGH (ref 10.3–14.5)
SODIUM SERPL-SCNC: 143 MMOL/L — SIGNIFICANT CHANGE UP (ref 135–145)
WBC # BLD: 2.28 K/UL — LOW (ref 3.8–10.5)
WBC # FLD AUTO: 2.28 K/UL — LOW (ref 3.8–10.5)

## 2024-05-30 PROCEDURE — 99232 SBSQ HOSP IP/OBS MODERATE 35: CPT

## 2024-05-30 RX ADMIN — HEPARIN SODIUM 5000 UNIT(S): 5000 INJECTION INTRAVENOUS; SUBCUTANEOUS at 17:03

## 2024-05-30 RX ADMIN — Medication 667 MILLIGRAM(S): at 11:47

## 2024-05-30 RX ADMIN — Medication 1: at 08:20

## 2024-05-30 RX ADMIN — Medication 667 MILLIGRAM(S): at 08:24

## 2024-05-30 RX ADMIN — Medication 100 MILLIGRAM(S): at 22:07

## 2024-05-30 RX ADMIN — Medication 2 MILLIGRAM(S): at 22:07

## 2024-05-30 RX ADMIN — Medication 100 MILLIGRAM(S): at 11:47

## 2024-05-30 RX ADMIN — Medication 100 MILLIGRAM(S): at 05:37

## 2024-05-30 RX ADMIN — AMLODIPINE BESYLATE 10 MILLIGRAM(S): 2.5 TABLET ORAL at 05:37

## 2024-05-30 RX ADMIN — Medication 100 MILLIGRAM(S): at 13:50

## 2024-05-30 RX ADMIN — Medication 667 MILLIGRAM(S): at 17:03

## 2024-05-30 RX ADMIN — Medication 1: at 11:47

## 2024-05-30 RX ADMIN — HEPARIN SODIUM 5000 UNIT(S): 5000 INJECTION INTRAVENOUS; SUBCUTANEOUS at 05:38

## 2024-05-30 RX ADMIN — SODIUM CHLORIDE 80 MILLILITER(S): 9 INJECTION, SOLUTION INTRAVENOUS at 05:19

## 2024-05-30 RX ADMIN — RASBURICASE 104 MILLIGRAM(S): KIT at 22:07

## 2024-05-30 RX ADMIN — CHLORHEXIDINE GLUCONATE 1 APPLICATION(S): 213 SOLUTION TOPICAL at 05:35

## 2024-05-30 RX ADMIN — Medication 1 TABLET(S): at 11:47

## 2024-05-30 NOTE — PROGRESS NOTE ADULT - SUBJECTIVE AND OBJECTIVE BOX
HANG MADERA, 89y Female  MRN: [id number]  ATTENDING: Humble Michele    HPI:  89F, PMHx HTN, herpes zoster, CKD, multinodular thyroid,breast cancer, colon cancer (previously on Keytruda), refractory anemia/MDS, treated supportively and ambulatory (erythropoietin supplements), no recent exposure to systemic chemotherapy, is admitted with generalized weakness.  Treated in ambulatory with Procrit and intermittent transfusion support, directed to the ED after found with a , creatinine 6.76.  In ED found pancytopenic (hemoglobin 6.9 g/dL, WBC 2.4K, platelet 123,000).  Patient receiving supportive care in ambulatory, discontinued Keytruda since June 2023. Reviewed results of CT abdomen consistent with suspicion of portal vein thrombus versus tumor thrombus and enlarged bilateral pleural effusion and pericardial effusion as well as developing left hydronephrosis.  There is increasing anasarca and a pancreatic head/mesenteric mass larger than prior studies. Oncology consulted as above.    MEDICATIONS:  acetaminophen     Tablet .. 650 milliGRAM(s) Oral every 6 hours PRN  allopurinol 100 milliGRAM(s) Oral daily  aluminum hydroxide/magnesium hydroxide/simethicone Suspension 30 milliLiter(s) Oral every 4 hours PRN  amLODIPine   Tablet 10 milliGRAM(s) Oral daily  calcium acetate 667 milliGRAM(s) Oral three times a day with meals  chlorhexidine 2% Cloths 1 Application(s) Topical daily  dextrose 5% 1000 milliLiter(s) IV Continuous <Continuous>  dextrose 5%. 1000 milliLiter(s) IV Continuous <Continuous>  dextrose 5%. 1000 milliLiter(s) IV Continuous <Continuous>  dextrose 50% Injectable 25 Gram(s) IV Push once  dextrose 50% Injectable 12.5 Gram(s) IV Push once  dextrose Oral Gel 15 Gram(s) Oral once PRN  doxazosin 2 milliGRAM(s) Oral at bedtime  glucagon  Injectable 1 milliGRAM(s) IntraMuscular once  heparin   Injectable 5000 Unit(s) SubCutaneous every 12 hours  hydrALAZINE 100 milliGRAM(s) Oral three times a day  insulin lispro (ADMELOG) corrective regimen sliding scale   SubCutaneous three times a day before meals  insulin lispro (ADMELOG) corrective regimen sliding scale   SubCutaneous at bedtime  melatonin 3 milliGRAM(s) Oral at bedtime PRN  multivitamin 1 Tablet(s) Oral daily  ondansetron Injectable 4 milliGRAM(s) IV Push every 8 hours PRN  rasburicase IVPB 3 milliGRAM(s) IV Intermittent once    All other medications reviewed.    SUBJECTIVE:  lying in bed, in NAD    VITALS:  T(C): 36.4 (05-29-24 @ 14:09), Max: 36.7 (05-29-24 @ 05:37)  T(F): 97.6 (05-29-24 @ 14:09), Max: 98 (05-29-24 @ 05:37)  HR: 70 (05-29-24 @ 14:09) (64 - 75)  BP: 132/51 (05-29-24 @ 14:09) (124/57 - 132/51)      PHYSICAL EXAM:  Constitutional: alert, well developed  HEENT: normocephalic, anicteric sclerae, no mucositis or thrush  Respiratory: bilateral clear to auscultation anteriorly  Cardiovascular : S1, S2 regular, rhythmic, no murmurs, gallops or rubs  Abdomen: soft, distended, + normoactive BS, no palpable HS- megaly  Extremities: no tenderness;  -c/c/e, pulses equal bilaterally    LABS:  (05-29) WBC: 2.09 K/uL,Hemoglobin: 7.0 g/dL, Hematocrit: 23.0 %,  Platelet: 84 K/uL  (05-29) Na: 138 mmol/L ; K: 4.4 mmol/L ; BUN: 110 mg/dL ; Cr: 6.62 mg/dL.    RADIOLOGY:  ACC: [id number] EXAM:  CT ABDOMEN AND PELVIS   ORDERED BY: FLOWER RESENDIZ     PROCEDURE DATE:  05/28/2024          INTERPRETATION:  CLINICAL INFORMATION: 89 years  Female with MAGDI, anemia      r/o mass, hydro.  . History of colon cancer. Status post right   hemicolectomy and chemotherapy in 2011. Mesenteric mass excised in 2013   negative for metastatic disease. Recurred in 2016.    COMPARISON: CT abdomen and pelvis 2/29/2024    CONTRAST/COMPLICATIONS:  IV Contrast: NONE  Oral Contrast: NONE  Complications: None reported at time of study completion    PROCEDURE:  CT of the Abdomen and Pelvis was performed.  Sagittal and coronal reformats were performed.    LIMITATIONS: Evaluation of the solid organs, vascular structures and GI   tract is limited without oral and IV contrast.    FINDINGS:  LOWER CHEST: Enlarging bilateral pleural effusions. Small but enlarging   pericardial effusion. Low-attenuation blood pool secondary to anemia.    LIVER: Innumerable cysts and hypodensities too small to characterize.   Soft tissue attenuation within the portal vein concerning for thrombus or   tumor thrombus.  BILE DUCTS: Normal caliber.  GALLBLADDER: Cholecystectomy.  SPLEEN: Stable splenomegaly spanning 13.4 cm.  PANCREAS: Enlarging 10.4 x 8.6 cmright mesenteric mass, previously 10.1   x 6.2 cm, inseparable from the pancreatic head. Increasing air within the   mass. Surrounding surgical clips unchanged. Surrounding mesenteric edema   unchanged.  ADRENALS: Mild left adrenal thickening.  KIDNEYS/URETERS: Bilateral cysts. Right renal atrophy. Developing   moderate left hydronephrosis.    BLADDER: Minimally distended.  REPRODUCTIVE ORGANS: Unremarkable uterus.    BOWEL: No bowel obstruction. Appendix is not visualized. No evidence of   inflammation in the pericecal region.. Right-sided small bowel   anastomosis. Mild gastric distention likely related to compression by the   mass.  PERITONEUM: Small ascites unchanged. Presacral edema.  VESSELS: Atherosclerotic changes.  RETROPERITONEUM/LYMPH NODES: No lymphadenopathy.  ABDOMINAL WALL: Increasing anasarca. Rectus diastases  BONES: Within normal limits.    IMPRESSION:  Suspect portal vein thrombus versus tumor thrombus. If IV contrast cannot   be administered recommend ultrasound evaluation.    Enlarging bilateral pleural effusions and enlarging pericardial effusion.    Developing left hydronephrosis.    Pancreatic head/mesenteric mass is larger than prior and contains more   air than previously, either related to necrosis or communication with   bowel.    Increasing anasarca.     HANG MADERA, 89y Female  MRN: [id number]  ATTENDING: Humble Michele    HPI:  89F, PMHx HTN, herpes zoster, CKD, multinodular thyroid,breast cancer, colon cancer (previously on Keytruda), refractory anemia/MDS, treated supportively and ambulatory (erythropoietin supplements), no recent exposure to systemic chemotherapy, is admitted with generalized weakness.  Treated in ambulatory with Procrit and intermittent transfusion support, directed to the ED after found with a , creatinine 6.76.  In ED found pancytopenic (hemoglobin 6.9 g/dL, WBC 2.4K, platelet 123,000).  Patient receiving supportive care in ambulatory, discontinued Keytruda since June 2023. Reviewed results of CT abdomen consistent with suspicion of portal vein thrombus versus tumor thrombus and enlarged bilateral pleural effusion and pericardial effusion as well as developing left hydronephrosis.  There is increasing anasarca and a pancreatic head/mesenteric mass larger than prior studies. Oncology consulted as above.    MEDICATIONS:  acetaminophen     Tablet .. 650 milliGRAM(s) Oral every 6 hours PRN  allopurinol 100 milliGRAM(s) Oral daily  aluminum hydroxide/magnesium hydroxide/simethicone Suspension 30 milliLiter(s) Oral every 4 hours PRN  amLODIPine   Tablet 10 milliGRAM(s) Oral daily  calcium acetate 667 milliGRAM(s) Oral three times a day with meals  chlorhexidine 2% Cloths 1 Application(s) Topical daily  dextrose 5% 1000 milliLiter(s) IV Continuous <Continuous>  dextrose 5%. 1000 milliLiter(s) IV Continuous <Continuous>  dextrose 5%. 1000 milliLiter(s) IV Continuous <Continuous>  dextrose 50% Injectable 25 Gram(s) IV Push once  dextrose 50% Injectable 12.5 Gram(s) IV Push once  dextrose Oral Gel 15 Gram(s) Oral once PRN  doxazosin 2 milliGRAM(s) Oral at bedtime  glucagon  Injectable 1 milliGRAM(s) IntraMuscular once  heparin   Injectable 5000 Unit(s) SubCutaneous every 12 hours  hydrALAZINE 100 milliGRAM(s) Oral three times a day  insulin lispro (ADMELOG) corrective regimen sliding scale   SubCutaneous three times a day before meals  insulin lispro (ADMELOG) corrective regimen sliding scale   SubCutaneous at bedtime  melatonin 3 milliGRAM(s) Oral at bedtime PRN  multivitamin 1 Tablet(s) Oral daily  ondansetron Injectable 4 milliGRAM(s) IV Push every 8 hours PRN  rasburicase IVPB 3 milliGRAM(s) IV Intermittent once    All other medications reviewed.    SUBJECTIVE:  Alert, denies new symptoms.  Occult blood positive, though patient denies melena.    VITALS:  T(C): 36.4 (05-29-24 @ 14:09), Max: 36.7 (05-29-24 @ 05:37)  T(F): 97.6 (05-29-24 @ 14:09), Max: 98 (05-29-24 @ 05:37)  HR: 70 (05-29-24 @ 14:09) (64 - 75)  BP: 132/51 (05-29-24 @ 14:09) (124/57 - 132/51)      PHYSICAL EXAM:  Constitutional: alert, well developed  HEENT: normocephalic, anicteric sclerae, no mucositis or thrush  Respiratory: bilateral clear to auscultation anteriorly  Cardiovascular : S1, S2 regular, rhythmic, no murmurs, gallops or rubs  Abdomen: soft, distended, + normoactive BS, no palpable HS- megaly  Extremities: no tenderness;  -c/c/e, pulses equal bilaterally    LABS:  (05-29) WBC: 2.09 K/uL,Hemoglobin: 7.0 g/dL, Hematocrit: 23.0 %,  Platelet: 84 K/uL  (05-29) Na: 138 mmol/L ; K: 4.4 mmol/L ; BUN: 110 mg/dL ; Cr: 6.62 mg/dL.    RADIOLOGY:  ACC: [id number] EXAM:  CT ABDOMEN AND PELVIS   ORDERED BY: FLOWER RESENDIZ     PROCEDURE DATE:  05/28/2024          INTERPRETATION:  CLINICAL INFORMATION: 89 years  Female with MAGDI, anemia      r/o mass, hydro.  . History of colon cancer. Status post right   hemicolectomy and chemotherapy in 2011. Mesenteric mass excised in 2013   negative for metastatic disease. Recurred in 2016.    COMPARISON: CT abdomen and pelvis 2/29/2024    CONTRAST/COMPLICATIONS:  IV Contrast: NONE  Oral Contrast: NONE  Complications: None reported at time of study completion    PROCEDURE:  CT of the Abdomen and Pelvis was performed.  Sagittal and coronal reformats were performed.    LIMITATIONS: Evaluation of the solid organs, vascular structures and GI   tract is limited without oral and IV contrast.    FINDINGS:  LOWER CHEST: Enlarging bilateral pleural effusions. Small but enlarging   pericardial effusion. Low-attenuation blood pool secondary to anemia.    LIVER: Innumerable cysts and hypodensities too small to characterize.   Soft tissue attenuation within the portal vein concerning for thrombus or   tumor thrombus.  BILE DUCTS: Normal caliber.  GALLBLADDER: Cholecystectomy.  SPLEEN: Stable splenomegaly spanning 13.4 cm.  PANCREAS: Enlarging 10.4 x 8.6 cmright mesenteric mass, previously 10.1   x 6.2 cm, inseparable from the pancreatic head. Increasing air within the   mass. Surrounding surgical clips unchanged. Surrounding mesenteric edema   unchanged.  ADRENALS: Mild left adrenal thickening.  KIDNEYS/URETERS: Bilateral cysts. Right renal atrophy. Developing   moderate left hydronephrosis.    BLADDER: Minimally distended.  REPRODUCTIVE ORGANS: Unremarkable uterus.    BOWEL: No bowel obstruction. Appendix is not visualized. No evidence of   inflammation in the pericecal region.. Right-sided small bowel   anastomosis. Mild gastric distention likely related to compression by the   mass.  PERITONEUM: Small ascites unchanged. Presacral edema.  VESSELS: Atherosclerotic changes.  RETROPERITONEUM/LYMPH NODES: No lymphadenopathy.  ABDOMINAL WALL: Increasing anasarca. Rectus diastases  BONES: Within normal limits.    IMPRESSION:  Suspect portal vein thrombus versus tumor thrombus. If IV contrast cannot   be administered recommend ultrasound evaluation.    Enlarging bilateral pleural effusions and enlarging pericardial effusion.    Developing left hydronephrosis.    Pancreatic head/mesenteric mass is larger than prior and contains more   air than previously, either related to necrosis or communication with   bowel.    Increasing anasarca.

## 2024-05-30 NOTE — DISCHARGE NOTE PROVIDER - CARE PROVIDERS DIRECT ADDRESSES
[email or web address]

## 2024-05-30 NOTE — PROGRESS NOTE ADULT - SUBJECTIVE AND OBJECTIVE BOX
ASYAALEX HANG  89y  Female      patient states she is feeling better and is without cardiac pulmonary GI  neuro complaints    REVIEW OF SYSTEMS:    Cardiac HTN  Pulmonary no cough/SON  GI colon cancer with mets   CRI  Neuro no headache/dizziness/numbness  Heme anemia/thrombocytopenia    FAMILY HISTORY:  FH: colon cancer (Sibling, Sibling)    FH: breast cancer (Sibling)      T(C): 36.6 (05-30-24 @ 18:55), Max: 37.1 (05-30-24 @ 05:34)  HR: 76 (05-30-24 @ 18:55) (75 - 80)  BP: 155/60 (05-30-24 @ 18:55) (137/58 - 155/60)  RR: 18 (05-30-24 @ 18:55) (15 - 18)  SpO2: 97% (05-30-24 @ 18:55) (96% - 98%)  Wt(kg): --Vital Signs Last 24 Hrs  T(C): 36.6 (30 May 2024 18:55), Max: 37.1 (30 May 2024 05:34)  T(F): 97.8 (30 May 2024 18:55), Max: 98.7 (30 May 2024 05:34)  HR: 76 (30 May 2024 18:55) (75 - 80)  BP: 155/60 (30 May 2024 18:55) (137/58 - 155/60)  BP(mean): --  RR: 18 (30 May 2024 18:55) (15 - 18)  SpO2: 97% (30 May 2024 18:55) (96% - 98%)    Parameters below as of 30 May 2024 18:55  Patient On (Oxygen Delivery Method): room air      No Known Allergies      PHYSICAL EXAM:    General NAD  Neck thyroid stable no JVD  Heart regular  Lungs clear  Abdomen non tender non distended normal bowel sounds no HSM/CVAT  Extremities no edema +pulses  Neurologic no acute focal changes alert and oriented x 3        Consultant(s) Notes Reviewed:  [x ] YES  [ ] NO  Care Discussed with Consultants/Other Providers [ x] YES  [ ] NO    LABS:  CBC Full  -  ( 30 May 2024 06:30 )  WBC Count : 2.28 K/uL  RBC Count : 2.86 M/uL  Hemoglobin : 7.7 g/dL  Hematocrit : 25.7 %  Platelet Count - Automated : 87 K/uL  Mean Cell Volume : 89.9 fl  Mean Cell Hemoglobin : 26.9 pg  Mean Cell Hemoglobin Concentration : 30.0 gm/dL  Auto Neutrophil # : x  Auto Lymphocyte # : x  Auto Monocyte # : x  Auto Eosinophil # : x  Auto Basophil # : x  Auto Neutrophil % : x  Auto Lymphocyte % : x  Auto Monocyte % : x  Auto Eosinophil % : x  Auto Basophil % : x                          7.7    2.28  )-----------( 87       ( 30 May 2024 06:30 )             25.7     05-30    143  |  108  |  109<H>  ----------------------------<  142<H>  4.3   |  18<L>  |  6.60<H>    Ca    7.3<L>      30 May 2024 06:30    TPro  5.1<L>  /  Alb  2.1<L>  /  TBili  0.4  /  DBili  x   /  AST  16  /  ALT  10  /  AlkPhos  49  05-30    LIVER FUNCTIONS - ( 30 May 2024 06:30 )  Alb: 2.1 g/dL / Pro: 5.1 g/dL / ALK PHOS: 49 U/L / ALT: 10 U/L / AST: 16 U/L / GGT: x             Urinalysis Basic - ( 30 May 2024 06:30 )    Color: x / Appearance: x / SG: x / pH: x  Gluc: 142 mg/dL / Ketone: x  / Bili: x / Urobili: x   Blood: x / Protein: x / Nitrite: x   Leuk Esterase: x / RBC: x / WBC x   Sq Epi: x / Non Sq Epi: x / Bacteria: x      CAPILLARY BLOOD GLUCOSE      POCT Blood Glucose.: 135 mg/dL (30 May 2024 18:07)      POCT Blood Glucose (05.30.24 @ 17:29)   POCT Blood Glucose.: 98 mg/dL  POCT Blood Glucose (05.30.24 @ 17:11)   POCT Blood Glucose.: 80 mg/dL  POCT Blood Glucose (05.30.24 @ 16:58)   POCT Blood Glucose.: 62 mg/dL  POCT Blood Glucose (05.30.24 @ 16:54)   POCT Blood Glucose.: 63 mg/dL  POCT Blood Glucose (05.30.24 @ 11:46)   POCT Blood Glucose.: 189 mg/dL  POCT Blood Glucose (05.30.24 @ 07:53)   POCT Blood Glucose.: 152 mg/dL  POCT Blood Glucose (05.29.24 @ 21:09)   POCT Blood Glucose.: 166 mg/dL  POCT Blood Glucose (05.29.24 @ 16:43)   POCT Blood Glucose.: 122 mg/dL      acetaminophen     Tablet .. 650 milliGRAM(s) Oral every 6 hours PRN  allopurinol 100 milliGRAM(s) Oral daily  aluminum hydroxide/magnesium hydroxide/simethicone Suspension 30 milliLiter(s) Oral every 4 hours PRN  amLODIPine   Tablet 10 milliGRAM(s) Oral daily  calcium acetate 667 milliGRAM(s) Oral three times a day with meals  chlorhexidine 2% Cloths 1 Application(s) Topical daily  dextrose 5% 1000 milliLiter(s) IV Continuous <Continuous>  dextrose 5%. 1000 milliLiter(s) IV Continuous <Continuous>  dextrose 5%. 1000 milliLiter(s) IV Continuous <Continuous>  dextrose 50% Injectable 25 Gram(s) IV Push once  dextrose 50% Injectable 12.5 Gram(s) IV Push once  dextrose Oral Gel 15 Gram(s) Oral once PRN  doxazosin 2 milliGRAM(s) Oral at bedtime  glucagon  Injectable 1 milliGRAM(s) IntraMuscular once  heparin   Injectable 5000 Unit(s) SubCutaneous every 12 hours  hydrALAZINE 100 milliGRAM(s) Oral three times a day  insulin lispro (ADMELOG) corrective regimen sliding scale   SubCutaneous three times a day before meals  insulin lispro (ADMELOG) corrective regimen sliding scale   SubCutaneous at bedtime  melatonin 3 milliGRAM(s) Oral at bedtime PRN  multivitamin 1 Tablet(s) Oral daily  ondansetron Injectable 4 milliGRAM(s) IV Push every 8 hours PRN  rasburicase IVPB 3 milliGRAM(s) IV Intermittent once

## 2024-05-30 NOTE — CHART NOTE - NSCHARTNOTEFT_GEN_A_CORE
89-year-old female with PMH of CKD, Breast Cancer, colon cancer s/p resection, parathyroid ca, HTN, thrombocytopenia presents to the emergency department for abnormal labs.  Per her Nephrologist Dr. Garcia pt to be admitted for MAGDI/CKD5.    #MAGDI/CKD5  -cont IVF for now, hold off on dialysis  -Renal following  -Renal sono pending  -cont with Nabicarb, IVF  -check UA, urine lytes    #RLQ Mesenteric Mass  -Hemeonc consult  -Surgery consult  -per patient she will probably just want to be conservative, states her ChemoRx was stopped    < from: US Abdomen Complete (US Abdomen Complete .) (05.28.24 @ 17:11) >      12.8 x 7.8 x 9.9 cm RLQ mesenteric mass    IMPRESSION:  1.  RLQ mesenteric mass, corresponding to recent CT.  2.  Patent main portal vein with hepatopedal flow  3.  Mild LEFT hydronephrosis, unchanged    < end of copied text >    < from: CT Abdomen and Pelvis No Cont (05.28.24 @ 12:52) >      IMPRESSION:  Suspect portal vein thrombus versus tumor thrombus. If IV contrast cannot   be administered recommend ultrasound evaluation.    Enlarging bilateral pleural effusions and enlarging pericardial effusion.    Developing left hydronephrosis.    Pancreatic head/mesenteric mass is larger than prior and contains more   air than previously, either related to necrosis or communication with   bowel.    Increasing anasarca.    < end of copied text >    #Pantcytopenia, hx of MDS and now probable new Ca  -Hemeonc consult noted  -hgb 7.0 today, type and screen-transfuse 1 unit of PRBC today  -cont Procrit  -follow daily H/H    #HTN  -cont home meds    GOC:  FULL CODE; PALLIATIVE CONSULT FOR GOC    Discussed case with PCP Dr. Michele today. No anticipated D/C as of yet.
Contacted by RN for FOBT result positive. FOBT ordered by Dr Nano Garcia this morning.    Mrs Veronica Camejo is a 89yo Female with PMHx of HTN, herpes zoster, CKD, multinodular thyroid,breast cancer, colon cancer (previously on Keytruda), refractory anemia/MDS, treated supportively and ambulatory (erythropoietin supplements), no recent exposure to systemic chemotherapy, is admitted with generalized weakness.  Treated in ambulatory with Procrit and intermittent transfusion support, directed to the ED after found with a , creatinine 6.76.  In ED found pancytopenic (hemoglobin 6.9 g/dL, WBC 2.4K, platelet 123,000).  Patient receiving supportive care in ambulatory, discontinued Keytruda since June 2023. Reviewed results of CT abdomen consistent with suspicion of portal vein thrombus versus tumor thrombus and enlarged bilateral pleural effusion and pericardial effusion as well as developing left hydronephrosis.  There is increasing anasarca and a pancreatic head/mesenteric mass larger than prior studies.    At bedside, patient sleeping. Nurse confirmed bowel movement around 19:30 that was light brown; no melena, no hematochezia, no hematuria. Around this time, patient without any complaints    VS: 160/68, HR 82, followed by 154/54, HR 86, T97.7, SO2 96% on RA, RR 16    Plan:  -HH 7.0/23.0 this am  -S/P PRBC transfusion this afternoon  -At bedside, patient sleeping  -Patient remains hemodynamically stable, with known history if Colorectal Cancer and s/p PRBC  -T&S active, transfusion consent in chart  -Monitor hemodynamic status  -Cont monitoring HH trend
89-year-old female with PMH of CKD, Breast Cancer, colon cancer s/p resection, parathyroid ca, HTN, thrombocytopenia presents to the emergency department for abnormal labs.  Per her Nephrologist Dr. Garcia pt to be admitted for MAGDI/CKD5.    #MAGDI/CKD5  -Creat 6.54 today, K 5.0 today  -Renal consult pending  -Renal sono pending  -cont with Nabicarb, IVF  -check UA, urine lytes    #Anemia of chronic kidney dz.  -hgb 6.9  -cont Procrit  -will discuss w/ Renal for possible blood transfusion  -follow daily H/H    #HTN  -cont home meds    #chronic thrombocytopenia  -platelets stable    Discussed case with PCP Dr. Michele today. No anticipated D/C as of yet.
Contacted by RN regarding rasburicase. Patient s/p PRBC transfusion.  Rasburicase ordered today by Dr Garcia for hyperurecemia.  Cont with medication
Off Service NP Hospitalist Note  Case Discussed with Dr. Michele    labs and imaging reviewed    89-year-old female with PMH of CKD, Breast Cancer, colon cancer s/p resection, parathyroid ca, HTN, thrombocytopenia presents to the emergency department for abnormal labs.  Per her Nephrologist Dr. Garcia pt to be admitted for MAGDI/CKD5.    #MAGDI/CKD5  -cont IVF for now, hold off on dialysis  -Renal following, avoid nephrotoxins, low sodium, low phos diet, continue IVF with bicarb, overall options limited, poor prognosis, not a good candidate for RRT  -Abdominal sono revealed rlq mesenteric mass, mild left hydro (unchanged)  -Plan to follow up with renal today, palliative consult appreciated, pt is full code, will discuss options including home with hospice referal    #RLQ Mesenteric Mass  -Hemeonc consult  -Surgery consult  -per patient she will probably just want to be conservative, states her ChemoRx was stopped    < from: US Abdomen Complete (US Abdomen Complete .) (05.28.24 @ 17:11) >      12.8 x 7.8 x 9.9 cm RLQ mesenteric mass    IMPRESSION:  1.  RLQ mesenteric mass, corresponding to recent CT.  2.  Patent main portal vein with hepatopedal flow  3.  Mild LEFT hydronephrosis, unchanged    < end of copied text >    < from: CT Abdomen and Pelvis No Cont (05.28.24 @ 12:52) >      IMPRESSION:  Suspect portal vein thrombus versus tumor thrombus. If IV contrast cannot   be administered recommend ultrasound evaluation.    Enlarging bilateral pleural effusions and enlarging pericardial effusion.    Developing left hydronephrosis.    Pancreatic head/mesenteric mass is larger than prior and contains more   air than previously, either related to necrosis or communication with   bowel.    Increasing anasarca.    < end of copied text >    #Pantcytopenia, hx of MDS and now probable new Ca  -Hemeonc consult noted  -hgb 7.0 today, type and screen-transfuse 1 unit of PRBC 5/29  -cont Procrit  -follow daily H/H    #HTN  -cont home meds    GOC:  FULL CODE; Palliative appreciated    Discussed case with PCP Dr. Michele today. Discharge planning underway ~ 24 hours

## 2024-05-30 NOTE — PROVIDER CONTACT NOTE (HYPOGLYCEMIA EVENT) - NS PROVIDER CONTACT BACKGROUND-HYPO
Age: 89y    Gender: Female    POCT Blood Glucose:  135 mg/dL (05-30-24 @ 18:07)  98 mg/dL (05-30-24 @ 17:29)  80 mg/dL (05-30-24 @ 17:11)  62 mg/dL (05-30-24 @ 16:58)  63 mg/dL (05-30-24 @ 16:54)  189 mg/dL (05-30-24 @ 11:46)  152 mg/dL (05-30-24 @ 07:53)  166 mg/dL (05-29-24 @ 21:09)      eMAR:allopurinol   100 milliGRAM(s) Oral (05-30-24 @ 11:47)    insulin lispro (ADMELOG) corrective regimen sliding scale   1 Unit(s) SubCutaneous (05-30-24 @ 11:47)   1 Unit(s) SubCutaneous (05-30-24 @ 08:20)

## 2024-05-30 NOTE — DISCHARGE NOTE PROVIDER - NSDCMRMEDTOKEN_GEN_ALL_CORE_FT
amLODIPine 10 mg oral tablet: 1 tab(s) orally once a day  calcium acetate 667 mg oral tablet: 1 tab(s) orally 3 times a day (with meals)  cholecalciferol 25 mcg (1000 intl units) oral tablet: 1 tab(s) orally once a day  doxazosin 2 mg oral tablet: 1 tab(s) orally once a day (at bedtime)  hydrALAZINE 100 mg oral tablet: 1 tab(s) orally every 8 hours  Multiple Vitamins oral tablet: 1 tab(s) orally once a day  sodium bicarbonate 650 mg oral tablet: 2 tab(s) orally 3 times a day   amLODIPine 10 mg oral tablet: 1 tab(s) orally once a day  cholecalciferol 25 mcg (1000 intl units) oral tablet: 1 tab(s) orally once a day  doxazosin 2 mg oral tablet: 1 tab(s) orally once a day (at bedtime)  hydrALAZINE 100 mg oral tablet: 1 tab(s) orally every 8 hours  Multiple Vitamins oral tablet: 1 tab(s) orally once a day  sodium bicarbonate 650 mg oral tablet: 2 tab(s) orally 3 times a day  Zyloprim 100 mg oral tablet: 1 tab(s) orally once a day

## 2024-05-30 NOTE — DISCHARGE NOTE PROVIDER - CARE PROVIDER_API CALL
Humble Michele  Internal Medicine  [address], [address]  [place] [address]  Phone: [phone]  Fax: [phone]  Follow Up Time:     Nano Garcia  Nephrology  [address], [address]  [place] [address]  Phone: [phone]  Fax: [phone]  Follow Up Time:

## 2024-05-30 NOTE — PROGRESS NOTE ADULT - SUBJECTIVE AND OBJECTIVE BOX
No distress, weak    Vital Signs Last 24 Hrs  T(C): 36.6 (05-30-24 @ 18:55), Max: 37.1 (05-30-24 @ 05:34)  T(F): 97.8 (05-30-24 @ 18:55), Max: 98.7 (05-30-24 @ 05:34)  HR: 76 (05-30-24 @ 18:55) (75 - 80)  BP: 155/60 (05-30-24 @ 18:55) (137/58 - 155/60)  RR: 18 (05-30-24 @ 18:55) (15 - 18)  SpO2: 97% (05-30-24 @ 18:55) (96% - 98%)    s1s2  b/l air entry  soft, ND  no edema  AO                        7.7    2.28  )-----------( 87       ( 30 May 2024 06:30 )             25.7     30 May 2024 06:30    143    |  108    |  109    ----------------------------<  142    4.3     |  18     |  6.60     Ca    7.3        30 May 2024 06:30    TPro  5.1    /  Alb  2.1    /  TBili  0.4    /  DBili  x      /  AST  16     /  ALT  10     /  AlkPhos  49     30 May 2024 06:30    LIVER FUNCTIONS - ( 30 May 2024 06:30 )  Alb: 2.1 g/dL / Pro: 5.1 g/dL / ALK PHOS: 49 U/L / ALT: 10 U/L / AST: 16 U/L / GGT: x           acetaminophen     Tablet .. 650 milliGRAM(s) Oral every 6 hours PRN  allopurinol 100 milliGRAM(s) Oral daily  aluminum hydroxide/magnesium hydroxide/simethicone Suspension 30 milliLiter(s) Oral every 4 hours PRN  amLODIPine   Tablet 10 milliGRAM(s) Oral daily  calcium acetate 667 milliGRAM(s) Oral three times a day with meals  chlorhexidine 2% Cloths 1 Application(s) Topical daily  dextrose 5% 1000 milliLiter(s) IV Continuous <Continuous>  dextrose 5%. 1000 milliLiter(s) IV Continuous <Continuous>  dextrose 5%. 1000 milliLiter(s) IV Continuous <Continuous>  dextrose 50% Injectable 25 Gram(s) IV Push once  dextrose 50% Injectable 12.5 Gram(s) IV Push once  dextrose Oral Gel 15 Gram(s) Oral once PRN  doxazosin 2 milliGRAM(s) Oral at bedtime  glucagon  Injectable 1 milliGRAM(s) IntraMuscular once  heparin   Injectable 5000 Unit(s) SubCutaneous every 12 hours  hydrALAZINE 100 milliGRAM(s) Oral three times a day  insulin lispro (ADMELOG) corrective regimen sliding scale   SubCutaneous three times a day before meals  insulin lispro (ADMELOG) corrective regimen sliding scale   SubCutaneous at bedtime  melatonin 3 milliGRAM(s) Oral at bedtime PRN  multivitamin 1 Tablet(s) Oral daily  ondansetron Injectable 4 milliGRAM(s) IV Push every 8 hours PRN  rasburicase IVPB 3 milliGRAM(s) IV Intermittent once    A/P:    Hx colon cancer s/p R colectomy, chemo  CT A/P noted, new mild L hydro, pancreatic head/mesenteric mass larger than prior   MAGDI/CKD iso of new L hydro, pre-renal/hemodynamic   Met acidosis iso chronic diarrhea, MAGDI/CKD  Avoid nephrotoxins, no NSAID's  Low Na, low Phos diet   Bld tx per medicine, heme/onc   IVF w/Bicarb   CBC, BMP in am  Overall options are limited and prognosis is poor  Pt is a poor candidate for RRT given above and it is unlikely to alter outcome and prognosis in this case  D/w her today at length, pt wish is to avoid RRT and invasive tests/procedures  She is aware that condition will progress  She wishes to follow as op  Palliative wally is appr  D/w medicine     [phone]

## 2024-05-30 NOTE — PROGRESS NOTE ADULT - PROBLEM SELECTOR PLAN 1
Complex medical history including colon cancer, breast cancer and MDS, transfusion dependent, treated supportively and ambulatory.  Presently admitted with pancytopenia and a CT of the abdomen showing suspicion for portal vein thrombus (unclear if tumor thrombus).  CT also showing increased anasarca and pleural and pericardial effusions consistent with progression of disease.  Hematologic profile showing pancytopenia.  Hemoglobin 7 g/dL today–will transfuse 1 unit PRBC.  Continue to monitor CBC, watch for fever or active bleeding.  Questions answered. Myelodysplasia, chronic on the background of multiple neoplastic conditions (colon and breast cancer).  Patient treated supportively and ambulatory with Procrit.  Hemoglobin at 7.7 g/dL this morning.  Will continue to monitor WBC and platelets which have stabilized.  No need for platelet transfusion or G-CSF for as long as patient does not spike temperatures or shows evidence of active bleeding.  Questions answered.  Will follow-up.

## 2024-05-30 NOTE — DISCHARGE NOTE PROVIDER - HOSPITAL COURSE
Hospital Course  89-year-old female with PMH of CKD, Breast Cancer, colon cancer s/p resection, parathyroid ca, HTN, thrombocytopenia presents to the emergency department for abnormal lab.  Patient was supposed to get her procrit injection today. After seeing her recent labs, she was asked by Dr. Garcia to present to the emergency department for admission to the hospital. Patient describes feeling generalized weakness especially when getting up to ambulate for the past few months. Of note, patient does have loose BM every day since she has a history of colon resection. This has been ongoing since 2011. She also states that whenever she goes to the bathroom she makes about a cup of urine each time. Pt does, however, still make urine. She denied dysuria, suprapubic tenderness, CVA tenderness. Denied changes in mental status.      ED Course: T 98, HR 73, /61, RR 17, SpO2 96% on RA. Labs showing WBC of 3.18, low H/H of 8.2/27.4, BUN elevation to 107, Cr of 6.76, eGFR of 5. UA and UCx ordered but not collected yet. Patient received a 500cc bolus of IVF. Patient was admitted to the floors for further management. Of note patient's baseline BUN/Cr is 85/4.5.     Patient admitted on 5/27 with worsening renal indices. Renal followed closely during hospital stay, managed with IVF with bicarb. Patient deemed not to be a good candidate for RRT.  Patient has known mesenteric mass, chronic pancytopenia, hx of MDS.  Patient was transfused one unit PRBCs during hospital stay on 5/29 for Hgb of 7.0.  Continued procrit during hospital stay.  Palliative was called, pt remained full code.  Does not want RRT or hospice referral at this time.  Pt was informed of poor prognosis and poor renal function.  Favors discharge home.  Pt medically cleared after renal clearance, outpatient follow up Dr. Michele advised.     Source of Infection:  Antibiotic / Last Day: N/A    Palliative Care / Advanced Care Planning  Code Status: full code  Patient/Family agreeable to Hospice/Palliative (Y/N)?  Summary of Goals of Care Conversation:    Discharging Provider:  Mayco Padgett NP  Contact Info: Cell [phone] - Please call with any questions or concerns.    Outpatient Provider: Dr. Michele - aware

## 2024-05-30 NOTE — DISCHARGE NOTE PROVIDER - NSDCCPCAREPLAN_GEN_ALL_CORE_FT
PRINCIPAL DISCHARGE DIAGNOSIS  Diagnosis: Acute on chronic kidney failure  Assessment and Plan of Treatment: you were admitted to the hospital with worsening renal funciton.  Nephrology, Dr. Garcia, followed you closely during your hospital stay.  Please follow up with Dr. Michele and Dr. Garcia after discharge.

## 2024-05-31 ENCOUNTER — TRANSCRIPTION ENCOUNTER (OUTPATIENT)
Age: 89
End: 2024-05-31

## 2024-05-31 VITALS
RESPIRATION RATE: 16 BRPM | HEART RATE: 71 BPM | DIASTOLIC BLOOD PRESSURE: 55 MMHG | TEMPERATURE: 98 F | OXYGEN SATURATION: 96 % | SYSTOLIC BLOOD PRESSURE: 148 MMHG

## 2024-05-31 LAB
ANION GAP SERPL CALC-SCNC: 11 MMOL/L — SIGNIFICANT CHANGE UP (ref 5–17)
BUN SERPL-MCNC: 102 MG/DL — HIGH (ref 7–23)
CALCIUM SERPL-MCNC: 7.5 MG/DL — LOW (ref 8.4–10.5)
CHLORIDE SERPL-SCNC: 112 MMOL/L — HIGH (ref 96–108)
CO2 SERPL-SCNC: 22 MMOL/L — SIGNIFICANT CHANGE UP (ref 22–31)
CREAT SERPL-MCNC: 6.56 MG/DL — HIGH (ref 0.5–1.3)
EGFR: 6 ML/MIN/1.73M2 — LOW
GLUCOSE BLDC GLUCOMTR-MCNC: 120 MG/DL — HIGH (ref 70–99)
GLUCOSE BLDC GLUCOMTR-MCNC: 172 MG/DL — HIGH (ref 70–99)
GLUCOSE SERPL-MCNC: 115 MG/DL — HIGH (ref 70–99)
HCT VFR BLD CALC: 25.5 % — LOW (ref 34.5–45)
HGB BLD-MCNC: 7.8 G/DL — LOW (ref 11.5–15.5)
MAGNESIUM SERPL-MCNC: 1.8 MG/DL — SIGNIFICANT CHANGE UP (ref 1.6–2.6)
MCHC RBC-ENTMCNC: 27.2 PG — SIGNIFICANT CHANGE UP (ref 27–34)
MCHC RBC-ENTMCNC: 30.6 GM/DL — LOW (ref 32–36)
MCV RBC AUTO: 88.9 FL — SIGNIFICANT CHANGE UP (ref 80–100)
NRBC # BLD: 0 /100 WBCS — SIGNIFICANT CHANGE UP (ref 0–0)
PHOSPHATE SERPL-MCNC: 4.8 MG/DL — HIGH (ref 2.5–4.5)
PLATELET # BLD AUTO: 92 K/UL — LOW (ref 150–400)
POTASSIUM SERPL-MCNC: 4.2 MMOL/L — SIGNIFICANT CHANGE UP (ref 3.5–5.3)
POTASSIUM SERPL-SCNC: 4.2 MMOL/L — SIGNIFICANT CHANGE UP (ref 3.5–5.3)
RBC # BLD: 2.87 M/UL — LOW (ref 3.8–5.2)
RBC # FLD: 18 % — HIGH (ref 10.3–14.5)
SODIUM SERPL-SCNC: 145 MMOL/L — SIGNIFICANT CHANGE UP (ref 135–145)
URATE SERPL-MCNC: 4.8 MG/DL — SIGNIFICANT CHANGE UP (ref 2.5–7)
WBC # BLD: 2.29 K/UL — LOW (ref 3.8–10.5)
WBC # FLD AUTO: 2.29 K/UL — LOW (ref 3.8–10.5)

## 2024-05-31 PROCEDURE — 85027 COMPLETE CBC AUTOMATED: CPT

## 2024-05-31 PROCEDURE — 86850 RBC ANTIBODY SCREEN: CPT

## 2024-05-31 PROCEDURE — 84550 ASSAY OF BLOOD/URIC ACID: CPT

## 2024-05-31 PROCEDURE — 80061 LIPID PANEL: CPT

## 2024-05-31 PROCEDURE — 83036 HEMOGLOBIN GLYCOSYLATED A1C: CPT

## 2024-05-31 PROCEDURE — 86901 BLOOD TYPING SEROLOGIC RH(D): CPT

## 2024-05-31 PROCEDURE — 86900 BLOOD TYPING SEROLOGIC ABO: CPT

## 2024-05-31 PROCEDURE — P9016: CPT

## 2024-05-31 PROCEDURE — 82272 OCCULT BLD FECES 1-3 TESTS: CPT

## 2024-05-31 PROCEDURE — 84100 ASSAY OF PHOSPHORUS: CPT

## 2024-05-31 PROCEDURE — 81001 URINALYSIS AUTO W/SCOPE: CPT

## 2024-05-31 PROCEDURE — 83540 ASSAY OF IRON: CPT

## 2024-05-31 PROCEDURE — 82728 ASSAY OF FERRITIN: CPT

## 2024-05-31 PROCEDURE — 93005 ELECTROCARDIOGRAM TRACING: CPT

## 2024-05-31 PROCEDURE — 83735 ASSAY OF MAGNESIUM: CPT

## 2024-05-31 PROCEDURE — 82962 GLUCOSE BLOOD TEST: CPT

## 2024-05-31 PROCEDURE — 80053 COMPREHEN METABOLIC PANEL: CPT

## 2024-05-31 PROCEDURE — 82570 ASSAY OF URINE CREATININE: CPT

## 2024-05-31 PROCEDURE — 85610 PROTHROMBIN TIME: CPT

## 2024-05-31 PROCEDURE — 74176 CT ABD & PELVIS W/O CONTRAST: CPT | Mod: MC

## 2024-05-31 PROCEDURE — 86923 COMPATIBILITY TEST ELECTRIC: CPT

## 2024-05-31 PROCEDURE — 85730 THROMBOPLASTIN TIME PARTIAL: CPT

## 2024-05-31 PROCEDURE — 36430 TRANSFUSION BLD/BLD COMPNT: CPT

## 2024-05-31 PROCEDURE — 36415 COLL VENOUS BLD VENIPUNCTURE: CPT

## 2024-05-31 PROCEDURE — 87507 IADNA-DNA/RNA PROBE TQ 12-25: CPT

## 2024-05-31 PROCEDURE — 99232 SBSQ HOSP IP/OBS MODERATE 35: CPT

## 2024-05-31 PROCEDURE — 83550 IRON BINDING TEST: CPT

## 2024-05-31 PROCEDURE — 84300 ASSAY OF URINE SODIUM: CPT

## 2024-05-31 PROCEDURE — 85025 COMPLETE CBC W/AUTO DIFF WBC: CPT

## 2024-05-31 PROCEDURE — 80048 BASIC METABOLIC PNL TOTAL CA: CPT

## 2024-05-31 PROCEDURE — 99497 ADVNCD CARE PLAN 30 MIN: CPT | Mod: 25

## 2024-05-31 PROCEDURE — 76700 US EXAM ABDOM COMPLETE: CPT

## 2024-05-31 PROCEDURE — 99285 EMERGENCY DEPT VISIT HI MDM: CPT

## 2024-05-31 RX ORDER — HEPARIN SODIUM 5000 [USP'U]/ML
3000 INJECTION INTRAVENOUS; SUBCUTANEOUS ONCE
Refills: 0 | Status: DISCONTINUED | OUTPATIENT
Start: 2024-05-31 | End: 2024-05-31

## 2024-05-31 RX ORDER — ALLOPURINOL 300 MG
1 TABLET ORAL
Qty: 30 | Refills: 0
Start: 2024-05-31 | End: 2024-06-29

## 2024-05-31 RX ORDER — MAGNESIUM SULFATE 500 MG/ML
1 VIAL (ML) INJECTION ONCE
Refills: 0 | Status: COMPLETED | OUTPATIENT
Start: 2024-05-31 | End: 2024-05-31

## 2024-05-31 RX ADMIN — Medication 100 MILLIGRAM(S): at 11:48

## 2024-05-31 RX ADMIN — Medication 1: at 11:48

## 2024-05-31 RX ADMIN — Medication 667 MILLIGRAM(S): at 11:48

## 2024-05-31 RX ADMIN — Medication 100 MILLIGRAM(S): at 05:57

## 2024-05-31 RX ADMIN — AMLODIPINE BESYLATE 10 MILLIGRAM(S): 2.5 TABLET ORAL at 05:57

## 2024-05-31 RX ADMIN — Medication 667 MILLIGRAM(S): at 08:33

## 2024-05-31 RX ADMIN — Medication 100 GRAM(S): at 11:47

## 2024-05-31 RX ADMIN — CHLORHEXIDINE GLUCONATE 1 APPLICATION(S): 213 SOLUTION TOPICAL at 05:58

## 2024-05-31 RX ADMIN — Medication 1 TABLET(S): at 11:48

## 2024-05-31 NOTE — PROGRESS NOTE ADULT - CONVERSATION DETAILS
Discussed pt's clinical status and her poor candidacy for different interventions including RRT or surgery. Pt expresses understanding that there is not much that can be done for her but is in good spirits because she is w/o pain/other serious complaints. Pt again expresses she wants to live to 89 y/o.   We discussed hospice services at home being an option but pt stated she did not feel she needed that at this Time. Her daughter will help her at home.  I gave her a [facility] pamplet with their number for when she feels ready and may required more help at home. Supportive care given. Pt remains FULL CODE

## 2024-05-31 NOTE — PROGRESS NOTE ADULT - PROVIDER SPECIALTY LIST ADULT
Nephrology
Heme/Onc
Internal Medicine
Heme/Onc
Heme/Onc
Palliative Care
(3) no apparent problem

## 2024-05-31 NOTE — DISCHARGE NOTE NURSING/CASE MANAGEMENT/SOCIAL WORK - NSDCPEFALRISK_GEN_ALL_CORE
For information on Fall & Injury Prevention, visit: [email or web address] OR  [email or web address] OR  https://www.cdc.gov/steadi/patient.html

## 2024-05-31 NOTE — PROGRESS NOTE ADULT - PROBLEM SELECTOR PLAN 1
Myelodysplasia, chronic on the background of multiple neoplastic conditions (colon and breast cancer).  Patient treated supportively and ambulatory with Procrit.  Hemoglobin at 7.7 g/dL this morning.  Will continue to monitor WBC and platelets which have stabilized.  No need for platelet transfusion or G-CSF for as long as patient does not spike temperatures or shows evidence of active bleeding.  Questions answered.  Will follow-up. Complex clinical picture including breast and colon cancer, and myelodysplasia with associated thrombocytopenia. Transfuse as needed. Consider indefinite anticoagulation given secondary hypercoagulable state. [facility]. Stable for discharge today.

## 2024-05-31 NOTE — PROGRESS NOTE ADULT - SUBJECTIVE AND OBJECTIVE BOX
No distress, weak    Vital Signs Last 24 Hrs  T(C): 36.6 (05-31-24 @ 13:52), Max: 36.7 (05-31-24 @ 05:29)  T(F): 97.8 (05-31-24 @ 13:52), Max: 98 (05-31-24 @ 05:29)  HR: 71 (05-31-24 @ 13:52) (71 - 75)  BP: 148/55 (05-31-24 @ 13:52) (148/55 - 170/70)  RR: 16 (05-31-24 @ 13:52) (16 - 17)  SpO2: 96% (05-31-24 @ 13:52) (95% - 96%)    s1s2  b/l air entry  soft, ND  no edema  AO                                7.8    2.29  )-----------( 92       ( 31 May 2024 06:19 )             25.5     31 May 2024 06:19    145    |  112    |  102    ----------------------------<  115    4.2     |  22     |  6.56     Ca    7.5        31 May 2024 06:19  Phos  4.8       31 May 2024 06:19  Mg     1.8       31 May 2024 06:19    TPro  5.1    /  Alb  2.1    /  TBili  0.4    /  DBili  x      /  AST  16     /  ALT  10     /  AlkPhos  49     30 May 2024 06:30    LIVER FUNCTIONS - ( 30 May 2024 06:30 )  Alb: 2.1 g/dL / Pro: 5.1 g/dL / ALK PHOS: 49 U/L / ALT: 10 U/L / AST: 16 U/L / GGT: x           acetaminophen     Tablet .. 650 milliGRAM(s) Oral every 6 hours PRN  allopurinol 100 milliGRAM(s) Oral daily  aluminum hydroxide/magnesium hydroxide/simethicone Suspension 30 milliLiter(s) Oral every 4 hours PRN  amLODIPine   Tablet 10 milliGRAM(s) Oral daily  calcium acetate 667 milliGRAM(s) Oral three times a day with meals  chlorhexidine 2% Cloths 1 Application(s) Topical daily  dextrose 5% 1000 milliLiter(s) IV Continuous <Continuous>  dextrose 5%. 1000 milliLiter(s) IV Continuous <Continuous>  dextrose 5%. 1000 milliLiter(s) IV Continuous <Continuous>  dextrose 50% Injectable 25 Gram(s) IV Push once  dextrose 50% Injectable 12.5 Gram(s) IV Push once  dextrose Oral Gel 15 Gram(s) Oral once PRN  doxazosin 2 milliGRAM(s) Oral at bedtime  glucagon  Injectable 1 milliGRAM(s) IntraMuscular once  heparin   Injectable 5000 Unit(s) SubCutaneous every 12 hours  heparin  Lock Flush 100 Units/mL Injectable 300 Unit(s) IV Push once  hydrALAZINE 100 milliGRAM(s) Oral three times a day  insulin lispro (ADMELOG) corrective regimen sliding scale   SubCutaneous three times a day before meals  insulin lispro (ADMELOG) corrective regimen sliding scale   SubCutaneous at bedtime  melatonin 3 milliGRAM(s) Oral at bedtime PRN  multivitamin 1 Tablet(s) Oral daily  ondansetron Injectable 4 milliGRAM(s) IV Push every 8 hours PRN    A/P:    Hx colon cancer s/p R colectomy, chemo  CT A/P noted, new mild L hydro, pancreatic head/mesenteric mass larger than prior   MAGDI/CKD iso of new L hydro, pre-renal/hemodynamic vs CKD progression   Avoid nephrotoxins, no NSAID's  Low Na, low Phos diet   Pt is a poor candidate for RRT given above and it is unlikely to alter outcome and prognosis in this case  D/w her, pt wish is to avoid RRT and invasive tests/procedures  She prefers conservative approach, which is reasonable   She wishes to follow as op  Palliative eval is appr  D/w medicine     [phone]

## 2024-05-31 NOTE — PROGRESS NOTE ADULT - REASON FOR ADMISSION
MAGDI on CKD stage 5

## 2024-05-31 NOTE — PROGRESS NOTE ADULT - ASSESSMENT
MAGDI/Stage 5 CRI - creatinine slightly improved. IVF/Na bicarb as per nephrology.. will monitor BMP  HTN - BP is stable. will continue hydralazine/Norvasc/doxazosin  Anemia/thrombocytopenia - Epogen given yesterday. platelet count improved. will monitor CBC  Generalized weakness - will continue PT  Portal vein thrombosis - await ultrasound results. patient is asymptomatic  Colon CA with mets - overall prognosis is poor. patient and family refusing treatment/workup  Type 2 DM - patient is clinically stable. will monitor glucose and continue insulin regimen      Case discussed with PA/patient's daughter/RN
Stage 5 CRI - patient is refusing dialysis and overall prognosis is poor. patient is aware. will continue supportive care  Anemia - patient is s/p epogen/RBC transfusion. patient is hemodynamically stable. will monitor H/H  HTN - BP is stable. will continue doxazosin/Norvasc/hydralazine. will monitor BP  Colon cancer with mets - patient overall prognosis is poor and is aware. patient is refusing treatment. will continue supportive care. hospice discussed and patient is refusing  Type 2 DM - patient is clinically stable. will continue glucose monitoring and insulin regimen  Generalized weakness - resolved      Case discussed with patient's son/nephrology/NP
Stage 5 CRI - patient refusing dialysis. overall prognosis is poor. nephrology is following  Colon cancer with mets - overall prognosis is poor. abdominal sono reveals patent portal vein. patient is refusing treatment  Anemia - patient receiving PRBC. will monitor H/H  HTN - BP is stable. will continue hydralazine/Norvasc/doxazosin. will monitor BP  Type 2 DM - glucose is stable. will continue insulin regimen and glucose monitoring      Case discussed with PA
89-year-old female with PMH of CKD, colon cancer s/p resection, breast cancer, parathyroid ca, HTN, thrombocytopenia presents to the emergency department for abnormal labs. Sent in by Dr. Garcia for MAGDI on CKD.     Metastatic colon CA  - hx breast CA s/p R lumpectomy s/p radiation  - hx colon CA s/p resection s/p chemotherapy; pt was on Keytruda 2016  - CT a/p reviewed; surgery consulted. pt reports no intervention at this Time.  pt s/p 1 unit pRBCs inpatient   - pt currently w/o pain; Zofran PRN nausea    MAGDI on CKD  - pt reports being asked about HD in the past and being recommended against given her age/ comorbidities.   - Renal following - Pt is a poor candidate for RRT given clinical status and it is unlikely to alter outcome and prognosis in this case    Advanced care planning  - HCP: son/daughter Maximo/Mali Camejo  - see GOC note above, Pt is FULL CODE    Encounter for Palliative Care  - Spoke with pt at bedside, see GOC note above. Provided hospice care network pamplet/information for the future as she is not ready for hospice currently. Pt is okay with coming back to the hospital if needed and getting blood transfusions. Supportive care given. Palliative care team will SIGN OFF at this time.

## 2024-05-31 NOTE — PROGRESS NOTE ADULT - SUBJECTIVE AND OBJECTIVE BOX
HANG MADERA, 89y Female  MRN: [id number]  ATTENDING: Humble Michele    HPI:  89F, PMHx HTN, herpes zoster, CKD, multinodular thyroid,breast cancer, colon cancer (previously on Keytruda), refractory anemia/MDS, treated supportively and ambulatory (erythropoietin supplements), no recent exposure to systemic chemotherapy, is admitted with generalized weakness.  Treated in ambulatory with Procrit and intermittent transfusion support, directed to the ED after found with a , creatinine 6.76.  In ED found pancytopenic (hemoglobin 6.9 g/dL, WBC 2.4K, platelet 123,000).  Patient receiving supportive care in ambulatory, discontinued Keytruda since June 2023. Reviewed results of CT abdomen consistent with suspicion of portal vein thrombus versus tumor thrombus and enlarged bilateral pleural effusion and pericardial effusion as well as developing left hydronephrosis.  There is increasing anasarca and a pancreatic head/mesenteric mass larger than prior studies. Oncology consulted as above.    MEDICATIONS:  acetaminophen     Tablet .. 650 milliGRAM(s) Oral every 6 hours PRN  allopurinol 100 milliGRAM(s) Oral daily  aluminum hydroxide/magnesium hydroxide/simethicone Suspension 30 milliLiter(s) Oral every 4 hours PRN  amLODIPine   Tablet 10 milliGRAM(s) Oral daily  calcium acetate 667 milliGRAM(s) Oral three times a day with meals  chlorhexidine 2% Cloths 1 Application(s) Topical daily  dextrose 5% 1000 milliLiter(s) IV Continuous <Continuous>  dextrose 5%. 1000 milliLiter(s) IV Continuous <Continuous>  dextrose 5%. 1000 milliLiter(s) IV Continuous <Continuous>  dextrose 50% Injectable 25 Gram(s) IV Push once  dextrose 50% Injectable 12.5 Gram(s) IV Push once  dextrose Oral Gel 15 Gram(s) Oral once PRN  doxazosin 2 milliGRAM(s) Oral at bedtime  glucagon  Injectable 1 milliGRAM(s) IntraMuscular once  heparin   Injectable 5000 Unit(s) SubCutaneous every 12 hours  hydrALAZINE 100 milliGRAM(s) Oral three times a day  insulin lispro (ADMELOG) corrective regimen sliding scale   SubCutaneous three times a day before meals  insulin lispro (ADMELOG) corrective regimen sliding scale   SubCutaneous at bedtime  melatonin 3 milliGRAM(s) Oral at bedtime PRN  multivitamin 1 Tablet(s) Oral daily  ondansetron Injectable 4 milliGRAM(s) IV Push every 8 hours PRN  rasburicase IVPB 3 milliGRAM(s) IV Intermittent once    All other medications reviewed.    SUBJECTIVE:  Alert, denies new symptoms.  Occult blood positive, though patient denies melena.    VITALS:  T(C): 36.4 (05-29-24 @ 14:09), Max: 36.7 (05-29-24 @ 05:37)  T(F): 97.6 (05-29-24 @ 14:09), Max: 98 (05-29-24 @ 05:37)  HR: 70 (05-29-24 @ 14:09) (64 - 75)  BP: 132/51 (05-29-24 @ 14:09) (124/57 - 132/51)      PHYSICAL EXAM:  Constitutional: alert, well developed  HEENT: normocephalic, anicteric sclerae, no mucositis or thrush  Respiratory: bilateral clear to auscultation anteriorly  Cardiovascular : S1, S2 regular, rhythmic, no murmurs, gallops or rubs  Abdomen: soft, distended, + normoactive BS, no palpable HS- megaly  Extremities: no tenderness;  -c/c/e, pulses equal bilaterally    LABS:  (05-29) WBC: 2.09 K/uL,Hemoglobin: 7.0 g/dL, Hematocrit: 23.0 %,  Platelet: 84 K/uL  (05-29) Na: 138 mmol/L ; K: 4.4 mmol/L ; BUN: 110 mg/dL ; Cr: 6.62 mg/dL.    RADIOLOGY:  ACC: [id number] EXAM:  CT ABDOMEN AND PELVIS   ORDERED BY: FLOWER RESENDIZ     PROCEDURE DATE:  05/28/2024          INTERPRETATION:  CLINICAL INFORMATION: 89 years  Female with MAGDI, anemia      r/o mass, hydro.  . History of colon cancer. Status post right   hemicolectomy and chemotherapy in 2011. Mesenteric mass excised in 2013   negative for metastatic disease. Recurred in 2016.    COMPARISON: CT abdomen and pelvis 2/29/2024    CONTRAST/COMPLICATIONS:  IV Contrast: NONE  Oral Contrast: NONE  Complications: None reported at time of study completion    PROCEDURE:  CT of the Abdomen and Pelvis was performed.  Sagittal and coronal reformats were performed.    LIMITATIONS: Evaluation of the solid organs, vascular structures and GI   tract is limited without oral and IV contrast.    FINDINGS:  LOWER CHEST: Enlarging bilateral pleural effusions. Small but enlarging   pericardial effusion. Low-attenuation blood pool secondary to anemia.    LIVER: Innumerable cysts and hypodensities too small to characterize.   Soft tissue attenuation within the portal vein concerning for thrombus or   tumor thrombus.  BILE DUCTS: Normal caliber.  GALLBLADDER: Cholecystectomy.  SPLEEN: Stable splenomegaly spanning 13.4 cm.  PANCREAS: Enlarging 10.4 x 8.6 cmright mesenteric mass, previously 10.1   x 6.2 cm, inseparable from the pancreatic head. Increasing air within the   mass. Surrounding surgical clips unchanged. Surrounding mesenteric edema   unchanged.  ADRENALS: Mild left adrenal thickening.  KIDNEYS/URETERS: Bilateral cysts. Right renal atrophy. Developing   moderate left hydronephrosis.    BLADDER: Minimally distended.  REPRODUCTIVE ORGANS: Unremarkable uterus.    BOWEL: No bowel obstruction. Appendix is not visualized. No evidence of   inflammation in the pericecal region.. Right-sided small bowel   anastomosis. Mild gastric distention likely related to compression by the   mass.  PERITONEUM: Small ascites unchanged. Presacral edema.  VESSELS: Atherosclerotic changes.  RETROPERITONEUM/LYMPH NODES: No lymphadenopathy.  ABDOMINAL WALL: Increasing anasarca. Rectus diastases  BONES: Within normal limits.    IMPRESSION:  Suspect portal vein thrombus versus tumor thrombus. If IV contrast cannot   be administered recommend ultrasound evaluation.    Enlarging bilateral pleural effusions and enlarging pericardial effusion.    Developing left hydronephrosis.    Pancreatic head/mesenteric mass is larger than prior and contains more   air than previously, either related to necrosis or communication with   bowel.    Increasing anasarca.     HANG MADERA, 89y Female  MRN: [id number]  ATTENDING: Humble Michele    HPI:  89F, PMHx HTN, herpes zoster, CKD, multinodular thyroid,breast cancer, colon cancer (previously on Keytruda), refractory anemia/MDS, treated supportively and ambulatory (erythropoietin supplements), no recent exposure to systemic chemotherapy, is admitted with generalized weakness.  Treated in ambulatory with Procrit and intermittent transfusion support, directed to the ED after found with a , creatinine 6.76.  In ED found pancytopenic (hemoglobin 6.9 g/dL, WBC 2.4K, platelet 123,000).  Patient receiving supportive care in ambulatory, discontinued Keytruda since June 2023. Reviewed results of CT abdomen consistent with suspicion of portal vein thrombus versus tumor thrombus and enlarged bilateral pleural effusion and pericardial effusion as well as developing left hydronephrosis.  There is increasing anasarca and a pancreatic head/mesenteric mass larger than prior studies. Oncology consulted as above.    MEDICATIONS:  acetaminophen     Tablet .. 650 milliGRAM(s) Oral every 6 hours PRN  allopurinol 100 milliGRAM(s) Oral daily  aluminum hydroxide/magnesium hydroxide/simethicone Suspension 30 milliLiter(s) Oral every 4 hours PRN  amLODIPine   Tablet 10 milliGRAM(s) Oral daily  calcium acetate 667 milliGRAM(s) Oral three times a day with meals  chlorhexidine 2% Cloths 1 Application(s) Topical daily  dextrose 5% 1000 milliLiter(s) IV Continuous <Continuous>  dextrose 5%. 1000 milliLiter(s) IV Continuous <Continuous>  dextrose 5%. 1000 milliLiter(s) IV Continuous <Continuous>  dextrose 50% Injectable 25 Gram(s) IV Push once  dextrose 50% Injectable 12.5 Gram(s) IV Push once  dextrose Oral Gel 15 Gram(s) Oral once PRN  doxazosin 2 milliGRAM(s) Oral at bedtime  glucagon  Injectable 1 milliGRAM(s) IntraMuscular once  heparin   Injectable 5000 Unit(s) SubCutaneous every 12 hours  hydrALAZINE 100 milliGRAM(s) Oral three times a day  insulin lispro (ADMELOG) corrective regimen sliding scale   SubCutaneous three times a day before meals  insulin lispro (ADMELOG) corrective regimen sliding scale   SubCutaneous at bedtime  melatonin 3 milliGRAM(s) Oral at bedtime PRN  multivitamin 1 Tablet(s) Oral daily  ondansetron Injectable 4 milliGRAM(s) IV Push every 8 hours PRN  rasburicase IVPB 3 milliGRAM(s) IV Intermittent once    All other medications reviewed.    SUBJECTIVE:  Offers no new complaints    VITALS:  T(C): 36.4 (05-29-24 @ 14:09), Max: 36.7 (05-29-24 @ 05:37)  T(F): 97.6 (05-29-24 @ 14:09), Max: 98 (05-29-24 @ 05:37)  HR: 70 (05-29-24 @ 14:09) (64 - 75)  BP: 132/51 (05-29-24 @ 14:09) (124/57 - 132/51)      PHYSICAL EXAM:  Constitutional: alert, well developed  HEENT: normocephalic, anicteric sclerae, no mucositis or thrush  Respiratory: bilateral clear to auscultation anteriorly  Cardiovascular : S1, S2 regular, rhythmic, no murmurs, gallops or rubs  Abdomen: soft, distended, + normoactive BS, no palpable HS- megaly  Extremities: no tenderness;  -c/c/e, pulses equal bilaterally    LABS:  (05-29) WBC: 2.09 K/uL,Hemoglobin: 7.0 g/dL, Hematocrit: 23.0 %,  Platelet: 84 K/uL  (05-29) Na: 138 mmol/L ; K: 4.4 mmol/L ; BUN: 110 mg/dL ; Cr: 6.62 mg/dL.    RADIOLOGY:  ACC: [id number] EXAM:  CT ABDOMEN AND PELVIS   ORDERED BY: FLOWER RESENDIZ     PROCEDURE DATE:  05/28/2024          INTERPRETATION:  CLINICAL INFORMATION: 89 years  Female with MAGDI, anemia      r/o mass, hydro.  . History of colon cancer. Status post right   hemicolectomy and chemotherapy in 2011. Mesenteric mass excised in 2013   negative for metastatic disease. Recurred in 2016.    COMPARISON: CT abdomen and pelvis 2/29/2024    CONTRAST/COMPLICATIONS:  IV Contrast: NONE  Oral Contrast: NONE  Complications: None reported at time of study completion    PROCEDURE:  CT of the Abdomen and Pelvis was performed.  Sagittal and coronal reformats were performed.    LIMITATIONS: Evaluation of the solid organs, vascular structures and GI   tract is limited without oral and IV contrast.    FINDINGS:  LOWER CHEST: Enlarging bilateral pleural effusions. Small but enlarging   pericardial effusion. Low-attenuation blood pool secondary to anemia.    LIVER: Innumerable cysts and hypodensities too small to characterize.   Soft tissue attenuation within the portal vein concerning for thrombus or   tumor thrombus.  BILE DUCTS: Normal caliber.  GALLBLADDER: Cholecystectomy.  SPLEEN: Stable splenomegaly spanning 13.4 cm.  PANCREAS: Enlarging 10.4 x 8.6 cmright mesenteric mass, previously 10.1   x 6.2 cm, inseparable from the pancreatic head. Increasing air within the   mass. Surrounding surgical clips unchanged. Surrounding mesenteric edema   unchanged.  ADRENALS: Mild left adrenal thickening.  KIDNEYS/URETERS: Bilateral cysts. Right renal atrophy. Developing   moderate left hydronephrosis.    BLADDER: Minimally distended.  REPRODUCTIVE ORGANS: Unremarkable uterus.    BOWEL: No bowel obstruction. Appendix is not visualized. No evidence of   inflammation in the pericecal region.. Right-sided small bowel   anastomosis. Mild gastric distention likely related to compression by the   mass.  PERITONEUM: Small ascites unchanged. Presacral edema.  VESSELS: Atherosclerotic changes.  RETROPERITONEUM/LYMPH NODES: No lymphadenopathy.  ABDOMINAL WALL: Increasing anasarca. Rectus diastases  BONES: Within normal limits.    IMPRESSION:  Suspect portal vein thrombus versus tumor thrombus. If IV contrast cannot   be administered recommend ultrasound evaluation.    Enlarging bilateral pleural effusions and enlarging pericardial effusion.    Developing left hydronephrosis.    Pancreatic head/mesenteric mass is larger than prior and contains more   air than previously, either related to necrosis or communication with   bowel.    Increasing anasarca.

## 2024-05-31 NOTE — DISCHARGE NOTE NURSING/CASE MANAGEMENT/SOCIAL WORK - PATIENT PORTAL LINK FT
You can access the FollowMyHealth Patient Portal offered by [facility] by registering at the following website: [email or web address]. By joining [facility]’s FollowMyHealth portal, you will also be able to view your health information using other applications (apps) compatible with our system.

## 2024-05-31 NOTE — PROGRESS NOTE ADULT - SUBJECTIVE AND OBJECTIVE BOX
Indication for Geriatrics and Palliative Care Services/INTERVAL HPI: GOC    OVERNIGHT EVENTS: no acute events noted    SUBJECTIVE AND OBJECTIVE:  Pt seen and examined at bedside this morning. She denies any pain and states she plans to go home with her daughter's help. Pt reports some nausea earlier but that has resolved. Denies any other complaints.    Allergies  No Known Allergies    Intolerances    MEDICATIONS  (STANDING):  allopurinol 100 milliGRAM(s) Oral daily  amLODIPine   Tablet 10 milliGRAM(s) Oral daily  calcium acetate 667 milliGRAM(s) Oral three times a day with meals  chlorhexidine 2% Cloths 1 Application(s) Topical daily  dextrose 5% 1000 milliLiter(s) (80 mL/Hr) IV Continuous <Continuous>  dextrose 5%. 1000 milliLiter(s) (100 mL/Hr) IV Continuous <Continuous>  dextrose 5%. 1000 milliLiter(s) (50 mL/Hr) IV Continuous <Continuous>  dextrose 50% Injectable 25 Gram(s) IV Push once  dextrose 50% Injectable 12.5 Gram(s) IV Push once  doxazosin 2 milliGRAM(s) Oral at bedtime  glucagon  Injectable 1 milliGRAM(s) IntraMuscular once  heparin   Injectable 5000 Unit(s) SubCutaneous every 12 hours  hydrALAZINE 100 milliGRAM(s) Oral three times a day  insulin lispro (ADMELOG) corrective regimen sliding scale   SubCutaneous three times a day before meals  insulin lispro (ADMELOG) corrective regimen sliding scale   SubCutaneous at bedtime  magnesium sulfate  IVPB 1 Gram(s) IV Intermittent once  multivitamin 1 Tablet(s) Oral daily    MEDICATIONS  (PRN):  acetaminophen     Tablet .. 650 milliGRAM(s) Oral every 6 hours PRN Temp greater or equal to 38C (100.4F), Mild Pain (1 - 3)  aluminum hydroxide/magnesium hydroxide/simethicone Suspension 30 milliLiter(s) Oral every 4 hours PRN Dyspepsia  dextrose Oral Gel 15 Gram(s) Oral once PRN Blood Glucose LESS THAN 70 milliGRAM(s)/deciliter  melatonin 3 milliGRAM(s) Oral at bedtime PRN Insomnia  ondansetron Injectable 4 milliGRAM(s) IV Push every 8 hours PRN Nausea and/or Vomiting      ITEMS UNCHECKED ARE NOT PRESENT    PRESENT SYMPTOMS:     Pain:   no  QOL impact -   Location -                    Aggravating factors -  Quality -  Radiation -  Timing-  Severity (0-10 scale):  Minimal acceptable level (0-10 scale):       Dyspnea:                          none  Anxiety:                             none  Depressed:                      none  Fatigue:                         mild  Nausea:                   mild  Loss of appetite:             none  Constipation:                    none      Other Symptoms:  [ X]All other review of systems negative       Chaplaincy Referral:   Deferred   Palliative Performance Status Version 2:        60 %      [email or web address]    PHYSICAL EXAM:  Vital Signs Last 24 Hrs  T(C): 36.7 (31 May 2024 05:29), Max: 36.7 (30 May 2024 13:09)  T(F): 98 (31 May 2024 05:29), Max: 98 (30 May 2024 13:09)  HR: 75 (31 May 2024 05:29) (75 - 76)  BP: 170/70 (31 May 2024 05:29) (137/58 - 170/70)  BP(mean): --  RR: 17 (31 May 2024 05:29) (16 - 18)  SpO2: 95% (31 May 2024 05:29) (95% - 97%)    Parameters below as of 30 May 2024 18:55  Patient On (Oxygen Delivery Method): room air     I&O's Summary    30 May 2024 07:01  -  31 May 2024 07:00  --------------------------------------------------------  IN: 0 mL / OUT: 400 mL / NET: -400 mL       GENERAL: pt laying in bed in NAD  HEENT: NC/AT, MMM  PULMONARY: nonlabored breathing, no resp distress  CARDIOVASCULAR:  RRR, no murmur  GASTROINTESTINAL: soft, nontender, ventral hernia noted  Last BM: 5/29  MUSCULOSKELETAL: no edema to BLE, moves all ext  Psych: A&Ox3, appropriate affect      LABS:                        7.8    2.29  )-----------( 92       ( 31 May 2024 06:19 )             25.5   05-31    145  |  112<H>  |  102<H>  ----------------------------<  115<H>  4.2   |  22  |  6.56<H>    Ca    7.5<L>      31 May 2024 06:19  Phos  4.8     05-31  Mg     1.8     05-31    TPro  5.1<L>  /  Alb  2.1<L>  /  TBili  0.4  /  DBili  x   /  AST  16  /  ALT  10  /  AlkPhos  49  05-30      Urinalysis Basic - ( 31 May 2024 06:19 )    Color: x / Appearance: x / SG: x / pH: x  Gluc: 115 mg/dL / Ketone: x  / Bili: x / Urobili: x   Blood: x / Protein: x / Nitrite: x   Leuk Esterase: x / RBC: x / WBC x   Sq Epi: x / Non Sq Epi: x / Bacteria: x      RADIOLOGY & ADDITIONAL STUDIES:    Protein Calorie Malnutrition Present: [ ]mild [ ]moderate [ ]severe [ ]underweight [ ]morbid obesity  [email or web address]/[email or web address]%[email or web address]%[email or web address]-[email or web address]%[email or web address]    Height (cm): 162.6 (05-27-24 @ 20:30), 162.6 (04-11-24 @ 11:01), 162.6 (02-28-24 @ 22:02)  Weight (kg): 71.4 (05-27-24 @ 20:30), 63.5 (04-11-24 @ 11:01), 62.6 (02-28-24 @ 22:02)  BMI (kg/m2): 27 (05-27-24 @ 20:30), 24 (04-11-24 @ 11:01), 23.7 (02-28-24 @ 22:02)    [ ]PPSV2 < or = 30%  [ ]significant weight loss [ ]poor nutritional intake [ ]anasarca[ ]Artificial Nutrition    Other REFERRALS:  [ ]Hospice  [ ]Child Life  [ ]Social Work  [ ]Case management [ ]Holistic Therapy

## 2024-06-03 ENCOUNTER — LABORATORY RESULT (OUTPATIENT)
Age: 89
End: 2024-06-03

## 2024-06-03 LAB
BASOPHILS # BLD AUTO: 0.01 K/UL
BASOPHILS NFR BLD AUTO: 0.4 %
EOSINOPHIL # BLD AUTO: 0.01 K/UL
EOSINOPHIL NFR BLD AUTO: 0.4 %
HCT VFR BLD CALC: 31 %
HGB BLD-MCNC: 8.9 G/DL
IMM GRANULOCYTES NFR BLD AUTO: 0 %
LYMPHOCYTES # BLD AUTO: 0.45 K/UL
LYMPHOCYTES NFR BLD AUTO: 17.7 %
MAN DIFF?: NORMAL
MCHC RBC-ENTMCNC: 26.4 PG
MCHC RBC-ENTMCNC: 28.7 GM/DL
MCV RBC AUTO: 92 FL
MONOCYTES # BLD AUTO: 0.24 K/UL
MONOCYTES NFR BLD AUTO: 9.4 %
NEUTROPHILS # BLD AUTO: 1.83 K/UL
NEUTROPHILS NFR BLD AUTO: 72.1 %
PLATELET # BLD AUTO: 167 K/UL
RBC # BLD: 3.37 M/UL
RBC # FLD: 17.9 %
WBC # FLD AUTO: 2.54 K/UL

## 2024-06-04 ENCOUNTER — NON-APPOINTMENT (OUTPATIENT)
Age: 89
End: 2024-06-04

## 2024-06-04 LAB
ALBUMIN SERPL ELPH-MCNC: 3.3 G/DL
ANION GAP SERPL CALC-SCNC: 17 MMOL/L
BUN SERPL-MCNC: 91 MG/DL
CALCIUM SERPL-MCNC: 8.2 MG/DL
CHLORIDE SERPL-SCNC: 107 MMOL/L
CO2 SERPL-SCNC: 18 MMOL/L
CREAT SERPL-MCNC: 6.53 MG/DL
EGFR: 6 ML/MIN/1.73M2
GLUCOSE SERPL-MCNC: 117 MG/DL
PHOSPHATE SERPL-MCNC: 5.2 MG/DL
POTASSIUM SERPL-SCNC: 4.3 MMOL/L
SODIUM SERPL-SCNC: 142 MMOL/L

## 2024-06-10 ENCOUNTER — OUTPATIENT (OUTPATIENT)
Dept: OUTPATIENT SERVICES | Facility: HOSPITAL | Age: 89
LOS: 1 days | End: 2024-06-10
Payer: MEDICARE

## 2024-06-10 DIAGNOSIS — Z00.00 ENCOUNTER FOR GENERAL ADULT MEDICAL EXAMINATION WITHOUT ABNORMAL FINDINGS: ICD-10-CM

## 2024-06-10 DIAGNOSIS — D35.1 BENIGN NEOPLASM OF PARATHYROID GLAND: Chronic | ICD-10-CM

## 2024-06-10 DIAGNOSIS — Z98.890 OTHER SPECIFIED POSTPROCEDURAL STATES: Chronic | ICD-10-CM

## 2024-06-10 DIAGNOSIS — Z13.228 ENCOUNTER FOR SCREENING FOR OTHER METABOLIC DISORDERS: ICD-10-CM

## 2024-06-10 DIAGNOSIS — Z90.49 ACQUIRED ABSENCE OF OTHER SPECIFIED PARTS OF DIGESTIVE TRACT: Chronic | ICD-10-CM

## 2024-06-10 LAB
ANION GAP SERPL CALC-SCNC: 16 MMOL/L — SIGNIFICANT CHANGE UP (ref 5–17)
BUN SERPL-MCNC: 82 MG/DL — HIGH (ref 7–23)
CALCIUM SERPL-MCNC: 7.8 MG/DL — LOW (ref 8.4–10.5)
CHLORIDE SERPL-SCNC: 109 MMOL/L — HIGH (ref 96–108)
CO2 SERPL-SCNC: 18 MMOL/L — LOW (ref 22–31)
CREAT SERPL-MCNC: 6.39 MG/DL — HIGH (ref 0.5–1.3)
EGFR: 6 ML/MIN/1.73M2 — LOW
GLUCOSE SERPL-MCNC: 142 MG/DL — HIGH (ref 70–99)
POTASSIUM SERPL-MCNC: 4.2 MMOL/L — SIGNIFICANT CHANGE UP (ref 3.5–5.3)
POTASSIUM SERPL-SCNC: 4.2 MMOL/L — SIGNIFICANT CHANGE UP (ref 3.5–5.3)
SODIUM SERPL-SCNC: 143 MMOL/L — SIGNIFICANT CHANGE UP (ref 135–145)

## 2024-06-10 PROCEDURE — 36415 COLL VENOUS BLD VENIPUNCTURE: CPT

## 2024-06-10 PROCEDURE — 80048 BASIC METABOLIC PNL TOTAL CA: CPT

## 2024-06-11 NOTE — ED PROVIDER NOTE - WET READ LAUNCH FT
2024       Sabrina Rolon MD  [address] [facility] [address]  [facility] [id number]  Via In Basket      Patient: Kofi Shirley   YOB: 1958   Date of Visit: 2024       Dear Dr. Rolon:    I saw your patient, Kofi Shirley, for an evaluation. Below are my notes for this visit with him.    If you have questions, please do not hesitate to call me.      Sincerely,        Jyothsna Palla, MD        CC: No Recipients Palla, Jyothsna, MD  2024 10:38 PM  Signed  OFFICE VISIT: FOLLOW-UP VISIT    Patient: Kofi Shirley Date of Service: 2024   : 1958 MRN: [id number]     SUBJECTIVE:     Chief Complaint   Patient presents with   • Office Visit   • Follow-up     Patient reports glucose as 98      HISTORY OF PRESENT ILLNESS:  65 year old male, with a history of type 2 diabetes, currently fairly controlled on oral hypoglycemics, presents for follow up.     Type 2 diabetes mellitus with hyperglycemia, with long-term current use of insulin (CMD)  Has had few episodes of hyperglycemia and hypoglycemia. Currently, the diabetes is complicated by nephropathy. Patient does not have macrovascular complications of stroke/CAD/PVD.    Patient has had diabetes since the age of 45.      The patient is currently utilizing a Daisy continuous glucose monitor, having experienced 1 or 2 instances of hypoglycemia in the past 2 weeks. He experienced an illness last week, characterized by an inability to retain any food in his stomach, necessitating the consumption of food. His glucose levels occasionally rise postprandially. His current medication regimen includes Jardiance 25 mg, glipizide 5 mg, Janumet, and metformin 1000 mg. He denies any new issues, yeast infections, or urinary issues. His kidney function remains stable, and the protein in his urine has normalized. A recent blood work conducted by his nephrologist indicated a low potassium level, prompting a new prescription. He is also  on telmisartan 80 mg once daily and hydrochlorothiazide.    Lab Results   Component Value Date    FNGQTLFY4W 8.9 (A) 09/29/2023    IIHVKLZU8X 7.8 (A) 05/19/2023    HDIIZBNB7X 7.4 (A) 01/19/2023     Lab Results   Component Value Date    HGBA1C 8.3 (H) 06/07/2024    HGBA1C 8.5 (H) 01/22/2024    HGBA1C 8.2 (H) 05/23/2023     Reports being compliant on medications.  Oral medication: On Empagliflozin 10 mg daily.  Denies yeast infection or UTI infection.   Sitagliptin-Metformin  mg, one tablet daily.  Glipizide 5 mg three tablets with breakfast.      Currently his weight is 221 lb.   Wt Readings from Last 3 Encounters:   06/11/24 101.1 kg (222 lb 14.2 oz)   02/06/24 100.3 kg (221 lb 1.9 oz)   01/26/24 101.2 kg (223 lb 1.7 oz)     BMI Readings from Last 3 Encounters:   06/11/24 28.62 kg/m²   02/06/24 28.39 kg/m²   01/26/24 28.65 kg/m²     Diet and exercise: The patient has been trying to make diet restrictions and on an exercise regimen for the last 6 months without  adequate weight loss.     Factors affecting glycemic control:  Monitoring of sugars at home - Yes.      Patient has stage 3 chronic disease.  His creatinine is high at 01/22/2024.   Lab Results   Component Value Date    CREATININE 1.76 (H) 06/07/2024    CREATININE 1.51 (H) 01/22/2024    CREATININE 1.49 (H) 05/23/2023     Benign essential hypertension  His blood pressure checked today is elevated at 181/94 mmHg.  Aldosterone levels were in the normal range.  Remains hypokalemic.      Hypokalemia   His potassium levels are low.  Lab Results   Component Value Date    POTASSIUM 3.1 (L) 06/07/2024    POTASSIUM 3.2 (L) 01/22/2024    POTASSIUM 3.4 05/23/2023          Patient Active Problem List   Diagnosis   • Anemia   • Benign essential hypertension   • CVA (cerebral vascular accident)  (CMD)   • Facet syndrome, lumbar   • Heart & renal disease, hypertensive benign with chronic kidney disease   • Hyperlipidemia   • Hypertensive kidney disease with stage 3a  chronic kidney disease  (CMD)   • LVH (left ventricular hypertrophy) due to hypertensive disease, without heart failure   • Occlusion and stenosis of cerebral artery   • Post herpetic neuralgia   • Protrusion of intervertebral disc of lumbosacral region   • Stage III chronic kidney disease  (CMD)   • Type 2 diabetes mellitus with diabetic polyneuropathy  (CMD)   • Uncontrolled type 2 diabetes mellitus with hyperglycemia  (CMD)   • Vitamin D deficiency   • Xerosis cutis   • Tear of talofibular ligament of left lower extremity   • Pre-ulcerative calluses   • Other age-related cataract   • Polyneuropathy due to type 2 diabetes mellitus  (CMD)   • Other long term (current) drug therapy   • Lumbar radiculopathy   • Encounter for general adult medical examination w/o abnormal findings   • Type 2 diabetes mellitus with hyperglycemia, with long-term current use of insulin  (CMD)       ALLERGIES:  ALLERGIES:  No Known Allergies    SOCIAL HISTORY:  Social History     Tobacco Use   Smoking Status Never   • Passive exposure: Never   Smokeless Tobacco Never     Social History     Substance and Sexual Activity   Alcohol Use Not Currently       Review of Systems  Constitutional: Normal energy levels.     OBJECTIVE:     Visit Vitals  BP (!) 160/83 (BP Location: LUE - Left upper extremity, Patient Position: Sitting, Cuff Size: Large Adult)   Pulse 76   Resp 16   Ht 6' 2\" (1.88 m)   Wt 101.1 kg (222 lb 14.2 oz)   SpO2 98%   BMI 28.62 kg/m²       Physical Exam  Constitutional: Well developed, well nourished, no acute medical distress.  Eyes: Conjunctivae are normal. Sclerae anicteric.  Respiratory: No distress.  Skin: No visible rashes.  Neurologic: Grossly normal coordination and gait.  Psychiatric: Normal mood, affect, speech and cognition.    DIAGNOSTIC STUDIES:   LAB RESULTS:    GFR Estimate, Non  (no units)   Date Value   11/14/2020 53       Creatinine (mg/dL)   Date Value   06/07/2024 1.76 (H)       Cholesterol  (mg/dL)   Date Value   01/22/2024 155       HDL (mg/dL)   Date Value   01/22/2024 39 (L)       Cholesterol/ HDL Ratio (no units)   Date Value   01/22/2024 4.0       Triglycerides (mg/dL)   Date Value   01/22/2024 101       LDL (mg/dL)   Date Value   01/22/2024 96       Lab Results   Component Value Date    URMIC 4.26 11/14/2020       ASSESSMENT AND PLAN:   This is a 65 year old  male who presents with     (E11.65,  Z79.4) Type 2 diabetes mellitus with hyperglycemia, with long-term current use of insulin (CMD)  CGM data downloaded and reviewed with patient.  Reviewed and discussed previous reports, and recommended the below in light of current diabetes.  Diabetes is currently fairly controlled as evidenced by HbA1c of  8.3% while the CGM A1c is 6.7%. because of the discrepency we are waiting for a fructosamine number.  .   Patient has stage 3 chronic disease. On Telmisartan 80 mg twice daily.   Recommended and ordered Glycohemoglobin, Fructosamine. Explained significance of labs and tests ordered in management and patient acknowledges the need to undergo the tests.  Advised to Sitagliptin-Metformin  xr  mg once daily with breakfast.  Advised to continue the  current dose of Empagliflozin  25 mg daily.   Advised to take Glipizide from 10 mg one tablet daily  in the morning.   Encouraged to remain compliant with the above medications.  Encouraged to remain compliant with diet restrictions, which were discussed in detail.    Advised to reduce the amount of carbohydrates and sugar in diet and incorporate protein and vegetables.  Advised on sustained levels of activity.  Advised glycemic control to prevent progression of CKD.   Bring glucometer or blood glucose log to every visit.  Check blood sugars multiple times per day.  Check fasting sugars or before lunch or dinner or at bedtime  If blood sugars consistently more than 200 or less than 70 please call clinic at [phone] or is send a message with blood  sugars through the my chart portal.    (I10) Benign essential hypertension  Reviewed and discussed previous logs.  We will continue to monitor.     (E87.6) Hypokalemia   Reviewed and discussed previous reports and recommended the below.  Recommended and ordered Aldosterone and Renin Activity Ratio. Explained significance of labs and tests ordered in management and patient acknowledges the need to undergo the tests.   We will continue to monitor.     Orders Placed This Encounter   • glipiZIDE (GLUCOTROL) 5 MG tablet     Sig: Take 2 tablets by mouth daily (before breakfast).     Dispense:  180 tablet     Refill:  3   • sitaGLIPtin-metFORMIN (JANUMET XR)  MG TABLET SR 24 HR     Sig: Take 1 tablet by mouth daily (with breakfast).     Dispense:  90 tablet     Refill:  3       Current Outpatient Medications   Medication Instructions   • aspirin (ASPIRIN LOW DOSE) 81 mg, Oral, DAILY   • Continuous Glucose Sensor (FreeStyle Daisy 14 Day Sensor) Misc Use it as directed   • empagliflozin (JARDIANCE) 25 mg, Oral, DAILY BEFORE BREAKFAST   • felodipine (PLENDIL) 10 mg, Oral, DAILY   • glipiZIDE (GLUCOTROL) 10 mg, Oral, DAILY BEFORE BREAKFAST   • hydroCHLOROthiazide 25 mg, Oral, DAILY   • levETIRAcetam (KepPRA) 500 MG tablet TAKE 1 TABLET BY MOUTH EVERY MORNING AND TAKE 1 TABLET BY MOUTH EVERY EVENING   • simvastatin (ZOCOR) 40 mg, Oral, DAILY   • sitaGLIPtin-metFORMIN (JANUMET XR)  MG TABLET SR 24 HR 1 tablet, Oral, DAILY WITH BREAKFAST   • telmisartan (MICARDIS) 80 mg, Oral, DAILY     Medications Discontinued During This Encounter   Medication Reason   • Continuous Blood Gluc Sensor (FreeStyle Daisy 14 Day Sensor) Misc Reorder   • glipiZIDE (GLUCOTROL) 5 MG tablet Reorder   • telmisartan (MICARDIS) 80 MG tablet Dose Adjustment   • sitaGLIPtin-metFORMIN (JANUMET XR)  MG TABLET SR 24 HR Reorder       Instructions provided as documented in the AVS.    Return in about 6 months (around 12/11/2024). There are no Wet Read(s) to document.

## 2024-06-18 NOTE — ASU PREOP CHECKLIST - HEART RATE (BEATS/MIN)
CHIEF COMPLAINT(S): No chief complaint on file.      HISTORY OF PRESENT ILLNESS:  Miguel Whitaker is a 70 year old  male last seen on 24 who presents today for follow up of right foot pain secondary to insufficiency of the posterior tibial tendon and posterior tibialis tendinitis. He was instructed to attend physical therapy, take Meloxicam, take Tylenol as needed, ice for 20 minutes 3 times per day, and purchase arch supports for his walking shoes. Today patient states ***    Accompanied by ***  Location: right foot  Date of Onset: 2024  Context: see above  Did this injury occur at work: No  Severity:{#:28234}/10   Timing: {ssk pain timin}  Quality: {ssk pain adjective:822749}  Associated signs and symptoms: {ssk pain related symptoms:943939}  Aggravating factors: {ssk aggrevating factors:250711}  Alleviating factors: {ssk alleviating factors:280331}  Patient feels: ***/100  Patient feels ***% improved from last visit  ***    Review of Systems    REVIEW OF SYSTEMS:  Skin:{ROS - SKIN:25493::\"No problems with hair or nails. No rash. No new skin lesions.\"}  Heent: {ROS HEENT:35277::\"Pt denies problems with head, eyes, ears, nose, mouth, throat and neck\"}  Respiratory: {ROS RESP:31669::\"No coughing, wheezing, changes in voice,  nor shortness of breath\"}  Cardiovascular:{ROS CARDIAC:80543::\"No chest pain, palpitations or other cardiac complaints noted\"}  Gastrointestinal: {ROS - GI:86461::\"No diarrhea, constipation, abdominal pain or other complaints noted\"}  Genitourinary: {ROS - :33638::\"Pt. denies urinary pain, bleeding and voiding problems\"}   Musculoskeletal: Per HPI  Neurologic:{ROS - NEURO:42898::\"Pt. denies syncope, seizures, paralysis, involuntary movements or gait problems\"}  Psychiatric: {ROS - PSYCH:13463::\"Pt denies sleep, anxiety, depression, sexual and other psychiatric problems\"}  Hematologic/Lymphatic/Immunologic: {ROS - HEME:04874::\"Pt. denies hematological, lymphatic and  immunological problems\"}  Endocrine: {ROS - ENDO:22530::\"Pt. denies thyroid and diabetic problems\"}    The patient was instructed to follow up with PCP regarding all positive ROS that were not directly pertinent with the chief complaint.      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    Past Family History Updated: {YES (DEF)/NO:36977}  FAMILY HISTORY:  Family History   Problem Relation Age of Onset    Patient is unaware of any medical problems Mother     Cancer, Lung Father      Reviewed and non-contributory to patient's illness unless otherwise stated above    Past Social History Updated: {YES (DEF)/NO:02282}  SOCIAL HISTORY:  Social History     Socioeconomic History    Marital status: /Civil Union     Spouse name: Not on file    Number of children: Not on file    Years of education: Not on file    Highest education level: Not on file   Occupational History    Not on file   Tobacco Use    Smoking status: Some Days     Types: Cigarettes    Smokeless tobacco: Never    Tobacco comments:     \"once in a while\"   Vaping Use    Vaping status: never used   Substance and Sexual Activity    Alcohol use: Yes     Alcohol/week: 6.0 standard drinks of alcohol     Types: 3 Cans of beer, 3 Shots of liquor per week     Comment: 2-3 times a week    Drug use: Never    Sexual activity: Yes     Partners: Female   Other Topics Concern    Not on file   Social History Narrative    Not on file     Social Determinants of Health     Financial Resource Strain: Not on file   Food Insecurity: Not on file   Transportation  Needs: Not on file   Physical Activity: Not on file   Stress: Not on file   Social Connections: Not on file   Interpersonal Safety: Not on file        MEDICATIONS:  Current Outpatient Medications   Medication Sig Dispense Refill    enalapril (VASOTEC) 20 MG tablet TAKE ONE TABLET BY MOUTH EVERY MORNING AND EVENING 180 tablet 0    meloxicam (MOBIC) 15 MG tablet Take 1 tablet by mouth daily (with breakfast) for 10 days. 10 tablet 0    hydroCHLOROthiazide 25 MG tablet TAKE ONE TABLET BY MOUTH DAILY 90 tablet 0    ciprofloxacin (CIPRO) 500 MG tablet Take 1 tablet by mouth 1 time. Take 1 tablet 1 hour prior to procedure, then take 1 tablet every 12 hours until finished (Patient not taking: Reported on 5/8/2024) 14 tablet 0    amLODIPine (NORVASC) 5 MG tablet Take 2 tablet by mouth daily 180 tablet 1    amLODIPine (NORVASC) 10 MG tablet Take 1 tablet by mouth daily. 90 tablet 3    halcinonide (Halog) 0.1 % ointment Apply topically 2 times daily. As needed 60 g 0    Magnesium 400 MG Cap Take 1 capsule by mouth daily.      Multiple Vitamins-Minerals (MULTIVITAMIN ADULT PO)       fluocinonide (LIDEX) 0.05 % ointment APPLY TWO TIMES A DAY TO AFFECTED AREAS. LIMIT USE TO 2 WEEKS.      Omega-3 Fatty Acids (FISH OIL) 500 MG capsule        No current facility-administered medications for this visit.     ALLERGIES:   ALLERGIES:  No Known Allergies    VITAL SIGNS:  There were no vitals taken for this visit.  There is no height or weight on file to calculate BMI.    Roomed by: ***    EXAM:  This is a 70 year old male, awake, alert, and cooperative. He is well nourished, well developed, and in no apparent distress.  Pulmonary - No increased work of breathing.  Lymphatics - There is no evidence of generalized adenopathy.  MSK:  ***  Radiology:   ***    {Cap Int/Ext:605066} {bckrxtype:818536::\"physical\"} therapy progress notes were reviewed and analyzed.  These show:  ***     ASSESSMENT & PLAN:  Miguel Whitaker is a 70 year old male  with ***    Plan as follows:  1. ***  2. ***  3. ***  4. ***     The patient will follow up with us in {NUMBERS 1-31:368223} {days wks mos yr:661856}    Restrictions: ***    No diagnosis found.    ***    ***    Total time was *** minutes. This includes chart review before the visit, actual time spent with patient, and time spent on documentation after the visit.      Note to patient: The 21st Century Cures Act makes medical notes like these available to patients in the interest of transparency. However, be advised this is a medical document. It is intended as peer to peer communication. It is written in medical language and may contain abbreviations or verbiage that are unfamiliar. It may appear blunt or direct. Medical documents are intended to carry relevant information, facts as evident, and the clinical opinion of the practitioner.     72

## 2024-06-25 ENCOUNTER — APPOINTMENT (OUTPATIENT)
Dept: ULTRASOUND IMAGING | Facility: HOSPITAL | Age: 89
End: 2024-06-25

## 2024-06-25 ENCOUNTER — APPOINTMENT (OUTPATIENT)
Dept: MAMMOGRAPHY | Facility: HOSPITAL | Age: 89
End: 2024-06-25

## 2024-07-16 ENCOUNTER — APPOINTMENT (OUTPATIENT)
Dept: HEMATOLOGY ONCOLOGY | Facility: CLINIC | Age: 89
End: 2024-07-16
Payer: MEDICARE

## 2024-07-16 ENCOUNTER — RESULT REVIEW (OUTPATIENT)
Age: 89
End: 2024-07-16

## 2024-07-16 ENCOUNTER — APPOINTMENT (OUTPATIENT)
Dept: INFUSION THERAPY | Facility: HOSPITAL | Age: 89
End: 2024-07-16

## 2024-07-16 VITALS
RESPIRATION RATE: 16 BRPM | OXYGEN SATURATION: 96 % | TEMPERATURE: 97.2 F | HEIGHT: 64.02 IN | SYSTOLIC BLOOD PRESSURE: 123 MMHG | WEIGHT: 140.43 LBS | DIASTOLIC BLOOD PRESSURE: 56 MMHG | HEART RATE: 84 BPM | BODY MASS INDEX: 23.98 KG/M2

## 2024-07-16 DIAGNOSIS — Z86.2 PERSONAL HISTORY OF DISEASES OF THE BLOOD AND BLOOD-FORMING ORGANS AND CERTAIN DISORDERS INVOLVING THE IMMUNE MECHANISM: ICD-10-CM

## 2024-07-16 DIAGNOSIS — R97.0 ELEVATED CARCINOEMBRYONIC ANTIGEN [CEA]: ICD-10-CM

## 2024-07-16 DIAGNOSIS — C18.9 MALIGNANT NEOPLASM OF COLON, UNSPECIFIED: ICD-10-CM

## 2024-07-16 DIAGNOSIS — D72.819 DECREASED WHITE BLOOD CELL COUNT, UNSPECIFIED: ICD-10-CM

## 2024-07-16 DIAGNOSIS — D64.9 ANEMIA, UNSPECIFIED: ICD-10-CM

## 2024-07-16 LAB
BASOPHILS # BLD AUTO: 0.01 K/UL — SIGNIFICANT CHANGE UP (ref 0–0.2)
BASOPHILS NFR BLD AUTO: 0.3 % — SIGNIFICANT CHANGE UP (ref 0–2)
EOSINOPHIL # BLD AUTO: 0 K/UL — SIGNIFICANT CHANGE UP (ref 0–0.5)
EOSINOPHIL NFR BLD AUTO: 0 % — SIGNIFICANT CHANGE UP (ref 0–6)
HCT VFR BLD CALC: 32.9 % — LOW (ref 34.5–45)
HGB BLD-MCNC: 9.6 G/DL — LOW (ref 11.5–15.5)
IMM GRANULOCYTES NFR BLD AUTO: 0 % — SIGNIFICANT CHANGE UP (ref 0–0.9)
LYMPHOCYTES # BLD AUTO: 0.44 K/UL — LOW (ref 1–3.3)
LYMPHOCYTES # BLD AUTO: 11.7 % — LOW (ref 13–44)
MCHC RBC-ENTMCNC: 26.8 PG — LOW (ref 27–34)
MCHC RBC-ENTMCNC: 29.2 G/DL — LOW (ref 32–36)
MCV RBC AUTO: 91.9 FL — SIGNIFICANT CHANGE UP (ref 80–100)
MONOCYTES # BLD AUTO: 0.34 K/UL — SIGNIFICANT CHANGE UP (ref 0–0.9)
MONOCYTES NFR BLD AUTO: 9.1 % — SIGNIFICANT CHANGE UP (ref 2–14)
NEUTROPHILS # BLD AUTO: 2.96 K/UL — SIGNIFICANT CHANGE UP (ref 1.8–7.4)
NEUTROPHILS NFR BLD AUTO: 78.9 % — HIGH (ref 43–77)
NRBC # BLD: 0 /100 WBCS — SIGNIFICANT CHANGE UP (ref 0–0)
PLATELET # BLD AUTO: 113 K/UL — LOW (ref 150–400)
RBC # BLD: 3.58 M/UL — LOW (ref 3.8–5.2)
RBC # FLD: 18 % — HIGH (ref 10.3–14.5)
WBC # BLD: 3.75 K/UL — LOW (ref 3.8–10.5)
WBC # FLD AUTO: 3.75 K/UL — LOW (ref 3.8–10.5)

## 2024-07-16 PROCEDURE — 99214 OFFICE O/P EST MOD 30 MIN: CPT

## 2024-07-16 PROCEDURE — G2211 COMPLEX E/M VISIT ADD ON: CPT

## 2024-07-17 LAB
FERRITIN SERPL-MCNC: 169 NG/ML — SIGNIFICANT CHANGE UP (ref 13–330)
FOLATE SERPL-MCNC: 11.8 NG/ML — SIGNIFICANT CHANGE UP
IRON SATN MFR SERPL: 10 % — LOW (ref 14–50)
IRON SATN MFR SERPL: 22 UG/DL — LOW (ref 30–160)
TIBC SERPL-MCNC: 212 UG/DL — LOW (ref 220–430)
UIBC SERPL-MCNC: 190 UG/DL — SIGNIFICANT CHANGE UP (ref 110–370)
VIT B12 SERPL-MCNC: 721 PG/ML — SIGNIFICANT CHANGE UP (ref 232–1245)

## 2024-08-01 NOTE — ED PROVIDER NOTE - CARE PROVIDERS DIRECT ADDRESSES
1. Carotid stenosis, symptomatic, with infarction (HCC)  Assessment & Plan:  High-grade symptomatic left carotid artery stenosis status post carotid endarterectomy on April 16.  Repeat duplex demonstrates wide patency of the left carotid artery.  The patient does not have any stroke symptoms since that time.  The patient wishes to no longer take aspirin nor Plavix.  I suggested continuing aspirin however Plavix could be discontinued.  She is not interested in taking statin medication per her own research.  She is a non-smoker.  Orders:  -     VAS carotid complete study; Future; Expected date: 02/01/2025  2. Cerebral atherosclerosis  -     VAS carotid complete study; Future; Expected date: 02/01/2025      ,DirectAddress_Unknown

## 2024-08-30 NOTE — ED PROVIDER NOTE - PROGRESS NOTE
[facility]  306/1  PROGRESS NOTE   Patient: Bettina Dodson  Today's Date: 8/30/2024    YOB: 1948  Admission Date: 8/26/2024    MRN: [id number]  Inpatient LOS: 2    Attending: Eli Lipscomb MD  Hospital Day: Hospital Day: 5    Subjective   HISTORY AND SUBJECTIVE COMPLAINTS     Chief Complaint:   Intractable nausea and vomiting    Interval History / Subjective:   Patient had a good day yesterday.  She did well for breakfast and lunch.  Then at suppertime and afterward she started feeling very nauseated and was unable to eat.  Today she feels very nauseated.  She has not vomited as of yet.  She does not think that Zofran helps much.  She is receiving Reglan.  She is on Tikosyn so we have to be careful with these medications as they all can affect the QT interval.  She denies any abdominal pain.  She denies a fever or chills.  She was supposed to see her oncologist today for the first visit since her lung cancer has returned.  She will not be able to make it today.  She denies a headache.    Hospital Course:  Bettina Dodson is a 76 year old female who presented on 8/26/2024 with complaints of Vomiting    Patient is a very pleasant 76-year-old white female with past medical history remarkable for right lung cancer status post radiation in 2017 with now recent recurrence of right lung cancer diagnosed August 13, 2024--just got the diagnosis--they are meeting with oncology through [facility] August 30, 2024.  Other health problems include hypertension, atrial fib/ flutter, chronic diastolic congestive heart failure, COPD, GERD, hypercholesterolemia, CKD 3 A, obstructive sleep apnea for which she uses an oral appliance, AV heart block, PVD.   Patient recently admitted 8/22/2024 and discharged 8/24/2024 with complaints of persistent unrelenting nausea and vomiting for about 1 week prior to admission--however upon further questioning patient admitted that she has had problems with  persistent intermittent nausea and vomiting since June 2024--due to being miserable for 2 days then good for 3 or 4 and then miserable again--she estimated weight loss of 12 pounds between June and last admission.  During last admission EGD was performed because of CT which revealed gastric wall thickening prominent in the fundus and along the lesser curvature which could be consistent with gastritis but cannot rule out infiltrating gastric neoplasm--EGD revealed no obvious masses or lesions and multiple biopsies were taken--biopsies revealed benign gastric oxyntic type mucosa with features compatible with proton pump inhibitor effect and some areas suggesting incipient fundic gland polyp formation, there is no adenomatous change or dysplasia or malignancy, there is also no significant abnormal inflammation.  Patient seems to be doing better upon discharge-and was good for about three quarters of a day before significant nausea and vomiting recurred and patient return to ED--- again recurrent complaints of nausea and bilious emesis and dry heaving.  Upon arrival to ED potassium low at 3.3 and magnesium severely low at 1.0.  CT abdomen and pelvis was repeated did not show any acute changes from previous--- persistent stable infrarenal AAA noted.     Patient was admitted to replace magnesium and potassium and to reconsult gastroenterology.  Consideration for repeat EGD was made--but since this was just performed a week ago it was opted to pursue a small bowel follow-through.  That was unremarkable.  Currently nausea is being controlled with IV Reglan and IV Zofran as needed  Small bowel follow-through shows no obstructive etiology the patient's nausea and vomiting--perfectly normal.  At this point we will try Transderm scopolamine patch and see how she does.  She does feel little bit hungry at times--will advance diet.  Patient does have her next appointment with oncology to discuss her newly diagnosed recurrent lung  cancer August 30.    ROS:  Pertinent systems negative except as above.    Objective   PHYSICAL EXAMINATION     Vital 24 Hour Range Most Recent Value   Temperature Temp  Min: 97.6 °F (36.4 °C)  Max: 98.1 °F (36.7 °C) 98.1 °F (36.7 °C)   Pulse Pulse  Min: 59  Max: 64 64   Respiratory Resp  Min: 16  Max: 16 16   Blood Pressure BP  Min: 118/64  Max: 162/74 118/64   Pulse Oximetry SpO2  Min: 95 %  Max: 96 % 96 %   Arterial BP No data recorded     O2 No data recorded       Recorded Intake and Output:  Intake/Output Summary (Last 24 hours) at 8/30/2024 1559  Last data filed at 8/30/2024 1112  Gross per 24 hour   Intake 1666 ml   Output 1200 ml   Net 466 ml      Recorded Last Stool Occurrence: 1 (08/29/24 1805)     Vital Most Recent Value First Value   Weight 55.8 kg (123 lb 0.3 oz) Weight: 55.8 kg (123 lb 0.3 oz)   Height 5' 1\" (154.9 cm) Height: 5' 1\" (154.9 cm)   BMI 23.24 N/A     General: Looks  well developed, well nourished.appears mildly ill  CV: regular rate and rhythm and no murmurs, rubs, or thrills  Resp: clear to auscultation bilaterally  Abd: soft, nontender, nondistended, and no hepatosplenomegaly  Ext: no rashes, lesions, or ulcers noted and edema absent   Skin: no rashes, lesions, or ulcers noted  Neuro: CN 2-12 grossly intact, normal sensation , and no focal deficits noted  Psych: normal judgement and insight, oriented to time, place and person, and normal mood and affect    TEST RESULTS     Labs: The Laboratory values listed below have been reviewed and pertinent findings discussed in the Assessment and Plan.    Laboratory values:   Recent Labs   Lab 08/30/24  0619 08/29/24  0601 08/28/24  0625   WBC 9.2 9.0 11.1*   HGB 10.7* 10.3* 10.8*   HCT 33.7* 32.9* 33.8*    405 471*         Recent Labs   Lab 08/30/24  0619 08/29/24  0601 08/28/24  1613 08/28/24  0625   SODIUM 137 139  --  137   POTASSIUM 4.2 3.8 3.5 3.3*   CHLORIDE 106 107  --  103   CO2 20* 21  --  23   CALCIUM 9.3 9.2  --  9.4   GLUCOSE 93  90  --  104*   BUN 6 8  --  10   CREATININE 0.58 0.61  --  0.67   MG 1.9 1.5*  --  1.9        Recent Labs   Lab 08/30/24  0619 08/29/24  0601 08/28/24  0625   ALBUMIN 2.3* 2.4* 2.6*   AST 7 5 8   GPT 12 12 14   BILIRUBIN 0.5 0.5 0.5       Radiology: Imaging studies have been reviewed and pertinent findings discussed in the Assessment and Plan.  Results for orders placed or performed during the hospital encounter of 08/26/24 (from the past 48 hour(s))   MRI BRAIN W WO CONTRAST    Impression    IMPRESSION:    *  No acute intracranial findings.  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      Electronically Signed by: Ryan Collins DO  Signed on: 8/30/2024 1:05 PM  Created on Workstation ID: [id number]  Signed on Workstation ID: [id number]        ANCILLARY ORDERS     Diet:  2 Times/Day W Lunch & Dinner; Ensure Clear Generic/Clear Liquid Supplement (Apple Flavor Preferred) Oral Nutrition Supplement  Regular Diet  Telemetry: On  Consults:    IP CONSULT TO GI  Therapy Orders:   No orders of the defined types were placed in this encounter.      ADVANCED DIRECTIVES     Code Status: Full Resuscitation           ASSESSMENT AND PLAN     Assessment and plan:    1.  Intractable nausea and vomiting:  The etiology of this is unclear.  There is no abdominal pain.  MRI of the brain was done today to rule out mets or tumor which could be causing some nausea and vomiting.  This was unremarkable.  --- Discussed with GI.  We do have to be careful with the antiemetics given the Tikosyn and side effect of arrhythmia.  Will use Zofran for now.  She does not think it is as effective.  Compazine is not recommended.  Reglan also can have the same effects but she was tolerating that.  Per GI, will try Atarax.  --- IV hydration-.  --- Continue antiemetics    2.  Recurrent right lung cancer:  Patient was unable to make her appointment today Rachel.  She will  need to reschedule this.    3.  History of atrial flutter/fib:  Patient is in the sinus rhythm.  --- Continue metoprolol, Cardizem, and Eliquis.    4.  GERD:  --- Continue Protonix IV.  Transition to omeprazole as an outpatient.    5.  SHAWN:  Resolved.  GFR is greater than 90.  Suspect this was prerenal in nature.    6.  History of diastolic congestive heart failure--chronic:  Patient does not have signs of CHF.  Lasix and spironolactone have been on hold.  --Continue Jardiance-.      7.  Hypertension:  --- Continue Cardizem and metoprolol and Cozaar      Smoking status: Former Tobacco User    Nutrition status: Malnutrition diagnosis acute illness/injury; severe: Energy intake of <50% of estimated energy requirement for >/equals 5 days, Weight loss of >5%/1 month (visual observations of moderate depletion of body fat and muscle mass). Intervention:Coordination of Nutrition care by a nutritional Professional, Nutritional Supplemental therapy. See RD Note for Current recommendations.   Body mass index is 23.24 kg/m². - Patient has an appropriate weight with BMI 18.5-24  DVT Prophylaxis: Eliquis, SCDs      DISCHARGE PLANNING     The patient's treatment plans were discussed with patient, RN, and consultant(s).    Discharge Planning    Barriers to discharge: Patient is not medically ready and needs to remain in the hospital today due to patient remains nauseated.  Not tolerating p.o. diet  Anticipated discharge destination: Home  Expected Discharge Date: 9/1/2024        Eli Lipscomb MDHospitalist  8/30/2024  3:59 PM         Stable.

## 2024-09-03 ENCOUNTER — OUTPATIENT (OUTPATIENT)
Dept: OUTPATIENT SERVICES | Facility: HOSPITAL | Age: 89
LOS: 1 days | End: 2024-09-03

## 2024-09-03 ENCOUNTER — APPOINTMENT (OUTPATIENT)
Dept: INTERNAL MEDICINE | Facility: CLINIC | Age: 89
End: 2024-09-03

## 2024-09-03 DIAGNOSIS — D35.1 BENIGN NEOPLASM OF PARATHYROID GLAND: Chronic | ICD-10-CM

## 2024-09-03 DIAGNOSIS — Z90.49 ACQUIRED ABSENCE OF OTHER SPECIFIED PARTS OF DIGESTIVE TRACT: Chronic | ICD-10-CM

## 2024-09-03 DIAGNOSIS — Z98.890 OTHER SPECIFIED POSTPROCEDURAL STATES: Chronic | ICD-10-CM

## 2024-09-13 ENCOUNTER — INPATIENT (INPATIENT)
Facility: HOSPITAL | Age: 89
LOS: 11 days | Discharge: HOME | DRG: 188 | End: 2024-09-25
Attending: INTERNAL MEDICINE | Admitting: HOSPITALIST
Payer: MEDICARE

## 2024-09-13 VITALS
OXYGEN SATURATION: 94 % | WEIGHT: 143.3 LBS | HEART RATE: 82 BPM | SYSTOLIC BLOOD PRESSURE: 122 MMHG | RESPIRATION RATE: 16 BRPM | DIASTOLIC BLOOD PRESSURE: 52 MMHG | TEMPERATURE: 98 F | HEIGHT: 62 IN

## 2024-09-13 DIAGNOSIS — J90 PLEURAL EFFUSION, NOT ELSEWHERE CLASSIFIED: ICD-10-CM

## 2024-09-13 DIAGNOSIS — D35.1 BENIGN NEOPLASM OF PARATHYROID GLAND: Chronic | ICD-10-CM

## 2024-09-13 DIAGNOSIS — Z90.49 ACQUIRED ABSENCE OF OTHER SPECIFIED PARTS OF DIGESTIVE TRACT: Chronic | ICD-10-CM

## 2024-09-13 DIAGNOSIS — Z98.890 OTHER SPECIFIED POSTPROCEDURAL STATES: Chronic | ICD-10-CM

## 2024-09-13 DIAGNOSIS — R09.89 OTHER SPECIFIED SYMPTOMS AND SIGNS INVOLVING THE CIRCULATORY AND RESPIRATORY SYSTEMS: ICD-10-CM

## 2024-09-13 LAB
ALBUMIN SERPL ELPH-MCNC: 2 G/DL — LOW (ref 3.3–5)
ALP SERPL-CCNC: 66 U/L — SIGNIFICANT CHANGE UP (ref 40–120)
ALT FLD-CCNC: <6 U/L — LOW (ref 10–45)
ANION GAP SERPL CALC-SCNC: 22 MMOL/L — HIGH (ref 5–17)
AST SERPL-CCNC: 9 U/L — LOW (ref 10–40)
BASOPHILS # BLD AUTO: 0 K/UL — SIGNIFICANT CHANGE UP (ref 0–0.2)
BASOPHILS NFR BLD AUTO: 0 % — SIGNIFICANT CHANGE UP (ref 0–2)
BILIRUB SERPL-MCNC: 0.5 MG/DL — SIGNIFICANT CHANGE UP (ref 0.2–1.2)
BLD GP AB SCN SERPL QL: SIGNIFICANT CHANGE UP
BUN SERPL-MCNC: 96 MG/DL — HIGH (ref 7–23)
CALCIUM SERPL-MCNC: 7.1 MG/DL — LOW (ref 8.4–10.5)
CHLORIDE SERPL-SCNC: 107 MMOL/L — SIGNIFICANT CHANGE UP (ref 96–108)
CO2 SERPL-SCNC: 14 MMOL/L — LOW (ref 22–31)
CREAT SERPL-MCNC: 9.43 MG/DL — HIGH (ref 0.5–1.3)
EGFR: 4 ML/MIN/1.73M2 — LOW
EOSINOPHIL # BLD AUTO: 0 K/UL — SIGNIFICANT CHANGE UP (ref 0–0.5)
EOSINOPHIL NFR BLD AUTO: 0 % — SIGNIFICANT CHANGE UP (ref 0–6)
GLUCOSE SERPL-MCNC: 144 MG/DL — HIGH (ref 70–99)
HCT VFR BLD CALC: 25.5 % — LOW (ref 34.5–45)
HGB BLD-MCNC: 7.1 G/DL — LOW (ref 11.5–15.5)
IMM GRANULOCYTES NFR BLD AUTO: 1 % — HIGH (ref 0–0.9)
LACTATE SERPL-SCNC: 0.7 MMOL/L — SIGNIFICANT CHANGE UP (ref 0.7–2)
LIDOCAIN IGE QN: 137 U/L — HIGH (ref 16–77)
LYMPHOCYTES # BLD AUTO: 0.33 K/UL — LOW (ref 1–3.3)
LYMPHOCYTES # BLD AUTO: 16.7 % — SIGNIFICANT CHANGE UP (ref 13–44)
MCHC RBC-ENTMCNC: 26.6 PG — LOW (ref 27–34)
MCHC RBC-ENTMCNC: 27.8 GM/DL — LOW (ref 32–36)
MCV RBC AUTO: 95.5 FL — SIGNIFICANT CHANGE UP (ref 80–100)
MONOCYTES # BLD AUTO: 0.13 K/UL — SIGNIFICANT CHANGE UP (ref 0–0.9)
MONOCYTES NFR BLD AUTO: 6.6 % — SIGNIFICANT CHANGE UP (ref 2–14)
NEUTROPHILS # BLD AUTO: 1.5 K/UL — LOW (ref 1.8–7.4)
NEUTROPHILS NFR BLD AUTO: 75.7 % — SIGNIFICANT CHANGE UP (ref 43–77)
NRBC # BLD: 0 /100 WBCS — SIGNIFICANT CHANGE UP (ref 0–0)
NT-PROBNP SERPL-SCNC: HIGH PG/ML (ref 0–300)
PLATELET # BLD AUTO: 92 K/UL — LOW (ref 150–400)
POTASSIUM SERPL-MCNC: 4.2 MMOL/L — SIGNIFICANT CHANGE UP (ref 3.5–5.3)
POTASSIUM SERPL-SCNC: 4.2 MMOL/L — SIGNIFICANT CHANGE UP (ref 3.5–5.3)
PROT SERPL-MCNC: 5.1 G/DL — LOW (ref 6–8.3)
RBC # BLD: 2.67 M/UL — LOW (ref 3.8–5.2)
RBC # FLD: 22.5 % — HIGH (ref 10.3–14.5)
SODIUM SERPL-SCNC: 143 MMOL/L — SIGNIFICANT CHANGE UP (ref 135–145)
TROPONIN I, HIGH SENSITIVITY RESULT: 18.9 NG/L — SIGNIFICANT CHANGE UP
WBC # BLD: 1.98 K/UL — LOW (ref 3.8–10.5)
WBC # FLD AUTO: 1.98 K/UL — LOW (ref 3.8–10.5)

## 2024-09-13 PROCEDURE — 99285 EMERGENCY DEPT VISIT HI MDM: CPT | Mod: GC

## 2024-09-13 PROCEDURE — 93970 EXTREMITY STUDY: CPT | Mod: 26

## 2024-09-13 PROCEDURE — 71250 CT THORAX DX C-: CPT | Mod: 26

## 2024-09-13 PROCEDURE — 74176 CT ABD & PELVIS W/O CONTRAST: CPT | Mod: 26,MC

## 2024-09-13 PROCEDURE — 93010 ELECTROCARDIOGRAM REPORT: CPT

## 2024-09-13 PROCEDURE — 99223 1ST HOSP IP/OBS HIGH 75: CPT

## 2024-09-13 PROCEDURE — 71045 X-RAY EXAM CHEST 1 VIEW: CPT | Mod: 26

## 2024-09-13 RX ORDER — SODIUM CHLORIDE IRRIG SOLUTION 0.9 %
1000 SOLUTION, IRRIGATION IRRIGATION
Refills: 0 | Status: DISCONTINUED | OUTPATIENT
Start: 2024-09-13 | End: 2024-09-14

## 2024-09-13 RX ORDER — FUROSEMIDE 10 MG/ML
40 INJECTION INTRAVENOUS ONCE
Refills: 0 | Status: COMPLETED | OUTPATIENT
Start: 2024-09-13 | End: 2024-09-13

## 2024-09-13 RX ORDER — SODIUM BICARBONATE 650 MG
650 TABLET ORAL THREE TIMES A DAY
Refills: 0 | Status: DISCONTINUED | OUTPATIENT
Start: 2024-09-13 | End: 2024-09-13

## 2024-09-13 RX ORDER — ONDANSETRON HCL/PF 4 MG/2 ML
4 VIAL (ML) INJECTION EVERY 8 HOURS
Refills: 0 | Status: DISCONTINUED | OUTPATIENT
Start: 2024-09-13 | End: 2024-09-25

## 2024-09-13 RX ORDER — CEFTRIAXONE SODIUM 1 G
1000 VIAL (EA) INJECTION EVERY 24 HOURS
Refills: 0 | Status: COMPLETED | OUTPATIENT
Start: 2024-09-13 | End: 2024-09-19

## 2024-09-13 RX ORDER — MULTI VITAMIN/MINERAL SUPPLEMENT WITH ASCORBIC ACID, NIACIN, PYRIDOXINE, PANTOTHENIC ACID, FOLIC ACID, RIBOFLAVIN, THIAMIN, BIOTIN, COBALAMIN AND ZINC. 60; 20; 12.5; 10; 10; 1.7; 1.5; 1; .3; .006 MG/1; MG/1; MG/1; MG/1; MG/1; MG/1; MG/1; MG/1; MG/1; MG/1
1 TABLET, COATED ORAL DAILY
Refills: 0 | Status: DISCONTINUED | OUTPATIENT
Start: 2024-09-13 | End: 2024-09-14

## 2024-09-13 RX ORDER — DOXAZOSIN 2 MG/1
2 TABLET ORAL AT BEDTIME
Refills: 0 | Status: DISCONTINUED | OUTPATIENT
Start: 2024-09-13 | End: 2024-09-13

## 2024-09-13 RX ORDER — 5-HYDROXYTRYPTOPHAN (5-HTP) 100 MG
3 TABLET,DISINTEGRATING ORAL AT BEDTIME
Refills: 0 | Status: DISCONTINUED | OUTPATIENT
Start: 2024-09-13 | End: 2024-09-25

## 2024-09-13 RX ORDER — HYDRALAZINE HYDROCHLORIDE 100 MG/1
100 TABLET ORAL THREE TIMES A DAY
Refills: 0 | Status: DISCONTINUED | OUTPATIENT
Start: 2024-09-13 | End: 2024-09-13

## 2024-09-13 RX ORDER — AMLODIPINE BESYLATE 5 MG
10 TABLET ORAL DAILY
Refills: 0 | Status: DISCONTINUED | OUTPATIENT
Start: 2024-09-13 | End: 2024-09-20

## 2024-09-13 RX ORDER — MAG HYDROX/ALUMINUM HYD/SIMETH 200-200-20
30 SUSPENSION, ORAL (FINAL DOSE FORM) ORAL EVERY 4 HOURS
Refills: 0 | Status: DISCONTINUED | OUTPATIENT
Start: 2024-09-13 | End: 2024-09-14

## 2024-09-13 RX ORDER — ACETAMINOPHEN 325 MG
650 TABLET ORAL EVERY 6 HOURS
Refills: 0 | Status: DISCONTINUED | OUTPATIENT
Start: 2024-09-13 | End: 2024-09-25

## 2024-09-13 RX ADMIN — Medication 100 MILLIGRAM(S): at 14:51

## 2024-09-13 RX ADMIN — Medication 100 MILLIGRAM(S): at 22:03

## 2024-09-13 RX ADMIN — FUROSEMIDE 40 MILLIGRAM(S): 10 INJECTION INTRAVENOUS at 14:16

## 2024-09-13 RX ADMIN — Medication 100 MILLIGRAM(S): at 14:49

## 2024-09-13 RX ADMIN — Medication 50 MILLILITER(S): at 22:04

## 2024-09-13 NOTE — CONSULT NOTE ADULT - SUBJECTIVE AND OBJECTIVE BOX
HANG MADERA,  89y Female  MRN: [id number]  ATTENDING: Dr. April Barnett      HPI:  89F, PMHx HTN, herpes zoster, CKD, multinodular thyroid,breast cancer, colon cancer (previously on Keytruda), refractory anemia/MDS, treated supportively and ambulatory (erythropoietin supplements), no recent exposure to systemic chemotherapy, is admitted with generalized weakness. Patient daughter at bedside reports that she have been getting more weaker and unable to ambulate as much. She is also complaining of sob and more generalized edema.    pt saw PCP on Monday and hgb was 7 at that time. Prior to 3 weeks ago, patient was walking independently and now she is unable to walk on her own. Daughter states patient is unsteady on her fee  In the ED, patient was found to have anemia with hgb of 7.1 and worsening Cr of around 9 previously was 6.  Pt is refusing HD. She is agreeble for blood. She also had CT which shows nonspecific colitis,increasing right pleural effusion with underlying passive atelectasis with questionable pneumonia.  also suspicion for main and right intrahepatic portal vein bland or tumor thrombus, similar to prior. Pancreatic head/mesenteric mass with central necrosis, similar to prior.  patient was DNI/DNR but she changed her mind to being full code. She is AAO x3  She denies any fevers, chills, nausea or vomiting  (13 Sep 2024 15:05)      PAST MEDICAL & SURGICAL HISTORY:  History of breast cancer  right breast      Anemia      Hypertension      History of thrombocytopenia      History of herpes zoster      History of malignant neoplasm of parathyroid gland      Colon cancer      Chronic renal insufficiency      Thyroid nodule      Diabetes mellitus  NIDDM      History of cholecystectomy      History of partial colectomy  and appendectomy at the same time      History of lumpectomy of right breast  2007 and radiation and chemotherapy      Parathyroid adenoma  excision          MEDICATION:  amLODIPine   Tablet 10 milliGRAM(s) Oral daily  cefTRIAXone   IVPB 1000 milliGRAM(s) IV Intermittent every 24 hours  doxazosin 2 milliGRAM(s) Oral at bedtime  hydrALAZINE 100 milliGRAM(s) Oral three times a day  metroNIDAZOLE  IVPB      metroNIDAZOLE  IVPB 500 milliGRAM(s) IV Intermittent every 8 hours  multivitamin 1 Tablet(s) Oral daily  sodium bicarbonate 650 milliGRAM(s) Oral three times a day      ALLERGIES:  No Known Allergies      FAMILY HISTORY:  Reviewed, non-contributory: [ ]   Maternal-  Paternal-    SOCIAL HISTORY:  Tobacco: YES [ ]  ; NO [ ]; Former smoker [ ]  Alcohol:   YES [ ]  ; NO [ ]; Social alcohol user [ ]  Occupation/ marital status/ children:    REVIEW SYSTEMS:  Constitutional: no fever, chills, night sweats, no weight loss  HEENT: denies visual changes; no oral ulcers, dysphagia, no epistaxis;   Respiratory: no dyspnea , wheezing, cough, hemoptysis  Cardiovascular: denies acute chest pain, palpitations  GI: no loss of appetite, dark stools, or abdominal tenderness / pain; no change in bowel habits.  Musculoskeletal: no new back pain, bone/ joint pain ,no extremity swelling  Integumentary: denies pruritus; no skin rash, bruises, no  suspicious skin lesions  Neurologic: denies peripheral numbness, no dizziness, no gait problems.  Heme: no reported easy bruisability; no lymph node enlargement    VITALS:  T(C): 36.7, Max: 36.7 (09-13-24 @ 16:16)  T(F): 98, Max: 98 (09-13-24 @ 16:16)  HR: 68 (68 - 88)  BP: 122/45 (121/57 - 122/52)  SpO2: 96% (94% - 98%)    PHYSICAL EXAM:  Constitutional: alert, well developed  HEENT: normocephalic, anicteric sclerae, no mucositis or thrush  Respiratory: bilateral clear to auscultation anteriorly  Cardiovascular : S1, S2 regular, rhythmic, no murmurs, gallops or rubs  Abdomen: soft, distended, + normoactive BS, no palpable HS- megaly  Extremities: no tenderness;  -c/c/e, pulses equal bilaterally  Integumentary: no rashes, scars, or lesions suggestive of malignancy  Neurologic: no gross focal deficits    LABS:  (09-13) WBC: 1.98 K/uL,Hemoglobin: 7.1 g/dL, Hematocrit: 25.5 %,    Platelet: 92 K/uL    (09-13) Na: 143 mmol/L ; K: 4.2 mmol/L ; BUN: 96 mg/dL ; Cr: 9.43 mg/dL.          RADIOLOGY:               HANG MADERA,  89y Female  MRN: [id number]  ATTENDING: Dr. April Gil      HPI:  89F, PMHx HTN, herpes zoster, CKD, multinodular thyroid,breast cancer, colon cancer (previously on Keytruda), refractory anemia/MDS, treated supportively and ambulatory (erythropoietin supplements), no recent exposure to systemic chemotherapy, is admitted with generalized weakness. Patient daughter at bedside reports that she have been getting more weaker and unable to ambulate as much. She is also complaining of sob and more generalized edema.    pt saw PCP on Monday and hgb was 7 at that time. Prior to 3 weeks ago, patient was walking independently and now she is unable to walk on her own. Daughter states patient is unsteady on her fee  In the ED, patient was found to have anemia with hgb of 7.1 and worsening Cr of around 9 previously was 6. Refusing HD. She is agreeable to PRBC. She also had CT which shows nonspecific colitis, Increasing right pleural effusion with underlying passive atelectasis with questionable pneumonia. Also suspicion for main and right intrahepatic portal vein bland or tumor thrombus, similar to prior. Pancreatic head/mesenteric mass with central necrosis, similar to prior.Denies any fevers, chills, nausea or vomiting.    PAST MEDICAL & SURGICAL HISTORY:  History of breast cancer right breast  Anemia  Hypertension  History of thrombocytopenia  History of herpes zoster  History of malignant neoplasm of parathyroid gland  Colon cancer  Chronic renal insufficiency  Thyroid nodule  Diabetes mellitus -NIDDM  History of cholecystectomy  History of partial colectomy and appendectomy at the same time  History of lumpectomy of right breast 2007 and radiation and chemotherapy  Parathyroid adenoma excision    MEDICATION:  amLODIPine   Tablet 10 milliGRAM(s) Oral daily  cefTRIAXone   IVPB 1000 milliGRAM(s) IV Intermittent every 24 hours  doxazosin 2 milliGRAM(s) Oral at bedtime  hydrALAZINE 100 milliGRAM(s) Oral three times a day  metroNIDAZOLE  IVPB      metroNIDAZOLE  IVPB 500 milliGRAM(s) IV Intermittent every 8 hours  multivitamin 1 Tablet(s) Oral daily  sodium bicarbonate 650 milliGRAM(s) Oral three times a day    ALLERGIES:  No Known Allergies    FAMILY HISTORY:  Reviewed, non-contributory: [x]     SOCIAL HISTORY:  Tobacco: YES [ ]  ; NO [ x]; Former smoker [ ]  Alcohol:   YES [ ]  ; NO [ x]; Social alcohol user [ ]  Occupation/ marital status/ children:    REVIEW SYSTEMS:  Constitutional:fatigued  HEENT: denies visual changes; no oral ulcers, dysphagia, no epistaxis;   Respiratory: no dyspnea , wheezing, cough, hemoptysis  Cardiovascular: denies acute chest pain, palpitations  GI: no loss of appetite, dark stools, or abdominal tenderness / pain; no change in bowel habits.  Musculoskeletal: no new back pain, bone/ joint pain ,no extremity swelling  Integumentary: denies pruritus; no skin rash, bruises, no  suspicious skin lesions  Neurologic: denies peripheral numbness, no dizziness, no gait problems.  Heme: no reported easy bruisability; no lymph node enlargement    VITALS:  T(C): 36.7, Max: 36.7 (09-13-24 @ 16:16)  T(F): 98, Max: 98 (09-13-24 @ 16:16)  HR: 68 (68 - 88)  BP: 122/45 (121/57 - 122/52)  SpO2: 96% (94% - 98%)    PHYSICAL EXAM:  Constitutional: alert, well developed  HEENT: normocephalic, anicteric sclerae, no mucositis or thrush  Respiratory: bilateral clear to auscultation anteriorly  Cardiovascular : S1, S2 regular, rhythmic, no murmurs, gallops or rubs  Abdomen: soft, distended, + normoactive BS, no palpable HS- megaly  Extremities: no tenderness;  -c/c/e, pulses equal bilaterally  Integumentary: no rashes, scars, or lesions suggestive of malignancy  Neurologic: no gross focal deficits    LABS:  (09-13) WBC: 1.98 K/uL,Hemoglobin: 7.1 g/dL, Hematocrit: 25.5 %, Platelet: 92 K/uL  (09-13) Na: 143 mmol/L ; K: 4.2 mmol/L ; BUN: 96 mg/dL ; Cr: 9.43 mg/dL.    RADIOLOGY:  ACC: [id number] EXAM:  CT CHEST   ORDERED BY:  APRIL GIL   PROCEDURE DATE:  09/13/2024    INTERPRETATION:  CLINICAL INFORMATION: Fluid overload.  COMPARISON: 06/22/2023.  CONTRAST/COMPLICATIONS:  IV Contrast: NONE  Oral Contrast: NONE  Complications: None reported at time of study completion  PROCEDURE:  CT of the Chest was performed.  Sagittal and coronal reformats were performed.  FINDINGS:  LUNGS AND LARGE AIRWAYS: Patent central airways. No pulmonary nodules.   Compression atelectasis dependent right lower lobe. Calcified granulomata   left lower lobe and probably within the compressed right lower lobe.  PLEURA: Mild to moderate right and a small left pleural effusion,   increased in comparison to the prior study.  VESSELS: Atherosclerotic calcification including the coronary arteries.   [id number] with tip at the cavoatrial junction.  HEART: Heart size is normal. Mild pericardial effusion.  MEDIASTINUM AND DIONICIO: No lymphadenopathy.  CHEST WALL AND LOWER NECK:Stable appearance of bilateral thyroid nodules   and substernal thyroid extension.  VISUALIZED UPPER ABDOMEN: Probable numerous hepatic cysts.   Cholecystectomy. Upper portion of previously characterized right   mesenteric mass is noted. Probable splenomegaly.  BONES: Within normal limits.    IMPRESSION:  Mild to moderate right pleural, small left pleural effusion and mild   pericardial effusion. No evidence of pulmonary edema.

## 2024-09-13 NOTE — CONSULT NOTE ADULT - SUBJECTIVE AND OBJECTIVE BOX
NEPHROLOGY CONSULTATION    CHIEF COMPLAINT: weak    HPI:  Pt is 89 yo F w/PMH HTN, herpes zoster, CKD 5, multinodular thyroid, breast cancer, colon cancer (previously on Keytruda), refractory anemia/MDS, treated supportively in ambulatory (erythropoietin), no recent exposure to systemic chemotherapy is admitted with generalized weakness, unable to ambulate this week. Noted to have anemia, worsening renal fx. No CP, SOB, N/V/F/C, has chronic diarrhea since tx for colon ca.    ROS:  as above    Allergies:  No Known Allergies    PAST MEDICAL & SURGICAL HISTORY:  History of breast cancer  right breast  Anemia  Hypertension  History of thrombocytopenia  History of herpes zoster  History of malignant neoplasm of parathyroid gland  Colon cancer  CKD  Thyroid nodule  Diabetes mellitus  History of cholecystectomy  History of partial colectomy  and appendectomy at the same time  History of lumpectomy of right breast  2007 and radiation and chemotherapy  Parathyroid adenoma  excision    SOCIAL HISTORY:  negative    FAMILY HISTORY:  colon cancer (Sibling, Sibling)  breast cancer (Sibling)    MEDICATIONS  (STANDING):  amLODIPine   Tablet 10 milliGRAM(s) Oral daily  cefTRIAXone   IVPB 1000 milliGRAM(s) IV Intermittent every 24 hours  doxazosin 2 milliGRAM(s) Oral at bedtime  hydrALAZINE 100 milliGRAM(s) Oral three times a day  metroNIDAZOLE  IVPB      metroNIDAZOLE  IVPB 500 milliGRAM(s) IV Intermittent every 8 hours  multivitamin 1 Tablet(s) Oral daily  sodium bicarbonate 650 milliGRAM(s) Oral three times a day    Vital Signs Last 24 Hrs  T(C): 36.7 (09-13-24 @ 16:16), Max: 36.7 (09-13-24 @ 16:16)  T(F): 98 (09-13-24 @ 16:16), Max: 98 (09-13-24 @ 16:16)  HR: 68 (09-13-24 @ 16:16) (68 - 88)  BP: 122/45 (09-13-24 @ 16:16) (121/57 - 122/52)  RR: 18 (09-13-24 @ 16:16) (16 - 18)  SpO2: 96% (09-13-24 @ 16:16) (94% - 98%)    s1s2  b/l air entry  soft, ND  + edema     LABS:                        7.1    1.98  )-----------( 92       ( 13 Sep 2024 12:35 )             25.5     09-13    143  |  107  |  96<H>  ----------------------------<  144<H>  4.2   |  14<L>  |  9.43<H>    Ca    7.1<L>      13 Sep 2024 12:35    TPro  5.1<L>  /  Alb  2.0<L>  /  TBili  0.5  /  DBili  x   /  AST  9<L>  /  ALT  <6<L>  /  AlkPhos  66  09-13    LIVER FUNCTIONS - ( 13 Sep 2024 12:35 )  Alb: 2.0 g/dL / Pro: 5.1 g/dL / ALK PHOS: 66 U/L / ALT: <6 U/L / AST: 9 U/L / GGT: x           A/P:    Hx colon cancer s/p R colectomy, chemo  Pancytopenia, worsening renal fx  Acute LLE DVT  CT A/P w/R renal atrophy, pancreas mass, colitis  MAGDI/CKD 5 pre-renal/hemodynamic vs CKD progression (Cr 6.39 - 6/10/24)  AGMA iso worsening renal fx, colitis, malignancy   RRT was d/w pt on last adm in May 2024  At that time pt wish was to avoid HD  D/w her and daughter again today, pt wish is to avoid RRT   They prefers conservative, comfort centered approach, which is reasonable   Agrees to blood transfusion  Na Bicarb as ordered  Heme/Onc f/u   Consider Palliative eval   D/w medicine    [phone]

## 2024-09-13 NOTE — ED ADULT TRIAGE NOTE - NS ED NURSE AMBULANCES
Quality 431: Preventive Care And Screening: Unhealthy Alcohol Use - Screening: Patient not identified as an unhealthy alcohol user when screened for unhealthy alcohol use using a systematic screening method Quality 226: Preventive Care And Screening: Tobacco Use: Screening And Cessation Intervention: Patient screened for tobacco use and is an ex/non-smoker Quality 47: Advance Care Plan: Advance care planning not documented, reason not otherwise specified. Detail Level: Detailed [employer]

## 2024-09-13 NOTE — ED ADULT NURSE NOTE - NSFALLHARMRISKINTERV_ED_ALL_ED

## 2024-09-13 NOTE — H&P ADULT - ASSESSMENT
89-year-old female with PMH of CKD, colon cancer s/p resection, breast cancer, parathyroid ca, HTN, thrombocytopenia presents to the emergency department for  shortness of breath,  generalized edema, and sob. pt was to found to have abnormal labs which includes anemia, worsening Cr baseline 6 currently 9. pt also have abnormal CT showing colilitis  and questionable pna as well as worsening pleural effusion and suspicion for main and right intrahepatic portal vein bland or tumor   thrombus, similar to prior.    # worsening anemia secondary to carcinoma, worsening CKD   # leukopenia, thromboytopenia  # history of breast cancer, colon cancer (previously on Keytruda), refractory anemia/MDS   Will give 1 unit of PRBC and lasix after   heme onc to evaluate the patient   currently not recieving chemo  supportive treatment    # fluid overload secondary to CKD most likely  # generalized edema  # acute respiratory failure requiring 2 liters of oxgen  # worsening plum edema     Rocephin to cover any possible pna  Pulm to see, echo to assess EF   supportive O2 to keep level above 90  US LE, V/Q scan given antionette of malignancy  CT chest non contrast to better characterize effusion      # worsening Cr, CKD stage 4  # HTN, gout  pt refusing HD currently  Dr. Garcia nephrology to see  will continue home meds, hydralazine, norvsac with hold parmeters    # Colilits  # antionette of colon CA   #  main and right intrahepatic portal vein bland or tumor   thrombus, s  ID to see but will start on rocephin, flagyl for now  regular diet as tolerated  GI to see as well    # deconditioning and generalized weakness  PT eval    Full code- palliative contact to see to address goals of care, family refusing HD for now        89-year-old female with PMH of CKD, colon cancer s/p resection, breast cancer, parathyroid ca, HTN, thrombocytopenia presents to the emergency department for  shortness of breath,  generalized edema, and sob. pt was to found to have abnormal labs which includes anemia, worsening Cr baseline 6 currently 9. pt also have abnormal CT showing colilitis  and questionable pna as well as worsening pleural effusion and suspicion for main and right intrahepatic portal vein bland or tumor   thrombus, similar to prior.    # worsening anemia secondary to carcinoma, worsening CKD   # leukopenia, thromboytopenia  # history of breast cancer, colon cancer (previously on Keytruda), refractory anemia/MDS   Will give 1 unit of PRBC and lasix after   heme onc to evaluate the patient   currently not recieving chemo  supportive treatment    # fluid overload secondary to CKD most likely  # generalized edema  # acute respiratory failure requiring 2 liters of oxgen  # pleural effusions    Rocephin to cover any possible pna  Pulm to see, echo to assess EF   supportive O2 to keep level above 90  US LE, V/Q scan given antionette of malignancy  CT chest non contrast to better characterize effusion      # worsening Cr, CKD stage 4  # HTN, gout  pt refusing HD currently  Dr. Garcia nephrology to see  will continue home meds, hydralazine, norvsac with hold parmeters    # Colilits  # antionette of colon CA   #  main and right intrahepatic portal vein bland or tumor   thrombus, s  ID to see but will start on rocephin, flagyl for now  regular diet as tolerated  GI to see as well    # deconditioning and generalized weakness  PT eval    Full code- palliative contact to see to address goals of care, family refusing HD for now

## 2024-09-13 NOTE — CHART NOTE - NSCHARTNOTEFT_GEN_A_CORE
89F admitted for generalized weakness with PMHx HTN, herpes zoster, CKD, multinodular thyroid,breast cancer, colon cancer (previously on Keytruda), refractory anemia/MDS, treated supportively and ambulatory (erythropoietin supplements)  Contacted by nurse due to Temp 95 post blood transfusion. Temp repeat 97.4     Patient denied chills, headache, chest pain, palpitations, SOB, cough, rash, abdominal pain. Pt states she had a blood transfusion 4/24 without complications. Patient has no known drug allergies    PHYSICAL EXAM:  T(C): 36.3 (13 Sep 2024 22:40), Max: 36.8 (13 Sep 2024 19:30)  T(F): 97.3 (13 Sep 2024 22:40), Max: 98.3 (13 Sep 2024 19:30)  HR: 65 (13 Sep 2024 22:40) (60 - 88)  BP: 130/55 (13 Sep 2024 22:40) (111/54 - 130/55)  BP(mean): --  RR: 16 (13 Sep 2024 22:40) (16 - 18)  SpO2: 97% (13 Sep 2024 22:40) (94% - 98%)  Parameters below as of 13 Sep 2024 22:40  Patient On (Oxygen Delivery Method): nasal cannula  O2 Flow (L/min): 2      PHYSICAL EXAM:  GENERAL: NAD, lying in bed comfortably  HEAD:  Atraumatic, Normocephalic  EYES: EOMI, conjunctiva and sclera clear  ENT: Moist mucous membranes  RESP: Clear to auscultation bilaterally, good air entry bilaterally; No wheezing, rales, or rhonchi. Unlabored respirations  CARDIAC: Regular rate and rhythm. S1 and S2. No murmurs, rubs, or gallops  GI:  Soft, Nontender, Nondistended. Bowel sounds present x4 quadrants  EXTREMITIES:  2+ Peripheral Pulses. Capillary refill <2 seconds. Pitting edema 1+ to ankle bilaterally  NERVOUS SYSTEM:  Alert & Oriented X3, speech clear. No deficits   MSK: FROM all 4 extremities, full and equal strength  SKIN: No rashes, bruises, or other lesions      Plan:  -Given repeat Temp WNL, resume transfusion at 85cc rate  -monitor VS

## 2024-09-13 NOTE — H&P ADULT - NSHPPHYSICALEXAM_GEN_ALL_CORE
T(C): 36.6 (09-13-24 @ 11:59), Max: 36.6 (09-13-24 @ 11:59)  HR: 88 (09-13-24 @ 13:30) (82 - 88)  BP: 121/57 (09-13-24 @ 13:30) (121/57 - 122/52)  RR: 18 (09-13-24 @ 13:30) (16 - 18)  SpO2: 98% (09-13-24 @ 13:30) (94% - 98%)    CONSTITUTIONAL:  mild distress   EYES: PERRLA and symmetric, EOMI, No conjunctival or scleral injection, non-icteric  ENMT: Oral mucosa with mildly dry . ; no pharyngeal injection or exudates             NECK: Supple, symmetric and without tracheal deviation   RESP: No respiratory distress, no use of accessory muscles. + crackles and decreased breath sounds   CV: RRR, +S1S2, 2+ peripheral edema   GI: Soft, + BS, mild diffuse tenderness   LYMPH: No cervical LAD or tenderness; no axillary LAD or tenderness; no inguinal LAD or tenderness  MSK: Normal gait; No digital clubbing or cyanosis; examination of the (head/neck/spine/ribs/pelvis, RUE, LUE, RLE, LLE) without misalignment,            Normal ROM without pain, no spinal tenderness, normal muscle strength/tone  SKIN: No rashes or ulcers noted; no subcutaneous nodules or induration palpable  NEURO: CN II-XII intact; normal reflexes in upper and lower extremities, sensation intact in upper and lower extremities b/l to light touch   PSYCH: Appropriate insight/judgment; A+O x 3, mood and affect appropriate, recent/remote memory intact

## 2024-09-13 NOTE — ED PROVIDER NOTE - NS ED ROS FT
REVIEW OF SYSTEMS:  CONSTITUTIONAL: + weakness, negative fever and chills   EYES/ENT: No visual changes;  No vertigo or throat pain   NECK: No pain or stiffness  RESPIRATORY: No cough, wheezing, hemoptysis; No shortness of breath  CARDIOVASCULAR: No chest pain or palpitations  GASTROINTESTINAL: + abdominal pain  GENITOURINARY: No dysuria, frequency or hematuria  NEUROLOGICAL: No numbness or weakness   All other review of systems is negative unless indicated above REVIEW OF SYSTEMS:  CONSTITUTIONAL: + weakness, negative fever and chills   EYES/ENT: No visual changes;  No vertigo or throat pain   NECK: No pain or stiffness  RESPIRATORY: No cough, wheezing, hemoptysis; No shortness of breath  CARDIOVASCULAR: No chest pain or palpitations  GASTROINTESTINAL: + abdominal pain  +Edema b/l Lower Extremities.   GENITOURINARY: No dysuria, frequency or hematuria  NEUROLOGICAL: No numbness or weakness   All other review of systems is negative unless indicated above

## 2024-09-13 NOTE — CHART NOTE - NSCHARTNOTEFT_GEN_A_CORE
I spoke to the patient daughter yarely and discussed the US of LE results  she is aware that the patient have Acute deep venous thrombosis: below the knee and in the Left peroneal vein thrombosis.  we discussed the patient being and having symptomatic anemia and her need for blood products and any AC can exacerbate the anemia.  Through shared decision making; daughter would like to hold off on AC for now until discussion with heme/onc as well as pulm

## 2024-09-13 NOTE — ED PROVIDER NOTE - PRO INTERPRETER NEED 2
SOB,chronic compensated HFref- LVEF 35-40%- NYHA class III- complicated by CKD and hypoalbuminemia. Cardiorenal syndrome.   2/2021 , LVEF 55-60% on echo  2/2021-[facility]-  moderate disease in distal LAD.  euvolemic on exam.  Repeat echo showed LVEF of about 40%.   Increased metoprlol to 25mg BID.       Renal failure- improving  contrast nephropathy.  Hold lisinopril for now given borderline low BP and renal function.        PVD- pending intervention.  d/w Dr Alfonso.  Patient is a moderate risk patient for a moderate to high risk procedure with poor functional tolerance.  Patient can proceed with anticipated procedure without any further cardiac testing.  Patient is optimized from cardiac standpoint.  Continue periop betablockers.       Bacteremia- on abx. Management per primary team.     Other medical issues- Management per primary team.   Thank you for allowing me to participate in the care of this patient. Please feel free to contact me with any questions.    English

## 2024-09-13 NOTE — CONSULT NOTE ADULT - PROBLEM SELECTOR RECOMMENDATION 9
History of breast cancer and colon cancer, no longer a systemic treatment candidate given comorbidities; not actively treated oncologically at this time, admitted with pancytopenia.   Reviewed results of CT results c/w mild to moderate right pleural, small left pleural effusion and mild   pericardial effusion. No evidence of pulmonary edema. Anemia likely exacerbated by worsening renal function.  Reviewed results of bone marrow studies from August 2023 consistent with MDS.  Transfuse as needed. Will remain under expectant surveillance during hospitalization.

## 2024-09-13 NOTE — ED PROVIDER NOTE - CLINICAL SUMMARY MEDICAL DECISION MAKING FREE TEXT BOX
89-year-old female with PMH of ESRD not on dialysis, Breast Cancer, colon cancer s/p resection with recurrence, parathyroid ca, HTN, thrombocytopenia presents to ED from [place] with complaint of weakness. Pt states she has been feeling week since April but it worsened over the past 3 weeks. Daughter at bedside states pt saw PCP on Monday and hgb was 7 at that time. Prior to 3 weeks ago, patient was walking independently and now she is unable to walk on her own. Daughter states patient is unsteady on her feet but denies any falls.  Exam as stated. Pt so weak that she cannot walk. [facility] admission. d/w Dr Edmonds. Hemoglobin 7.1.  Will give 1 unit transfusion however will give over 3 hours.  Will pretreat with Lasix given patient's overload.  Discussed at length with patient and family and in agreement.

## 2024-09-13 NOTE — ED PROVIDER NOTE - ATTENDING CONTRIBUTION TO CARE
I have discussed the patient's plan of care with PA/NP/Resident. I agree with note as stated above.  This includes History of Present Illness, HIV, Past Medical/Surgical/Family/Social History, Allergies and Home Medications, Review of Systems, Physical Exam, and any Progress Notes during the time I functioned as the attending physician for this patient.

## 2024-09-13 NOTE — PATIENT PROFILE ADULT - DO YOU EVER NEED HELP READING HOSPITAL MATERIALS?
[address] [facility]  Discharge- Geetha Colon 1944, 66 y o  female MRN: [id number]  Unit/Bed#: -Steven Encounter: [id number]  Primary Care Provider: Kemi Magallanes MD   Date and time admitted to hospital: 2/6/2023  3:33 AM    Dysphagia  Assessment & Plan  · Noted by nursing staff, therefore speech consulted  · Recommending pureed diet with thin liquids    · Patient had modified barium swallow, continue puréed diet with thin liquids on discharge  Leg pain  Assessment & Plan  · Endorses severe left foot pain on the plantar aspect; does have a history of osteomyelitis of the left foot  · Bilateral lower extremities notable for erythema of the lower extremities and warmth  · Afebrile, mild leukocytosis, continue to trend CBC, could be in setting of daily prednisone use   · Immunocompromised secondary to patient's rheumatoid arthritis  · Sed rate normal, CRP 11 1  · Podiatry evaluated, no evidence of underlying infection at this time  · Patient will be monitored off antibiotics  · Could be secondary to patient's underlying RA  · Local wound care    Hyponatremia  Assessment & Plan  · Appears to be chronic on review of records  · S/p IV fluid resuscitation  · Sodium improved this morning  Rheumatoid arthritis involving multiple sites with positive rheumatoid factor (HCC)  Assessment & Plan  · History of rheumatoid arthritis  · On methotrexate 7 5 mg once weekly on Thursday, prednisone 5 mg daily and hydroxychloroquine, continue for now  · Continue her home meds on discharge      Ambulatory dysfunction  Assessment & Plan  · S/p mechanical fall at home, lost balance and fell to her right side, denies any LOC  · CXR with right shoulder dislocation as noted above   · Additional imaging negative thus far for additional traumatic injury   · History of severe rheumatoid arthritis and has very limited ambulation at baseline but uses walker to ambulate at times; lives at home with her sister; patient reports that she has home health nurses that come and visit her and patient reports that she would be very hesitant about going to a skilled nursing facility as she wants to remain at her house  · CM consultation for aide in outpatient resources  · Evaluated by PT/OT, recommended rehab however patient refused for same, hence will be discharged home with home health  Hypothyroidism due to Hashimoto's thyroiditis  Assessment & Plan  · Continue PTA levothyroxine 50 mcg daily     * Shoulder dislocation  Assessment & Plan  · Pt presented s/p mechanical fall onto her right side at home, lives with her sister  Does have home health aides that come in at baseline   · Presented as a trauma, CT head and xray pelvis negative   · CXR with Right shoulder arthroplasty fracture dislocation  Multilevel mild thoracic compression fractures, indeterminate age  · Patient evaluated by Ortho, input appreciated  · Seems patient's dislocation was chronic in background of total reverse right shoulder arthroplasty as on exam reduction and dislocation was done with ease  Hence decision is made for observation at this point of time  · Continue as needed pain managed  · PT/OT recommended rehab however patient refused for same  · Patient will be discharged home with home health  Medical Problems     Resolved Problems  Date Reviewed: 2/8/2023   None       Discharging Physician / Practitioner: Sravan Anne MD  PCP: Rosana Arroyo MD  Admission Date:   Admission Orders (From admission, onward)     Ordered        02/06/23 0613  Inpatient Admission  Once                      Discharge Date: 02/08/23    Consultations During Hospital Stay:  · Podiatry  · Orthopedics  Procedures Performed:   · Bedside reduction of right shoulder      Significant Findings / Test Results:     CT upper extremity:    IMPRESSION:     The dislocation of the right reverse total shoulder arthroplasty seen on plain films has been reduced  There is no periprosthetic fracture  Test Results Pending at Discharge (will require follow up): · None     Outpatient Tests Requested:  · BMP    Complications:  None    Reason for Admission: Ambulatory dysfunction  Hospital Course:   Staci Mitchell is a 66 y o  female patient who originally presented to the hospital on 2/6/2023 due to ambulatory dysfunction, right shoulder dislocation and right foot pain  Patient has past medical history significant for rheumatoid arthritis, but noted to have significant ambulatory dysfunction, found to have right shoulder dislocation  Patient was evaluated by orthopedics, and bedside reduction was done however with small effort right shoulder joint was easily dislocated again, CT upper extremity was remarkable for dislocation of the right reverse total shoulder arthroplasty  Discussed with orthopedics, seems chronic in nature hence she will need to follow-up with orthopedics in outpatient setting, no acute surgical intervention needed during this hospitalization  She was also noted to have electrolyte imbalance of hyponatremia and hypokalemia, that has resolved with repletion and IV fluids  Patient will get repeat BMP done in 7 days from now  Given her ambulatory dysfunction, and concerns for worsening right foot, she was evaluated by podiatry for concerns of osteomyelitis, but monitored off antibiotics due to low/no concerns for infection  She was evaluated by PT/OT, who recommended rehab however patient refused for same, patient will be discharged to home with home health  Please see above list of diagnoses and related plan for additional information  Condition at Discharge: stable    Discharge Day Visit / Exam:   Subjective: Patient seen and examined at bedside, is looking forward to be discharged home today  Denies any active complaints at this point of time      Vitals: Blood Pressure: 140/71 (02/08/23 0740)  Pulse: 78 (02/08/23 0740)  Temperature: 98 3 °F (36 8 °C) (02/08/23 0740)  Temp Source: Oral (02/07/23 2358)  Respirations: 16 (02/07/23 2358)  SpO2: 96 % (02/08/23 0740)  Exam:   Physical Exam  Constitutional:       Appearance: Normal appearance  HENT:      Head: Normocephalic and atraumatic  Eyes:      Extraocular Movements: Extraocular movements intact  Conjunctiva/sclera: Conjunctivae normal    Cardiovascular:      Rate and Rhythm: Normal rate and regular rhythm  Pulses: Normal pulses  Heart sounds: Normal heart sounds  Pulmonary:      Effort: Pulmonary effort is normal       Breath sounds: Normal breath sounds  Abdominal:      General: Bowel sounds are normal       Palpations: Abdomen is soft  Skin:     General: Skin is warm and dry  Neurological:      Mental Status: She is alert  Psychiatric:         Mood and Affect: Mood normal           Discussion with Family: Tried calling patient's niece was directed to voicemail, updated patient at bedside  [facility] Shel[facility] Discharge instructions/Information to patient and family:   See after visit summary for information provided to patient and family  Provisions for Follow-Up Care:  See after visit summary for information related to follow-up care and any pertinent home health orders  Disposition:   Home with [facility] Services (Reminder: Complete face to face encounter)    Planned Readmission: No     Discharge Statement:  I spent 38 minutes discharging the patient  This time was spent on the day of discharge  I had direct contact with the patient on the day of discharge  Greater than 50% of the total time was spent examining patient, answering all patient questions, arranging and discussing plan of care with patient as well as directly providing post-discharge instructions  Additional time then spent on discharge activities  Discharge Medications:  See after visit summary for reconciled discharge medications provided to patient and/or family        **Please Note: This note may have been constructed using a voice recognition system** no

## 2024-09-13 NOTE — PATIENT PROFILE ADULT - FALL HARM RISK - HARM RISK INTERVENTIONS

## 2024-09-13 NOTE — ED PROVIDER NOTE - OBJECTIVE STATEMENT
89-year-old female with PMH of ESRD not on dialysis, Breast Cancer, colon cancer s/p resection with recurrence, parathyroid ca, HTN, thrombocytopenia presents to ED from [place] with complaint of weakness. Pt states she has been feeling week since April but it worsened over the past 3 weeks. Daughter at bedside states pt saw PCP on Monday and hgb was 7 at that time. Prior to 3 weeks ago, patient was walking independently and now she is unable to walk on her own. Daughter states patient is unsteady on her feet but denies any falls.

## 2024-09-13 NOTE — ED PROVIDER NOTE - PHYSICAL EXAMINATION
Vital Signs Last 24 Hrs  T(C): 36.6 (13 Sep 2024 11:59), Max: 36.6 (13 Sep 2024 11:59)  T(F): 97.8 (13 Sep 2024 11:59), Max: 97.8 (13 Sep 2024 11:59)  HR: 82 (13 Sep 2024 11:59) (82 - 82)  BP: 122/52 (13 Sep 2024 11:59) (122/52 - 122/52)  BP(mean): --  RR: 16 (13 Sep 2024 11:59) (16 - 16)  SpO2: 94% (13 Sep 2024 11:59) (94% - 94%)    Parameters below as of 13 Sep 2024 11:59  Patient On (Oxygen Delivery Method): room air      PHYSICAL EXAM:  Constitutional: NAD, awake , ill-appearing, well-developed  HEENT: PERR, EOMI, hard of hearing, MMM  Neck: Soft and supple,   Respiratory: Breath sounds are clear bilaterally, No wheezing, rales or rhonchi  Cardiovascular: S1 and S2, regular rate and rhythm, + systolic murmur   Gastrointestinal: + distention and RLQ tenderness  Extremities: 2+ B/l LE edema, + left upper extremity edema   Vascular: 2+ peripheral pulses  Neurological: A/O x 3,   Musculoskeletal: 3/5 strength b/l upper and lower extremities  Skin: Left sided port, Ecchymosis b/l Lower and upper extremities Vital Signs Last 24 Hrs  T(C): 36.6 (13 Sep 2024 11:59), Max: 36.6 (13 Sep 2024 11:59)  T(F): 97.8 (13 Sep 2024 11:59), Max: 97.8 (13 Sep 2024 11:59)  HR: 82 (13 Sep 2024 11:59) (82 - 82)  BP: 122/52 (13 Sep 2024 11:59) (122/52 - 122/52)  BP(mean): --  RR: 16 (13 Sep 2024 11:59) (16 - 16)  SpO2: 94% (13 Sep 2024 11:59) (94% - 94%)    Parameters below as of 13 Sep 2024 11:59  Patient On (Oxygen Delivery Method): room air      PHYSICAL EXAM:  Constitutional: NAD, awake , ill-appearing, well-developed  HEENT: PERR, EOMI, hard of hearing, MMM  Neck: Soft and supple,   Respiratory: decreased breath sounds b/l bases   Cardiovascular: S1 and S2, regular rate and rhythm, + systolic murmur   Gastrointestinal: + distention and RLQ tenderness  Extremities: 2+ B/l LE edema, + left upper extremity edema   Vascular: 2+ peripheral pulses  Neurological: A/O x 3,   Musculoskeletal: 3/5 strength b/l upper and lower extremities  Skin: Left sided port, Ecchymosis b/l Lower and upper extremities

## 2024-09-13 NOTE — H&P ADULT - HISTORY OF PRESENT ILLNESS
89F, PMHx HTN, herpes zoster, CKD, multinodular thyroid,breast cancer, colon cancer (previously on Keytruda), refractory anemia/MDS, treated supportively and ambulatory (erythropoietin supplements), no recent exposure to systemic chemotherapy, is admitted with generalized weakness. Patient daughter at bedside reports that she have been getting more weaker and unable to ambulate as much. She is also complaining of sob and more generalized edema.    pt saw PCP on Monday and hgb was 7 at that time. Prior to 3 weeks ago, patient was walking independently and now she is unable to walk on her own. Daughter states patient is unsteady on her fee  In the ED, patient was found to have anemia with hgb of 7.1 and worsening Cr of around 9 previously was 6.  Pt is refusing HD. She is agreeble for blood. She also had CT which shows nonspecific colitis,increasing right pleural effusion with underlying passive atelectasis with questionable pneumonia.  also suspicion for main and right intrahepatic portal vein bland or tumor thrombus, similar to prior. Pancreatic head/mesenteric mass with central necrosis, similar to prior.  patient was DNI/DNR but she changed her mind to being full code. She is AAO x3  She denies any fevers, chills, nausea or vomiting

## 2024-09-14 ENCOUNTER — OUTPATIENT (OUTPATIENT)
Dept: OUTPATIENT SERVICES | Facility: HOSPITAL | Age: 88
LOS: 1 days | Discharge: HOME | End: 2024-09-14

## 2024-09-14 ENCOUNTER — RESULT REVIEW (OUTPATIENT)
Age: 89
End: 2024-09-14

## 2024-09-14 DIAGNOSIS — K52.9 NONINFECTIVE GASTROENTERITIS AND COLITIS, UNSPECIFIED: ICD-10-CM

## 2024-09-14 DIAGNOSIS — Z90.49 ACQUIRED ABSENCE OF OTHER SPECIFIED PARTS OF DIGESTIVE TRACT: Chronic | ICD-10-CM

## 2024-09-14 DIAGNOSIS — D46.9 MYELODYSPLASTIC SYNDROME, UNSPECIFIED: ICD-10-CM

## 2024-09-14 DIAGNOSIS — Z98.890 OTHER SPECIFIED POSTPROCEDURAL STATES: Chronic | ICD-10-CM

## 2024-09-14 DIAGNOSIS — C18.9 MALIGNANT NEOPLASM OF COLON, UNSPECIFIED: ICD-10-CM

## 2024-09-14 DIAGNOSIS — D35.1 BENIGN NEOPLASM OF PARATHYROID GLAND: Chronic | ICD-10-CM

## 2024-09-14 LAB
ALBUMIN SERPL ELPH-MCNC: 1.8 G/DL — LOW (ref 3.3–5)
ALP SERPL-CCNC: 57 U/L — SIGNIFICANT CHANGE UP (ref 40–120)
ALT FLD-CCNC: <6 U/L — LOW (ref 10–45)
ANION GAP SERPL CALC-SCNC: 20 MMOL/L — HIGH (ref 5–17)
AST SERPL-CCNC: 9 U/L — LOW (ref 10–40)
BILIRUB SERPL-MCNC: 0.4 MG/DL — SIGNIFICANT CHANGE UP (ref 0.2–1.2)
BUN SERPL-MCNC: 105 MG/DL — HIGH (ref 7–23)
CALCIUM SERPL-MCNC: 7.2 MG/DL — LOW (ref 8.4–10.5)
CHLORIDE SERPL-SCNC: 110 MMOL/L — HIGH (ref 96–108)
CO2 SERPL-SCNC: 16 MMOL/L — LOW (ref 22–31)
CREAT SERPL-MCNC: 9.7 MG/DL — HIGH (ref 0.5–1.3)
EGFR: 4 ML/MIN/1.73M2 — LOW
GLUCOSE SERPL-MCNC: 178 MG/DL — HIGH (ref 70–99)
HCT VFR BLD CALC: 21.5 % — LOW (ref 34.5–45)
HCT VFR BLD CALC: 23.8 % — LOW (ref 34.5–45)
HCT VFR BLD CALC: 27.6 % — LOW (ref 34.5–45)
HGB BLD-MCNC: 6.3 G/DL — CRITICAL LOW (ref 11.5–15.5)
HGB BLD-MCNC: 6.8 G/DL — CRITICAL LOW (ref 11.5–15.5)
HGB BLD-MCNC: 8.3 G/DL — LOW (ref 11.5–15.5)
INR BLD: 1.2 RATIO — HIGH (ref 0.85–1.18)
MAGNESIUM SERPL-MCNC: 1.9 MG/DL — SIGNIFICANT CHANGE UP (ref 1.6–2.6)
MCHC RBC-ENTMCNC: 26.8 PG — LOW (ref 27–34)
MCHC RBC-ENTMCNC: 27.6 PG — SIGNIFICANT CHANGE UP (ref 27–34)
MCHC RBC-ENTMCNC: 29.3 GM/DL — LOW (ref 32–36)
MCHC RBC-ENTMCNC: 30.1 GM/DL — LOW (ref 32–36)
MCV RBC AUTO: 91.5 FL — SIGNIFICANT CHANGE UP (ref 80–100)
MCV RBC AUTO: 91.7 FL — SIGNIFICANT CHANGE UP (ref 80–100)
NRBC # BLD: 0 /100 WBCS — SIGNIFICANT CHANGE UP (ref 0–0)
NRBC # BLD: 0 /100 WBCS — SIGNIFICANT CHANGE UP (ref 0–0)
PHOSPHATE SERPL-MCNC: 7.9 MG/DL — HIGH (ref 2.5–4.5)
PLATELET # BLD AUTO: 107 K/UL — LOW (ref 150–400)
PLATELET # BLD AUTO: 75 K/UL — LOW (ref 150–400)
POTASSIUM SERPL-MCNC: 4.4 MMOL/L — SIGNIFICANT CHANGE UP (ref 3.5–5.3)
POTASSIUM SERPL-SCNC: 4.4 MMOL/L — SIGNIFICANT CHANGE UP (ref 3.5–5.3)
PROT SERPL-MCNC: 4.7 G/DL — LOW (ref 6–8.3)
PROTHROM AB SERPL-ACNC: 14 SEC — HIGH (ref 9.5–13)
RBC # BLD: 2.35 M/UL — LOW (ref 3.8–5.2)
RBC # BLD: 3.01 M/UL — LOW (ref 3.8–5.2)
RBC # FLD: 20.1 % — HIGH (ref 10.3–14.5)
RBC # FLD: 21.8 % — HIGH (ref 10.3–14.5)
SODIUM SERPL-SCNC: 146 MMOL/L — HIGH (ref 135–145)
WBC # BLD: 1.96 K/UL — LOW (ref 3.8–10.5)
WBC # BLD: 2.23 K/UL — LOW (ref 3.8–10.5)
WBC # FLD AUTO: 1.96 K/UL — LOW (ref 3.8–10.5)
WBC # FLD AUTO: 2.23 K/UL — LOW (ref 3.8–10.5)

## 2024-09-14 PROCEDURE — 93306 TTE W/DOPPLER COMPLETE: CPT | Mod: 26

## 2024-09-14 PROCEDURE — 78582 LUNG VENTILAT&PERFUS IMAGING: CPT | Mod: 26

## 2024-09-14 PROCEDURE — 99223 1ST HOSP IP/OBS HIGH 75: CPT | Mod: FS

## 2024-09-14 RX ORDER — CALCIUM ACETATE 667 MG
667 TABLET ORAL
Refills: 0 | Status: DISCONTINUED | OUTPATIENT
Start: 2024-09-14 | End: 2024-09-15

## 2024-09-14 RX ORDER — SODIUM CHLORIDE IRRIG SOLUTION 0.9 %
1000 SOLUTION, IRRIGATION IRRIGATION
Refills: 0 | Status: DISCONTINUED | OUTPATIENT
Start: 2024-09-14 | End: 2024-09-15

## 2024-09-14 RX ORDER — FUROSEMIDE 10 MG/ML
40 INJECTION INTRAVENOUS ONCE
Refills: 0 | Status: COMPLETED | OUTPATIENT
Start: 2024-09-14 | End: 2024-09-14

## 2024-09-14 RX ORDER — MULTI VITAMIN/MINERAL SUPPLEMENT WITH ASCORBIC ACID, NIACIN, PYRIDOXINE, PANTOTHENIC ACID, FOLIC ACID, RIBOFLAVIN, THIAMIN, BIOTIN, COBALAMIN AND ZINC. 60; 20; 12.5; 10; 10; 1.7; 1.5; 1; .3; .006 MG/1; MG/1; MG/1; MG/1; MG/1; MG/1; MG/1; MG/1; MG/1; MG/1
1 TABLET, COATED ORAL DAILY
Refills: 0 | Status: DISCONTINUED | OUTPATIENT
Start: 2024-09-14 | End: 2024-09-25

## 2024-09-14 RX ADMIN — Medication 100 MILLIGRAM(S): at 14:19

## 2024-09-14 RX ADMIN — Medication 100 MILLIGRAM(S): at 21:51

## 2024-09-14 RX ADMIN — FUROSEMIDE 40 MILLIGRAM(S): 10 INJECTION INTRAVENOUS at 16:22

## 2024-09-14 RX ADMIN — Medication 60 MILLILITER(S): at 20:16

## 2024-09-14 RX ADMIN — MULTI VITAMIN/MINERAL SUPPLEMENT WITH ASCORBIC ACID, NIACIN, PYRIDOXINE, PANTOTHENIC ACID, FOLIC ACID, RIBOFLAVIN, THIAMIN, BIOTIN, COBALAMIN AND ZINC. 1 TABLET(S): 60; 20; 12.5; 10; 10; 1.7; 1.5; 1; .3; .006 TABLET, COATED ORAL at 12:51

## 2024-09-14 RX ADMIN — Medication 667 MILLIGRAM(S): at 12:51

## 2024-09-14 RX ADMIN — Medication 100 MILLIGRAM(S): at 05:38

## 2024-09-14 RX ADMIN — Medication 667 MILLIGRAM(S): at 17:52

## 2024-09-14 NOTE — CONSULT NOTE ADULT - NS ATTEND AMEND GEN_ALL_CORE FT
Agree with assessment and plan as above.    88 y/o F presenting with weakness. RLQ prompted CT scan which showed evidence of colitis. Continue with antibiotic coverage. Symptomatic treatment for nausea. If persistent diarrhea recommend stool studies (c diff, GI PCR) to rule out infection especially in the setting of neutropenia.     >75 minutes was spent on direct patient care

## 2024-09-14 NOTE — CONSULT NOTE ADULT - SUBJECTIVE AND OBJECTIVE BOX
Time of visit:    CHIEF COMPLAINT: Patient is a 89y old  Female who presents with a chief complaint of Generalized weakness (14 Sep 2024 11:57)      HPI: This is an 89 yr old woman with HTN, herpes zoster, CKD, multinodular thyroid,breast cancer, colon cancer (previously on Keytruda), refractory anemia/MDS, treated supportively and ambulatory (erythropoietin supplements), no recent exposure to systemic chemotherapy, is admitted with generalized weakness. Patient daughter at bedside reports that she have been getting more weaker and unable to ambulate as much. She is also complaining of sob and more generalized edema.    pt saw PCP on Monday and hgb was 7 at that time. Prior to 3 weeks ago, patient was walking independently and now she is unable to walk on her own. Daughter states patient is unsteady on her fee  In the ED, patient was found to have anemia with hgb of 7.1 and worsening Cr of around 9 previously was 6.  Pt is refusing HD. She is agreeble for blood. She also had CT which shows nonspecific colitis,increasing right pleural effusion with underlying passive atelectasis with questionable pneumonia.  also suspicion for main and right intrahepatic portal vein bland or tumor thrombus, similar to prior. Pancreatic head/mesenteric mass with central necrosis, similar to prior.  patient was DNI/DNR but she changed her mind to being full code. She is AAO x3  She denies any fevers, chills, nausea or vomiting  (13 Sep 2024 15:05)   Patient seen and examined.     PAST MEDICAL & SURGICAL HISTORY:  History of breast cancer  right breast      Anemia      Hypertension      History of thrombocytopenia      History of herpes zoster      History of malignant neoplasm of parathyroid gland      Colon cancer      Chronic renal insufficiency      Thyroid nodule      Diabetes mellitus  NIDDM      History of cholecystectomy      History of partial colectomy  and appendectomy at the same time      History of lumpectomy of right breast  2007 and radiation and chemotherapy      Parathyroid adenoma  excision          Allergies    No Known Allergies    Intolerances        MEDICATIONS  (STANDING):  amLODIPine   Tablet 10 milliGRAM(s) Oral daily  calcium acetate 667 milliGRAM(s) Oral three times a day with meals  cefTRIAXone   IVPB 1000 milliGRAM(s) IV Intermittent every 24 hours  dextrose 5% 1000 milliLiter(s) (60 mL/Hr) IV Continuous <Continuous>  furosemide   Injectable 40 milliGRAM(s) IV Push once  metroNIDAZOLE  IVPB 500 milliGRAM(s) IV Intermittent every 8 hours  metroNIDAZOLE  IVPB      Nephro-sage 1 Tablet(s) Oral daily      MEDICATIONS  (PRN):  acetaminophen     Tablet .. 650 milliGRAM(s) Oral every 6 hours PRN Temp greater or equal to 38C (100.4F), Mild Pain (1 - 3)  melatonin 3 milliGRAM(s) Oral at bedtime PRN Insomnia  ondansetron Injectable 4 milliGRAM(s) IV Push every 8 hours PRN Nausea and/or Vomiting   Medications up to date at time of exam.    Medications up to date at time of exam.    FAMILY HISTORY:  FH: colon cancer (Sibling, Sibling)    FH: breast cancer (Sibling)        SOCIAL HISTORY  Smoking History: [   ] smoking/smoke exposure, [   ] former smoker  Living Condition: [   ] apartment, [   ] private house  Work History:   Travel History: denies recent travel  Illicit Substance Use: denies  Alcohol Use: denies    REVIEW OF SYSTEMS:    CONSTITUTIONAL:  denies fevers, chills, sweats, weight loss    HEENT:  denies diplopia or blurred vision, sore throat or runny nose.    CARDIOVASCULAR:  denies pressure, squeezing, tightness, or heaviness about the chest; no palpitations.    RESPIRATORY:  denies SOB, cough, MIDDLETON, wheezing.    GASTROINTESTINAL:  denies abdominal pain, nausea, vomiting or diarrhea.    GENITOURINARY: denies dysuria, frequency or urgency.    NEUROLOGIC:  denies numbness, tingling, seizures or weakness.    PSYCHIATRIC:  denies disorder of thought or mood.    MSK: denies swelling, redness      PHYSICAL EXAMINATION:    GENERAL: The patient  in no apparent distress.     Vital Signs Last 24 Hrs  T(C): 36.3 (14 Sep 2024 12:30), Max: 36.8 (13 Sep 2024 19:30)  T(F): 97.3 (14 Sep 2024 12:30), Max: 98.3 (13 Sep 2024 19:30)  HR: 67 (14 Sep 2024 12:30) (60 - 69)  BP: 133/52 (14 Sep 2024 12:30) (111/54 - 133/52)  BP(mean): --  RR: 17 (14 Sep 2024 12:30) (16 - 18)  SpO2: 94% (14 Sep 2024 05:18) (94% - 98%)    Parameters below as of 14 Sep 2024 12:30  Patient On (Oxygen Delivery Method): nasal cannula  O2 Flow (L/min): 2     (if applicable)    Chest Tube (if applicable)    HEENT: Head is normocephalic and atraumatic. Extraocular muscles are intact. Mucous membranes are moist.     NECK: Supple, no palpable adenopathy.    LUNGS: Fair b/l breath sounds, no wheezing, rales, or rhonchi.    HEART: Regular rate and rhythm without murmur.    ABDOMEN: Soft, nontender, and nondistended.  No hepatosplenomegaly is noted.    RENAL: No difficulty voiding, no pelvic pain    EXTREMITIES: Without any cyanosis, clubbing, rash, lesions or edema.    NEUROLOGIC: Awake, alert, oriented, grossly intact    SKIN: Warm, dry, good turgor.      LABS:                        6.8    x     )-----------( x        ( 14 Sep 2024 08:48 )             23.8     09-14    146<H>  |  110<H>  |  105<H>  ----------------------------<  178<H>  4.4   |  16<L>  |  9.70<H>    Ca    7.2<L>      14 Sep 2024 06:40  Phos  7.9     09-14  Mg     1.9     09-14    TPro  4.7<L>  /  Alb  1.8<L>  /  TBili  0.4  /  DBili  x   /  AST  9<L>  /  ALT  <6<L>  /  AlkPhos  57  09-14    PT/INR - ( 14 Sep 2024 06:40 )   PT: 14.0 sec;   INR: 1.20 ratio           Urinalysis Basic - ( 14 Sep 2024 06:40 )    Color: x / Appearance: x / SG: x / pH: x  Gluc: 178 mg/dL / Ketone: x  / Bili: x / Urobili: x   Blood: x / Protein: x / Nitrite: x   Leuk Esterase: x / RBC: x / WBC x   Sq Epi: x / Non Sq Epi: x / Bacteria: x                      MICROBIOLOGY: (if applicable)    RADIOLOGY & ADDITIONAL STUDIES:  EKG:   CXR:  ECHO:    IMPRESSION: 89y Female PAST MEDICAL & SURGICAL HISTORY:  History of breast cancer  right breast      Anemia      Hypertension      History of thrombocytopenia      History of herpes zoster      History of malignant neoplasm of parathyroid gland      Colon cancer      Chronic renal insufficiency      Thyroid nodule      Diabetes mellitus  NIDDM      History of cholecystectomy      History of partial colectomy  and appendectomy at the same time      History of lumpectomy of right breast  2007 and radiation and chemotherapy      Parathyroid adenoma  excision       p/w                  Time of visit:    CHIEF COMPLAINT: Patient is a 89y old  Female who presents with a chief complaint of Generalized weakness (14 Sep 2024 11:57)      HPI: This is an 89 yr old woman with HTN, herpes zoster, CKD, multinodular thyroid ,breast cancer, colon cancer (previously on Keytruda), refractory anemia/MDS, treated supportively and ambulatory (erythropoietin supplements), no recent exposure to systemic chemotherapy, is admitted with generalized weakness. Patient daughter at bedside reports that she have been getting more weaker and unable to ambulate as much. She is also complaining of sob and more generalized edema.    pt saw PCP on Monday and hgb was 7 at that time. Prior to 3 weeks ago, patient was walking independently and now she is unable to walk on her own. Daughter states patient is unsteady on her fee  In the ED, patient was found to have anemia with hgb of 7.1 and worsening Cr of around 9 previously was 6.  Pt is refusing HD. She is agreeble for blood. She also had CT which shows nonspecific colitis,increasing right pleural effusion with underlying passive atelectasis with questionable pneumonia.  also suspicion for main and right intrahepatic portal vein bland or tumor thrombus, similar to prior. Pancreatic head/mesenteric mass with central necrosis, similar to prior.  patient was DNI/DNR but she changed her mind to being full code. She is AAO x3  She denies any fevers, chills, nausea or vomiting  (13 Sep 2024 15:05)   Patient seen and examined.     PAST MEDICAL & SURGICAL HISTORY:  History of breast cancer  right breast      Anemia      Hypertension      History of thrombocytopenia      History of herpes zoster      History of malignant neoplasm of parathyroid gland      Colon cancer      Chronic renal insufficiency      Thyroid nodule      Diabetes mellitus  NIDDM      History of cholecystectomy      History of partial colectomy  and appendectomy at the same time      History of lumpectomy of right breast  2007 and radiation and chemotherapy      Parathyroid adenoma  excision          Allergies    No Known Allergies    Intolerances        MEDICATIONS  (STANDING):  amLODIPine   Tablet 10 milliGRAM(s) Oral daily  calcium acetate 667 milliGRAM(s) Oral three times a day with meals  cefTRIAXone   IVPB 1000 milliGRAM(s) IV Intermittent every 24 hours  dextrose 5% 1000 milliLiter(s) (60 mL/Hr) IV Continuous <Continuous>  furosemide   Injectable 40 milliGRAM(s) IV Push once  metroNIDAZOLE  IVPB 500 milliGRAM(s) IV Intermittent every 8 hours  metroNIDAZOLE  IVPB      Nephro-sage 1 Tablet(s) Oral daily      MEDICATIONS  (PRN):  acetaminophen     Tablet .. 650 milliGRAM(s) Oral every 6 hours PRN Temp greater or equal to 38C (100.4F), Mild Pain (1 - 3)  melatonin 3 milliGRAM(s) Oral at bedtime PRN Insomnia  ondansetron Injectable 4 milliGRAM(s) IV Push every 8 hours PRN Nausea and/or Vomiting   Medications up to date at time of exam.    Medications up to date at time of exam.    FAMILY HISTORY:  FH: colon cancer (Sibling, Sibling)    FH: breast cancer (Sibling)        SOCIAL HISTORY  Smoking History: [  x ] none  smoking/smoke exposure, [   ] former smoker  Living Condition: [   ] apartment, [   ] private house  Work History: retired  / beautician   Travel History: denies recent travel  Illicit Substance Use: denies  Alcohol Use: denies    REVIEW OF SYSTEMS:    CONSTITUTIONAL:  denies fevers, chills, sweats, weight loss    HEENT:  denies diplopia or blurred vision, sore throat or runny nose.    CARDIOVASCULAR:  denies pressure, squeezing, tightness, or heaviness about the chest; no palpitations.    RESPIRATORY:  denies SOB, cough, MIDDLETON, wheezing.    GASTROINTESTINAL:  denies abdominal pain, nausea, vomiting or diarrhea.    GENITOURINARY: denies dysuria, frequency or urgency.    NEUROLOGIC:  denies numbness, tingling, seizures or weakness.    PSYCHIATRIC:  denies disorder of thought or mood.    MSK: denies swelling, redness      PHYSICAL EXAMINATION:    GENERAL: The patient  in no apparent distress.  PRBC transfusion in progress . niece Ray a bedside     Vital Signs Last 24 Hrs  T(C): 36.3 (14 Sep 2024 12:30), Max: 36.8 (13 Sep 2024 19:30)  T(F): 97.3 (14 Sep 2024 12:30), Max: 98.3 (13 Sep 2024 19:30)  HR: 67 (14 Sep 2024 12:30) (60 - 69)  BP: 133/52 (14 Sep 2024 12:30) (111/54 - 133/52)  BP(mean): --  RR: 17 (14 Sep 2024 12:30) (16 - 18)  SpO2: 94% (14 Sep 2024 05:18) (94% - 98%)    Parameters below as of 14 Sep 2024 12:30  Patient On (Oxygen Delivery Method): nasal cannula  O2 Flow (L/min): 2     (if applicable)    Chest Tube (if applicable)    HEENT: Head is normocephalic and atraumatic. Extraocular muscles are intact. Mucous membranes are moist.     NECK: Supple, no palpable adenopathy.    LUNGS: Fair b/l breath sounds, no wheezing, rales, or rhonchi.    HEART: Regular rate and rhythm without murmur.    ABDOMEN: Soft, nontender, and nondistended.  No hepatosplenomegaly is noted.    RENAL: No difficulty voiding, no pelvic pain    EXTREMITIES: Without any cyanosis, clubbing, rash, lesions or edema.    NEUROLOGIC: Awake, alert, oriented, grossly intact    SKIN: Warm, dry, good turgor.      LABS:                        6.8    x     )-----------( x        ( 14 Sep 2024 08:48 )             23.8     09-14    146<H>  |  110<H>  |  105<H>  ----------------------------<  178<H>  4.4   |  16<L>  |  9.70<H>    Ca    7.2<L>      14 Sep 2024 06:40  Phos  7.9     09-14  Mg     1.9     09-14    TPro  4.7<L>  /  Alb  1.8<L>  /  TBili  0.4  /  DBili  x   /  AST  9<L>  /  ALT  <6<L>  /  AlkPhos  57  09-14    PT/INR - ( 14 Sep 2024 06:40 )   PT: 14.0 sec;   INR: 1.20 ratio           Urinalysis Basic - ( 14 Sep 2024 06:40 )    Color: x / Appearance: x / SG: x / pH: x  Gluc: 178 mg/dL / Ketone: x  / Bili: x / Urobili: x   Blood: x / Protein: x / Nitrite: x   Leuk Esterase: x / RBC: x / WBC x   Sq Epi: x / Non Sq Epi: x / Bacteria: x  MICROBIOLOGY: (if applicable)    RADIOLOGY & ADDITIONAL STUDIES:  EKG:   CXR:< from: CT Chest No Cont (09.13.24 @ 15:08) >  IMPRESSION:  Mild to moderate right pleural, small left pleural effusion and mild   pericardial effusion. No evidence of pulmonary edema.      < end of copied text >    ECHO:    IMPRESSION: 89y Female PAST MEDICAL & SURGICAL HISTORY:  History of breast cancer  right breast      Anemia      Hypertension      History of thrombocytopenia      History of herpes zoster      History of malignant neoplasm of parathyroid gland      Colon cancer      Chronic renal insufficiency      Thyroid nodule      Diabetes mellitus  NIDDM      History of cholecystectomy      History of partial colectomy  and appendectomy at the same time      History of lumpectomy of right breast  2007 and radiation and chemotherapy      Parathyroid adenoma  excision       p/w         IMP: This is an 89 yr old woman with HTN, herpes zoster, CKD, multinodular thyroid ,breast cancer 2007, colon cancer 2011 (previously on Keytruda), refractory anemia/MDS, treated supportively and ambulatory (erythropoietin supplements), no recent exposure to systemic chemotherapy, is admitted with generalized weakness due to multiple reasons : CKD ,  severe anemia ( pancytopenia ). Pat denied sob but noted with moderate  pleural effusion R>L probable due to combination of malignancy and CKD .  There is alos b/l LE DVT . Since pat si relatively stable from a pulmonary point of view, no emergent thoracentesis indicated. Pat wish to discuss with family before consenting for thoracentesis .  Now DNR DNI .      Sugg  - Continue O2 supp as needed to maintain sat >90%  - Diagnostic thoracentesis and PleurX cath if indicated   - Transfuse  PRBC as needed to maintain H/H >7/21  - Hold off on anticoag at this time   - Monitor renal fx   - DNR DNI     discussed with Raudel ( Niмария ) at bedside

## 2024-09-14 NOTE — CHART NOTE - NSCHARTNOTEFT_GEN_A_CORE
89-year-old female with PMH of CKD, colon cancer s/p resection, breast cancer, parathyroid ca, HTN, thrombocytopenia presents to the emergency department for  shortness of breath,  generalized edema, and sob. pt was to found to have abnormal labs which includes anemia, worsening Cr baseline 6 currently 9. pt also have abnormal CT showing colilitis and questionable pna as well as worsening pleural effusion and suspicion for main and right intrahepatic portal vein bland or tumor   thrombus, similar to prior.    # worsening anemia secondary to carcinoma, worsening CKD   # leukopenia, thrombocytopenia  # history of breast cancer, colon cancer (previously on Keytruda), refractory anemia/MDS  s/p 1 unit PRBC 9/13 however per nursing patient did not finish full unit  hemoglobin 6.3, then 6.8 on repeat this am  Will give 1 unit of PRBC and lasix after   heme onc eval appreciated  currently not receiving chemo  supportive treatment  f/u blood cultures    # fluid overload secondary to CKD most likely  # generalized edema  # acute respiratory failure requiring 2 liters of oxygen  # pleural effusions  Rocephin to cover any possible pna  Pulm to see, echo to assess EF   supportive O2 to keep level above 90  V/Q scan given hx of malignancy, indeterminate no ventilation/perfusion mismatch that would suggest PE  CT chest non contrast reviewed  Patient refusing dialysis at this time    #Acute DVT below the knee  Discussed with family, will hold off on ac for now as patient with anemia    # worsening Cr, CKD stage 4  # HTN, gout  pt refusing HD currently  Dr. 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 Michele     89-year-old female with PMH of CKD, colon cancer s/p resection, breast cancer, parathyroid ca, HTN, thrombocytopenia presents to the emergency department for  shortness of breath,  generalized edema, and sob. pt was to found to have abnormal labs which includes anemia, worsening Cr baseline 6 currently 9. pt also have abnormal CT showing colilitis and questionable pna as well as worsening pleural effusion and suspicion for main and right intrahepatic portal vein bland or tumor   thrombus, similar to prior.    Patient states she is feeling better than yesterday. Denies chest pain, sob, nausea, vomiting. Has chronic diarrhea s/p chemo treatments.     # worsening anemia secondary to carcinoma, worsening CKD   # leukopenia, thrombocytopenia  # history of breast cancer, colon cancer (previously on Keytruda), refractory anemia/MDS  s/p 1 unit PRBC 9/13 however per nursing patient did not finish full unit  hemoglobin 6.3, then 6.8 on repeat this am  Will give 1 unit of PRBC and lasix after   heme onc eval appreciated  currently not receiving chemo  supportive treatment  f/u blood cultures    # fluid overload secondary to CKD most likely  # generalized edema  # acute respiratory failure requiring 2 liters of oxygen  # pleural effusions  Rocephin to cover any possible pna  Pulm to see, echo to assess EF   supportive O2 to keep level above 90  V/Q scan given hx of malignancy, indeterminate no ventilation/perfusion mismatch that would suggest PE  CT chest non contrast reviewed  Patient refusing dialysis at this time    #Acute DVT below the knee  Discussed with family, will hold off on ac for now as patient with anemia    # worsening Cr, CKD stage 4  # HTN, gout  pt refusing HD currently  Dr. 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 Patient states she does not want to be resuscitated or intubated and would like to pass away peacefully. PAVEL completed DNR/DNI. Discussed with Dr. Michele     89-year-old female with PMH of CKD, colon cancer s/p resection, breast cancer, parathyroid ca, HTN, thrombocytopenia presents to the emergency department for  shortness of breath,  generalized edema, and sob. pt was to found to have abnormal labs which includes anemia, worsening Cr baseline 6 currently 9. pt also have abnormal CT showing colilitis and questionable pna as well as worsening pleural effusion and suspicion for main and right intrahepatic portal vein bland or tumor   thrombus, similar to prior.    Patient states she is feeling better than yesterday. Denies chest pain, sob, nausea, vomiting. Has chronic diarrhea s/p chemo treatments.     # worsening anemia secondary to carcinoma, worsening CKD   # leukopenia, thrombocytopenia  # history of breast cancer, colon cancer (previously on Keytruda), refractory anemia/MDS  s/p 1 unit PRBC 9/13 however per nursing patient did not finish full unit  hemoglobin 6.3, then 6.8 on repeat this am  Will give 1 unit of PRBC and lasix after   heme onc eval appreciated  currently not receiving chemo  supportive treatment  f/u blood cultures    # fluid overload secondary to CKD most likely  # generalized edema  # acute respiratory failure requiring 2 liters of oxygen  # pleural effusions  Rocephin to cover any possible pna  Pulm to see, echo to assess EF   supportive O2 to keep level above 90  V/Q scan given hx of malignancy, indeterminate no ventilation/perfusion mismatch that would suggest PE  CT chest non contrast reviewed  Patient refusing dialysis at this time    #Acute DVT below the knee  Discussed with family, will hold off on ac for now as patient with anemia  Vascular consult placed    # worsening Cr, CKD stage 4  # HTN, gout  pt refusing HD currently  Dr. 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 Patient states she does not want to be resuscitated or intubated and would like to pass away peacefully. PAVEL completed DNR/DNI.

## 2024-09-14 NOTE — CONSULT NOTE ADULT - SUBJECTIVE AND OBJECTIVE BOX
INTERVAL HPI/OVERNIGHT EVENTS:  HPI: 89F, PMHx HTN, herpes zoster, CKD, multinodular thyroid,breast cancer, colon cancer (previously on Keytruda), refractory anemia/MDS, treated supportively and ambulatory (erythropoietin supplements), no recent exposure to systemic chemotherapy, is admitted with generalized weakness. Patient daughter at bedside reports that she have been getting more weaker and unable to ambulate as much. She is also complaining of sob and more generalized edema.    pt saw PCP on Monday and hgb was 7 at that time. Prior to 3 weeks ago, patient was walking independently and now she is unable to walk on her own. Daughter states patient is unsteady on her fee  In the ED, patient was found to have anemia with hgb of 7.1 and worsening Cr of around 9 previously was 6. Pt is refusing HD. She is agreeble for blood. She also had CT which shows nonspecific colitis,increasing right pleural effusion with underlying passive atelectasis with questionable pneumonia. also suspicion for main and right intrahepatic portal vein bland or tumor thrombus, similar to prior. Pancreatic head/mesenteric mass with central necrosis, similar to prior. patient was DNI/DNR but she changed her mind to being full code. She is AAO x3. She denies any fevers, chills, nausea or vomiting  (13 Sep 2024 15:05)    GI CONSULT:  GI was consulted for colitis. Pt was seen and examined at the bedside. Pt is AAOx3. Pt admitted to the hospital for generalized weakness, fatigue and exhaustion. Pt has a hx of breast CA and colon CA x3. Chemo since 2007. Last Colonoscopy 3 years ago. Pt don't remember when the last time EGD was done. BMs are loose and that is normal for her. Feeling nauseous as well. Denies any other s/s including dizziness, lightheadedness, vomiting, constipation, blood in the stool. CT abdomen/pelvis showed- Nonspecific colitis, either infectious or inflammatory.       MEDICATIONS  (STANDING):  amLODIPine   Tablet 10 milliGRAM(s) Oral daily  calcium acetate 667 milliGRAM(s) Oral three times a day with meals  cefTRIAXone   IVPB 1000 milliGRAM(s) IV Intermittent every 24 hours  dextrose 5% 1000 milliLiter(s) (60 mL/Hr) IV Continuous <Continuous>  metroNIDAZOLE  IVPB      metroNIDAZOLE  IVPB 500 milliGRAM(s) IV Intermittent every 8 hours  Nephro-sage 1 Tablet(s) Oral daily    MEDICATIONS  (PRN):  acetaminophen     Tablet .. 650 milliGRAM(s) Oral every 6 hours PRN Temp greater or equal to 38C (100.4F), Mild Pain (1 - 3)  melatonin 3 milliGRAM(s) Oral at bedtime PRN Insomnia  ondansetron Injectable 4 milliGRAM(s) IV Push every 8 hours PRN Nausea and/or Vomiting      Allergies  No Known Allergies    Intolerances    PAST MEDICAL & SURGICAL HISTORY:  History of breast cancer  right breast  Anemia  Hypertension  History of thrombocytopenia  History of herpes zoster  History of malignant neoplasm of parathyroid gland  Colon cancer  Chronic renal insufficiency  Thyroid nodule  Diabetes mellitus  NIDDM  History of cholecystectomy  History of partial colectomy  and appendectomy at the same time  History of lumpectomy of right breast  2007 and radiation and chemotherapy  Parathyroid adenoma  excision      REVIEW OF SYSTEMS  Negative unless indicated in the HPI  	    PHYSICAL EXAM:   Vital Signs:  Vital Signs Last 24 Hrs  T(C): 36.3 (14 Sep 2024 05:18), Max: 36.8 (13 Sep 2024 19:30)  T(F): 97.3 (14 Sep 2024 05:18), Max: 98.3 (13 Sep 2024 19:30)  HR: 63 (14 Sep 2024 05:18) (60 - 88)  BP: 130/56 (14 Sep 2024 05:18) (111/54 - 130/56)  BP(mean): --  RR: 17 (14 Sep 2024 05:18) (16 - 18)  SpO2: 94% (14 Sep 2024 05:18) (94% - 98%)    Parameters below as of 14 Sep 2024 05:18  Patient On (Oxygen Delivery Method): nasal cannula  O2 Flow (L/min): 2    Daily Height in cm: 162.56 (13 Sep 2024 16:16)    Daily I&O's Summary      GENERAL:  Appears stated age  HEENT:  NC/AT,  conjunctivae clear and pink,   CHEST:  Full & symmetric excursion,   HEART:  Regular rhythm,   ABDOMEN:  Soft, non-tender, non-distended, normoactive bowel sounds  EXTEREMITIES:  no cyanosis, clubbing  SKIN:  No rash/warm/dry  NEURO:  Alert, oriented      LABS:              6.8    x     )-----------( x        ( 14 Sep 2024 08:48 )             23.8     09-14    146<H>  |  110<H>  |  105<H>  ----------------------------<  178<H>  4.4   |  16<L>  |  9.70<H>    Ca    7.2<L>      14 Sep 2024 06:40  Phos  7.9     09-14  Mg     1.9     09-14    TPro  4.7<L>  /  Alb  1.8<L>  /  TBili  0.4  /  DBili  x   /  AST  9<L>  /  ALT  <6<L>  /  AlkPhos  57  09-14    PT/INR - ( 14 Sep 2024 06:40 )   PT: 14.0 sec;   INR: 1.20 ratio           Urinalysis Basic - ( 14 Sep 2024 06:40 )    Color: x / Appearance: x / SG: x / pH: x  Gluc: 178 mg/dL / Ketone: x  / Bili: x / Urobili: x   Blood: x / Protein: x / Nitrite: x   Leuk Esterase: x / RBC: x / WBC x   Sq Epi: x / Non Sq Epi: x / Bacteria: x      amylase   lipaseLipase: 137 U/L (09-13 @ 12:35)    RADIOLOGY & ADDITIONAL TESTS:    ACC: [id number] EXAM:  CT ABDOMEN AND PELVIS   ORDERED BY: ALANIS JACKSON     PROCEDURE DATE:  09/13/2024          INTERPRETATION:  CLINICAL INFORMATION: 89 years  Female with RLQ abd   pain. History of breast cancer, colon cancer and myelodysplasia. Status   post right hemicolectomy and chemotherapy in 2011. Mesenteric mass   excised in 2013 negative for metastatic disease. Recurred in 2016.    COMPARISON: CT abdomen and pelvis 5/28/2024 and 2/29/2024    CONTRAST/COMPLICATIONS:  IV Contrast: NONE  Oral Contrast: NONE  Complications: None reported at time of study completion    PROCEDURE:  CT of the Abdomen and Pelvis was performed.  Sagittal and coronal reformats were performed.    LIMITATIONS: Evaluation of the solid organs, vascular structures and GI   tract is limited without oral and IV contrast.    FINDINGS:  LOWER CHEST: Moderate right pleural effusion, larger than prior, with   underlying near complete right lower lobe passive atelectasis. Trace left   pleural effusion slightly larger than prior. Right central venous   catheter tip at cavoatrial junction. Coronary atherosclerosis.   Low-attenuation cardiac blood pool secondary to anemia. Trace pericardial   effusion unchanged.    LIVER: Innumerable hepatic cysts and hypodensities too small to   characterize. Redemonstrated soft tissue attenuation in the main and   right portal vein concerning for bland or tumor thrombus.  BILE DUCTS: Normal caliber.  GALLBLADDER: Cholecystectomy.  SPLEEN: Stable splenomegaly.  PANCREAS: Redemonstrated 9.9 x 7.7 cm right mesenteric mass, previously   10.4 x 8.6 cm. Mass is again inseparable from the pancreatic head. Air   within the mass similar to prior. Surrounding surgical clips and   surrounding mesenteric edema unchanged.  ADRENALS: Within normal limits.  KIDNEYS/URETERS: Atrophic right kidney. Bilateral cysts. Right extrarenal   pelvis. No hydronephrosis bilaterally.    BLADDER: Within normal limits.  REPRODUCTIVE ORGANS:    BOWEL: No bowel obstruction. Appendix is not visualized and cannot be   assessed. Right hemicolectomy with ileocolic anastomosis. Thick-walled   nondistended colon. Trace pericolonic fat stranding either related to   ascites or inflammatory process. Colonic diverticulosis without   diverticulitis.  PERITONEUM/RETROPERITONEUM: Small ascites. Multiple right abdominal   surgical clips.  VESSELS: Advanced atherosclerosis.  LYMPH NODES: No lymphadenopathy.  ABDOMINAL WALL: Anasarca. Rectus diastases. Anterior abdominal wall   laxity.  BONES: Within normal limits.    IMPRESSION:    Nonspecific colitis, either infectious or inflammatory.    Increasing right pleural effusion with underlying passive atelectasis.   Cannot exclude underlying pneumonia. Left pleural effusion slightly   larger than prior.    Suspicion for main and right intrahepatic portal vein bland or tumor   thrombus, similar to prior.    Pancreatic head/mesenteric mass with central necrosis, similar to prior.        --- End of Report ---            DMITRY XIAO MD; Attending Radiologist  This document has been electronically signed. Sep 13 2024  1:31PM INTERVAL HPI/OVERNIGHT EVENTS:  HPI: 89F, PMHx HTN, herpes zoster, CKD, multinodular thyroid,breast cancer, colon cancer (previously on Keytruda), refractory anemia/MDS, treated supportively and ambulatory (erythropoietin supplements), no recent exposure to systemic chemotherapy, is admitted with generalized weakness. Patient daughter at bedside reports that she have been getting more weaker and unable to ambulate as much. She is also complaining of sob and more generalized edema.    pt saw PCP on Monday and hgb was 7 at that time. Prior to 3 weeks ago, patient was walking independently and now she is unable to walk on her own. Daughter states patient is unsteady on her fee  In the ED, patient was found to have anemia with hgb of 7.1 and worsening Cr of around 9 previously was 6. Pt is refusing HD. She is agreeble for blood. She also had CT which shows nonspecific colitis,increasing right pleural effusion with underlying passive atelectasis with questionable pneumonia. also suspicion for main and right intrahepatic portal vein bland or tumor thrombus, similar to prior. Pancreatic head/mesenteric mass with central necrosis, similar to prior. patient was DNI/DNR but she changed her mind to being full code. She is AAO x3. She denies any fevers, chills, nausea or vomiting  (13 Sep 2024 15:05)    GI CONSULT:  GI was consulted for colitis. Pt was seen and examined at the bedside. Pt is AAOx3. Pt admitted to the hospital for generalized weakness, fatigue and exhaustion. Pt has a hx of breast CA and colon CA x3. Chemo since 2007. Last Colonoscopy 3 years ago. Pt don't remember when the last time EGD was done. BMs are loose and that is normal for her. Feeling nauseous as well. Denies any other s/s including dizziness, lightheadedness, vomiting, constipation, blood in the stool. CT abdomen/pelvis showed- Nonspecific colitis, either infectious or inflammatory.       MEDICATIONS  (STANDING):  amLODIPine   Tablet 10 milliGRAM(s) Oral daily  calcium acetate 667 milliGRAM(s) Oral three times a day with meals  cefTRIAXone   IVPB 1000 milliGRAM(s) IV Intermittent every 24 hours  dextrose 5% 1000 milliLiter(s) (60 mL/Hr) IV Continuous <Continuous>  metroNIDAZOLE  IVPB      metroNIDAZOLE  IVPB 500 milliGRAM(s) IV Intermittent every 8 hours  Nephro-sage 1 Tablet(s) Oral daily    MEDICATIONS  (PRN):  acetaminophen     Tablet .. 650 milliGRAM(s) Oral every 6 hours PRN Temp greater or equal to 38C (100.4F), Mild Pain (1 - 3)  melatonin 3 milliGRAM(s) Oral at bedtime PRN Insomnia  ondansetron Injectable 4 milliGRAM(s) IV Push every 8 hours PRN Nausea and/or Vomiting      Allergies  No Known Allergies    Intolerances    PAST MEDICAL & SURGICAL HISTORY:  History of breast cancer  right breast  Anemia  Hypertension  History of thrombocytopenia  History of herpes zoster  History of malignant neoplasm of parathyroid gland  Colon cancer  Chronic renal insufficiency  Thyroid nodule  Diabetes mellitus  NIDDM  History of cholecystectomy  History of partial colectomy  and appendectomy at the same time  History of lumpectomy of right breast  2007 and radiation and chemotherapy  Parathyroid adenoma  excision      REVIEW OF SYSTEMS  Negative unless indicated in the HPI  	    PHYSICAL EXAM:   Vital Signs:  Vital Signs Last 24 Hrs  T(C): 36.3 (14 Sep 2024 05:18), Max: 36.8 (13 Sep 2024 19:30)  T(F): 97.3 (14 Sep 2024 05:18), Max: 98.3 (13 Sep 2024 19:30)  HR: 63 (14 Sep 2024 05:18) (60 - 88)  BP: 130/56 (14 Sep 2024 05:18) (111/54 - 130/56)  BP(mean): --  RR: 17 (14 Sep 2024 05:18) (16 - 18)  SpO2: 94% (14 Sep 2024 05:18) (94% - 98%)    Parameters below as of 14 Sep 2024 05:18  Patient On (Oxygen Delivery Method): nasal cannula  O2 Flow (L/min): 2    Daily Height in cm: 162.56 (13 Sep 2024 16:16)    Daily I&O's Summary      GENERAL:  Appears stated age  HEENT:  NC/AT,  conjunctivae clear and pink,   CHEST:  Full & symmetric excursion,   HEART:  Regular rhythm,   ABDOMEN:  Soft, non-tender, non-distended, normoactive bowel sounds  EXTEREMITIES:  no cyanosis, clubbing  SKIN:  No rash/warm/dry  NEURO:  Alert, oriented      LABS:              6.8    x     )-----------( x        ( 14 Sep 2024 08:48 )             23.8     09-14    146<H>  |  110<H>  |  105<H>  ----------------------------<  178<H>  4.4   |  16<L>  |  9.70<H>    Ca    7.2<L>      14 Sep 2024 06:40  Phos  7.9     09-14  Mg     1.9     09-14    TPro  4.7<L>  /  Alb  1.8<L>  /  TBili  0.4  /  DBili  x   /  AST  9<L>  /  ALT  <6<L>  /  AlkPhos  57  09-14    PT/INR - ( 14 Sep 2024 06:40 )   PT: 14.0 sec;   INR: 1.20 ratio           Urinalysis Basic - ( 14 Sep 2024 06:40 )    Color: x / Appearance: x / SG: x / pH: x  Gluc: 178 mg/dL / Ketone: x  / Bili: x / Urobili: x   Blood: x / Protein: x / Nitrite: x   Leuk Esterase: x / RBC: x / WBC x   Sq Epi: x / Non Sq Epi: x / Bacteria: x      amylase   lipaseLipase: 137 U/L (09-13 @ 12:35)    RADIOLOGY & ADDITIONAL TESTS:    ACC: [id number] EXAM:  CT ABDOMEN AND PELVIS   ORDERED BY: ALANIS JCAKSON     PROCEDURE DATE:  09/13/2024          INTERPRETATION:  CLINICAL INFORMATION: 89 years  Female with RLQ abd   pain. History of breast cancer, colon cancer and myelodysplasia. Status   post right hemicolectomy and chemotherapy in 2011. Mesenteric mass   excised in 2013 negative for metastatic disease. Recurred in 2016.    COMPARISON: CT abdomen and pelvis 5/28/2024 and 2/29/2024    CONTRAST/COMPLICATIONS:  IV Contrast: NONE  Oral Contrast: NONE  Complications: None reported at time of study completion    PROCEDURE:  CT of the Abdomen and Pelvis was performed.  Sagittal and coronal reformats were performed.    LIMITATIONS: Evaluation of the solid organs, vascular structures and GI   tract is limited without oral and IV contrast.    FINDINGS:  LOWER CHEST: Moderate right pleural effusion, larger than prior, with   underlying near complete right lower lobe passive atelectasis. Trace left   pleural effusion slightly larger than prior. Right central venous   catheter tip at cavoatrial junction. Coronary atherosclerosis.   Low-attenuation cardiac blood pool secondary to anemia. Trace pericardial   effusion unchanged.    LIVER: Innumerable hepatic cysts and hypodensities too small to   characterize. Redemonstrated soft tissue attenuation in the main and   right portal vein concerning for bland or tumor thrombus.  BILE DUCTS: Normal caliber.  GALLBLADDER: Cholecystectomy.  SPLEEN: Stable splenomegaly.  PANCREAS: Redemonstrated 9.9 x 7.7 cm right mesenteric mass, previously   10.4 x 8.6 cm. Mass is again inseparable from the pancreatic head. Air   within the mass similar to prior. Surrounding surgical clips and   surrounding mesenteric edema unchanged.  ADRENALS: Within normal limits.  KIDNEYS/URETERS: Atrophic right kidney. Bilateral cysts. Right extrarenal   pelvis. No hydronephrosis bilaterally.    BLADDER: Within normal limits.  REPRODUCTIVE ORGANS:    BOWEL: No bowel obstruction. Appendix is not visualized and cannot be   assessed. Right hemicolectomy with ileocolic anastomosis. Thick-walled   nondistended colon. Trace pericolonic fat stranding either related to   ascites or inflammatory process. Colonic diverticulosis without   diverticulitis.  PERITONEUM/RETROPERITONEUM: Small ascites. Multiple right abdominal   surgical clips.  VESSELS: Advanced atherosclerosis.  LYMPH NODES: No lymphadenopathy.  ABDOMINAL WALL: Anasarca. Rectus diastases. Anterior abdominal wall   laxity.  BONES: Within normal limits.    IMPRESSION:  Nonspecific colitis, either infectious or inflammatory.    Increasing right pleural effusion with underlying passive atelectasis.   Cannot exclude underlying pneumonia. Left pleural effusion slightly   larger than prior.    Suspicion for main and right intrahepatic portal vein bland or tumor   thrombus, similar to prior.    Pancreatic head/mesenteric mass with central necrosis, similar to prior.    --- End of Report ---    DMITRY XIAO MD; Attending Radiologist  This document has been electronically signed. Sep 13 2024  1:31PM

## 2024-09-14 NOTE — CONSULT NOTE ADULT - PROBLEM SELECTOR RECOMMENDATION 9
-infectious vs, inflammatory  -hx of colon CA   -main and right intrahepatic portal vein bland or tumor   - Stool studies  - Trend BMs  - Blood cultures/follow-up  - Continue on IV antibiotics  - C. diff panel if necessary  -Diet as tolerated -infectious vs, inflammatory  -hx of colon CA   -main and right intrahepatic portal vein bland or tumor   -Stool studies  -Trend BMs  -Blood cultures/follow-up  -Continue on IV antibiotics  -C. diff panel if necessary  -Diet as tolerated

## 2024-09-15 LAB
A1C WITH ESTIMATED AVERAGE GLUCOSE RESULT: 5.1 % — SIGNIFICANT CHANGE UP (ref 4–5.6)
ALBUMIN SERPL ELPH-MCNC: 2 G/DL — LOW (ref 3.3–5)
ALP SERPL-CCNC: 66 U/L — SIGNIFICANT CHANGE UP (ref 40–120)
ALT FLD-CCNC: 6 U/L — LOW (ref 10–45)
ANION GAP SERPL CALC-SCNC: 17 MMOL/L — SIGNIFICANT CHANGE UP (ref 5–17)
AST SERPL-CCNC: 9 U/L — LOW (ref 10–40)
BASOPHILS # BLD AUTO: 0.01 K/UL — SIGNIFICANT CHANGE UP (ref 0–0.2)
BASOPHILS NFR BLD AUTO: 0.5 % — SIGNIFICANT CHANGE UP (ref 0–2)
BILIRUB SERPL-MCNC: 0.4 MG/DL — SIGNIFICANT CHANGE UP (ref 0.2–1.2)
BUN SERPL-MCNC: 105 MG/DL — HIGH (ref 7–23)
CALCIUM SERPL-MCNC: 7.1 MG/DL — LOW (ref 8.4–10.5)
CHLORIDE SERPL-SCNC: 107 MMOL/L — SIGNIFICANT CHANGE UP (ref 96–108)
CO2 SERPL-SCNC: 19 MMOL/L — LOW (ref 22–31)
CREAT SERPL-MCNC: 9.46 MG/DL — HIGH (ref 0.5–1.3)
EGFR: 4 ML/MIN/1.73M2 — LOW
EOSINOPHIL # BLD AUTO: 0.02 K/UL — SIGNIFICANT CHANGE UP (ref 0–0.5)
EOSINOPHIL NFR BLD AUTO: 1 % — SIGNIFICANT CHANGE UP (ref 0–6)
ESTIMATED AVERAGE GLUCOSE: 100 MG/DL — SIGNIFICANT CHANGE UP (ref 68–114)
FERRITIN SERPL-MCNC: 242 NG/ML — SIGNIFICANT CHANGE UP (ref 13–330)
FOLATE SERPL-MCNC: 6.3 NG/ML — SIGNIFICANT CHANGE UP
GLUCOSE SERPL-MCNC: 172 MG/DL — HIGH (ref 70–99)
HAPTOGLOB SERPL-MCNC: 170 MG/DL — SIGNIFICANT CHANGE UP (ref 34–200)
HCT VFR BLD CALC: 27.8 % — LOW (ref 34.5–45)
HGB BLD-MCNC: 8.2 G/DL — LOW (ref 11.5–15.5)
IMM GRANULOCYTES NFR BLD AUTO: 0.5 % — SIGNIFICANT CHANGE UP (ref 0–0.9)
IRON SATN MFR SERPL: 30 % — SIGNIFICANT CHANGE UP (ref 14–50)
IRON SATN MFR SERPL: 43 UG/DL — SIGNIFICANT CHANGE UP (ref 30–160)
LYMPHOCYTES # BLD AUTO: 0.35 K/UL — LOW (ref 1–3.3)
LYMPHOCYTES # BLD AUTO: 17.7 % — SIGNIFICANT CHANGE UP (ref 13–44)
MAGNESIUM SERPL-MCNC: 2 MG/DL — SIGNIFICANT CHANGE UP (ref 1.6–2.6)
MCHC RBC-ENTMCNC: 27.1 PG — SIGNIFICANT CHANGE UP (ref 27–34)
MCHC RBC-ENTMCNC: 29.5 GM/DL — LOW (ref 32–36)
MCV RBC AUTO: 91.7 FL — SIGNIFICANT CHANGE UP (ref 80–100)
MONOCYTES # BLD AUTO: 0.14 K/UL — SIGNIFICANT CHANGE UP (ref 0–0.9)
MONOCYTES NFR BLD AUTO: 7.1 % — SIGNIFICANT CHANGE UP (ref 2–14)
NEUTROPHILS # BLD AUTO: 1.45 K/UL — LOW (ref 1.8–7.4)
NEUTROPHILS NFR BLD AUTO: 73.2 % — SIGNIFICANT CHANGE UP (ref 43–77)
NRBC # BLD: 0 /100 WBCS — SIGNIFICANT CHANGE UP (ref 0–0)
PHOSPHATE SERPL-MCNC: 7.9 MG/DL — HIGH (ref 2.5–4.5)
PLATELET # BLD AUTO: 82 K/UL — LOW (ref 150–400)
POTASSIUM SERPL-MCNC: 4 MMOL/L — SIGNIFICANT CHANGE UP (ref 3.5–5.3)
POTASSIUM SERPL-SCNC: 4 MMOL/L — SIGNIFICANT CHANGE UP (ref 3.5–5.3)
PROT SERPL-MCNC: 5.1 G/DL — LOW (ref 6–8.3)
RBC # BLD: 3.03 M/UL — LOW (ref 3.8–5.2)
RBC # FLD: 20.3 % — HIGH (ref 10.3–14.5)
SODIUM SERPL-SCNC: 143 MMOL/L — SIGNIFICANT CHANGE UP (ref 135–145)
TIBC SERPL-MCNC: 141 UG/DL — LOW (ref 220–430)
UIBC SERPL-MCNC: 98 UG/DL — LOW (ref 110–370)
URATE SERPL-MCNC: 5.6 MG/DL — SIGNIFICANT CHANGE UP (ref 2.5–7)
VIT B12 SERPL-MCNC: 1556 PG/ML — HIGH (ref 232–1245)
WBC # BLD: 1.98 K/UL — LOW (ref 3.8–10.5)
WBC # FLD AUTO: 1.98 K/UL — LOW (ref 3.8–10.5)

## 2024-09-15 PROCEDURE — 99233 SBSQ HOSP IP/OBS HIGH 50: CPT | Mod: FS

## 2024-09-15 RX ORDER — CALCIUM ACETATE 667 MG
1334 TABLET ORAL
Refills: 0 | Status: DISCONTINUED | OUTPATIENT
Start: 2024-09-15 | End: 2024-09-20

## 2024-09-15 RX ORDER — SODIUM BICARBONATE 650 MG
1300 TABLET ORAL
Refills: 0 | Status: DISCONTINUED | OUTPATIENT
Start: 2024-09-15 | End: 2024-09-16

## 2024-09-15 RX ADMIN — Medication 100 MILLIGRAM(S): at 05:56

## 2024-09-15 RX ADMIN — MULTI VITAMIN/MINERAL SUPPLEMENT WITH ASCORBIC ACID, NIACIN, PYRIDOXINE, PANTOTHENIC ACID, FOLIC ACID, RIBOFLAVIN, THIAMIN, BIOTIN, COBALAMIN AND ZINC. 1 TABLET(S): 60; 20; 12.5; 10; 10; 1.7; 1.5; 1; .3; .006 TABLET, COATED ORAL at 11:33

## 2024-09-15 RX ADMIN — Medication 100 MILLIGRAM(S): at 21:18

## 2024-09-15 RX ADMIN — Medication 1334 MILLIGRAM(S): at 11:33

## 2024-09-15 RX ADMIN — Medication 10 MILLIGRAM(S): at 05:56

## 2024-09-15 RX ADMIN — Medication 667 MILLIGRAM(S): at 08:52

## 2024-09-15 RX ADMIN — Medication 1300 MILLIGRAM(S): at 17:05

## 2024-09-15 RX ADMIN — Medication 100 MILLIGRAM(S): at 14:07

## 2024-09-15 RX ADMIN — Medication 1334 MILLIGRAM(S): at 17:05

## 2024-09-15 NOTE — DISCHARGE NOTE PROVIDER - HOSPITAL COURSE
Hospital Course  HPI:  89F, PMHx HTN, herpes zoster, CKD, multinodular thyroid, breast cancer, colon cancer (previously on Keytruda), refractory anemia/MDS, treated supportively and ambulatory (erythropoietin supplements), no recent exposure to systemic chemotherapy, is admitted with generalized weakness. Patient daughter at bedside reports that she have been getting more weaker and unable to ambulate as much. She is also complaining of sob and more generalized edema.    pt saw PCP on Monday and hgb was 7 at that time. Prior to 3 weeks ago, patient was walking independently and now she is unable to walk on her own. Daughter states patient is unsteady on her fee  In the ED, patient was found to have anemia with hgb of 7.1 and worsening Cr of around 9 previously was 6.  Pt is refusing HD. She is agreeable for blood. She also had CT which shows nonspecific colitis, increasing right pleural effusion with underlying passive atelectasis with questionable pneumonia.  also suspicion for main and right intrahepatic portal vein bland or tumor thrombus, similar to prior. Pancreatic head/mesenteric mass with central necrosis, similar to prior.  patient was DNI/DNR but she changed her mind to being full code. She is AAO x3  She denies any fevers, chills, nausea or vomiting  (13 Sep 2024 15:05)    Upon admission, pulmonology was consulted. Blood cultures were drawn and patient was started on IV CTX. Patient with hemoglobin below 7, received 2 units of PRBCs. Hematology was consulted and anemia is likely exacerbated by worsening renal function. Patient with moderate pleural effusion.      You were admitted for   You were diagnosed with   You were treated with   You were prescribed the following new medications:    You will need to follow up with your primary care physician.    Source of Infection:  Antibiotic / Last Day:    Palliative Care / Advanced Care Planning  Code Status: DNR/DNI  Patient/Family agreeable to Hospice/Palliative (Y/N)?  Summary of Goals of Care Conversation:    Discharging Provider:  BERNICE Kraus  Contact Info: [phone] - Please call with any questions or concerns.    Outpatient Provider:     SNF Provider: Hospital Course  HPI:  89F, PMHx HTN, herpes zoster, CKD, multinodular thyroid, breast cancer, colon cancer (previously on Keytruda), refractory anemia/MDS, treated supportively and ambulatory (erythropoietin supplements), no recent exposure to systemic chemotherapy, is admitted with generalized weakness. Patient daughter at bedside reports that she have been getting more weaker and unable to ambulate as much. She is also complaining of sob and more generalized edema.    pt saw PCP on Monday and hgb was 7 at that time. Prior to 3 weeks ago, patient was walking independently and now she is unable to walk on her own. Daughter states patient is unsteady on her fee  In the ED, patient was found to have anemia with hgb of 7.1 and worsening Cr of around 9 previously was 6.  Pt is refusing HD. She is agreeable for blood. She also had CT which shows nonspecific colitis, increasing right pleural effusion with underlying passive atelectasis with questionable pneumonia.  also suspicion for main and right intrahepatic portal vein bland or tumor thrombus, similar to prior. Pancreatic head/mesenteric mass with central necrosis, similar to prior.  patient was DNI/DNR but she changed her mind to being full code. She is AAO x3  She denies any fevers, chills, nausea or vomiting  (13 Sep 2024 15:05)    Upon admission, pulmonology was consulted. Blood cultures were drawn and patient was started on IV CTX. Patient with hemoglobin below 7, received 2 units of PRBCs, as well as IV lasix to avoid fluid overload. Hematology was consulted and anemia is likely exacerbated by worsening renal function. Nephrology was consulted. Patient is refusing dialysis at this time. Doppler US of b/l LE performed and is positive for DVT LLE. Due to anemia, patient not on anticoagulation. Vascular consulted placed, recs IR consult for possible filter placement, however patient refused. Patient was titrated off of supplemental oxygen.     Patient with moderate pleural effusion....      You were admitted for shortness of breath  You were diagnosed with pleural effusion  You were treated with   You were prescribed the following new medications:    You will need to follow up with your primary care physician.    Source of Infection:  Antibiotic / Last Day:    Palliative Care / Advanced Care Planning  Code Status: DNR/DNI  Patient/Family agreeable to Hospice/Palliative (Y/N)?  Summary of Goals of Care Conversation:    Discharging Provider:  BERNICE Kraus  Contact Info: [phone] - Please call with any questions or concerns.    Outpatient Provider:     SNF Provider: Hospital Course  HPI:  89F, PMHx HTN, herpes zoster, CKD, multinodular thyroid, breast cancer, colon cancer (previously on Keytruda), refractory anemia/MDS, treated supportively and ambulatory (erythropoietin supplements), no recent exposure to systemic chemotherapy, is admitted with generalized weakness. Patient daughter at bedside reports that she have been getting more weaker and unable to ambulate as much. She is also complaining of sob and more generalized edema.    pt saw PCP on Monday and hgb was 7 at that time. Prior to 3 weeks ago, patient was walking independently and now she is unable to walk on her own. Daughter states patient is unsteady on her fee  In the ED, patient was found to have anemia with hgb of 7.1 and worsening Cr of around 9 previously was 6.  Pt is refusing HD. She is agreeable for blood. She also had CT which shows nonspecific colitis, increasing right pleural effusion with underlying passive atelectasis with questionable pneumonia.  also suspicion for main and right intrahepatic portal vein bland or tumor thrombus, similar to prior. Pancreatic head/mesenteric mass with central necrosis, similar to prior.  patient was DNI/DNR but she changed her mind to being full code. She is AAO x3  She denies any fevers, chills, nausea or vomiting  (13 Sep 2024 15:05)    Upon admission, pulmonology was consulted. Blood cultures were drawn and patient was started on IV CTX. Patient with hemoglobin below 7, received 2 units of PRBCs, as well as IV lasix to avoid fluid overload. Hematology was consulted and anemia is likely exacerbated by worsening renal function. Nephrology was consulted. Patient is refusing dialysis at this time. Doppler US of b/l LE performed and is positive for DVT LLE. Due to anemia, patient not on anticoagulation. Vascular consulted placed, recs IR consult for possible filter placement, however patient refused. Patient was titrated off of supplemental oxygen. Stable respiratory status, hemodynamically stable.  Hospice consult placed, approved for home with hospice.       You will need to follow up with your primary care physician.    Source of Infection:  Antibiotic / Last Day:    Palliative Care / Advanced Care Planning  Code Status: DNR/DNI  Patient/Family agreeable to Hospice/Palliative (Y/N)?  Summary of Goals of Care Conversation:    Discharging Provider:  BERNICE Kraus  Contact Info: [phone] - Please call with any questions or concerns.    Outpatient Provider: Dr. Michele      Hospital Course  HPI:  89F, PMHx HTN, herpes zoster, CKD, multinodular thyroid, breast cancer, colon cancer (previously on Keytruda), refractory anemia/MDS, treated supportively and ambulatory (erythropoietin supplements), no recent exposure to systemic chemotherapy, is admitted with generalized weakness. Patient daughter at bedside reports that she have been getting more weaker and unable to ambulate as much. She is also complaining of sob and more generalized edema.    pt saw PCP on Monday and hgb was 7 at that time. Prior to 3 weeks ago, patient was walking independently and now she is unable to walk on her own. Daughter states patient is unsteady on her fee  In the ED, patient was found to have anemia with hgb of 7.1 and worsening Cr of around 9 previously was 6.  Pt is refusing HD. She is agreeable for blood. She also had CT which shows nonspecific colitis, increasing right pleural effusion with underlying passive atelectasis with questionable pneumonia.  also suspicion for main and right intrahepatic portal vein bland or tumor thrombus, similar to prior. Pancreatic head/mesenteric mass with central necrosis, similar to prior.  patient was DNI/DNR but she changed her mind to being full code. She is AAO x3  She denies any fevers, chills, nausea or vomiting  (13 Sep 2024 15:05)    Upon admission, pulmonology was consulted. Blood cultures were drawn and patient was started on IV CTX. Patient with hemoglobin below 7, received 2 units of PRBCs, as well as IV lasix to avoid fluid overload. Hematology was consulted and anemia is likely exacerbated by worsening renal function. Nephrology was consulted. Patient is refusing dialysis at this time. Doppler US of b/l LE performed and is positive for DVT LLE. Due to anemia, patient not on anticoagulation. Vascular consulted placed, recs IR consult for possible filter placement, however patient refused. Patient was titrated off of supplemental oxygen. Stable respiratory status, hemodynamically stable.  Hospice consult placed, approved for home with hospice. 24hr home aids in place.  Hydralazine held.     You will need to follow up with your primary care physician as within your goals of care.    Source of Infection:NA  Antibiotic / Last Day: NA     Palliative Care / Advanced Care Planning  Code Status: DNR/DNI  Patient/Family agreeable to Hospice/Palliative (Y)?  Summary of Goals of Care Conversation: YES     Discharging Provider: Ronny Rodriguez   Contact Info: [phone] - Please call with any questions or concerns.    Outpatient Provider: Dr. Michele (notified)

## 2024-09-15 NOTE — DISCHARGE NOTE PROVIDER - NSDCMRMEDTOKEN_GEN_ALL_CORE_FT
amLODIPine 10 mg oral tablet: 1 tab(s) orally once a day  cholecalciferol 25 mcg (1000 intl units) oral tablet: 1 tab(s) orally once a day  doxazosin 2 mg oral tablet: 1 tab(s) orally once a day (at bedtime)  hydrALAZINE 100 mg oral tablet: 1 tab(s) orally every 8 hours  Multiple Vitamins oral tablet: 1 tab(s) orally once a day  sodium bicarbonate 650 mg oral tablet: 2 tab(s) orally 3 times a day  Zyloprim 100 mg oral tablet: 1 tab(s) orally once a day   amLODIPine 10 mg oral tablet: 1 tab(s) orally once a day  calcium acetate 667 mg oral tablet: 3 tab(s) orally 3 times a day  cholecalciferol 25 mcg (1000 intl units) oral tablet: 1 tab(s) orally once a day  doxazosin 2 mg oral tablet: 1 tab(s) orally once a day (at bedtime)  Metoprolol Tartrate 25 mg oral tablet: 0.5 tab(s) orally 2 times a day  Multiple Vitamins oral tablet: 1 tab(s) orally once a day  sodium bicarbonate 650 mg oral tablet: 2 tab(s) orally 3 times a day  Zyloprim 100 mg oral tablet: 1 tab(s) orally once a day

## 2024-09-15 NOTE — DISCHARGE NOTE PROVIDER - NSDCCPCAREPLAN_GEN_ALL_CORE_FT
PRINCIPAL DISCHARGE DIAGNOSIS  Diagnosis: Pleural effusion, right  Assessment and Plan of Treatment: Follow up with your medical and hospice team. Return to the ED if within your goals of care for worsening shortness of breath, chest pain or other concerning symptoms. Hold your home hydralazine. Review your medications to continue with the home hospice team and your primary care provider.      SECONDARY DISCHARGE DIAGNOSES  Diagnosis: Anemia  Assessment and Plan of Treatment:

## 2024-09-15 NOTE — PHYSICAL THERAPY INITIAL EVALUATION ADULT - ADDITIONAL COMMENTS
pt lives alone but daughter has been staying with patient last few weeks, reports no steps to negotiate, has RW, W/C, shower seat, prior to last few weeks pt was independent with ADLs and mobility, pt and dtr reports she could barely get up last week or so

## 2024-09-15 NOTE — PHYSICAL THERAPY INITIAL EVALUATION ADULT - PERTINENT HX OF CURRENT PROBLEM, REHAB EVAL
Patient is an 89F, PMHx HTN, herpes zoster, CKD, multinodular thyroid,breast cancer, colon cancer (previously on Keytruda), refractory anemia/MDS, treated supportively and ambulatory (erythropoietin supplements), no recent exposure to systemic chemotherapy, is admitted with generalized weakness. Patient daughter at bedside reports that she have been getting weaker and unable to ambulate as much. She is also complaining of sob and more generalized edema. pt saw PCP on Monday and hgb was 7 at that time. Prior to 3 weeks ago, patient was walking independently and now she is unable to walk on her own. Daughter states patient is unsteady on her fee  In the ED, patient was found to have anemia with hgb of 7.1 and worsening Cr of around 9 previously was 6.  Pt is refusing HD. She is agreeable for blood. She also had CT which shows nonspecific colitis, increasing right pleural effusion with underlying passive atelectasis with questionable pneumonia.  also suspicion for main and right intrahepatic portal vein bland or tumor thrombus, similar to prior. Pancreatic head/mesenteric mass with central necrosis, similar to prior.  patient was DNI/DNR but she changed her mind to being full code. She is AAO x3  She denies any fevers, chills, nausea or vomiting

## 2024-09-15 NOTE — PHYSICAL THERAPY INITIAL EVALUATION ADULT - PRECAUTIONS/LIMITATIONS, REHAB EVAL
r/o C Diff/aspiration precautions/fall precautions/oxygen therapy device and L/min/seizure precautions

## 2024-09-15 NOTE — DISCHARGE NOTE PROVIDER - NSDCFUSCHEDAPPT_GEN_ALL_CORE_FT
Cortney Mack  [facility]  [facility]  Scheduled Appointment: 09/24/2024    [facility]  [facility]  Scheduled Appointment: 09/24/2024

## 2024-09-15 NOTE — PHYSICAL THERAPY INITIAL EVALUATION ADULT - LEVEL OF INDEPENDENCE: BED TO CHAIR, REHAB EVAL
pt sat at edge of bed several minutes however c/o dizziness that was not getting better, unsafe to transfer into chair at this time/unable to perform

## 2024-09-15 NOTE — DISCHARGE NOTE PROVIDER - CARE PROVIDERS DIRECT ADDRESSES
[email or web address]

## 2024-09-15 NOTE — CHART NOTE - NSCHARTNOTEFT_GEN_A_CORE
Discussed with Dr. Michele     89-year-old female with PMH of CKD, colon cancer s/p resection, breast cancer, parathyroid ca, HTN, thrombocytopenia presents to the emergency department for  shortness of breath,  generalized edema, and sob. pt was to found to have abnormal labs which includes anemia, worsening Cr baseline 6 currently 9. pt also have abnormal CT showing colilitis and questionable pna as well as worsening pleural effusion and suspicion for main and right intrahepatic portal vein bland or tumor   thrombus, similar to prior.    Patient states she is feeling better than yesterday. Denies chest pain, sob, nausea, vomiting. Has chronic diarrhea s/p chemo treatments however the diarrhea is "smoothie" consistency not watery.     # worsening anemia secondary to carcinoma, worsening CKD   # leukopenia, thrombocytopenia  # history of breast cancer, colon cancer (previously on Keytruda), refractory anemia/MDS  s/p 1 unit PRBC 9/13 however per nursing patient did not finish full unit  hemoglobin 6.3, then 6.8 on repeat 9/14  Received 1 unit of PRBC and lasix after 9/14  heme onc eval appreciated  currently not receiving chemo  supportive treatment  blood cultures no growth at 24hrs    # fluid overload secondary to CKD most likely  # generalized edema  # acute respiratory failure requiring 2 liters of oxygen-resolved  # pleural effusions  Rocephin to cover any possible pna  Pulm to see, echo to assess EF   supportive O2 to keep level above 90, patient currently tolerating RA spo2 94%  V/Q scan given hx of malignancy, indeterminate no ventilation/perfusion mismatch that would suggest PE  CT chest non contrast reviewed  Patient refusing dialysis at this time    #Acute DVT below the knee  Discussed with family, will hold off on ac for now as patient with anemia  Vascular consult placed-discussed with Dr. Renee, recs IR consult for possible filter placement  Discussed with patient, patient is unsure about filter placement at this time and wants to speak further with Dr. Michele     # worsening Cr, CKD stage 4  # HTN, gout  pt refusing HD currently  Dr. 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 Patient states she does not want to be resuscitated or intubated and would like to pass away peacefully. PAVEL completed DNR/DNI.

## 2024-09-16 LAB
ALBUMIN SERPL ELPH-MCNC: 1.9 G/DL — LOW (ref 3.3–5)
ALP SERPL-CCNC: 59 U/L — SIGNIFICANT CHANGE UP (ref 40–120)
ALT FLD-CCNC: <6 U/L — LOW (ref 10–45)
ANION GAP SERPL CALC-SCNC: 21 MMOL/L — HIGH (ref 5–17)
AST SERPL-CCNC: 14 U/L — SIGNIFICANT CHANGE UP (ref 10–40)
BILIRUB SERPL-MCNC: 0.4 MG/DL — SIGNIFICANT CHANGE UP (ref 0.2–1.2)
BUN SERPL-MCNC: 105 MG/DL — HIGH (ref 7–23)
CALCIUM SERPL-MCNC: 6.9 MG/DL — LOW (ref 8.4–10.5)
CHLORIDE SERPL-SCNC: 109 MMOL/L — HIGH (ref 96–108)
CO2 SERPL-SCNC: 13 MMOL/L — LOW (ref 22–31)
CREAT SERPL-MCNC: 9.29 MG/DL — HIGH (ref 0.5–1.3)
EGFR: 4 ML/MIN/1.73M2 — LOW
GI PCR PANEL: SIGNIFICANT CHANGE UP
GLUCOSE SERPL-MCNC: 119 MG/DL — HIGH (ref 70–99)
HCT VFR BLD CALC: 28.3 % — LOW (ref 34.5–45)
HGB BLD-MCNC: 8.5 G/DL — LOW (ref 11.5–15.5)
MAGNESIUM SERPL-MCNC: 1.9 MG/DL — SIGNIFICANT CHANGE UP (ref 1.6–2.6)
MCHC RBC-ENTMCNC: 27.8 PG — SIGNIFICANT CHANGE UP (ref 27–34)
MCHC RBC-ENTMCNC: 30 GM/DL — LOW (ref 32–36)
MCV RBC AUTO: 92.5 FL — SIGNIFICANT CHANGE UP (ref 80–100)
NRBC # BLD: 0 /100 WBCS — SIGNIFICANT CHANGE UP (ref 0–0)
OB PNL STL: POSITIVE
PHOSPHATE SERPL-MCNC: 7.7 MG/DL — HIGH (ref 2.5–4.5)
PLATELET # BLD AUTO: 64 K/UL — LOW (ref 150–400)
POTASSIUM SERPL-MCNC: 4.5 MMOL/L — SIGNIFICANT CHANGE UP (ref 3.5–5.3)
POTASSIUM SERPL-SCNC: 4.5 MMOL/L — SIGNIFICANT CHANGE UP (ref 3.5–5.3)
PROT SERPL-MCNC: 4.8 G/DL — LOW (ref 6–8.3)
RBC # BLD: 3.06 M/UL — LOW (ref 3.8–5.2)
RBC # FLD: 20.7 % — HIGH (ref 10.3–14.5)
SODIUM SERPL-SCNC: 143 MMOL/L — SIGNIFICANT CHANGE UP (ref 135–145)
WBC # BLD: 2.12 K/UL — LOW (ref 3.8–10.5)
WBC # FLD AUTO: 2.12 K/UL — LOW (ref 3.8–10.5)

## 2024-09-16 PROCEDURE — 99232 SBSQ HOSP IP/OBS MODERATE 35: CPT

## 2024-09-16 PROCEDURE — 99498 ADVNCD CARE PLAN ADDL 30 MIN: CPT

## 2024-09-16 PROCEDURE — 99223 1ST HOSP IP/OBS HIGH 75: CPT

## 2024-09-16 PROCEDURE — 99497 ADVNCD CARE PLAN 30 MIN: CPT | Mod: 25

## 2024-09-16 RX ORDER — SODIUM BICARBONATE 650 MG
1300 TABLET ORAL THREE TIMES A DAY
Refills: 0 | Status: DISCONTINUED | OUTPATIENT
Start: 2024-09-16 | End: 2024-09-25

## 2024-09-16 RX ORDER — ERYTHROPOIETIN 10000 [IU]/ML
10000 INJECTION, SOLUTION INTRAVENOUS; SUBCUTANEOUS ONCE
Refills: 0 | Status: COMPLETED | OUTPATIENT
Start: 2024-09-16 | End: 2024-09-18

## 2024-09-16 RX ORDER — CHLORHEXIDINE GLUCONATE ORAL RINSE 1.2 MG/ML
1 SOLUTION DENTAL DAILY
Refills: 0 | Status: DISCONTINUED | OUTPATIENT
Start: 2024-09-16 | End: 2024-09-25

## 2024-09-16 RX ORDER — ERYTHROPOIETIN 10000 [IU]/ML
10000 INJECTION, SOLUTION INTRAVENOUS; SUBCUTANEOUS ONCE
Refills: 0 | Status: DISCONTINUED | OUTPATIENT
Start: 2024-09-16 | End: 2024-09-16

## 2024-09-16 RX ADMIN — Medication 1300 MILLIGRAM(S): at 17:24

## 2024-09-16 RX ADMIN — Medication 1334 MILLIGRAM(S): at 12:41

## 2024-09-16 RX ADMIN — MULTI VITAMIN/MINERAL SUPPLEMENT WITH ASCORBIC ACID, NIACIN, PYRIDOXINE, PANTOTHENIC ACID, FOLIC ACID, RIBOFLAVIN, THIAMIN, BIOTIN, COBALAMIN AND ZINC. 1 TABLET(S): 60; 20; 12.5; 10; 10; 1.7; 1.5; 1; .3; .006 TABLET, COATED ORAL at 12:40

## 2024-09-16 RX ADMIN — Medication 1300 MILLIGRAM(S): at 21:06

## 2024-09-16 RX ADMIN — Medication 1300 MILLIGRAM(S): at 05:18

## 2024-09-16 RX ADMIN — Medication 100 MILLIGRAM(S): at 14:05

## 2024-09-16 RX ADMIN — Medication 100 MILLIGRAM(S): at 21:06

## 2024-09-16 RX ADMIN — CHLORHEXIDINE GLUCONATE ORAL RINSE 1 APPLICATION(S): 1.2 SOLUTION DENTAL at 12:42

## 2024-09-16 RX ADMIN — Medication 100 MILLIGRAM(S): at 17:28

## 2024-09-16 RX ADMIN — Medication 10 MILLIGRAM(S): at 05:50

## 2024-09-16 RX ADMIN — Medication 100 MILLIGRAM(S): at 05:18

## 2024-09-16 RX ADMIN — Medication 1334 MILLIGRAM(S): at 17:24

## 2024-09-16 RX ADMIN — Medication 1334 MILLIGRAM(S): at 09:01

## 2024-09-16 NOTE — CONSULT NOTE ADULT - ASSESSMENT
89F, PMHx HTN, herpes zoster, CKD, multinodular thyroid,breast cancer, colon cancer (previously on Keytruda), refractory anemia/MDS, treated supportively and ambulatory (erythropoietin supplements), no recent exposure to systemic chemotherapy, is admitted with generalized weakness. GI consulted for Colitis- loose BMs without any blood and nauseous 
A/P 89-year-old female with PMH of CKD, colon cancer s/p resection, breast cancer, parathyroid ca, HTN, thrombocytopenia presents to the emergency department for  shortness of breath,  generalized edema, and sob. pt was to found to have abnormal labs which includes anemia, worsening Cr, also  abnormal CT showing colilitis  and questionable pna as well as worsening pleural effusion          Assessment/ Plans:         worsening anemia secondary to carcinoma   worsening CKD    leukopenia    thrombocytopenia    history of breast cancer, colon cancer (previously on Keytruda)   refractory anemia/MDS   - S/p Transfusion   - Heme onc - consulted   - Neph - consulted - pt does not want  HD       fluid overload secondary to CKD most likely   generalized edema   acute respiratory failure requiring 2 liters of oxgen   pleural effusions  - On Rocephin to cover any possible pna  - IV lasix   Pulmonary consulted  -  offered thoracentesis - pt thinking about it , may not want it.    -  supportive O2 to keep level above 90       deconditioning and generalized weakness  - supportive care         Palliative :    asked for further goc, supportive care , chart reviewed for hx, ca w/ mets, esrd, mds , anemia.  Tests noted, consults noted.  Met and spoke w/ both pt and her daughter [place] today.   Had lengthy discussion , see GOC note above.  Pt presently comfortable, not in distress, denies pain , no n/v, no constipation, occ sob.   Reviewed pt hx and current condition , discussed what is most important to her at this time.   Reviewed/recommended  option of home hospice services.   Pt and daughter thankful for the discussion and information.  They wish to think about everything and speak w/ pts PMD.  I reached out to Dr Bales and let him know of our discussion today.   They have palliative contact info for any questions .

## 2024-09-16 NOTE — CONSULT NOTE ADULT - SUBJECTIVE AND OBJECTIVE BOX
HPI: 89F, PMHx HTN, herpes zoster, CKD, multinodular thyroid,breast cancer, colon cancer (previously on Keytruda), refractory anemia/MDS, treated supportively and ambulatory (erythropoietin supplements), no recent exposure to systemic chemotherapy, is admitted with generalized weakness. Patient daughter at bedside reports that she have been getting more weaker and unable to ambulate as much. She is also complaining of sob and more generalized edema.    pt saw PCP on Monday and hgb was 7 at that time. Prior to 3 weeks ago, patient was walking independently and now she is unable to walk on her own. Daughter states patient is unsteady on her fee  In the ED, patient was found to have anemia with hgb of 7.1 and worsening Cr of around 9 previously was 6.  Pt is refusing HD. She is agreeble for blood. She also had CT which shows nonspecific colitis,increasing right pleural effusion with underlying passive atelectasis with questionable pneumonia.  also suspicion for main and right intrahepatic portal vein bland or tumor thrombus, similar to prior. Pancreatic head/mesenteric mass with central necrosis, similar to prior.   She is AAO x3 , She denies any fevers, chills, nausea or vomiting  (13 Sep 2024 15:05)      PAST MEDICAL & SURGICAL HISTORY:  History of breast cancer  right breast      Anemia      Hypertension      History of thrombocytopenia      History of herpes zoster      History of malignant neoplasm of parathyroid gland      Colon cancer      Chronic renal insufficiency      Thyroid nodule      Diabetes mellitus  NIDDM      History of cholecystectomy      History of partial colectomy  and appendectomy at the same time      History of lumpectomy of right breast  2007 and radiation and chemotherapy      Parathyroid adenoma  excision          SOCIAL HISTORY:    Admitted from:  home    Substance abuse history:              Tobacco hx:                  Alcohol hx:              Home Opioid hx:  Tenriism:                                    Preferred Language:    Surrogate/HCP/:   Mali-daughter          Phone#: [phone]    FAMILY HISTORY:  FH: colon cancer (Sibling, Sibling)    FH: breast cancer (Sibling)      Baseline ADLs (prior to admission):    Allergies    No Known Allergies    Intolerances      Present Symptoms:   Dyspnea: not now, but at times   Nausea/Vomiting: no  Anxiety:  Depressed   Fatigue: yes  Loss of appetite: no  Pain:         no                       location:          Review of Systems: above MEDICATIONS  (STANDING):  amLODIPine   Tablet 10 milliGRAM(s) Oral daily  calcium acetate 1334 milliGRAM(s) Oral three times a day with meals  cefTRIAXone   IVPB 1000 milliGRAM(s) IV Intermittent every 24 hours  chlorhexidine 2% Cloths 1 Application(s) Topical daily  metroNIDAZOLE  IVPB      metroNIDAZOLE  IVPB 500 milliGRAM(s) IV Intermittent every 8 hours  Nephro-sage 1 Tablet(s) Oral daily  sodium bicarbonate 1300 milliGRAM(s) Oral two times a day    MEDICATIONS  (PRN):  acetaminophen     Tablet .. 650 milliGRAM(s) Oral every 6 hours PRN Temp greater or equal to 38C (100.4F), Mild Pain (1 - 3)  melatonin 3 milliGRAM(s) Oral at bedtime PRN Insomnia  ondansetron Injectable 4 milliGRAM(s) IV Push every 8 hours PRN Nausea and/or Vomiting      PHYSICAL EXAM:    Vital Signs Last 24 Hrs  T(C): 36.3 (16 Sep 2024 05:05), Max: 36.6 (15 Sep 2024 12:10)  T(F): 97.4 (16 Sep 2024 05:05), Max: 97.8 (15 Sep 2024 12:10)  HR: 72 (16 Sep 2024 05:05) (72 - 76)  BP: 152/61 (16 Sep 2024 05:05) (141/60 - 152/61)  BP(mean): --  RR: 17 (16 Sep 2024 05:05) (16 - 17)  SpO2: 95% (16 Sep 2024 05:05) (95% - 98%)    Parameters below as of 16 Sep 2024 05:05  Patient On (Oxygen Delivery Method): nasal cannula  O2 Flow (L/min): 2      General: alert  oriented x 3, conversant , [place] , calm, comfortable, not in distress   Karnofsky Performance Score/Palliative Performance Status Version2:   50  %  PPSV: 50%  HEENT: n/c, a/t , dry mouth/lips   Lungs: ess clear dim to bases, resp non-labored   CV: normal rate   GI: abd lrg., soft, n/t, + hernia    : normal  , prima fit   Musculoskeletal: normal  w/ weakness  bi lat LE edema +1  Skin: normal, pale, w/d    Neuro: no deficits, awake, alert, oriented, converses   Oral intake ability:  full capability  Diet: reg    LABS:                        8.5    2.12  )-----------( 64       ( 16 Sep 2024 07:09 )             28.3     09-16    143  |  109<H>  |  105<H>  ----------------------------<  119<H>  4.5   |  13<L>  |  9.29<H>    Ca    6.9<L>      16 Sep 2024 07:09  Phos  7.7     09-16  Mg     1.9     09-16    TPro  4.8<L>  /  Alb  1.9<L>  /  TBili  0.4  /  DBili  x   /  AST  14  /  ALT  <6<L>  /  AlkPhos  59  09-16    Urinalysis Basic - ( 16 Sep 2024 07:09 )    Color: x / Appearance: x / SG: x / pH: x  Gluc: 119 mg/dL / Ketone: x  / Bili: x / Urobili: x   Blood: x / Protein: x / Nitrite: x   Leuk Esterase: x / RBC: x / WBC x   Sq Epi: x / Non Sq Epi: x / Bacteria: x        RADIOLOGY & ADDITIONAL STUDIES: < from: NM Pulmonary Ventilation/Perfusion Scan (09.14.24 @ 11:41) >  IMPRESSION:    Indeterminate for pulmonary embolism due to matched perfusion defects,   partially corresponding with pleural effusions.    No ventilation/perfusion mismatches that would suggest pulmonary embolism.              < from: CT Chest No Cont (09.13.24 @ 15:08) >  FINDINGS:    LUNGS AND LARGE AIRWAYS: Patent central airways. No pulmonary nodules.   Compression atelectasis dependent right lower lobe. Calcified granulomata   left lower lobe and probably within the compressed right lower lobe.  PLEURA: Mild to moderate right and a small left pleural effusion,   increased in comparison to the prior study.  VESSELS: Atherosclerotic calcification including the coronary arteries.   [id number] with tip at the cavoatrial junction.  HEART: Heart size is normal. Mild pericardial effusion.  MEDIASTINUM AND DIONICIO: No lymphadenopathy.  CHEST WALL AND LOWER NECK: Stable appearance of bilateral thyroid nodules   and substernal thyroid extension.  VISUALIZED UPPER ABDOMEN: Probable numerous hepatic cysts.   Cholecystectomy. Upper portion of previously characterized right   mesenteric mass is noted. Probable splenomegaly.  BONES: Within normal limits.    IMPRESSION:  Mild to moderate right pleural, small left pleural effusion and mild   pericardial effusion. No evidence of pulmonary edema.          ADVANCE DIRECTIVES: molst dnr/dni , No HD   Advanced Care Planning discussion total time spent:

## 2024-09-16 NOTE — CHART NOTE - NSCHARTNOTEFT_GEN_A_CORE
89-year-old female with PMH of CKD, colon cancer s/p resection, breast cancer, parathyroid ca, HTN, thrombocytopenia presents to the emergency department for  shortness of breath,  generalized edema, and sob. pt was to found to have abnormal labs which includes anemia, worsening Cr baseline 6 currently 9. pt also have abnormal CT showing colitis and questionable pna as well as worsening pleural effusion and suspicion for main and right intrahepatic portal vein bland or tumor   thrombus, similar to prior.    Patient feels less short of breath, denies complaints including chest pain, sob, nausea, vomiting. Has chronic diarrhea s/p chemo treatments however the diarrhea is "smoothie" consistency not watery.     # worsening anemia secondary to carcinoma, worsening CKD   # leukopenia, thrombocytopenia  # history of breast cancer, colon cancer (previously on Keytruda), refractory anemia/MDS  s/p 1 unit PRBC 9/13 however per nursing patient did not finish full unit  hemoglobin 6.3, then 6.8 on repeat 9/14  Received 1 unit of PRBC and lasix after 9/14  heme onc eval appreciated  currently not receiving chemo  supportive treatment  blood cultures no growth at 24hrs    # fluid overload secondary to CKD most likely  # generalized edema  # acute respiratory failure requiring 2 liters of oxygen-resolved  # pleural effusions  Rocephin to cover any possible pna  EF 60-65%  Pulm recs. Holding off on thoracentesis at this time,   supportive O2 to keep level above 90, patient currently tolerating RA spo2 94%  V/Q scan given hx of malignancy, indeterminate no ventilation/perfusion mismatch that would suggest PE  CT chest non contrast reviewed  Patient refusing dialysis at this time    #Acute DVT below the knee  Discussed with family, will hold off on ac for now as patient with anemia  Vascular consult placed-discussed with Dr. Renee, recs IR consult for possible filter placement  Discussed with patient, patient is unsure about filter placement at this time and wants to speak further with Dr. Michele     # worsening Cr, CKD stage 4  # HTN, gout  pt refusing HD currently  Dr. 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 Wants to continue blood transfusions when needed.   Previously Discussed with patient, patient states she would not want to be left a vegetable. Patient states she does not want to be resuscitated or intubated and would like to pass away peacefully. PAVEL completed DNR/DNI.

## 2024-09-16 NOTE — CONSULT NOTE ADULT - CONVERSATION DETAILS
met and spoke w/ pt and her daughter at bedside this morning.  Pt alert, oriented and fully converses, she is aware of her med history as well as her present condition. I explained role of palliative /supportive care to them , and pt interested in talking.  Pt discussed with me why she is here, and how she is doing. Pt reported having taken keytruda for 6 years and it has now hurt her kidneys. She reports feeling better after the transfusion, but says she is still weak, and unable to walk like she used to .  Pt aware her cancer has spread, and that it will not be cured, she is aware her kidneys are not functioning well, and she is not interested in having HD, and she knows all of this makes her time limited.  Pt says she wants to be at home, she has support of her daughter to help her.  Pt says she wants to be home and comfortable and not to suffer, saying her quality is more important than he quantity, but does hope to make her 89th birthday which is this Friday. Pt confirmed she does not want cpr or intubation at this time.   I spoke w/ her about having certain procedures, and her thoughts on them, presently she is considering having a thoracentesis, but after learning about the risks involved with the procedure and that the fluid will likely return, she may not go through with it. I also spoke w/ them about the blood transfusions, presently pt says she would be ok with continuing them. I did discuss that her anemia will continue, for multiple reasons, and the blood transfusions are a temporary fix to her chronic conditions.  We spoke about going home, and the extra support that home hospice can offer. Both daughter and pt said they have spoken, and thought  about hospice before, but did not feel ready. Discussed with them , that pt would be a good candidate for home hospice, but if she wishes to continue with blood transfusions, she may not be a candidate. Pt and daughter thankful for the conversation , and say they will think about things, and discuss with their PMD, dr Bales .

## 2024-09-17 LAB
ANION GAP SERPL CALC-SCNC: 18 MMOL/L — HIGH (ref 5–17)
BUN SERPL-MCNC: 102 MG/DL — HIGH (ref 7–23)
CALCIUM SERPL-MCNC: 6.4 MG/DL — CRITICAL LOW (ref 8.4–10.5)
CALCIUM SERPL-MCNC: 6.7 MG/DL — LOW (ref 8.4–10.5)
CHLORIDE SERPL-SCNC: 106 MMOL/L — SIGNIFICANT CHANGE UP (ref 96–108)
CO2 SERPL-SCNC: 17 MMOL/L — LOW (ref 22–31)
CREAT SERPL-MCNC: 9.29 MG/DL — HIGH (ref 0.5–1.3)
EGFR: 4 ML/MIN/1.73M2 — LOW
GLUCOSE SERPL-MCNC: 113 MG/DL — HIGH (ref 70–99)
HCT VFR BLD CALC: 26.9 % — LOW (ref 34.5–45)
HGB BLD-MCNC: 8.1 G/DL — LOW (ref 11.5–15.5)
MAGNESIUM SERPL-MCNC: 1.7 MG/DL — SIGNIFICANT CHANGE UP (ref 1.6–2.6)
MCHC RBC-ENTMCNC: 27.6 PG — SIGNIFICANT CHANGE UP (ref 27–34)
MCHC RBC-ENTMCNC: 30.1 GM/DL — LOW (ref 32–36)
MCV RBC AUTO: 91.8 FL — SIGNIFICANT CHANGE UP (ref 80–100)
NRBC # BLD: 0 /100 WBCS — SIGNIFICANT CHANGE UP (ref 0–0)
PLATELET # BLD AUTO: 68 K/UL — LOW (ref 150–400)
POTASSIUM SERPL-MCNC: 4.3 MMOL/L — SIGNIFICANT CHANGE UP (ref 3.5–5.3)
POTASSIUM SERPL-SCNC: 4.3 MMOL/L — SIGNIFICANT CHANGE UP (ref 3.5–5.3)
PTH-INTACT FLD-MCNC: 209 PG/ML — HIGH (ref 15–65)
RBC # BLD: 2.93 M/UL — LOW (ref 3.8–5.2)
RBC # FLD: 20.7 % — HIGH (ref 10.3–14.5)
SODIUM SERPL-SCNC: 141 MMOL/L — SIGNIFICANT CHANGE UP (ref 135–145)
WBC # BLD: 2.49 K/UL — LOW (ref 3.8–10.5)
WBC # FLD AUTO: 2.49 K/UL — LOW (ref 3.8–10.5)

## 2024-09-17 PROCEDURE — 99232 SBSQ HOSP IP/OBS MODERATE 35: CPT

## 2024-09-17 PROCEDURE — 99232 SBSQ HOSP IP/OBS MODERATE 35: CPT | Mod: FS

## 2024-09-17 PROCEDURE — 99497 ADVNCD CARE PLAN 30 MIN: CPT

## 2024-09-17 RX ORDER — METOPROLOL TARTRATE 50 MG
12.5 TABLET ORAL ONCE
Refills: 0 | Status: COMPLETED | OUTPATIENT
Start: 2024-09-17 | End: 2024-09-17

## 2024-09-17 RX ORDER — METOPROLOL TARTRATE 50 MG
12.5 TABLET ORAL
Refills: 0 | Status: DISCONTINUED | OUTPATIENT
Start: 2024-09-18 | End: 2024-09-25

## 2024-09-17 RX ADMIN — Medication 100 MILLIGRAM(S): at 05:29

## 2024-09-17 RX ADMIN — Medication 1300 MILLIGRAM(S): at 05:31

## 2024-09-17 RX ADMIN — Medication 1334 MILLIGRAM(S): at 09:05

## 2024-09-17 RX ADMIN — MULTI VITAMIN/MINERAL SUPPLEMENT WITH ASCORBIC ACID, NIACIN, PYRIDOXINE, PANTOTHENIC ACID, FOLIC ACID, RIBOFLAVIN, THIAMIN, BIOTIN, COBALAMIN AND ZINC. 1 TABLET(S): 60; 20; 12.5; 10; 10; 1.7; 1.5; 1; .3; .006 TABLET, COATED ORAL at 16:03

## 2024-09-17 RX ADMIN — Medication 100 MILLIGRAM(S): at 17:05

## 2024-09-17 RX ADMIN — Medication 1300 MILLIGRAM(S): at 16:04

## 2024-09-17 RX ADMIN — Medication 1334 MILLIGRAM(S): at 17:06

## 2024-09-17 RX ADMIN — Medication 100 MILLIGRAM(S): at 21:20

## 2024-09-17 RX ADMIN — CHLORHEXIDINE GLUCONATE ORAL RINSE 1 APPLICATION(S): 1.2 SOLUTION DENTAL at 16:07

## 2024-09-17 RX ADMIN — Medication 1300 MILLIGRAM(S): at 21:19

## 2024-09-17 RX ADMIN — Medication 10 MILLIGRAM(S): at 05:31

## 2024-09-17 RX ADMIN — Medication 100 MILLIGRAM(S): at 13:55

## 2024-09-17 RX ADMIN — Medication 1334 MILLIGRAM(S): at 13:06

## 2024-09-17 NOTE — PROGRESS NOTE ADULT - NS ATTEND AMEND GEN_ALL_CORE FT
I attest my time as attending was greater than 50% of the total time of 35 min on patient care on this DOS. Time spent includes review of hospital course, labs, vitals, medical records, patient evaluation, contact with family (when present), discussion of plan with the primary team, coordinating services and documenting encounter.    Please reconsult if we can be of service.
Agree with assessment and plan as above.    Colitis secondary to likely infectious etiology. Blood cultures negative to date. Currently on ceftriaxone and flagyl. Can continue antibiotic therapy until diarrhea improves. Please collect stool sample for GI PCR.     >55 minutes was spent on direct patient care

## 2024-09-17 NOTE — DIETITIAN INITIAL EVALUATION ADULT - REASON
no overt signs of muscle wasting , mild muscle wasting & loss of body fat in the setting of advanced age,

## 2024-09-17 NOTE — CHART NOTE - NSCHARTNOTEFT_GEN_A_CORE
critical lab value Ca 6.4    Ca correction for albumin = 8.1    hypocalcemia likely due to Chronic Kidney Disease as PTH and phos in elevated. pt planning for home hospice. critical lab value Ca 6.4    Ca correction for albumin = 8.1    hypocalcemia likely due to Chronic Kidney Disease as PTH and phos in elevated. denies muscle cramps, tingling/numbness. pt planning for home hospice.

## 2024-09-17 NOTE — PROGRESS NOTE ADULT - PROVIDER SPECIALTY LIST ADULT
Palliative Care ,[email or web address]

## 2024-09-17 NOTE — PROGRESS NOTE ADULT - CONVERSATION DETAILS
Met with pt for follow up today , pt oob w/ family , daughter in room. Pt said she has spoken wit her PMD  Dr jaquez and has decided she does not want to go through with the thoracentesis at this time. She has no respiratory troubles at this tiem, and does not like al the risks involved , zuly because she knows the fluid will likely return. Pt says " im ready to go anytime after Friday , ( her 89th birthday.)  Pt hopes to be at home soon and wants to focus on her eating and being with family, and being comfortable. We again discussed hospice and I asked her if she has discussed the blood transfusions, she said she has d/w her daughter , and pt feels that she will not be taking any more transfusions , as she now understands that these are a temporary fix to a chronic problem.  Pt and daughter also spoke w/ dr BERMUDEZ and they are all in agreement for a home hospice consult to be placed .

## 2024-09-17 NOTE — DIETITIAN INITIAL EVALUATION ADULT - NS FNS DIET ORDER
Diet, Regular:   DASH/TLC {Sodium & Cholesterol Restricted}  No Concentrated Phosphorus (09-16-24 @ 11:29)

## 2024-09-17 NOTE — DIETITIAN INITIAL EVALUATION ADULT - PERTINENT MEDS FT
MEDICATIONS  (STANDING):  amLODIPine   Tablet 10 milliGRAM(s) Oral daily  calcium acetate 1334 milliGRAM(s) Oral three times a day with meals  cefTRIAXone   IVPB 1000 milliGRAM(s) IV Intermittent every 24 hours  chlorhexidine 2% Cloths 1 Application(s) Topical daily  epoetin peyton-epbx (RETACRIT) Injectable 93299 Unit(s) SubCutaneous once  metroNIDAZOLE  IVPB      metroNIDAZOLE  IVPB 500 milliGRAM(s) IV Intermittent every 8 hours  Nephro-sage 1 Tablet(s) Oral daily  sodium bicarbonate 1300 milliGRAM(s) Oral three times a day    MEDICATIONS  (PRN):  acetaminophen     Tablet .. 650 milliGRAM(s) Oral every 6 hours PRN Temp greater or equal to 38C (100.4F), Mild Pain (1 - 3)  melatonin 3 milliGRAM(s) Oral at bedtime PRN Insomnia  ondansetron Injectable 4 milliGRAM(s) IV Push every 8 hours PRN Nausea and/or Vomiting

## 2024-09-17 NOTE — DIETITIAN INITIAL EVALUATION ADULT - PERTINENT LABORATORY DATA
09-17    141  |  106  |  102[H]  ----------------------------<  113[H]  4.3   |  17[L]  |  9.29[H]    Ca    6.7[L]      17 Sep 2024 07:51  Phos  7.7     09-16  Mg     1.9     09-16    TPro  4.8[L]  /  Alb  1.9[L]  /  TBili  0.4  /  DBili  x   /  AST  14  /  ALT  <6[L]  /  AlkPhos  59  09-16  A1C with Estimated Average Glucose Result: 5.1 % (09-15-24 @ 06:45)  A1C with Estimated Average Glucose Result: 4.6 % (05-28-24 @ 07:50)  A1C with Estimated Average Glucose Result: 4.6 % (02-29-24 @ 07:57)

## 2024-09-17 NOTE — CHART NOTE - NSCHARTNOTEFT_GEN_A_CORE
Reviewed prior progress notes, labs and imaging.    To be discussed with Dr Michele     89-year-old female with PMH of CKD, colon cancer s/p resection, breast cancer, parathyroid cancer, HTN, thrombocytopenia presents to the emergency department for shortness of breath, generalized edema, and SOB.     #Pleural effusions - R > L pl effusion - malignant vs transudate (from ESRD) effusion  #Acute respiratory failure 2/2 above   #MAGDI/CKD 5 pre-renal/hemodynamic vs CKD progression (Cr 6.39 - 6/10/24)  - CT A/P w/R renal atrophy, pancreas mass, colitis  - V/Q scan indeterminate, but unlikely PE  - Continue supplemental O2 - currently on 2L nasal cannula   - Continue with ceftriaxone and flagyl for now to cover for any possible pneumonia   - No plan for thoracentesis at this time  - Pulm consulted, following  - Nephrology consulted, following, patient does not want dialysis     #Pancotopenia in setting of       # worsening anemia secondary to carcinoma, worsening CKD   # leukopenia, thrombocytopenia  # history of breast cancer, colon cancer (previously on Keytruda), refractory anemia/MDS  s/p 1 unit PRBC 9/13 however per nursing patient did not finish full unit  hemoglobin 6.3, then 6.8 on repeat 9/14  Received 1 unit of PRBC and lasix after 9/14  heme onc eval appreciated  currently not receiving chemo  supportive treatment  blood cultures no growth at 24hrs  # worsening anemia secondary to carcinoma, worsening CKD   # leukopenia, thrombocytopenia  # history of breast cancer, colon cancer (previously on Keytruda), refractory anemia/MDS  s/p 1 unit PRBC 9/13 however per nursing patient did not finish full unit  hemoglobin 6.3, then 6.8 on repeat 9/14  Received 1 unit of PRBC and lasix after 9/14  heme onc eval appreciated  currently not receiving chemo  supportive treatment  blood cultures no growth at 24hrs         CKD ,  severe anemia ( pancytopenia ). Pat denied sob but noted with moderate  pleural effusion R>L probable due to combination of malignancy and CKD .  There is also LE DVT . Since pat si relatively stable from a pulmonary point of view, no emergent thoracentesis indicated. Pat wish to discuss with family before consenting for thoracentesis .  Now DNR DNI .    Problem list  1.   2 Left Lower Extremity bellow knee DVT acute   3. Hypoxemia  4. Pancytopenia/MDS                        MDS (myelodysplastic syndrome).   ·  Plan: History of MDS, currently with guaiac positive presentation, treated supportively.  Hemoglobin at 8.3 g/dL after PRBC.  Patient is a candidate for hemodialysis; followed by nephrology.  No plans for systemic chemotherapy, but brother will continue transfusion support and CASH.  GI evaluation for guaiac positive stools reasonable.      #Acute DVT below the knee  Discussed with family, will hold off on ac for now as patient with anemia  Vascular consult placed-discussed with Dr. Renee, recs IR consult for possible filter placement  Discussed with patient, patient is unsure about filter placement at this time and wants to speak further with Dr. Michele     # worsening Cr, CKD stage 4  # HTN, gout  pt refusing HD currently  Dr. 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 Wants to continue blood transfusions when needed.   Previously Discussed with patient, patient states she would not want to be left a vegetable. Patient states she does not want to be resuscitated or intubated and would like to pass away peacefully. PAVEL completed DNR/DNI. Reviewed prior progress notes, labs and imaging.    To be discussed with Dr Michele     89-year-old female with PMH of CKD, colon cancer s/p resection, breast cancer, parathyroid cancer, HTN, thrombocytopenia presents to the emergency department for shortness of breath, generalized edema, and SOB. Admitted for pneural effusions, worsening renal function, and colitis     #Pleural effusions - R > L pl effusion - malignant vs transudate (from ESRD) effusion  #Acute respiratory failure 2/2 above   #MAGDI/CKD 5 pre-renal/hemodynamic vs CKD progression (Cr 6.39 - 6/10/24)  - CT A/P w/R renal atrophy, pancreas mass, colitis  - V/Q scan indeterminate, but unlikely PE  - Continue supplemental O2 - currently on 2L nasal cannula   - No plan for thoracentesis at this time  - Will continue home meds, hydralazine, norvasc with hold parameters  - Pulm consulted, following  - Nephrology consulted, following, patient does not want dialysis   - Palliative care consulted     #Colitis - secondary to likely infectious etiology  - Blood cultures negative to date  - Currently on ceftriaxone and flagyl. Can continue antibiotic therapy until diarrhea improves   - GI PCR neg     #Pancytopenia in setting of CKD, history of breast cancer, colon cancer (previously on Keytruda), refractory anemia/MDS  #FOBT positive   - s/p 1 unit PRBC 9/13 however per nursing patient did not finish full unit  - s/p 1 unit of PRBC and lasix after 9/14  - Supportive care  - Transfuse PRN  - Heme onc eval appreciated  - No plans for systemic chemotherapy     #Acute DVT, below the knee  - Will hold off on ac for now as patient with anemia  - Patient is refusing IVC filter      Dispo: Pending above. Possible home hospice eval. Palliative care following Reviewed prior progress notes, labs and imaging.    Discussed with Dr Michele     89-year-old female with PMH of CKD, colon cancer s/p resection, breast cancer, parathyroid cancer, HTN, thrombocytopenia presents to the emergency department for shortness of breath, generalized edema, and SOB. Admitted for pneural effusions, worsening renal function, and colitis     #Pleural effusions - R > L pl effusion - malignant vs transudate (from ESRD) effusion  #Acute respiratory failure 2/2 above   #MAGDI/CKD 5 pre-renal/hemodynamic vs CKD progression (Cr 6.39 - 6/10/24)  - CT A/P w/R renal atrophy, pancreas mass, colitis  - V/Q scan indeterminate, but unlikely PE  - Continue supplemental O2 - currently on 2L nasal cannula   - No plan for thoracentesis at this time  - Will continue home meds - hydralazine, norvasc with hold parameters  - Pulm consulted, following  - Nephrology consulted, following, patient does not want dialysis   - Palliative care consulted     #Colitis - secondary to likely infectious etiology  - Blood cultures negative to date  - Currently on ceftriaxone and flagyl - day 5  - GI PCR neg     #Pancytopenia in setting of CKD, history of breast cancer, colon cancer (previously on Keytruda), refractory anemia/MDS  #FOBT positive   - s/p 1 unit PRBC 9/13 however per nursing patient did not finish full unit  - s/p 1 unit of PRBC and lasix after 9/14  - Supportive care  - Transfuse PRN  - Heme onc eval appreciated  - No plans for systemic chemotherapy     #Acute DVT, below the knee  - Will hold off on ac for now as patient with anemia  - Patient is refusing IVC filter    GOC: DNR/I. Patient is considering home hospice, however she is undecided re: transfusions which will impact eligibility for hospice. Palliative care team following, will follow up today     Dispo: Pending above.     Updated patient's niece at bedside

## 2024-09-17 NOTE — DIETITIAN INITIAL EVALUATION ADULT - OTHER INFO
89F, PMHx HTN, herpes zoster, CKD, multinodular thyroid,breast cancer, colon cancer (previously on Keytruda), refractory anemia/MDS, treated supportively and ambulatory (erythropoietin supplements), no recent exposure to systemic chemotherapy, is admitted with generalized weakness, Patient noted to refuse HD , (+) occult (9/17) patient noted to have received 2 unit PRBC 's since admission . patient noted with UE edema as per daughter , ?  current weight , UBW reported ~ 145-149lb as per daughter . (+) BM (9/17) skin intact

## 2024-09-18 LAB
ALBUMIN SERPL ELPH-MCNC: 1.9 G/DL — LOW (ref 3.3–5)
ALP SERPL-CCNC: 73 U/L — SIGNIFICANT CHANGE UP (ref 40–120)
ALT FLD-CCNC: 9 U/L — LOW (ref 10–45)
ANION GAP SERPL CALC-SCNC: 19 MMOL/L — HIGH (ref 5–17)
AST SERPL-CCNC: 24 U/L — SIGNIFICANT CHANGE UP (ref 10–40)
BASOPHILS # BLD AUTO: 0.01 K/UL — SIGNIFICANT CHANGE UP (ref 0–0.2)
BASOPHILS NFR BLD AUTO: 0.3 % — SIGNIFICANT CHANGE UP (ref 0–2)
BILIRUB SERPL-MCNC: 0.4 MG/DL — SIGNIFICANT CHANGE UP (ref 0.2–1.2)
BUN SERPL-MCNC: 103 MG/DL — HIGH (ref 7–23)
CALCIUM SERPL-MCNC: 6.6 MG/DL — LOW (ref 8.4–10.5)
CHLORIDE SERPL-SCNC: 106 MMOL/L — SIGNIFICANT CHANGE UP (ref 96–108)
CO2 SERPL-SCNC: 16 MMOL/L — LOW (ref 22–31)
CREAT SERPL-MCNC: 9.53 MG/DL — HIGH (ref 0.5–1.3)
CULTURE RESULTS: SIGNIFICANT CHANGE UP
CULTURE RESULTS: SIGNIFICANT CHANGE UP
EGFR: 4 ML/MIN/1.73M2 — LOW
EOSINOPHIL # BLD AUTO: 0.02 K/UL — SIGNIFICANT CHANGE UP (ref 0–0.5)
EOSINOPHIL NFR BLD AUTO: 0.7 % — SIGNIFICANT CHANGE UP (ref 0–6)
GLUCOSE SERPL-MCNC: 113 MG/DL — HIGH (ref 70–99)
HCT VFR BLD CALC: 26.8 % — LOW (ref 34.5–45)
HGB BLD-MCNC: 7.9 G/DL — LOW (ref 11.5–15.5)
IMM GRANULOCYTES NFR BLD AUTO: 1 % — HIGH (ref 0–0.9)
LYMPHOCYTES # BLD AUTO: 0.64 K/UL — LOW (ref 1–3.3)
LYMPHOCYTES # BLD AUTO: 21.7 % — SIGNIFICANT CHANGE UP (ref 13–44)
MAGNESIUM SERPL-MCNC: 1.9 MG/DL — SIGNIFICANT CHANGE UP (ref 1.6–2.6)
MCHC RBC-ENTMCNC: 27.1 PG — SIGNIFICANT CHANGE UP (ref 27–34)
MCHC RBC-ENTMCNC: 29.5 GM/DL — LOW (ref 32–36)
MCV RBC AUTO: 92.1 FL — SIGNIFICANT CHANGE UP (ref 80–100)
MONOCYTES # BLD AUTO: 0.23 K/UL — SIGNIFICANT CHANGE UP (ref 0–0.9)
MONOCYTES NFR BLD AUTO: 7.8 % — SIGNIFICANT CHANGE UP (ref 2–14)
NEUTROPHILS # BLD AUTO: 2.02 K/UL — SIGNIFICANT CHANGE UP (ref 1.8–7.4)
NEUTROPHILS NFR BLD AUTO: 68.5 % — SIGNIFICANT CHANGE UP (ref 43–77)
NRBC # BLD: 0 /100 WBCS — SIGNIFICANT CHANGE UP (ref 0–0)
PHOSPHATE SERPL-MCNC: 7.8 MG/DL — HIGH (ref 2.5–4.5)
PLATELET # BLD AUTO: 72 K/UL — LOW (ref 150–400)
POTASSIUM SERPL-MCNC: 4.5 MMOL/L — SIGNIFICANT CHANGE UP (ref 3.5–5.3)
POTASSIUM SERPL-SCNC: 4.5 MMOL/L — SIGNIFICANT CHANGE UP (ref 3.5–5.3)
PROT SERPL-MCNC: 4.9 G/DL — LOW (ref 6–8.3)
RBC # BLD: 2.91 M/UL — LOW (ref 3.8–5.2)
RBC # FLD: 20.6 % — HIGH (ref 10.3–14.5)
SODIUM SERPL-SCNC: 141 MMOL/L — SIGNIFICANT CHANGE UP (ref 135–145)
SPECIMEN SOURCE: SIGNIFICANT CHANGE UP
SPECIMEN SOURCE: SIGNIFICANT CHANGE UP
WBC # BLD: 2.95 K/UL — LOW (ref 3.8–10.5)
WBC # FLD AUTO: 2.95 K/UL — LOW (ref 3.8–10.5)

## 2024-09-18 PROCEDURE — 99232 SBSQ HOSP IP/OBS MODERATE 35: CPT

## 2024-09-18 RX ADMIN — Medication 1334 MILLIGRAM(S): at 17:34

## 2024-09-18 RX ADMIN — Medication 100 MILLIGRAM(S): at 14:44

## 2024-09-18 RX ADMIN — Medication 100 MILLIGRAM(S): at 05:39

## 2024-09-18 RX ADMIN — Medication 1300 MILLIGRAM(S): at 05:38

## 2024-09-18 RX ADMIN — Medication 10 MILLIGRAM(S): at 05:39

## 2024-09-18 RX ADMIN — Medication 100 MILLIGRAM(S): at 21:07

## 2024-09-18 RX ADMIN — MULTI VITAMIN/MINERAL SUPPLEMENT WITH ASCORBIC ACID, NIACIN, PYRIDOXINE, PANTOTHENIC ACID, FOLIC ACID, RIBOFLAVIN, THIAMIN, BIOTIN, COBALAMIN AND ZINC. 1 TABLET(S): 60; 20; 12.5; 10; 10; 1.7; 1.5; 1; .3; .006 TABLET, COATED ORAL at 13:19

## 2024-09-18 RX ADMIN — ERYTHROPOIETIN 10000 UNIT(S): 10000 INJECTION, SOLUTION INTRAVENOUS; SUBCUTANEOUS at 17:34

## 2024-09-18 RX ADMIN — Medication 1300 MILLIGRAM(S): at 13:20

## 2024-09-18 RX ADMIN — Medication 12.5 MILLIGRAM(S): at 17:35

## 2024-09-18 RX ADMIN — Medication 1334 MILLIGRAM(S): at 13:18

## 2024-09-18 RX ADMIN — Medication 12.5 MILLIGRAM(S): at 05:38

## 2024-09-18 RX ADMIN — Medication 1300 MILLIGRAM(S): at 21:07

## 2024-09-18 RX ADMIN — Medication 100 MILLIGRAM(S): at 13:22

## 2024-09-18 RX ADMIN — Medication 1334 MILLIGRAM(S): at 08:44

## 2024-09-18 RX ADMIN — CHLORHEXIDINE GLUCONATE ORAL RINSE 1 APPLICATION(S): 1.2 SOLUTION DENTAL at 13:26

## 2024-09-18 NOTE — CHART NOTE - NSCHARTNOTEFT_GEN_A_CORE
Reviewed prior progress notes, labs and imaging.    Discussed with Dr Michele     89-year-old female with PMH of CKD, colon cancer s/p resection, breast cancer, parathyroid cancer, HTN, thrombocytopenia presents to the emergency department for shortness of breath, generalized edema, and SOB. Admitted for pleural effusions, worsening renal function, and colitis     #Pleural effusions - R > L pl effusion - malignant vs transudate (from ESRD) effusion  #Acute respiratory failure 2/2 above   #MAGDI/CKD 5 pre-renal/hemodynamic vs CKD progression (Cr 6.39 - 6/10/24)  - CT A/P w/R renal atrophy, pancreas mass, colitis  - V/Q scan indeterminate, but unlikely PE  - Continue supplemental O2 - currently on 2L nasal cannula   - No plan for thoracentesis at this time  - Will continue home meds - hydralazine, norvasc with hold parameters  - Pulm consulted, following  - Nephrology consulted, following, patient does not want dialysis   - Palliative care consulted - home hospice eval pending     #Colitis - secondary to likely infectious etiology  - Blood cultures negative to date  - Currently on ceftriaxone and flagyl - day 6  - GI PCR neg     #Pancytopenia in setting of CKD, history of breast cancer, colon cancer (previously on Keytruda), refractory anemia/MDS  #FOBT positive   - s/p 1 unit PRBC 9/13 however per nursing patient did not finish full unit  - s/p 1 unit of PRBC and lasix after 9/14  - Supportive care  - No longer planning to do transfusions, home hospice eval pending   - Heme onc eval appreciated  - No plans for systemic chemotherapy     #Acute DVT, below the knee  - Will hold off on ac for now as patient with anemia  - Patient is refusing IVC filter    GOC: DNR/I.     Dispo: Home hospice eval pending - anticipate home with hospice later this week Reviewed prior progress notes, labs and imaging.    Discussed with Dr Michele     89-year-old female with PMH of CKD, colon cancer s/p resection, breast cancer, parathyroid cancer, HTN, thrombocytopenia presents to the emergency department for shortness of breath, generalized edema, and SOB. Admitted for pleural effusions, worsening renal function, and colitis     Patient is in good spirits this morning, no complaints at this time, on room air     #Pleural effusions - R > L pl effusion - malignant vs transudate (from ESRD) effusion  #Acute respiratory failure 2/2 above   #MAGDI/CKD 5 pre-renal/hemodynamic vs CKD progression (Cr 6.39 - 6/10/24)  - CT A/P w/R renal atrophy, pancreas mass, colitis  - V/Q scan indeterminate, but unlikely PE  - Continue supplemental O2 PRN  - No plan for thoracentesis at this time  - Will continue home meds - hydralazine, norvasc with hold parameters  - Pulm consulted, following  - Nephrology consulted, following, patient does not want dialysis   - Palliative care consulted - home hospice eval pending     #Colitis - secondary to likely infectious etiology  - Blood cultures negative to date  - Currently on ceftriaxone and flagyl - day 6  - GI PCR neg     #Pancytopenia in setting of CKD, history of breast cancer, colon cancer (previously on Keytruda), refractory anemia/MDS  #FOBT positive   - s/p 1 unit PRBC 9/13 however per nursing patient did not finish full unit  - s/p 1 unit of PRBC and lasix after 9/14  - Supportive care  - No longer planning to do transfusions, home hospice eval pending   - Heme onc eval appreciated  - No plans for systemic chemotherapy     #Acute DVT, below the knee  - Will hold off on ac for now as patient with anemia  - Patient is refusing IVC filter    GOC: DNR/I.     Dispo: Home hospice eval pending - anticipate home with hospice later this week

## 2024-09-19 LAB
ALBUMIN SERPL ELPH-MCNC: 2 G/DL — LOW (ref 3.3–5)
ALP SERPL-CCNC: 71 U/L — SIGNIFICANT CHANGE UP (ref 40–120)
ALT FLD-CCNC: 8 U/L — LOW (ref 10–45)
ANION GAP SERPL CALC-SCNC: 19 MMOL/L — HIGH (ref 5–17)
AST SERPL-CCNC: 29 U/L — SIGNIFICANT CHANGE UP (ref 10–40)
BILIRUB SERPL-MCNC: 0.4 MG/DL — SIGNIFICANT CHANGE UP (ref 0.2–1.2)
BUN SERPL-MCNC: 114 MG/DL — HIGH (ref 7–23)
CALCIUM SERPL-MCNC: 6.7 MG/DL — LOW (ref 8.4–10.5)
CHLORIDE SERPL-SCNC: 108 MMOL/L — SIGNIFICANT CHANGE UP (ref 96–108)
CO2 SERPL-SCNC: 15 MMOL/L — LOW (ref 22–31)
CREAT SERPL-MCNC: 9.7 MG/DL — HIGH (ref 0.5–1.3)
EGFR: 4 ML/MIN/1.73M2 — LOW
GLUCOSE SERPL-MCNC: 116 MG/DL — HIGH (ref 70–99)
HCT VFR BLD CALC: 27 % — LOW (ref 34.5–45)
HGB BLD-MCNC: 8 G/DL — LOW (ref 11.5–15.5)
MAGNESIUM SERPL-MCNC: 1.9 MG/DL — SIGNIFICANT CHANGE UP (ref 1.6–2.6)
MCHC RBC-ENTMCNC: 27.2 PG — SIGNIFICANT CHANGE UP (ref 27–34)
MCHC RBC-ENTMCNC: 29.6 GM/DL — LOW (ref 32–36)
MCV RBC AUTO: 91.8 FL — SIGNIFICANT CHANGE UP (ref 80–100)
NRBC # BLD: 0 /100 WBCS — SIGNIFICANT CHANGE UP (ref 0–0)
PHOSPHATE SERPL-MCNC: 7.8 MG/DL — HIGH (ref 2.5–4.5)
PLATELET # BLD AUTO: 69 K/UL — LOW (ref 150–400)
POTASSIUM SERPL-MCNC: 4.8 MMOL/L — SIGNIFICANT CHANGE UP (ref 3.5–5.3)
POTASSIUM SERPL-SCNC: 4.8 MMOL/L — SIGNIFICANT CHANGE UP (ref 3.5–5.3)
PROT SERPL-MCNC: 5 G/DL — LOW (ref 6–8.3)
RBC # BLD: 2.94 M/UL — LOW (ref 3.8–5.2)
RBC # FLD: 20.5 % — HIGH (ref 10.3–14.5)
SODIUM SERPL-SCNC: 142 MMOL/L — SIGNIFICANT CHANGE UP (ref 135–145)
WBC # BLD: 2.76 K/UL — LOW (ref 3.8–10.5)
WBC # FLD AUTO: 2.76 K/UL — LOW (ref 3.8–10.5)

## 2024-09-19 PROCEDURE — 99232 SBSQ HOSP IP/OBS MODERATE 35: CPT

## 2024-09-19 RX ADMIN — Medication 1334 MILLIGRAM(S): at 12:01

## 2024-09-19 RX ADMIN — CHLORHEXIDINE GLUCONATE ORAL RINSE 1 APPLICATION(S): 1.2 SOLUTION DENTAL at 12:04

## 2024-09-19 RX ADMIN — Medication 1334 MILLIGRAM(S): at 18:28

## 2024-09-19 RX ADMIN — Medication 100 MILLIGRAM(S): at 13:54

## 2024-09-19 RX ADMIN — MULTI VITAMIN/MINERAL SUPPLEMENT WITH ASCORBIC ACID, NIACIN, PYRIDOXINE, PANTOTHENIC ACID, FOLIC ACID, RIBOFLAVIN, THIAMIN, BIOTIN, COBALAMIN AND ZINC. 1 TABLET(S): 60; 20; 12.5; 10; 10; 1.7; 1.5; 1; .3; .006 TABLET, COATED ORAL at 12:01

## 2024-09-19 RX ADMIN — Medication 12.5 MILLIGRAM(S): at 18:28

## 2024-09-19 RX ADMIN — Medication 100 MILLIGRAM(S): at 21:26

## 2024-09-19 RX ADMIN — Medication 1300 MILLIGRAM(S): at 21:25

## 2024-09-19 RX ADMIN — Medication 100 MILLIGRAM(S): at 13:55

## 2024-09-19 RX ADMIN — Medication 1300 MILLIGRAM(S): at 13:55

## 2024-09-19 RX ADMIN — Medication 10 MILLIGRAM(S): at 05:18

## 2024-09-19 RX ADMIN — Medication 1334 MILLIGRAM(S): at 13:54

## 2024-09-19 RX ADMIN — Medication 12.5 MILLIGRAM(S): at 05:18

## 2024-09-19 RX ADMIN — Medication 1300 MILLIGRAM(S): at 05:17

## 2024-09-19 RX ADMIN — Medication 100 MILLIGRAM(S): at 05:17

## 2024-09-19 NOTE — CHART NOTE - NSCHARTNOTEFT_GEN_A_CORE
Reviewed prior progress notes, labs and imaging.    Discussed with Dr Michele     89-year-old female with PMH of CKD, colon cancer s/p resection, breast cancer, parathyroid cancer, HTN, thrombocytopenia presents to the emergency department for shortness of breath, generalized edema, and SOB. Admitted for pleural effusions, worsening renal function, and colitis     Patient is in good spirits this morning, no complaints at this time, on room air     #Pleural effusions - R > L pl effusion - malignant vs transudate (from ESRD) effusion  #Acute respiratory failure 2/2 above   #MAGDI/CKD 5 pre-renal/hemodynamic vs CKD progression (Cr 6.39 - 6/10/24)  - CT A/P w/R renal atrophy, pancreas mass, colitis  - V/Q scan indeterminate, but unlikely PE  - Continue supplemental O2 PRN  - No plan for thoracentesis at this time  - Will continue home meds - hydralazine, Norvasc with hold parameters  - Pulm consulted, following  - Nephrology consulted, following, patient does not want dialysis   - Palliative care consulted. Accepted to home hospice     #Colitis - secondary to likely infectious etiology  - Blood cultures negative to date  - Currently on ceftriaxone and flagyl - day 7. Last day  - GI PCR neg     #Pancytopenia in setting of CKD, history of breast cancer, colon cancer (previously on Keytruda), refractory anemia/MDS  #FOBT positive   - s/p 1 unit PRBC 9/13 however per nursing patient did not finish full unit  - s/p 1 unit of PRBC and lasix after 9/14  - Supportive care  - No longer planning to do transfusions,   - Heme onc eval appreciated  - No plans for systemic chemotherapy     #Acute DVT, below the knee  - Will hold off on ac for now as patient with anemia  - Patient is refusing IVC filter    GOC: DNR/I.     Dispo: Accepted to home hospice. Pending 24 hour aids at home.

## 2024-09-20 LAB
ALBUMIN SERPL ELPH-MCNC: 2.1 G/DL — LOW (ref 3.3–5)
ALP SERPL-CCNC: 77 U/L — SIGNIFICANT CHANGE UP (ref 40–120)
ALT FLD-CCNC: 6 U/L — LOW (ref 10–45)
ANION GAP SERPL CALC-SCNC: 20 MMOL/L — HIGH (ref 5–17)
AST SERPL-CCNC: 20 U/L — SIGNIFICANT CHANGE UP (ref 10–40)
BILIRUB SERPL-MCNC: 0.4 MG/DL — SIGNIFICANT CHANGE UP (ref 0.2–1.2)
BUN SERPL-MCNC: 113 MG/DL — HIGH (ref 7–23)
CA-I BLD-SCNC: 3.7 MG/DL — LOW (ref 4.5–5.6)
CALCIUM SERPL-MCNC: 6.7 MG/DL — LOW (ref 8.4–10.5)
CHLORIDE SERPL-SCNC: 108 MMOL/L — SIGNIFICANT CHANGE UP (ref 96–108)
CO2 SERPL-SCNC: 15 MMOL/L — LOW (ref 22–31)
CREAT SERPL-MCNC: 9.83 MG/DL — HIGH (ref 0.5–1.3)
EGFR: 3 ML/MIN/1.73M2 — LOW
GLUCOSE SERPL-MCNC: 125 MG/DL — HIGH (ref 70–99)
HCT VFR BLD CALC: 28.5 % — LOW (ref 34.5–45)
HGB BLD-MCNC: 8.5 G/DL — LOW (ref 11.5–15.5)
MAGNESIUM SERPL-MCNC: 1.9 MG/DL — SIGNIFICANT CHANGE UP (ref 1.6–2.6)
MCHC RBC-ENTMCNC: 27.8 PG — SIGNIFICANT CHANGE UP (ref 27–34)
MCHC RBC-ENTMCNC: 29.8 GM/DL — LOW (ref 32–36)
MCV RBC AUTO: 93.1 FL — SIGNIFICANT CHANGE UP (ref 80–100)
NRBC # BLD: 0 /100 WBCS — SIGNIFICANT CHANGE UP (ref 0–0)
PHOSPHATE SERPL-MCNC: 7.8 MG/DL — HIGH (ref 2.5–4.5)
PLATELET # BLD AUTO: 81 K/UL — LOW (ref 150–400)
POTASSIUM SERPL-MCNC: 4.9 MMOL/L — SIGNIFICANT CHANGE UP (ref 3.5–5.3)
POTASSIUM SERPL-SCNC: 4.9 MMOL/L — SIGNIFICANT CHANGE UP (ref 3.5–5.3)
PROT SERPL-MCNC: 5.2 G/DL — LOW (ref 6–8.3)
RBC # BLD: 3.06 M/UL — LOW (ref 3.8–5.2)
RBC # FLD: 20 % — HIGH (ref 10.3–14.5)
SODIUM SERPL-SCNC: 143 MMOL/L — SIGNIFICANT CHANGE UP (ref 135–145)
WBC # BLD: 3.54 K/UL — LOW (ref 3.8–10.5)
WBC # FLD AUTO: 3.54 K/UL — LOW (ref 3.8–10.5)

## 2024-09-20 PROCEDURE — 99232 SBSQ HOSP IP/OBS MODERATE 35: CPT

## 2024-09-20 RX ORDER — CALCIUM ACETATE 667 MG
2001 TABLET ORAL
Refills: 0 | Status: DISCONTINUED | OUTPATIENT
Start: 2024-09-20 | End: 2024-09-25

## 2024-09-20 RX ADMIN — Medication 100 MILLIGRAM(S): at 13:16

## 2024-09-20 RX ADMIN — Medication 1300 MILLIGRAM(S): at 13:14

## 2024-09-20 RX ADMIN — Medication 10 MILLIGRAM(S): at 09:21

## 2024-09-20 RX ADMIN — CHLORHEXIDINE GLUCONATE ORAL RINSE 1 APPLICATION(S): 1.2 SOLUTION DENTAL at 13:21

## 2024-09-20 RX ADMIN — Medication 12.5 MILLIGRAM(S): at 17:40

## 2024-09-20 RX ADMIN — Medication 2001 MILLIGRAM(S): at 13:16

## 2024-09-20 RX ADMIN — MULTI VITAMIN/MINERAL SUPPLEMENT WITH ASCORBIC ACID, NIACIN, PYRIDOXINE, PANTOTHENIC ACID, FOLIC ACID, RIBOFLAVIN, THIAMIN, BIOTIN, COBALAMIN AND ZINC. 1 TABLET(S): 60; 20; 12.5; 10; 10; 1.7; 1.5; 1; .3; .006 TABLET, COATED ORAL at 13:14

## 2024-09-20 RX ADMIN — Medication 1300 MILLIGRAM(S): at 21:53

## 2024-09-20 RX ADMIN — Medication 2001 MILLIGRAM(S): at 17:39

## 2024-09-20 RX ADMIN — Medication 1300 MILLIGRAM(S): at 06:36

## 2024-09-20 RX ADMIN — Medication 100 MILLIGRAM(S): at 21:53

## 2024-09-20 RX ADMIN — Medication 12.5 MILLIGRAM(S): at 06:36

## 2024-09-20 RX ADMIN — Medication 100 MILLIGRAM(S): at 06:36

## 2024-09-20 NOTE — CHART NOTE - NSCHARTNOTEFT_GEN_A_CORE
Chart reviewed.    Case and plan to be d/w Dr Michele.     A/P: 89yo female with PMH CKD, Colon cancer s/p resection, breast cancer, parathyroid cancer, HTN, thrombocytopenia presents with c/o SOB and generalized edema. Admitted for pleural effusions, worsening renal function, and colitis.     *Pleural effusions - R > L pl effusion - malignant vs transudate (from ESRD) effusion  *Acute respiratory failure 2/2 above   *MAGDI/CKD 5 pre-renal/hemodynamic vs CKD progression (Cr 6.39 - 6/10/24)  - CTA/P w. R renal atrophy, pancreas mass, colitis  - V/Q scan indeterminate, however less likely PE  - Appreciate Pulm recs- no plan for thoracentesis at this time  - Home Hydralazine and Amlodipine d/c'd  - Cont Metoprolol w.  hold parameters  - Nephrology recs appreciated- patient does not want dialysis   - Palliative care involvement appreciated- Accepted to home hospice     *Colitis - secondary to likely infectious etiology  - BldCxs: NGTD  - GI PCR neg   - Ceftriaxone and Flagyl x 7d course     *Pancytopenia in setting of CKD, history of breast cancer, colon cancer (previously on Keytruda), refractory anemia/MDS  *FOBT positive   - s/p 1 unit PRBC 9/13 however per nursing patient did not finish full unit  - s/p 1 unit of PRBC and Lasix after 9/14  - Heme onc recs appreciated  - No plans for further transfusions   - No plans for systemic chemotherapy   - Supportive care    *Acute DVT, below the knee  - Will hold off on AC for now given anemia and GOC  - Patient is declined IVC filter    GOC/Code Status: DNR/I.     Dispo: Accepted to home hospice. Pending 24 hour aids at home set up.   Daughter [place] [phone]- 9.20 Updated regarding pts current status and plan of care. Also followed up on status of 24H-HHA being set up- has reached out to agencies now awaiting response back.

## 2024-09-21 LAB
ANION GAP SERPL CALC-SCNC: 19 MMOL/L — HIGH (ref 5–17)
BUN SERPL-MCNC: 111 MG/DL — HIGH (ref 7–23)
CALCIUM SERPL-MCNC: 6.6 MG/DL — LOW (ref 8.4–10.5)
CHLORIDE SERPL-SCNC: 109 MMOL/L — HIGH (ref 96–108)
CO2 SERPL-SCNC: 17 MMOL/L — LOW (ref 22–31)
CREAT SERPL-MCNC: 9.99 MG/DL — HIGH (ref 0.5–1.3)
EGFR: 3 ML/MIN/1.73M2 — LOW
GLUCOSE SERPL-MCNC: 134 MG/DL — HIGH (ref 70–99)
HCT VFR BLD CALC: 24.8 % — LOW (ref 34.5–45)
HGB BLD-MCNC: 7.4 G/DL — LOW (ref 11.5–15.5)
MCHC RBC-ENTMCNC: 27.5 PG — SIGNIFICANT CHANGE UP (ref 27–34)
MCHC RBC-ENTMCNC: 29.8 GM/DL — LOW (ref 32–36)
MCV RBC AUTO: 92.2 FL — SIGNIFICANT CHANGE UP (ref 80–100)
NRBC # BLD: 0 /100 WBCS — SIGNIFICANT CHANGE UP (ref 0–0)
PLATELET # BLD AUTO: 64 K/UL — LOW (ref 150–400)
POTASSIUM SERPL-MCNC: 4.7 MMOL/L — SIGNIFICANT CHANGE UP (ref 3.5–5.3)
POTASSIUM SERPL-SCNC: 4.7 MMOL/L — SIGNIFICANT CHANGE UP (ref 3.5–5.3)
RBC # BLD: 2.69 M/UL — LOW (ref 3.8–5.2)
RBC # FLD: 20.2 % — HIGH (ref 10.3–14.5)
SODIUM SERPL-SCNC: 145 MMOL/L — SIGNIFICANT CHANGE UP (ref 135–145)
WBC # BLD: 2.61 K/UL — LOW (ref 3.8–10.5)
WBC # FLD AUTO: 2.61 K/UL — LOW (ref 3.8–10.5)

## 2024-09-21 RX ADMIN — Medication 100 MILLIGRAM(S): at 21:05

## 2024-09-21 RX ADMIN — Medication 2001 MILLIGRAM(S): at 18:05

## 2024-09-21 RX ADMIN — Medication 100 MILLIGRAM(S): at 13:36

## 2024-09-21 RX ADMIN — Medication 100 MILLIGRAM(S): at 06:04

## 2024-09-21 RX ADMIN — Medication 2001 MILLIGRAM(S): at 11:32

## 2024-09-21 RX ADMIN — Medication 12.5 MILLIGRAM(S): at 18:05

## 2024-09-21 RX ADMIN — Medication 2001 MILLIGRAM(S): at 09:55

## 2024-09-21 RX ADMIN — Medication 1300 MILLIGRAM(S): at 21:05

## 2024-09-21 RX ADMIN — Medication 1300 MILLIGRAM(S): at 06:05

## 2024-09-21 RX ADMIN — MULTI VITAMIN/MINERAL SUPPLEMENT WITH ASCORBIC ACID, NIACIN, PYRIDOXINE, PANTOTHENIC ACID, FOLIC ACID, RIBOFLAVIN, THIAMIN, BIOTIN, COBALAMIN AND ZINC. 1 TABLET(S): 60; 20; 12.5; 10; 10; 1.7; 1.5; 1; .3; .006 TABLET, COATED ORAL at 11:32

## 2024-09-21 RX ADMIN — CHLORHEXIDINE GLUCONATE ORAL RINSE 1 APPLICATION(S): 1.2 SOLUTION DENTAL at 11:33

## 2024-09-21 RX ADMIN — Medication 12.5 MILLIGRAM(S): at 06:06

## 2024-09-21 RX ADMIN — Medication 1300 MILLIGRAM(S): at 13:35

## 2024-09-21 NOTE — CHART NOTE - NSCHARTNOTEFT_GEN_A_CORE
Off Service NP Hospitalist Note  Case Discussed with Dr. Michele    labs and imaging reviewed    A/P: 88yo female with PMH CKD, Colon cancer s/p resection, breast cancer, parathyroid cancer, HTN, thrombocytopenia presents with c/o SOB and generalized edema. Admitted for pleural effusions, worsening renal function, and colitis.     *Pleural effusions - R > L pl effusion - malignant vs transudate (from ESRD) effusion  *Acute respiratory failure 2/2 above   *MAGDI/CKD 5 pre-renal/hemodynamic vs CKD progression (Cr 6.39 - 6/10/24)  - CTA/P w. R renal atrophy, pancreas mass, colitis  - V/Q scan indeterminate, however less likely PE  - Appreciate Pulm recs- no plan for thoracentesis at this time  - Home Hydralazine and Amlodipine d/c'd  - Cont Metoprolol w.  hold parameters  - Nephrology recs appreciated- patient does not want dialysis   - Palliative care involvement appreciated- Accepted to home hospice     *Colitis - secondary to likely infectious etiology  - BldCxs: NGTD  - GI PCR neg   - Ceftriaxone and Flagyl x 7d course     *Pancytopenia in setting of CKD, history of breast cancer, colon cancer (previously on Keytruda), refractory anemia/MDS  *FOBT positive   - s/p 1 unit PRBC 9/13 however per nursing patient did not finish full unit  - s/p 1 unit of PRBC and Lasix after 9/14  - Heme onc recs appreciated  - No plans for further transfusions   - No plans for systemic chemotherapy   - Supportive care    *Acute DVT, below the knee  - Will hold off on AC for now given anemia and GOC  - Patient is declined IVC filter    GOC/Code Status: DNR/I.     Dispo: Accepted to home hospice. Pending 24 hour aids at home set up.   Daughter [place] [phone]- 9.20 Updated regarding pts current status and plan of care. Also followed up on status of 24H-HHA being set up- has reached out to agencies now awaiting response back.

## 2024-09-22 LAB
ANION GAP SERPL CALC-SCNC: 19 MMOL/L — HIGH (ref 5–17)
BUN SERPL-MCNC: 113 MG/DL — HIGH (ref 7–23)
CALCIUM SERPL-MCNC: 6.8 MG/DL — LOW (ref 8.4–10.5)
CHLORIDE SERPL-SCNC: 109 MMOL/L — HIGH (ref 96–108)
CO2 SERPL-SCNC: 17 MMOL/L — LOW (ref 22–31)
CREAT SERPL-MCNC: 9.9 MG/DL — HIGH (ref 0.5–1.3)
EGFR: 3 ML/MIN/1.73M2 — LOW
GLUCOSE SERPL-MCNC: 154 MG/DL — HIGH (ref 70–99)
HCT VFR BLD CALC: 25.1 % — LOW (ref 34.5–45)
HGB BLD-MCNC: 7.7 G/DL — LOW (ref 11.5–15.5)
MCHC RBC-ENTMCNC: 28.4 PG — SIGNIFICANT CHANGE UP (ref 27–34)
MCHC RBC-ENTMCNC: 30.7 GM/DL — LOW (ref 32–36)
MCV RBC AUTO: 92.6 FL — SIGNIFICANT CHANGE UP (ref 80–100)
NRBC # BLD: 0 /100 WBCS — SIGNIFICANT CHANGE UP (ref 0–0)
PLATELET # BLD AUTO: 64 K/UL — LOW (ref 150–400)
POTASSIUM SERPL-MCNC: 4.8 MMOL/L — SIGNIFICANT CHANGE UP (ref 3.5–5.3)
POTASSIUM SERPL-SCNC: 4.8 MMOL/L — SIGNIFICANT CHANGE UP (ref 3.5–5.3)
RBC # BLD: 2.71 M/UL — LOW (ref 3.8–5.2)
RBC # FLD: 20.3 % — HIGH (ref 10.3–14.5)
SODIUM SERPL-SCNC: 145 MMOL/L — SIGNIFICANT CHANGE UP (ref 135–145)
WBC # BLD: 2.5 K/UL — LOW (ref 3.8–10.5)
WBC # FLD AUTO: 2.5 K/UL — LOW (ref 3.8–10.5)

## 2024-09-22 RX ADMIN — Medication 1300 MILLIGRAM(S): at 21:36

## 2024-09-22 RX ADMIN — Medication 100 MILLIGRAM(S): at 14:01

## 2024-09-22 RX ADMIN — Medication 100 MILLIGRAM(S): at 05:22

## 2024-09-22 RX ADMIN — Medication 1300 MILLIGRAM(S): at 05:21

## 2024-09-22 RX ADMIN — Medication 12.5 MILLIGRAM(S): at 17:59

## 2024-09-22 RX ADMIN — MULTI VITAMIN/MINERAL SUPPLEMENT WITH ASCORBIC ACID, NIACIN, PYRIDOXINE, PANTOTHENIC ACID, FOLIC ACID, RIBOFLAVIN, THIAMIN, BIOTIN, COBALAMIN AND ZINC. 1 TABLET(S): 60; 20; 12.5; 10; 10; 1.7; 1.5; 1; .3; .006 TABLET, COATED ORAL at 11:53

## 2024-09-22 RX ADMIN — Medication 100 MILLIGRAM(S): at 21:36

## 2024-09-22 RX ADMIN — CHLORHEXIDINE GLUCONATE ORAL RINSE 1 APPLICATION(S): 1.2 SOLUTION DENTAL at 14:01

## 2024-09-22 RX ADMIN — Medication 12.5 MILLIGRAM(S): at 08:27

## 2024-09-22 RX ADMIN — Medication 1300 MILLIGRAM(S): at 14:01

## 2024-09-22 RX ADMIN — Medication 2001 MILLIGRAM(S): at 11:53

## 2024-09-22 RX ADMIN — Medication 2001 MILLIGRAM(S): at 17:59

## 2024-09-22 RX ADMIN — Medication 2001 MILLIGRAM(S): at 08:27

## 2024-09-22 NOTE — CHART NOTE - NSCHARTNOTEFT_GEN_A_CORE
Off Service NP Hospitalist Note  Case discussed with Dr. Michele    labs and imaging reviewed    A/P: 88yo female with PMH CKD, Colon cancer s/p resection, breast cancer, parathyroid cancer, HTN, thrombocytopenia presents with c/o SOB and generalized edema. Admitted for pleural effusions, worsening renal function, and colitis.     *Pleural effusions - R > L pl effusion - malignant vs transudate (from ESRD) effusion  *Acute respiratory failure 2/2 above - now stable respiratory status  *MAGDI/CKD 5 pre-renal/hemodynamic vs CKD progression (Cr 6.39 - 6/10/24)  - CTA/P w. R renal atrophy, pancreas mass, colitis  - V/Q scan indeterminate, however less likely PE  - Appreciate Pulm recs- no plan for thoracentesis at this time  - Home Hydralazine and Amlodipine d/c'd  - Cont Metoprolol w.  hold parameters  - Nephrology recs appreciated- patient does not want dialysis   - Palliative care involvement appreciated- Accepted to home hospice     *Colitis   - BldCxs: NGTD  - GI PCR neg   - Ceftriaxone and Flagyl x 7d course     *Pancytopenia in setting of CKD, history of breast cancer, colon cancer (previously on Keytruda), refractory anemia/MDS  *FOBT positive   - s/p 1 unit PRBC 9/13 however per nursing patient did not finish full unit  - s/p 1 unit of PRBC and Lasix after 9/14  - Heme onc recs appreciated  - No plans for further transfusions   - No plans for systemic chemotherapy   - Supportive care    *Acute DVT, below the knee  - Will hold off on AC for now given anemia and GOC  - Patient is declined IVC filter    GOC/Code Status: DNR/I.     Dispo: Accepted to home hospice. Pending 24 hour aids at home set up.   Daughter [place] [phone]- 9.20 Updated regarding pts current status and plan of care. Also followed up on status of 24H-HHA being set up- has reached out to agencies now awaiting response back.

## 2024-09-23 DIAGNOSIS — C18.9 MALIGNANT NEOPLASM OF COLON, UNSPECIFIED: ICD-10-CM

## 2024-09-23 LAB
ANION GAP SERPL CALC-SCNC: 21 MMOL/L — HIGH (ref 5–17)
BUN SERPL-MCNC: 117 MG/DL — HIGH (ref 7–23)
CALCIUM SERPL-MCNC: 6.7 MG/DL — LOW (ref 8.4–10.5)
CHLORIDE SERPL-SCNC: 109 MMOL/L — HIGH (ref 96–108)
CO2 SERPL-SCNC: 13 MMOL/L — LOW (ref 22–31)
CREAT SERPL-MCNC: 10.17 MG/DL — HIGH (ref 0.5–1.3)
EGFR: 3 ML/MIN/1.73M2 — LOW
GLUCOSE SERPL-MCNC: 126 MG/DL — HIGH (ref 70–99)
HCT VFR BLD CALC: 24.9 % — LOW (ref 34.5–45)
HGB BLD-MCNC: 7.4 G/DL — LOW (ref 11.5–15.5)
MCHC RBC-ENTMCNC: 27.8 PG — SIGNIFICANT CHANGE UP (ref 27–34)
MCHC RBC-ENTMCNC: 29.7 GM/DL — LOW (ref 32–36)
MCV RBC AUTO: 93.6 FL — SIGNIFICANT CHANGE UP (ref 80–100)
NRBC # BLD: 0 /100 WBCS — SIGNIFICANT CHANGE UP (ref 0–0)
PLATELET # BLD AUTO: 58 K/UL — LOW (ref 150–400)
POTASSIUM SERPL-MCNC: 5 MMOL/L — SIGNIFICANT CHANGE UP (ref 3.5–5.3)
POTASSIUM SERPL-SCNC: 5 MMOL/L — SIGNIFICANT CHANGE UP (ref 3.5–5.3)
RBC # BLD: 2.66 M/UL — LOW (ref 3.8–5.2)
RBC # FLD: 20.5 % — HIGH (ref 10.3–14.5)
SODIUM SERPL-SCNC: 143 MMOL/L — SIGNIFICANT CHANGE UP (ref 135–145)
WBC # BLD: 2.4 K/UL — LOW (ref 3.8–10.5)
WBC # FLD AUTO: 2.4 K/UL — LOW (ref 3.8–10.5)

## 2024-09-23 PROCEDURE — 99232 SBSQ HOSP IP/OBS MODERATE 35: CPT

## 2024-09-23 RX ADMIN — Medication 100 MILLIGRAM(S): at 06:03

## 2024-09-23 RX ADMIN — CHLORHEXIDINE GLUCONATE ORAL RINSE 1 APPLICATION(S): 1.2 SOLUTION DENTAL at 11:58

## 2024-09-23 RX ADMIN — MULTI VITAMIN/MINERAL SUPPLEMENT WITH ASCORBIC ACID, NIACIN, PYRIDOXINE, PANTOTHENIC ACID, FOLIC ACID, RIBOFLAVIN, THIAMIN, BIOTIN, COBALAMIN AND ZINC. 1 TABLET(S): 60; 20; 12.5; 10; 10; 1.7; 1.5; 1; .3; .006 TABLET, COATED ORAL at 11:55

## 2024-09-23 RX ADMIN — Medication 1300 MILLIGRAM(S): at 14:11

## 2024-09-23 RX ADMIN — Medication 1300 MILLIGRAM(S): at 21:10

## 2024-09-23 RX ADMIN — Medication 2001 MILLIGRAM(S): at 11:55

## 2024-09-23 RX ADMIN — Medication 100 MILLIGRAM(S): at 21:10

## 2024-09-23 RX ADMIN — Medication 2001 MILLIGRAM(S): at 17:34

## 2024-09-23 RX ADMIN — Medication 1300 MILLIGRAM(S): at 06:03

## 2024-09-23 RX ADMIN — Medication 2001 MILLIGRAM(S): at 08:17

## 2024-09-23 RX ADMIN — Medication 12.5 MILLIGRAM(S): at 17:34

## 2024-09-23 RX ADMIN — Medication 100 MILLIGRAM(S): at 14:11

## 2024-09-23 NOTE — PROGRESS NOTE ADULT - PROBLEM SELECTOR PLAN 2
Patient with history of pancytopenia. Last BMB was done 8/23/2023: Bone marrow biopsy and bone marrow aspirate   - Normocellular marrow with trilineage hematopoiesis with maturation and increased iron stores.   -Karyotype: 46,XX[21]. FISH Result: NORMAL FISH - MDS PANEL. [facility] Advanced NGS Myeloid Panel  Final Report-RESULT SUMMARY: ABNORMAL-DETECTED GENOMIC ALTERATIONS:   Tier II: Variants of Potential Clinical Significance   [id number]  --has been treated supportively. Given PRN Procrit as renal disease contributing to her anemia. Transfuse PRBC for hemoglobin <7/related symptoms if in line with patient's GOC. Hospice noted.

## 2024-09-23 NOTE — CHART NOTE - NSCHARTNOTEFT_GEN_A_CORE
Off Service NP Hospitalist Note  Case Discussed with Dr. Michele     labs and imaging reviewed    A/P: 88yo female with PMH CKD, Colon cancer s/p resection, breast cancer, parathyroid cancer, HTN, thrombocytopenia presents with c/o SOB and generalized edema. Admitted for pleural effusions, worsening renal function, and colitis.     *Pleural effusions - R > L pl effusion - malignant vs transudate (from ESRD) effusion  *Acute respiratory failure 2/2 above - now stable respiratory status  *MAGDI/CKD 5 pre-renal/hemodynamic vs CKD progression (Cr 6.39 - 6/10/24)  - CTA/P w. R renal atrophy, pancreas mass, colitis  - V/Q scan indeterminate, however less likely PE  - Appreciate Pulm recs- no plan for thoracentesis at this time  - Home Hydralazine and Amlodipine d/c'd  - Cont Metoprolol w.  hold parameters  - Nephrology recs appreciated- patient does not want dialysis   - Palliative care involvement appreciated- Accepted to home hospice     *Colitis   - BldCxs: NGTD  - GI PCR neg   - Ceftriaxone and Flagyl x 7d course     *Pancytopenia in setting of CKD, history of breast cancer, colon cancer (previously on Keytruda), refractory anemia/MDS  *FOBT positive   - s/p 1 unit PRBC 9/13 however per nursing patient did not finish full unit  - s/p 1 unit of PRBC and Lasix after 9/14  - Heme onc recs appreciated  - No plans for further transfusions   - No plans for systemic chemotherapy   - Supportive care    *Acute DVT, below the knee  - Will hold off on AC for now given anemia and GOC  - Patient is declined IVC filter    GOC/Code Status: DNR/I.     Dispo: Accepted to home hospice. Pending 24 hour aids at home set up.   Daughter [place] [phone]- 9.20 Updated regarding pts current status and plan of care. Also followed up on status of 24H-HHA being set up- has reached out to agencies now awaiting response back.

## 2024-09-24 ENCOUNTER — APPOINTMENT (OUTPATIENT)
Dept: INFUSION THERAPY | Facility: HOSPITAL | Age: 89
End: 2024-09-24

## 2024-09-24 ENCOUNTER — APPOINTMENT (OUTPATIENT)
Dept: HEMATOLOGY ONCOLOGY | Facility: CLINIC | Age: 89
End: 2024-09-24

## 2024-09-24 PROCEDURE — 99232 SBSQ HOSP IP/OBS MODERATE 35: CPT

## 2024-09-24 RX ADMIN — Medication 1300 MILLIGRAM(S): at 15:05

## 2024-09-24 RX ADMIN — Medication 2001 MILLIGRAM(S): at 17:32

## 2024-09-24 RX ADMIN — MULTI VITAMIN/MINERAL SUPPLEMENT WITH ASCORBIC ACID, NIACIN, PYRIDOXINE, PANTOTHENIC ACID, FOLIC ACID, RIBOFLAVIN, THIAMIN, BIOTIN, COBALAMIN AND ZINC. 1 TABLET(S): 60; 20; 12.5; 10; 10; 1.7; 1.5; 1; .3; .006 TABLET, COATED ORAL at 11:42

## 2024-09-24 RX ADMIN — Medication 1300 MILLIGRAM(S): at 22:05

## 2024-09-24 RX ADMIN — Medication 12.5 MILLIGRAM(S): at 05:11

## 2024-09-24 RX ADMIN — CHLORHEXIDINE GLUCONATE ORAL RINSE 1 APPLICATION(S): 1.2 SOLUTION DENTAL at 11:43

## 2024-09-24 RX ADMIN — Medication 1300 MILLIGRAM(S): at 05:11

## 2024-09-24 RX ADMIN — Medication 2001 MILLIGRAM(S): at 11:41

## 2024-09-24 RX ADMIN — Medication 12.5 MILLIGRAM(S): at 17:32

## 2024-09-24 RX ADMIN — Medication 100 MILLIGRAM(S): at 05:11

## 2024-09-24 RX ADMIN — Medication 2001 MILLIGRAM(S): at 09:54

## 2024-09-24 NOTE — CHART NOTE - NSCHARTNOTEFT_GEN_A_CORE
Nutrition Follow Up Note  Hospital Course   (Per Electronic Medical Record)    Source:  Patient [X]  Family Member [X] Daughter  Nursing Staff [X]   Medical Record [X]      Diet: Diet, Regular:   DASH/TLC {Sodium & Cholesterol Restricted}  No Concentrated Phosphorus (09-16-24 @ 11:29) [Active]      At this time patient tolerating diet w/ adequate appetite/intake consuming % of meals. No issues w/ dentition or chewing and swallowing current diet texture. Denies N/V/C/D, last BM 9/21 per nursing flowsheets. Daughter states she will prepare meals after discharge, reports good understanding and acceptance of [facility] DASH/TLC, no concentrated phosphorus meal pattern.        Current Weight: 144.6lb on 9/13      Pertinent Medications: MEDICATIONS  (STANDING):  calcium acetate 2001 milliGRAM(s) Oral three times a day with meals  chlorhexidine 2% Cloths 1 Application(s) Topical daily  metoprolol tartrate 12.5 milliGRAM(s) Oral two times a day  Nephro-sage 1 Tablet(s) Oral daily  sodium bicarbonate 1300 milliGRAM(s) Oral three times a day    MEDICATIONS  (PRN):  acetaminophen     Tablet .. 650 milliGRAM(s) Oral every 6 hours PRN Temp greater or equal to 38C (100.4F), Mild Pain (1 - 3)  melatonin 3 milliGRAM(s) Oral at bedtime PRN Insomnia  ondansetron Injectable 4 milliGRAM(s) IV Push every 8 hours PRN Nausea and/or Vomiting      Pertinent Labs:  09-23 Na143 mmol/L Glu 126 mg/dL[H] K+ 5.0 mmol/L Cr  10.17 mg/dL[H]  mg/dL[H] 09-20 Phos 7.8 mg/dL[H] 09-20 Alb 2.1 g/dL[L]    Skin: No pressure injury per nursing flowsheets    Edema: +2 arm    Last Bowel Movement: on 9/21 Per nursing flowsheets     Estimated Needs:   [X] No Change Since Previous Assessment    Previous Nutrition Diagnosis:   Altered Nutrition Related Lab Values  related to ESRD  as evidence by need for HD however patient refusing     New Nutrition Diagnosis: [X] Not Applicable    Interventions:   1. Recommend continuing with current plan of care  Monitoring & Evaluation:   [X] Weights   [X] PO Intake   [X] Skin Integrity   [X] Follow Up (Per Protocol)  [X] Tolerance to Diet Prescription   [X] Other: Labs & Renal Indices     Registered Dietitian/Nutritionist Remains Available.  Annika Sim RD    Phone# [phone]

## 2024-09-24 NOTE — CHART NOTE - NSCHARTNOTEFT_GEN_A_CORE
Off Service NP Hospitalist Note  Case Discussed with Dr. Michele     labs and imaging reviewed    A/P: 88yo female with PMH CKD, Colon cancer s/p resection, breast cancer, parathyroid cancer, HTN, thrombocytopenia presents with c/o SOB and generalized edema. Admitted for pleural effusions, worsening renal function, and colitis.     *Pleural effusions - R > L pl effusion - malignant vs transudate (from ESRD) effusion  *Acute respiratory failure 2/2 above - now stable respiratory status  *MAGDI/CKD 5 pre-renal/hemodynamic vs CKD progression (Cr 6.39 - 6/10/24)  - CTA/P w. R renal atrophy, pancreas mass, colitis  - V/Q scan indeterminate, however less likely PE  - Appreciate Pulm recs- no plan for thoracentesis at this time  - Home Hydralazine and Amlodipine d/c'd  - Cont Metoprolol w.  hold parameters  - Nephrology recs appreciated- patient does not want dialysis   - Palliative care involvement appreciated- Accepted to home hospice     *Colitis   - BldCxs: NGTD  - GI PCR neg   - Ceftriaxone and Flagyl x 7d course     *Pancytopenia in setting of CKD, history of breast cancer, colon cancer (previously on Keytruda), refractory anemia/MDS  *FOBT positive   - s/p 1 unit PRBC 9/13 however per nursing patient did not finish full unit  - s/p 1 unit of PRBC and Lasix after 9/14  - Heme onc recs appreciated  - No plans for further transfusions   - No plans for systemic chemotherapy   - Supportive care    *Acute DVT, below the knee  - Will hold off on AC for now given anemia and GOC  - Patient is declined IVC filter    GOC/Code Status: DNR/I.     Dispo: Accepted to home hospice. Pending 24 hour aids at home set up.   Daughter [place] [phone]  Updated regarding pts current status and plan of care. Also followed up on status of 24H-HHA being set up- has reached out to agencies now awaiting response back. Off Service NP Hospitalist Note  Case Discussed with Dr. Michele     labs and imaging reviewed    A/P: 88yo female with PMH CKD, Colon cancer s/p resection, breast cancer, parathyroid cancer, HTN, thrombocytopenia presents with c/o SOB and generalized edema. Admitted for pleural effusions, worsening renal function, and colitis.     *Pleural effusions - R > L pl effusion - malignant vs transudate (from ESRD) effusion  *Acute respiratory failure 2/2 above - now stable respiratory status  *MAGDI/CKD 5 pre-renal/hemodynamic vs CKD progression (Cr 6.39 - 6/10/24)  - CTA/P w. R renal atrophy, pancreas mass, colitis  - V/Q scan indeterminate, however less likely PE  - Appreciate Pulm recs- no plan for thoracentesis at this time,  - Home Hydralazine and Amlodipine d/c'd  - Cont Metoprolol w.  hold parameters  - Nephrology recs appreciated- patient does not want dialysis   - Palliative care involvement appreciated- Accepted to home hospice     *Colitis   - BldCxs: NGTD  - GI PCR neg   - completed Ceftriaxone and Flagyl x 7d course     *Pancytopenia in setting of CKD, history of breast cancer, colon cancer (previously on Keytruda), refractory anemia/MDS  *FOBT positive   - s/p 1 unit PRBC 9/13 however per nursing patient did not finish full unit  - s/p 1 unit of PRBC and Lasix after 9/14  - Heme onc recs appreciated  - No plans for further transfusions   - No plans for systemic chemotherapy   - Supportive care    *Acute DVT, below the knee  - Will hold off on AC for now given anemia and GOC  - Patient is declined IVC filter    GOC/Code Status: DNR/I.     Dispo: Accepted to home hospice. Pending 24 hour aids at home set up.   Daughter [place] [phone]-  Updated regarding pts current status and plan of care. Also followed up on status of 24H-HHA set up for tomorrow

## 2024-09-24 NOTE — PROGRESS NOTE ADULT - ASSESSMENT
Anemia/MDS - patient is refusing epogen/transfusions. overal prognosis is poor. will continue supportive care/hospice  Stage 5 CRI - patient is refusing HD. overall prognosis is poor. will continue hospice and supportive care  HTN - BP is stable. will continue metoprolol  V Tach - patient is currently cardiac stable. she is refusing cardiac work up. will continue metoprolol  Colon cancer with mets - patient is refusing treatment. prognosis is poor. will continue supportive carre/hospice  Colitis - patient is GI stable. will continue antibiotics  Pleural effusions - patient is respiratory stable. she is refusing thorococentesis  LUE edema - improved. will continue warm soaks. patient is refusing work up  LLE DVT - patient is clinically stable  Weakness - stable
Anemia/leukopenia/MDS - H/H is improve s/p transfusion. will monitor CBC  Weakness improved  Stage 5 CRI - patient is refusing dialysis. overall prognosis is poor. will continue supportive care  LLE DVT - patient is asymptomatic and clinically stable. discussed IVC filter with patient and patient is refusing.   Colitis - patient is GI stable. blood cultures are negative to date. will continue antibiotics. GI is following  HTN - BP is stable. will continue Norvasc  Colon cancer with mets - overall prognosis is poor. will continue supportive care. patient refusing treatment  Pleural effusions - patient is currently respiratory stable. O2 saturation is 96% on 2 Liters N/C O2.  case discussed with Pulmonary MD. V/Q scan discussed with Pulmonary MD and recommends no AC in light of anemia. will await thoracocentesis by pulmonary MD      Case discussed with patient's brother/PA/pulmonary MD
Assessment     89 yr old woman with HTN, herpes zoster, CKD, multinodular thyroid ,breast cancer 2007, colon cancer 2011 (previously on Keytruda), refractory anemia/MDS, treated supportively and ambulatory (erythropoietin supplements), no recent exposure to systemic chemotherapy, is admitted with generalized weakness due to multiple reasons : CKD ,  severe anemia ( pancytopenia ). Pat denied sob but noted with moderate  pleural effusion R>L probable due to combination of malignancy and CKD .  There is also LE DVT . Since pat si relatively stable from a pulmonary point of view, no emergent thoracentesis indicated. Pat wish to discuss with family before consenting for thoracentesis .  Now DNR DNI .    Problem list  1. R > L pl effusion - ddx can be malifnant vs trasudate (from ESRD) effusion  2 Left Lower Extremity bellow knee DVT acute   3. Hypoxemia  4. Pancytopenia/MDS  5.     Plan  - high risk for a/c  - heme onc eval to consider a/c, high risk of bleeding as Plts< 100 an bun elevation. from pulm perspective may benefit from IVC filter if repeat US LE propogates  - n/c o2 to mainain sat and comfort  - Risks/ benefits/ alternatives discussed with Pt and family, will await decision   -   - Doppler + b/l DVT   - Continue O2 supp as needed to maintain sat >90%  - Transfuse  PRBC as needed to maintain H/H >7/21  - Hold off on anticoag at this time   - Monitor renal fx   - DNR DNI     case discussed with Dr Michele and palliative care 
Colitis - clinically resolved. will continue ABs  Stage 5 CRI - patient is refusing HD/ overall prognosis is poor. will continue supportive care/hospice  Anemia/MDS - H/H is stable. patient is refusing transfusions. overall prognosis is poor. will continue supportive care/hospice  Colon cancer with mets - overall prognosis is poor. patient is refusing treatment/work up. will continue supportive care/hospice  LLE DVT - patient is clinically stable.  HTN - BP is stable. will continue Norvasc  V tach - patient is cardiac stable . patient is refusing workup. will continue metoprolol  Weakness - improved  Bialteral pleural effusions - patient is asymptomatic. and O2 saturation is 98%on RA. patient is refusing thoracocentesis      Case discussed with NP
Stage 5 CRI - patient is refusing dialysis. overall prognosis is poor. patient is on hospice  Colitis - patient is GI stable. will continue antibiotics  HTN - BP is stable. will continue meds and monitor BP  Vtach - patient refusing work up. will continue metoprolol if BP/HR permits  Colon cancer with mets - overall prognosis. patient refusing workup/treatment. patient on hospice  Anemia/MDS - patient is hemodynamically stable. patient is refusing epogen/blood transfusions. overall prognosis is poor. patient is on hospice  FOB + - patient refusing work up  H/O diarrhea - stable.   Weakness - improved. family is arranging for 24 hour supervision  LLE DVT - patient asymptomatic and refusing treatment      Case discussed with /NP/patient's daughter
89F, PMHx HTN, herpes zoster, CKD, multinodular thyroid,breast cancer, colon cancer (previously on Keytruda), refractory anemia/MDS, treated supportively, no recent exposure to systemic chemotherapy, is admitted with generalized weakness. Patient daughter at bedside reports that she have been getting weaker and unable to ambulate as much. She is also complaining of sob and more generalized edema.  In the ED, patient was found to have anemia with hgb of 7.1 and worsening Cr of around 9.  Pt is refusing HD. She is agreeble to blood. She also had CT which shows nonspecific colitis,increasing right pleural effusion with underlying passive atelectasis with questionable pneumonia.  also suspicion for main and right intrahepatic portal vein bland or tumor thrombus, similar to prior. Pancreatic head/mesenteric mass with central necrosis, similar to prior.  She denies any fevers, chills, nausea or vomiting.  
A/P 89-year-old female with PMH of CKD, colon cancer s/p resection, breast cancer, parathyroid ca, HTN, thrombocytopenia presents to the emergency department for  shortness of breath,  generalized edema, and sob. pt was to found to have abnormal labs which includes anemia, worsening Cr, also  abnormal CT showing colilitis  and questionable pna as well as worsening pleural effusion          Assessment/ Plans:         worsening anemia secondary to carcinoma   worsening CKD    leukopenia    thrombocytopenia    history of breast cancer, colon cancer (previously on Keytruda)   refractory anemia/MDS   - S/p Transfusion - pt no longer wishes for transfusions   - Heme onc - consulted and follows   - Neph - consulted - pt does not want  HD     - Plans for home hospice      Colitis - secondary to likely infectious etiology  - Blood cultures negative   - Currently on IV ceftriaxone and flagyl - day 5        Palliative ;   as a f/u today , I reviewed chart , saw pt bedside today w/ daughter [place]. See [facility] note above.    Pt aware of her multiple dx's  including cancer that has spread, she does not wish for any further aggressive  interventions. She knows her time   is limited, and she prefers to spend that time at home w/ family, friends, and being comfortable. She and her daughter have  also spoken with her PMD about her decision .  Rec for home hospice accepted, will  place home hospice consult today , med team aware  .     
Anemia/MDS - patient is refusing blood transfusions/epogen. overall prognosis is poor. will continue supportive are/hospice  Stage 5 CRI - patient is refusing dialysis. overall prognosis is poor. will continue hospice/supportive care  HTN/V tach - BP is stable. patient is asymptomatic. will continue metoprolol. patient is refusing cardiac work up  Colon cancer with mets - patient is refusing treatment. will continue supportive care. overall prognosis poor. hospice care  Weakness - stable. patient's daughter has arranged for 24 hour supervision for the patient  Colitis - patient is clinically stable      Case discussed with NP/patient's daughter  Called patient's son message left
Physical Examination:  GENERAL:               Alert, Oriented, no acute distress.    HEENT:                   No JVD, Dry MM  PULM:                     Bilateral air entry, dimminished to auscultation bilaterally, no significant sputum production, + Rales, No Rhonchi, No Wheezing  CVS:                         S1, S2,  +Murmur  ABD:                        Soft, nondistended, nontender, normoactive bowel sounds,   EXT:                         +edema, nontender, No Cyanosis or Clubbing    NEURO:                  Alert, oriented, interactive, nonfocal, follows commands  PSYC:                      Calm, + Insight.    Assessment     89 yr old woman with HTN, herpes zoster, CKD, multinodular thyroid ,breast cancer 2007, colon cancer 2011 (previously on Keytruda), refractory anemia/MDS, treated supportively and ambulatory (erythropoietin supplements), no recent exposure to systemic chemotherapy, is admitted with generalized weakness due to multiple reasons : CKD ,  severe anemia ( pancytopenia ). Pat denied sob but noted with moderate  pleural effusion R>L probable due to combination of malignancy and CKD .  There is also LE DVT . Since pat si relatively stable from a pulmonary point of view, no emergent thoracentesis indicated. Pat wish to discuss with family before consenting for thoracentesis .  Now DNR DNI .    Problem list  1. R > L pl effusion - ddx can be malifnant vs trasudate (from ESRD) effusion  2 Left Lower Extremity bellow knee DVT acute   3. Hypoxemia  4. Pancytopenia/MDS       Plan  - high risk for a/c,  - n/c o2 PRN to maintain sat and comfort  - no acute intervention to drain fluid as patient wants to have limited interventions  - Continue O2 PRN supp as needed to maintain sat >90%     - Monitor renal fx   - DNR DNI   - Continue supportive care  - 
Stage 5 CRI - patient is refusing HD. overall prognosis is poor. will continue supportive care/hospice  MDS/anemia - Hg is stable. patient. patient is refusing epogen/blood transfusions  Colitis - patient is stable. patient on ABs  Colon cancer with mets - patient is refusing treatment. overall prognosis is poor. will continue hospice/supportive care  LLE DVT - patient is asymptomatic. patient is refusing IVC filter and will refrain from AC in light of history of anemia/FOB and refusal of transfusions  Left arm swelling - patient is refusing diagnostic testing. will apply warm soaks to left upper extremity  Bilateral pleural effusions - patient is respiratory stable. O2 saturation is 94% on RA. patient is refusing thorococentesis  HTN - BP is stable. will continue metoprolol  Weakness - stable. will continue supportive care  V Tach - patient is cardiac stable. will continue metoprolol      Case discussed with patient's daughter
Stage 5 CRI - patient is refusing dialysis. will continue supportive care and present management. overall prognosis is poor  Colitis - patient is GI stable and clinically improved. will continue antibiotics  Colon cancer with mets - overall prognosis is poor. will continue supportive care  Pleural effusions - patient is respiratory stable and has an O2 saturation of 98% on 2 Liters of N/C O2. discussed thoracocentesis with Pulmonary MD and no thoracocentesis at this time in light of risk of bleeding and patient is currently asymptomatic  LLE DVT - patient is clinically stable. no A/C in light of frequent low H/H. patient is refusing IVC filter  MDS/anemia/leukopenia - H/H is stable. will continue to monitor CBC  HTN BP is stable. will continue Norvasc    Case discussed with Pulmonary/NP
MDS/anemia - patient s/p transfusion. will follow H/H  Colon cancer with metastatic disease - overall prognosis is poor. Heme/bnc is following  HTN - BP is stable. will continue Norvasc and monitor BP  Colitis - patient is GI stable. will continue antibiotics. GI is following  ?PE - patient is respiratory stable. Pulmonary is following  DVT LLE - patient is asymptomatic. no AC in light of anemia. will get vascular surgery consult  Pleural effusions - consider thoracocentesis. Pulmonary is following  Stage 5 CRI - patient is refusing dialysis. nephrology is following      Case discussed with patient's daughter/PA
Stage 5 CRI -  patient is refusing dialysis. patient is refusing dialysis. patient is requesting hospice care and family agrees  Colitis - patient is GI stable. will continue antibiotics  Anemia/MDS - H/H improved s/p transfusion. patient is refusing additional  transfusions and is requesting hospice care  HTN/arrythmia - patient with an episode of asymptomatic V tach. patient is refusing cardiac workup. patient is requesting hospice care. will start beta blocker as pulse and BP permits  LLE DVT - patient is clinically stable and is refusing IVC filter. patient has history of low H/H requiring epogen/transfusions and is not a candidate for AC  Colon cancer with mets - overall prognosis is poor. will continue supportive care. patient is requesting hospice  Pleural effusions - patient is respiratory stable. her O2 saturation is 96% on 2 Liter N/.C O2. patient is refusing thoracocentesis  Weakness - improved    Case discussed with Palliative care/NP/patient's daughter
Stage 5 CRI - patient is refusing HD and overall prognosis is poor. will continue supportive care/hospice  Anemia/MDS - patient is hemodynamically stable.  Colitis - resolved. will discontinue antibiotics  HTN/V tach- BP is stable. patient is refusing cardiac wok up. will continue metoprolol  Colon cancer with mets - patient is refusing treatment. overall prognosis is poor. will continue hospice/supportive care  Weakness - patient is to have 24 hour home assistance. will continue supportive care  LUE edema - improved. will continue warm soaks. patient is refusing work up  LLE DVT - patient is asymptomatic.      Case discussed with /PA
Stage 5 CRI - patient is refusing HD. overall prognosis is poor. will continue present management/hospice  MDS/anemia - patient is hemodynamically stable. patient is refusing epogen/blood transfusions. overall prognosis is poor. will continue hospice/supportive care  HTN/V tach - BP is stable. patient is refusing workup for arrythmia. will continue metoprolol/supportive care  Colon cancer with mets - patient is refusing work up. overall prognosis is poor. will continue supportive care/hospice  Weakness - stable. patient and family advised 24 hour assistance at home. family is arranging for home assistance   LLE DVT - patient is asymptomatic and clinically stable  LUE edema - improved. will continue warm soaks. patient is refusing work up      Case discussed with NP/patient's daughter
89F, PMHx HTN, herpes zoster, CKD, multinodular thyroid,breast cancer, colon cancer (previously on Keytruda), refractory anemia/MDS, treated supportively and ambulatory (erythropoietin supplements), no recent exposure to systemic chemotherapy, is admitted with generalized weakness. GI consulted for Colitis- loose BMs without any blood and nauseous.     Pt seen at the bedside. No significant events over night. Continues to have loose stools. Currently on antibiotics. 
89F, PMHx HTN, herpes zoster, CKD, multinodular thyroid,breast cancer, colon cancer (previously on Keytruda), refractory anemia/MDS, treated supportively, no recent exposure to systemic chemotherapy, is admitted with generalized weakness. Patient daughter at bedside reports that she have been getting weaker and unable to ambulate as much. She is also complaining of sob and more generalized edema.  In the ED, patient was found to have anemia with hgb of 7.1 and worsening Cr of around 9.  Pt is refusing HD. She is agreeble to blood. She also had CT which shows nonspecific colitis,increasing right pleural effusion with underlying passive atelectasis with questionable pneumonia.  also suspicion for main and right intrahepatic portal vein bland or tumor thrombus, similar to prior. Pancreatic head/mesenteric mass with central necrosis, similar to prior.  She denies any fevers, chills, nausea or vomiting.  
Physical Examination:  GENERAL:               Alert, Oriented, no acute distress.    HEENT:                   No JVD, Dry MM  PULM:                     Bilateral air entry, dimminished to auscultation bilaterally, no significant sputum production, + Rales, No Rhonchi, No Wheezing  CVS:                         S1, S2,  +Murmur  ABD:                        Soft, nondistended, nontender, normoactive bowel sounds,   EXT:                         +edema, nontender, No Cyanosis or Clubbing    NEURO:                  Alert, oriented, interactive, nonfocal, follows commands  PSYC:                      Calm, + Insight.    Assessment     89 yr old woman with HTN, herpes zoster, CKD, multinodular thyroid ,breast cancer 2007, colon cancer 2011 (previously on Keytruda), refractory anemia/MDS, treated supportively and ambulatory (erythropoietin supplements), no recent exposure to systemic chemotherapy, is admitted with generalized weakness due to multiple reasons : CKD ,  severe anemia ( pancytopenia ). Pat denied sob but noted with moderate  pleural effusion R>L probable due to combination of malignancy and CKD .  There is also LE DVT . Since pat si relatively stable from a pulmonary point of view, no emergent thoracentesis indicated. Pat wish to discuss with family before consenting for thoracentesis .  Now DNR DNI .    Problem list  1. R > L pl effusion - ddx can be malifnant vs trasudate (from ESRD) effusion  2 Left Lower Extremity bellow knee DVT acute   3. Hypoxemia  4. Pancytopenia/MDS       Plan  - high risk for a/c,  - n/c o2 PRN to maintain sat and comfort  - no acute intervention to drain fluid as patient wants to have limited interventions  - Continue O2 PRN supp as needed to maintain sat >90%     - Monitor renal fx   - DNR DNI   - Continue supportive care  - 
Physical Examination:  GENERAL:               Alert, Oriented, no acute distress.    HEENT:                   No JVD, Dry MM  PULM:                     Bilateral air entry, dimminished to auscultation bilaterally, no significant sputum production, + Rales, No Rhonchi, No Wheezing  CVS:                         S1, S2,  +Murmur  ABD:                        Soft, nondistended, nontender, normoactive bowel sounds,   EXT:                         +edema, nontender, No Cyanosis or Clubbing    NEURO:                  Alert, oriented, interactive, nonfocal, follows commands  PSYC:                      Calm, + Insight.    Assessment     89 yr old woman with HTN, herpes zoster, CKD, multinodular thyroid ,breast cancer 2007, colon cancer 2011 (previously on Keytruda), refractory anemia/MDS, treated supportively and ambulatory (erythropoietin supplements), no recent exposure to systemic chemotherapy, is admitted with generalized weakness due to multiple reasons : CKD ,  severe anemia ( pancytopenia ). Pat denied sob but noted with moderate  pleural effusion R>L probable due to combination of malignancy and CKD .  There is also LE DVT . Since pat si relatively stable from a pulmonary point of view, no emergent thoracentesis indicated. Pat wish to discuss with family before consenting for thoracentesis .  Now DNR DNI .    Problem list  1. R > L pl effusion - ddx can be malifnant vs trasudate (from ESRD) effusion  2 Left Lower Extremity bellow knee DVT acute   3. Hypoxemia  4. Pancytopenia/MDS  5.     Plan  - high risk for a/c  - n/c o2 to mainain sat and comfort  - patient does not want thoracentesis   - Continue O2 supp as needed to maintain sat >90%  - Transfuse  PRBC as needed to maintain H/H >7/21  - Hold off on anticoag at this time   - Monitor renal fx   - DNR DNI   
89F, PMHx HTN, herpes zoster, CKD, multinodular thyroid,breast cancer, colon cancer (previously on Keytruda), refractory anemia/MDS, treated supportively and ambulatory (erythropoietin supplements), no recent exposure to systemic chemotherapy, is admitted with generalized weakness.     GI consulted for Colitis- loose BMs without any blood and nauseous.

## 2024-09-24 NOTE — PROGRESS NOTE ADULT - PROBLEM SELECTOR PROBLEM 1
Colon cancer
MDS (myelodysplastic syndrome)
MDS (myelodysplastic syndrome)
Colitis
MDS (myelodysplastic syndrome)
MDS (myelodysplastic syndrome)
Colitis
MDS (myelodysplastic syndrome)
Colon cancer

## 2024-09-24 NOTE — PROGRESS NOTE ADULT - PROBLEM SELECTOR PLAN 1
Patient with MDS on a background of multiple comorbidities including colon cancer, breast cancer.  She is transfusion dependent, currently not treated with systemic chemotherapy and declining hemodialysis.  Hematologic profile stable with fluctuant hemoglobin and platelet count, slightly improved WBC.  Continues antibiotics for colitis.  Comfort care reasonable.
Recommendations: -infectious vs, inflammatory  -hx of colon CA   -Blood cultures/follow-up- negative  -Continue on IV antibiotics  -Diet as tolerated.  -Trend BMs  -Stool studies recommended including C. Diff panel
History of MDS, currently with guaiac positive presentation, treated supportively.  Hemoglobin at 8.3 g/dL after PRBC.  Patient is a candidate for hemodialysis; followed by nephrology.  No plans for systemic chemotherapy, but brother will continue transfusion support and CASH.  GI evaluation for guaiac positive stools reasonable.
History of breast cancer and colon cancer, no longer a systemic treatment candidate given comorbidities, admitted with pancytopenia and acute on chronic renal failure, declining hemodialysis.  Last bone marrow in August 2023 consistent with MDS.  Patient is transfusion dependent.  CT results c/w mild to moderate right pleural, small left pleural effusion and mild   pericardial effusion. No evidence of pulmonary edema. Anemia likely exacerbated by worsening renal function.  Hematologic surveillance recommended.
Complex medical history including colon cancer, breast cancer and MDS, portal vein thrombosis, transfusion dependent, treated supportively in ambulatory.  Presently admitted with pancytopenia characteristic of MDS, chronic.  Hemoglobin at 6.3 g/dL this improved after PRBC to 8.5 g/dL today.  Patient refused hemodialysis despite worsening renal failure.  Will continue transfusion support and monitoring CBC, watch for fever or active bleeding.  Questions answered.
Complex medical history including colon cancer, breast cancer and MDS, transfusion dependent, treated supportively and ambulatory.  Presently admitted with pancytopenia and guaiac positive stools.  Hemoglobin between 7 and 8 g/dL.  Patient transfusion dependent, declining hemodialysis.  Continue transfusion support.  Treat conservatively, per patient's preference.  Social work in progress.
Patient with h/o colon cancer with known intra-abdominal mass. Last systemic therapy of Keytruda 6/14/22. Has opted for supportive care in this regard due to comorbidities/age. No related pain/symptoms currently.
Recommendations: -infectious vs, inflammatory  -hx of colon CA   -Blood cultures/follow-up- negative  -Continue on IV antibiotics  -Diet as tolerated.  -Trend BMs  GI PCR negative
Patient with h/o colon cancer with known intra-abdominal mass. Last systemic therapy of Keytruda 6/14/22. Has opted for supportive care in this regard due to comorbidities/age. No related pain/symptoms currently.

## 2024-09-24 NOTE — PROGRESS NOTE ADULT - PROBLEM SELECTOR PLAN 2
Patient with history of pancytopenia. Last BMB was done 8/23/2023: Bone marrow biopsy and bone marrow aspirate   - Normocellular marrow with trilineage hematopoiesis with maturation and increased iron stores.   -Karyotype: 46,XX[21]. FISH Result: NORMAL FISH - MDS PANEL. [employer] Advanced NGS Myeloid Panel  Final Report-RESULT SUMMARY: ABNORMAL-DETECTED GENOMIC ALTERATIONS:   Tier II: Variants of Potential Clinical Significance   [id number]  --has been treated supportively. Given PRN Procrit as renal disease contributing to her anemia. Transfuse PRBC for hemoglobin <7/related symptoms if in line with patient's GOC.   Hospice plans noted-patient's wishes.  Please  re-call if further questions for us.  Thank you.

## 2024-09-24 NOTE — PROGRESS NOTE ADULT - REASON FOR ADMISSION
Anemia 2nd MDS
MDS
MDS
sob
weakness
SOB
sob
Generalized weakness
Generalized weakness
Anemia 2nd MDS
anemia; MDS
weakness
weakness

## 2024-09-25 ENCOUNTER — TRANSCRIPTION ENCOUNTER (OUTPATIENT)
Age: 89
End: 2024-09-25

## 2024-09-25 VITALS
HEART RATE: 63 BPM | DIASTOLIC BLOOD PRESSURE: 63 MMHG | SYSTOLIC BLOOD PRESSURE: 155 MMHG | RESPIRATION RATE: 18 BRPM | TEMPERATURE: 97 F | OXYGEN SATURATION: 97 %

## 2024-09-25 RX ORDER — CALCIUM ACETATE 667 MG
3 TABLET ORAL
Qty: 270 | Refills: 0
Start: 2024-09-25 | End: 2024-10-24

## 2024-09-25 RX ORDER — AMLODIPINE BESYLATE 5 MG
1 TABLET ORAL
Qty: 0 | Refills: 0 | DISCHARGE
Start: 2024-09-25

## 2024-09-25 RX ORDER — METOPROLOL TARTRATE 50 MG
0.5 TABLET ORAL
Qty: 30 | Refills: 0
Start: 2024-09-25 | End: 2024-10-24

## 2024-09-25 RX ORDER — HEPARIN SOD,PORCINE/0.9 % NACL 10 UNIT/ML
300 KIT INTRAVENOUS ONCE
Refills: 0 | Status: COMPLETED | OUTPATIENT
Start: 2024-09-25 | End: 2024-09-25

## 2024-09-25 RX ADMIN — Medication 2001 MILLIGRAM(S): at 11:57

## 2024-09-25 RX ADMIN — Medication 1300 MILLIGRAM(S): at 05:41

## 2024-09-25 RX ADMIN — Medication 12.5 MILLIGRAM(S): at 05:41

## 2024-09-25 RX ADMIN — Medication 2001 MILLIGRAM(S): at 08:28

## 2024-09-25 RX ADMIN — Medication 300 UNIT(S): at 12:17

## 2024-09-25 RX ADMIN — MULTI VITAMIN/MINERAL SUPPLEMENT WITH ASCORBIC ACID, NIACIN, PYRIDOXINE, PANTOTHENIC ACID, FOLIC ACID, RIBOFLAVIN, THIAMIN, BIOTIN, COBALAMIN AND ZINC. 1 TABLET(S): 60; 20; 12.5; 10; 10; 1.7; 1.5; 1; .3; .006 TABLET, COATED ORAL at 11:56

## 2024-09-25 RX ADMIN — CHLORHEXIDINE GLUCONATE ORAL RINSE 1 APPLICATION(S): 1.2 SOLUTION DENTAL at 11:56

## 2024-09-25 NOTE — DISCHARGE NOTE NURSING/CASE MANAGEMENT/SOCIAL WORK - PATIENT PORTAL LINK FT
You can access the FollowMyHealth Patient Portal offered by [facility] by registering at the following website: [email or web address]. By joining [employer]’s FollowMyHealth portal, you will also be able to view your health information using other applications (apps) compatible with our [email or web address]

## 2024-09-25 NOTE — PROGRESS NOTE ADULT - SUBJECTIVE AND OBJECTIVE BOX
DEION MADERAMELA  89y  Female      patient states she is feeling uch better. she denies abdominal pain/cough/SOB. patient states weakness improved    REVIEW OF SYSTEMS:    Cardiac HTN  Pulmonary pleural effusion  GI colitis/colon cancer with mets   CRI no dysuria/hematuria  Neuro no headache/dizziness/numbness    FAMILY HISTORY:  FH: colon cancer (Sibling, Sibling)    FH: breast cancer (Sibling)      T(C): 36.1 (09-16-24 @ 12:21), Max: 36.3 (09-16-24 @ 05:05)  HR: 74 (09-16-24 @ 12:21) (72 - 74)  BP: 116/63 (09-16-24 @ 12:21) (116/63 - 152/61)  RR: 17 (09-16-24 @ 12:21) (17 - 17)  SpO2: 98% (09-16-24 @ 12:21) (95% - 98%)  Wt(kg): --Vital Signs Last 24 Hrs  T(C): 36.1 (16 Sep 2024 12:21), Max: 36.3 (16 Sep 2024 05:05)  T(F): 97 (16 Sep 2024 12:21), Max: 97.4 (16 Sep 2024 05:05)  HR: 74 (16 Sep 2024 12:21) (72 - 74)  BP: 116/63 (16 Sep 2024 12:21) (116/63 - 152/61)  BP(mean): --  RR: 17 (16 Sep 2024 12:21) (17 - 17)  SpO2: 98% (16 Sep 2024 12:21) (95% - 98%)    Parameters below as of 16 Sep 2024 12:21  Patient On (Oxygen Delivery Method): nasal cannula  O2 Flow (L/min): 2    No Known Allergies      PHYSICAL EXAM:    General NAD no respiratory distrress  Neck thyroid stable no JVD  Heart regular  Lungs decreased BS at bilateral bases  Abdomen non tender non distended normal bowel sounds no HSM/CVAT  Extremities nontender negative homans/cord sign +pulses no edema  Neurologic alert and oriented x 3 no acute focal changes  Skin      Consultant(s) Notes Reviewed:  [x ] YES  [ ] NO  Care Discussed with Consultants/Other Providers [ x] YES  [ ] NO    LABS:  CBC Full  -  ( 16 Sep 2024 07:09 )  WBC Count : 2.12 K/uL  RBC Count : 3.06 M/uL  Hemoglobin : 8.5 g/dL  Hematocrit : 28.3 %  Platelet Count - Automated : 64 K/uL  Mean Cell Volume : 92.5 fl  Mean Cell Hemoglobin : 27.8 pg  Mean Cell Hemoglobin Concentration : 30.0 gm/dL  Auto Neutrophil # : x  Auto Lymphocyte # : x  Auto Monocyte # : x  Auto Eosinophil # : x  Auto Basophil # : x  Auto Neutrophil % : x  Auto Lymphocyte % : x  Auto Monocyte % : x  Auto Eosinophil % : x  Auto Basophil % : x                          8.5    2.12  )-----------( 64       ( 16 Sep 2024 07:09 )             28.3     09-16    143  |  109<H>  |  105<H>  ----------------------------<  119<H>  4.5   |  13<L>  |  9.29<H>    Ca    6.9<L>      16 Sep 2024 07:09  Phos  7.7     09-16  Mg     1.9     09-16    TPro  4.8<L>  /  Alb  1.9<L>  /  TBili  0.4  /  DBili  x   /  AST  14  /  ALT  <6<L>  /  AlkPhos  59  09-16    LIVER FUNCTIONS - ( 16 Sep 2024 07:09 )  Alb: 1.9 g/dL / Pro: 4.8 g/dL / ALK PHOS: 59 U/L / ALT: <6 U/L / AST: 14 U/L / GGT: x             Urinalysis Basic - ( 16 Sep 2024 07:09 )    Color: x / Appearance: x / SG: x / pH: x  Gluc: 119 mg/dL / Ketone: x  / Bili: x / Urobili: x   Blood: x / Protein: x / Nitrite: x   Leuk Esterase: x / RBC: x / WBC x   Sq Epi: x / Non Sq Epi: x / Bacteria: x    Culture - Blood (09.13.24 @ 14:00)   Specimen Source: .Blood Blood-Peripheral  Culture Results:   No growth at 48 Hours    Culture - Blood (09.13.24 @ 14:00)   Specimen Source: .Blood Blood-Peripheral  Culture Results:   No growth at 48 Hours          acetaminophen     Tablet .. 650 milliGRAM(s) Oral every 6 hours PRN  amLODIPine   Tablet 10 milliGRAM(s) Oral daily  calcium acetate 1334 milliGRAM(s) Oral three times a day with meals  cefTRIAXone   IVPB 1000 milliGRAM(s) IV Intermittent every 24 hours  chlorhexidine 2% Cloths 1 Application(s) Topical daily  epoetin peyton-epbx (RETACRIT) Injectable 58509 Unit(s) SubCutaneous once  melatonin 3 milliGRAM(s) Oral at bedtime PRN  metroNIDAZOLE  IVPB 500 milliGRAM(s) IV Intermittent every 8 hours  metroNIDAZOLE  IVPB      Nephro-sage 1 Tablet(s) Oral daily  ondansetron Injectable 4 milliGRAM(s) IV Push every 8 hours PRN  sodium bicarbonate 1300 milliGRAM(s) Oral three times a day      
  HANG MADERA  89y  Female    patient is resting comfortably. no complaints of pain/SOB      REVIEW OF SYSTEMS:    Cardiac HTN/V tach  Pulmonary pleural effusions  GI colon cancer with mets/colitis   CRI no hematuria/dysuria  Neuro no headache/numbness/dizziness  Heme anemia/MDS    FAMILY HISTORY:  FH: colon cancer (Sibling, Sibling)    FH: breast cancer (Sibling)      T(C): 36.3 (09-23-24 @ 12:06), Max: 36.6 (09-23-24 @ 05:00)  HR: 57 (09-23-24 @ 12:06) (55 - 57)  BP: 163/65 (09-23-24 @ 12:06) (152/55 - 163/65)  RR: 18 (09-23-24 @ 12:06) (18 - 18)  SpO2: 97% (09-23-24 @ 12:06) (96% - 99%)  Wt(kg): --Vital Signs Last 24 Hrs  T(C): 36.3 (23 Sep 2024 12:06), Max: 36.6 (23 Sep 2024 05:00)  T(F): 97.4 (23 Sep 2024 12:06), Max: 97.8 (23 Sep 2024 05:00)  HR: 57 (23 Sep 2024 12:06) (55 - 57)  BP: 163/65 (23 Sep 2024 12:06) (152/55 - 163/65)  BP(mean): --  RR: 18 (23 Sep 2024 12:06) (18 - 18)  SpO2: 97% (23 Sep 2024 12:06) (96% - 99%)    Parameters below as of 23 Sep 2024 12:06  Patient On (Oxygen Delivery Method): room air      No Known Allergies      PHYSICAL EXAM:    General NAD  Neck thyroid stable no JVD  Heart regular  Lungs stable decreased bibasilar BS  Abdomen non tender non distended normal bowel sounds no HSM/CVAT  Extremities decreased LUE edema with no tenderness BLE non tender +pulses  Neurologic alert and oriented x 3 no acute focal changes        Consultant(s) Notes Reviewed:  [x ] YES  [ ] NO      LABS:  CBC Full  -  ( 23 Sep 2024 06:00 )  WBC Count : 2.40 K/uL  RBC Count : 2.66 M/uL  Hemoglobin : 7.4 g/dL  Hematocrit : 24.9 %  Platelet Count - Automated : 58 K/uL  Mean Cell Volume : 93.6 fl  Mean Cell Hemoglobin : 27.8 pg  Mean Cell Hemoglobin Concentration : 29.7 gm/dL  Auto Neutrophil # : x  Auto Lymphocyte # : x  Auto Monocyte # : x  Auto Eosinophil # : x  Auto Basophil # : x  Auto Neutrophil % : x  Auto Lymphocyte % : x  Auto Monocyte % : x  Auto Eosinophil % : x  Auto Basophil % : x                          7.4    2.40  )-----------( 58       ( 23 Sep 2024 06:00 )             24.9     09-23    143  |  109[H]  |  117[H]  ----------------------------<  126[H]  5.0   |  13[L]  |  10.17[H]    Ca    6.7[L]      23 Sep 2024 05:38          Urinalysis Basic - ( 23 Sep 2024 05:38 )    Color: x / Appearance: x / SG: x / pH: x  Gluc: 126 mg/dL / Ketone: x  / Bili: x / Urobili: x   Blood: x / Protein: x / Nitrite: x   Leuk Esterase: x / RBC: x / WBC x   Sq Epi: x / Non Sq Epi: x / Bacteria: x        acetaminophen     Tablet .. 650 milliGRAM(s) Oral every 6 hours PRN  calcium acetate 2001 milliGRAM(s) Oral three times a day with meals  chlorhexidine 2% Cloths 1 Application(s) Topical daily  melatonin 3 milliGRAM(s) Oral at bedtime PRN  metoprolol tartrate 12.5 milliGRAM(s) Oral two times a day  metroNIDAZOLE  IVPB 500 milliGRAM(s) IV Intermittent every 8 hours  metroNIDAZOLE  IVPB      Nephro-sage 1 Tablet(s) Oral daily  ondansetron Injectable 4 milliGRAM(s) IV Push every 8 hours PRN  sodium bicarbonate 1300 milliGRAM(s) Oral three times a day          
INTERVAL HPI/OVERNIGHT EVENTS:  GI Progress Note:  GI was consulted for colitis. Pt was seen and examined at the bedside. Denies any other s/s including dizziness, lightheadedness, vomiting, constipation, blood in the stool. CT abdomen/pelvis showed- Nonspecific colitis, either infectious or inflammatory.  No significant events overnight.       MEDICATIONS  (STANDING):  amLODIPine   Tablet 10 milliGRAM(s) Oral daily  calcium acetate 667 milliGRAM(s) Oral three times a day with meals  cefTRIAXone   IVPB 1000 milliGRAM(s) IV Intermittent every 24 hours  dextrose 5% 1000 milliLiter(s) (60 mL/Hr) IV Continuous <Continuous>  metroNIDAZOLE  IVPB      metroNIDAZOLE  IVPB 500 milliGRAM(s) IV Intermittent every 8 hours  Nephro-sage 1 Tablet(s) Oral daily    MEDICATIONS  (PRN):  acetaminophen     Tablet .. 650 milliGRAM(s) Oral every 6 hours PRN Temp greater or equal to 38C (100.4F), Mild Pain (1 - 3)  melatonin 3 milliGRAM(s) Oral at bedtime PRN Insomnia  ondansetron Injectable 4 milliGRAM(s) IV Push every 8 hours PRN Nausea and/or Vomiting      Allergies  No Known Allergies    Intolerances    PAST MEDICAL & SURGICAL HISTORY:  History of breast cancer  right breast  Anemia  Hypertension  History of thrombocytopenia  History of herpes zoster  History of malignant neoplasm of parathyroid gland  Colon cancer  Chronic renal insufficiency  Thyroid nodule  Diabetes mellitus  NIDDM  History of cholecystectomy  History of partial colectomy  and appendectomy at the same time  History of lumpectomy of right breast  2007 and radiation and chemotherapy  Parathyroid adenoma  excision      REVIEW OF SYSTEMS  Negative unless indicated in the HPI  	    PHYSICAL EXAM:   Vital Signs:  Vital Signs Last 24 Hrs  T(C): 36.3 (14 Sep 2024 05:18), Max: 36.8 (13 Sep 2024 19:30)  T(F): 97.3 (14 Sep 2024 05:18), Max: 98.3 (13 Sep 2024 19:30)  HR: 63 (14 Sep 2024 05:18) (60 - 88)  BP: 130/56 (14 Sep 2024 05:18) (111/54 - 130/56)  BP(mean): --  RR: 17 (14 Sep 2024 05:18) (16 - 18)  SpO2: 94% (14 Sep 2024 05:18) (94% - 98%)    Parameters below as of 14 Sep 2024 05:18  Patient On (Oxygen Delivery Method): nasal cannula  O2 Flow (L/min): 2    Daily Height in cm: 162.56 (13 Sep 2024 16:16)    Daily I&O's Summary      GENERAL:  Appears stated age  HEENT:  NC/AT,  conjunctivae clear and pink,   CHEST:  Full & symmetric excursion,   HEART:  Regular rhythm,   ABDOMEN:  Soft, non-tender, non-distended, normoactive bowel sounds  EXTEREMITIES:  no cyanosis, clubbing  SKIN:  No rash/warm/dry  NEURO:  Alert, oriented      LABS:              6.8    x     )-----------( x        ( 14 Sep 2024 08:48 )             23.8     09-14    146<H>  |  110<H>  |  105<H>  ----------------------------<  178<H>  4.4   |  16<L>  |  9.70<H>    Ca    7.2<L>      14 Sep 2024 06:40  Phos  7.9     09-14  Mg     1.9     09-14    TPro  4.7<L>  /  Alb  1.8<L>  /  TBili  0.4  /  DBili  x   /  AST  9<L>  /  ALT  <6<L>  /  AlkPhos  57  09-14    PT/INR - ( 14 Sep 2024 06:40 )   PT: 14.0 sec;   INR: 1.20 ratio           Urinalysis Basic - ( 14 Sep 2024 06:40 )    Color: x / Appearance: x / SG: x / pH: x  Gluc: 178 mg/dL / Ketone: x  / Bili: x / Urobili: x   Blood: x / Protein: x / Nitrite: x   Leuk Esterase: x / RBC: x / WBC x   Sq Epi: x / Non Sq Epi: x / Bacteria: x      amylase   lipaseLipase: 137 U/L (09-13 @ 12:35)    RADIOLOGY & ADDITIONAL TESTS:    ACC: [id number] EXAM:  CT ABDOMEN AND PELVIS   ORDERED BY: ALANIS JACKSON     PROCEDURE DATE:  09/13/2024          INTERPRETATION:  CLINICAL INFORMATION: 89 years  Female with RLQ abd   pain. History of breast cancer, colon cancer and myelodysplasia. Status   post right hemicolectomy and chemotherapy in 2011. Mesenteric mass   excised in 2013 negative for metastatic disease. Recurred in 2016.    COMPARISON: CT abdomen and pelvis 5/28/2024 and 2/29/2024    CONTRAST/COMPLICATIONS:  IV Contrast: NONE  Oral Contrast: NONE  Complications: None reported at time of study completion    PROCEDURE:  CT of the Abdomen and Pelvis was performed.  Sagittal and coronal reformats were performed.    LIMITATIONS: Evaluation of the solid organs, vascular structures and GI   tract is limited without oral and IV contrast.    FINDINGS:  LOWER CHEST: Moderate right pleural effusion, larger than prior, with   underlying near complete right lower lobe passive atelectasis. Trace left   pleural effusion slightly larger than prior. Right central venous   catheter tip at cavoatrial junction. Coronary atherosclerosis.   Low-attenuation cardiac blood pool secondary to anemia. Trace pericardial   effusion unchanged.    LIVER: Innumerable hepatic cysts and hypodensities too small to   characterize. Redemonstrated soft tissue attenuation in the main and   right portal vein concerning for bland or tumor thrombus.  BILE DUCTS: Normal caliber.  GALLBLADDER: Cholecystectomy.  SPLEEN: Stable splenomegaly.  PANCREAS: Redemonstrated 9.9 x 7.7 cm right mesenteric mass, previously   10.4 x 8.6 cm. Mass is again inseparable from the pancreatic head. Air   within the mass similar to prior. Surrounding surgical clips and   surrounding mesenteric edema unchanged.  ADRENALS: Within normal limits.  KIDNEYS/URETERS: Atrophic right kidney. Bilateral cysts. Right extrarenal   pelvis. No hydronephrosis bilaterally.    BLADDER: Within normal limits.  REPRODUCTIVE ORGANS:    BOWEL: No bowel obstruction. Appendix is not visualized and cannot be   assessed. Right hemicolectomy with ileocolic anastomosis. Thick-walled   nondistended colon. Trace pericolonic fat stranding either related to   ascites or inflammatory process. Colonic diverticulosis without   diverticulitis.  PERITONEUM/RETROPERITONEUM: Small ascites. Multiple right abdominal   surgical clips.  VESSELS: Advanced atherosclerosis.  LYMPH NODES: No lymphadenopathy.  ABDOMINAL WALL: Anasarca. Rectus diastases. Anterior abdominal wall   laxity.  BONES: Within normal limits.    IMPRESSION:  Nonspecific colitis, either infectious or inflammatory.    Increasing right pleural effusion with underlying passive atelectasis.   Cannot exclude underlying pneumonia. Left pleural effusion slightly   larger than prior.    Suspicion for main and right intrahepatic portal vein bland or tumor   thrombus, similar to prior.    Pancreatic head/mesenteric mass with central necrosis, similar to prior.    --- End of Report ---    DMITRY XIAO MD; Attending Radiologist  This document has been electronically signed. Sep 13 2024  1:31PM
Resting    Vital Signs Last 24 Hrs  T(C): 36.4 (09-24-24 @ 12:42), Max: 36.4 (09-23-24 @ 20:31)  T(F): 97.5 (09-24-24 @ 12:42), Max: 97.6 (09-23-24 @ 20:31)  HR: 66 (09-24-24 @ 17:29) (54 - 66)  BP: 167/62 (09-24-24 @ 17:29) (151/82 - 177/58)  BP(mean): 94 (09-24-24 @ 04:48) (94 - 94)  RR: 18 (09-24-24 @ 04:48) (18 - 18)  SpO2: 100% (09-24-24 @ 12:42) (96% - 100%)    I&O's Detail    23 Sep 2024 07:01  -  24 Sep 2024 07:00  --------------------------------------------------------  OUT:    Voided (mL): 600 mL  Total OUT: 600 mL    s1s2  b/l air entry  soft, ND  edema                                                                                      7.4    2.40  )-----------( 58       ( 23 Sep 2024 06:00 )             24.9     23 Sep 2024 05:38    143    |  109    |  117    ----------------------------<  126    5.0     |  13     |  10.17    Ca    6.7        23 Sep 2024 05:38    acetaminophen     Tablet .. 650 milliGRAM(s) Oral every 6 hours PRN  calcium acetate 2001 milliGRAM(s) Oral three times a day with meals  chlorhexidine 2% Cloths 1 Application(s) Topical daily  melatonin 3 milliGRAM(s) Oral at bedtime PRN  metoprolol tartrate 12.5 milliGRAM(s) Oral two times a day  Nephro-sage 1 Tablet(s) Oral daily  ondansetron Injectable 4 milliGRAM(s) IV Push every 8 hours PRN  sodium bicarbonate 1300 milliGRAM(s) Oral three times a day    A/P:    Hx colon cancer s/p R colectomy, chemo, s/p Keytruda  Known CKD 5  Pancytopenia, s/p bld tx  Acute LLE DVT  CT A/P w/R renal atrophy, pancreas mass, colitis  V/Q scan indeterminate, unlikely PE  MAGDI/CKD 5 pre-renal/hemodynamic vs CKD progression (Cr 6.39 - 6/10/24)  AGMA iso worsening renal fx, colitis, malignancy   RRT was d/w pt on previous adm in May 2024  At that time pt wish was to avoid HD  D/w her again on this adm, pt does not wish to be on HD   Continue Na Bicarb, Phoslo if c/w goc  Avoid nephrotoxins   Plan for home hospice noted, most appropriate  DNR/I, no HD    [phone]    
Resting, no SOB, feels better overall    Vital Signs Last 24 Hrs  T(C): 36.4 (09-17-24 @ 19:56), Max: 36.9 (09-16-24 @ 23:39)  T(F): 97.5 (09-17-24 @ 19:56), Max: 98.4 (09-16-24 @ 23:39)  HR: 68 (09-17-24 @ 19:56) (68 - 82)  BP: 141/62 (09-17-24 @ 19:56) (102/64 - 141/62)  RR: 18 (09-17-24 @ 19:56) (17 - 18)  SpO2: 95% (09-17-24 @ 19:56) (94% - 96%)    I&O's Detail    16 Sep 2024 07:01  -  17 Sep 2024 07:00  --------------------------------------------------------  IN:    Oral Fluid: 400 mL  Total IN: 400 mL    OUT:    Voided (mL): 500 mL  Total OUT: 500 mL    s1s2  b/l air entry  soft, ND  sm edema                                               8.1    2.49  )-----------( 68       ( 17 Sep 2024 07:51 )             26.9     17 Sep 2024 07:51    141    |  106    |  102    ----------------------------<  113    4.3     |  17     |  9.29     Ca    6.7        17 Sep 2024 07:51  Phos  7.7       16 Sep 2024 07:09  Mg     1.7       17 Sep 2024 07:51    TPro  4.8    /  Alb  1.9    /  TBili  0.4    /  DBili  x      /  AST  14     /  ALT  <6     /  AlkPhos  59     16 Sep 2024 07:09    LIVER FUNCTIONS - ( 16 Sep 2024 07:09 )  Alb: 1.9 g/dL / Pro: 4.8 g/dL / ALK PHOS: 59 U/L / ALT: <6 U/L / AST: 14 U/L / GGT: x           acetaminophen     Tablet .. 650 milliGRAM(s) Oral every 6 hours PRN  amLODIPine   Tablet 10 milliGRAM(s) Oral daily  calcium acetate 1334 milliGRAM(s) Oral three times a day with meals  cefTRIAXone   IVPB 1000 milliGRAM(s) IV Intermittent every 24 hours  chlorhexidine 2% Cloths 1 Application(s) Topical daily  epoetin peyton-epbx (RETACRIT) Injectable 54418 Unit(s) SubCutaneous once  melatonin 3 milliGRAM(s) Oral at bedtime PRN  metroNIDAZOLE  IVPB 500 milliGRAM(s) IV Intermittent every 8 hours  metroNIDAZOLE  IVPB      Nephro-sage 1 Tablet(s) Oral daily  ondansetron Injectable 4 milliGRAM(s) IV Push every 8 hours PRN  sodium bicarbonate 1300 milliGRAM(s) Oral three times a day    A/P:    Hx colon cancer s/p R colectomy, chemo, s/p Keytruda  Known CKD 5  Pancytopenia, s/p bld tx  Acute LLE DVT  CT A/P w/R renal atrophy, pancreas mass, colitis  V/Q scan indeterminate, but unlikely PE  MAGDI/CKD 5 pre-renal/hemodynamic vs CKD progression (Cr 6.39 - 6/10/24)  AGMA iso worsening renal fx, colitis, malignancy   RRT was d/w pt on last adm in May 2024  At that time pt wish was to avoid HD  D/w her again on this adm, pt does not wish to be on HD   Na Bicarb, Phoslo as ordered   F/u BMP, Mg, Phos, CBC, UO  Avoid nephrotoxins     [phone]    
Resting, no SOB, feels better today    Vital Signs Last 24 Hrs  T(C): 36.1 (09-16-24 @ 20:42), Max: 36.3 (09-16-24 @ 05:05)  T(F): 97 (09-16-24 @ 20:42), Max: 97.4 (09-16-24 @ 05:05)  HR: 70 (09-16-24 @ 20:42) (70 - 74)  BP: 130/62 (09-16-24 @ 20:42) (116/63 - 152/61)  RR: 17 (09-16-24 @ 20:42) (17 - 17)  SpO2: 97% (09-16-24 @ 20:42) (95% - 98%)    16 Sep 2024 07:01  -  16 Sep 2024 22:37  --------------------------------------------------------  IN:    Oral Fluid: 400 mL  Total IN: 400 mL    OUT:    Voided (mL): 500 mL  Total OUT: 500 mL    s1s2  b/l air entry  soft, ND  sm edema                                        8.5    2.12  )-----------( 64       ( 16 Sep 2024 07:09 )             28.3     16 Sep 2024 07:09    143    |  109    |  105    ----------------------------<  119    4.5     |  13     |  9.29     Ca    6.9        16 Sep 2024 07:09  Phos  7.7       16 Sep 2024 07:09  Mg     1.9       16 Sep 2024 07:09    TPro  4.8    /  Alb  1.9    /  TBili  0.4    /  DBili  x      /  AST  14     /  ALT  <6     /  AlkPhos  59     16 Sep 2024 07:09    LIVER FUNCTIONS - ( 16 Sep 2024 07:09 )  Alb: 1.9 g/dL / Pro: 4.8 g/dL / ALK PHOS: 59 U/L / ALT: <6 U/L / AST: 14 U/L / GGT: x           acetaminophen     Tablet .. 650 milliGRAM(s) Oral every 6 hours PRN  amLODIPine   Tablet 10 milliGRAM(s) Oral daily  calcium acetate 1334 milliGRAM(s) Oral three times a day with meals  cefTRIAXone   IVPB 1000 milliGRAM(s) IV Intermittent every 24 hours  chlorhexidine 2% Cloths 1 Application(s) Topical daily  epoetin peyton-epbx (RETACRIT) Injectable 73154 Unit(s) SubCutaneous once  melatonin 3 milliGRAM(s) Oral at bedtime PRN  metroNIDAZOLE  IVPB 500 milliGRAM(s) IV Intermittent every 8 hours  metroNIDAZOLE  IVPB      Nephro-sage 1 Tablet(s) Oral daily  ondansetron Injectable 4 milliGRAM(s) IV Push every 8 hours PRN  sodium bicarbonate 1300 milliGRAM(s) Oral three times a day    A/P:    Hx colon cancer s/p R colectomy, chemo  Known CKD 5  Pancytopenia, worsening renal fx  Acute LLE DVT  CT A/P w/R renal atrophy, pancreas mass, colitis  V/Q scan indeterminate, but unlikely PE  MAGDI/CKD 5 pre-renal/hemodynamic vs CKD progression (Cr 6.39 - 6/10/24)  AGMA iso worsening renal fx, colitis, malignancy   RRT was d/w pt on last adm in May 2024  At that time pt wish was to avoid HD  D/w her again, pt does not wish to be on HD   Na Bicarb, Phoslo as ordered   F/u BMP, Mg, Phos, CBC, UO  Palliative eval appr    [phone]    
Resting, on RA, no distress    Vital Signs Last 24 Hrs  T(C): 36.3 (09-19-24 @ 11:46), Max: 36.7 (09-19-24 @ 05:10)  T(F): 97.3 (09-19-24 @ 11:46), Max: 98.1 (09-19-24 @ 05:10)  HR: 74 (09-19-24 @ 11:46) (63 - 74)  BP: 112/61 (09-19-24 @ 11:46) (112/61 - 140/61)  RR: 16 (09-19-24 @ 11:46) (16 - 17)  SpO2: 98% (09-19-24 @ 11:46) (96% - 98%)    s1s2  b/l air entry  soft, ND  edema                                                               8.0    2.76  )-----------( 69       ( 19 Sep 2024 06:45 )             27.0     19 Sep 2024 06:45    142    |  108    |  114    ----------------------------<  116    4.8     |  15     |  9.70     Ca    6.7        19 Sep 2024 06:45  Phos  7.8       19 Sep 2024 06:45  Mg     1.9       19 Sep 2024 06:45    TPro  5.0    /  Alb  2.0    /  TBili  0.4    /  DBili  x      /  AST  29     /  ALT  8      /  AlkPhos  71     19 Sep 2024 06:45    LIVER FUNCTIONS - ( 19 Sep 2024 06:45 )  Alb: 2.0 g/dL / Pro: 5.0 g/dL / ALK PHOS: 71 U/L / ALT: 8 U/L / AST: 29 U/L / GGT: x           acetaminophen     Tablet .. 650 milliGRAM(s) Oral every 6 hours PRN  amLODIPine   Tablet 10 milliGRAM(s) Oral daily  calcium acetate 1334 milliGRAM(s) Oral three times a day with meals  chlorhexidine 2% Cloths 1 Application(s) Topical daily  melatonin 3 milliGRAM(s) Oral at bedtime PRN  metoprolol tartrate 12.5 milliGRAM(s) Oral two times a day  metroNIDAZOLE  IVPB 500 milliGRAM(s) IV Intermittent every 8 hours  metroNIDAZOLE  IVPB      Nephro-sage 1 Tablet(s) Oral daily  ondansetron Injectable 4 milliGRAM(s) IV Push every 8 hours PRN  sodium bicarbonate 1300 milliGRAM(s) Oral three times a day    A/P:    Hx colon cancer s/p R colectomy, chemo, s/p Keytruda  Known CKD 5  Pancytopenia, s/p bld tx  Acute LLE DVT  CT A/P w/R renal atrophy, pancreas mass, colitis  V/Q scan indeterminate, unlikely PE  MAGDI/CKD 5 pre-renal/hemodynamic vs CKD progression (Cr 6.39 - 6/10/24)  AGMA iso worsening renal fx, colitis, malignancy   RRT was d/w pt on last adm in May 2024  At that time pt wish was to avoid HD  D/w her again on this adm, pt does not wish to be on HD   Continue Na Bicarb, Phoslo   Avoid nephrotoxins   F/u BMP, Mg, Phos, CBC if c/w [facility]    [phone]    
Resting, on RA, no distress    Vital Signs Last 24 Hrs  T(C): 36.6 (09-20-24 @ 13:18), Max: 36.8 (09-20-24 @ 04:45)  T(F): 97.8 (09-20-24 @ 13:18), Max: 98.2 (09-20-24 @ 04:45)  HR: 63 (09-20-24 @ 13:18) (57 - 63)  BP: 120/63 (09-20-24 @ 13:18) (114/62 - 123/57)  BP(mean): 82 (09-20-24 @ 13:18) (79 - 82)  RR: 18 (09-20-24 @ 13:18) (17 - 18)  SpO2: 94% (09-20-24 @ 13:18) (94% - 97%)    s1s2  b/l air entry  soft, ND  edema                                                                       8.5    3.54  )-----------( 81       ( 20 Sep 2024 06:16 )             28.5     20 Sep 2024 06:16    143    |  108    |  113    ----------------------------<  125    4.9     |  15     |  9.83     Ca    6.7        20 Sep 2024 06:16  Phos  7.8       20 Sep 2024 06:16  Mg     1.9       20 Sep 2024 06:16    TPro  5.2    /  Alb  2.1    /  TBili  0.4    /  DBili  x      /  AST  20     /  ALT  6      /  AlkPhos  77     20 Sep 2024 06:16    LIVER FUNCTIONS - ( 20 Sep 2024 06:16 )  Alb: 2.1 g/dL / Pro: 5.2 g/dL / ALK PHOS: 77 U/L / ALT: 6 U/L / AST: 20 U/L / GGT: x           acetaminophen     Tablet .. 650 milliGRAM(s) Oral every 6 hours PRN  calcium acetate 2001 milliGRAM(s) Oral three times a day with meals  chlorhexidine 2% Cloths 1 Application(s) Topical daily  melatonin 3 milliGRAM(s) Oral at bedtime PRN  metoprolol tartrate 12.5 milliGRAM(s) Oral two times a day  metroNIDAZOLE  IVPB 500 milliGRAM(s) IV Intermittent every 8 hours  metroNIDAZOLE  IVPB      Nephro-sage 1 Tablet(s) Oral daily  ondansetron Injectable 4 milliGRAM(s) IV Push every 8 hours PRN  sodium bicarbonate 1300 milliGRAM(s) Oral three times a day    A/P:    Hx colon cancer s/p R colectomy, chemo, s/p Keytruda  Known CKD 5  Pancytopenia, s/p bld tx  Acute LLE DVT  CT A/P w/R renal atrophy, pancreas mass, colitis  V/Q scan indeterminate, unlikely PE  MAGDI/CKD 5 pre-renal/hemodynamic vs CKD progression (Cr 6.39 - 6/10/24)  AGMA iso worsening renal fx, colitis, malignancy   RRT was d/w pt on last adm in May 2024  At that time pt wish was to avoid HD  D/w her again on this adm, pt does not wish to be on HD   Continue Na Bicarb, Phoslo   Avoid nephrotoxins   F/u bld work if c/w [facility]    [phone]    
Resting, some SOB    Vital Signs Last 24 Hrs  T(C): 36.6 (09-15-24 @ 12:10), Max: 36.6 (09-15-24 @ 04:51)  T(F): 97.8 (09-15-24 @ 12:10), Max: 97.8 (09-15-24 @ 04:51)  HR: 72 (09-15-24 @ 12:10) (70 - 72)  BP: 143/63 (09-15-24 @ 12:10) (130/68 - 143/63)  RR: 16 (09-15-24 @ 12:10) (16 - 17)  SpO2: 96% (09-15-24 @ 12:10) (96% - 96%)    I&O's Detail    14 Sep 2024 07:01  -  15 Sep 2024 07:00  --------------------------------------------------------  IN:    Oral Fluid: 200 mL  Total IN: 200 mL    OUT:    Voided (mL): 400 mL  Total OUT: 400 mL    s1s2  b/l air entry  soft, ND  + edema                                 8.2    1.98  )-----------( 82       ( 15 Sep 2024 06:45 )             27.8     15 Sep 2024 06:45    143    |  107    |  105    ----------------------------<  172    4.0     |  19     |  9.46     Ca    7.1        15 Sep 2024 06:45  Phos  7.9       15 Sep 2024 06:45  Mg     2.0       15 Sep 2024 06:45    TPro  5.1    /  Alb  2.0    /  TBili  0.4    /  DBili  x      /  AST  9      /  ALT  6      /  AlkPhos  66     15 Sep 2024 06:45    LIVER FUNCTIONS - ( 15 Sep 2024 06:45 )  Alb: 2.0 g/dL / Pro: 5.1 g/dL / ALK PHOS: 66 U/L / ALT: 6 U/L / AST: 9 U/L / GGT: x           PT/INR - ( 14 Sep 2024 06:40 )   PT: 14.0 sec;   INR: 1.20 ratio      Culture - Blood (collected 13 Sep 2024 14:00)  Source: .Blood Blood-Peripheral  Preliminary Report (14 Sep 2024 22:02):    No growth at 24 hours    Culture - Blood (collected 13 Sep 2024 14:00)  Source: .Blood Blood-Peripheral  Preliminary Report (14 Sep 2024 22:02):    No growth at 24 hours    acetaminophen     Tablet .. 650 milliGRAM(s) Oral every 6 hours PRN  amLODIPine   Tablet 10 milliGRAM(s) Oral daily  calcium acetate 1334 milliGRAM(s) Oral three times a day with meals  cefTRIAXone   IVPB 1000 milliGRAM(s) IV Intermittent every 24 hours  melatonin 3 milliGRAM(s) Oral at bedtime PRN  metroNIDAZOLE  IVPB      metroNIDAZOLE  IVPB 500 milliGRAM(s) IV Intermittent every 8 hours  Nephro-sage 1 Tablet(s) Oral daily  ondansetron Injectable 4 milliGRAM(s) IV Push every 8 hours PRN  sodium bicarbonate 1300 milliGRAM(s) Oral two times a day    A/P:    Hx colon cancer s/p R colectomy, chemo  Known CKD 5  Pancytopenia, worsening renal fx  Acute LLE DVT  CT A/P w/R renal atrophy, pancreas mass, colitis  V/Q scan indeterminate, but unlikely PE  MAGDI/CKD 5 pre-renal/hemodynamic vs CKD progression (Cr 6.39 - 6/10/24)  AGMA iso worsening renal fx, colitis, malignancy   RRT was d/w pt on last adm in May 2024  At that time pt wish was to avoid HD  D/w her again, pt does not wish to be on HD   Na Bicarb, Phoslo ordered   F/u BMP, Mg, Phos, CBC, UO  Consider Palliative eval     [phone]    
Time of Visit:  Patient seen and examined. pat  is lying in bed supine with o2 via NC , c/c mild sob but is speaking in complete sentences     MEDICATIONS  (STANDING):  amLODIPine   Tablet 10 milliGRAM(s) Oral daily  calcium acetate 1334 milliGRAM(s) Oral three times a day with meals  cefTRIAXone   IVPB 1000 milliGRAM(s) IV Intermittent every 24 hours  metroNIDAZOLE  IVPB      metroNIDAZOLE  IVPB 500 milliGRAM(s) IV Intermittent every 8 hours  Nephro-sage 1 Tablet(s) Oral daily  sodium bicarbonate 1300 milliGRAM(s) Oral two times a day      MEDICATIONS  (PRN):  acetaminophen     Tablet .. 650 milliGRAM(s) Oral every 6 hours PRN Temp greater or equal to 38C (100.4F), Mild Pain (1 - 3)  melatonin 3 milliGRAM(s) Oral at bedtime PRN Insomnia  ondansetron Injectable 4 milliGRAM(s) IV Push every 8 hours PRN Nausea and/or Vomiting       Medications up to date at time of exam.      PHYSICAL EXAMINATION:  Patient has no new complaints.  GENERAL: The patient  in no apparent distress.     Vital Signs Last 24 Hrs  T(C): 36.6 (15 Sep 2024 12:10), Max: 36.6 (15 Sep 2024 04:51)  T(F): 97.8 (15 Sep 2024 12:10), Max: 97.8 (15 Sep 2024 04:51)  HR: 72 (15 Sep 2024 12:10) (70 - 72)  BP: 143/63 (15 Sep 2024 12:10) (128/60 - 143/63)  BP(mean): --  RR: 16 (15 Sep 2024 12:10) (16 - 18)  SpO2: 96% (15 Sep 2024 12:10) (96% - 96%)    Parameters below as of 15 Sep 2024 12:10  Patient On (Oxygen Delivery Method): nasal cannula  O2 Flow (L/min): 2     (if applicable)    Chest Tube (if applicable)    HEENT: Head is normocephalic and atraumatic. Extraocular muscles are intact. Mucous membranes are moist.     NECK: Supple, no palpable adenopathy.    LUNGS: Fair bilateral air entry  . Right lower 1/3 lung zone dullness     HEART: Regular rate and rhythm without murmur.    ABDOMEN: Soft, nontender, and nondistended.  No hepatosplenomegaly is noted.    : No painful voiding, no pelvic pain    EXTREMITIES: Without any cyanosis, clubbing, rash, lesions or edema.    NEUROLOGIC: Awake, alert, oriented, grossly intact    SKIN: Warm, dry, good turgor.      LABS:                        8.2    1.98  )-----------( 82       ( 15 Sep 2024 06:45 )             27.8     09-15    143  |  107  |  105<H>  ----------------------------<  172<H>  4.0   |  19<L>  |  9.46<H>    Ca    7.1<L>      15 Sep 2024 06:45  Phos  7.9     09-15  Mg     2.0     09-15    TPro  5.1<L>  /  Alb  2.0<L>  /  TBili  0.4  /  DBili  x   /  AST  9<L>  /  ALT  6<L>  /  AlkPhos  66  09-15    PT/INR - ( 14 Sep 2024 06:40 )   PT: 14.0 sec;   INR: 1.20 ratio           Urinalysis Basic - ( 15 Sep 2024 06:45 )    Color: x / Appearance: x / SG: x / pH: x  Gluc: 172 mg/dL / Ketone: x  / Bili: x / Urobili: x   Blood: x / Protein: x / Nitrite: x   Leuk Esterase: x / RBC: x / WBC x   Sq Epi: x / Non Sq Epi: x / Bacteria: x    MICROBIOLOGY: (if applicable)    RADIOLOGY & ADDITIONAL STUDIES:  EKG:   V/Q"< from: NM Pulmonary Ventilation/Perfusion Scan (09.14.24 @ 11:41) >  IMPRESSION:    Indeterminate for pulmonary embolism due to matched perfusion defects,   partially corresponding with pleural effusions.    No ventilation/perfusion mismatches that would suggest pulmonary embolism.      < end of copied text >    ECHO:    IMPRESSION: 89y Female PAST MEDICAL & SURGICAL HISTORY:  History of breast cancer  right breast      Anemia      Hypertension      History of thrombocytopenia      History of herpes zoster      History of malignant neoplasm of parathyroid gland      Colon cancer      Chronic renal insufficiency      Thyroid nodule      Diabetes mellitus  NIDDM      History of cholecystectomy      History of partial colectomy  and appendectomy at the same time      History of lumpectomy of right breast  2007 and radiation and chemotherapy      Parathyroid adenoma  excision       p/w           IMP: This is an 89 yr old woman with HTN, herpes zoster, CKD, multinodular thyroid ,breast cancer 2007, colon cancer 2011 (previously on Keytruda), refractory anemia/MDS, treated supportively and ambulatory (erythropoietin supplements), no recent exposure to systemic chemotherapy, is admitted with generalized weakness due to multiple reasons : CKD ,  severe anemia ( pancytopenia ). Pat denied sob but noted with moderate  pleural effusion R>L probable due to combination of malignancy and CKD .  There is alos b/l LE DVT . Since pat si relatively stable from a pulmonary point of view, no emergent thoracentesis indicated. Pat wish to discuss with family before consenting for thoracentesis .  Now DNR DNI .      Sugg  - V/Q scan neg for PE   - Doppler + b/l DVT   - Continue O2 supp as needed to maintain sat >90%  - Diagnostic thoracentesis and PleurX cath if indicated   - Transfuse  PRBC as needed to maintain H/H >7/21  - Hold off on anticoag at this time   - Monitor renal fx   - DNR DNI     case discussed with rivera Michele     
Time of Visit:  Patient seen and examined. pat is lying in tylor-chair comfortable     MEDICATIONS  (STANDING):  amLODIPine   Tablet 10 milliGRAM(s) Oral daily  calcium acetate 1334 milliGRAM(s) Oral three times a day with meals  chlorhexidine 2% Cloths 1 Application(s) Topical daily  metoprolol tartrate 12.5 milliGRAM(s) Oral two times a day  metroNIDAZOLE  IVPB      metroNIDAZOLE  IVPB 500 milliGRAM(s) IV Intermittent every 8 hours  Nephro-sage 1 Tablet(s) Oral daily  sodium bicarbonate 1300 milliGRAM(s) Oral three times a day      MEDICATIONS  (PRN):  acetaminophen     Tablet .. 650 milliGRAM(s) Oral every 6 hours PRN Temp greater or equal to 38C (100.4F), Mild Pain (1 - 3)  melatonin 3 milliGRAM(s) Oral at bedtime PRN Insomnia  ondansetron Injectable 4 milliGRAM(s) IV Push every 8 hours PRN Nausea and/or Vomiting       Medications up to date at time of exam.      PHYSICAL EXAMINATION:  Patient has no new complaints.  GENERAL: The patient  in no apparent distress.     Vital Signs Last 24 Hrs  T(C): 36.3 (19 Sep 2024 11:46), Max: 36.7 (19 Sep 2024 05:10)  T(F): 97.3 (19 Sep 2024 11:46), Max: 98.1 (19 Sep 2024 05:10)  HR: 74 (19 Sep 2024 11:46) (63 - 74)  BP: 112/61 (19 Sep 2024 11:46) (112/61 - 140/61)  BP(mean): --  RR: 16 (19 Sep 2024 11:46) (16 - 17)  SpO2: 98% (19 Sep 2024 11:46) (96% - 98%)    Parameters below as of 19 Sep 2024 11:46  Patient On (Oxygen Delivery Method): room air       (if applicable)    Chest Tube (if applicable)    HEENT: Head is normocephalic and atraumatic. Extraocular muscles are intact. Mucous membranes are moist.     NECK: Supple, no palpable adenopathy.    LUNGS: Fair bilateral air entry   no wheezing, rales, or rhonchi.    HEART: Regular rate and rhythm without murmur.    ABDOMEN: Soft, nontender, and nondistended.  No hepatosplenomegaly is noted.    : No painful voiding, no pelvic pain    EXTREMITIES: Without any cyanosis, clubbing, rash, lesions or edema.    NEUROLOGIC: Awake, alert, oriented, grossly intact    SKIN: Warm, dry, good turgor.      LABS:                        8.0    2.76  )-----------( 69       ( 19 Sep 2024 06:45 )             27.0     09-19    142  |  108  |  114[H]  ----------------------------<  116[H]  4.8   |  15[L]  |  9.70[H]    Ca    6.7[L]      19 Sep 2024 06:45  Phos  7.8     09-19  Mg     1.9     09-19    TPro  5.0[L]  /  Alb  2.0[L]  /  TBili  0.4  /  DBili  x   /  AST  29  /  ALT  8[L]  /  AlkPhos  71  09-19      Urinalysis Basic - ( 19 Sep 2024 06:45 )    Color: x / Appearance: x / SG: x / pH: x  Gluc: 116 mg/dL / Ketone: x  / Bili: x / Urobili: x   Blood: x / Protein: x / Nitrite: x   Leuk Esterase: x / RBC: x / WBC x   Sq Epi: x / Non Sq Epi: x / Bacteria: x    MICROBIOLOGY: (if applicable)    RADIOLOGY & ADDITIONAL STUDIES:  EKG:   CXR:  ECHO:    IMPRESSION: 89y Female PAST MEDICAL & SURGICAL HISTORY:  History of breast cancer  right breast      Anemia      Hypertension      History of thrombocytopenia      History of herpes zoster      History of malignant neoplasm of parathyroid gland      Colon cancer      Chronic renal insufficiency      Thyroid nodule      Diabetes mellitus  NIDDM      History of cholecystectomy      History of partial colectomy  and appendectomy at the same time      History of lumpectomy of right breast  2007 and radiation and chemotherapy      Parathyroid adenoma  excision       p/w         IMP: This is an 89 yr old woman with HTN, herpes zoster, CKD, multinodular thyroid ,breast cancer 2007, colon cancer 2011 (previously on Keytruda), refractory anemia/MDS, treated supportively and ambulatory (erythropoietin supplements), no recent exposure to systemic chemotherapy, is admitted with generalized weakness due to multiple reasons : CKD ,  severe anemia ( pancytopenia ). Pat denied sob but noted with moderate  pleural effusion R>L probable due to combination of malignancy and CKD .  There is alos b/l LE DVT . Since pat si relatively stable from a pulmonary point of view, no emergent thoracentesis indicated. Pat wish to discuss with family before consenting for thoracentesis .  Now DNR DNI .      Sugg  - V/Q scan neg for PE   - Doppler + b/l DVT   - Continue O2 supp as needed to maintain sat >90%  - Pt refused c thoracentesis   - Transfuse  PRBC as needed to maintain H/H >7/21  - Hold off on anticoag at this time   - Monitor renal fx   - DNR DNI   - Accept to hospice       
  DEION MADERAMELA  89y  Female    patient states she is feeling better and has less weakness. patient denies SOB/chest pain/abdominal pain. patient states she has h/o loose bowel movements that is unchanged           REVIEW OF SYSTEMS:    Cardiac HTN  Pulmonary pleural effusions  GI colon cancer with mets/colitis/loose BMs   CRI no dysuria/hematuria  Neuro no headache/dizziness/numbness  Heme anemia/MDS    FAMILY HISTORY:  FH: colon cancer (Sibling, Sibling)    FH: breast cancer (Sibling)      T(C): 36.3 (09-14-24 @ 12:30), Max: 36.8 (09-13-24 @ 19:30)  HR: 67 (09-14-24 @ 12:30) (60 - 69)  BP: 133/52 (09-14-24 @ 12:30) (111/54 - 133/52)  RR: 17 (09-14-24 @ 12:30) (16 - 17)  SpO2: 94% (09-14-24 @ 05:18) (94% - 98%)  Wt(kg): --Vital Signs Last 24 Hrs  T(C): 36.3 (14 Sep 2024 12:30), Max: 36.8 (13 Sep 2024 19:30)  T(F): 97.3 (14 Sep 2024 12:30), Max: 98.3 (13 Sep 2024 19:30)  HR: 67 (14 Sep 2024 12:30) (60 - 69)  BP: 133/52 (14 Sep 2024 12:30) (111/54 - 133/52)  BP(mean): --  RR: 17 (14 Sep 2024 12:30) (16 - 17)  SpO2: 94% (14 Sep 2024 05:18) (94% - 98%)    Parameters below as of 14 Sep 2024 12:30  Patient On (Oxygen Delivery Method): nasal cannula  O2 Flow (L/min): 2    No Known Allergies      PHYSICAL EXAM:    General NAD patient is resting comfortably  Neck thyroid stable no JVD  Heart RRR  Lungs decreased BS R>L  Abdomen non tender non distended normal bowel sounds no HSM/CVAT  Extremities non tender +1 BLE edema +pulses  Neurologic alert and oriented x 3 no acute focal changes      Consultant(s) Notes Reviewed:  [x ] YES  [ ] NO      LABS:  CBC Full  -  ( 14 Sep 2024 08:48 )  WBC Count : x  RBC Count : x  Hemoglobin : 6.8 g/dL  Hematocrit : 23.8 %  Platelet Count - Automated : x  Mean Cell Volume : x  Mean Cell Hemoglobin : x  Mean Cell Hemoglobin Concentration : x  Auto Neutrophil # : x  Auto Lymphocyte # : x  Auto Monocyte # : x  Auto Eosinophil # : x  Auto Basophil # : x  Auto Neutrophil % : x  Auto Lymphocyte % : x  Auto Monocyte % : x  Auto Eosinophil % : x  Auto Basophil % : x                          6.8    x     )-----------( x        ( 14 Sep 2024 08:48 )             23.8     09-14    146<H>  |  110<H>  |  105<H>  ----------------------------<  178<H>  4.4   |  16<L>  |  9.70<H>    Ca    7.2<L>      14 Sep 2024 06:40  Phos  7.9     09-14  Mg     1.9     09-14    TPro  4.7<L>  /  Alb  1.8<L>  /  TBili  0.4  /  DBili  x   /  AST  9<L>  /  ALT  <6<L>  /  AlkPhos  57  09-14    LIVER FUNCTIONS - ( 14 Sep 2024 06:40 )  Alb: 1.8 g/dL / Pro: 4.7 g/dL / ALK PHOS: 57 U/L / ALT: <6 U/L / AST: 9 U/L / GGT: x           PT/INR - ( 14 Sep 2024 06:40 )   PT: 14.0 sec;   INR: 1.20 ratio           Urinalysis Basic - ( 14 Sep 2024 06:40 )    Color: x / Appearance: x / SG: x / pH: x  Gluc: 178 mg/dL / Ketone: x  / Bili: x / Urobili: x   Blood: x / Protein: x / Nitrite: x   Leuk Esterase: x / RBC: x / WBC x   Sq Epi: x / Non Sq Epi: x / Bacteria: x      < from: CT Chest No Cont (09.13.24 @ 15:08) >    ACC: [id number] EXAM:  CT CHEST   ORDERED BY:  MARIFER GIL     PROCEDURE DATE:  09/13/2024          INTERPRETATION:  CLINICAL INFORMATION: Fluid overload.    COMPARISON: 06/22/2023.    CONTRAST/COMPLICATIONS:  IV Contrast: NONE  Oral Contrast: NONE  Complications: None reported at time of study completion    PROCEDURE:  CT of the Chest was performed.  Sagittal and coronal reformats were performed.    FINDINGS:    LUNGS AND LARGE AIRWAYS: Patent central airways. No pulmonary nodules.   Compression atelectasis dependent right lower lobe. Calcified granulomata   left lower lobe and probably within the compressed right lower lobe.  PLEURA: Mild to moderate right and a small left pleural effusion,   increased in comparison to the prior study.  VESSELS: Atherosclerotic calcification including the coronary arteries.   Laqesn-n-Cutt with tip at the cavoatrial junction.  HEART: Heart size is normal. Mild pericardial effusion.  MEDIASTINUM AND DIONICIO: No lymphadenopathy.  CHEST WALL AND LOWER NECK:Stable appearance of bilateral thyroid nodules   and substernal thyroid extension.  VISUALIZED UPPER ABDOMEN: Probable numerous hepatic cysts.   Cholecystectomy. Upper portion of previously characterized right   mesenteric mass is noted. Probable splenomegaly.  BONES: Within normal limits.    IMPRESSION:  Mild to moderate right pleural, small left pleural effusion and mild   pericardial effusion. No evidence of pulmonary edema.    Additional findings as above.        --- End of Report ---        MARTIN HINKLE MD; Attending Radiologist  This document has been electronically signed. Sep 13 2024  3:39PM    < end of copied text >        < from: CT Abdomen and Pelvis No Cont (09.13.24 @ 13:05) >    ACC: [id number] EXAM:  CT ABDOMEN AND PELVIS   ORDERED BY: ALANIS JACKSON     PROCEDURE DATE:  09/13/2024          INTERPRETATION:  CLINICAL INFORMATION: 89 years  Female with RLQ abd   pain. History of breast cancer, colon cancer and myelodysplasia. Status   post right hemicolectomy and chemotherapy in 2011. Mesenteric mass   excised in 2013 negative for metastatic disease. Recurred in 2016.    COMPARISON: CT abdomen and pelvis 5/28/2024 and 2/29/2024    CONTRAST/COMPLICATIONS:  IV Contrast: NONE  Oral Contrast: NONE  Complications: None reported at time of study completion    PROCEDURE:  CT of the Abdomen and Pelvis was performed.  Sagittal and coronal reformats were performed.    LIMITATIONS: Evaluation of the solid organs, vascular structures and GI   tract is limited without oral and IV contrast.    FINDINGS:  LOWER CHEST: Moderate right pleural effusion, larger than prior, with   underlying near complete right lower lobe passive atelectasis. Trace left   pleural effusion slightly larger than prior. Right central venous   catheter tip at cavoatrial junction. Coronary atherosclerosis.   Low-attenuation cardiac blood pool secondary to anemia. Trace pericardial   effusion unchanged.    LIVER: Innumerable hepatic cysts and hypodensities too small to   characterize. Redemonstrated soft tissue attenuation in the main and   right portal vein concerning for bland or tumor thrombus.  BILE DUCTS: Normal caliber.  GALLBLADDER: Cholecystectomy.  SPLEEN: Stable splenomegaly.  PANCREAS: Redemonstrated 9.9 x 7.7 cm right mesenteric mass, previously   10.4 x 8.6 cm. Mass is again inseparable from the pancreatic head. Air   within the mass similar to prior. Surrounding surgical clips and   surrounding mesenteric edema unchanged.  ADRENALS: Within normal limits.  KIDNEYS/URETERS: Atrophic right kidney. Bilateral cysts. Right extrarenal   pelvis. No hydronephrosis bilaterally.    BLADDER: Within normal limits.  REPRODUCTIVE ORGANS:    BOWEL: No bowel obstruction. Appendix is not visualized and cannot be   assessed. Right hemicolectomy with ileocolic anastomosis. Thick-walled   nondistended colon. Trace pericolonic fat stranding either related to   ascites or inflammatory process. Colonic diverticulosis without   diverticulitis.  PERITONEUM/RETROPERITONEUM: Small ascites. Multiple right abdominal   surgical clips.  VESSELS: Advanced atherosclerosis.  LYMPH NODES: No lymphadenopathy.  ABDOMINAL WALL: Anasarca. Rectus diastases. Anterior abdominal wall   laxity.  BONES: Within normal limits.    IMPRESSION:    Nonspecific colitis, either infectious or inflammatory.    Increasing right pleural effusion with underlying passive atelectasis.   Cannot exclude underlying pneumonia. Left pleural effusion slightly   larger than prior.    Suspicion for main and right intrahepatic portal vein bland or tumor   thrombus, similar to prior.    Pancreatic head/mesenteric mass with central necrosis, similar to prior.        --- End of Report ---        DMITRY XIAO MD; Attending Radiologist  This document has been electronically signed. Sep 13 2024  1:31PM    < end of copied text >              < from: 12 Lead ECG (09.13.24 @ 12:02) >    Ventricular Rate 81 BPM    Atrial Rate 81 BPM    P-R Interval 244 ms    QRS Duration 94 ms    Q-T Interval 400 ms    QTC Calculation(Bazett) 464 ms    P Axis 27 degrees    R Axis -23 degrees    T Axis 27 degrees    Diagnosis Line Sinus rhythm with 1st degree AV block  Poor R wave progression  Possible Anterior infarct , age undetermined  Abnormal ECG  When compared with ECG of 27-MAY-2024 18:39,  No significant change was found    Confirmed by TRISH COLINDRES MD ([id number]) on 9/14/2024 7:59:40 AM    < end of copied text >          < from: US Duplex Venous Lower Ext Complete, Bilateral (09.13.24 @ 16:31) >    ACC: [id number] EXAM:  US DPLX LWR EXT VEINS COMPL BI   ORDERED BY:    MARIFER GIL     PROCEDURE DATE:  09/13/2024          INTERPRETATION:  CLINICAL INFORMATION: Bilateral lower extremity   swelling. History of colon cancer.    COMPARISON: None available.    TECHNIQUE: Duplex sonography of the BILATERAL LOWER extremity veins with   color and spectral Doppler, with and without compression.    FINDINGS:    RIGHT:  Normal compressibility of the RIGHT common femoral, femoral and popliteal   veins.  Doppler examination shows normal spontaneous and phasic flow.  No RIGHT calf vein thrombosis is detected.    There is a 3.9 x 1 x 2.7 cm sized popliteal cyst.    LEFT:  Normal compressibility of the LEFT common femoral, femoral and popliteal   veins.  Doppler examination shows normal spontaneous and phasic flow.  Evidence of thrombosis involving the left peroneal vein.    IMPRESSION:  Acute deep venous thrombosis: below the knee.    Left peroneal vein thrombosis.    Findings were discussed with nurse Fanny [phone] 9/13/2024 4:42 PM by   Dr. Hinkle with read back confirmation.    --- End of Report ---      < end of copied text >        < from: NM Pulmonary Ventilation/Perfusion Scan (09.14.24 @ 11:41) >    ACC: [id number] EXAM:  NM PULM VENTILATION PERFUS IMG   ORDERED BY:    MARIFER GIL     PROCEDURE DATE:  09/14/2024          INTERPRETATION:  CLINICAL INFORMATION: Dyspnea.  Evaluate for pulmonary   embolus.    RADIOPHARMACEUTICAL: 1 mCi [id number] aerosol;  6 mCi Tc-99m-MAA,   I.V.    TECHNIQUE:  Ventilation and perfusion images of the lungs were obtained   following administration of Tc-99m-DTPA and Tc-99m-MAA. Images were   obtained in the anterior, posterior, both lateral, and all 4 oblique   views. The study was interpreted in conjunction with chest radiograph on   9/13/2024.    COMPARISON: None    OTHER STUDIES USED FOR CORRELATION: None    FINDINGS: There is heterogeneous distribution of radiopharmaceuticals in   both lungs on the ventilation and perfusion images, some of which   correspond to small and moderate pleural effusions. There is no segmental   perfusion defect with ventilation mismatch that would suggest pulmonary   embolism.    IMPRESSION:    Indeterminate for pulmonary embolism due to matched perfusion defects,   partially corresponding with pleural effusions.    No ventilation/perfusion mismatches that would suggest pulmonary embolism.    --- End of Report ---        LE DEGROOT MD; Attending Radiologist  This document has been electronically signed. Sep 14 2024 12:22PM    < end of copied text >          acetaminophen     Tablet .. 650 milliGRAM(s) Oral every 6 hours PRN  amLODIPine   Tablet 10 milliGRAM(s) Oral daily  calcium acetate 667 milliGRAM(s) Oral three times a day with meals  cefTRIAXone   IVPB 1000 milliGRAM(s) IV Intermittent every 24 hours  dextrose 5% 1000 milliLiter(s) IV Continuous <Continuous>  melatonin 3 milliGRAM(s) Oral at bedtime PRN  metroNIDAZOLE  IVPB 500 milliGRAM(s) IV Intermittent every 8 hours  metroNIDAZOLE  IVPB      Nephro-sage 1 Tablet(s) Oral daily  ondansetron Injectable 4 milliGRAM(s) IV Push every 8 hours PRN            
  HANG MADERA  89y  Female      patient  is resting comfortably      REVIEW OF SYSTEMS:    Cardiac HTN  Pulmonary no SOB/cough  GI colon cancer with mets/FOB +   CRI no dysuria/hematuria  Neuro no headache/numbness    FAMILY HISTORY:  FH: colon cancer (Sibling, Sibling)    FH: breast cancer (Sibling)      T(C): 36.3 (09-18-24 @ 13:50), Max: 36.4 (09-17-24 @ 19:56)  HR: 61 (09-18-24 @ 13:50) (61 - 74)  BP: 125/58 (09-18-24 @ 13:50) (125/58 - 141/62)  RR: 17 (09-18-24 @ 13:50) (16 - 18)  SpO2: 95% (09-18-24 @ 13:50) (94% - 95%)  Wt(kg): --Vital Signs Last 24 Hrs  T(C): 36.3 (18 Sep 2024 13:50), Max: 36.4 (17 Sep 2024 19:56)  T(F): 97.3 (18 Sep 2024 13:50), Max: 97.5 (17 Sep 2024 19:56)  HR: 61 (18 Sep 2024 13:50) (61 - 74)  BP: 125/58 (18 Sep 2024 13:50) (125/58 - 141/62)  BP(mean): --  RR: 17 (18 Sep 2024 13:50) (16 - 18)  SpO2: 95% (18 Sep 2024 13:50) (94% - 95%)    Parameters below as of 18 Sep 2024 13:50  Patient On (Oxygen Delivery Method): room air      No Known Allergies      PHYSICAL EXAM:    General NAD  Neck no JVD thyroid stable  Heart regular  Lungs clear  Abdomen non tender non distended normal bowel sounds no HSM/CVAT  Extremities non tender no edema +pulses  Neurologic no acute focal changes alert and oriented x 3        Consultant(s) Notes Reviewed:  [x ] YES  [ ] NO      LABS:  CBC Full  -  ( 18 Sep 2024 05:37 )  WBC Count : 2.95 K/uL  RBC Count : 2.91 M/uL  Hemoglobin : 7.9 g/dL  Hematocrit : 26.8 %  Platelet Count - Automated : 72 K/uL  Mean Cell Volume : 92.1 fl  Mean Cell Hemoglobin : 27.1 pg  Mean Cell Hemoglobin Concentration : 29.5 gm/dL  Auto Neutrophil # : 2.02 K/uL  Auto Lymphocyte # : 0.64 K/uL  Auto Monocyte # : 0.23 K/uL  Auto Eosinophil # : 0.02 K/uL  Auto Basophil # : 0.01 K/uL  Auto Neutrophil % : 68.5 %  Auto Lymphocyte % : 21.7 %  Auto Monocyte % : 7.8 %  Auto Eosinophil % : 0.7 %  Auto Basophil % : 0.3 %                          7.9    2.95  )-----------( 72       ( 18 Sep 2024 05:37 )             26.8     09-18    141  |  106  |  103[H]  ----------------------------<  113[H]  4.5   |  16[L]  |  9.53[H]    Ca    6.6[L]      18 Sep 2024 05:37  Phos  7.8     09-18  Mg     1.9     09-18    TPro  4.9[L]  /  Alb  1.9[L]  /  TBili  0.4  /  DBili  x   /  AST  24  /  ALT  9[L]  /  AlkPhos  73  09-18    LIVER FUNCTIONS - ( 18 Sep 2024 05:37 )  Alb: 1.9 g/dL / Pro: 4.9 g/dL / ALK PHOS: 73 U/L / ALT: 9 U/L / AST: 24 U/L / GGT: x             Urinalysis Basic - ( 18 Sep 2024 05:37 )    Color: x / Appearance: x / SG: x / pH: x  Gluc: 113 mg/dL / Ketone: x  / Bili: x / Urobili: x   Blood: x / Protein: x / Nitrite: x   Leuk Esterase: x / RBC: x / WBC x   Sq Epi: x / Non Sq Epi: x / Bacteria: x      Culture - Blood (09.13.24 @ 14:00)   Specimen Source: .Blood Blood-Peripheral  Culture Results:   No growth at 4 days      Culture - Blood (09.13.24 @ 14:00)   Specimen Source: .Blood Blood-Peripheral  Culture Results:   No growth at 4 days        GI PCR Panel Stool (09.16.24 @ 10:42)   GI PCR Panel: NotDetec      Occult Blood, Feces (09.16.24 @ 10:42)   Occult Blood, Feces: Positive        acetaminophen     Tablet .. 650 milliGRAM(s) Oral every 6 hours PRN  amLODIPine   Tablet 10 milliGRAM(s) Oral daily  calcium acetate 1334 milliGRAM(s) Oral three times a day with meals  cefTRIAXone   IVPB 1000 milliGRAM(s) IV Intermittent every 24 hours  chlorhexidine 2% Cloths 1 Application(s) Topical daily  epoetin peyton-epbx (RETACRIT) Injectable 11752 Unit(s) SubCutaneous once  melatonin 3 milliGRAM(s) Oral at bedtime PRN  metoprolol tartrate 12.5 milliGRAM(s) Oral two times a day  metroNIDAZOLE  IVPB 500 milliGRAM(s) IV Intermittent every 8 hours  metroNIDAZOLE  IVPB      Nephro-sage 1 Tablet(s) Oral daily  ondansetron Injectable 4 milliGRAM(s) IV Push every 8 hours PRN  sodium bicarbonate 1300 milliGRAM(s) Oral three times a day          
  HANG MADERA  89y  Female      patient complains of occasional SOB. patiens chest pain/palpitations/cough/leg pain. [patient states weaknes has improved    REVIEW OF SYSTEMS:    Cardiac HTN  Pulmonary SOB  GI colitis/colon cancer with mets   no dysuria/hematuria  Neuro no headache/dizzines/numbness    FAMILY HISTORY:  FH: colon cancer (Sibling, Sibling)    FH: breast cancer (Sibling)      T(C): 36.6 (09-15-24 @ 12:10), Max: 36.6 (09-15-24 @ 04:51)  HR: 72 (09-15-24 @ 12:10) (70 - 72)  BP: 143/63 (09-15-24 @ 12:10) (128/60 - 143/63)  RR: 16 (09-15-24 @ 12:10) (16 - 18)  SpO2: 96% (09-15-24 @ 12:10) (94% - 96%)  Wt(kg): --Vital Signs Last 24 Hrs  T(C): 36.6 (15 Sep 2024 12:10), Max: 36.6 (15 Sep 2024 04:51)  T(F): 97.8 (15 Sep 2024 12:10), Max: 97.8 (15 Sep 2024 04:51)  HR: 72 (15 Sep 2024 12:10) (70 - 72)  BP: 143/63 (15 Sep 2024 12:10) (128/60 - 143/63)  BP(mean): --  RR: 16 (15 Sep 2024 12:10) (16 - 18)  SpO2: 96% (15 Sep 2024 12:10) (94% - 96%)    Parameters below as of 15 Sep 2024 12:10  Patient On (Oxygen Delivery Method): nasal cannula  O2 Flow (L/min): 2    No Known Allergies      PHYSICAL EXAM:    General resting comfortably no respiratory distress  Neck no JVD thyroid stable  Heart RRR  Lungs decreased BS at bilateral bases  Abdomen non tender non distended normal bowel sounds no HSM/CVAT  Extremities non tender no edema negative cord/ananya sign  Neurologic alert and oriented x 3 no acute focal changes        Consultant(s) Notes Reviewed:  [x ] YES  [ ] NO      LABS:  CBC Full  -  ( 15 Sep 2024 06:45 )  WBC Count : 1.98 K/uL  RBC Count : 3.03 M/uL  Hemoglobin : 8.2 g/dL  Hematocrit : 27.8 %  Platelet Count - Automated : 82 K/uL  Mean Cell Volume : 91.7 fl  Mean Cell Hemoglobin : 27.1 pg  Mean Cell Hemoglobin Concentration : 29.5 gm/dL  Auto Neutrophil # : 1.45 K/uL  Auto Lymphocyte # : 0.35 K/uL  Auto Monocyte # : 0.14 K/uL  Auto Eosinophil # : 0.02 K/uL  Auto Basophil # : 0.01 K/uL  Auto Neutrophil % : 73.2 %  Auto Lymphocyte % : 17.7 %  Auto Monocyte % : 7.1 %  Auto Eosinophil % : 1.0 %  Auto Basophil % : 0.5 %                          8.2    1.98  )-----------( 82       ( 15 Sep 2024 06:45 )             27.8     09-15    143  |  107  |  105<H>  ----------------------------<  172<H>  4.0   |  19<L>  |  9.46<H>    Ca    7.1<L>      15 Sep 2024 06:45  Phos  7.9     09-15  Mg     2.0     09-15    TPro  5.1<L>  /  Alb  2.0<L>  /  TBili  0.4  /  DBili  x   /  AST  9<L>  /  ALT  6<L>  /  AlkPhos  66  09-15    LIVER FUNCTIONS - ( 15 Sep 2024 06:45 )  Alb: 2.0 g/dL / Pro: 5.1 g/dL / ALK PHOS: 66 U/L / ALT: 6 U/L / AST: 9 U/L / GGT: x           PT/INR - ( 14 Sep 2024 06:40 )   PT: 14.0 sec;   INR: 1.20 ratio           Urinalysis Basic - ( 15 Sep 2024 06:45 )    Color: x / Appearance: x / SG: x / pH: x  Gluc: 172 mg/dL / Ketone: x  / Bili: x / Urobili: x   Blood: x / Protein: x / Nitrite: x   Leuk Esterase: x / RBC: x / WBC x   Sq Epi: x / Non Sq Epi: x / Bacteria: x      Culture - Blood (09.13.24 @ 14:00)   Specimen Source: .Blood Blood-Peripheral  Culture Results:   No growth at 24 hours    Culture - Blood (09.13.24 @ 14:00)   Specimen Source: .Blood Blood-Peripheral  Culture Results:   No growth at 24 hours                  acetaminophen     Tablet .. 650 milliGRAM(s) Oral every 6 hours PRN  amLODIPine   Tablet 10 milliGRAM(s) Oral daily  calcium acetate 1334 milliGRAM(s) Oral three times a day with meals  cefTRIAXone   IVPB 1000 milliGRAM(s) IV Intermittent every 24 hours  melatonin 3 milliGRAM(s) Oral at bedtime PRN  metroNIDAZOLE  IVPB      metroNIDAZOLE  IVPB 500 milliGRAM(s) IV Intermittent every 8 hours  Nephro-sage 1 Tablet(s) Oral daily  ondansetron Injectable 4 milliGRAM(s) IV Push every 8 hours PRN  sodium bicarbonate 1300 milliGRAM(s) Oral two times a day            
  HANG MADERA  89y  Female      patient is feeling getter and states weakness has  improved. she denies chest pain/palpitations/dizziness/SOB/leg pain      REVIEW OF SYSTEMS:    Cardiac HTN/Vtach  Pulmonary pleural effusions  GI colon cancer with mets/colitis   no dysuria/hematuria  Neuro no headache/dizziness/numbness  Heme anemia/MDS    FAMILY HISTORY:  FH: colon cancer (Sibling, Sibling)    FH: breast cancer (Sibling)      T(C): 36.4 (09-17-24 @ 12:00), Max: 36.9 (09-16-24 @ 23:39)  HR: 76 (09-17-24 @ 12:00) (70 - 82)  BP: 123/63 (09-17-24 @ 12:00) (102/64 - 135/61)  RR: 17 (09-17-24 @ 12:00) (17 - 17)  SpO2: 96% (09-17-24 @ 12:00) (94% - 97%)  Wt(kg): --Vital Signs Last 24 Hrs  T(C): 36.4 (17 Sep 2024 12:00), Max: 36.9 (16 Sep 2024 23:39)  T(F): 97.5 (17 Sep 2024 12:00), Max: 98.4 (16 Sep 2024 23:39)  HR: 76 (17 Sep 2024 12:00) (70 - 82)  BP: 123/63 (17 Sep 2024 12:00) (102/64 - 135/61)  BP(mean): --  RR: 17 (17 Sep 2024 12:00) (17 - 17)  SpO2: 96% (17 Sep 2024 12:00) (94% - 97%)    Parameters below as of 17 Sep 2024 12:00  Patient On (Oxygen Delivery Method): nasal cannula  O2 Flow (L/min): 2    No Known Allergies      PHYSICAL EXAM:    General NAD  Neck no JVD thyroid stable  Heart regular  Lungs decreased bilateral BS  Abdomen non tender non distended normal bowel sounds no HSM/CVAT  Extremities   Neurologic no acute focal changes alert and oriented x 3        Consultant(s) Notes Reviewed:  [x ] YES  [ ] NO      LABS:  CBC Full  -  ( 17 Sep 2024 07:51 )  WBC Count : 2.49 K/uL  RBC Count : 2.93 M/uL  Hemoglobin : 8.1 g/dL  Hematocrit : 26.9 %  Platelet Count - Automated : 68 K/uL  Mean Cell Volume : 91.8 fl  Mean Cell Hemoglobin : 27.6 pg  Mean Cell Hemoglobin Concentration : 30.1 gm/dL  Auto Neutrophil # : x  Auto Lymphocyte # : x  Auto Monocyte # : x  Auto Eosinophil # : x  Auto Basophil # : x  Auto Neutrophil % : x  Auto Lymphocyte % : x  Auto Monocyte % : x  Auto Eosinophil % : x  Auto Basophil % : x                          8.1    2.49  )-----------( 68       ( 17 Sep 2024 07:51 )             26.9     09-17    141  |  106  |  102[H]  ----------------------------<  113[H]  4.3   |  17[L]  |  9.29[H]    Ca    6.7[L]      17 Sep 2024 07:51  Phos  7.7     09-16  Mg     1.7     09-17    TPro  4.8[L]  /  Alb  1.9[L]  /  TBili  0.4  /  DBili  x   /  AST  14  /  ALT  <6[L]  /  AlkPhos  59  09-16    LIVER FUNCTIONS - ( 16 Sep 2024 07:09 )  Alb: 1.9 g/dL / Pro: 4.8 g/dL / ALK PHOS: 59 U/L / ALT: <6 U/L / AST: 14 U/L / GGT: x             Urinalysis Basic - ( 17 Sep 2024 07:51 )    Color: x / Appearance: x / SG: x / pH: x  Gluc: 113 mg/dL / Ketone: x  / Bili: x / Urobili: x   Blood: x / Protein: x / Nitrite: x   Leuk Esterase: x / RBC: x / WBC x   Sq Epi: x / Non Sq Epi: x / Bacteria: x      Culture - Blood (09.13.24 @ 14:00)   Specimen Source: .Blood Blood-Peripheral  Culture Results:   No growth at 72 Hours    Culture - Blood (09.13.24 @ 14:00)   Specimen Source: .Blood Blood-Peripheral  Culture Results:   No growth at 72 Hours              acetaminophen     Tablet .. 650 milliGRAM(s) Oral every 6 hours PRN  amLODIPine   Tablet 10 milliGRAM(s) Oral daily  calcium acetate 1334 milliGRAM(s) Oral three times a day with meals  cefTRIAXone   IVPB 1000 milliGRAM(s) IV Intermittent every 24 hours  chlorhexidine 2% Cloths 1 Application(s) Topical daily  epoetin peyton-epbx (RETACRIT) Injectable 27263 Unit(s) SubCutaneous once  melatonin 3 milliGRAM(s) Oral at bedtime PRN  metroNIDAZOLE  IVPB      metroNIDAZOLE  IVPB 500 milliGRAM(s) IV Intermittent every 8 hours  Nephro-sage 1 Tablet(s) Oral daily  ondansetron Injectable 4 milliGRAM(s) IV Push every 8 hours PRN  sodium bicarbonate 1300 milliGRAM(s) Oral three times a day      
  HANG MADERA  89y  Female      patient is resting comfortably and without complaints    REVIEW OF SYSTEMS:    Cardiac HTN/V tach  Pulmonary pleural effusions  GI colon cancer with mets/colitis   CRI  Neuro no headache/dizziness/weakness  Heme MDS/anemia    FAMILY HISTORY:  FH: colon cancer (Sibling, Sibling)    FH: breast cancer (Sibling)      T(C): 36.4 (09-22-24 @ 11:30), Max: 36.4 (09-22-24 @ 05:12)  HR: 53 (09-22-24 @ 11:30) (53 - 61)  BP: 140/68 (09-22-24 @ 11:30) (140/68 - 151/61)  RR: 18 (09-22-24 @ 11:30) (16 - 18)  SpO2: 98% (09-22-24 @ 11:30) (97% - 99%)  Wt(kg): --Vital Signs Last 24 Hrs  T(C): 36.4 (22 Sep 2024 11:30), Max: 36.4 (22 Sep 2024 05:12)  T(F): 97.5 (22 Sep 2024 11:30), Max: 97.6 (22 Sep 2024 05:12)  HR: 53 (22 Sep 2024 11:30) (53 - 61)  BP: 140/68 (22 Sep 2024 11:30) (140/68 - 151/61)  BP(mean): 92 (22 Sep 2024 11:30) (92 - 92)  RR: 18 (22 Sep 2024 11:30) (16 - 18)  SpO2: 98% (22 Sep 2024 11:30) (97% - 99%)    Parameters below as of 22 Sep 2024 11:30  Patient On (Oxygen Delivery Method): room air      No Known Allergies      PHYSICAL EXAM:    General NAD  Neck no JVD thyroid stable  Heart regular  Lungs stable decrease bibasilar breath sounds  Abdomen non tender non distended normal bowel sounds no HSM/CVAT  Extremities decreased LUE swelling and non tender BLE non tender no edema +pulses  Neurologic alert and oriented x 3 no acute focal changes        LABS:  CBC Full  -  ( 22 Sep 2024 06:00 )  WBC Count : 2.50 K/uL  RBC Count : 2.71 M/uL  Hemoglobin : 7.7 g/dL  Hematocrit : 25.1 %  Platelet Count - Automated : 64 K/uL  Mean Cell Volume : 92.6 fl  Mean Cell Hemoglobin : 28.4 pg  Mean Cell Hemoglobin Concentration : 30.7 gm/dL  Auto Neutrophil # : x  Auto Lymphocyte # : x  Auto Monocyte # : x  Auto Eosinophil # : x  Auto Basophil # : x  Auto Neutrophil % : x  Auto Lymphocyte % : x  Auto Monocyte % : x  Auto Eosinophil % : x  Auto Basophil % : x                          7.7    2.50  )-----------( 64       ( 22 Sep 2024 06:00 )             25.1     09-22    145  |  109[H]  |  113[H]  ----------------------------<  154[H]  4.8   |  17[L]  |  9.90[H]    Ca    6.8[L]      22 Sep 2024 06:00          Urinalysis Basic - ( 22 Sep 2024 06:00 )    Color: x / Appearance: x / SG: x / pH: x  Gluc: 154 mg/dL / Ketone: x  / Bili: x / Urobili: x   Blood: x / Protein: x / Nitrite: x   Leuk Esterase: x / RBC: x / WBC x   Sq Epi: x / Non Sq Epi: x / Bacteria: x          acetaminophen     Tablet .. 650 milliGRAM(s) Oral every 6 hours PRN  calcium acetate 2001 milliGRAM(s) Oral three times a day with meals  chlorhexidine 2% Cloths 1 Application(s) Topical daily  melatonin 3 milliGRAM(s) Oral at bedtime PRN  metoprolol tartrate 12.5 milliGRAM(s) Oral two times a day  metroNIDAZOLE  IVPB      metroNIDAZOLE  IVPB 500 milliGRAM(s) IV Intermittent every 8 hours  Nephro-sage 1 Tablet(s) Oral daily  ondansetron Injectable 4 milliGRAM(s) IV Push every 8 hours PRN  sodium bicarbonate 1300 milliGRAM(s) Oral three times a day      
  HANG MADERA  89y  Female      patient is resting comfortably and without complaints of SOB. patient states weakness improved. patient denies abdominal pain  REVIEW OF SYSTEMS:    Cardiac HTN  Pulmonary pleural effusions  GI colitis/colon cancer with mets   CRI  Neuro no headache/dizziness/numbness  Heme anemia/MDS    FAMILY HISTORY:  FH: colon cancer (Sibling, Sibling)    FH: breast cancer (Sibling)      T(C): 36.4 (09-24-24 @ 04:48), Max: 36.4 (09-23-24 @ 20:31)  HR: 64 (09-24-24 @ 04:48) (54 - 64)  BP: 159/62 (09-24-24 @ 04:48) (151/82 - 177/58)  RR: 18 (09-24-24 @ 04:48) (18 - 18)  SpO2: 96% (09-24-24 @ 04:48) (96% - 97%)  Wt(kg): --Vital Signs Last 24 Hrs  T(C): 36.4 (24 Sep 2024 04:48), Max: 36.4 (23 Sep 2024 20:31)  T(F): 97.5 (24 Sep 2024 04:48), Max: 97.6 (23 Sep 2024 20:31)  HR: 64 (24 Sep 2024 04:48) (54 - 64)  BP: 159/62 (24 Sep 2024 04:48) (151/82 - 177/58)  BP(mean): 94 (24 Sep 2024 04:48) (94 - 94)  RR: 18 (24 Sep 2024 04:48) (18 - 18)  SpO2: 96% (24 Sep 2024 04:48) (96% - 97%)    Parameters below as of 24 Sep 2024 04:48  Patient On (Oxygen Delivery Method): room air      No Known Allergies      PHYSICAL EXAM:    General NAD  Neck no JV thyroid stable  Heart regular  Lungs stable decreased bilateral BS  Abdomen non tender non distended normal bowel sounds no HSM/CVAT  Extremities decreased LUE edema no tenderness at all four extremities BLE without edema +pulses  Neurologic alert and oriented x 3 no acute focal changes        Consultant(s) Notes Reviewed:  [x ] YES  [ ] NO      LABS:  CBC Full  -  ( 23 Sep 2024 06:00 )  WBC Count : 2.40 K/uL  RBC Count : 2.66 M/uL  Hemoglobin : 7.4 g/dL  Hematocrit : 24.9 %  Platelet Count - Automated : 58 K/uL  Mean Cell Volume : 93.6 fl  Mean Cell Hemoglobin : 27.8 pg  Mean Cell Hemoglobin Concentration : 29.7 gm/dL  Auto Neutrophil # : x  Auto Lymphocyte # : x  Auto Monocyte # : x  Auto Eosinophil # : x  Auto Basophil # : x  Auto Neutrophil % : x  Auto Lymphocyte % : x  Auto Monocyte % : x  Auto Eosinophil % : x  Auto Basophil % : x                          7.4    2.40  )-----------( 58       ( 23 Sep 2024 06:00 )             24.9     09-23    143  |  109[H]  |  117[H]  ----------------------------<  126[H]  5.0   |  13[L]  |  10.17[H]    Ca    6.7[L]      23 Sep 2024 05:38          Urinalysis Basic - ( 23 Sep 2024 05:38 )    Color: x / Appearance: x / SG: x / pH: x  Gluc: 126 mg/dL / Ketone: x  / Bili: x / Urobili: x   Blood: x / Protein: x / Nitrite: x   Leuk Esterase: x / RBC: x / WBC x   Sq Epi: x / Non Sq Epi: x / Bacteria: x      acetaminophen     Tablet .. 650 milliGRAM(s) Oral every 6 hours PRN  calcium acetate 2001 milliGRAM(s) Oral three times a day with meals  chlorhexidine 2% Cloths 1 Application(s) Topical daily  melatonin 3 milliGRAM(s) Oral at bedtime PRN  metoprolol tartrate 12.5 milliGRAM(s) Oral two times a day  metroNIDAZOLE  IVPB      metroNIDAZOLE  IVPB 500 milliGRAM(s) IV Intermittent every 8 hours  Nephro-sage 1 Tablet(s) Oral daily  ondansetron Injectable 4 milliGRAM(s) IV Push every 8 hours PRN  sodium bicarbonate 1300 milliGRAM(s) Oral three times a day            
 Patient is a 89y old  Female who presents with a chief complaint of sob (17 Sep 2024 11:42)    Patient seen and examined at bedside, no event over night. She still has loose to soft BM, no Bowel movement today.     MEDICATIONS:   MEDICATIONS  (STANDING):  amLODIPine   Tablet 10 milliGRAM(s) Oral daily  calcium acetate 1334 milliGRAM(s) Oral three times a day with meals  cefTRIAXone   IVPB 1000 milliGRAM(s) IV Intermittent every 24 hours  chlorhexidine 2% Cloths 1 Application(s) Topical daily  epoetin peyton-epbx (RETACRIT) Injectable 73125 Unit(s) SubCutaneous once  metroNIDAZOLE  IVPB 500 milliGRAM(s) IV Intermittent every 8 hours  metroNIDAZOLE  IVPB      Nephro-sage 1 Tablet(s) Oral daily  sodium bicarbonate 1300 milliGRAM(s) Oral three times a day    MEDICATIONS  (PRN):  acetaminophen     Tablet .. 650 milliGRAM(s) Oral every 6 hours PRN Temp greater or equal to 38C (100.4F), Mild Pain (1 - 3)  melatonin 3 milliGRAM(s) Oral at bedtime PRN Insomnia  ondansetron Injectable 4 milliGRAM(s) IV Push every 8 hours PRN Nausea and/or Vomiting      Packed Red Cells Order:  1 Unit  Indication: Hgb <7 gm/dL  Infuse Unit : 3.5 Hours (09-14-24 @ 10:40)  Packed Red Cells Order:  1 Unit  Indication: Symptomatic Anemia - e.g. Chest Pain, Orthostasis, Tach  Infuse Unit : 3 Hours (09-13-24 @ 13:42)        DIET:  Diet, Regular:   DASH/TLC Sodium & Cholesterol Restricted  No Concentrated Phosphorus (09-16-24 @ 11:29) [Active]          ALLERGIES:   Allergies    No Known Allergies    Intolerances        VITAL SIGNS:   Vital Signs Last 24 Hrs  T(C): 36.4 (17 Sep 2024 12:00), Max: 36.9 (16 Sep 2024 23:39)  T(F): 97.5 (17 Sep 2024 12:00), Max: 98.4 (16 Sep 2024 23:39)  HR: 76 (17 Sep 2024 12:00) (70 - 82)  BP: 123/63 (17 Sep 2024 12:00) (102/64 - 135/61)  BP(mean): --  RR: 17 (17 Sep 2024 12:00) (17 - 17)  SpO2: 96% (17 Sep 2024 12:00) (94% - 97%)    Parameters below as of 17 Sep 2024 12:00  Patient On (Oxygen Delivery Method): nasal cannula  O2 Flow (L/min): 2    I&O's Summary    16 Sep 2024 07:01  -  17 Sep 2024 07:00  --------------------------------------------------------  IN: 400 mL / OUT: 500 mL / NET: -100 mL        PHYSICAL EXAM:   GENERAL:  No acute distress  HEENT:  NC/AT, conjunctiva clear, sclera anicteric  CHEST:  No increased effort  HEART:  Regular rate  ABDOMEN:  Soft, non-tender, non-distended, positive bowel sounds,  EXTREMITIES: No edema  SKIN:  Warm, dry  NEURO:  Calm, cooperative    LABS:                        8.1    2.49  )-----------( 68       ( 17 Sep 2024 07:51 )             26.9     Hemoglobin: 8.1 g/dL (09-17-24 @ 07:51)  Hemoglobin: 8.5 g/dL (09-16-24 @ 07:09)  Hemoglobin: 8.2 g/dL (09-15-24 @ 06:45)  Hemoglobin: 8.3 g/dL (09-14-24 @ 20:25)    09-17    141  |  106  |  102[H]  ----------------------------<  113[H]  4.3   |  17[L]  |  9.29[H]    Ca    6.7[L]      17 Sep 2024 07:51  Phos  7.7     09-16  Mg     1.9     09-16    TPro  4.8[L]  /  Alb  1.9[L]  /  TBili  0.4  /  DBili  x   /  AST  14  /  ALT  <6[L]  /  AlkPhos  59  09-16    LIVER FUNCTIONS - ( 16 Sep 2024 07:09 )  Alb: 1.9 g/dL / Pro: 4.8 g/dL / ALK PHOS: 59 U/L / ALT: <6 U/L / AST: 14 U/L / GGT: x                                 [facility] (09-16-24 @ 10:42)                      RADIOLOGY & ADDITIONAL STUDIES:        
Follow-up Pulmonary Progress Note  Chief Complaint : Pleural effusion, not elsewhere classified    patient seen and examined  comfortable  no cp, sob  on room air      Allergies :No Known Allergies      PAST MEDICAL & SURGICAL HISTORY:  History of breast cancer  right breast    Anemia    Hypertension    History of thrombocytopenia    History of herpes zoster    History of malignant neoplasm of parathyroid gland    Colon cancer    Chronic renal insufficiency    Thyroid nodule    Diabetes mellitus  NIDDM    History of cholecystectomy    History of partial colectomy  and appendectomy at the same time    History of lumpectomy of right breast  2007 and radiation and chemotherapy    Parathyroid adenoma  excision        Medications:  MEDICATIONS  (STANDING):  calcium acetate 2001 milliGRAM(s) Oral three times a day with meals  chlorhexidine 2% Cloths 1 Application(s) Topical daily  metoprolol tartrate 12.5 milliGRAM(s) Oral two times a day  metroNIDAZOLE  IVPB 500 milliGRAM(s) IV Intermittent every 8 hours  metroNIDAZOLE  IVPB      Nephro-sage 1 Tablet(s) Oral daily  sodium bicarbonate 1300 milliGRAM(s) Oral three times a day    MEDICATIONS  (PRN):  acetaminophen     Tablet .. 650 milliGRAM(s) Oral every 6 hours PRN Temp greater or equal to 38C (100.4F), Mild Pain (1 - 3)  melatonin 3 milliGRAM(s) Oral at bedtime PRN Insomnia  ondansetron Injectable 4 milliGRAM(s) IV Push every 8 hours PRN Nausea and/or Vomiting      Antibiotics History  cefTRIAXone   IVPB 1000 milliGRAM(s) IV Intermittent every 24 hours, 09-13-24 @ 14:25, Stop order after: 7 Days  levoFLOXacin IVPB 500 milliGRAM(s) IV Intermittent once, 09-13-24 @ 13:36, Stop order after: 1 Doses  metroNIDAZOLE  IVPB 500 milliGRAM(s) IV Intermittent every 8 hours, 09-13-24 @ 22:00  metroNIDAZOLE  IVPB    , 09-13-24 @ 14:52  metroNIDAZOLE  IVPB 500 milliGRAM(s) IV Intermittent once, 09-13-24 @ 14:42      Heme Medications       GI Medications        LABS:                        7.4    2.61  )-----------( 64       ( 21 Sep 2024 06:21 )             24.8     09-21    145  |  109[H]  |  111[H]  ----------------------------<  134[H]  4.7   |  17[L]  |  9.99[H]    Ca    6.6[L]      21 Sep 2024 06:21  Phos  7.8     09-20  Mg     1.9     09-20    TPro  5.2[L]  /  Alb  2.1[L]  /  TBili  0.4  /  DBili  x   /  AST  20  /  ALT  6[L]  /  AlkPhos  77  09-20         VITALS:  T(C): 36.8 (09-21-24 @ 04:56), Max: 36.8 (09-21-24 @ 04:56)  T(F): 98.2 (09-21-24 @ 04:56), Max: 98.2 (09-21-24 @ 04:56)  HR: 62 (09-21-24 @ 04:56) (61 - 62)  BP: 127/59 (09-21-24 @ 04:56) (127/59 - 154/62)  BP(mean): 82 (09-21-24 @ 04:56) (82 - 82)  ABP: --  ABP(mean): --  RR: 18 (09-21-24 @ 04:56) (18 - 18)  SpO2: 95% (09-21-24 @ 04:56) (95% - 95%)  CVP(mm Hg): --  CVP(cm H2O): --    Ins and Outs     09-20-24 @ 07:01  -  09-21-24 @ 07:00  --------------------------------------------------------  IN: 0 mL / OUT: 1 mL / NET: -1 mL                I&O's Detail    20 Sep 2024 07:01  -  21 Sep 2024 07:00  --------------------------------------------------------  IN:  Total IN: 0 mL    OUT:    Voided (mL): 1 mL  Total OUT: 1 mL    Total NET: -1 mL   
HANG MADERA, 89y Female  MRN: [id number]  ATTENDING: Humble Michele    HPI:  89F, PMHx HTN, herpes zoster, CKD, multinodular thyroid,breast cancer, colon cancer (previously on Keytruda), refractory anemia/MDS, treated supportively and ambulatory (erythropoietin supplements), no recent exposure to systemic chemotherapy, is admitted with generalized weakness. Patient daughter at bedside reports that she have been getting more weaker and unable to ambulate as much. She is also complaining of sob and more generalized edema.    pt saw PCP on Monday and hgb was 7 at that time. Prior to 3 weeks ago, patient was walking independently and now she is unable to walk on her own. Daughter states patient is unsteady on her fee  In the ED, patient was found to have anemia with hgb of 7.1 and worsening Cr of around 9 previously was 6. Refusing HD. She is agreeable to [facility]. She also had CT which shows nonspecific colitis, Increasing right pleural effusion with underlying passive atelectasis with questionable pneumonia. Also suspicion for main and right intrahepatic portal vein bland or tumor thrombus, similar to prior. Pancreatic head/mesenteric mass with central necrosis, similar to prior.Denies any fevers, chills, nausea or vomiting.    MEDICATIONS:  acetaminophen     Tablet .. 650 milliGRAM(s) Oral every 6 hours PRN  amLODIPine   Tablet 10 milliGRAM(s) Oral daily  calcium acetate 1334 milliGRAM(s) Oral three times a day with meals  cefTRIAXone   IVPB 1000 milliGRAM(s) IV Intermittent every 24 hours  melatonin 3 milliGRAM(s) Oral at bedtime PRN  metroNIDAZOLE  IVPB      metroNIDAZOLE  IVPB 500 milliGRAM(s) IV Intermittent every 8 hours  Nephro-sage 1 Tablet(s) Oral daily  ondansetron Injectable 4 milliGRAM(s) IV Push every 8 hours PRN  sodium bicarbonate 1300 milliGRAM(s) Oral two times a day    All other medications reviewed.    SUBJECTIVE:  Resting    VITALS:  T(C): 36.3 (09-16-24 @ 05:05), Max: 36.6 (09-15-24 @ 12:10)  T(F): 97.4 (09-16-24 @ 05:05), Max: 97.8 (09-15-24 @ 12:10)  HR: 72 (09-16-24 @ 05:05) (72 - 76)  BP: 152/61 (09-16-24 @ 05:05) (141/60 - 152/61)    PHYSICAL EXAM:  Constitutional: alert, well developed  HEENT: normocephalic, anicteric sclerae, no mucositis or thrush  Respiratory: bilateral clear to auscultation anteriorly  Cardiovascular : S1, S2 regular, rhythmic, no murmurs, gallops or rubs  Abdomen: soft, distended, + normoactive BS, no palpable HS- megaly  Extremities: no tenderness;  -c/c/e, pulses equal bilaterally    LABS:  (09-16) WBC: 2.12 K/uL,Hemoglobin: 8.5 g/dL, Hematocrit: 28.3 %,  Platelet: 64 K/uL    RADIOLOGY:  ACC: [id number] EXAM:  CT CHEST   ORDERED BY:  MARIFER GIL   PROCEDURE DATE:  09/13/2024    INTERPRETATION:  CLINICAL INFORMATION: Fluid overload.  COMPARISON: 06/22/2023.  CONTRAST/COMPLICATIONS:  IV Contrast: NONE  Oral Contrast: NONE  Complications: None reported at time of study completion  PROCEDURE:  CT of the Chest was performed.  Sagittal and coronal reformats were performed.  FINDINGS:  LUNGS AND LARGE AIRWAYS: Patent central airways. No pulmonary nodules.   Compression atelectasis dependent right lower lobe. Calcified granulomata   left lower lobe and probably within the compressed right lower lobe.  PLEURA: Mild to moderate right and a small left pleural effusion,   increased in comparison to the prior study.  VESSELS: Atherosclerotic calcification including the coronary arteries.   Kuumuj-g-Gwrx with tip at the cavoatrial junction.  HEART: Heart size is normal. Mild pericardial effusion.  MEDIASTINUM AND DIONICIO: No lymphadenopathy.  CHEST WALL AND LOWER NECK:Stable appearance of bilateral thyroid nodules   and substernal thyroid extension.  VISUALIZED UPPER ABDOMEN: Probable numerous hepatic cysts.   Cholecystectomy. Upper portion of previously characterized right   mesenteric mass is noted. Probable splenomegaly.  BONES: Within normal limits.    IMPRESSION:  Mild to moderate right pleural, small left pleural effusion and mild   pericardial effusion. No evidence of pulmonary edema.      
HANG MADERA, 89y Female  MRN: [id number]  ATTENDING: Humble Michele    HPI:  89F, PMHx HTN, herpes zoster, CKD, multinodular thyroid,breast cancer, colon cancer (previously on Keytruda), refractory anemia/MDS, treated supportively and ambulatory (erythropoietin supplements), no recent exposure to systemic chemotherapy, is admitted with generalized weakness. Patient daughter at bedside reports that she have been getting more weaker and unable to ambulate as much. She is also complaining of sob and more generalized edema.    pt saw PCP on Monday and hgb was 7 at that time. Prior to 3 weeks ago, patient was walking independently and now she is unable to walk on her own. Daughter states patient is unsteady on her fee  In the ED, patient was found to have anemia with hgb of 7.1 and worsening Cr of around 9 previously was 6. Refusing HD. She is agreeable to [facility]. She also had CT which shows nonspecific colitis, Increasing right pleural effusion with underlying passive atelectasis with questionable pneumonia. Also suspicion for main and right intrahepatic portal vein bland or tumor thrombus, similar to prior. Pancreatic head/mesenteric mass with central necrosis, similar to prior.Denies any fevers, chills, nausea or vomiting.    MEDICATIONS:  acetaminophen     Tablet .. 650 milliGRAM(s) Oral every 6 hours PRN  amLODIPine   Tablet 10 milliGRAM(s) Oral daily  calcium acetate 1334 milliGRAM(s) Oral three times a day with meals  cefTRIAXone   IVPB 1000 milliGRAM(s) IV Intermittent every 24 hours  melatonin 3 milliGRAM(s) Oral at bedtime PRN  metroNIDAZOLE  IVPB      metroNIDAZOLE  IVPB 500 milliGRAM(s) IV Intermittent every 8 hours  Nephro-sage 1 Tablet(s) Oral daily  ondansetron Injectable 4 milliGRAM(s) IV Push every 8 hours PRN  sodium bicarbonate 1300 milliGRAM(s) Oral two times a day    All other medications reviewed.    SUBJECTIVE:  somnolent, arousable    VITALS:  T(C): 36.3 (09-16-24 @ 05:05), Max: 36.6 (09-15-24 @ 12:10)  T(F): 97.4 (09-16-24 @ 05:05), Max: 97.8 (09-15-24 @ 12:10)  HR: 72 (09-16-24 @ 05:05) (72 - 76)  BP: 152/61 (09-16-24 @ 05:05) (141/60 - 152/61)    PHYSICAL EXAM:  Constitutional: alert, well developed  HEENT: normocephalic, anicteric sclerae, no mucositis or thrush  Respiratory: bilateral clear to auscultation anteriorly  Cardiovascular : S1, S2 regular, rhythmic, no murmurs, gallops or rubs  Abdomen: soft, distended, + normoactive BS, no palpable HS- megaly  Extremities: no tenderness;  -c/c/e, pulses equal bilaterally    LABS:  (09-16) WBC: 2.12 K/uL,Hemoglobin: 8.5 g/dL, Hematocrit: 28.3 %,  Platelet: 64 K/uL    RADIOLOGY:  ACC: [id number] EXAM:  CT CHEST   ORDERED BY:  MARIFER GIL   PROCEDURE DATE:  09/13/2024    INTERPRETATION:  CLINICAL INFORMATION: Fluid overload.  COMPARISON: 06/22/2023.  CONTRAST/COMPLICATIONS:  IV Contrast: NONE  Oral Contrast: NONE  Complications: None reported at time of study completion  PROCEDURE:  CT of the Chest was performed.  Sagittal and coronal reformats were performed.  FINDINGS:  LUNGS AND LARGE AIRWAYS: Patent central airways. No pulmonary nodules.   Compression atelectasis dependent right lower lobe. Calcified granulomata   left lower lobe and probably within the compressed right lower lobe.  PLEURA: Mild to moderate right and a small left pleural effusion,   increased in comparison to the prior study.  VESSELS: Atherosclerotic calcification including the coronary arteries.   Ehnzft-a-Ista with tip at the cavoatrial junction.  HEART: Heart size is normal. Mild pericardial effusion.  MEDIASTINUM AND DIONICIO: No lymphadenopathy.  CHEST WALL AND LOWER NECK:Stable appearance of bilateral thyroid nodules   and substernal thyroid extension.  VISUALIZED UPPER ABDOMEN: Probable numerous hepatic cysts.   Cholecystectomy. Upper portion of previously characterized right   mesenteric mass is noted. Probable splenomegaly.  BONES: Within normal limits.    IMPRESSION:  Mild to moderate right pleural, small left pleural effusion and mild   pericardial effusion. No evidence of pulmonary edema.      
Resting    Vital Signs Last 24 Hrs  T(C): 36.1 (09-14-24 @ 17:22), Max: 36.3 (09-13-24 @ 21:28)  T(F): 97 (09-14-24 @ 17:22), Max: 97.4 (09-13-24 @ 21:28)  HR: 70 (09-14-24 @ 12:45) (60 - 70)  BP: 128/60 (09-14-24 @ 17:22) (123/63 - 133/52)  RR: 18 (09-14-24 @ 17:22) (16 - 18)  SpO2: 96% (09-14-24 @ 17:22) (94% - 97%)    s1s2  b/l air entry  soft, ND  + edema                         8.3    2.23  )-----------( 75       ( 14 Sep 2024 20:25 )             27.6     14 Sep 2024 06:40    146    |  110    |  105    ----------------------------<  178    4.4     |  16     |  9.70     Ca    7.2        14 Sep 2024 06:40  Phos  7.9       14 Sep 2024 06:40  Mg     1.9       14 Sep 2024 06:40    TPro  4.7    /  Alb  1.8    /  TBili  0.4    /  DBili  x      /  AST  9      /  ALT  <6     /  AlkPhos  57     14 Sep 2024 06:40    LIVER FUNCTIONS - ( 14 Sep 2024 06:40 )  Alb: 1.8 g/dL / Pro: 4.7 g/dL / ALK PHOS: 57 U/L / ALT: <6 U/L / AST: 9 U/L / GGT: x           PT/INR - ( 14 Sep 2024 06:40 )   PT: 14.0 sec;   INR: 1.20 ratio      acetaminophen     Tablet .. 650 milliGRAM(s) Oral every 6 hours PRN  amLODIPine   Tablet 10 milliGRAM(s) Oral daily  calcium acetate 667 milliGRAM(s) Oral three times a day with meals  cefTRIAXone   IVPB 1000 milliGRAM(s) IV Intermittent every 24 hours  dextrose 5% 1000 milliLiter(s) IV Continuous <Continuous>  melatonin 3 milliGRAM(s) Oral at bedtime PRN  metroNIDAZOLE  IVPB 500 milliGRAM(s) IV Intermittent every 8 hours  metroNIDAZOLE  IVPB      Nephro-sage 1 Tablet(s) Oral daily  ondansetron Injectable 4 milliGRAM(s) IV Push every 8 hours PRN    A/P:    Hx colon cancer s/p R colectomy, chemo  Pancytopenia, worsening renal fx  Acute LLE DVT  CT A/P w/R renal atrophy, pancreas mass, colitis  V/Q scan indeterminate, but unlikely PE  MAGDI/CKD 5 pre-renal/hemodynamic vs CKD progression (Cr 6.39 - 6/10/24)  AGMA iso worsening renal fx, colitis, malignancy   RRT was d/w pt on last adm in May 2024  At that time pt wish was to avoid HD  D/w her and daughter again yesterday, pt does not wish to be on HD   Agrees to blood transfusion as needed   Gentle IVF w/Bicarb  Heme/Onc f/u   Consider Palliative eval   D/w medicine    [phone]    
Resting    Vital Signs Last 24 Hrs  T(C): 36.4 (09-23-24 @ 20:31), Max: 36.6 (09-23-24 @ 05:00)  T(F): 97.6 (09-23-24 @ 20:31), Max: 97.8 (09-23-24 @ 05:00)  HR: 54 (09-23-24 @ 20:31) (54 - 57)  BP: 177/58 (09-23-24 @ 20:31) (152/55 - 177/58)  RR: 18 (09-23-24 @ 20:31) (18 - 18)  SpO2: 97% (09-23-24 @ 20:31) (96% - 99%)    I&O's Detail    23 Sep 2024 07:01  -  23 Sep 2024 20:34  --------------------------------------------------------  OUT:    Voided (mL): 600 mL  Total OUT: 600 mL    s1s2  b/l air entry  soft, ND  edema                                                                               7.4    2.40  )-----------( 58       ( 23 Sep 2024 06:00 )             24.9     23 Sep 2024 05:38    143    |  109    |  117    ----------------------------<  126    5.0     |  13     |  10.17    Ca    6.7        23 Sep 2024 05:38    acetaminophen     Tablet  650 milliGRAM(s) Oral every 6 hours PRN  calcium acetate 2001 milliGRAM(s) Oral three times a day with meals  chlorhexidine 2% Cloths 1 Application(s) Topical daily  melatonin 3 milliGRAM(s) Oral at bedtime PRN  metoprolol tartrate 12.5 milliGRAM(s) Oral two times a day  metroNIDAZOLE  IVPB      metroNIDAZOLE  IVPB 500 milliGRAM(s) IV Intermittent every 8 hours  Nephro-sage 1 Tablet(s) Oral daily  ondansetron Injectable 4 milliGRAM(s) IV Push every 8 hours PRN  sodium bicarbonate 1300 milliGRAM(s) Oral three times a day    A/P:    Hx colon cancer s/p R colectomy, chemo, s/p Keytruda  Known CKD 5  Pancytopenia, s/p bld tx  Acute LLE DVT  CT A/P w/R renal atrophy, pancreas mass, colitis  V/Q scan indeterminate, unlikely PE  MAGDI/CKD 5 pre-renal/hemodynamic vs CKD progression (Cr 6.39 - 6/10/24)  AGMA iso worsening renal fx, colitis, malignancy   RRT was d/w pt on last adm in May 2024  At that time pt wish was to avoid HD  D/w her again on this adm, pt does not wish to be on HD   Continue Na Bicarb, Phoslo   Avoid nephrotoxins   Comfort directed care is most appropriate    [phone]    
Resting, no SOB, no distress    Vital Signs Last 24 Hrs  T(C): 36.3 (09-18-24 @ 13:50), Max: 36.3 (09-18-24 @ 05:17)  T(F): 97.3 (09-18-24 @ 13:50), Max: 97.3 (09-18-24 @ 05:17)  HR: 71 (09-18-24 @ 17:32) (61 - 74)  BP: 125/58 (09-18-24 @ 13:50) (125/58 - 135/52)  RR: 17 (09-18-24 @ 13:50) (16 - 17)  SpO2: 95% (09-18-24 @ 13:50) (94% - 95%)    s1s2  b/l air entry  soft, ND  sm edema                                                       7.9    2.95  )-----------( 72       ( 18 Sep 2024 05:37 )             26.8     18 Sep 2024 05:37    141    |  106    |  103    ----------------------------<  113    4.5     |  16     |  9.53     Ca    6.6        18 Sep 2024 05:37  Phos  7.8       18 Sep 2024 05:37  Mg     1.9       18 Sep 2024 05:37    TPro  4.9    /  Alb  1.9    /  TBili  0.4    /  DBili  x      /  AST  24     /  ALT  9      /  AlkPhos  73     18 Sep 2024 05:37    LIVER FUNCTIONS - ( 18 Sep 2024 05:37 )  Alb: 1.9 g/dL / Pro: 4.9 g/dL / ALK PHOS: 73 U/L / ALT: 9 U/L / AST: 24 U/L / GGT: x           acetaminophen     Tablet .. 650 milliGRAM(s) Oral every 6 hours PRN  amLODIPine   Tablet 10 milliGRAM(s) Oral daily  calcium acetate 1334 milliGRAM(s) Oral three times a day with meals  cefTRIAXone   IVPB 1000 milliGRAM(s) IV Intermittent every 24 hours  chlorhexidine 2% Cloths 1 Application(s) Topical daily  melatonin 3 milliGRAM(s) Oral at bedtime PRN  metoprolol tartrate 12.5 milliGRAM(s) Oral two times a day  metroNIDAZOLE  IVPB 500 milliGRAM(s) IV Intermittent every 8 hours  metroNIDAZOLE  IVPB      Nephro-sage 1 Tablet(s) Oral daily  ondansetron Injectable 4 milliGRAM(s) IV Push every 8 hours PRN  sodium bicarbonate 1300 milliGRAM(s) Oral three times a day    A/P:    Hx colon cancer s/p R colectomy, chemo, s/p Keytruda  Known CKD 5  Pancytopenia, s/p bld tx  Acute LLE DVT  CT A/P w/R renal atrophy, pancreas mass, colitis  V/Q scan indeterminate, but unlikely PE  MAGDI/CKD 5 pre-renal/hemodynamic vs CKD progression (Cr 6.39 - 6/10/24)  AGMA iso worsening renal fx, colitis, malignancy   RRT was d/w pt on last adm in May 2024  At that time pt wish was to avoid HD  D/w her again on this adm, pt does not wish to be on HD   Na Bicarb, Phoslo as ordered   F/u BMP, Mg, Phos, CBC, UO    [phone]    
Resting, on RA    Vital Signs Last 24 Hrs  T(C): 36.3 (09-25-24 @ 04:47), Max: 36.6 (09-24-24 @ 19:55)  T(F): 97.4 (09-25-24 @ 04:47), Max: 97.9 (09-24-24 @ 19:55)  HR: 63 (09-25-24 @ 04:47) (54 - 66)  BP: 155/63 (09-25-24 @ 04:47) (155/63 - 168/64)  BP(mean): 93 (09-25-24 @ 04:47) (93 - 93)  RR: 18 (09-25-24 @ 04:47) (18 - 18)  SpO2: 97% (09-25-24 @ 04:47) (96% - 100%)    s1s2  b/l air entry  soft, ND  edema                                                                           acetaminophen     Tablet .. 650 milliGRAM(s) Oral every 6 hours PRN  calcium acetate 2001 milliGRAM(s) Oral three times a day with meals  chlorhexidine 2% Cloths 1 Application(s) Topical daily  melatonin 3 milliGRAM(s) Oral at bedtime PRN  metoprolol tartrate 12.5 milliGRAM(s) Oral two times a day  Nephro-sage 1 Tablet(s) Oral daily  ondansetron Injectable 4 milliGRAM(s) IV Push every 8 hours PRN  sodium bicarbonate 1300 milliGRAM(s) Oral three times a day    A/P:    Hx colon cancer s/p R colectomy, chemo, Keytruda  Known CKD 5  Pancytopenia, s/p bld tx  LLE DVT  CT A/P w/R renal atrophy, pancreas mass, colitis  V/Q scan indeterminate, unlikely PE  MAGDI/CKD 5 pre-renal/hemodynamic vs CKD progression (Cr 6.39 - 6/10/24)  AGMA iso worsening renal fx, rx colitis, malignancy   RRT was d/w pt on previous adm in May 2024  At that time pt wish was to avoid HD  D/w her again on this adm, pt does not wish to be on HD   Continue Na Bicarb, Phoslo if c/w goc  DNR/I, no HD  Plan for home hospice     [phone]    
Time of Visit:  Patient seen and examined. pat is lying in bed comfortable , on room air , daugter Cassi at bedside     MEDICATIONS  (STANDING):  amLODIPine   Tablet 10 milliGRAM(s) Oral daily  calcium acetate 1334 milliGRAM(s) Oral three times a day with meals  cefTRIAXone   IVPB 1000 milliGRAM(s) IV Intermittent every 24 hours  chlorhexidine 2% Cloths 1 Application(s) Topical daily  epoetin peyton-epbx (RETACRIT) Injectable 48439 Unit(s) SubCutaneous once  metoprolol tartrate 12.5 milliGRAM(s) Oral two times a day  metroNIDAZOLE  IVPB      metroNIDAZOLE  IVPB 500 milliGRAM(s) IV Intermittent every 8 hours  Nephro-sage 1 Tablet(s) Oral daily  sodium bicarbonate 1300 milliGRAM(s) Oral three times a day      MEDICATIONS  (PRN):  acetaminophen     Tablet .. 650 milliGRAM(s) Oral every 6 hours PRN Temp greater or equal to 38C (100.4F), Mild Pain (1 - 3)  melatonin 3 milliGRAM(s) Oral at bedtime PRN Insomnia  ondansetron Injectable 4 milliGRAM(s) IV Push every 8 hours PRN Nausea and/or Vomiting       Medications up to date at time of exam.      PHYSICAL EXAMINATION:  Patient has no new complaints.  GENERAL: The patient  in no apparent distress.     Vital Signs Last 24 Hrs  T(C): 36.3 (18 Sep 2024 05:17), Max: 36.4 (17 Sep 2024 19:56)  T(F): 97.3 (18 Sep 2024 05:17), Max: 97.5 (17 Sep 2024 19:56)  HR: 74 (18 Sep 2024 05:17) (68 - 74)  BP: 135/52 (18 Sep 2024 05:17) (135/52 - 141/62)  BP(mean): --  RR: 16 (18 Sep 2024 05:17) (16 - 18)  SpO2: 94% (18 Sep 2024 05:17) (94% - 95%)    Parameters below as of 18 Sep 2024 05:17  Patient On (Oxygen Delivery Method): nasal cannula  O2 Flow (L/min): 2     (if applicable)    Chest Tube (if applicable)    HEENT: Head is normocephalic and atraumatic. Extraocular muscles are intact. Mucous membranes are moist.     NECK: Supple, no palpable adenopathy.    LUNGS: Fair bilateral air entry   no wheezing, rales, or rhonchi.    HEART: Regular rate and rhythm without murmur.    ABDOMEN: Soft, nontender, and nondistended.  No hepatosplenomegaly is noted.    : No painful voiding, no pelvic pain    EXTREMITIES: Without any cyanosis, clubbing, rash, lesions or edema.    NEUROLOGIC: Awake, alert, oriented, grossly intact    SKIN: Warm, dry, good turgor.      LABS:                        7.9    2.95  )-----------( 72       ( 18 Sep 2024 05:37 )             26.8     09-18    141  |  106  |  103[H]  ----------------------------<  113[H]  4.5   |  16[L]  |  9.53[H]    Ca    6.6[L]      18 Sep 2024 05:37  Phos  7.8     09-18  Mg     1.9     09-18    TPro  4.9[L]  /  Alb  1.9[L]  /  TBili  0.4  /  DBili  x   /  AST  24  /  ALT  9[L]  /  AlkPhos  73  09-18      Urinalysis Basic - ( 18 Sep 2024 05:37 )    Color: x / Appearance: x / SG: x / pH: x  Gluc: 113 mg/dL / Ketone: x  / Bili: x / Urobili: x   Blood: x / Protein: x / Nitrite: x   Leuk Esterase: x / RBC: x / WBC x   Sq Epi: x / Non Sq Epi: x / Bacteria: x    MICROBIOLOGY: (if applicable)    RADIOLOGY & ADDITIONAL STUDIES:  EKG:   CXR:  ECHO:    IMPRESSION: 89y Female PAST MEDICAL & SURGICAL HISTORY:  History of breast cancer  right breast      Anemia      Hypertension      History of thrombocytopenia      History of herpes zoster      History of malignant neoplasm of parathyroid gland      Colon cancer      Chronic renal insufficiency      Thyroid nodule      Diabetes mellitus  NIDDM      History of cholecystectomy      History of partial colectomy  and appendectomy at the same time      History of lumpectomy of right breast  2007 and radiation and chemotherapy      Parathyroid adenoma  excision       p/w         IMP: This is an 89 yr old woman with HTN, herpes zoster, CKD, multinodular thyroid ,breast cancer 2007, colon cancer 2011 (previously on Keytruda), refractory anemia/MDS, treated supportively and ambulatory (erythropoietin supplements), no recent exposure to systemic chemotherapy, is admitted with generalized weakness due to multiple reasons : CKD ,  severe anemia ( pancytopenia ). Pat denied sob but noted with moderate  pleural effusion R>L probable due to combination of malignancy and CKD .  There is alos b/l LE DVT . Since pat si relatively stable from a pulmonary point of view, no emergent thoracentesis indicated. Pat wish to discuss with family before consenting for thoracentesis .  Now DNR DNI .      Sugg  - V/Q scan neg for PE   - Doppler + b/l DVT   - Continue O2 supp as needed to maintain sat >90%  - Pt refused c thoracentesis   - Transfuse  PRBC as needed to maintain H/H >7/21  - Hold off on anticoag at this time   - Monitor renal fx   - DNR DNI   - Palliative wally     spoke with daughter Cassi at bedside   
  CASSY HANG  89y  Female      patient is resting comfortably. she denies palpitations/SOB/pain/nausea/vomiting. she states weakness is stable    REVIEW OF SYSTEMS:    Cardiac V tach/HTN  Pulmonary pleural effusions  GI colon cancer with mets   CRI  Neuro no headache/dizziness/numbnes  Heme anemia/MDS    FAMILY HISTORY:  FH: colon cancer (Sibling, Sibling)    FH: breast cancer (Sibling)      T(C): 36.8 (09-21-24 @ 04:56), Max: 36.8 (09-21-24 @ 04:56)  HR: 62 (09-21-24 @ 04:56) (61 - 63)  BP: 127/59 (09-21-24 @ 04:56) (120/63 - 154/62)  RR: 18 (09-21-24 @ 04:56) (18 - 18)  SpO2: 95% (09-21-24 @ 04:56) (94% - 95%)  Wt(kg): --Vital Signs Last 24 Hrs  T(C): 36.8 (21 Sep 2024 04:56), Max: 36.8 (21 Sep 2024 04:56)  T(F): 98.2 (21 Sep 2024 04:56), Max: 98.2 (21 Sep 2024 04:56)  HR: 62 (21 Sep 2024 04:56) (61 - 63)  BP: 127/59 (21 Sep 2024 04:56) (120/63 - 154/62)  BP(mean): 82 (21 Sep 2024 04:56) (82 - 82)  RR: 18 (21 Sep 2024 04:56) (18 - 18)  SpO2: 95% (21 Sep 2024 04:56) (94% - 95%)    Parameters below as of 21 Sep 2024 04:56  Patient On (Oxygen Delivery Method): room air      No Known Allergies      PHYSICAL EXAM:    General NAD  Neck no JVD thyroid stable  Heart regular  Lungs stable decreased bibasilar BS  Abdomen non tender non distended normal bowel sounds no HSM/CVAT  Extremities LUE edema decreased and is nontender BLE without edema and non tender +pulses  Neurologic no acute focal changes        LABS:  CBC Full  -  ( 21 Sep 2024 06:21 )  WBC Count : 2.61 K/uL  RBC Count : 2.69 M/uL  Hemoglobin : 7.4 g/dL  Hematocrit : 24.8 %  Platelet Count - Automated : 64 K/uL  Mean Cell Volume : 92.2 fl  Mean Cell Hemoglobin : 27.5 pg  Mean Cell Hemoglobin Concentration : 29.8 gm/dL  Auto Neutrophil # : x  Auto Lymphocyte # : x  Auto Monocyte # : x  Auto Eosinophil # : x  Auto Basophil # : x  Auto Neutrophil % : x  Auto Lymphocyte % : x  Auto Monocyte % : x  Auto Eosinophil % : x  Auto Basophil % : x                          7.4    2.61  )-----------( 64       ( 21 Sep 2024 06:21 )             24.8     09-21    145  |  109[H]  |  111[H]  ----------------------------<  134[H]  4.7   |  17[L]  |  9.99[H]    Ca    6.6[L]      21 Sep 2024 06:21  Phos  7.8     09-20  Mg     1.9     09-20    TPro  5.2[L]  /  Alb  2.1[L]  /  TBili  0.4  /  DBili  x   /  AST  20  /  ALT  6[L]  /  AlkPhos  77  09-20    LIVER FUNCTIONS - ( 20 Sep 2024 06:16 )  Alb: 2.1 g/dL / Pro: 5.2 g/dL / ALK PHOS: 77 U/L / ALT: 6 U/L / AST: 20 U/L / GGT: x             Urinalysis Basic - ( 21 Sep 2024 06:21 )    Color: x / Appearance: x / SG: x / pH: x  Gluc: 134 mg/dL / Ketone: x  / Bili: x / Urobili: x   Blood: x / Protein: x / Nitrite: x   Leuk Esterase: x / RBC: x / WBC x   Sq Epi: x / Non Sq Epi: x / Bacteria: x      Culture - Blood (09.13.24 @ 14:00)   Specimen Source: .Blood Blood-Peripheral  Culture Results:   No growth at 5 days    Culture - Blood (09.13.24 @ 14:00)   Specimen Source: .Blood Blood-Peripheral  Culture Results:   No growth at 5 days          acetaminophen     Tablet .. 650 milliGRAM(s) Oral every 6 hours PRN  calcium acetate 2001 milliGRAM(s) Oral three times a day with meals  chlorhexidine 2% Cloths 1 Application(s) Topical daily  melatonin 3 milliGRAM(s) Oral at bedtime PRN  metoprolol tartrate 12.5 milliGRAM(s) Oral two times a day  metroNIDAZOLE  IVPB 500 milliGRAM(s) IV Intermittent every 8 hours  metroNIDAZOLE  IVPB      Nephro-sage 1 Tablet(s) Oral daily  ondansetron Injectable 4 milliGRAM(s) IV Push every 8 hours PRN  sodium bicarbonate 1300 milliGRAM(s) Oral three times a day          
  HANG MADERA  89y  Female    patient denies SOB/pain/palpitaions. patient states weakness is improved      REVIEW OF SYSTEMS:    Cardiac HTN/V tach  Pulmonary pleural effusions  GI colitis/colon cancer with mets   CRI no dysuria/hematuria  Neuro no headache/dizziness/numbnes    FAMILY HISTORY:  FH: colon cancer (Sibling, Sibling)    FH: breast cancer (Sibling)      T(C): 36.3 (09-19-24 @ 11:46), Max: 36.7 (09-19-24 @ 05:10)  HR: 74 (09-19-24 @ 11:46) (63 - 74)  BP: 112/61 (09-19-24 @ 11:46) (112/61 - 140/61)  RR: 16 (09-19-24 @ 11:46) (16 - 17)  SpO2: 98% (09-19-24 @ 11:46) (96% - 98%)  Wt(kg): --Vital Signs Last 24 Hrs  T(C): 36.3 (19 Sep 2024 11:46), Max: 36.7 (19 Sep 2024 05:10)  T(F): 97.3 (19 Sep 2024 11:46), Max: 98.1 (19 Sep 2024 05:10)  HR: 74 (19 Sep 2024 11:46) (63 - 74)  BP: 112/61 (19 Sep 2024 11:46) (112/61 - 140/61)  BP(mean): --  RR: 16 (19 Sep 2024 11:46) (16 - 17)  SpO2: 98% (19 Sep 2024 11:46) (96% - 98%)    Parameters below as of 19 Sep 2024 11:46  Patient On (Oxygen Delivery Method): room air      No Known Allergies      PHYSICAL EXAM:    General resting comfortably  Neck no JVD thyroid stable  Heart regular  Lungs decreased bibasilar BS  Abdomen non tender non distended normal bowel sounds no HSM/CVAT  Extremities non tender negative cord/ananya sign no edema  Neurologic alert and oriented x 3 no acute focal changes        Consultant(s) Notes Reviewed:  [x ] YES  [ ] NO      LABS:  CBC Full  -  ( 19 Sep 2024 06:45 )  WBC Count : 2.76 K/uL  RBC Count : 2.94 M/uL  Hemoglobin : 8.0 g/dL  Hematocrit : 27.0 %  Platelet Count - Automated : 69 K/uL  Mean Cell Volume : 91.8 fl  Mean Cell Hemoglobin : 27.2 pg  Mean Cell Hemoglobin Concentration : 29.6 gm/dL  Auto Neutrophil # : x  Auto Lymphocyte # : x  Auto Monocyte # : x  Auto Eosinophil # : x  Auto Basophil # : x  Auto Neutrophil % : x  Auto Lymphocyte % : x  Auto Monocyte % : x  Auto Eosinophil % : x  Auto Basophil % : x                          8.0    2.76  )-----------( 69       ( 19 Sep 2024 06:45 )             27.0     09-19    142  |  108  |  114[H]  ----------------------------<  116[H]  4.8   |  15[L]  |  9.70[H]    Ca    6.7[L]      19 Sep 2024 06:45  Phos  7.8     09-19  Mg     1.9     09-19    TPro  5.0[L]  /  Alb  2.0[L]  /  TBili  0.4  /  DBili  x   /  AST  29  /  ALT  8[L]  /  AlkPhos  71  09-19    LIVER FUNCTIONS - ( 19 Sep 2024 06:45 )  Alb: 2.0 g/dL / Pro: 5.0 g/dL / ALK PHOS: 71 U/L / ALT: 8 U/L / AST: 29 U/L / GGT: x             Urinalysis Basic - ( 19 Sep 2024 06:45 )    Color: x / Appearance: x / SG: x / pH: x  Gluc: 116 mg/dL / Ketone: x  / Bili: x / Urobili: x   Blood: x / Protein: x / Nitrite: x   Leuk Esterase: x / RBC: x / WBC x   Sq Epi: x / Non Sq Epi: x / Bacteria: x    Culture - Blood (09.13.24 @ 14:00)   Specimen Source: .Blood Blood-Peripheral  Culture Results:   No growth at 5 days    Culture - Blood (09.13.24 @ 14:00)   Specimen Source: .Blood Blood-Peripheral  Culture Results:   No growth at 5 days    acetaminophen     Tablet .. 650 milliGRAM(s) Oral every 6 hours PRN  amLODIPine   Tablet 10 milliGRAM(s) Oral daily  calcium acetate 1334 milliGRAM(s) Oral three times a day with meals  chlorhexidine 2% Cloths 1 Application(s) Topical daily  melatonin 3 milliGRAM(s) Oral at bedtime PRN  metoprolol tartrate 12.5 milliGRAM(s) Oral two times a day  metroNIDAZOLE  IVPB      metroNIDAZOLE  IVPB 500 milliGRAM(s) IV Intermittent every 8 hours  Nephro-sage 1 Tablet(s) Oral daily  ondansetron Injectable 4 milliGRAM(s) IV Push every 8 hours PRN  sodium bicarbonate 1300 milliGRAM(s) Oral three times a day            
  HANG MADERA  89y  Female      patient denies SOB/pain/palpitations. Generalized weakness stable    REVIEW OF SYSTEMS:    Cardiac V tach/HTN  Pulmonary pleural effusion  GI colon cancer with mets/FOB   no hematuria/dysuria  Neuro no headache/dizziness/numbness  Heme anemia/MDS    FAMILY HISTORY:  FH: colon cancer (Sibling, Sibling)    FH: breast cancer (Sibling)      T(C): 36.6 (09-20-24 @ 13:18), Max: 36.8 (09-20-24 @ 04:45)  HR: 63 (09-20-24 @ 13:18) (63 - 63)  BP: 120/63 (09-20-24 @ 13:18) (120/63 - 123/57)  RR: 18 (09-20-24 @ 13:18) (17 - 18)  SpO2: 94% (09-20-24 @ 13:18) (94% - 97%)  Wt(kg): --Vital Signs Last 24 Hrs  T(C): 36.6 (20 Sep 2024 13:18), Max: 36.8 (20 Sep 2024 04:45)  T(F): 97.8 (20 Sep 2024 13:18), Max: 98.2 (20 Sep 2024 04:45)  HR: 63 (20 Sep 2024 13:18) (63 - 63)  BP: 120/63 (20 Sep 2024 13:18) (120/63 - 123/57)  BP(mean): 82 (20 Sep 2024 13:18) (79 - 82)  RR: 18 (20 Sep 2024 13:18) (17 - 18)  SpO2: 94% (20 Sep 2024 13:18) (94% - 97%)    Parameters below as of 20 Sep 2024 13:18  Patient On (Oxygen Delivery Method): room air      No Known Allergies      PHYSICAL EXAM:    General NAD   Neck no JVD thyroid stable  Heart RRR  Lungs decreased bibasilar BS  Abdomen non tender non distended normal bowel sounds no HSM/CVAT  Extremities right upper extremity swelling and without erythema/pain BLE non tender/no edema +pulses  Neurologic alert and oriented x 3 no acute focal changes        Consultant(s) Notes Reviewed:  [x ] YES  [ ] NO      LABS:  CBC Full  -  ( 20 Sep 2024 06:16 )  WBC Count : 3.54 K/uL  RBC Count : 3.06 M/uL  Hemoglobin : 8.5 g/dL  Hematocrit : 28.5 %  Platelet Count - Automated : 81 K/uL  Mean Cell Volume : 93.1 fl  Mean Cell Hemoglobin : 27.8 pg  Mean Cell Hemoglobin Concentration : 29.8 gm/dL  Auto Neutrophil # : x  Auto Lymphocyte # : x  Auto Monocyte # : x  Auto Eosinophil # : x  Auto Basophil # : x  Auto Neutrophil % : x  Auto Lymphocyte % : x  Auto Monocyte % : x  Auto Eosinophil % : x  Auto Basophil % : x                          8.5    3.54  )-----------( 81       ( 20 Sep 2024 06:16 )             28.5     09-20    143  |  108  |  113[H]  ----------------------------<  125[H]  4.9   |  15[L]  |  9.83[H]    Ca    6.7[L]      20 Sep 2024 06:16  Phos  7.8     09-20  Mg     1.9     09-20    TPro  5.2[L]  /  Alb  2.1[L]  /  TBili  0.4  /  DBili  x   /  AST  20  /  ALT  6[L]  /  AlkPhos  77  09-20    LIVER FUNCTIONS - ( 20 Sep 2024 06:16 )  Alb: 2.1 g/dL / Pro: 5.2 g/dL / ALK PHOS: 77 U/L / ALT: 6 U/L / AST: 20 U/L / GGT: x             Urinalysis Basic - ( 20 Sep 2024 06:16 )    Color: x / Appearance: x / SG: x / pH: x  Gluc: 125 mg/dL / Ketone: x  / Bili: x / Urobili: x   Blood: x / Protein: x / Nitrite: x   Leuk Esterase: x / RBC: x / WBC x   Sq Epi: x / Non Sq Epi: x / Bacteria: x        Occult Blood, Feces (09.16.24 @ 10:42)   Occult Blood, Feces: Positive            acetaminophen     Tablet .. 650 milliGRAM(s) Oral every 6 hours PRN  calcium acetate 2001 milliGRAM(s) Oral three times a day with meals  chlorhexidine 2% Cloths 1 Application(s) Topical daily  melatonin 3 milliGRAM(s) Oral at bedtime PRN  metoprolol tartrate 12.5 milliGRAM(s) Oral two times a day  metroNIDAZOLE  IVPB      metroNIDAZOLE  IVPB 500 milliGRAM(s) IV Intermittent every 8 hours  Nephro-sage 1 Tablet(s) Oral daily  ondansetron Injectable 4 milliGRAM(s) IV Push every 8 hours PRN  sodium bicarbonate 1300 milliGRAM(s) Oral three times a day        
Follow-up Pulmonary Progress Note  Chief Complaint : Pleural effusion, not elsewhere classified          No new events overnight.  Denies SOB/CP.   on room air  discussed role in thoracenteis again, states will be going to home hospice, and does not want invasive procedure as fluid likely to re occur and sob much improved today.     Allergies :No Known Allergies      PAST MEDICAL & SURGICAL HISTORY:  History of breast cancer  right breast    Anemia    Hypertension    History of thrombocytopenia    History of herpes zoster    History of malignant neoplasm of parathyroid gland    Colon cancer    Chronic renal insufficiency    Thyroid nodule    Diabetes mellitus  NIDDM    History of cholecystectomy    History of partial colectomy  and appendectomy at the same time    History of lumpectomy of right breast  2007 and radiation and chemotherapy    Parathyroid adenoma  excision        Medications:  MEDICATIONS  (STANDING):  amLODIPine   Tablet 10 milliGRAM(s) Oral daily  calcium acetate 1334 milliGRAM(s) Oral three times a day with meals  cefTRIAXone   IVPB 1000 milliGRAM(s) IV Intermittent every 24 hours  chlorhexidine 2% Cloths 1 Application(s) Topical daily  epoetin peyton-epbx (RETACRIT) Injectable 67677 Unit(s) SubCutaneous once  metroNIDAZOLE  IVPB      metroNIDAZOLE  IVPB 500 milliGRAM(s) IV Intermittent every 8 hours  Nephro-sage 1 Tablet(s) Oral daily  sodium bicarbonate 1300 milliGRAM(s) Oral three times a day    MEDICATIONS  (PRN):  acetaminophen     Tablet .. 650 milliGRAM(s) Oral every 6 hours PRN Temp greater or equal to 38C (100.4F), Mild Pain (1 - 3)  melatonin 3 milliGRAM(s) Oral at bedtime PRN Insomnia  ondansetron Injectable 4 milliGRAM(s) IV Push every 8 hours PRN Nausea and/or Vomiting      Antibiotics History  cefTRIAXone   IVPB 1000 milliGRAM(s) IV Intermittent every 24 hours, 09-13-24 @ 14:25, Stop order after: 7 Days  levoFLOXacin IVPB 500 milliGRAM(s) IV Intermittent once, 09-13-24 @ 13:36, Stop order after: 1 Doses  metroNIDAZOLE  IVPB    , 09-13-24 @ 14:52  metroNIDAZOLE  IVPB 500 milliGRAM(s) IV Intermittent once, 09-13-24 @ 14:42  metroNIDAZOLE  IVPB 500 milliGRAM(s) IV Intermittent every 8 hours, 09-13-24 @ 22:00      Heme Medications       GI Medications        LABS:                        8.1    2.49  )-----------( 68       ( 17 Sep 2024 07:51 )             26.9     09-17    141  |  106  |  102[H]  ----------------------------<  113[H]  4.3   |  17[L]  |  9.29[H]    Ca    6.7[L]      17 Sep 2024 07:51  Phos  7.7     09-16  Mg     1.9     09-16    TPro  4.8[L]  /  Alb  1.9[L]  /  TBili  0.4  /  DBili  x   /  AST  14  /  ALT  <6[L]  /  AlkPhos  59  09-16              Urinalysis Basic - ( 17 Sep 2024 07:51 )    Color: x / Appearance: x / SG: x / pH: x  Gluc: 113 mg/dL / Ketone: x  / Bili: x / Urobili: x   Blood: x / Protein: x / Nitrite: x   Leuk Esterase: x / RBC: x / WBC x   Sq Epi: x / Non Sq Epi: x / Bacteria: x              CULTURES: (if applicable)    Culture - Blood (collected 09-13-24 @ 14:00)  Source: .Blood Blood-Peripheral  Preliminary Report (09-16-24 @ 22:01):    No growth at 72 Hours    Culture - Blood (collected 09-13-24 @ 14:00)  Source: .Blood Blood-Peripheral  Preliminary Report (09-16-24 @ 22:01):    No growth at 72 Hours            CAPILLARY BLOOD GLUCOSE          RADIOLOGY  CXR:      CT:    ECHO:      VITALS:  T(C): 36.3 (09-17-24 @ 05:07), Max: 36.9 (09-16-24 @ 23:39)  T(F): 97.3 (09-17-24 @ 05:07), Max: 98.4 (09-16-24 @ 23:39)  HR: 82 (09-17-24 @ 05:07) (70 - 82)  BP: 135/61 (09-17-24 @ 05:07) (102/64 - 135/61)  BP(mean): --  ABP: --  ABP(mean): --  RR: 17 (09-17-24 @ 05:07) (17 - 17)  SpO2: 94% (09-17-24 @ 05:07) (94% - 98%)  CVP(mm Hg): --  CVP(cm H2O): --    Ins and Outs     09-16-24 @ 07:01  -  09-17-24 @ 07:00  --------------------------------------------------------  IN: 400 mL / OUT: 500 mL / NET: -100 mL                I&O's Detail    16 Sep 2024 07:01  -  17 Sep 2024 07:00  --------------------------------------------------------  IN:    Oral Fluid: 400 mL  Total IN: 400 mL    OUT:    Voided (mL): 500 mL  Total OUT: 500 mL    Total NET: -100 mL     
Follow-up Pulmonary Progress Note  Chief Complaint : Pleural effusion, not elsewhere classified    HPI:  89 year old femlae with HTN, herpes zoster, CKD/ESRD refusing HD, multinodular thyroid,breast cancer, colon cancer (previously on Keytruda), refractory anemia/MDS, treated supportively and ambulatory (erythropoietin supplements), who  is admitted with generalized weakness on 9/13/2024.      Today-  patient seen and examined  comfortable, less sob, no cp, palp on o2  was asked to f/u to see if patient will require thoracentesis  patient at this time on 2 L n/c, denies cough, sob, +garcía +LE  discussed risks and benefits of thoracentesis with patient and dtr beside  Discussed at this time respiratory status on n/c appears stable and patient states feeling better from a respiratory status  Discussed role of thoracentesis can be threraputic and diagnostic  From a Therapeutic perspective as patient not in distress, not a rush  from a diagnostic perspective DDX includes malignant effusion vs transusdate from ESRD     as patient does not want HD or chemo, diagnostically -  no urgent indication  discussed that with plts < 100 and BUN > 80 high risk of bleeding from interventions  discussed with PMD - Dr. Alvares as well  decision was made to hold off on intervention at this time and re evaluate situation , if sob/dyspnia/respiratory distress would plan for a therapeutic drainage             Allergies :No Known Allergies      PAST MEDICAL & SURGICAL HISTORY:  History of breast cancer  right breast    Anemia    Hypertension    History of thrombocytopenia    History of herpes zoster    History of malignant neoplasm of parathyroid gland    Colon cancer    Chronic renal insufficiency    Thyroid nodule    Diabetes mellitus  NIDDM    History of cholecystectomy    History of partial colectomy  and appendectomy at the same time    History of lumpectomy of right breast  2007 and radiation and chemotherapy    Parathyroid adenoma  excision        Medications:  MEDICATIONS  (STANDING):  amLODIPine   Tablet 10 milliGRAM(s) Oral daily  calcium acetate 1334 milliGRAM(s) Oral three times a day with meals  cefTRIAXone   IVPB 1000 milliGRAM(s) IV Intermittent every 24 hours  chlorhexidine 2% Cloths 1 Application(s) Topical daily  metroNIDAZOLE  IVPB      metroNIDAZOLE  IVPB 500 milliGRAM(s) IV Intermittent every 8 hours  Nephro-sage 1 Tablet(s) Oral daily  sodium bicarbonate 1300 milliGRAM(s) Oral two times a day    MEDICATIONS  (PRN):  acetaminophen     Tablet .. 650 milliGRAM(s) Oral every 6 hours PRN Temp greater or equal to 38C (100.4F), Mild Pain (1 - 3)  melatonin 3 milliGRAM(s) Oral at bedtime PRN Insomnia  ondansetron Injectable 4 milliGRAM(s) IV Push every 8 hours PRN Nausea and/or Vomiting      Antibiotics History  cefTRIAXone   IVPB 1000 milliGRAM(s) IV Intermittent every 24 hours, 09-13-24 @ 14:25, Stop order after: 7 Days  levoFLOXacin IVPB 500 milliGRAM(s) IV Intermittent once, 09-13-24 @ 13:36, Stop order after: 1 Doses  metroNIDAZOLE  IVPB    , 09-13-24 @ 14:52  metroNIDAZOLE  IVPB 500 milliGRAM(s) IV Intermittent once, 09-13-24 @ 14:42  metroNIDAZOLE  IVPB 500 milliGRAM(s) IV Intermittent every 8 hours, 09-13-24 @ 22:00           LABS:                        8.5    2.12  )-----------( 64       ( 16 Sep 2024 07:09 )             28.3     09-16    143  |  109<H>  |  105<H>  ----------------------------<  119<H>  4.5   |  13<L>  |  9.29<H>    Ca    6.9<L>      16 Sep 2024 07:09  Phos  7.7     09-16  Mg     1.9     09-16    TPro  4.8<L>  /  Alb  1.9<L>  /  TBili  0.4  /  DBili  x   /  AST  14  /  ALT  <6<L>  /  AlkPhos  59  09-16           RADIOLOGY  CXR:      CT:    < from: CT Chest No Cont (09.13.24 @ 15:08) >  ACC: [id number] EXAM:  CT CHEST   ORDERED BY:  MARIFER GIL     PROCEDURE DATE:  09/13/2024          INTERPRETATION:  CLINICAL INFORMATION: Fluid overload.    COMPARISON: 06/22/2023.    CONTRAST/COMPLICATIONS:  IV Contrast: NONE  Oral Contrast: NONE  Complications: None reported at time of study completion    PROCEDURE:  CT of the Chest was performed.  Sagittal and coronal reformats were performed.    FINDINGS:    LUNGS AND LARGE AIRWAYS: Patent central airways. No pulmonary nodules.   Compression atelectasis dependent right lower lobe. Calcified granulomata   left lower lobe and probably within the compressed right lower lobe.  PLEURA: Mild to moderate right and a small left pleural effusion,   increased in comparison to the prior study.  VESSELS: Atherosclerotic calcification including the coronary arteries.   Gajyii-d-Vldy with tip at the cavoatrial junction.  HEART: Heart size is normal. Mild pericardial effusion.  MEDIASTINUM AND DIONICIO: No lymphadenopathy.  CHEST WALL AND LOWER NECK:Stable appearance of bilateral thyroid nodules   and substernal thyroid extension.  VISUALIZED UPPER ABDOMEN: Probable numerous hepatic cysts.   Cholecystectomy. Upper portion of previously characterized right   mesenteric mass is noted. Probable splenomegaly.  BONES: Within normal limits.    IMPRESSION:  Mild to moderate right pleural, small left pleural effusion and mild   pericardial effusion. No evidence of pulmonary edema.    Additional findings as above.        --- End of Report ---    < end of copied text >  ECHO:    < from: TTE Echo Complete w/o Contrast w/ Doppler (09.14.24 @ 09:48) >  Summary:   1. Left ventricular ejection fraction, by visual estimation,is 60 to 65%.   2. Normal global left ventricular systolic function.   3. Mildly increased LV wall thickness.   4. Spectral Doppler shows impaired relaxation pattern of left ventricular myocardial filling (Grade I diastolic dysfunction).   5. There is mild concentric left ventricular hypertrophy.   6. The left atrium is normal in size.   7. The right atrium is normal in size.   8. Prominent eustachian valve likely.   9. There is a significant pericardial fat pad present.  10. Mild mitral annular calcification.  11. Mild mitral valve regurgitation.  12. Mild thickening of the anterior and posterior mitral valve leaflets.  13. Mild-moderate tricuspid regurgitation.  14. Structurally normal tricuspid valve, with normal leaflet excursion.  15. Sclerotic aortic valve with normal opening.  16. Mild pulmonic valve regurgitation.  17. Structurally normal pulmonic valve, with normal leaflet excursion.  18. Estimated pulmonary artery systolic pressure is 39.2 mmHg assuming a right atrial pressure of 3 mmHg, which is consistent with borderline pulmonary hypertension.  19. The main pulmonary artery is normal in size.    [id number] Italo Jorge , Electronically signed on 9/14/2024 at   11:27:20 AM    < end of copied text >    US   < from: US Duplex Venous Lower Ext Complete, Bilateral (09.13.24 @ 16:31) >    IMPRESSION:  Acute deep venous thrombosis: below the knee.    Left peroneal vein thrombosis.    Findings were discussed with nurse Fanny [phone] 9/13/2024 4:42 PM by   Dr. Allen with read back confirmation.    --- End of Report ---    < end of copied text >    VQ  < from: NM Pulmonary Ventilation/Perfusion Scan (09.14.24 @ 11:41) >    Indeterminate for pulmonary embolism due to matched perfusion defects,   partially corresponding with pleural effusions.    No ventilation/perfusion mismatches that would suggest pulmonary embolism.      < end of copied text >    VITALS:  T(C): 36.3 (09-16-24 @ 05:05), Max: 36.6 (09-15-24 @ 12:10)  T(F): 97.4 (09-16-24 @ 05:05), Max: 97.8 (09-15-24 @ 12:10)  HR: 72 (09-16-24 @ 05:05) (72 - 76)  BP: 152/61 (09-16-24 @ 05:05) (141/60 - 152/61)  BP(mean): --  ABP: --  ABP(mean): --  RR: 17 (09-16-24 @ 05:05) (16 - 17)  SpO2: 95% (09-16-24 @ 05:05) (95% - 98%)  CVP(mm Hg): --  CVP(cm H2O): --    Ins and Outs     09-15-24 @ 07:01  -  09-16-24 @ 07:00  --------------------------------------------------------  IN: 200 mL / OUT: 0 mL / NET: 200 mL        Height (cm): 162.6 (09-13-24 @ 16:16)  Weight (kg): 65.6 (09-13-24 @ 16:16)  BMI (kg/m2): 24.8 (09-13-24 @ 16:16)        I&O's Detail    15 Sep 2024 07:01  -  16 Sep 2024 07:00  --------------------------------------------------------  IN:    Oral Fluid: 200 mL  Total IN: 200 mL    OUT:  Total OUT: 0 mL    Total NET: 200 mL     Physical Examination:  GENERAL:               Alert, Oriented, no acute distress.    HEENT:                   No JVD, Dry MM  PULM:                     Bilateral air entry, dimminished to auscultation bilaterally, no significant sputum production, + Rales, No Rhonchi, No Wheezing  CVS:                         S1, S2,  +Murmur  ABD:                        Soft, nondistended, nontender, normoactive bowel sounds,   EXT:                         +edema, nontender, No Cyanosis or Clubbing    NEURO:                  Alert, oriented, interactive, nonfocal, follows commands  PSYC:                      Calm, + Insight.        
Follow-up Pulmonary Progress Note  Chief Complaint : Pleural effusion, not elsewhere classified    pt on room air  denies respiratory distress  no complaints.       Allergies :No Known Allergies      PAST MEDICAL & SURGICAL HISTORY:  History of breast cancer  right breast    Anemia    Hypertension    History of thrombocytopenia    History of herpes zoster    History of malignant neoplasm of parathyroid gland    Colon cancer    Chronic renal insufficiency    Thyroid nodule    Diabetes mellitus  NIDDM    History of cholecystectomy    History of partial colectomy  and appendectomy at the same time    History of lumpectomy of right breast  2007 and radiation and chemotherapy    Parathyroid adenoma  excision        Medications:  MEDICATIONS  (STANDING):  calcium acetate 2001 milliGRAM(s) Oral three times a day with meals  chlorhexidine 2% Cloths 1 Application(s) Topical daily  metoprolol tartrate 12.5 milliGRAM(s) Oral two times a day  metroNIDAZOLE  IVPB      metroNIDAZOLE  IVPB 500 milliGRAM(s) IV Intermittent every 8 hours  Nephro-sage 1 Tablet(s) Oral daily  sodium bicarbonate 1300 milliGRAM(s) Oral three times a day    MEDICATIONS  (PRN):  acetaminophen     Tablet .. 650 milliGRAM(s) Oral every 6 hours PRN Temp greater or equal to 38C (100.4F), Mild Pain (1 - 3)  melatonin 3 milliGRAM(s) Oral at bedtime PRN Insomnia  ondansetron Injectable 4 milliGRAM(s) IV Push every 8 hours PRN Nausea and/or Vomiting      Antibiotics History  cefTRIAXone   IVPB 1000 milliGRAM(s) IV Intermittent every 24 hours, 09-13-24 @ 14:25, Stop order after: 7 Days  levoFLOXacin IVPB 500 milliGRAM(s) IV Intermittent once, 09-13-24 @ 13:36, Stop order after: 1 Doses  metroNIDAZOLE  IVPB 500 milliGRAM(s) IV Intermittent every 8 hours, 09-13-24 @ 22:00  metroNIDAZOLE  IVPB    , 09-13-24 @ 14:52  metroNIDAZOLE  IVPB 500 milliGRAM(s) IV Intermittent once, 09-13-24 @ 14:42      Heme Medications       GI Medications        LABS:                        8.5    3.54  )-----------( 81       ( 20 Sep 2024 06:16 )             28.5     09-20    143  |  108  |  113[H]  ----------------------------<  125[H]  4.9   |  15[L]  |  9.83[H]    Ca    6.7[L]      20 Sep 2024 06:16  Phos  7.8     09-20  Mg     1.9     09-20    TPro  5.2[L]  /  Alb  2.1[L]  /  TBili  0.4  /  DBili  x   /  AST  20  /  ALT  6[L]  /  AlkPhos  77  09-20         VITALS:  T(C): 36.6 (09-20-24 @ 13:18), Max: 36.8 (09-20-24 @ 04:45)  T(F): 97.8 (09-20-24 @ 13:18), Max: 98.2 (09-20-24 @ 04:45)  HR: 63 (09-20-24 @ 13:18) (57 - 63)  BP: 120/63 (09-20-24 @ 13:18) (114/62 - 123/57)  BP(mean): 82 (09-20-24 @ 13:18) (79 - 82)  ABP: --  ABP(mean): --  RR: 18 (09-20-24 @ 13:18) (17 - 18)  SpO2: 94% (09-20-24 @ 13:18) (94% - 97%)  CVP(mm Hg): --  CVP(cm H2O): --    Ins and Outs     09-20-24 @ 07:01  -  09-20-24 @ 18:07  --------------------------------------------------------  IN: 0 mL / OUT: 1 mL / NET: -1 mL         I&O's Detail    20 Sep 2024 07:01  -  20 Sep 2024 18:07  --------------------------------------------------------  IN:  Total IN: 0 mL    OUT:    Voided (mL): 1 mL  Total OUT: 1 mL    Total NET: -1 mL               
HANG MADERA, 89y Female  MRN: [id number]  ATTENDING: Humble Michele    HPI:  89F, PMHx HTN, herpes zoster, CKD, multinodular thyroid,breast cancer, colon cancer (previously on Keytruda), refractory anemia/MDS, treated supportively and ambulatory (erythropoietin supplements), no recent exposure to systemic chemotherapy, is admitted with generalized weakness. Patient daughter at bedside reports that she have been getting more weaker and unable to ambulate as much. She is also complaining of sob and more generalized edema.    pt saw PCP on Monday and hgb was 7 at that time. Prior to 3 weeks ago, patient was walking independently and now she is unable to walk on her own. Daughter states patient is unsteady on her fee  In the ED, patient was found to have anemia with hgb of 7.1 and worsening Cr of around 9 previously was 6. Refusing HD. She is agreeable to [facility]. She also had CT which shows nonspecific colitis, Increasing right pleural effusion with underlying passive atelectasis with questionable pneumonia. Also suspicion for main and right intrahepatic portal vein bland or tumor thrombus, similar to prior. Pancreatic head/mesenteric mass with central necrosis, similar to prior.Denies any fevers, chills, nausea or vomiting.    MEDICATIONS:  acetaminophen     Tablet .. 650 milliGRAM(s) Oral every 6 hours PRN  amLODIPine   Tablet 10 milliGRAM(s) Oral daily  calcium acetate 1334 milliGRAM(s) Oral three times a day with meals  cefTRIAXone   IVPB 1000 milliGRAM(s) IV Intermittent every 24 hours  melatonin 3 milliGRAM(s) Oral at bedtime PRN  metroNIDAZOLE  IVPB      metroNIDAZOLE  IVPB 500 milliGRAM(s) IV Intermittent every 8 hours  Nephro-sage 1 Tablet(s) Oral daily  ondansetron Injectable 4 milliGRAM(s) IV Push every 8 hours PRN  sodium bicarbonate 1300 milliGRAM(s) Oral two times a day    All other medications reviewed.    SUBJECTIVE:  Offers no new complaints    VITALS:  T(C): 36.3 (09-16-24 @ 05:05), Max: 36.6 (09-15-24 @ 12:10)  T(F): 97.4 (09-16-24 @ 05:05), Max: 97.8 (09-15-24 @ 12:10)  HR: 72 (09-16-24 @ 05:05) (72 - 76)  BP: 152/61 (09-16-24 @ 05:05) (141/60 - 152/61)    PHYSICAL EXAM:  Constitutional: alert, well developed  HEENT: normocephalic, anicteric sclerae, no mucositis or thrush  Respiratory: bilateral clear to auscultation anteriorly  Cardiovascular : S1, S2 regular, rhythmic, no murmurs, gallops or rubs  Abdomen: soft, distended, + normoactive BS, no palpable HS- megaly  Extremities: no tenderness;  -c/c/e, pulses equal bilaterally    LABS:  (09-16) WBC: 2.12 K/uL,Hemoglobin: 8.5 g/dL, Hematocrit: 28.3 %,  Platelet: 64 K/uL    RADIOLOGY:  ACC: [id number] EXAM:  CT CHEST   ORDERED BY:  MARIFER GIL   PROCEDURE DATE:  09/13/2024    INTERPRETATION:  CLINICAL INFORMATION: Fluid overload.  COMPARISON: 06/22/2023.  CONTRAST/COMPLICATIONS:  IV Contrast: NONE  Oral Contrast: NONE  Complications: None reported at time of study completion  PROCEDURE:  CT of the Chest was performed.  Sagittal and coronal reformats were performed.  FINDINGS:  LUNGS AND LARGE AIRWAYS: Patent central airways. No pulmonary nodules.   Compression atelectasis dependent right lower lobe. Calcified granulomata   left lower lobe and probably within the compressed right lower lobe.  PLEURA: Mild to moderate right and a small left pleural effusion,   increased in comparison to the prior study.  VESSELS: Atherosclerotic calcification including the coronary arteries.   Uyxrsd-j-Ljau with tip at the cavoatrial junction.  HEART: Heart size is normal. Mild pericardial effusion.  MEDIASTINUM AND DIONICIO: No lymphadenopathy.  CHEST WALL AND LOWER NECK:Stable appearance of bilateral thyroid nodules   and substernal thyroid extension.  VISUALIZED UPPER ABDOMEN: Probable numerous hepatic cysts.   Cholecystectomy. Upper portion of previously characterized right   mesenteric mass is noted. Probable splenomegaly.  BONES: Within normal limits.    IMPRESSION:  Mild to moderate right pleural, small left pleural effusion and mild   pericardial effusion. No evidence of pulmonary edema.      
  Progress:  pt oob in recliner, nad, in good spirits     Present Symptoms:   Dyspnea: no  Nausea/Vomiting: no  Anxiety:    Depressed Mood: no  Fatigue: no  Loss of appetite: no  Pain:                no      Review of Systems: above MEDICATIONS  (STANDING):  amLODIPine   Tablet 10 milliGRAM(s) Oral daily  calcium acetate 1334 milliGRAM(s) Oral three times a day with meals  cefTRIAXone   IVPB 1000 milliGRAM(s) IV Intermittent every 24 hours  chlorhexidine 2% Cloths 1 Application(s) Topical daily  epoetin peyton-epbx (RETACRIT) Injectable 64003 Unit(s) SubCutaneous once  metroNIDAZOLE  IVPB      metroNIDAZOLE  IVPB 500 milliGRAM(s) IV Intermittent every 8 hours  Nephro-sage 1 Tablet(s) Oral daily  sodium bicarbonate 1300 milliGRAM(s) Oral three times a day    MEDICATIONS  (PRN):  acetaminophen     Tablet .. 650 milliGRAM(s) Oral every 6 hours PRN Temp greater or equal to 38C (100.4F), Mild Pain (1 - 3)  melatonin 3 milliGRAM(s) Oral at bedtime PRN Insomnia  ondansetron Injectable 4 milliGRAM(s) IV Push every 8 hours PRN Nausea and/or Vomiting      PHYSICAL EXAM:  Vital Signs Last 24 Hrs  T(C): 36.4 (17 Sep 2024 12:00), Max: 36.9 (16 Sep 2024 23:39)  T(F): 97.5 (17 Sep 2024 12:00), Max: 98.4 (16 Sep 2024 23:39)  HR: 76 (17 Sep 2024 12:00) (70 - 82)  BP: 123/63 (17 Sep 2024 12:00) (102/64 - 135/61)  BP(mean): --  RR: 17 (17 Sep 2024 12:00) (17 - 17)  SpO2: 96% (17 Sep 2024 12:00) (94% - 97%)    Parameters below as of 17 Sep 2024 12:00  Patient On (Oxygen Delivery Method): nasal cannula  O2 Flow (L/min): 2  - room air     General: alert  oriented x 3 , converses      HEENT: n/c, a/t , lips dry     Lungs: breathing, non labored ,comfortable   CV: normal rate   GI: normal, soft + hernia    : normal    Musculoskeletal: normal, johnson's, w/ weakness    Skin: pale, w/d     Neuro: awake, alert, oriented, [place] , converses    Oral intake ability:  oral feeding   Diet: reg as esvin      LABS:                          8.1    2.49  )-----------( 68       ( 17 Sep 2024 07:51 )             26.9     09-17    141  |  106  |  102[H]  ----------------------------<  113[H]  4.3   |  17[L]  |  9.29[H]    Ca    6.7[L]      17 Sep 2024 07:51  Phos  7.7     09-16  Mg     1.9     09-16    TPro  4.8[L]  /  Alb  1.9[L]  /  TBili  0.4  /  DBili  x   /  AST  14  /  ALT  <6[L]  /  AlkPhos  59  09-16    Urinalysis Basic - ( 17 Sep 2024 07:51 )    Color: x / Appearance: x / SG: x / pH: x  Gluc: 113 mg/dL / Ketone: x  / Bili: x / Urobili: x   Blood: x / Protein: x / Nitrite: x   Leuk Esterase: x / RBC: x / WBC x   Sq Epi: x / Non Sq Epi: x / Bacteria: x        RADIOLOGY & ADDITIONAL STUDIES:     ADVANCE DIRECTIVES:  hcp  Molst dnr/dni , no feed tubes   Advanced Care Planning discussion total time spent:  
HANG MADERA   89y   Female    Admitting: KODI Barnett  HPI:  89F, PMHx HTN, herpes zoster, CKD, multinodular thyroid,breast cancer, colon cancer (previously on Keytruda), refractory anemia/MDS, treated supportively, no recent exposure to systemic chemotherapy, is admitted with generalized weakness. Patient daughter at bedside reports that she have been getting weaker and unable to ambulate as much. She is also complaining of sob and more generalized edema.  In the ED, patient was found to have anemia with hgb of 7.1 and worsening Cr of around 9.  Pt is refusing HD. She is agreeble to blood. She also had CT which shows nonspecific colitis,increasing right pleural effusion with underlying passive atelectasis with questionable pneumonia.  also suspicion for main and right intrahepatic portal vein bland or tumor thrombus, similar to prior. Pancreatic head/mesenteric mass with central necrosis, similar to prior.  She denies any fevers, chills, nausea or vomiting.    PAST MEDICAL & SURGICAL HISTORY:  History of breast cancer  right breast      Anemia      Hypertension      History of thrombocytopenia      History of herpes zoster      History of malignant neoplasm of parathyroid gland      Colon cancer      Chronic renal insufficiency      Thyroid nodule      Diabetes mellitus  NIDDM      History of cholecystectomy      History of partial colectomy  and appendectomy at the same time      History of lumpectomy of right breast  2007 and radiation and chemotherapy      Parathyroid adenoma  excision        HEALTH ISSUES - PROBLEM Dx:  Suspected deep vein thrombosis (DVT)    Suspected pulmonary embolism    MDS (myelodysplastic syndrome)    Colitis          MEDICATIONS  (STANDING):  calcium acetate 2001 milliGRAM(s) Oral three times a day with meals  chlorhexidine 2% Cloths 1 Application(s) Topical daily  metoprolol tartrate 12.5 milliGRAM(s) Oral two times a day  metroNIDAZOLE  IVPB      metroNIDAZOLE  IVPB 500 milliGRAM(s) IV Intermittent every 8 hours  Nephro-sage 1 Tablet(s) Oral daily  sodium bicarbonate 1300 milliGRAM(s) Oral three times a day    MEDICATIONS  (PRN):  acetaminophen     Tablet .. 650 milliGRAM(s) Oral every 6 hours PRN Temp greater or equal to 38C (100.4F), Mild Pain (1 - 3)  melatonin 3 milliGRAM(s) Oral at bedtime PRN Insomnia  ondansetron Injectable 4 milliGRAM(s) IV Push every 8 hours PRN Nausea and/or Vomiting    Allergies    No Known Allergies    INTERVAL HPI/OVERNIGHT EVENTS:  Patient S&E at bedside. Resting comfortably; denies pain     VITAL SIGNS:  T(F): 97.8 (09-23-24 @ 05:00)  HR: 56 (09-23-24 @ 05:00)  BP: 154/57 (09-23-24 @ 05:00)  RR: 18 (09-23-24 @ 05:00)  SpO2: 96% (09-23-24 @ 05:00)      PHYSICAL EXAM:  Constitutional: NAD  Eyes: sclera non-icteric  Neck: no JVD  Respiratory: decreased BS bases ant.  Cardiovascular: RRR, no M/R/G  Gastrointestinal: soft, NT  Extremities: no calf tenderness; +edema  Neurological: Awake, alert.    Labs:             7.4    2.40  )-----------( 58       ( 09-23 @ 06:00 )             24.9                7.7    2.50  )-----------( 64       ( 09-22 @ 06:00 )             25.1                7.4    2.61  )-----------( 64       ( 09-21 @ 06:21 )             24.8       09-23    143  |  109[H]  |  117[H]  ----------------------------<  126[H]  5.0   |  13[L]  |  10.17[H]    Ca    6.7[L]      23 Sep 2024 05:38    Consultant notes reviewed : YES [x ] ; NO [ ]  
HANG MADERA   89y   Female    Admitting: KODI Barnett  HPI:  89F, PMHx HTN, herpes zoster, CKD, multinodular thyroid,breast cancer, colon cancer (previously on Keytruda), refractory anemia/MDS, treated supportively, no recent exposure to systemic chemotherapy, is admitted with generalized weakness. Patient daughter at bedside reports that she have been getting weaker and unable to ambulate as much. She is also complaining of sob and more generalized edema.  In the ED, patient was found to have anemia with hgb of 7.1 and worsening Cr of around 9.  Pt is refusing HD. She is agreeble to blood. She also had CT which shows nonspecific colitis,increasing right pleural effusion with underlying passive atelectasis with questionable pneumonia.  also suspicion for main and right intrahepatic portal vein bland or tumor thrombus, similar to prior. Pancreatic head/mesenteric mass with central necrosis, similar to prior.  She denies any fevers, chills, nausea or vomiting.    PAST MEDICAL & SURGICAL HISTORY:  History of breast cancer  right breast      Anemia      Hypertension      History of thrombocytopenia      History of herpes zoster      History of malignant neoplasm of parathyroid gland      Colon cancer      Chronic renal insufficiency      Thyroid nodule      Diabetes mellitus  NIDDM      History of cholecystectomy      History of partial colectomy  and appendectomy at the same time      History of lumpectomy of right breast  2007 and radiation and chemotherapy      Parathyroid adenoma  excision        HEALTH ISSUES - PROBLEM Dx:  Suspected deep vein thrombosis (DVT)    Suspected pulmonary embolism    MDS (myelodysplastic syndrome)    Colitis    Colon cancer    Anemia secondary to renal failure          MEDICATIONS  (STANDING):  calcium acetate 2001 milliGRAM(s) Oral three times a day with meals  chlorhexidine 2% Cloths 1 Application(s) Topical daily  metoprolol tartrate 12.5 milliGRAM(s) Oral two times a day  metroNIDAZOLE  IVPB      metroNIDAZOLE  IVPB 500 milliGRAM(s) IV Intermittent every 8 hours  Nephro-asge 1 Tablet(s) Oral daily  sodium bicarbonate 1300 milliGRAM(s) Oral three times a day    MEDICATIONS  (PRN):  acetaminophen     Tablet .. 650 milliGRAM(s) Oral every 6 hours PRN Temp greater or equal to 38C (100.4F), Mild Pain (1 - 3)  melatonin 3 milliGRAM(s) Oral at bedtime PRN Insomnia  ondansetron Injectable 4 milliGRAM(s) IV Push every 8 hours PRN Nausea and/or Vomiting    Allergies    No Known Allergies    INTERVAL HPI/OVERNIGHT EVENTS:  Patient S&E at bedside. No c/o pain or SOB at rest.    VITAL SIGNS:  T(F): 97.5 (09-24-24 @ 04:48)  HR: 64 (09-24-24 @ 04:48)  BP: 159/62 (09-24-24 @ 04:48)  RR: 18 (09-24-24 @ 04:48)  SpO2: 96% (09-24-24 @ 04:48)      PHYSICAL EXAM:  Constitutional: NAD  Eyes: sclera non-icteric  Neck: no JVD  Respiratory: decreased BS bases ant.  Cardiovascular: RRR, no M/R/G  Gastrointestinal: soft, NT  Extremities: no calf tenderness; +edema decreased  Neurological: Awake, alert.    Labs:             7.4    2.40  )-----------( 58       ( 09-23 @ 06:00 )             24.9                7.7    2.50  )-----------( 64       ( 09-22 @ 06:00 )             25.1       09-23    143  |  109[H]  |  117[H]  ----------------------------<  126[H]  5.0   |  13[L]  |  10.17[H]    Ca    6.7[L]      23 Sep 2024 05:38      Consultant notes reviewed : YES [x ] ; NO [ ]  
HANG MADERA, 89y Female  MRN: [id number]  ATTENDING: Humble Michele    HPI:  89F, PMHx HTN, herpes zoster, CKD, multinodular thyroid,breast cancer, colon cancer (previously on Keytruda), refractory anemia/MDS, treated supportively and ambulatory (erythropoietin supplements), no recent exposure to systemic chemotherapy, is admitted with generalized weakness. Patient daughter at bedside reports that she have been getting more weaker and unable to ambulate as much. She is also complaining of sob and more generalized edema.    pt saw PCP on Monday and hgb was 7 at that time. Prior to 3 weeks ago, patient was walking independently and now she is unable to walk on her own. Daughter states patient is unsteady on her fee  In the ED, patient was found to have anemia with hgb of 7.1 and worsening Cr of around 9 previously was 6. Refusing HD. She is agreeable to PRBC. She also had CT which shows nonspecific colitis, Increasing right pleural effusion with underlying passive atelectasis with questionable pneumonia. Also suspicion for main and right intrahepatic portal vein bland or tumor thrombus, similar to prior. Pancreatic head/mesenteric mass with central necrosis, similar to prior.Denies any fevers, chills, nausea or vomiting.    MEDICATIONS:  acetaminophen     Tablet .. 650 milliGRAM(s) Oral every 6 hours PRN  amLODIPine   Tablet 10 milliGRAM(s) Oral daily  calcium acetate 1334 milliGRAM(s) Oral three times a day with meals  cefTRIAXone   IVPB 1000 milliGRAM(s) IV Intermittent every 24 hours  melatonin 3 milliGRAM(s) Oral at bedtime PRN  metroNIDAZOLE  IVPB      metroNIDAZOLE  IVPB 500 milliGRAM(s) IV Intermittent every 8 hours  Nephro-sage 1 Tablet(s) Oral daily  ondansetron Injectable 4 milliGRAM(s) IV Push every 8 hours PRN  sodium bicarbonate 1300 milliGRAM(s) Oral two times a day    All other medications reviewed.    SUBJECTIVE:  Resting, offers no new complaints.    VITALS:  T(C): 36.3 (09-16-24 @ 05:05), Max: 36.6 (09-15-24 @ 12:10)  T(F): 97.4 (09-16-24 @ 05:05), Max: 97.8 (09-15-24 @ 12:10)  HR: 72 (09-16-24 @ 05:05) (72 - 76)  BP: 152/61 (09-16-24 @ 05:05) (141/60 - 152/61)    PHYSICAL EXAM:  Constitutional: alert, well developed  HEENT: normocephalic, anicteric sclerae, no mucositis or thrush  Respiratory: bilateral clear to auscultation anteriorly  Cardiovascular : S1, S2 regular, rhythmic, no murmurs, gallops or rubs  Abdomen: soft, distended, + normoactive BS, no palpable HS- megaly  Extremities: no tenderness;  -c/c/e, pulses equal bilaterally    LABS:  (09-16) WBC: 2.12 K/uL,Hemoglobin: 8.5 g/dL, Hematocrit: 28.3 %,  Platelet: 64 K/uL    RADIOLOGY:  ACC: [id number] EXAM:  CT CHEST   ORDERED BY:  MARIFER GIL   PROCEDURE DATE:  09/13/2024    INTERPRETATION:  CLINICAL INFORMATION: Fluid overload.  COMPARISON: 06/22/2023.  CONTRAST/COMPLICATIONS:  IV Contrast: NONE  Oral Contrast: NONE  Complications: None reported at time of study completion  PROCEDURE:  CT of the Chest was performed.  Sagittal and coronal reformats were performed.  FINDINGS:  LUNGS AND LARGE AIRWAYS: Patent central airways. No pulmonary nodules.   Compression atelectasis dependent right lower lobe. Calcified granulomata   left lower lobe and probably within the compressed right lower lobe.  PLEURA: Mild to moderate right and a small left pleural effusion,   increased in comparison to the prior study.  VESSELS: Atherosclerotic calcification including the coronary arteries.   Ycznbs-l-Lakp with tip at the cavoatrial junction.  HEART: Heart size is normal. Mild pericardial effusion.  MEDIASTINUM AND DIONICIO: No lymphadenopathy.  CHEST WALL AND LOWER NECK:Stable appearance of bilateral thyroid nodules   and substernal thyroid extension.  VISUALIZED UPPER ABDOMEN: Probable numerous hepatic cysts.   Cholecystectomy. Upper portion of previously characterized right   mesenteric mass is noted. Probable splenomegaly.  BONES: Within normal limits.    IMPRESSION:  Mild to moderate right pleural, small left pleural effusion and mild   pericardial effusion. No evidence of pulmonary edema.      
HANG MADERA, 89y Female  MRN: [id number]  ATTENDING: Humble Michele    HPI:  89F, PMHx HTN, herpes zoster, CKD, multinodular thyroid,breast cancer, colon cancer (previously on Keytruda), refractory anemia/MDS, treated supportively and ambulatory (erythropoietin supplements), no recent exposure to systemic chemotherapy, is admitted with generalized weakness. Patient daughter at bedside reports that she have been getting more weaker and unable to ambulate as much. She is also complaining of sob and more generalized edema.    pt saw PCP on Monday and hgb was 7 at that time. Prior to 3 weeks ago, patient was walking independently and now she is unable to walk on her own. Daughter states patient is unsteady on her fee  In the ED, patient was found to have anemia with hgb of 7.1 and worsening Cr of around 9 previously was 6. Refusing HD. She is agreeable to PRBC. She also had CT which shows nonspecific colitis, Increasing right pleural effusion with underlying passive atelectasis with questionable pneumonia. Also suspicion for main and right intrahepatic portal vein bland or tumor thrombus, similar to prior. Pancreatic head/mesenteric mass with central necrosis, similar to prior.Denies any fevers, chills, nausea or vomiting.    MEDICATIONS:  acetaminophen     Tablet .. 650 milliGRAM(s) Oral every 6 hours PRN  amLODIPine   Tablet 10 milliGRAM(s) Oral daily  calcium acetate 1334 milliGRAM(s) Oral three times a day with meals  cefTRIAXone   IVPB 1000 milliGRAM(s) IV Intermittent every 24 hours  melatonin 3 milliGRAM(s) Oral at bedtime PRN  metroNIDAZOLE  IVPB      metroNIDAZOLE  IVPB 500 milliGRAM(s) IV Intermittent every 8 hours  Nephro-sage 1 Tablet(s) Oral daily  ondansetron Injectable 4 milliGRAM(s) IV Push every 8 hours PRN  sodium bicarbonate 1300 milliGRAM(s) Oral two times a day    All other medications reviewed.    SUBJECTIVE:  Feeling better post PRBC    VITALS:  T(C): 36.3 (09-16-24 @ 05:05), Max: 36.6 (09-15-24 @ 12:10)  T(F): 97.4 (09-16-24 @ 05:05), Max: 97.8 (09-15-24 @ 12:10)  HR: 72 (09-16-24 @ 05:05) (72 - 76)  BP: 152/61 (09-16-24 @ 05:05) (141/60 - 152/61)    PHYSICAL EXAM:  Constitutional: alert, well developed  HEENT: normocephalic, anicteric sclerae, no mucositis or thrush  Respiratory: bilateral clear to auscultation anteriorly  Cardiovascular : S1, S2 regular, rhythmic, no murmurs, gallops or rubs  Abdomen: soft, distended, + normoactive BS, no palpable HS- megaly  Extremities: no tenderness;  -c/c/e, pulses equal bilaterally    LABS:  (09-16) WBC: 2.12 K/uL,Hemoglobin: 8.5 g/dL, Hematocrit: 28.3 %,  Platelet: 64 K/uL    RADIOLOGY:  ACC: [id number] EXAM:  CT CHEST   ORDERED BY:  MARIFER GIL   PROCEDURE DATE:  09/13/2024    INTERPRETATION:  CLINICAL INFORMATION: Fluid overload.  COMPARISON: 06/22/2023.  CONTRAST/COMPLICATIONS:  IV Contrast: NONE  Oral Contrast: NONE  Complications: None reported at time of study completion  PROCEDURE:  CT of the Chest was performed.  Sagittal and coronal reformats were performed.  FINDINGS:  LUNGS AND LARGE AIRWAYS: Patent central airways. No pulmonary nodules.   Compression atelectasis dependent right lower lobe. Calcified granulomata   left lower lobe and probably within the compressed right lower lobe.  PLEURA: Mild to moderate right and a small left pleural effusion,   increased in comparison to the prior study.  VESSELS: Atherosclerotic calcification including the coronary arteries.   Nwbcrk-t-Ybwt with tip at the cavoatrial junction.  HEART: Heart size is normal. Mild pericardial effusion.  MEDIASTINUM AND DIONICIO: No lymphadenopathy.  CHEST WALL AND LOWER NECK:Stable appearance of bilateral thyroid nodules   and substernal thyroid extension.  VISUALIZED UPPER ABDOMEN: Probable numerous hepatic cysts.   Cholecystectomy. Upper portion of previously characterized right   mesenteric mass is noted. Probable splenomegaly.  BONES: Within normal limits.    IMPRESSION:  Mild to moderate right pleural, small left pleural effusion and mild   pericardial effusion. No evidence of pulmonary edema.

## 2024-09-25 NOTE — CHART NOTE - NSCHARTNOTESELECT_GEN_ALL_CORE
Event Note
NP Hospitalist/Off Service Note
Nutrition Services
Off Service Note
Off Service Note
Event Note
Off Service Note

## 2024-09-25 NOTE — CHART NOTE - NSCHARTNOTEFT_GEN_A_CORE
Pt optimized for DC with home hospice. HHA and home equipment in place.  Daughter [place] updated     Case discussed with Dr. Michele

## 2024-10-06 ENCOUNTER — EMERGENCY (EMERGENCY)
Facility: HOSPITAL | Age: 89
LOS: 1 days | Discharge: HOME | End: 2024-10-06
Attending: STUDENT IN AN ORGANIZED HEALTH CARE EDUCATION/TRAINING PROGRAM | Admitting: STUDENT IN AN ORGANIZED HEALTH CARE EDUCATION/TRAINING PROGRAM
Payer: MEDICARE

## 2024-10-06 ENCOUNTER — EMERGENCY (EMERGENCY)
Facility: HOSPITAL | Age: 89
LOS: 1 days | Discharge: ANOTHER HOSPITAL | End: 2024-10-06
Attending: STUDENT IN AN ORGANIZED HEALTH CARE EDUCATION/TRAINING PROGRAM | Admitting: STUDENT IN AN ORGANIZED HEALTH CARE EDUCATION/TRAINING PROGRAM
Payer: MEDICARE

## 2024-10-06 VITALS
TEMPERATURE: 98 F | SYSTOLIC BLOOD PRESSURE: 146 MMHG | OXYGEN SATURATION: 97 % | DIASTOLIC BLOOD PRESSURE: 60 MMHG | HEART RATE: 85 BPM | RESPIRATION RATE: 18 BRPM

## 2024-10-06 VITALS
OXYGEN SATURATION: 98 % | HEART RATE: 92 BPM | DIASTOLIC BLOOD PRESSURE: 78 MMHG | WEIGHT: 130.07 LBS | HEIGHT: 64 IN | TEMPERATURE: 98 F | SYSTOLIC BLOOD PRESSURE: 142 MMHG | RESPIRATION RATE: 19 BRPM

## 2024-10-06 VITALS
HEART RATE: 83 BPM | HEIGHT: 64 IN | OXYGEN SATURATION: 96 % | RESPIRATION RATE: 18 BRPM | DIASTOLIC BLOOD PRESSURE: 61 MMHG | TEMPERATURE: 98 F | SYSTOLIC BLOOD PRESSURE: 143 MMHG

## 2024-10-06 DIAGNOSIS — Z90.49 ACQUIRED ABSENCE OF OTHER SPECIFIED PARTS OF DIGESTIVE TRACT: Chronic | ICD-10-CM

## 2024-10-06 DIAGNOSIS — D35.1 BENIGN NEOPLASM OF PARATHYROID GLAND: Chronic | ICD-10-CM

## 2024-10-06 DIAGNOSIS — Z98.890 OTHER SPECIFIED POSTPROCEDURAL STATES: Chronic | ICD-10-CM

## 2024-10-06 PROCEDURE — 30903 CONTROL OF NOSEBLEED: CPT | Mod: RT

## 2024-10-06 PROCEDURE — 99283 EMERGENCY DEPT VISIT LOW MDM: CPT

## 2024-10-06 PROCEDURE — 30901 CONTROL OF NOSEBLEED: CPT

## 2024-10-06 PROCEDURE — 99284 EMERGENCY DEPT VISIT MOD MDM: CPT | Mod: 25

## 2024-10-06 PROCEDURE — 30903 CONTROL OF NOSEBLEED: CPT | Mod: RT,77

## 2024-10-06 PROCEDURE — 30901 CONTROL OF NOSEBLEED: CPT | Mod: RT

## 2024-10-06 PROCEDURE — 99283 EMERGENCY DEPT VISIT LOW MDM: CPT | Mod: 25

## 2024-10-06 RX ORDER — OXYMETAZOLINE HCL 0.05 %
2 SPRAY, NON-AEROSOL (ML) NASAL ONCE
Refills: 0 | Status: COMPLETED | OUTPATIENT
Start: 2024-10-06 | End: 2024-10-06

## 2024-10-06 RX ORDER — SILVER NITRATE
1 CRYSTALS MISCELLANEOUS ONCE
Refills: 0 | Status: COMPLETED | OUTPATIENT
Start: 2024-10-06 | End: 2024-10-06

## 2024-10-06 RX ADMIN — Medication 5 MILLILITER(S): at 03:20

## 2024-10-06 RX ADMIN — Medication 2 SPRAY(S): at 03:20

## 2024-10-06 RX ADMIN — Medication 5 MILLILITER(S): at 16:27

## 2024-10-06 RX ADMIN — Medication 1 APPLICATION(S): at 03:20

## 2024-10-06 NOTE — ED ADULT TRIAGE NOTE - ARRIVAL INFO ADDITIONAL COMMENTS
Patient presents to ED with epistaxis after removing her rhinorocket this morning to right nares.. Patient is currently on hospice and family believed that she removed due to the fact that it was uncomfortable. No blood thinners. Received Lorazepam this AM for anxiety.

## 2024-10-06 NOTE — ED PROVIDER NOTE - CLINICAL SUMMARY MEDICAL DECISION MAKING FREE TEXT BOX
89-year-old female with a history of hypertension MDS CKD on hospice care not taking any medication including anticoagulants presents with epistaxis of her right nose.  Admits all day has been having bleeding from the right nostril.  Brought in by EMS with aide and daughter at bedside.  Exam as stated  Used Afrin and TXA soaked gauze and holding compression, will  reassess to see if possible to cauterize 89-year-old female with a history of hypertension MDS CKD on hospice care not taking any medication including anticoagulants presents with epistaxis of her right nose.  Admits all day has been having bleeding from the right nostril.  Brought in by EMS with aide and daughter at bedside.  Exam as stated  Used Afrin and TXA soaked gauze and holding compression, will  reassess to see if possible to cauterize  Pt required nasal packing via rhino rocket. Bleeding improved  Patient to be discharged from ED. Any available test results were discussed with patient and/or family. Verbal instructions given, including instructions to return to ED immediately for any new, worsening, or concerning symptoms. Patient endorsed understanding. Written discharge instructions additionally given, including follow-up plan.

## 2024-10-06 NOTE — ED PROVIDER NOTE - PHYSICAL EXAMINATION
VITAL SIGNS: I have reviewed nursing notes and confirm.   GEN: Well-developed; well-nourished; in no acute distress. Speaking full sentences.  SKIN: Warm, pink, no rash, no diaphoresis, no cyanosis, well perfused.   HEAD: Normocephalic; atraumatic.    NECK: Supple; non tender. RIGHT nostril (+) bleeding, no lesion identified.   EYES: Pupils 3mm equal, round, reactive to light and accomodation, conjunctiva and sclera clear.    MSK: Normal range of motion and movement of all 4 extremities. No apparent joint or muscular pain throughout.    NEURO: Alert & oriented x 3, Grossly unremarkable. Sensory and motor intact throughout. No focal deficits. Gait: Fluid. Normal speech and coordination.

## 2024-10-06 NOTE — ED PROVIDER NOTE - PHYSICAL EXAMINATION
CONSTITUTIONAL: NAD  SKIN: Warm dry  HEAD: NCAT  EYES: NL inspection  ENT: MMM  +active bleeding from RT nostril  NEURO: Grossly unremarkable  PSYCH: Cooperative, appropriate.

## 2024-10-06 NOTE — ED PROVIDER NOTE - PATIENT PORTAL LINK FT
You can access the FollowMyHealth Patient Portal offered by [facility] by registering at the following website: [email or web address] By joining [email or web address] FollowMyHealth portal, you will also be able to view your health information using other applications (apps) compatible with our system.

## 2024-10-06 NOTE — ED PROVIDER NOTE - OBJECTIVE STATEMENT
89-year-old female with a history of hypertension MDS CKD on hospice care not taking any medication including anticoagulants presenting with right nosebleed s/p pulling out placed rhinorocket placed this AM. Pulled it out herself. Still with nosebleed on right side. Denies any chest pain, abdominal pain, shortness of breath, nausea/vomiting, headaches, fevers, chills  subjective neurological deficits.

## 2024-10-06 NOTE — ED PROVIDER NOTE - CLINICAL SUMMARY MEDICAL DECISION MAKING FREE TEXT BOX
This patient on NO anticoagulation  presents with RIGHT epistaxis, with active bleeding requiring intervention. The patient is hemodynamically stable without evidence of symptomatic anemia. There are no risk factors for bleeding disorders. Denies any chest pain, shortness of breath, lightheadedness, traumatic injuries, visual complaints, neck pain. Protecting airway at this time.   DDX: Anterior epistaxis, posterior epstaxis  PLAN:   - Will treat w/ direct pressure, oxymetazoline PRN, possibly placement of packing with TXA or a rhinorocket if unable to control bleeding.   - Will offer patient the option of silver nitrate cautery if a bleeding vessel is identified.  - ENT Consult if unable to control with the above methods.  - Re-evaluation after treatment for re-bleeding. Discharge home with nasal precautions, saline spray, follow up with ENT outpatient.     S/p TXA w/ merocele with good relief after 20 minutes of nasal bridge pressure.

## 2024-10-06 NOTE — CHART NOTE - NSCHARTNOTESELECT_GEN_ALL_CORE
Pt discharged per MD's order. PIV removed. Shaffer and drains left in place, educated on caring for shaffer and RUDDY's while at home, supplies provided to pt. Discharge instructions reviewed with pt, verbalized understanding. Family at the  to assist with DC. All personal belongings with pt. Pt discharged via wheelchair, with family, no transport needed.      Event Note

## 2024-10-06 NOTE — ED PROVIDER NOTE - NSFOLLOWUPINSTRUCTIONS_ED_ALL_ED_FT
Nosebleed    WHAT YOU NEED TO KNOW:    A nosebleed, or epistaxis, occurs when one or more of the blood vessels in your nose break. You may have dark or bright red blood from one or both nostrils. A nosebleed is most commonly caused by dry air or picking your nose. A direct blow to your nose, irritation from a cold or allergies, or a foreign object can also cause a nosebleed.     DISCHARGE INSTRUCTIONS:    Return to the emergency department if:   •Your nasal packing is soaked with blood.      •Your nose is still bleeding after 20 minutes, even after you pinch it.       •You have a foul-smelling discharge coming out of your nose.      •You feel so weak and dizzy that you have trouble standing up.      •You have trouble breathing or talking.       Contact your healthcare provider if:   •You have a fever and are vomiting.      •You have pain in and around your nose that is getting worse even after you take pain medicines.      •Your nasal pack is loose.      •You have questions or concerns about your condition or care.      First aid:   •Sit up and lean forward. This will help prevent you from swallowing blood. Spit blood and saliva into a bowl.       •Apply pressure to your nose. Use 2 fingers to pinch your nose shut for 10       •Apply ice on the bridge of your nose to decrease swelling and bleeding. Use a cold pack or put crushed ice in a plastic bag. Cover it with a towel to protect your skin.      •Pack your nose with a cotton ball, tissue, tampon, or gauze bandage to stop the bleeding.      Medicines:   •Medicines applied to a small piece of cotton and placed in your nose. Medicine may also be sprayed in or applied directly to your nose. You may need medicine to prevent an infection. If bleeding is severe, medicine may be injected into a blood vessel in your nose.       •Take your medicine as directed. Contact your healthcare provider if you think your medicine is not helping or if you have side effects. Tell him of her if you are allergic to any medicine. Keep a list of the medicines, vitamins, and herbs you take. Include the amounts, and when and why you take them. Bring the list or the pill bottles to follow-up visits. Carry your medicine list with you in case of an emergency.      Prevent another nosebleed:   •Keep your nose moist. Put a small amount of petroleum jelly inside your nostrils as needed. Use a saline (saltwater) nasal spray. Do not put anything else inside your nose unless your healthcare provider says it is okay. Do not use oil-based lubricants if you use oxygen therapy. They may be flammable.      •Use a cool mist humidifier to increase air moisture in your home. This will help your nose stay moist.       •Do not pick or blow your nose for at least a week. You can irritate or damage your nose if you pick it. Blowing your nose too hard may cause the bleeding to start again. Do not bend over or strain as this can cause the bleeding to start again.      •Avoid irritants such as tobacco smoke or chemical sprays such as .      Follow up with your healthcare provider as directed: Any packing in your nose should be removed within 2 to 3 days. Write down your questions so you remember to ask them during your visits.        © Copyright [employer] 2020 Follow up with the ENT in 2 days to remove the rhino rocket.    ~~~~    Nosebleed    WHAT YOU NEED TO KNOW:    A nosebleed, or epistaxis, occurs when one or more of the blood vessels in your nose break. You may have dark or bright red blood from one or both nostrils. A nosebleed is most commonly caused by dry air or picking your nose. A direct blow to your nose, irritation from a cold or allergies, or a foreign object can also cause a nosebleed.     DISCHARGE INSTRUCTIONS:    Return to the emergency department if:   •Your nasal packing is soaked with blood.      •Your nose is still bleeding after 20 minutes, even after you pinch it.       •You have a foul-smelling discharge coming out of your nose.      •You feel so weak and dizzy that you have trouble standing up.      •You have trouble breathing or talking.       Contact your healthcare provider if:   •You have a fever and are vomiting.      •You have pain in and around your nose that is getting worse even after you take pain medicines.      •Your nasal pack is loose.      •You have questions or concerns about your condition or care.      First aid:   •Sit up and lean forward. This will help prevent you from swallowing blood. Spit blood and saliva into a bowl.       •Apply pressure to your nose. Use 2 fingers to pinch your nose shut for 10       •Apply ice on the bridge of your nose to decrease swelling and bleeding. Use a cold pack or put crushed ice in a plastic bag. Cover it with a towel to protect your skin.      •Pack your nose with a cotton ball, tissue, tampon, or gauze bandage to stop the bleeding.      Medicines:   •Medicines applied to a small piece of cotton and placed in your nose. Medicine may also be sprayed in or applied directly to your nose. You may need medicine to prevent an infection. If bleeding is severe, medicine may be injected into a blood vessel in your nose.       •Take your medicine as directed. Contact your healthcare provider if you think your medicine is not helping or if you have side effects. Tell him of her if you are allergic to any medicine. Keep a list of the medicines, vitamins, and herbs you take. Include the amounts, and when and why you take them. Bring the list or the pill bottles to follow-up visits. Carry your medicine list with you in case of an emergency.      Prevent another nosebleed:   •Keep your nose moist. Put a small amount of petroleum jelly inside your nostrils as needed. Use a saline (saltwater) nasal spray. Do not put anything else inside your nose unless your healthcare provider says it is okay. Do not use oil-based lubricants if you use oxygen therapy. They may be flammable.      •Use a cool mist humidifier to increase air moisture in your home. This will help your nose stay moist.       •Do not pick or blow your nose for at least a week. You can irritate or damage your nose if you pick it. Blowing your nose too hard may cause the bleeding to start again. Do not bend over or strain as this can cause the bleeding to start again.      •Avoid irritants such as tobacco smoke or chemical sprays such as .      Follow up with your healthcare provider as directed: Any packing in your nose should be removed within 2 to 3 days. Write down your questions so you remember to ask them during your visits.        © Copyright [employer] 2020

## 2024-10-06 NOTE — ED ADULT NURSE NOTE - NSFALLHARMRISKINTERV_ED_ALL_ED
Assistance OOB with selected safe patient handling equipment if applicable/Assistance with ambulation/Communicate risk of Fall with Harm to all staff, patient, and family/Monitor gait and stability/Provide visual cue: red socks, yellow wristband, yellow gown, etc/Reinforce activity limits and safety measures with patient and family/Bed in lowest position, wheels locked, appropriate side rails in place/Call bell, personal items and telephone in reach/Instruct patient to call for assistance before getting out of bed/chair/stretcher/Non-slip footwear applied when patient is off stretcher/[place] to call system/Physically safe environment - no spills, clutter or unnecessary equipment/Purposeful Proactive Rounding/Room/bathroom lighting operational, light cord in reach

## 2024-10-06 NOTE — ED ADULT NURSE NOTE - NSFALLUNIVINTERV_ED_ALL_ED
Pt calls stating he is not feeling well and needs to reschedule. Appt rescheduled to 9/30 at 0830.  
Bed/Stretcher in lowest position, wheels locked, appropriate side rails in place/Call bell, personal items and telephone in reach/Instruct patient to call for assistance before getting out of bed/chair/stretcher/Non-slip footwear applied when patient is off stretcher/[place] to call system/Physically safe environment - no spills, clutter or unnecessary equipment/Purposeful proactive rounding/Room/bathroom lighting operational, light cord in reach

## 2024-10-06 NOTE — ED PROVIDER NOTE - OBJECTIVE STATEMENT
89-year-old female with a history of hypertension MDS CKD on hospice care not taking any medication including anticoagulants presents with epistaxis of her right nose.  Admits all day has been having bleeding from the right nostril.  Brought in by EMS with aide and daughter at bedside.

## 2024-10-06 NOTE — ED ADULT NURSE NOTE - OBJECTIVE STATEMENT
pt came in from home with epistaxis after removing her rhinorocket this morning to right nares.. Patient is currently on hospice and family believed that she removed due to the fact that it was uncomfortable. No blood thinners. Received Lorazepam this AM for anxiety. Bleeding controlled on arrival.

## 2024-10-06 NOTE — ED PROVIDER NOTE - PATIENT PORTAL LINK FT
You can access the FollowMyHealth Patient Portal offered by [facility] by registering at the following website: [email or web address]. By joining [facility] FollowMyHealth portal, you will also be able to view your health information using other applications (apps) compatible with our system.

## 2024-10-06 NOTE — ED PROVIDER NOTE - CARE PROVIDER_API CALL
Migel Moser  Otolaryngology  [address], [place] [address]  Phone: [phone]  Fax: [phone]  Follow Up Time: 1-3 Days

## 2024-10-06 NOTE — ED ADULT NURSE NOTE - OBJECTIVE STATEMENT
Patient presents to ED complaining of a nose bleed starting 2 days ago. Patient states she is on home hospice, dnr/dni and wants to be made comfortable with nose bleed. Patient axox4, with family at the bedside. Patient does not ambulate at baseline.

## 2024-10-06 NOTE — ED PROCEDURE NOTE - CPROC ED NASAL DETAIL1
The source of the bleeding was clearly identified./Topical thrombin sprayed onto the affected area./The anatomic location was packed./The area was cauterized with silver nitrate./The bleeding stopped.

## 2024-10-06 NOTE — ED ADULT TRIAGE NOTE - CHIEF COMPLAINT QUOTE
nosebleed x 1 day pt is in renal failure on hospice  pt  from home biba   chief complaint  nosebleed

## 2024-10-11 NOTE — CHART NOTE - NSCHARTNOTEFT_GEN_A_CORE
89 y o female presenting to the ED on 10/6/24 for nose bleed as per chart.  SW called to assist with scheduling the recommended ENT follow up and received no answer.  Contact information was provided via voicemail.

## 2024-10-15 PROCEDURE — 83550 IRON BINDING TEST: CPT

## 2024-10-15 PROCEDURE — 86900 BLOOD TYPING SEROLOGIC ABO: CPT

## 2024-10-15 PROCEDURE — P9016: CPT

## 2024-10-15 PROCEDURE — 80048 BASIC METABOLIC PNL TOTAL CA: CPT

## 2024-10-15 PROCEDURE — 83605 ASSAY OF LACTIC ACID: CPT

## 2024-10-15 PROCEDURE — 83880 ASSAY OF NATRIURETIC PEPTIDE: CPT

## 2024-10-15 PROCEDURE — 71250 CT THORAX DX C-: CPT | Mod: MC

## 2024-10-15 PROCEDURE — 97162 PT EVAL MOD COMPLEX 30 MIN: CPT

## 2024-10-15 PROCEDURE — 83036 HEMOGLOBIN GLYCOSYLATED A1C: CPT

## 2024-10-15 PROCEDURE — 83010 ASSAY OF HAPTOGLOBIN QUANT: CPT

## 2024-10-15 PROCEDURE — 74176 CT ABD & PELVIS W/O CONTRAST: CPT | Mod: MC

## 2024-10-15 PROCEDURE — 78582 LUNG VENTILAT&PERFUS IMAGING: CPT | Mod: MC

## 2024-10-15 PROCEDURE — 82746 ASSAY OF FOLIC ACID SERUM: CPT

## 2024-10-15 PROCEDURE — 85018 HEMOGLOBIN: CPT

## 2024-10-15 PROCEDURE — 85014 HEMATOCRIT: CPT

## 2024-10-15 PROCEDURE — 83540 ASSAY OF IRON: CPT

## 2024-10-15 PROCEDURE — 85025 COMPLETE CBC W/AUTO DIFF WBC: CPT

## 2024-10-15 PROCEDURE — 82310 ASSAY OF CALCIUM: CPT

## 2024-10-15 PROCEDURE — 82728 ASSAY OF FERRITIN: CPT

## 2024-10-15 PROCEDURE — 84484 ASSAY OF TROPONIN QUANT: CPT

## 2024-10-15 PROCEDURE — 86923 COMPATIBILITY TEST ELECTRIC: CPT

## 2024-10-15 PROCEDURE — 83970 ASSAY OF PARATHORMONE: CPT

## 2024-10-15 PROCEDURE — 36430 TRANSFUSION BLD/BLD COMPNT: CPT

## 2024-10-15 PROCEDURE — 93005 ELECTROCARDIOGRAM TRACING: CPT

## 2024-10-15 PROCEDURE — 71045 X-RAY EXAM CHEST 1 VIEW: CPT

## 2024-10-15 PROCEDURE — 83735 ASSAY OF MAGNESIUM: CPT

## 2024-10-15 PROCEDURE — 84100 ASSAY OF PHOSPHORUS: CPT

## 2024-10-15 PROCEDURE — 36415 COLL VENOUS BLD VENIPUNCTURE: CPT

## 2024-10-15 PROCEDURE — 93306 TTE W/DOPPLER COMPLETE: CPT

## 2024-10-15 PROCEDURE — 80053 COMPREHEN METABOLIC PANEL: CPT

## 2024-10-15 PROCEDURE — 82607 VITAMIN B-12: CPT

## 2024-10-15 PROCEDURE — 85027 COMPLETE CBC AUTOMATED: CPT

## 2024-10-15 PROCEDURE — 93970 EXTREMITY STUDY: CPT

## 2024-10-15 PROCEDURE — 85610 PROTHROMBIN TIME: CPT

## 2024-10-15 PROCEDURE — 86901 BLOOD TYPING SEROLOGIC RH(D): CPT

## 2024-10-15 PROCEDURE — 87507 IADNA-DNA/RNA PROBE TQ 12-25: CPT

## 2024-10-15 PROCEDURE — 86850 RBC ANTIBODY SCREEN: CPT

## 2024-10-15 PROCEDURE — 87040 BLOOD CULTURE FOR BACTERIA: CPT

## 2024-10-15 PROCEDURE — 82330 ASSAY OF CALCIUM: CPT

## 2024-10-15 PROCEDURE — 99285 EMERGENCY DEPT VISIT HI MDM: CPT | Mod: 25

## 2024-10-15 PROCEDURE — 84550 ASSAY OF BLOOD/URIC ACID: CPT

## 2024-10-15 PROCEDURE — A9540: CPT

## 2024-10-15 PROCEDURE — A9567: CPT

## 2024-10-15 PROCEDURE — 82272 OCCULT BLD FECES 1-3 TESTS: CPT

## 2024-10-15 PROCEDURE — 83690 ASSAY OF LIPASE: CPT

## 2024-12-26 NOTE — OB HISTORY
[Home] : at home, [unfilled] , at the time of the visit. [Medical Office: ([facility])___] : at the medical office located in  [Post-Menopause, No Sxs] : post-menopausal, currently without symptoms [Menopause Age: ____] : age at menopause was [unfilled] [___] : Living: [unfilled]

## 2025-03-20 NOTE — ASU PREOP CHECKLIST - ALLERGIES REVIEWED
Left Voicemail (1st Attempt) for the patient to call back and schedule the following:    Appointment type: RETURN DIABETES  Provider: Pamela Laura PA-C  Return date: Adrián 4/8/25  Specialty phone number: [phone]  Additional appointment(s) needed: N/A  Additonal Notes:  LVM, No MyChart, Due to changes in the providers schedule appointment on 4/8 needs to be rescheduled to first available slot.    Tye Blair on 3/20/2025 at 9:42 AM     done

## (undated) DEVICE — FORCEP RADIAL JAW 4 240CM DISP

## (undated) DEVICE — SNARE EXACTO COLD 9MMX230CM

## (undated) DEVICE — POLY TRAP ETRAP

## (undated) DEVICE — PLATE NESSY 170

## (undated) DEVICE — ENDOCUFF VISION SZ 2 LG GRN

## (undated) DEVICE — CONTAINER FORMALIN BUFF 10% 60ML

## (undated) DEVICE — SNARE POLYP SENS SM 13MM 240CM

## (undated) DEVICE — TUBING CAP SET ERBEFLO CLEVERCAP HYBRID CO2 FOR OLYMPUS SCOPES AND UCR

## (undated) DEVICE — SOL IRR POUR H2O 1000ML

## (undated) DEVICE — KIT ENDO PROCEDURE CUST W/VLV